# Patient Record
Sex: MALE | Race: WHITE | Employment: OTHER | URBAN - METROPOLITAN AREA
[De-identification: names, ages, dates, MRNs, and addresses within clinical notes are randomized per-mention and may not be internally consistent; named-entity substitution may affect disease eponyms.]

---

## 2018-07-31 ENCOUNTER — APPOINTMENT (OUTPATIENT)
Dept: CT IMAGING | Age: 53
End: 2018-07-31
Payer: COMMERCIAL

## 2018-07-31 ENCOUNTER — APPOINTMENT (OUTPATIENT)
Dept: ULTRASOUND IMAGING | Age: 53
End: 2018-07-31
Payer: COMMERCIAL

## 2018-07-31 ENCOUNTER — HOSPITAL ENCOUNTER (EMERGENCY)
Age: 53
Discharge: HOME | End: 2018-07-31
Attending: EMERGENCY MEDICINE
Payer: COMMERCIAL

## 2018-07-31 VITALS
BODY MASS INDEX: 29.4 KG/M2 | HEIGHT: 71 IN | DIASTOLIC BLOOD PRESSURE: 67 MMHG | SYSTOLIC BLOOD PRESSURE: 108 MMHG | WEIGHT: 210 LBS | TEMPERATURE: 97.5 F | RESPIRATION RATE: 16 BRPM | OXYGEN SATURATION: 98 % | HEART RATE: 95 BPM

## 2018-07-31 DIAGNOSIS — N50.819 TESTICLE PAIN: ICD-10-CM

## 2018-07-31 DIAGNOSIS — F32.0 MILD SINGLE CURRENT EPISODE OF MAJOR DEPRESSIVE DISORDER (HCC): Primary | ICD-10-CM

## 2018-07-31 LAB
ACETAMINOPHEN LEVEL: <5 MCG/ML (ref 10–30)
ALBUMIN SERPL-MCNC: 4.3 G/DL (ref 3.5–5.2)
ALP BLD-CCNC: 73 U/L (ref 40–129)
ALT SERPL-CCNC: 13 U/L (ref 0–40)
AMPHETAMINE SCREEN, URINE: POSITIVE
ANION GAP SERPL CALCULATED.3IONS-SCNC: 12 MMOL/L (ref 7–16)
AST SERPL-CCNC: 12 U/L (ref 0–39)
BACTERIA: ABNORMAL /HPF
BARBITURATE SCREEN URINE: NOT DETECTED
BASOPHILS ABSOLUTE: 0.1 E9/L (ref 0–0.2)
BASOPHILS RELATIVE PERCENT: 0.9 % (ref 0–2)
BENZODIAZEPINE SCREEN, URINE: NOT DETECTED
BILIRUB SERPL-MCNC: 0.3 MG/DL (ref 0–1.2)
BILIRUBIN URINE: ABNORMAL
BLOOD, URINE: NEGATIVE
BUN BLDV-MCNC: 20 MG/DL (ref 6–20)
CALCIUM SERPL-MCNC: 9.4 MG/DL (ref 8.6–10.2)
CANNABINOID SCREEN URINE: NOT DETECTED
CASTS: ABNORMAL /LPF
CHLORIDE BLD-SCNC: 97 MMOL/L (ref 98–107)
CLARITY: CLEAR
CO2: 25 MMOL/L (ref 22–29)
COCAINE METABOLITE SCREEN URINE: NOT DETECTED
COLOR: ABNORMAL
CREAT SERPL-MCNC: 0.9 MG/DL (ref 0.7–1.2)
EKG ATRIAL RATE: 100 BPM
EKG P AXIS: 48 DEGREES
EKG P-R INTERVAL: 182 MS
EKG Q-T INTERVAL: 334 MS
EKG QRS DURATION: 82 MS
EKG QTC CALCULATION (BAZETT): 430 MS
EKG R AXIS: -22 DEGREES
EKG T AXIS: 73 DEGREES
EKG VENTRICULAR RATE: 100 BPM
EOSINOPHILS ABSOLUTE: 0.35 E9/L (ref 0.05–0.5)
EOSINOPHILS RELATIVE PERCENT: 3.2 % (ref 0–6)
ETHANOL: <10 MG/DL (ref 0–0.08)
GFR AFRICAN AMERICAN: >60
GFR NON-AFRICAN AMERICAN: >60 ML/MIN/1.73
GLUCOSE BLD-MCNC: 247 MG/DL (ref 74–109)
GLUCOSE URINE: 250 MG/DL
HCT VFR BLD CALC: 41.4 % (ref 37–54)
HEMOGLOBIN: 14 G/DL (ref 12.5–16.5)
IMMATURE GRANULOCYTES #: 0.03 E9/L
IMMATURE GRANULOCYTES %: 0.3 % (ref 0–5)
KETONES, URINE: NEGATIVE MG/DL
LACTIC ACID: 1.1 MMOL/L (ref 0.5–2.2)
LEUKOCYTE ESTERASE, URINE: NEGATIVE
LIPASE: 104 U/L (ref 13–60)
LYMPHOCYTES ABSOLUTE: 4.1 E9/L (ref 1.5–4)
LYMPHOCYTES RELATIVE PERCENT: 37.7 % (ref 20–42)
MCH RBC QN AUTO: 29.5 PG (ref 26–35)
MCHC RBC AUTO-ENTMCNC: 33.8 % (ref 32–34.5)
MCV RBC AUTO: 87.3 FL (ref 80–99.9)
METHADONE SCREEN, URINE: NOT DETECTED
MONOCYTES ABSOLUTE: 0.93 E9/L (ref 0.1–0.95)
MONOCYTES RELATIVE PERCENT: 8.5 % (ref 2–12)
MUCUS: PRESENT
NEUTROPHILS ABSOLUTE: 5.37 E9/L (ref 1.8–7.3)
NEUTROPHILS RELATIVE PERCENT: 49.4 % (ref 43–80)
NITRITE, URINE: NEGATIVE
OPIATE SCREEN URINE: NOT DETECTED
PDW BLD-RTO: 12.6 FL (ref 11.5–15)
PH UA: 5.5 (ref 5–9)
PHENCYCLIDINE SCREEN URINE: NOT DETECTED
PLATELET # BLD: 398 E9/L (ref 130–450)
PMV BLD AUTO: 8.7 FL (ref 7–12)
POTASSIUM SERPL-SCNC: 4.3 MMOL/L (ref 3.5–5)
PROPOXYPHENE SCREEN: NOT DETECTED
PROTEIN UA: >=300 MG/DL
RBC # BLD: 4.74 E12/L (ref 3.8–5.8)
RBC UA: ABNORMAL /HPF (ref 0–2)
SALICYLATE, SERUM: <0.3 MG/DL (ref 0–30)
SODIUM BLD-SCNC: 134 MMOL/L (ref 132–146)
SPECIFIC GRAVITY UA: >=1.03 (ref 1–1.03)
TOTAL PROTEIN: 7.1 G/DL (ref 6.4–8.3)
TRICYCLIC ANTIDEPRESSANTS SCREEN SERUM: NEGATIVE NG/ML
TSH SERPL DL<=0.05 MIU/L-ACNC: 2.62 UIU/ML (ref 0.27–4.2)
UROBILINOGEN, URINE: 1 E.U./DL
WBC # BLD: 10.9 E9/L (ref 4.5–11.5)
WBC UA: ABNORMAL /HPF (ref 0–5)

## 2018-07-31 PROCEDURE — 93005 ELECTROCARDIOGRAM TRACING: CPT | Performed by: NURSE PRACTITIONER

## 2018-07-31 PROCEDURE — 99283 EMERGENCY DEPT VISIT LOW MDM: CPT

## 2018-07-31 PROCEDURE — 6360000002 HC RX W HCPCS: Performed by: EMERGENCY MEDICINE

## 2018-07-31 PROCEDURE — 2580000003 HC RX 258

## 2018-07-31 PROCEDURE — 74176 CT ABD & PELVIS W/O CONTRAST: CPT

## 2018-07-31 PROCEDURE — 80307 DRUG TEST PRSMV CHEM ANLYZR: CPT

## 2018-07-31 PROCEDURE — 83690 ASSAY OF LIPASE: CPT

## 2018-07-31 PROCEDURE — G0480 DRUG TEST DEF 1-7 CLASSES: HCPCS

## 2018-07-31 PROCEDURE — 81001 URINALYSIS AUTO W/SCOPE: CPT

## 2018-07-31 PROCEDURE — 84443 ASSAY THYROID STIM HORMONE: CPT

## 2018-07-31 PROCEDURE — 36415 COLL VENOUS BLD VENIPUNCTURE: CPT

## 2018-07-31 PROCEDURE — 80053 COMPREHEN METABOLIC PANEL: CPT

## 2018-07-31 PROCEDURE — 76870 US EXAM SCROTUM: CPT

## 2018-07-31 PROCEDURE — 96372 THER/PROPH/DIAG INJ SC/IM: CPT

## 2018-07-31 PROCEDURE — 83605 ASSAY OF LACTIC ACID: CPT

## 2018-07-31 PROCEDURE — 6370000000 HC RX 637 (ALT 250 FOR IP): Performed by: EMERGENCY MEDICINE

## 2018-07-31 PROCEDURE — 85025 COMPLETE CBC W/AUTO DIFF WBC: CPT

## 2018-07-31 RX ORDER — DOCUSATE SODIUM 100 MG/1
100 CAPSULE, LIQUID FILLED ORAL 2 TIMES DAILY
Qty: 20 CAPSULE | Refills: 0 | Status: ON HOLD | OUTPATIENT
Start: 2018-07-31 | End: 2020-07-31 | Stop reason: HOSPADM

## 2018-07-31 RX ORDER — IBUPROFEN 800 MG/1
800 TABLET ORAL ONCE
Status: COMPLETED | OUTPATIENT
Start: 2018-07-31 | End: 2018-07-31

## 2018-07-31 RX ORDER — CEFTRIAXONE 1 G/1
1 INJECTION, POWDER, FOR SOLUTION INTRAMUSCULAR; INTRAVENOUS ONCE
Status: COMPLETED | OUTPATIENT
Start: 2018-07-31 | End: 2018-07-31

## 2018-07-31 RX ORDER — CEPHALEXIN 500 MG/1
500 CAPSULE ORAL 3 TIMES DAILY
Qty: 21 CAPSULE | Refills: 0 | Status: SHIPPED | OUTPATIENT
Start: 2018-07-31 | End: 2018-08-07

## 2018-07-31 RX ORDER — NAPROXEN 500 MG/1
500 TABLET ORAL 2 TIMES DAILY
Qty: 14 TABLET | Refills: 0 | Status: SHIPPED | OUTPATIENT
Start: 2018-07-31 | End: 2018-12-11

## 2018-07-31 RX ORDER — DOXYCYCLINE HYCLATE 100 MG
100 TABLET ORAL 2 TIMES DAILY
Qty: 29 TABLET | Refills: 0 | Status: SHIPPED | OUTPATIENT
Start: 2018-07-31 | End: 2018-08-10

## 2018-07-31 RX ADMIN — IBUPROFEN 800 MG: 800 TABLET ORAL at 21:54

## 2018-07-31 RX ADMIN — WATER 10 ML: 1 INJECTION INTRAMUSCULAR; INTRAVENOUS; SUBCUTANEOUS at 21:57

## 2018-07-31 RX ADMIN — CEFTRIAXONE SODIUM 1 G: 1 INJECTION, POWDER, FOR SOLUTION INTRAMUSCULAR; INTRAVENOUS at 21:54

## 2018-07-31 ASSESSMENT — PAIN DESCRIPTION - PAIN TYPE: TYPE: ACUTE PAIN

## 2018-07-31 ASSESSMENT — PAIN SCALES - GENERAL
PAINLEVEL_OUTOF10: 9
PAINLEVEL_OUTOF10: 7

## 2018-07-31 ASSESSMENT — PAIN DESCRIPTION - DESCRIPTORS: DESCRIPTORS: DISCOMFORT

## 2018-07-31 ASSESSMENT — PAIN DESCRIPTION - LOCATION: LOCATION: GROIN

## 2018-07-31 NOTE — ED NOTES
FIRST PROVIDER CONTACT ASSESSMENT NOTE      Department of Emergency Medicine   7/31/18  7:24 PM    Chief Complaint: Groin Pain (complains of groin pain and lower abd pain )      History of Present Illness:    Taiwo Ross is a 46 y.o. male who presents to the ED by private car for Over 1 week history of groin and scrotal pain. Patient reports he was seen by his doctor for that and he said he has enlarged lymph nodes reports being on antibiotic. Patient also reports feeling very anxious but denies suicidal or homicidal ideations. Focused Screening Exam:  Constitutional:  Alert, appears stated age and is in no distress. *ALLERGIES*     Adhesive tape; Cymbalta [duloxetine hcl];  Lyrica [pregabalin]; and Tramadol     ED Triage Vitals [07/31/18 1923]   BP Temp Temp Source Pulse Resp SpO2 Height Weight   -- 97.5 °F (36.4 °C) Temporal 108 -- -- -- --        Initial Plan of Care:  Initiate Treatment-Testing, Proceed toTreatment Area When Bed Available for ED Attending/MLP to Continue Care    -----------------END OF FIRST PROVIDER CONTACT ASSESSMENT NOTE--------------  Electronically signed by FRANCIE Crane CNP   DD: 7/31/18         FRANCIE Crane CNP  07/31/18 1926

## 2018-08-01 NOTE — ED PROVIDER NOTES
Department of Emergency Medicine   ED  Provider Note  Admit Date/RoomTime: 7/31/2018  9:02 PM  ED Room: 10/10          History of Present Illness:  7/31/18, Time: 9:37 PM         Amber Cramer is a 46 y.o. male presenting to the ED for groin pain and psychiatric evaluation, beginning a couple days ago. The complaint has been persistent, moderate in severity, and worsened by nothing. The pt reports having lower abdominal pain and testicular pain. He states he has an infection to his groin area and is currently on Levaquin. He complains of pain and new onset of hematuria today. Pt also reports feeling depressed and very stressed recently. He denies any suicidal ideations or homicidal ideations. He denies any alcohol use or drug use. Pt denies any syncope, weakness, numbness, headache, fever, chills, cough, chest pain, sob, nausea, emesis, diarrhea, or any further complaints at this time. Review of Systems:   Pertinent positives and negatives are stated within HPI, all other systems reviewed and are negative.      --------------------------------------------- PAST HISTORY ---------------------------------------------  Past Medical History:  has a past medical history of Chronic pain; Depression; Osteoarthritis; and Type II or unspecified type diabetes mellitus without mention of complication, not stated as uncontrolled. Past Surgical History:  has a past surgical history that includes Varicose vein surgery and Arm Surgery. Social History:  reports that he has been smoking. He has a 15.00 pack-year smoking history. He has never used smokeless tobacco. He reports that he drinks alcohol. He reports that he uses drugs, including Marijuana. Family History: family history includes COPD in his father; Diabetes in his paternal grandfather. The patients home medications have been reviewed. Allergies: Adhesive tape; Cymbalta [duloxetine hcl]; Lidocaine; Lyrica [pregabalin];  Strattera [atomoxetine]; and Tramadol      ---------------------------------------------------PHYSICAL EXAM--------------------------------------    Constitutional/General: Alert and oriented x3, depressed appearing, non toxic in NAD  Head: Normocephalic and atraumatic  Eyes: PERRL, EOMI, conjunctiva normal  Mouth: Oropharynx clear, handling secretions, no asymmetry of the posterior oropharynx or uvular edema  Neck: Supple, full ROM, no meningeal signs  Respiratory: Lungs clear to auscultation bilaterally, no wheezes, rales, or rhonchi. Not in respiratory distress  Cardiovascular:  Regular rate. Regular rhythm. No murmurs, gallops, or rubs. 2+ distal pulses  GI:  Abdomen Soft, Diffuse lower abdominal tenderness, Non distended. +BS. No rebound, guarding, or rigidity. No pulsatile masses. Musculoskeletal: Moves all extremities x 4. Warm and well perfused  Integument: skin warm and dry. No rashes. Neurologic: GCS 15, no focal deficits, symmetric strength in the upper and lower extremities bilaterally  Psychiatric: Depressed. -SI. -HI. Genitourinary: Normal external exam. Nonspecific scrotal tenderness and no erythema. No scrotal edema. No penile discharge.      -------------------------------------------------- RESULTS -------------------------------------------------  I have personally reviewed all laboratory and imaging results for this patient. Results are listed below.      LABS:  Results for orders placed or performed during the hospital encounter of 07/31/18   CBC Auto Differential   Result Value Ref Range    WBC 10.9 4.5 - 11.5 E9/L    RBC 4.74 3.80 - 5.80 E12/L    Hemoglobin 14.0 12.5 - 16.5 g/dL    Hematocrit 41.4 37.0 - 54.0 %    MCV 87.3 80.0 - 99.9 fL    MCH 29.5 26.0 - 35.0 pg    MCHC 33.8 32.0 - 34.5 %    RDW 12.6 11.5 - 15.0 fL    Platelets 886 576 - 401 E9/L    MPV 8.7 7.0 - 12.0 fL    Neutrophils % 49.4 43.0 - 80.0 %    Immature Granulocytes % 0.3 0.0 - 5.0 %    Lymphocytes % 37.7 20.0 - 42.0 %    Monocytes % 8.5 2.0 - Wt 210 lb (95.3 kg)   SpO2 98%   BMI 29.29 kg/m²   Oxygen Saturation Interpretation: Normal    The patients available past medical records and past encounters were reviewed. ------------------------------ ED COURSE/MEDICAL DECISION MAKING----------------------  Medications   ibuprofen (ADVIL;MOTRIN) tablet 800 mg (800 mg Oral Given 7/31/18 2154)   cefTRIAXone (ROCEPHIN) injection 1 g (1 g Intramuscular Given 7/31/18 2154)   sterile water injection (10 mLs  Given 7/31/18 2157)       Medical Decision Making:    Pt presents for groin pain and psychiatric evaluation. Medication given. US scrotum/testicles, CT A/P, labs, and EKG obtained and reviewed. Results discussed with pt. Stable for discharge. Prescribed Naproxen, Colace, Keflex, and Doxycycline Hyclate. Advised follow-up with urology. This patient's ED course included: a personal history and physicial examination and re-evaluation prior to disposition    This patient has remained hemodynamically stable during their ED course. Counseling: The emergency provider has spoken with the patient and discussed todays results, in addition to providing specific details for the plan of care and counseling regarding the diagnosis and prognosis. Questions are answered at this time and they are agreeable with the plan.       --------------------------------- IMPRESSION AND DISPOSITION ---------------------------------    IMPRESSION  1. Mild single current episode of major depressive disorder ([facility] [address]    2. Testicle pain        DISPOSITION  Disposition: Discharge to home  Patient condition is stable    7/31/18, 9:37 PM.    This note is prepared by Joceline Osorio, acting as Scribe for Tyrell Gong MD.    Tyrell Gong MD:  The scribe's documentation has been prepared under my direction and personally reviewed by me in its entirety.   I confirm that the note above accurately reflects all work, treatment, procedures, and medical decision making performed by me.              Clemente Bergman MD  08/01/18 0195

## 2018-08-05 LAB
AMPHETAMINES, URINE: <200 NG/ML
METHAMPHETAMINE, URINE: <200 NG/ML
METHYLENEDIOXYAMPHETAMINE, UR: <200 NG/ML
METHYLENEDIOXYETHYLAMPHETAMINE, UR: <200 NG/ML
METHYLENEDIOXYMETHAMPHETAMINE, UR: <200 NG/ML

## 2018-09-13 ENCOUNTER — PREP FOR PROCEDURE (OUTPATIENT)
Dept: SURGERY | Age: 53
End: 2018-09-13

## 2018-09-13 RX ORDER — ONDANSETRON 4 MG/1
TABLET, ORALLY DISINTEGRATING ORAL
Refills: 0 | COMMUNITY
Start: 2018-08-29 | End: 2018-09-13

## 2018-09-13 RX ORDER — SODIUM CHLORIDE 9 MG/ML
INJECTION, SOLUTION INTRAVENOUS CONTINUOUS
Status: CANCELLED | OUTPATIENT
Start: 2018-09-13 | End: 2019-09-13

## 2018-09-13 RX ORDER — SENNOSIDES 15 MG
TABLET ORAL
Refills: 0 | COMMUNITY
Start: 2018-08-29 | End: 2019-05-07

## 2018-09-13 RX ORDER — TOPIRAMATE 25 MG/1
TABLET ORAL
Refills: 0 | COMMUNITY
Start: 2018-06-28 | End: 2018-09-13

## 2018-09-13 RX ORDER — FAMOTIDINE 20 MG/1
TABLET, FILM COATED ORAL
Refills: 0 | COMMUNITY
Start: 2018-08-29 | End: 2018-09-13

## 2018-09-20 ENCOUNTER — HOSPITAL ENCOUNTER (OUTPATIENT)
Age: 53
Setting detail: OUTPATIENT SURGERY
Discharge: HOME | End: 2018-09-20
Attending: SURGERY | Admitting: SURGERY
Payer: COMMERCIAL

## 2018-09-20 ENCOUNTER — ANESTHESIA (OUTPATIENT)
Dept: ENDOSCOPY | Age: 53
End: 2018-09-20
Payer: COMMERCIAL

## 2018-09-20 ENCOUNTER — ANESTHESIA EVENT (OUTPATIENT)
Dept: ENDOSCOPY | Age: 53
End: 2018-09-20
Payer: COMMERCIAL

## 2018-09-20 VITALS
HEIGHT: 71 IN | TEMPERATURE: 98.1 F | OXYGEN SATURATION: 98 % | WEIGHT: 212 LBS | SYSTOLIC BLOOD PRESSURE: 120 MMHG | HEART RATE: 78 BPM | DIASTOLIC BLOOD PRESSURE: 78 MMHG | BODY MASS INDEX: 29.68 KG/M2 | RESPIRATION RATE: 17 BRPM

## 2018-09-20 VITALS
SYSTOLIC BLOOD PRESSURE: 100 MMHG | OXYGEN SATURATION: 96 % | DIASTOLIC BLOOD PRESSURE: 63 MMHG | RESPIRATION RATE: 28 BRPM

## 2018-09-20 DIAGNOSIS — Z79.4 INSULIN-REQUIRING OR DEPENDENT TYPE II DIABETES MELLITUS (HCC): Primary | ICD-10-CM

## 2018-09-20 DIAGNOSIS — E11.9 INSULIN-REQUIRING OR DEPENDENT TYPE II DIABETES MELLITUS (HCC): Primary | ICD-10-CM

## 2018-09-20 LAB — METER GLUCOSE: 189 MG/DL (ref 70–110)

## 2018-09-20 PROCEDURE — 6360000002 HC RX W HCPCS: Performed by: NURSE ANESTHETIST, CERTIFIED REGISTERED

## 2018-09-20 PROCEDURE — 2720000010 HC SURG SUPPLY STERILE: Performed by: SURGERY

## 2018-09-20 PROCEDURE — 2580000003 HC RX 258: Performed by: NURSE ANESTHETIST, CERTIFIED REGISTERED

## 2018-09-20 PROCEDURE — 3700000000 HC ANESTHESIA ATTENDED CARE: Performed by: SURGERY

## 2018-09-20 PROCEDURE — C1773 RET DEV, INSERTABLE: HCPCS | Performed by: SURGERY

## 2018-09-20 PROCEDURE — 3609010600 HC COLONOSCOPY POLYPECTOMY SNARE/COLD BIOPSY: Performed by: SURGERY

## 2018-09-20 PROCEDURE — 88305 TISSUE EXAM BY PATHOLOGIST: CPT

## 2018-09-20 PROCEDURE — 2580000003 HC RX 258: Performed by: SURGERY

## 2018-09-20 PROCEDURE — 7100000011 HC PHASE II RECOVERY - ADDTL 15 MIN: Performed by: SURGERY

## 2018-09-20 PROCEDURE — 82962 GLUCOSE BLOOD TEST: CPT

## 2018-09-20 PROCEDURE — 3700000001 HC ADD 15 MINUTES (ANESTHESIA): Performed by: SURGERY

## 2018-09-20 PROCEDURE — 3609010300 HC COLONOSCOPY W/BIOPSY SINGLE/MULTIPLE: Performed by: SURGERY

## 2018-09-20 PROCEDURE — 7100000010 HC PHASE II RECOVERY - FIRST 15 MIN: Performed by: SURGERY

## 2018-09-20 RX ORDER — SODIUM CHLORIDE 9 MG/ML
INJECTION, SOLUTION INTRAVENOUS CONTINUOUS
Status: DISCONTINUED | OUTPATIENT
Start: 2018-09-20 | End: 2018-09-20 | Stop reason: HOSPADM

## 2018-09-20 RX ORDER — SODIUM CHLORIDE 9 MG/ML
INJECTION, SOLUTION INTRAVENOUS CONTINUOUS PRN
Status: DISCONTINUED | OUTPATIENT
Start: 2018-09-20 | End: 2018-09-20 | Stop reason: SDUPTHER

## 2018-09-20 RX ORDER — PROPOFOL 10 MG/ML
INJECTION, EMULSION INTRAVENOUS PRN
Status: DISCONTINUED | OUTPATIENT
Start: 2018-09-20 | End: 2018-09-20 | Stop reason: SDUPTHER

## 2018-09-20 RX ORDER — 0.9 % SODIUM CHLORIDE 0.9 %
10 VIAL (ML) INJECTION EVERY 12 HOURS SCHEDULED
Status: DISCONTINUED | OUTPATIENT
Start: 2018-09-20 | End: 2018-09-20 | Stop reason: HOSPADM

## 2018-09-20 RX ORDER — 0.9 % SODIUM CHLORIDE 0.9 %
10 VIAL (ML) INJECTION PRN
Status: DISCONTINUED | OUTPATIENT
Start: 2018-09-20 | End: 2018-09-20 | Stop reason: HOSPADM

## 2018-09-20 RX ADMIN — SODIUM CHLORIDE: 9 INJECTION, SOLUTION INTRAVENOUS at 08:53

## 2018-09-20 RX ADMIN — PROPOFOL 50 MG: 10 INJECTION, EMULSION INTRAVENOUS at 09:25

## 2018-09-20 RX ADMIN — PROPOFOL 40 MG: 10 INJECTION, EMULSION INTRAVENOUS at 09:20

## 2018-09-20 RX ADMIN — PROPOFOL 50 MG: 10 INJECTION, EMULSION INTRAVENOUS at 09:15

## 2018-09-20 RX ADMIN — PROPOFOL 150 MG: 10 INJECTION, EMULSION INTRAVENOUS at 09:00

## 2018-09-20 RX ADMIN — PROPOFOL 40 MG: 10 INJECTION, EMULSION INTRAVENOUS at 09:22

## 2018-09-20 RX ADMIN — SODIUM CHLORIDE: 9 INJECTION, SOLUTION INTRAVENOUS at 09:01

## 2018-09-20 RX ADMIN — PROPOFOL 50 MG: 10 INJECTION, EMULSION INTRAVENOUS at 09:10

## 2018-09-20 RX ADMIN — PROPOFOL 60 MG: 10 INJECTION, EMULSION INTRAVENOUS at 09:05

## 2018-09-20 ASSESSMENT — PAIN - FUNCTIONAL ASSESSMENT: PAIN_FUNCTIONAL_ASSESSMENT: 0-10

## 2018-09-20 ASSESSMENT — PAIN SCALES - GENERAL
PAINLEVEL_OUTOF10: 0

## 2018-09-20 ASSESSMENT — LIFESTYLE VARIABLES: SMOKING_STATUS: 1

## 2018-09-20 NOTE — ANESTHESIA PRE PROCEDURE
Department of Anesthesiology  Preprocedure Note       Name:  Madeleine Sandhoff   Age:  46 y.o.  :  1965                                          MRN:  [id number]         Date:  2018      Surgeon: Raquel Sanchez):  Maddie Franks MD    Procedure: Procedure(s):  COLONOSCOPY DIAGNOSTIC    Medications prior to admission:   Prior to Admission medications    Medication Sig Start Date End Date Taking? Authorizing Provider   docusate sodium (COLACE) 100 MG capsule Take 1 capsule by mouth 2 times daily 18  Yes Yadira Thomas MD   insulin glargine (LANTUS SOLOSTAR) 100 UNIT/ML injection pen Inject 30 Units into the skin 2 times daily. Indications: Insulin-Resistant Diabetes  Patient taking differently: Inject 20 Units into the skin 2 times daily  14  Yes Annette Velez MD   Multiple Vitamin (MULTI VITAMIN MENS PO) Take by mouth daily    Yes Historical Provider, MD   RA LAXATIVE 25 MG TABS take 2 tablets by mouth four times a day for 3 days 18   Historical Provider, MD   naproxen (NAPROSYN) 500 MG tablet Take 1 tablet by mouth 2 times daily for 7 days 18  Yadira Thomas MD   ketorolac (TORADOL) 10 MG tablet Take 1 tablet by mouth every 6 hours as needed for Pain for up to 5 days. 1/27/15 2/1/15  Annette Velez MD   Insulin Syringes, Disposable, U-100 0.3 ML MISC 1 each by Does not apply route daily. Indications: Insulin-Resistant Diabetes 14   Annette Velez MD   pravastatin (PRAVACHOL) 40 MG tablet Take 1 tablet by mouth daily. Indications: High Amount of Fats in the Blood  Patient not taking: Reported on 14   Annette Velez MD   Insulin Pen Needle 31G X 5 MM MISC 1 each by Does not apply route daily.  Indications: Insulin-Resistant Diabetes, Type 2 Diabetes 14   Annette Velez MD       Current medications:    Current Facility-Administered Medications   Medication Dose Route Frequency Provider Last Rate Last Dose    0.9 % sodium chloride infusion   Intravenous Continuous Valeria Dockery MD        sodium chloride (PF) 0.9 % injection 10 mL  10 mL Intravenous 2 times per day Valeria Dockery MD        sodium chloride (PF) 0.9 % injection 10 mL  10 mL Intravenous PRN Valeria Dockery MD           Allergies:     Allergies   Allergen Reactions    Adhesive Tape Hives    Cymbalta [Duloxetine Hcl]     Lyrica [Pregabalin]     Strattera [Atomoxetine]     Tramadol Other (See Comments)     With psych medicine    Lidocaine Rash and Swelling       Problem List:    Patient Active Problem List   Diagnosis Code    Acute on chronic neck pain M54.2, G89.29    Chronic low back pain M54.5, G89.29    Diabetic neuropathy (HCC) E11.40    Anxiety and depression F41.9, F32.9    Hyperlipidemia E78.5    Insulin-requiring or dependent type II diabetes mellitus ([facility] [address] E11.9, Z79.4    Mild single current episode of major depressive disorder ([facility] [address] F32.0    Testicle pain N50.819       Past Medical History:        Diagnosis Date    Chronic pain     Depression     Osteoarthritis     Sleep apnea     Type II or unspecified type diabetes mellitus without mention of complication, not stated as uncontrolled        Past Surgical History:        Procedure Laterality Date    ARM SURGERY      left arm    VARICOSE VEIN SURGERY         Social History:    Social History   Substance Use Topics    Smoking status: Current Every Day Smoker     Packs/day: 0.50     Years: 30.00    Smokeless tobacco: Never Used      Comment: Patient counseled to quit smoking     Alcohol use No      Comment: none since 2017                                Ready to quit: Not Answered  Counseling given: Not Answered      Vital Signs (Current):   Vitals:    09/13/18 1159   Weight: 212 lb (96.2 kg)   Height: 5' 11\" (1.803 m)                                              BP Readings from Last 3 Encounters:   07/31/18 108/67   01/30/15 140/90 01/27/15 128/78       NPO Status: Time of last liquid consumption: 2330                        Time of last solid consumption: 2330                        Date of last liquid consumption: 09/19/18                        Date of last solid food consumption: 09/19/18    BMI:   Wt Readings from Last 3 Encounters:   09/13/18 212 lb (96.2 kg)   07/31/18 210 lb (95.3 kg)   01/30/15 249 lb (112.9 kg)     Body mass index is 29.57 kg/m². CBC:   Lab Results   Component Value Date    WBC 10.9 07/31/2018    RBC 4.74 07/31/2018    HGB 14.0 07/31/2018    HCT 41.4 07/31/2018    MCV 87.3 07/31/2018    RDW 12.6 07/31/2018     07/31/2018       CMP:   Lab Results   Component Value Date     07/31/2018    K 4.3 07/31/2018    CL 97 07/31/2018    CO2 25 07/31/2018    BUN 20 07/31/2018    CREATININE 0.9 07/31/2018    GFRAA >60 07/31/2018    LABGLOM >60 07/31/2018    GLUCOSE 247 07/31/2018    PROT 7.1 07/31/2018    CALCIUM 9.4 07/31/2018    BILITOT 0.3 07/31/2018    ALKPHOS 73 07/31/2018    AST 12 07/31/2018    ALT 13 07/31/2018       POC Tests: No results for input(s): POCGLU, POCNA, POCK, POCCL, POCBUN, POCHEMO, POCHCT in the last 72 hours.     Coags:   Lab Results   Component Value Date    PROTIME 10.5 01/20/2015    INR 1.0 01/20/2015    APTT 31.9 01/20/2015       HCG (If Applicable): No results found for: PREGTESTUR, PREGSERUM, HCG, HCGQUANT     ABGs: No results found for: PHART, PO2ART, IZR0DEI, RDQ7WIQ, BEART, M4WETDDG     Type & Screen (If Applicable):  No results found for: LABABO, LABRH      EKG 7/31/2018  Normal sinus rhythm  Possible Left atrial enlargement  Septal infarct , age undetermined  Abnormal ECG  No previous ECGs available    Anesthesia Evaluation  Patient summary reviewed and Nursing notes reviewed no history of anesthetic complications:   Airway: Mallampati: II  TM distance: >3 FB   Neck ROM: full  Mouth opening: > = 3 FB Dental: normal exam     Comment: Pt denies loose, missing, or chipped teeth

## 2018-09-20 NOTE — PROGRESS NOTES
Fluids given to patient.  Lavonne Gonsalez
Patient passing gas.  Lavonne Gonsalez
Tolerating fluids well.  Lavonne Gonsalez
Tolerating fluids well.  Lavonne Gonsalez
juice or take a glucose tablet. The morning of your procedure, you may call the pre-op area if you have concerns about your blood sugar [phone]. [] Use your inhalers the morning of surgery. Bring your emergency inhaler with you day of surgery. [] Follow physician instructions regarding any blood thinners you may be taking. WHAT TO EXPECT:  [x] The day of surgery you will be greeted and  checked in by the [employer] .  A nurse will greet you in accordance to the time you are needed in the pre-op area to prepare you for surgery. Please do not be discouraged if you are not greeted in the order you arrive as there are many variables that are involved in patient preparation. Your patience is greatly appreciated as you wait for your nurse. Please bring in items such as: books, magazines, newspapers, electronics, or any other items  to occupy your time in the waiting area. [x]  Delays may occur with surgery and staff will make a sincere effort to keep you informed of delays. If any delays occur with your procedure, we apologize ahead of time for your inconvenience as we recognize the value of your time.

## 2018-09-21 NOTE — OP NOTE
Pre oop dx: rectal bleeding    Post op: colon polyps    Procedure, colonoscopy and polypectomy. Findings:  2 polyps in sigmoid colon removed with snare. Dictated. Tolerated well. To RR stable. Follow up in office in 1 week.     Wendy Friedman MD FACS
and the entire scope was pulled out and removed. Once the polyp had  been dislodged from the snare, the colonoscope was reinserted all the way  up to the polypectomy site and was carefully removed inspecting the distal  colon. No other abnormalities were found. Scope was removed after  approximately 12 minutes and the procedure was considered terminated. RECOMMENDATIONS: In view of findings, we will await the pathology report,  and based on that we will make further recommendation for followup.         Sydney Bah    D: 09/20/2018 9:37:37       T: 09/20/2018 12:21:03     [email or web address]  Job#: [id number]     Doc#: [id number]    CC:

## 2018-11-02 ENCOUNTER — APPOINTMENT (OUTPATIENT)
Dept: CT IMAGING | Age: 53
End: 2018-11-02
Payer: COMMERCIAL

## 2018-11-02 ENCOUNTER — APPOINTMENT (OUTPATIENT)
Dept: GENERAL RADIOLOGY | Age: 53
End: 2018-11-02
Payer: COMMERCIAL

## 2018-11-02 ENCOUNTER — HOSPITAL ENCOUNTER (EMERGENCY)
Age: 53
Discharge: HOME | End: 2018-11-03
Attending: EMERGENCY MEDICINE
Payer: COMMERCIAL

## 2018-11-02 DIAGNOSIS — K76.9 LIVER DISORDER: ICD-10-CM

## 2018-11-02 DIAGNOSIS — E86.0 DEHYDRATION: Primary | ICD-10-CM

## 2018-11-02 DIAGNOSIS — R10.9 ABDOMINAL PAIN, UNSPECIFIED ABDOMINAL LOCATION: ICD-10-CM

## 2018-11-02 LAB
ALBUMIN SERPL-MCNC: 4.3 G/DL (ref 3.5–5.2)
ALP BLD-CCNC: 85 U/L (ref 40–129)
ALT SERPL-CCNC: 13 U/L (ref 0–40)
ANION GAP SERPL CALCULATED.3IONS-SCNC: 13 MMOL/L (ref 7–16)
AST SERPL-CCNC: 13 U/L (ref 0–39)
BACTERIA: NORMAL /HPF
BASOPHILS ABSOLUTE: 0.14 E9/L (ref 0–0.2)
BASOPHILS RELATIVE PERCENT: 1.3 % (ref 0–2)
BILIRUB SERPL-MCNC: 0.3 MG/DL (ref 0–1.2)
BILIRUBIN URINE: NEGATIVE
BLOOD, URINE: NEGATIVE
BUN BLDV-MCNC: 18 MG/DL (ref 6–20)
CALCIUM SERPL-MCNC: 9.9 MG/DL (ref 8.6–10.2)
CASTS: NORMAL /LPF
CHLORIDE BLD-SCNC: 100 MMOL/L (ref 98–107)
CLARITY: ABNORMAL
CO2: 23 MMOL/L (ref 22–29)
COLOR: YELLOW
CREAT SERPL-MCNC: 1.1 MG/DL (ref 0.7–1.2)
EOSINOPHILS ABSOLUTE: 0.37 E9/L (ref 0.05–0.5)
EOSINOPHILS RELATIVE PERCENT: 3.6 % (ref 0–6)
EPITHELIAL CELLS, UA: NORMAL /HPF
GFR AFRICAN AMERICAN: >60
GFR NON-AFRICAN AMERICAN: >60 ML/MIN/1.73
GLUCOSE BLD-MCNC: 216 MG/DL (ref 74–109)
GLUCOSE URINE: NEGATIVE MG/DL
HCT VFR BLD CALC: 43.7 % (ref 37–54)
HEMOGLOBIN: 14.8 G/DL (ref 12.5–16.5)
IMMATURE GRANULOCYTES #: 0.03 E9/L
IMMATURE GRANULOCYTES %: 0.3 % (ref 0–5)
INFLUENZA A BY PCR: NOT DETECTED
INFLUENZA B BY PCR: NOT DETECTED
KETONES, URINE: NEGATIVE MG/DL
LACTIC ACID: 1.5 MMOL/L (ref 0.5–2.2)
LEUKOCYTE ESTERASE, URINE: NEGATIVE
LIPASE: 45 U/L (ref 13–60)
LYMPHOCYTES ABSOLUTE: 3.78 E9/L (ref 1.5–4)
LYMPHOCYTES RELATIVE PERCENT: 36.3 % (ref 20–42)
MAGNESIUM: 2 MG/DL (ref 1.6–2.6)
MCH RBC QN AUTO: 29.3 PG (ref 26–35)
MCHC RBC AUTO-ENTMCNC: 33.9 % (ref 32–34.5)
MCV RBC AUTO: 86.5 FL (ref 80–99.9)
MONOCYTES ABSOLUTE: 0.97 E9/L (ref 0.1–0.95)
MONOCYTES RELATIVE PERCENT: 9.3 % (ref 2–12)
MUCUS: PRESENT
NEUTROPHILS ABSOLUTE: 5.11 E9/L (ref 1.8–7.3)
NEUTROPHILS RELATIVE PERCENT: 49.2 % (ref 43–80)
NITRITE, URINE: NEGATIVE
PDW BLD-RTO: 12.8 FL (ref 11.5–15)
PH UA: 6 (ref 5–9)
PLATELET # BLD: 437 E9/L (ref 130–450)
PMV BLD AUTO: 8.8 FL (ref 7–12)
POTASSIUM SERPL-SCNC: 4.4 MMOL/L (ref 3.5–5)
PROTEIN UA: 100 MG/DL
RBC # BLD: 5.05 E12/L (ref 3.8–5.8)
RBC UA: NORMAL /HPF (ref 0–2)
SODIUM BLD-SCNC: 136 MMOL/L (ref 132–146)
SPECIFIC GRAVITY UA: 1.02 (ref 1–1.03)
TOTAL PROTEIN: 7.6 G/DL (ref 6.4–8.3)
TROPONIN: <0.01 NG/ML (ref 0–0.03)
UROBILINOGEN, URINE: 1 E.U./DL
WBC # BLD: 10.4 E9/L (ref 4.5–11.5)
WBC UA: NORMAL /HPF (ref 0–5)

## 2018-11-02 PROCEDURE — 84484 ASSAY OF TROPONIN QUANT: CPT

## 2018-11-02 PROCEDURE — 83690 ASSAY OF LIPASE: CPT

## 2018-11-02 PROCEDURE — 96374 THER/PROPH/DIAG INJ IV PUSH: CPT

## 2018-11-02 PROCEDURE — 85025 COMPLETE CBC W/AUTO DIFF WBC: CPT

## 2018-11-02 PROCEDURE — 6360000002 HC RX W HCPCS: Performed by: EMERGENCY MEDICINE

## 2018-11-02 PROCEDURE — 81001 URINALYSIS AUTO W/SCOPE: CPT

## 2018-11-02 PROCEDURE — 87040 BLOOD CULTURE FOR BACTERIA: CPT

## 2018-11-02 PROCEDURE — 6360000004 HC RX CONTRAST MEDICATION: Performed by: RADIOLOGY

## 2018-11-02 PROCEDURE — 83735 ASSAY OF MAGNESIUM: CPT

## 2018-11-02 PROCEDURE — 80053 COMPREHEN METABOLIC PANEL: CPT

## 2018-11-02 PROCEDURE — 36415 COLL VENOUS BLD VENIPUNCTURE: CPT

## 2018-11-02 PROCEDURE — 83605 ASSAY OF LACTIC ACID: CPT

## 2018-11-02 PROCEDURE — 2580000003 HC RX 258: Performed by: EMERGENCY MEDICINE

## 2018-11-02 PROCEDURE — 99284 EMERGENCY DEPT VISIT MOD MDM: CPT

## 2018-11-02 PROCEDURE — 74177 CT ABD & PELVIS W/CONTRAST: CPT

## 2018-11-02 PROCEDURE — 87502 INFLUENZA DNA AMP PROBE: CPT

## 2018-11-02 PROCEDURE — 71045 X-RAY EXAM CHEST 1 VIEW: CPT

## 2018-11-02 PROCEDURE — 96375 TX/PRO/DX INJ NEW DRUG ADDON: CPT

## 2018-11-02 RX ORDER — 0.9 % SODIUM CHLORIDE 0.9 %
1000 INTRAVENOUS SOLUTION INTRAVENOUS ONCE
Status: COMPLETED | OUTPATIENT
Start: 2018-11-02 | End: 2018-11-02

## 2018-11-02 RX ORDER — 0.9 % SODIUM CHLORIDE 0.9 %
1000 INTRAVENOUS SOLUTION INTRAVENOUS ONCE
Status: COMPLETED | OUTPATIENT
Start: 2018-11-02 | End: 2018-11-03

## 2018-11-02 RX ORDER — ONDANSETRON 2 MG/ML
4 INJECTION INTRAMUSCULAR; INTRAVENOUS ONCE
Status: COMPLETED | OUTPATIENT
Start: 2018-11-02 | End: 2018-11-02

## 2018-11-02 RX ORDER — SODIUM CHLORIDE 0.9 % (FLUSH) 0.9 %
10 SYRINGE (ML) INJECTION PRN
Status: DISCONTINUED | OUTPATIENT
Start: 2018-11-02 | End: 2018-11-03 | Stop reason: HOSPADM

## 2018-11-02 RX ORDER — FENTANYL CITRATE 50 UG/ML
50 INJECTION, SOLUTION INTRAMUSCULAR; INTRAVENOUS ONCE
Status: COMPLETED | OUTPATIENT
Start: 2018-11-02 | End: 2018-11-02

## 2018-11-02 RX ADMIN — SODIUM CHLORIDE 1000 ML: 9 INJECTION, SOLUTION INTRAVENOUS at 23:46

## 2018-11-02 RX ADMIN — SODIUM CHLORIDE 1000 ML: 9 INJECTION, SOLUTION INTRAVENOUS at 19:57

## 2018-11-02 RX ADMIN — SODIUM CHLORIDE 1000 ML: 9 INJECTION, SOLUTION INTRAVENOUS at 21:46

## 2018-11-02 RX ADMIN — FENTANYL CITRATE 50 MCG: 50 INJECTION, SOLUTION INTRAMUSCULAR; INTRAVENOUS at 19:57

## 2018-11-02 RX ADMIN — SODIUM CHLORIDE 1000 ML: 9 INJECTION, SOLUTION INTRAVENOUS at 23:04

## 2018-11-02 RX ADMIN — ONDANSETRON 4 MG: 2 INJECTION INTRAMUSCULAR; INTRAVENOUS at 19:57

## 2018-11-02 RX ADMIN — IOPAMIDOL 110 ML: 755 INJECTION, SOLUTION INTRAVENOUS at 20:50

## 2018-11-02 ASSESSMENT — PAIN SCALES - GENERAL: PAINLEVEL_OUTOF10: 9

## 2018-11-03 VITALS
TEMPERATURE: 98 F | RESPIRATION RATE: 18 BRPM | OXYGEN SATURATION: 95 % | WEIGHT: 220 LBS | SYSTOLIC BLOOD PRESSURE: 160 MMHG | BODY MASS INDEX: 30.8 KG/M2 | DIASTOLIC BLOOD PRESSURE: 98 MMHG | HEIGHT: 71 IN | HEART RATE: 76 BPM

## 2018-11-03 NOTE — ED NOTES
Pt alert oriented skin warm dry resp easy states dizziness better c/o abd pain that's been there for a while and body wide pain abd soft diffusely tender to palp bowel sounds present     Christine Costa RN  11/02/18 8187

## 2018-11-07 LAB — BLOOD CULTURE, ROUTINE: NORMAL

## 2018-11-09 LAB
EKG ATRIAL RATE: 99 BPM
EKG P AXIS: 47 DEGREES
EKG P-R INTERVAL: 180 MS
EKG Q-T INTERVAL: 360 MS
EKG QRS DURATION: 82 MS
EKG QTC CALCULATION (BAZETT): 462 MS
EKG R AXIS: -19 DEGREES
EKG T AXIS: 72 DEGREES
EKG VENTRICULAR RATE: 99 BPM

## 2018-12-04 ENCOUNTER — HOSPITAL ENCOUNTER (OUTPATIENT)
Dept: NEUROLOGY | Age: 53
Discharge: HOME | End: 2018-12-04
Payer: COMMERCIAL

## 2018-12-04 VITALS — BODY MASS INDEX: 29.82 KG/M2 | HEIGHT: 71 IN | WEIGHT: 213 LBS

## 2018-12-04 PROCEDURE — 95886 MUSC TEST DONE W/N TEST COMP: CPT

## 2018-12-04 PROCEDURE — 95911 NRV CNDJ TEST 9-10 STUDIES: CPT

## 2018-12-04 NOTE — PROCEDURES
[address] [facility]  [address] [facility], [address] [place] [address]  Phone: [phone]  Fax: [phone]      Referring Provider: Desirae Rick, Shena COLON Physician: Unknown Provider Result (Inactive)  Patient Name: Jack Francis  Patient YOB: 1965  Gender: male  BMI: There is no height or weight on file to calculate BMI. There were no vitals taken for this visit. 12/4/2018    Description of clinical problem:   No chief complaint on file. Pain Yes   ; Numbness/tingling  Yes; Weakness  No       Brief physical exam:   Sensory deficit Yes; Weakness No; Atrophy  No; Reflex abnormality Yes    Study Limitations:      Summary of Findings:   Nerve conduction studies:   · The following nerve conduction studies were abnormal:   · None  · All other nerve conduction studies listed in the table above were normal in latency, amplitude and conduction velocity. [address]  Electrodiagnostic Laboratory  [facility]        Full Name: Wayne Ortega Gender: Male  MRN: [id number] YOB: 1965  Location[de-identified] [facility]      Visit Date: 12/4/2018 09:21  Age: 46 Years 6 Months Old  Examining Physician: Dr. Tianna Bunn  Referring Physician: Dr. Desirae Rick  Technician: Jodi Leger  Height: 5 feet 11 inch  Weight: 213 lbs  Notes: Polyneuropathy/ Paresthesia BLE (G62.9)      Motor NCS      Nerve / Sites Lat. Amplitude Amp. 1-2 Distance Lat Diff Velocity Temp.    ms mV % cm ms m/s °C   R Peroneal - EDB      Ankle 4.79 5.0 100 8   31.2      Pop fossa 13.44 4.9 97.4 38 8.65 44 31.2   L Peroneal - EDB      Ankle 5.83 2.9 100 8   31.7      Pop fossa 13.75 2.8 97.9  7.92  31.7   R Tibial - AH      Ankle 5.73 2.7 100 8   31      Pop fossa 14.01 2.5 94.4 38 8.28 46 31   L Tibial - AH      Ankle 7.34 2.8 100 8   31.6      Pop fossa 18.07 1.9 66.7  10.73  31.8               Sensory NCS      Nerve / Sites Onset Lat Peak Lat PP Amp Distance

## 2018-12-10 ENCOUNTER — HOSPITAL ENCOUNTER (EMERGENCY)
Age: 53
Discharge: HOME | End: 2018-12-11
Attending: EMERGENCY MEDICINE
Payer: COMMERCIAL

## 2018-12-10 ENCOUNTER — APPOINTMENT (OUTPATIENT)
Dept: GENERAL RADIOLOGY | Age: 53
End: 2018-12-10
Payer: COMMERCIAL

## 2018-12-10 ENCOUNTER — APPOINTMENT (OUTPATIENT)
Dept: CT IMAGING | Age: 53
End: 2018-12-10
Payer: COMMERCIAL

## 2018-12-10 VITALS
OXYGEN SATURATION: 96 % | WEIGHT: 210 LBS | TEMPERATURE: 97.5 F | HEART RATE: 96 BPM | SYSTOLIC BLOOD PRESSURE: 140 MMHG | BODY MASS INDEX: 29.29 KG/M2 | DIASTOLIC BLOOD PRESSURE: 89 MMHG | RESPIRATION RATE: 17 BRPM

## 2018-12-10 DIAGNOSIS — K59.00 CONSTIPATION, UNSPECIFIED CONSTIPATION TYPE: ICD-10-CM

## 2018-12-10 DIAGNOSIS — R07.81 RIB PAIN ON LEFT SIDE: Primary | ICD-10-CM

## 2018-12-10 LAB
ALBUMIN SERPL-MCNC: 4.4 G/DL (ref 3.5–5.2)
ALP BLD-CCNC: 98 U/L (ref 40–129)
ALT SERPL-CCNC: 20 U/L (ref 0–40)
ANION GAP SERPL CALCULATED.3IONS-SCNC: 12 MMOL/L (ref 7–16)
AST SERPL-CCNC: 17 U/L (ref 0–39)
BACTERIA: ABNORMAL /HPF
BASOPHILS ABSOLUTE: 0.12 E9/L (ref 0–0.2)
BASOPHILS RELATIVE PERCENT: 1.3 % (ref 0–2)
BILIRUB SERPL-MCNC: 0.3 MG/DL (ref 0–1.2)
BILIRUBIN URINE: NEGATIVE
BLOOD, URINE: NEGATIVE
BUN BLDV-MCNC: 10 MG/DL (ref 6–20)
CALCIUM SERPL-MCNC: 9.5 MG/DL (ref 8.6–10.2)
CHLORIDE BLD-SCNC: 98 MMOL/L (ref 98–107)
CLARITY: CLEAR
CO2: 26 MMOL/L (ref 22–29)
COLOR: YELLOW
CREAT SERPL-MCNC: 0.8 MG/DL (ref 0.7–1.2)
EKG ATRIAL RATE: 82 BPM
EKG P AXIS: 65 DEGREES
EKG P-R INTERVAL: 176 MS
EKG Q-T INTERVAL: 382 MS
EKG QRS DURATION: 86 MS
EKG QTC CALCULATION (BAZETT): 446 MS
EKG R AXIS: 5 DEGREES
EKG T AXIS: 32 DEGREES
EKG VENTRICULAR RATE: 82 BPM
EOSINOPHILS ABSOLUTE: 0.25 E9/L (ref 0.05–0.5)
EOSINOPHILS RELATIVE PERCENT: 2.8 % (ref 0–6)
GFR AFRICAN AMERICAN: >60
GFR NON-AFRICAN AMERICAN: >60 ML/MIN/1.73
GLUCOSE BLD-MCNC: 248 MG/DL (ref 74–99)
GLUCOSE URINE: 500 MG/DL
HCT VFR BLD CALC: 41.2 % (ref 37–54)
HEMOGLOBIN: 13.6 G/DL (ref 12.5–16.5)
IMMATURE GRANULOCYTES #: 0.03 E9/L
IMMATURE GRANULOCYTES %: 0.3 % (ref 0–5)
KETONES, URINE: NEGATIVE MG/DL
LEUKOCYTE ESTERASE, URINE: NEGATIVE
LYMPHOCYTES ABSOLUTE: 3.98 E9/L (ref 1.5–4)
LYMPHOCYTES RELATIVE PERCENT: 44 % (ref 20–42)
MCH RBC QN AUTO: 29.2 PG (ref 26–35)
MCHC RBC AUTO-ENTMCNC: 33 % (ref 32–34.5)
MCV RBC AUTO: 88.4 FL (ref 80–99.9)
MONOCYTES ABSOLUTE: 0.68 E9/L (ref 0.1–0.95)
MONOCYTES RELATIVE PERCENT: 7.5 % (ref 2–12)
NEUTROPHILS ABSOLUTE: 3.99 E9/L (ref 1.8–7.3)
NEUTROPHILS RELATIVE PERCENT: 44.1 % (ref 43–80)
NITRITE, URINE: NEGATIVE
PDW BLD-RTO: 12.5 FL (ref 11.5–15)
PH UA: 7 (ref 5–9)
PLATELET # BLD: 486 E9/L (ref 130–450)
PMV BLD AUTO: 8.7 FL (ref 7–12)
POTASSIUM SERPL-SCNC: 4.2 MMOL/L (ref 3.5–5)
PROTEIN UA: 30 MG/DL
RBC # BLD: 4.66 E12/L (ref 3.8–5.8)
RBC UA: ABNORMAL /HPF (ref 0–2)
SODIUM BLD-SCNC: 136 MMOL/L (ref 132–146)
SPECIFIC GRAVITY UA: 1.02 (ref 1–1.03)
TOTAL PROTEIN: 7.4 G/DL (ref 6.4–8.3)
TROPONIN: <0.01 NG/ML (ref 0–0.03)
UROBILINOGEN, URINE: 0.2 E.U./DL
WBC # BLD: 9.1 E9/L (ref 4.5–11.5)
WBC UA: ABNORMAL /HPF (ref 0–5)

## 2018-12-10 PROCEDURE — 81001 URINALYSIS AUTO W/SCOPE: CPT

## 2018-12-10 PROCEDURE — 74176 CT ABD & PELVIS W/O CONTRAST: CPT

## 2018-12-10 PROCEDURE — 36415 COLL VENOUS BLD VENIPUNCTURE: CPT

## 2018-12-10 PROCEDURE — 96374 THER/PROPH/DIAG INJ IV PUSH: CPT

## 2018-12-10 PROCEDURE — 99284 EMERGENCY DEPT VISIT MOD MDM: CPT

## 2018-12-10 PROCEDURE — 84484 ASSAY OF TROPONIN QUANT: CPT

## 2018-12-10 PROCEDURE — 71101 X-RAY EXAM UNILAT RIBS/CHEST: CPT

## 2018-12-10 PROCEDURE — 85025 COMPLETE CBC W/AUTO DIFF WBC: CPT

## 2018-12-10 PROCEDURE — 6360000002 HC RX W HCPCS: Performed by: STUDENT IN AN ORGANIZED HEALTH CARE EDUCATION/TRAINING PROGRAM

## 2018-12-10 PROCEDURE — 80053 COMPREHEN METABOLIC PANEL: CPT

## 2018-12-10 RX ORDER — KETOROLAC TROMETHAMINE 30 MG/ML
15 INJECTION, SOLUTION INTRAMUSCULAR; INTRAVENOUS ONCE
Status: COMPLETED | OUTPATIENT
Start: 2018-12-10 | End: 2018-12-10

## 2018-12-10 RX ADMIN — KETOROLAC TROMETHAMINE 15 MG: 30 INJECTION, SOLUTION INTRAMUSCULAR; INTRAVENOUS at 22:56

## 2018-12-10 ASSESSMENT — PAIN DESCRIPTION - DESCRIPTORS: DESCRIPTORS: ACHING;SORE

## 2018-12-10 ASSESSMENT — PAIN DESCRIPTION - ORIENTATION: ORIENTATION: LEFT

## 2018-12-10 ASSESSMENT — ENCOUNTER SYMPTOMS
VOMITING: 0
COLOR CHANGE: 0
WHEEZING: 0
COUGH: 0
DIARRHEA: 0
CONSTIPATION: 0
NAUSEA: 0
ABDOMINAL PAIN: 1
SHORTNESS OF BREATH: 0

## 2018-12-10 ASSESSMENT — PAIN SCALES - GENERAL
PAINLEVEL_OUTOF10: 10
PAINLEVEL_OUTOF10: 9

## 2018-12-10 ASSESSMENT — PAIN DESCRIPTION - LOCATION: LOCATION: FLANK

## 2018-12-10 NOTE — ED NOTES
FIRST PROVIDER CONTACT ASSESSMENT NOTE      Department of Emergency Medicine   12/10/18  5:59 PM    Chief Complaint: Flank Pain (left flank pain that started after he fell this am onto his back)      History of Present Illness:    Ron Garcia is a 46 y.o. male who presents to the ED by private car for left rib and flank pain after near syncope and fell  Focused Screening Exam:  Constitutional:  Alert, appears stated age and is in no distress. *ALLERGIES*     Adhesive tape; Cymbalta [duloxetine hcl]; Lyrica [pregabalin]; Strattera [atomoxetine];  Tramadol; and Lidocaine     ED Triage Vitals [12/10/18 1702]   BP Temp Temp Source Pulse Resp SpO2 Height Weight   (!) 140/89 97.5 °F (36.4 °C) Temporal 96 17 96 % -- 210 lb (95.3 kg)        Initial Plan of Care:  Initiate Treatment-Testing, Proceed toTreatment Area When Bed Available for ED Attending/MLP to Continue Care    -----------------END OF FIRST PROVIDER CONTACT ASSESSMENT NOTE--------------  Electronically signed by FRANCIE Lowe CNP   DD: 12/10/18       FRANCIE Hernandez CNP  12/10/18 8607

## 2018-12-11 RX ORDER — POLYETHYLENE GLYCOL 3350 17 G/17G
17 POWDER, FOR SOLUTION ORAL DAILY
Qty: 510 G | Refills: 0 | Status: SHIPPED | OUTPATIENT
Start: 2018-12-11 | End: 2019-01-10

## 2018-12-11 RX ORDER — NAPROXEN 500 MG/1
500 TABLET ORAL 2 TIMES DAILY
Qty: 14 TABLET | Refills: 0 | Status: SHIPPED | OUTPATIENT
Start: 2018-12-11 | End: 2019-01-26 | Stop reason: SDUPTHER

## 2018-12-11 ASSESSMENT — PAIN DESCRIPTION - ORIENTATION: ORIENTATION: LEFT

## 2018-12-11 ASSESSMENT — PAIN DESCRIPTION - DESCRIPTORS: DESCRIPTORS: ACHING;SORE

## 2018-12-11 ASSESSMENT — PAIN DESCRIPTION - LOCATION: LOCATION: RIB CAGE

## 2018-12-11 ASSESSMENT — PAIN DESCRIPTION - PAIN TYPE: TYPE: ACUTE PAIN

## 2018-12-11 ASSESSMENT — PAIN SCALES - GENERAL: PAINLEVEL_OUTOF10: 8

## 2018-12-11 NOTE — ED PROVIDER NOTES
used smokeless tobacco. He reports that he does not drink alcohol or use drugs. Family History: family history includes COPD in his father; Diabetes in his paternal grandfather. The patients home medications have been reviewed. Allergies: Adhesive tape; Cymbalta [duloxetine hcl]; Lyrica [pregabalin]; Strattera [atomoxetine];  Tramadol; and Lidocaine    -------------------------------------------------- RESULTS -------------------------------------------------  Labs:  Results for orders placed or performed during the hospital encounter of 12/10/18   CBC Auto Differential   Result Value Ref Range    WBC 9.1 4.5 - 11.5 E9/L    RBC 4.66 3.80 - 5.80 E12/L    Hemoglobin 13.6 12.5 - 16.5 g/dL    Hematocrit 41.2 37.0 - 54.0 %    MCV 88.4 80.0 - 99.9 fL    MCH 29.2 26.0 - 35.0 pg    MCHC 33.0 32.0 - 34.5 %    RDW 12.5 11.5 - 15.0 fL    Platelets 105 (H) 736 - 450 E9/L    MPV 8.7 7.0 - 12.0 fL    Neutrophils % 44.1 43.0 - 80.0 %    Immature Granulocytes % 0.3 0.0 - 5.0 %    Lymphocytes % 44.0 (H) 20.0 - 42.0 %    Monocytes % 7.5 2.0 - 12.0 %    Eosinophils % 2.8 0.0 - 6.0 %    Basophils % 1.3 0.0 - 2.0 %    Neutrophils # 3.99 1.80 - 7.30 E9/L    Immature Granulocytes # 0.03 E9/L    Lymphocytes # 3.98 1.50 - 4.00 E9/L    Monocytes # 0.68 0.10 - 0.95 E9/L    Eosinophils # 0.25 0.05 - 0.50 E9/L    Basophils # 0.12 0.00 - 0.20 E9/L   Comprehensive Metabolic Panel   Result Value Ref Range    Sodium 136 132 - 146 mmol/L    Potassium 4.2 3.5 - 5.0 mmol/L    Chloride 98 98 - 107 mmol/L    CO2 26 22 - 29 mmol/L    Anion Gap 12 7 - 16 mmol/L    Glucose 248 (H) 74 - 99 mg/dL    BUN 10 6 - 20 mg/dL    CREATININE 0.8 0.7 - 1.2 mg/dL    GFR Non-African American >60 >=60 mL/min/1.73    GFR African American >60     Calcium 9.5 8.6 - 10.2 mg/dL    Total Protein 7.4 6.4 - 8.3 g/dL    Alb 4.4 3.5 - 5.2 g/dL    Total Bilirubin 0.3 0.0 - 1.2 mg/dL    Alkaline Phosphatase 98 40 - 129 U/L    ALT 20 0 - 40 U/L    AST 17 0 - 39 U/L

## 2019-01-17 ENCOUNTER — HOSPITAL ENCOUNTER (EMERGENCY)
Age: 54
Discharge: HOME | End: 2019-01-17
Payer: COMMERCIAL

## 2019-01-17 VITALS
RESPIRATION RATE: 18 BRPM | TEMPERATURE: 98.7 F | OXYGEN SATURATION: 96 % | SYSTOLIC BLOOD PRESSURE: 121 MMHG | DIASTOLIC BLOOD PRESSURE: 83 MMHG | HEART RATE: 108 BPM

## 2019-01-17 DIAGNOSIS — M54.2 CHRONIC NECK PAIN: ICD-10-CM

## 2019-01-17 DIAGNOSIS — G89.29 CHRONIC LOW BACK PAIN: ICD-10-CM

## 2019-01-17 DIAGNOSIS — M25.511 RIGHT SHOULDER PAIN: ICD-10-CM

## 2019-01-17 DIAGNOSIS — G89.29 CHRONIC NECK PAIN: ICD-10-CM

## 2019-01-17 DIAGNOSIS — M54.50 CHRONIC LOW BACK PAIN: ICD-10-CM

## 2019-01-17 DIAGNOSIS — M54.12 CERVICAL RADICULAR PAIN: Primary | ICD-10-CM

## 2019-01-17 PROCEDURE — 96372 THER/PROPH/DIAG INJ SC/IM: CPT

## 2019-01-17 PROCEDURE — 6360000002 HC RX W HCPCS: Performed by: PHYSICIAN ASSISTANT

## 2019-01-17 PROCEDURE — 99282 EMERGENCY DEPT VISIT SF MDM: CPT

## 2019-01-17 RX ORDER — KETOROLAC TROMETHAMINE 10 MG/1
10 TABLET, FILM COATED ORAL EVERY 6 HOURS PRN
Qty: 12 TABLET | Refills: 0 | Status: SHIPPED | OUTPATIENT
Start: 2019-01-17 | End: 2019-03-18

## 2019-01-17 RX ORDER — KETOROLAC TROMETHAMINE 30 MG/ML
30 INJECTION, SOLUTION INTRAMUSCULAR; INTRAVENOUS ONCE
Status: COMPLETED | OUTPATIENT
Start: 2019-01-17 | End: 2019-01-17

## 2019-01-17 RX ORDER — DEXAMETHASONE SODIUM PHOSPHATE 10 MG/ML
10 INJECTION INTRAMUSCULAR; INTRAVENOUS ONCE
Status: COMPLETED | OUTPATIENT
Start: 2019-01-17 | End: 2019-01-17

## 2019-01-17 RX ORDER — ONDANSETRON 4 MG/1
4 TABLET, ORALLY DISINTEGRATING ORAL EVERY 8 HOURS PRN
Qty: 10 TABLET | Refills: 0 | Status: SHIPPED | OUTPATIENT
Start: 2019-01-17 | End: 2019-03-18

## 2019-01-17 RX ORDER — CYCLOBENZAPRINE HCL 10 MG
10 TABLET ORAL NIGHTLY PRN
Qty: 30 TABLET | Refills: 0 | Status: SHIPPED | OUTPATIENT
Start: 2019-01-17 | End: 2019-01-26 | Stop reason: SDUPTHER

## 2019-01-17 RX ORDER — ONDANSETRON 4 MG/1
4 TABLET, ORALLY DISINTEGRATING ORAL ONCE
Status: COMPLETED | OUTPATIENT
Start: 2019-01-17 | End: 2019-01-17

## 2019-01-17 RX ORDER — PREDNISONE 10 MG/1
40 TABLET ORAL DAILY
Qty: 20 TABLET | Refills: 0 | Status: SHIPPED | OUTPATIENT
Start: 2019-01-17 | End: 2019-01-22

## 2019-01-17 RX ADMIN — ONDANSETRON 4 MG: 4 TABLET, ORALLY DISINTEGRATING ORAL at 19:57

## 2019-01-17 RX ADMIN — KETOROLAC TROMETHAMINE 30 MG: 30 INJECTION, SOLUTION INTRAMUSCULAR; INTRAVENOUS at 19:57

## 2019-01-17 RX ADMIN — DEXAMETHASONE SODIUM PHOSPHATE 10 MG: 10 INJECTION INTRAMUSCULAR; INTRAVENOUS at 19:58

## 2019-01-17 ASSESSMENT — PAIN DESCRIPTION - ORIENTATION: ORIENTATION: RIGHT

## 2019-01-17 ASSESSMENT — PAIN SCALES - GENERAL
PAINLEVEL_OUTOF10: 10
PAINLEVEL_OUTOF10: 10

## 2019-01-17 ASSESSMENT — PAIN DESCRIPTION - FREQUENCY: FREQUENCY: CONTINUOUS

## 2019-01-17 ASSESSMENT — PAIN DESCRIPTION - DESCRIPTORS: DESCRIPTORS: STABBING

## 2019-01-17 ASSESSMENT — PAIN DESCRIPTION - LOCATION: LOCATION: NECK

## 2019-01-17 ASSESSMENT — PAIN DESCRIPTION - PAIN TYPE: TYPE: CHRONIC PAIN

## 2019-01-26 ENCOUNTER — HOSPITAL ENCOUNTER (EMERGENCY)
Age: 54
Discharge: HOME | End: 2019-01-26
Payer: COMMERCIAL

## 2019-01-26 ENCOUNTER — APPOINTMENT (OUTPATIENT)
Dept: CT IMAGING | Age: 54
End: 2019-01-26
Payer: COMMERCIAL

## 2019-01-26 ENCOUNTER — APPOINTMENT (OUTPATIENT)
Dept: GENERAL RADIOLOGY | Age: 54
End: 2019-01-26
Payer: COMMERCIAL

## 2019-01-26 VITALS
BODY MASS INDEX: 30.8 KG/M2 | OXYGEN SATURATION: 97 % | TEMPERATURE: 97.7 F | DIASTOLIC BLOOD PRESSURE: 87 MMHG | HEART RATE: 97 BPM | HEIGHT: 71 IN | WEIGHT: 220 LBS | SYSTOLIC BLOOD PRESSURE: 124 MMHG | RESPIRATION RATE: 18 BRPM

## 2019-01-26 DIAGNOSIS — M54.2 NECK PAIN: Primary | ICD-10-CM

## 2019-01-26 DIAGNOSIS — M25.511 ACUTE PAIN OF RIGHT SHOULDER: ICD-10-CM

## 2019-01-26 DIAGNOSIS — M54.12 CERVICAL RADICULOPATHY: ICD-10-CM

## 2019-01-26 PROCEDURE — 6370000000 HC RX 637 (ALT 250 FOR IP): Performed by: PHYSICIAN ASSISTANT

## 2019-01-26 PROCEDURE — 72125 CT NECK SPINE W/O DYE: CPT

## 2019-01-26 PROCEDURE — 73030 X-RAY EXAM OF SHOULDER: CPT

## 2019-01-26 PROCEDURE — 99283 EMERGENCY DEPT VISIT LOW MDM: CPT

## 2019-01-26 PROCEDURE — 96372 THER/PROPH/DIAG INJ SC/IM: CPT

## 2019-01-26 PROCEDURE — 6360000002 HC RX W HCPCS: Performed by: PHYSICIAN ASSISTANT

## 2019-01-26 RX ORDER — ORPHENADRINE CITRATE 30 MG/ML
60 INJECTION INTRAMUSCULAR; INTRAVENOUS ONCE
Status: COMPLETED | OUTPATIENT
Start: 2019-01-26 | End: 2019-01-26

## 2019-01-26 RX ORDER — HYDROCODONE BITARTRATE AND ACETAMINOPHEN 5; 325 MG/1; MG/1
1 TABLET ORAL ONCE
Status: COMPLETED | OUTPATIENT
Start: 2019-01-26 | End: 2019-01-26

## 2019-01-26 RX ORDER — CYCLOBENZAPRINE HCL 10 MG
10 TABLET ORAL 3 TIMES DAILY PRN
Qty: 30 TABLET | Refills: 0 | Status: SHIPPED | OUTPATIENT
Start: 2019-01-26 | End: 2019-02-05

## 2019-01-26 RX ORDER — PREDNISONE 20 MG/1
TABLET ORAL
Qty: 18 TABLET | Refills: 0 | Status: SHIPPED | OUTPATIENT
Start: 2019-01-26 | End: 2019-02-05

## 2019-01-26 RX ORDER — DEXAMETHASONE SODIUM PHOSPHATE 10 MG/ML
10 INJECTION INTRAMUSCULAR; INTRAVENOUS ONCE
Status: COMPLETED | OUTPATIENT
Start: 2019-01-26 | End: 2019-01-26

## 2019-01-26 RX ORDER — NAPROXEN 500 MG/1
500 TABLET ORAL 2 TIMES DAILY
Qty: 14 TABLET | Refills: 0 | Status: SHIPPED | OUTPATIENT
Start: 2019-01-26 | End: 2020-07-21

## 2019-01-26 RX ORDER — KETOROLAC TROMETHAMINE 30 MG/ML
30 INJECTION, SOLUTION INTRAMUSCULAR; INTRAVENOUS ONCE
Status: COMPLETED | OUTPATIENT
Start: 2019-01-26 | End: 2019-01-26

## 2019-01-26 RX ADMIN — ORPHENADRINE CITRATE 60 MG: 30 INJECTION INTRAMUSCULAR; INTRAVENOUS at 16:14

## 2019-01-26 RX ADMIN — KETOROLAC TROMETHAMINE 30 MG: 30 INJECTION, SOLUTION INTRAMUSCULAR; INTRAVENOUS at 16:14

## 2019-01-26 RX ADMIN — HYDROCODONE BITARTRATE AND ACETAMINOPHEN 1 TABLET: 5; 325 TABLET ORAL at 16:15

## 2019-01-26 RX ADMIN — DEXAMETHASONE SODIUM PHOSPHATE 10 MG: 10 INJECTION INTRAMUSCULAR; INTRAVENOUS at 16:14

## 2019-01-26 ASSESSMENT — PAIN SCALES - GENERAL
PAINLEVEL_OUTOF10: 9
PAINLEVEL_OUTOF10: 5
PAINLEVEL_OUTOF10: 9
PAINLEVEL_OUTOF10: 9

## 2019-01-26 ASSESSMENT — PAIN DESCRIPTION - LOCATION: LOCATION: ARM;SHOULDER;NECK

## 2019-01-26 ASSESSMENT — PAIN DESCRIPTION - DESCRIPTORS: DESCRIPTORS: SORE;CRAMPING

## 2019-01-26 ASSESSMENT — PAIN DESCRIPTION - PAIN TYPE: TYPE: ACUTE PAIN

## 2019-01-26 ASSESSMENT — PAIN - FUNCTIONAL ASSESSMENT: PAIN_FUNCTIONAL_ASSESSMENT: PREVENTS OR INTERFERES WITH MANY ACTIVE NOT PASSIVE ACTIVITIES

## 2019-01-26 ASSESSMENT — PAIN DESCRIPTION - PROGRESSION: CLINICAL_PROGRESSION: GRADUALLY WORSENING

## 2019-01-26 ASSESSMENT — PAIN DESCRIPTION - FREQUENCY: FREQUENCY: CONTINUOUS

## 2019-01-26 ASSESSMENT — PAIN DESCRIPTION - ONSET: ONSET: PROGRESSIVE

## 2019-01-26 ASSESSMENT — PAIN DESCRIPTION - ORIENTATION: ORIENTATION: RIGHT

## 2019-02-25 ENCOUNTER — HOSPITAL ENCOUNTER (OUTPATIENT)
Dept: CT IMAGING | Age: 54
Discharge: HOME | End: 2019-02-27
Payer: COMMERCIAL

## 2019-02-25 ENCOUNTER — HOSPITAL ENCOUNTER (OUTPATIENT)
Age: 54
Discharge: HOME | End: 2019-02-25
Payer: COMMERCIAL

## 2019-02-25 DIAGNOSIS — R14.0 ABDOMINAL DISTENSION: ICD-10-CM

## 2019-02-25 LAB
BUN BLDV-MCNC: 20 MG/DL (ref 6–20)
CREAT SERPL-MCNC: 1 MG/DL (ref 0.7–1.2)
GFR AFRICAN AMERICAN: >60
GFR NON-AFRICAN AMERICAN: >60 ML/MIN/1.73

## 2019-02-25 PROCEDURE — 74177 CT ABD & PELVIS W/CONTRAST: CPT

## 2019-02-25 PROCEDURE — 2580000003 HC RX 258: Performed by: RADIOLOGY

## 2019-02-25 PROCEDURE — 82565 ASSAY OF CREATININE: CPT

## 2019-02-25 PROCEDURE — 36415 COLL VENOUS BLD VENIPUNCTURE: CPT

## 2019-02-25 PROCEDURE — 6360000004 HC RX CONTRAST MEDICATION: Performed by: RADIOLOGY

## 2019-02-25 PROCEDURE — 84520 ASSAY OF UREA NITROGEN: CPT

## 2019-02-25 RX ORDER — SODIUM CHLORIDE 0.9 % (FLUSH) 0.9 %
10 SYRINGE (ML) INJECTION ONCE
Status: COMPLETED | OUTPATIENT
Start: 2019-02-25 | End: 2019-02-25

## 2019-02-25 RX ADMIN — IOHEXOL 50 ML: 240 INJECTION, SOLUTION INTRATHECAL; INTRAVASCULAR; INTRAVENOUS; ORAL at 17:13

## 2019-02-25 RX ADMIN — Medication 10 ML: at 17:13

## 2019-02-25 RX ADMIN — IOPAMIDOL 110 ML: 755 INJECTION, SOLUTION INTRAVENOUS at 17:13

## 2019-03-11 ENCOUNTER — HOSPITAL ENCOUNTER (OUTPATIENT)
Age: 54
Discharge: HOME | End: 2019-03-13

## 2019-03-11 PROCEDURE — 88342 IMHCHEM/IMCYTCHM 1ST ANTB: CPT

## 2019-03-11 PROCEDURE — 88305 TISSUE EXAM BY PATHOLOGIST: CPT

## 2019-03-18 ENCOUNTER — OFFICE VISIT (OUTPATIENT)
Dept: NEUROLOGY | Age: 54
End: 2019-03-18
Payer: COMMERCIAL

## 2019-03-18 VITALS
HEART RATE: 98 BPM | WEIGHT: 211 LBS | DIASTOLIC BLOOD PRESSURE: 85 MMHG | HEIGHT: 71 IN | OXYGEN SATURATION: 98 % | TEMPERATURE: 97.2 F | SYSTOLIC BLOOD PRESSURE: 114 MMHG | BODY MASS INDEX: 29.54 KG/M2 | RESPIRATION RATE: 12 BRPM

## 2019-03-18 DIAGNOSIS — E11.42 DIABETIC POLYNEUROPATHY ASSOCIATED WITH TYPE 2 DIABETES MELLITUS (HCC): Primary | ICD-10-CM

## 2019-03-18 PROBLEM — F32.0 MILD SINGLE CURRENT EPISODE OF MAJOR DEPRESSIVE DISORDER (HCC): Status: RESOLVED | Noted: 2018-07-31 | Resolved: 2019-03-18

## 2019-03-18 PROCEDURE — 3017F COLORECTAL CA SCREEN DOC REV: CPT | Performed by: NURSE PRACTITIONER

## 2019-03-18 PROCEDURE — 99204 OFFICE O/P NEW MOD 45 MIN: CPT | Performed by: NURSE PRACTITIONER

## 2019-03-18 PROCEDURE — 4004F PT TOBACCO SCREEN RCVD TLK: CPT | Performed by: NURSE PRACTITIONER

## 2019-03-18 PROCEDURE — G8484 FLU IMMUNIZE NO ADMIN: HCPCS | Performed by: NURSE PRACTITIONER

## 2019-03-18 PROCEDURE — 2022F DILAT RTA XM EVC RTNOPTHY: CPT | Performed by: NURSE PRACTITIONER

## 2019-03-18 PROCEDURE — G8419 CALC BMI OUT NRM PARAM NOF/U: HCPCS | Performed by: NURSE PRACTITIONER

## 2019-03-18 PROCEDURE — 3046F HEMOGLOBIN A1C LEVEL >9.0%: CPT | Performed by: NURSE PRACTITIONER

## 2019-03-18 PROCEDURE — G8427 DOCREV CUR MEDS BY ELIG CLIN: HCPCS | Performed by: NURSE PRACTITIONER

## 2019-03-18 RX ORDER — DOXEPIN HYDROCHLORIDE 10 MG/1
10 CAPSULE ORAL NIGHTLY
Qty: 30 CAPSULE | Refills: 2 | Status: SHIPPED | OUTPATIENT
Start: 2019-03-18 | End: 2019-06-24 | Stop reason: SDUPTHER

## 2019-03-25 ENCOUNTER — TELEPHONE (OUTPATIENT)
Dept: NEUROLOGY | Age: 54
End: 2019-03-25

## 2019-04-05 ENCOUNTER — HOSPITAL ENCOUNTER (OUTPATIENT)
Dept: NEUROLOGY | Age: 54
Discharge: HOME | End: 2019-04-05
Payer: COMMERCIAL

## 2019-04-05 VITALS — WEIGHT: 215 LBS | BODY MASS INDEX: 30.1 KG/M2 | HEIGHT: 71 IN

## 2019-04-05 DIAGNOSIS — E11.42 DIABETIC POLYNEUROPATHY ASSOCIATED WITH TYPE 2 DIABETES MELLITUS (HCC): ICD-10-CM

## 2019-04-05 PROCEDURE — 95911 NRV CNDJ TEST 9-10 STUDIES: CPT

## 2019-04-05 PROCEDURE — 95911 NRV CNDJ TEST 9-10 STUDIES: CPT | Performed by: PSYCHIATRY & NEUROLOGY

## 2019-04-05 PROCEDURE — 95886 MUSC TEST DONE W/N TEST COMP: CPT

## 2019-04-05 PROCEDURE — 95886 MUSC TEST DONE W/N TEST COMP: CPT | Performed by: PSYCHIATRY & NEUROLOGY

## 2019-04-05 NOTE — PROCEDURES
[place]  [facility].  Electrodiagnostic Laboratory  [place]        Full Name: Chey Antoine Gender: Male  MRN: [id number] YOB: 1965  Location[de-identified] [facility]      Visit Date: 4/5/2019 11:05  Age: 53 Years 0 Months Old  Examining Physician: Dr. Alexandra Chandler   Referring Physician: Quinton Dean, APRN-CNP  Technician: Rama Haley   Height: 5 feet 11 inch  Weight: 215 lbs  Notes: Diabetic polyneuropathy; LS radiculopathy (E11.42)      Motor NCS      Nerve / Sites Lat. Amplitude Amp. 1-2 Distance Lat Diff Velocity Temp.    ms mV % cm ms m/s °C   R Peroneal - EDB      Ankle 4.43 4.7 100 8   31.2      Fib head 12.50 3.3 70.1 37 8.07 46 31.2      Pop fossa 14.74 3.1 66 10 2.24 45 31.2   L Peroneal - EDB      Ankle 3.70 4.2 100 8   31      Fib head 12.71 2.1 48.9 36 9.01 40 31      Pop fossa 15.10 2.1 49.8 10 2.40 42 31   R Tibial - AH      Ankle 4.11 6.3 100 8   31.8      Pop fossa 15.21 4.0 62.6 45 11.09 41 31   L Tibial - AH      Ankle 6.20 4.8 100 8   31      Pop fossa 17.24 3.6 75.7 45 11.04 41 31       Sensory NCS      Nerve / Sites Onset Lat Peak Lat PP Amp Distance Velocity Temp.    ms ms µV cm m/s °C   R Superficial peroneal - Ankle      Lat leg 3.07 3.49 20.1 10 33 31.2   L Superficial peroneal - Ankle      Lat leg 4.06 4.74 2.1 10 25 31.6   R Sural - Ankle (Calf)      Calf NR NR NR 14 NR 31   L Sural - Ankle (Calf)      Calf NR NR NR 14 NR 31.6       F  Wave      Nerve F Lat M Lat F-M Lat    ms ms ms   R Peroneal - EDB 38.3 3.9 34.5   R Tibial - AH 46.0 4.4 41.6   L Tibial - AH 46.6 6.7 39.9   L Peroneal - EDB 38.5 5.6 32.9       H Reflex      Nerve Lat Hmax    ms   R Tibial - Soleus 37.4   L Tibial - Soleus 37. 6       EMG         EMG Summary Table     Spontaneous MUAP Recruitment   Muscle IA Fib PSW Fasc H.F. Amp Dur. PPP Pattern   R. Lumbar paraspinals (mid) Incr 1+ 1+ None None N N N N   R. Lumbar paraspinals (low) Incr Few Few None None N N N N   R.  Sacral paraspinals Inc/DNT None None None None N N N N   R. Gluteus medius N None None None None N N N N   R. Biceps femoris (short head) N None None None None N N N N   R. Vastus lateralis N None None None None N N N N   R. Gastrocnemius (Medial head) N None None None None N N N N   R. Tibialis anterior N None None None None N N 1+ Sl Decr   R. Flexor digitorum longus N None None None None N N 1+ Sl Decr   R. Extensor hallucis longus N None None None None N N 1+ Decr   R. Dorsal interossei (pedis) N None None None None N N N Distant MUPs   R. Extensor digitorum brevis N None None None None N N 2+ Decr         Nerve conduction studies in both legs disclosed the following abnormalities---absent sural sensory nerve potentials. The sensory nerve conduction velocities in both superficial peroneal nerves were decreased. The distal motor latency of the left tibial nerve was also slightly prolonged. These findings were compared to the referential values in this laboratory, available upon request.    Monopolar needle examination of the right leg disclosed chronic axonal loss with reinnervation in the distal muscles of that limb. In addition, needle testing of the paraspinals produced acute denervation changes. Electrodiagnostic examination of both legs disclosed evidence diagnostic of the following---    1. A diffuse, symmetrical, sensorimotor peripheral neuropathy of the axonal degenerative type---of moderate severity. 2.  Right lumbosacral motor radiculopathies or intracanalicular lesions. These were proven with the abnormal needle testing of the paraspinals but were not further delineated due to the normal examination and the proximal right leg musculature. There were no other peripheral neuropathies. There were no other motor radiculopathies or intracanalicular lesions. Sensory radiculopathies can not be evaluated by electrodiagnostic means. Clinically, the patient presented with numbness in both feet and calves.   He denied

## 2019-04-26 ENCOUNTER — TELEPHONE (OUTPATIENT)
Dept: NEUROLOGY | Age: 54
End: 2019-04-26

## 2019-04-26 NOTE — TELEPHONE ENCOUNTER
LM for pt to return call regarding overdue labs.    Electronically signed by Shon Marin on 4/26/19 at 3:13 PM

## 2019-04-29 ENCOUNTER — TELEPHONE (OUTPATIENT)
Dept: NEUROLOGY | Age: 54
End: 2019-04-29

## 2019-04-29 DIAGNOSIS — M54.17 L-S RADICULOPATHY: Primary | ICD-10-CM

## 2019-04-29 NOTE — TELEPHONE ENCOUNTER
He has failed multiple other medications due to similar side effects. He should try taking the doxepin even earlier, perhaps around 1900. He must also make sure he is using his CPAP faithfully---I question if he needs an adjustment due to his continued daytime fatigue? ? He should also limit his daytime naps.     He EMG proved R LS radiculopathy and MRI of the lumbar spine was recommended, so I will place an order for this testing

## 2019-04-29 NOTE — TELEPHONE ENCOUNTER
MA contacted pt regarding overdue labs from 3/2019. Pt stated he will have them done at [facility] in the next 2-4 weeks. Pt also mentioned he had his EMG on 4/5/19 and was advised by Dr. Everlean Cushing to increase Doxepin to 2-10mg tabs qpm. Pt states the med has only given him 10% improvement of sx and has caused daytime drowsiness. Pt takes the med at 9pm and goes to bed at 11pm but will be unable to fall asleep. He admits to taking naps during the day due to daytime fatigue and poor night sleep. Forwarding to FRANCIE Garner, for advisement.   Electronically signed by Tre Persaud on 4/29/19 at 1:41 PM

## 2019-05-03 ENCOUNTER — TELEPHONE (OUTPATIENT)
Dept: NEUROLOGY | Age: 54
End: 2019-05-03

## 2019-05-03 NOTE — TELEPHONE ENCOUNTER
Pt called to inform FRANCIE Villarreal, that he quit taking Doxepin for nerve pain last night by recommendation of his psychiatrist at [facility]. Pt will instead be started on Effexor and \"something similar to Abilify. \" Forwarding to Ling Eaton for informational purposes.   Electronically signed by Obed Moreau on 5/3/19 at 3:20 PM

## 2019-05-07 ENCOUNTER — OFFICE VISIT (OUTPATIENT)
Dept: ENDOCRINOLOGY | Age: 54
End: 2019-05-07
Payer: COMMERCIAL

## 2019-05-07 VITALS
HEIGHT: 71 IN | OXYGEN SATURATION: 98 % | SYSTOLIC BLOOD PRESSURE: 120 MMHG | DIASTOLIC BLOOD PRESSURE: 52 MMHG | HEART RATE: 80 BPM | BODY MASS INDEX: 29.29 KG/M2 | WEIGHT: 209.2 LBS

## 2019-05-07 DIAGNOSIS — E11.8 TYPE 2 DIABETES MELLITUS WITH COMPLICATION, WITH LONG-TERM CURRENT USE OF INSULIN (HCC): ICD-10-CM

## 2019-05-07 DIAGNOSIS — Z91.119 DIETARY NONCOMPLIANCE: ICD-10-CM

## 2019-05-07 DIAGNOSIS — Z79.4 TYPE 2 DIABETES MELLITUS WITH COMPLICATION, WITH LONG-TERM CURRENT USE OF INSULIN (HCC): ICD-10-CM

## 2019-05-07 DIAGNOSIS — Z86.39 H/O THYROID DISEASE: Primary | ICD-10-CM

## 2019-05-07 PROCEDURE — 2022F DILAT RTA XM EVC RTNOPTHY: CPT | Performed by: INTERNAL MEDICINE

## 2019-05-07 PROCEDURE — G8427 DOCREV CUR MEDS BY ELIG CLIN: HCPCS | Performed by: INTERNAL MEDICINE

## 2019-05-07 PROCEDURE — 4004F PT TOBACCO SCREEN RCVD TLK: CPT | Performed by: INTERNAL MEDICINE

## 2019-05-07 PROCEDURE — G8419 CALC BMI OUT NRM PARAM NOF/U: HCPCS | Performed by: INTERNAL MEDICINE

## 2019-05-07 PROCEDURE — 3017F COLORECTAL CA SCREEN DOC REV: CPT | Performed by: INTERNAL MEDICINE

## 2019-05-07 PROCEDURE — 3046F HEMOGLOBIN A1C LEVEL >9.0%: CPT | Performed by: INTERNAL MEDICINE

## 2019-05-07 PROCEDURE — 99204 OFFICE O/P NEW MOD 45 MIN: CPT | Performed by: INTERNAL MEDICINE

## 2019-05-07 RX ORDER — LAMOTRIGINE 25 MG/1
25 TABLET ORAL DAILY
COMMUNITY
End: 2021-11-08 | Stop reason: ALTCHOICE

## 2019-05-07 RX ORDER — VENLAFAXINE 37.5 MG/1
37.5 TABLET ORAL DAILY
COMMUNITY
End: 2022-08-05

## 2019-05-07 RX ORDER — GLUCOSAMINE HCL/CHONDROITIN SU 500-400 MG
CAPSULE ORAL
Qty: 250 STRIP | Refills: 5 | Status: ON HOLD | OUTPATIENT
Start: 2019-05-07 | End: 2020-07-31 | Stop reason: HOSPADM

## 2019-05-07 RX ORDER — LANCETS 28 GAUGE
EACH MISCELLANEOUS
Qty: 250 EACH | Refills: 5 | Status: ON HOLD | OUTPATIENT
Start: 2019-05-07 | End: 2020-07-31 | Stop reason: HOSPADM

## 2019-05-07 RX ORDER — OMEPRAZOLE 40 MG/1
40 CAPSULE, DELAYED RELEASE ORAL DAILY
Status: ON HOLD | COMMUNITY
End: 2020-07-31 | Stop reason: HOSPADM

## 2019-05-07 NOTE — LETTER
[address] [place] [address] Department of Endocrinology Diabetes and Metabolism       Provider: Severiano Grizzle MD  [address] [facility], [address] [facility],[address] [address]   Phone: [phone]  Fax: [phone]    Primary Care Physician: Kenny Adams DO   Referring Provider: No ref. provider found    Patient: Shahla Berry  YOB: 1965  Date of Visit: 5/7/2019      Dear Dr. Kenny Adams DO   I had the pleasure of seeing your patient Shahla Berry today at endocrine clinic for consultation visit and I enclosed a copy of the office visit completed today. Thank you very much for asking us to participate in the care of this very pleasant patient. Please don't hesitate to call if there are any further questions or concerns. Sincerely   Severiano Grizzle MD  Endocrinologist, [facility]   [address] [facility] [address]   Phone: [phone]  Fax: [phone]      ENDOCRINOLOGY CLINIC NOTE    Date of Service: 5/7/2019    Medical Records Reviewed:   I personally reviewed and summarized previous records     Care Team:  Primary Care Physician: Kenny Adams DO  Referring physician: No ref. provider found  Provider: Severiano Grizzle MD     Reason for the visit:  Poorly controlled DM     History of Present Illness: The history is provided by the patient. No  was used. Accuracy of the patient data is excellent. Shahla Berry is a very pleasant 53 y.o. male seen in Endocrine clinic today for diabetes management     Shahla Berry was diagnosed with diabetes at age 38   The patient is currently on Lantus 30 units BID  The patient has been checking blood sugar 1-2 times a day in the morning.  BS readings consistently running high   Most recent A1c results summarized below  Lab Results   Component Value Date    LABA1C 14.5 12/23/2014    LABA1C 13.4 05/29/2014    LABA1C 13 02/17/2014     Patient denied any h/o hypoglycemic episodes The patient hasn't been mindful of what has been eating and wasn't following diabetes diet as encouraged   I reviewed current medications and the patient has no issues with diabetes medications  The patient is due for an eye exam. Last eye exam was 5 years ago , no h/o diabetic retinopathy  The patient seeing podiatrist every   And also performs  own feet care  Microvascular complications:  No Retinopathy, no Nephropathy + Neuropathy   Macrovascular complications: no CAD, PVD, or Stroke  The patient refuses Flushot and pneumonia vaccine     PAST MEDICAL HISTORY   Past Medical History:   Diagnosis Date    Chronic pain     Depression     HLD (hyperlipidemia)     Neuropathy     LIDYA on CPAP     Osteoarthritis     Type II or unspecified type diabetes mellitus without mention of complication, not stated as uncontrolled      PAST SURGICAL HISTORY   Past Surgical History:   Procedure Laterality Date    ARM SURGERY      left arm    WI COLONOSCOPY W/BIOPSY SINGLE/MULTIPLE N/A 9/20/2018    COLONOSCOPY WITH BIOPSY performed by Kat Cary MD at [address] [place] [address]  FLX W/RMVL OF TUMOR POLYP LESION SNARE TQ  9/20/2018    COLONOSCOPY POLYPECTOMY SNARE/COLD BIOPSY performed by Kat Cary MD at [address] [place] [address]   Social History     Socioeconomic History    Marital status: Single     Spouse name: Not on file    Number of children: Not on file    Years of education: Not on file    Highest education level: Not on file   Occupational History    Not on file   Social Needs    Financial resource strain: Not on file    Food insecurity:     Worry: Not on file     Inability: Not on file    Transportation needs:     Medical: Not on file     Non-medical: Not on file   Tobacco Use    Smoking status: Current Every Day Smoker     Packs/day: 1.00     Years: 30.00     Pack years: 30.00    Smokeless tobacco: Never Used   Substance and Sexual Activity  Alcohol use: No     Comment: none since 2017, drank heavily in early 2000s    Drug use: No     Types: Marijuana    Sexual activity: Not Currently     Partners: Female     Comment: Single; Last STD check was 2012   Lifestyle    Physical activity:     Days per week: Not on file     Minutes per session: Not on file    Stress: Not on file   Relationships    Social connections:     Talks on phone: Not on file     Gets together: Not on file     Attends Orthodox service: Not on file     Active member of club or organization: Not on file     Attends meetings of clubs or organizations: Not on file     Relationship status: Not on file    Intimate partner violence:     Fear of current or ex partner: Not on file     Emotionally abused: Not on file     Physically abused: Not on file     Forced sexual activity: Not on file   Other Topics Concern    Not on file   Social History Narrative    Not on file     FAMILY HISTORY   Family History   Problem Relation Age of Onset    COPD Father     Stroke Father     Diabetes Paternal Grandfather     Heart Disease Brother      ALLERGIES AND DRUG REACTIONS   Allergies   Allergen Reactions    Adhesive Tape Hives    Tramadol Other (See Comments)     With psych medicine    Cymbalta [Duloxetine Hcl] Rash    Lidocaine Rash and Swelling    Lyrica [Pregabalin] Rash    Strattera [Atomoxetine] Rash     And nausea       CURRENT MEDICATIONS   Current Outpatient Medications   Medication Sig Dispense Refill    venlafaxine (EFFEXOR) 37.5 MG tablet Take 37.5 mg by mouth daily      pitavastatin (LIVALO) 4 MG TABS tablet Take by mouth nightly      lamoTRIgine (LAMICTAL) 25 MG tablet Take 25 mg by mouth daily      omeprazole (PRILOSEC) 40 MG delayed release capsule Take 40 mg by mouth daily      insulin aspart (NOVOLOG) 100 UNIT/ML injection pen Take 12 units before meals  + sliding scale.  MAX 70 units daily 15 pen 5    insulin glargine (LANTUS SOLOSTAR) 100 UNIT/ML injection pen Indications: Insulin-Resistant Diabetes Take 45 units daily at bedtime 5 pen 5    Insulin Pen Needle 31G X 5 MM MISC Indications: Insulin-Resistant Diabetes, Type 2 Diabetes Four times a day for insulin Pen 250 each 5    blood glucose monitor strips Freestyle Lite Strips. Checks 4 times/day before meals and at bedtime and as needed for symptoms of irregular blood glucose. 250 strip 5    FREESTYLE LANCETS MISC Test 4 times a day before meals and at bedtime and as needed for symptoms of irregular blood glucose. 250 each 5    docusate sodium (COLACE) 100 MG capsule Take 1 capsule by mouth 2 times daily 20 capsule 0    Multiple Vitamin (MULTI VITAMIN MENS PO) Take by mouth daily       doxepin (SINEQUAN) 10 MG capsule Take 1 capsule by mouth nightly 30 capsule 2    naproxen (NAPROSYN) 500 MG tablet Take 1 tablet by mouth 2 times daily for 7 days 14 tablet 0     No current facility-administered medications for this visit. Review of Systems  Constitutional: No fever, no chills, no diaphoresis, no generalized weakness. HEENT: No blurred vision, No sore throat, no ear pain, no hair loss  Neck: denied any neck swelling, difficulty swallowing,   Cardio-pulmonary: No CP, SOB or palpitation, No orthopnea or PND. No cough or wheezing. GI: No N/V/D, no constipation, No abdominal pain, no melena or hematochezia   : Denied any dysuria, hematuria, flank pain, discharge, or incontinence. Skin: denied any rash, ulcer, Hirsute, or hyperpigmentation. MSK: denied any joint deformity, joint pain/swelling, muscle pain, or back pain.   Neuro: no numbness, no tingling, no weakness, _    OBJECTIVE    BP (!) 120/52   Pulse 80   Ht 5' 11\" (1.803 m)   Wt 209 lb 3.2 oz (94.9 kg)   SpO2 98%   BMI 29.18 kg/m²    BP Readings from Last 4 Encounters:   05/07/19 (!) 120/52   03/18/19 114/85   01/26/19 124/87   01/17/19 121/83     Wt Readings from Last 6 Encounters:   05/07/19 209 lb 3.2 oz (94.9 kg) 04/05/19 215 lb (97.5 kg)   03/18/19 211 lb (95.7 kg)   01/26/19 220 lb (99.8 kg)   12/10/18 210 lb (95.3 kg)   12/04/18 213 lb (96.6 kg)       Physical examination:  General: awake alert, oriented x3, no abnormal position or movements. HEENT: normocephalic non-traumatic, no exophthalmos   Neck: supple, no LN enlargement, no thyromegaly, no thyroid tenderness, no JVD. Pulm: Clear equal air entry no added sounds, no wheezing or rhonchi    CVS: S1 + S2, no murmur, no heave. Dorsalis pedis pulse palpable   Abd: soft lax, no tenderness, no organomegaly, audible bowel sounds.    Skin: warm, no lesions, no rash. + callus, no Ulcers, No acanthosis nigricans  Musculoskeletal: No back tenderness, no kyphosis/scoliosis    Neuro: CN intact, Monofilament sensation decreased bilateral , muscle power normal  Psych: normal mood, and affect      Review of Laboratory Data:  I personally reviewed the following lab:  Lab Results   Component Value Date/Time    WBC 9.1 12/10/2018 05:20 PM    RBC 4.66 12/10/2018 05:20 PM    HGB 13.6 12/10/2018 05:20 PM    HCT 41.2 12/10/2018 05:20 PM    MCV 88.4 12/10/2018 05:20 PM    MCH 29.2 12/10/2018 05:20 PM    MCHC 33.0 12/10/2018 05:20 PM    RDW 12.5 12/10/2018 05:20 PM     (H) 12/10/2018 05:20 PM    MPV 8.7 12/10/2018 05:20 PM      Lab Results   Component Value Date/Time     12/10/2018 05:20 PM    K 4.2 12/10/2018 05:20 PM    CO2 26 12/10/2018 05:20 PM    BUN 20 02/25/2019 02:56 PM    CREATININE 1.0 02/25/2019 02:56 PM    CALCIUM 9.5 12/10/2018 05:20 PM    LABGLOM >60 02/25/2019 02:56 PM    GFRAA >60 02/25/2019 02:56 PM      Lab Results   Component Value Date/Time    TSH 2.620 07/31/2018 09:01 PM    T4FREE 1.14 02/06/2014 10:00 AM     Lab Results   Component Value Date    LABA1C 14.5 12/23/2014    GLUCOSE 248 12/10/2018     Lab Results   Component Value Date    LABA1C 14.5 12/23/2014    LABA1C 13.4 05/29/2014    LABA1C 13 02/17/2014     Lab Results   Component Value Date Thank you for allowing us to participate in the care of this patient. Please do not hesitate to contact us with any additional questions. Diagnosis Orders   1. H/O thyroid disease  TSH without Reflex    T4, Free   2. Type 2 diabetes mellitus with complication, with long-term current use of insulin ([facility])  insulin aspart (NOVOLOG) 100 UNIT/ML injection pen    insulin glargine (LANTUS SOLOSTAR) 100 UNIT/ML injection pen    Insulin Pen Needle 31G X 5 MM MISC    blood glucose monitor strips    FREESTYLE LANCETS [facility]    Ambulatory referral to Diabetic Education    Basic Metabolic Panel    Hemoglobin A1C    Microalbumin / Creatinine Urine Ratio   3.  Dietary noncompliance           Lashell Guerrero MD  Endocrinologist, [facility])   [address] [place] [address]   Phone: [phone]  Fax: [phone]  --------------------------------------------  Electronically signed

## 2019-05-07 NOTE — PROGRESS NOTES
ENDOCRINOLOGY CLINIC NOTE    Date of Service: 5/7/2019    Medical Records Reviewed:   I personally reviewed and summarized previous records     Care Team:  Primary Care Physician: Martha Burkett DO  Referring physician: No ref. provider found  Provider: Wale Rodriguez MD     Reason for the visit:  Poorly controlled DM     History of Present Illness: The history is provided by the patient. No  was used. Accuracy of the patient data is excellent. Rom Baldwin is a very pleasant 53 y.o. male seen in Endocrine clinic today for diabetes management     Rom Baldwin was diagnosed with diabetes at age 38   The patient is currently on Lantus 30 units BID  The patient has been checking blood sugar 1-2 times a day in the morning.  BS readings consistently running high   Most recent A1c results summarized below  Lab Results   Component Value Date    LABA1C 14.5 12/23/2014    LABA1C 13.4 05/29/2014    LABA1C 13 02/17/2014     Patient denied any h/o hypoglycemic episodes  The patient hasn't been mindful of what has been eating and wasn't following diabetes diet as encouraged   I reviewed current medications and the patient has no issues with diabetes medications  The patient is due for an eye exam. Last eye exam was 5 years ago , no h/o diabetic retinopathy  The patient seeing podiatrist every   And also performs  own feet care  Microvascular complications:  No Retinopathy, no Nephropathy + Neuropathy   Macrovascular complications: no CAD, PVD, or Stroke  The patient refuses Flushot and pneumonia vaccine     PAST MEDICAL HISTORY   Past Medical History:   Diagnosis Date    Chronic pain     Depression     HLD (hyperlipidemia)     Neuropathy     LIDYA on CPAP     Osteoarthritis     Type II or unspecified type diabetes mellitus without mention of complication, not stated as uncontrolled      PAST SURGICAL HISTORY   Past Surgical History:   Procedure Laterality Date    ARM SURGERY      left arm    ND COLONOSCOPY W/BIOPSY SINGLE/MULTIPLE N/A 9/20/2018    COLONOSCOPY WITH BIOPSY performed by Jessica Avendaño MD at [address]  FLX W/RMVL OF TUMOR POLYP LESION SNARE TQ  9/20/2018    COLONOSCOPY POLYPECTOMY SNARE/COLD BIOPSY performed by Jessica Avendaño MD at [address] [facility]   Social History     Socioeconomic History    Marital status: Single     Spouse name: Not on file    Number of children: Not on file    Years of education: Not on file    Highest education level: Not on file   Occupational History    Not on file   Social Needs    Financial resource strain: Not on file    Food insecurity:     Worry: Not on file     Inability: Not on file    Transportation needs:     Medical: Not on file     Non-medical: Not on file   Tobacco Use    Smoking status: Current Every Day Smoker     Packs/day: 1.00     Years: 30.00     Pack years: 30.00    Smokeless tobacco: Never Used   Substance and Sexual Activity    Alcohol use: No     Comment: none since 2017, drank heavily in early 2000s    Drug use: No     Types: Marijuana    Sexual activity: Not Currently     Partners: Female     Comment: Single;  Last STD check was 2012   Lifestyle    Physical activity:     Days per week: Not on file     Minutes per session: Not on file    Stress: Not on file   Relationships    Social connections:     Talks on phone: Not on file     Gets together: Not on file     Attends Congregational service: Not on file     Active member of club or organization: Not on file     Attends meetings of clubs or organizations: Not on file     Relationship status: Not on file    Intimate partner violence:     Fear of current or ex partner: Not on file     Emotionally abused: Not on file     Physically abused: Not on file     Forced sexual activity: Not on file   Other Topics Concern    Not on file   Social History Narrative    Not on file     FAMILY HISTORY   Family History   Problem Relation Age of Onset    COPD Father     Stroke Father     Diabetes Paternal Grandfather     Heart Disease Brother      ALLERGIES AND DRUG REACTIONS   Allergies   Allergen Reactions    Adhesive Tape Hives    Tramadol Other (See Comments)     With psych medicine    Cymbalta [Duloxetine Hcl] Rash    Lidocaine Rash and Swelling    Lyrica [Pregabalin] Rash    Strattera [Atomoxetine] Rash     And nausea       CURRENT MEDICATIONS   Current Outpatient Medications   Medication Sig Dispense Refill    venlafaxine (EFFEXOR) 37.5 MG tablet Take 37.5 mg by mouth daily      pitavastatin (LIVALO) 4 MG TABS tablet Take by mouth nightly      lamoTRIgine (LAMICTAL) 25 MG tablet Take 25 mg by mouth daily      omeprazole (PRILOSEC) 40 MG delayed release capsule Take 40 mg by mouth daily      insulin aspart (NOVOLOG) 100 UNIT/ML injection pen Take 12 units before meals  + sliding scale. MAX 70 units daily 15 pen 5    insulin glargine (LANTUS SOLOSTAR) 100 UNIT/ML injection pen Indications: Insulin-Resistant Diabetes Take 45 units daily at bedtime 5 pen 5    Insulin Pen Needle 31G X 5 MM MISC Indications: Insulin-Resistant Diabetes, Type 2 Diabetes Four times a day for insulin Pen 250 each 5    blood glucose monitor strips Freestyle Lite Strips. Checks 4 times/day before meals and at bedtime and as needed for symptoms of irregular blood glucose. 250 strip 5    FREESTYLE LANCETS MISC Test 4 times a day before meals and at bedtime and as needed for symptoms of irregular blood glucose. 250 each 5    docusate sodium (COLACE) 100 MG capsule Take 1 capsule by mouth 2 times daily 20 capsule 0    Multiple Vitamin (MULTI VITAMIN MENS PO) Take by mouth daily       doxepin (SINEQUAN) 10 MG capsule Take 1 capsule by mouth nightly 30 capsule 2    naproxen (NAPROSYN) 500 MG tablet Take 1 tablet by mouth 2 times daily for 7 days 14 tablet 0     No current facility-administered medications for this visit. Review of Systems  Constitutional: No fever, no chills, no diaphoresis, no generalized weakness. HEENT: No blurred vision, No sore throat, no ear pain, no hair loss  Neck: denied any neck swelling, difficulty swallowing,   Cardio-pulmonary: No CP, SOB or palpitation, No orthopnea or PND. No cough or wheezing. GI: No N/V/D, no constipation, No abdominal pain, no melena or hematochezia   : Denied any dysuria, hematuria, flank pain, discharge, or incontinence. Skin: denied any rash, ulcer, Hirsute, or hyperpigmentation. MSK: denied any joint deformity, joint pain/swelling, muscle pain, or back pain. Neuro: no numbness, no tingling, no weakness, _    OBJECTIVE    BP (!) 120/52   Pulse 80   Ht 5' 11\" (1.803 m)   Wt 209 lb 3.2 oz (94.9 kg)   SpO2 98%   BMI 29.18 kg/m²   BP Readings from Last 4 Encounters:   05/07/19 (!) 120/52   03/18/19 114/85   01/26/19 124/87   01/17/19 121/83     Wt Readings from Last 6 Encounters:   05/07/19 209 lb 3.2 oz (94.9 kg)   04/05/19 215 lb (97.5 kg)   03/18/19 211 lb (95.7 kg)   01/26/19 220 lb (99.8 kg)   12/10/18 210 lb (95.3 kg)   12/04/18 213 lb (96.6 kg)       Physical examination:  General: awake alert, oriented x3, no abnormal position or movements. HEENT: normocephalic non-traumatic, no exophthalmos   Neck: supple, no LN enlargement, no thyromegaly, no thyroid tenderness, no JVD. Pulm: Clear equal air entry no added sounds, no wheezing or rhonchi    CVS: S1 + S2, no murmur, no heave. Dorsalis pedis pulse palpable   Abd: soft lax, no tenderness, no organomegaly, audible bowel sounds.    Skin: warm, no lesions, no rash. + callus, no Ulcers, No acanthosis nigricans  Musculoskeletal: No back tenderness, no kyphosis/scoliosis    Neuro: CN intact, Monofilament sensation decreased bilateral , muscle power normal  Psych: normal mood, and affect      Review of Laboratory Data:  I personally reviewed the following lab:  Lab Results   Component Value Date/Time    WBC 9.1 12/10/2018 05:20 PM    RBC 4.66 12/10/2018 05:20 PM    HGB 13.6 12/10/2018 05:20 PM    HCT 41.2 12/10/2018 05:20 PM    MCV 88.4 12/10/2018 05:20 PM    MCH 29.2 12/10/2018 05:20 PM    MCHC 33.0 12/10/2018 05:20 PM    RDW 12.5 12/10/2018 05:20 PM     (H) 12/10/2018 05:20 PM    MPV 8.7 12/10/2018 05:20 PM      Lab Results   Component Value Date/Time     12/10/2018 05:20 PM    K 4.2 12/10/2018 05:20 PM    CO2 26 12/10/2018 05:20 PM    BUN 20 02/25/2019 02:56 PM    CREATININE 1.0 02/25/2019 02:56 PM    CALCIUM 9.5 12/10/2018 05:20 PM    LABGLOM >60 02/25/2019 02:56 PM    GFRAA >60 02/25/2019 02:56 PM      Lab Results   Component Value Date/Time    TSH 2.620 07/31/2018 09:01 PM    T4FREE 1.14 02/06/2014 10:00 AM     Lab Results   Component Value Date    LABA1C 14.5 12/23/2014    GLUCOSE 248 12/10/2018     Lab Results   Component Value Date    LABA1C 14.5 12/23/2014    LABA1C 13.4 05/29/2014    LABA1C 13 02/17/2014     Lab Results   Component Value Date    CHOL 336 05/29/2014    CHOL 309 02/06/2014    TRIG 399 05/29/2014    TRIG 357 02/06/2014    HDL 45 05/29/2014    HDL 44 02/06/2014     No results found for: [address] [facility] [address] Records/Labs/Images review:   I personally reviewed and summarized previous records   All labs and imaging studies were independently reviewed     ASSESSMENT & RECOMMENDATIONS   Vy Michele, a 53 y.o.-old male seen in for the following issues     Insulin Dependent Diabetes Mellitus     · Patient's diabetes is poorly controlled. A1c 11.8%   · Will change DM regimen to Lantus 45 units daily at bedtime, Novolog 12 units before meals + ss 2:50>150   · The patient was advised to check blood sugars 4 times a day before meals and at bedtime and send BS readings to our office in a week.   · Discussed with patient A1c and blood sugar goals   · Optimal blood sugars: 100-140 pre-prandial, < 180 peak post-prandial  · The patient counseled about the complications of uncontrolled diabetes · Patient was counselled about the importance of self-blood glucose monitoring and eating consistent carb diet to avoid blood sugar fluctuations   · Patient will need routine diabetes maintenance and prevention  · The patient was referred to diabetic educator for further teaching   · Discussed lifestyle changes including diet and exercise with patient; recommended 150 minutes of moderate intensity exercise per week. · Diabetes labs before next visit     Dietary noncompliance   Discussed with patient the importance of eating consistent carbohydrate meals, avoiding high glycemic index food. Also, discussed with patient the risk and negative consequences of dietary noncompliance on blood glucose control, blood pressure and weight    Hyperlipidemia   · Continue statin     Return in about 4 months (around 9/7/2019) for IDDM . The above issues were reviewed with the patient who understood and agreed with the plan. Thank you for allowing us to participate in the care of this patient. Please do not hesitate to contact us with any additional questions. Diagnosis Orders   1. H/O thyroid disease  TSH without Reflex    T4, Free   2. Type 2 diabetes mellitus with complication, with long-term current use of insulin ([facility])  insulin aspart (NOVOLOG) 100 UNIT/ML injection pen    insulin glargine (LANTUS SOLOSTAR) 100 UNIT/ML injection pen    Insulin Pen Needle 31G X 5 MM MISC    blood glucose monitor strips    FREESTYLE LANCETS [facility]    Ambulatory referral to Diabetic Education    Basic Metabolic Panel    Hemoglobin A1C    Microalbumin / Creatinine Urine Ratio   3.  Dietary noncompliance           Bear Epstein MD  Endocrinologist, [facility])   [address] [place] [id number]   Phone: [phone]  Fax: [phone]  --------------------------------------------  Electronically signed

## 2019-05-09 ENCOUNTER — TELEPHONE (OUTPATIENT)
Dept: NEUROLOGY | Age: 54
End: 2019-05-09

## 2019-05-09 NOTE — TELEPHONE ENCOUNTER
Kelsi denied MRI lumbar bc pt has not completed PT, chiropractic tx or a medically directed home exercise program for 6 weeks in the last 6 months. Forwarding to FRANCIE Vasquez, for advisement.   Electronically signed by Lilian Leung on 5/9/19 at 2:01 PM

## 2019-05-09 NOTE — LETTER
[facility]  [facility] [address]  Phone: [phone]  Fax: [phone]    FRANCIE Juarez CNP        May 17, 2019    [address]      Dear Westley Cartagena: We have been unable to reach you by phone. Please contact us as soon as possible to avoid any delays in your care.     Sincerely,        [facility]) Neurology

## 2019-05-14 NOTE — TELEPHONE ENCOUNTER
WESTLEY VIRGEN for pt to call back regarding PT and what facility he would like, if agreeable.   Electronically signed by Delbert Card on 5/14/19 at 8:47 AM

## 2019-05-17 NOTE — TELEPHONE ENCOUNTER
MA LM x 3. Letter will be mailed to home address.   Electronically signed by Fortino Maharaj on 5/17/19 at 10:39 AM

## 2019-05-21 ENCOUNTER — HOSPITAL ENCOUNTER (OUTPATIENT)
Dept: DIABETES SERVICES | Age: 54
Setting detail: THERAPIES SERIES
Discharge: HOME | End: 2019-05-21
Payer: COMMERCIAL

## 2019-05-21 PROCEDURE — G0109 DIAB MANAGE TRN IND/GROUP: HCPCS

## 2019-05-21 PROCEDURE — G0109 DIAB MANAGE TRN IND/GROUP: HCPCS | Performed by: DIETITIAN, REGISTERED

## 2019-05-21 NOTE — LETTER
[facility])- Diabetes Education    2019       Re:     Radha Hays         :  1965  Dear Dr. Nyasia Aguirre:                    Thank you for referring your patient, Radha Hays, for diabetes education sessions.     Your patient attended class on 19, that addressed the following topics:    Nursing/Medical [x]     Nutrition []  · Describing the diabetes disease process/ treatment options  · Incorporating physical activity into lifestyle  · Using medication safely & for maximum therapeutic effectiveness  · Monitoring blood glucose & other parameters:  Interpreting and using results for self-management & decision making  · Preventing, detecting, & treating acute complications  · Preventing, detecting & treating chronic complications  · Developing personal strategies to address psychosocial issues and concerns  · Developing personal strategies to promote health & behavior change (risk reduction) · Incorporating nutrition management into lifestyle    · Nutrition for diabetes prevention & diabetes  · Relationship among nutrition, exercise, medication & blood glucose levels  · Food Groups/carbohydrate & non- carbohydrate foods  · Whole grain/high fiber foods & needs  · Fluid needs   · Label reading  · Carbohydrate consistent meal planning/ techniques  · Weighing/measuring portions  · Heart healthy guidelines: fat, sodium & cholesterol  · Alcohol  · Free foods/nutritive and non-nutritive sweeteners  · Dining out tips and recipe modification guidelines  · Sick day guidelines       Upon completion of these sessions the diabetes team made the following evaluation of your patients progress:  [x] Attended class alone  [] Attended classes accompanied by                family/friend/significant other  [x] Very attentive to teaching  [x] Actively participated in class                      discussions   [x] Answered questions appropriately            when asked

## 2019-05-22 SDOH — ECONOMIC STABILITY: FOOD INSECURITY: ADDITIONAL INFORMATION: YES

## 2019-05-22 ASSESSMENT — PATIENT HEALTH QUESTIONNAIRE - PHQ9
SUM OF ALL RESPONSES TO PHQ QUESTIONS 1-9: 23
SUM OF ALL RESPONSES TO PHQ9 QUESTIONS 1 & 2: 6
2. FEELING DOWN, DEPRESSED OR HOPELESS: 3
1. LITTLE INTEREST OR PLEASURE IN DOING THINGS: 3
SUM OF ALL RESPONSES TO PHQ QUESTIONS 1-9: 23

## 2019-05-22 NOTE — PROGRESS NOTES
Diabetes Self-Management Education Record    Participant Name: Joaquin Pimentel  Referring Provider: Ilan Lao DO  Assessment/Evaluation Ratings:  1=Needs Instruction   4=Demonstrates Understanding/Competency  2=Needs Review   NC=Not Covered    3=Comprehends Key Points  N/A=Not Applicable  Topics/Learning Objectives Pre-session Assess Date:  5/21/19 KMS Instr. Date Reinforce Date Post- session Eval Comments   Diabetes disease process & Treatment process: Define diabetes & prediabetes; Identify own type of diabetes; role of the pancreas; signs/symptoms; diagnostic criteria; prevention & treatment options; contributing factors. 1 5/21/19 KMS  4 Type 2, A1C 14.5%   Incorporating nutritional management into lifestyle: Describe effect of type, amount & timing of food on blood glucose; Describe basic meal planning techniques & current nutrition guidelines;Correctly read food labels & demonstrate CHO counting & portion control with personalized meal plan. Identify dining out strategies, & dietary sick day guidelines. 1       Incorporating physical activity into lifestyle:   Verbalize effect of exercise on blood glucose levels; benefits of regular exercise; safety considerations; contraindications; maintenance of activity. 1 5/21/19 KMS  4 Not physically active d/t pain--many exercise options discussed. Using medications safely:  Identify effects of diabetes medicines on blood glucose levels; List diabetes medication taken, action & side effects; appropriate injection sites; proper storage; supplies needed; proper technique; safe needle disposal guidelines. 1 5/21/19 KMS  3/4 Lantus 45 U at bedtime    Novolog 12 units plus sliding scale with meals   Monitoring blood glucose, interpreting and using results:  Identify recommended & personal blood glucose targets; importance of testing; testing supplies; HgbA1C target levels; Factors affecting blood glucose;  Importance of logging blood glucose levels for pattern recognition; ketone testing; safe lancet disposal. 1 5/21/19 KMS  3 Monitoring daily, but ordered to monitor 4-5 times per day. Encouraged more frequent monitoring per doctor's order. Prevention, detection & treatment of acute complications:  Identify symptoms of hyper & hypoglycemia, and prevention & treatment strategies. Describe sick day guidelines & indications for ketone testing & physician notification. Identify short term consequences of poor control. 1 5/21/19 KMS  3    Prevention, detection & treatment of chronic complications:  Define the natural course of diabetes & describe the relationship of blood glucose levels to long term complications of diabetes. Identify preventative measures & standards of care. 1 5/21/19 KMS  3    Developing strategies to address psychosocial issues:  Describe feelings about living with diabetes; Describe how stress, depression & anxiety affect blood glucose; Identify coping strategies; Identify support needed & support network available. 1 5/21/19 KMS  3 PHQ-9 Depression Screen Score: 23  Patient very positive and in good spirits today--states that he just began seeing a therapist and started on some new medication, both of which are helping. Developing strategies to promote health/change behavior: Identify 7 self-care behaviors; Personal health risk factors; Benefits, challenges & strategies for behavioral change; Individualized goal selection.  1    Goal:     Identified Barriers to learning/adherence to self management plan:    Physical, Emotional, Cognitive and Financial     Instruction Method:  Lecture/Discussion, Power Point Presentation  and Handouts    Education Materials/Equipment Provided: Self-management manual       Encounter Type Date Attended Start Time End Time Comments No Show Dates   Assessment 5/21/19 [facility] 1800 1830       Session 1 5/21/19 [facility] 1830 2100      Session 2        Session 3         Session 4         Gestational Diabetes Individual MNT        Meter Instrx        Insulin Instrx            Additional Comments:    DSMES Support Plan:  Follow-up plan/Date: August 2019   Contact Post Class Regarding:   Fasting Blood Sugar   HgbA1C   Weight   Hypertension/Follow-up with Physician   Self-Foot Exam Frequency   Monitoring Frequency   Exercise Routine   Goal Attainment  Post Education Referrals:      []WIC   []PAP   []Wound Care   []Social Service   []JUDY Matias 29 Specialist   [][facility]   []Sleep Lab   []Other

## 2019-05-29 ENCOUNTER — TELEPHONE (OUTPATIENT)
Dept: NEUROLOGY | Age: 54
End: 2019-05-29

## 2019-05-29 NOTE — LETTER
[facility]  [facility] [place] [address]  Phone: [phone]  Fax: [phone]    FRANCIE Sebastian - CNP        May 29, 2019    [address] [place] [address]      Dear Mercy Holm: We have been unable to reach you regarding your testing ordered by Lincoln Crawford that has not been completed. Please contact us as soon as possible to avoid any potential delays in your care.     Sincerely,      [facility]) Neurology Staff

## 2019-05-29 NOTE — TELEPHONE ENCOUNTER
MA attempted to reach pt (third attempt) regarding overdue labs from 3/2019. Labs will be cancelled and letter mailed to pt's home address. Forwarding to FRANCIE Aviles, for informational purposes.   Electronically signed by Ava Johnson on 5/29/19 at 9:07 AM

## 2019-06-24 RX ORDER — DOXEPIN HYDROCHLORIDE 10 MG/1
10 CAPSULE ORAL NIGHTLY
Qty: 30 CAPSULE | Refills: 2 | Status: SHIPPED | OUTPATIENT
Start: 2019-06-24 | End: 2019-09-27 | Stop reason: SDUPTHER

## 2019-09-27 RX ORDER — DOXEPIN HYDROCHLORIDE 10 MG/1
10 CAPSULE ORAL NIGHTLY
Qty: 30 CAPSULE | Refills: 2 | Status: SHIPPED
Start: 2019-09-27 | End: 2020-09-30 | Stop reason: ALTCHOICE

## 2019-11-24 ENCOUNTER — HOSPITAL ENCOUNTER (EMERGENCY)
Age: 54
Discharge: HOME | End: 2019-11-24
Attending: EMERGENCY MEDICINE
Payer: COMMERCIAL

## 2019-11-24 ENCOUNTER — APPOINTMENT (OUTPATIENT)
Dept: GENERAL RADIOLOGY | Age: 54
End: 2019-11-24
Payer: COMMERCIAL

## 2019-11-24 VITALS
RESPIRATION RATE: 16 BRPM | SYSTOLIC BLOOD PRESSURE: 115 MMHG | WEIGHT: 209 LBS | TEMPERATURE: 98.6 F | DIASTOLIC BLOOD PRESSURE: 84 MMHG | OXYGEN SATURATION: 98 % | BODY MASS INDEX: 29.15 KG/M2 | HEART RATE: 70 BPM

## 2019-11-24 DIAGNOSIS — Z48.89 ENCOUNTER FOR POST SURGICAL WOUND CHECK: Primary | ICD-10-CM

## 2019-11-24 LAB
ALBUMIN SERPL-MCNC: 4.1 G/DL (ref 3.5–5.2)
ALP BLD-CCNC: 88 U/L (ref 40–129)
ALT SERPL-CCNC: 17 U/L (ref 0–40)
ANION GAP SERPL CALCULATED.3IONS-SCNC: 13 MMOL/L (ref 7–16)
AST SERPL-CCNC: 16 U/L (ref 0–39)
BILIRUB SERPL-MCNC: 0.3 MG/DL (ref 0–1.2)
BUN BLDV-MCNC: 19 MG/DL (ref 6–20)
CALCIUM SERPL-MCNC: 9.8 MG/DL (ref 8.6–10.2)
CHLORIDE BLD-SCNC: 96 MMOL/L (ref 98–107)
CO2: 24 MMOL/L (ref 22–29)
CREAT SERPL-MCNC: 0.8 MG/DL (ref 0.7–1.2)
GFR AFRICAN AMERICAN: >60
GFR NON-AFRICAN AMERICAN: >60 ML/MIN/1.73
GLUCOSE BLD-MCNC: 322 MG/DL (ref 74–99)
HCT VFR BLD CALC: 42.3 % (ref 37–54)
HEMOGLOBIN: 13.8 G/DL (ref 12.5–16.5)
LACTIC ACID, SEPSIS: 0.9 MMOL/L (ref 0.5–1.9)
MCH RBC QN AUTO: 30.1 PG (ref 26–35)
MCHC RBC AUTO-ENTMCNC: 32.6 % (ref 32–34.5)
MCV RBC AUTO: 92.2 FL (ref 80–99.9)
PDW BLD-RTO: 12.9 FL (ref 11.5–15)
PLATELET # BLD: 387 E9/L (ref 130–450)
PMV BLD AUTO: 8.9 FL (ref 7–12)
POTASSIUM SERPL-SCNC: 4.7 MMOL/L (ref 3.5–5)
RBC # BLD: 4.59 E12/L (ref 3.8–5.8)
SODIUM BLD-SCNC: 133 MMOL/L (ref 132–146)
TOTAL PROTEIN: 7.6 G/DL (ref 6.4–8.3)
WBC # BLD: 7.5 E9/L (ref 4.5–11.5)

## 2019-11-24 PROCEDURE — 80053 COMPREHEN METABOLIC PANEL: CPT

## 2019-11-24 PROCEDURE — 73630 X-RAY EXAM OF FOOT: CPT

## 2019-11-24 PROCEDURE — 99283 EMERGENCY DEPT VISIT LOW MDM: CPT

## 2019-11-24 PROCEDURE — 85027 COMPLETE CBC AUTOMATED: CPT

## 2019-11-24 PROCEDURE — 83605 ASSAY OF LACTIC ACID: CPT

## 2019-11-24 PROCEDURE — 6370000000 HC RX 637 (ALT 250 FOR IP): Performed by: EMERGENCY MEDICINE

## 2019-11-24 PROCEDURE — 36415 COLL VENOUS BLD VENIPUNCTURE: CPT

## 2019-11-24 RX ORDER — DOXYCYCLINE HYCLATE 100 MG/1
100 CAPSULE ORAL ONCE
Status: COMPLETED | OUTPATIENT
Start: 2019-11-24 | End: 2019-11-24

## 2019-11-24 RX ORDER — DOXYCYCLINE HYCLATE 100 MG
100 TABLET ORAL 2 TIMES DAILY
Qty: 20 TABLET | Refills: 0 | Status: SHIPPED | OUTPATIENT
Start: 2019-11-24 | End: 2019-12-04

## 2019-11-24 RX ADMIN — DOXYCYCLINE HYCLATE 100 MG: 100 CAPSULE ORAL at 20:55

## 2019-11-24 ASSESSMENT — PAIN DESCRIPTION - DESCRIPTORS: DESCRIPTORS: ACHING;THROBBING

## 2019-11-24 ASSESSMENT — ENCOUNTER SYMPTOMS
SHORTNESS OF BREATH: 0
EYE DISCHARGE: 0
COUGH: 0
SINUS PRESSURE: 0
CONSTIPATION: 0
EYE REDNESS: 0
ABDOMINAL PAIN: 0
RHINORRHEA: 0
EYE PAIN: 0
WHEEZING: 0
DIARRHEA: 0
NAUSEA: 0
BLOOD IN STOOL: 0
VOMITING: 0
BACK PAIN: 0
SORE THROAT: 0

## 2019-11-24 ASSESSMENT — PAIN DESCRIPTION - PAIN TYPE: TYPE: ACUTE PAIN

## 2019-11-24 ASSESSMENT — PAIN DESCRIPTION - ORIENTATION: ORIENTATION: LEFT

## 2019-11-24 ASSESSMENT — PAIN SCALES - GENERAL: PAINLEVEL_OUTOF10: 7

## 2019-11-24 ASSESSMENT — PAIN DESCRIPTION - ONSET: ONSET: ON-GOING

## 2019-11-24 ASSESSMENT — PAIN DESCRIPTION - LOCATION: LOCATION: TOE (COMMENT WHICH ONE)

## 2019-11-24 ASSESSMENT — PAIN DESCRIPTION - PROGRESSION: CLINICAL_PROGRESSION: GRADUALLY IMPROVING

## 2020-02-27 ENCOUNTER — HOSPITAL ENCOUNTER (EMERGENCY)
Age: 55
Discharge: HOME | End: 2020-02-27
Payer: COMMERCIAL

## 2020-02-27 VITALS
SYSTOLIC BLOOD PRESSURE: 126 MMHG | TEMPERATURE: 98.2 F | RESPIRATION RATE: 18 BRPM | OXYGEN SATURATION: 98 % | BODY MASS INDEX: 30.52 KG/M2 | HEIGHT: 71 IN | WEIGHT: 218 LBS | HEART RATE: 78 BPM | DIASTOLIC BLOOD PRESSURE: 79 MMHG

## 2020-02-27 PROCEDURE — 99282 EMERGENCY DEPT VISIT SF MDM: CPT

## 2020-02-27 RX ORDER — CEPHALEXIN 500 MG/1
500 CAPSULE ORAL 3 TIMES DAILY
Qty: 30 CAPSULE | Refills: 0 | Status: SHIPPED | OUTPATIENT
Start: 2020-02-27 | End: 2020-03-08

## 2020-02-27 RX ORDER — TRIAMCINOLONE ACETONIDE 1 MG/G
CREAM TOPICAL
Qty: 45 G | Refills: 0 | Status: ON HOLD | OUTPATIENT
Start: 2020-02-27 | End: 2020-07-31 | Stop reason: HOSPADM

## 2020-02-27 RX ORDER — HYDROXYZINE PAMOATE 25 MG/1
25 CAPSULE ORAL 3 TIMES DAILY PRN
Qty: 30 CAPSULE | Refills: 0 | Status: SHIPPED | OUTPATIENT
Start: 2020-02-27 | End: 2020-03-12

## 2020-02-27 ASSESSMENT — PAIN SCALES - GENERAL: PAINLEVEL_OUTOF10: 8

## 2020-02-28 NOTE — ED PROVIDER NOTES
pack-year smoking history. He has never used smokeless tobacco. He reports that he does not drink alcohol or use drugs. Family History: family history includes COPD in his father; Diabetes in his paternal grandfather; Heart Disease in his brother; Stroke in his father. Allergies: Adhesive tape; Tramadol; Cymbalta [duloxetine hcl]; Lidocaine; Lyrica [pregabalin]; and Strattera [atomoxetine]    Physical Exam           ED Triage Vitals   BP Temp Temp src Pulse Resp SpO2 Height Weight   02/27/20 1459 02/27/20 1459 -- 02/27/20 1446 02/27/20 1446 02/27/20 1446 02/27/20 1459 02/27/20 1459   126/79 98.2 °F (36.8 °C)  78 18 98 % 5' 11\" (1.803 m) 218 lb (98.9 kg)     Oxygen Saturation Interpretation: Normal.    Constitutional:  Alert, development consistent with age. HEENT:  NC/NT. Airway patent. Eyes:  PERRL, EOMI, no discharge. Ears:  TMs without perforation, injection, or bulging. External canals clear without exudate. Mouth:  Mucous membranes moist without lesions, tongue and gums normal.  Throat:  Pharynx without injection, exudate, or tonsillar hypertrophy. Airway patient. Neck:  Supple. No lymphadenopathy. Respiratory:  Clear to auscultation and breath sounds equal.  CV:  Regular rate and rhythm. Peripheral pulses 2+ palpable  GI:  Abdomen Soft, nontender, +BS. Integument:  Skin turgor: Normal.              Patient has a 0.5 cm scab noted to dorsal aspect of proximal hand. No drainage noted. Rounding the scab is erythema measuring 2 cm. On this area there is diffuse macular/papular rash. Neurological:  Orientation age-appropriate unless noted elseware. Motor functions intact. Lab / Imaging Results   (All laboratory and radiology results have been personally reviewed by myself)  Labs:  No results found for this visit on 02/27/20. Imaging: All Radiology results interpreted by Radiologist unless otherwise noted.   No orders to display       ED Course / Medical Decision Making   Medications - No

## 2020-07-19 ENCOUNTER — HOSPITAL ENCOUNTER (EMERGENCY)
Age: 55
Discharge: HOME | End: 2020-07-19
Attending: EMERGENCY MEDICINE
Payer: COMMERCIAL

## 2020-07-19 ENCOUNTER — APPOINTMENT (OUTPATIENT)
Dept: CT IMAGING | Age: 55
End: 2020-07-19
Payer: COMMERCIAL

## 2020-07-19 VITALS
HEART RATE: 82 BPM | OXYGEN SATURATION: 96 % | TEMPERATURE: 97.5 F | WEIGHT: 240 LBS | HEIGHT: 71 IN | SYSTOLIC BLOOD PRESSURE: 131 MMHG | RESPIRATION RATE: 18 BRPM | BODY MASS INDEX: 33.6 KG/M2 | DIASTOLIC BLOOD PRESSURE: 75 MMHG

## 2020-07-19 LAB
ALBUMIN SERPL-MCNC: 4.4 G/DL (ref 3.5–5.2)
ALP BLD-CCNC: 74 U/L (ref 40–129)
ALT SERPL-CCNC: 22 U/L (ref 0–40)
ANION GAP SERPL CALCULATED.3IONS-SCNC: 17 MMOL/L (ref 7–16)
AST SERPL-CCNC: 24 U/L (ref 0–39)
BASOPHILS ABSOLUTE: 0.08 E9/L (ref 0–0.2)
BASOPHILS RELATIVE PERCENT: 0.7 % (ref 0–2)
BILIRUB SERPL-MCNC: 0.5 MG/DL (ref 0–1.2)
BUN BLDV-MCNC: 17 MG/DL (ref 6–20)
BURR CELLS: ABNORMAL
CALCIUM SERPL-MCNC: 9.2 MG/DL (ref 8.6–10.2)
CHLORIDE BLD-SCNC: 98 MMOL/L (ref 98–107)
CO2: 21 MMOL/L (ref 22–29)
CREAT SERPL-MCNC: 0.8 MG/DL (ref 0.7–1.2)
EOSINOPHILS ABSOLUTE: 0 E9/L (ref 0.05–0.5)
EOSINOPHILS RELATIVE PERCENT: 0 % (ref 0–6)
GFR AFRICAN AMERICAN: >60
GFR NON-AFRICAN AMERICAN: >60 ML/MIN/1.73
GLUCOSE BLD-MCNC: 306 MG/DL (ref 74–99)
HCT VFR BLD CALC: 40.2 % (ref 37–54)
HEMOGLOBIN: 13.9 G/DL (ref 12.5–16.5)
IMMATURE GRANULOCYTES #: 0.08 E9/L
IMMATURE GRANULOCYTES %: 0.7 % (ref 0–5)
LIPASE: 24 U/L (ref 13–60)
LYMPHOCYTES ABSOLUTE: 0.44 E9/L (ref 1.5–4)
LYMPHOCYTES RELATIVE PERCENT: 3.6 % (ref 20–42)
MCH RBC QN AUTO: 30.8 PG (ref 26–35)
MCHC RBC AUTO-ENTMCNC: 34.6 % (ref 32–34.5)
MCV RBC AUTO: 88.9 FL (ref 80–99.9)
MONOCYTES ABSOLUTE: 0.59 E9/L (ref 0.1–0.95)
MONOCYTES RELATIVE PERCENT: 4.9 % (ref 2–12)
NEUTROPHILS ABSOLUTE: 10.9 E9/L (ref 1.8–7.3)
NEUTROPHILS RELATIVE PERCENT: 90.1 % (ref 43–80)
PDW BLD-RTO: 12.9 FL (ref 11.5–15)
PLATELET # BLD: 321 E9/L (ref 130–450)
PMV BLD AUTO: 8.9 FL (ref 7–12)
POIKILOCYTES: ABNORMAL
POLYCHROMASIA: ABNORMAL
POTASSIUM REFLEX MAGNESIUM: 4.1 MMOL/L (ref 3.5–5)
RBC # BLD: 4.52 E12/L (ref 3.8–5.8)
SCHISTOCYTES: ABNORMAL
SODIUM BLD-SCNC: 136 MMOL/L (ref 132–146)
TOTAL PROTEIN: 7.1 G/DL (ref 6.4–8.3)
TROPONIN: <0.01 NG/ML (ref 0–0.03)
WBC # BLD: 12.1 E9/L (ref 4.5–11.5)

## 2020-07-19 PROCEDURE — 96374 THER/PROPH/DIAG INJ IV PUSH: CPT

## 2020-07-19 PROCEDURE — 6370000000 HC RX 637 (ALT 250 FOR IP): Performed by: EMERGENCY MEDICINE

## 2020-07-19 PROCEDURE — 70450 CT HEAD/BRAIN W/O DYE: CPT

## 2020-07-19 PROCEDURE — 83690 ASSAY OF LIPASE: CPT

## 2020-07-19 PROCEDURE — 74177 CT ABD & PELVIS W/CONTRAST: CPT

## 2020-07-19 PROCEDURE — 72125 CT NECK SPINE W/O DYE: CPT

## 2020-07-19 PROCEDURE — 6360000002 HC RX W HCPCS: Performed by: EMERGENCY MEDICINE

## 2020-07-19 PROCEDURE — 93005 ELECTROCARDIOGRAM TRACING: CPT | Performed by: EMERGENCY MEDICINE

## 2020-07-19 PROCEDURE — 96375 TX/PRO/DX INJ NEW DRUG ADDON: CPT

## 2020-07-19 PROCEDURE — 99284 EMERGENCY DEPT VISIT MOD MDM: CPT

## 2020-07-19 PROCEDURE — 84484 ASSAY OF TROPONIN QUANT: CPT

## 2020-07-19 PROCEDURE — 6360000004 HC RX CONTRAST MEDICATION: Performed by: RADIOLOGY

## 2020-07-19 PROCEDURE — 85025 COMPLETE CBC W/AUTO DIFF WBC: CPT

## 2020-07-19 PROCEDURE — 2580000003 HC RX 258: Performed by: EMERGENCY MEDICINE

## 2020-07-19 PROCEDURE — 80053 COMPREHEN METABOLIC PANEL: CPT

## 2020-07-19 RX ORDER — MORPHINE SULFATE 4 MG/ML
8 INJECTION, SOLUTION INTRAMUSCULAR; INTRAVENOUS ONCE
Status: COMPLETED | OUTPATIENT
Start: 2020-07-19 | End: 2020-07-19

## 2020-07-19 RX ORDER — SODIUM CHLORIDE 0.9 % (FLUSH) 0.9 %
10 SYRINGE (ML) INJECTION PRN
Status: DISCONTINUED | OUTPATIENT
Start: 2020-07-19 | End: 2020-07-19 | Stop reason: HOSPADM

## 2020-07-19 RX ORDER — 0.9 % SODIUM CHLORIDE 0.9 %
1000 INTRAVENOUS SOLUTION INTRAVENOUS ONCE
Status: COMPLETED | OUTPATIENT
Start: 2020-07-19 | End: 2020-07-19

## 2020-07-19 RX ORDER — ONDANSETRON 2 MG/ML
4 INJECTION INTRAMUSCULAR; INTRAVENOUS ONCE
Status: COMPLETED | OUTPATIENT
Start: 2020-07-19 | End: 2020-07-19

## 2020-07-19 RX ORDER — OXYCODONE HYDROCHLORIDE AND ACETAMINOPHEN 5; 325 MG/1; MG/1
2 TABLET ORAL ONCE
Status: COMPLETED | OUTPATIENT
Start: 2020-07-19 | End: 2020-07-19

## 2020-07-19 RX ADMIN — ONDANSETRON 4 MG: 2 INJECTION INTRAMUSCULAR; INTRAVENOUS at 16:12

## 2020-07-19 RX ADMIN — MORPHINE SULFATE 8 MG: 4 INJECTION, SOLUTION INTRAMUSCULAR; INTRAVENOUS at 16:12

## 2020-07-19 RX ADMIN — SODIUM CHLORIDE 1000 ML: 9 INJECTION, SOLUTION INTRAVENOUS at 20:42

## 2020-07-19 RX ADMIN — IOPAMIDOL 110 ML: 755 INJECTION, SOLUTION INTRAVENOUS at 19:19

## 2020-07-19 RX ADMIN — OXYCODONE AND ACETAMINOPHEN 2 TABLET: 5; 325 TABLET ORAL at 20:48

## 2020-07-19 ASSESSMENT — PAIN SCALES - GENERAL
PAINLEVEL_OUTOF10: 6
PAINLEVEL_OUTOF10: 10
PAINLEVEL_OUTOF10: 9
PAINLEVEL_OUTOF10: 9

## 2020-07-19 ASSESSMENT — PAIN DESCRIPTION - PAIN TYPE: TYPE: ACUTE PAIN

## 2020-07-19 ASSESSMENT — PAIN DESCRIPTION - LOCATION: LOCATION: NECK
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
## 2020-07-19 NOTE — ED NOTES
Bed: 16  Expected date:   Expected time:   Means of arrival:   Comments:  jovan High RN  07/19/20 [id number]

## 2020-07-20 ENCOUNTER — APPOINTMENT (OUTPATIENT)
Dept: GENERAL RADIOLOGY | Age: 55
DRG: 710 | End: 2020-07-20
Payer: COMMERCIAL

## 2020-07-20 ENCOUNTER — HOSPITAL ENCOUNTER (EMERGENCY)
Age: 55
Discharge: HOME | DRG: 710 | End: 2020-07-21
Attending: EMERGENCY MEDICINE
Payer: COMMERCIAL

## 2020-07-20 LAB
ANION GAP SERPL CALCULATED.3IONS-SCNC: 13 MMOL/L (ref 7–16)
BUN BLDV-MCNC: 19 MG/DL (ref 6–20)
CALCIUM SERPL-MCNC: 9.7 MG/DL (ref 8.6–10.2)
CHLORIDE BLD-SCNC: 94 MMOL/L (ref 98–107)
CO2: 26 MMOL/L (ref 22–29)
CREAT SERPL-MCNC: 0.8 MG/DL (ref 0.7–1.2)
EKG ATRIAL RATE: 103 BPM
EKG P AXIS: 51 DEGREES
EKG P-R INTERVAL: 184 MS
EKG Q-T INTERVAL: 350 MS
EKG QRS DURATION: 94 MS
EKG QTC CALCULATION (BAZETT): 458 MS
EKG R AXIS: -16 DEGREES
EKG T AXIS: 117 DEGREES
EKG VENTRICULAR RATE: 103 BPM
GFR AFRICAN AMERICAN: >60
GFR NON-AFRICAN AMERICAN: >60 ML/MIN/1.73
GLUCOSE BLD-MCNC: 181 MG/DL (ref 74–99)
POTASSIUM SERPL-SCNC: 3.4 MMOL/L (ref 3.5–5)
SODIUM BLD-SCNC: 133 MMOL/L (ref 132–146)
TROPONIN: <0.01 NG/ML (ref 0–0.03)

## 2020-07-20 PROCEDURE — 80048 BASIC METABOLIC PNL TOTAL CA: CPT

## 2020-07-20 PROCEDURE — 96374 THER/PROPH/DIAG INJ IV PUSH: CPT

## 2020-07-20 PROCEDURE — 93010 ELECTROCARDIOGRAM REPORT: CPT | Performed by: INTERNAL MEDICINE

## 2020-07-20 PROCEDURE — 2580000003 HC RX 258: Performed by: PHYSICIAN ASSISTANT

## 2020-07-20 PROCEDURE — 73562 X-RAY EXAM OF KNEE 3: CPT

## 2020-07-20 PROCEDURE — 84484 ASSAY OF TROPONIN QUANT: CPT

## 2020-07-20 PROCEDURE — 6360000002 HC RX W HCPCS: Performed by: PHYSICIAN ASSISTANT

## 2020-07-20 PROCEDURE — 20610 DRAIN/INJ JOINT/BURSA W/O US: CPT

## 2020-07-20 PROCEDURE — 71046 X-RAY EXAM CHEST 2 VIEWS: CPT

## 2020-07-20 PROCEDURE — 93005 ELECTROCARDIOGRAM TRACING: CPT | Performed by: PHYSICIAN ASSISTANT

## 2020-07-20 PROCEDURE — 99285 EMERGENCY DEPT VISIT HI MDM: CPT

## 2020-07-20 PROCEDURE — 96375 TX/PRO/DX INJ NEW DRUG ADDON: CPT

## 2020-07-20 PROCEDURE — 85025 COMPLETE CBC W/AUTO DIFF WBC: CPT

## 2020-07-20 RX ORDER — KETOROLAC TROMETHAMINE 30 MG/ML
30 INJECTION, SOLUTION INTRAMUSCULAR; INTRAVENOUS ONCE
Status: DISCONTINUED | OUTPATIENT
Start: 2020-07-20 | End: 2020-07-20

## 2020-07-20 RX ORDER — KETOROLAC TROMETHAMINE 30 MG/ML
30 INJECTION, SOLUTION INTRAMUSCULAR; INTRAVENOUS ONCE
Status: COMPLETED | OUTPATIENT
Start: 2020-07-20 | End: 2020-07-20

## 2020-07-20 RX ORDER — DIPHENHYDRAMINE HYDROCHLORIDE 50 MG/ML
25 INJECTION INTRAMUSCULAR; INTRAVENOUS ONCE
Status: COMPLETED | OUTPATIENT
Start: 2020-07-20 | End: 2020-07-20

## 2020-07-20 RX ORDER — METOCLOPRAMIDE HYDROCHLORIDE 5 MG/ML
10 INJECTION INTRAMUSCULAR; INTRAVENOUS ONCE
Status: COMPLETED | OUTPATIENT
Start: 2020-07-20 | End: 2020-07-20

## 2020-07-20 RX ORDER — 0.9 % SODIUM CHLORIDE 0.9 %
1000 INTRAVENOUS SOLUTION INTRAVENOUS ONCE
Status: COMPLETED | OUTPATIENT
Start: 2020-07-20 | End: 2020-07-21

## 2020-07-20 RX ADMIN — METOCLOPRAMIDE HYDROCHLORIDE 10 MG: 5 INJECTION INTRAMUSCULAR; INTRAVENOUS at 22:49

## 2020-07-20 RX ADMIN — SODIUM CHLORIDE 1000 ML: 9 INJECTION, SOLUTION INTRAVENOUS at 22:48

## 2020-07-20 RX ADMIN — KETOROLAC TROMETHAMINE 30 MG: 30 INJECTION, SOLUTION INTRAMUSCULAR at 22:49

## 2020-07-20 RX ADMIN — DIPHENHYDRAMINE HYDROCHLORIDE 25 MG: 50 INJECTION, SOLUTION INTRAMUSCULAR; INTRAVENOUS at 22:50

## 2020-07-20 ASSESSMENT — PAIN DESCRIPTION - PAIN TYPE: TYPE: ACUTE PAIN

## 2020-07-20 ASSESSMENT — PAIN SCALES - GENERAL
PAINLEVEL_OUTOF10: 10
PAINLEVEL_OUTOF10: 8

## 2020-07-21 ENCOUNTER — HOSPITAL ENCOUNTER (INPATIENT)
Age: 55
LOS: 8 days | Discharge: NURSING, REHAB OR LONG-TERM CARE | DRG: 710 | End: 2020-07-31
Attending: EMERGENCY MEDICINE | Admitting: INTERNAL MEDICINE
Payer: COMMERCIAL

## 2020-07-21 ENCOUNTER — APPOINTMENT (OUTPATIENT)
Dept: CT IMAGING | Age: 55
DRG: 710 | End: 2020-07-21
Payer: COMMERCIAL

## 2020-07-21 VITALS
BODY MASS INDEX: 30.8 KG/M2 | OXYGEN SATURATION: 98 % | RESPIRATION RATE: 14 BRPM | HEART RATE: 93 BPM | WEIGHT: 220 LBS | HEIGHT: 71 IN | TEMPERATURE: 98 F | SYSTOLIC BLOOD PRESSURE: 167 MMHG | DIASTOLIC BLOOD PRESSURE: 88 MMHG

## 2020-07-21 LAB
APPEARANCE FLUID: NORMAL
BACTERIA: NORMAL /HPF
BASOPHILS ABSOLUTE: 0 E9/L (ref 0–0.2)
BASOPHILS RELATIVE PERCENT: 0.3 % (ref 0–2)
BILIRUBIN URINE: NEGATIVE
BLOOD, URINE: ABNORMAL
BURR CELLS: ABNORMAL
CELL COUNT FLUID TYPE: NORMAL
CLARITY: CLEAR
COLOR FLUID: YELLOW
COLOR: YELLOW
CRYSTALS, FLUID: NORMAL
EKG ATRIAL RATE: 85 BPM
EKG P AXIS: 43 DEGREES
EKG P-R INTERVAL: 160 MS
EKG Q-T INTERVAL: 366 MS
EKG QRS DURATION: 90 MS
EKG QTC CALCULATION (BAZETT): 435 MS
EKG R AXIS: -22 DEGREES
EKG T AXIS: 91 DEGREES
EKG VENTRICULAR RATE: 85 BPM
EOSINOPHILS ABSOLUTE: 0 E9/L (ref 0.05–0.5)
EOSINOPHILS RELATIVE PERCENT: 0.1 % (ref 0–6)
GLUCOSE URINE: 100 MG/DL
GRAM STAIN ORDERABLE: NORMAL
HCT VFR BLD CALC: 39.4 % (ref 37–54)
HEMOGLOBIN: 13.5 G/DL (ref 12.5–16.5)
KETONES, URINE: NEGATIVE MG/DL
LEUKOCYTE ESTERASE, URINE: NEGATIVE
LYMPHOCYTES ABSOLUTE: 2.62 E9/L (ref 1.5–4)
LYMPHOCYTES RELATIVE PERCENT: 15.7 % (ref 20–42)
MCH RBC QN AUTO: 30.8 PG (ref 26–35)
MCHC RBC AUTO-ENTMCNC: 34.3 % (ref 32–34.5)
MCV RBC AUTO: 90 FL (ref 80–99.9)
MONOCYTE, FLUID: 8 %
MONOCYTES ABSOLUTE: 2.13 E9/L (ref 0.1–0.95)
MONOCYTES RELATIVE PERCENT: 13 % (ref 2–12)
NEUTROPHIL, FLUID: 92 %
NEUTROPHILS ABSOLUTE: 11.64 E9/L (ref 1.8–7.3)
NEUTROPHILS RELATIVE PERCENT: 71.3 % (ref 43–80)
NITRITE, URINE: NEGATIVE
NUCLEATED CELLS FLUID: NORMAL /UL
PDW BLD-RTO: 13 FL (ref 11.5–15)
PH UA: 6 (ref 5–9)
PLATELET # BLD: 348 E9/L (ref 130–450)
PMV BLD AUTO: 9.4 FL (ref 7–12)
POIKILOCYTES: ABNORMAL
PROTEIN UA: 100 MG/DL
RBC # BLD: 4.38 E12/L (ref 3.8–5.8)
RBC FLUID: 2000 /UL
RBC UA: NORMAL /HPF (ref 0–2)
SOURCE BODY FLUID: NORMAL
SPECIFIC GRAVITY UA: 1.01 (ref 1–1.03)
UROBILINOGEN, URINE: 0.2 E.U./DL
WBC # BLD: 16.4 E9/L (ref 4.5–11.5)
WBC UA: NORMAL /HPF (ref 0–5)

## 2020-07-21 PROCEDURE — 87186 SC STD MICRODIL/AGAR DIL: CPT

## 2020-07-21 PROCEDURE — 81001 URINALYSIS AUTO W/SCOPE: CPT

## 2020-07-21 PROCEDURE — 87205 SMEAR GRAM STAIN: CPT

## 2020-07-21 PROCEDURE — 96376 TX/PRO/DX INJ SAME DRUG ADON: CPT

## 2020-07-21 PROCEDURE — 87070 CULTURE OTHR SPECIMN AEROBIC: CPT

## 2020-07-21 PROCEDURE — 93010 ELECTROCARDIOGRAM REPORT: CPT | Performed by: INTERNAL MEDICINE

## 2020-07-21 PROCEDURE — 6360000002 HC RX W HCPCS: Performed by: PHYSICIAN ASSISTANT

## 2020-07-21 PROCEDURE — 89060 EXAM SYNOVIAL FLUID CRYSTALS: CPT

## 2020-07-21 PROCEDURE — 89051 BODY FLUID CELL COUNT: CPT

## 2020-07-21 PROCEDURE — 70450 CT HEAD/BRAIN W/O DYE: CPT

## 2020-07-21 PROCEDURE — 96375 TX/PRO/DX INJ NEW DRUG ADDON: CPT

## 2020-07-21 PROCEDURE — 0S9D4ZZ DRAINAGE OF LEFT KNEE JOINT, PERCUTANEOUS ENDOSCOPIC APPROACH: ICD-10-PCS | Performed by: EMERGENCY MEDICINE

## 2020-07-21 PROCEDURE — 0S9C3ZX DRAINAGE OF RIGHT KNEE JOINT, PERCUTANEOUS APPROACH, DIAGNOSTIC: ICD-10-PCS | Performed by: EMERGENCY MEDICINE

## 2020-07-21 PROCEDURE — 99284 EMERGENCY DEPT VISIT MOD MDM: CPT

## 2020-07-21 PROCEDURE — 87147 CULTURE TYPE IMMUNOLOGIC: CPT

## 2020-07-21 RX ORDER — SODIUM CHLORIDE 0.9 % (FLUSH) 0.9 %
10 SYRINGE (ML) INJECTION PRN
Status: DISCONTINUED | OUTPATIENT
Start: 2020-07-21 | End: 2020-07-21 | Stop reason: HOSPADM

## 2020-07-21 RX ORDER — FENTANYL CITRATE 50 UG/ML
50 INJECTION, SOLUTION INTRAMUSCULAR; INTRAVENOUS ONCE
Status: COMPLETED | OUTPATIENT
Start: 2020-07-22 | End: 2020-07-22

## 2020-07-21 RX ORDER — NAPROXEN 500 MG/1
500 TABLET ORAL 2 TIMES DAILY
Qty: 14 TABLET | Refills: 0 | Status: ON HOLD | OUTPATIENT
Start: 2020-07-21 | End: 2020-07-31 | Stop reason: HOSPADM

## 2020-07-21 RX ORDER — SODIUM CHLORIDE 0.9 % (FLUSH) 0.9 %
10 SYRINGE (ML) INJECTION EVERY 12 HOURS SCHEDULED
Status: DISCONTINUED | OUTPATIENT
Start: 2020-07-21 | End: 2020-07-21 | Stop reason: HOSPADM

## 2020-07-21 RX ORDER — DIPHENHYDRAMINE HYDROCHLORIDE 50 MG/ML
25 INJECTION INTRAMUSCULAR; INTRAVENOUS ONCE
Status: COMPLETED | OUTPATIENT
Start: 2020-07-21 | End: 2020-07-21

## 2020-07-21 RX ORDER — ONDANSETRON 4 MG/1
4 TABLET, ORALLY DISINTEGRATING ORAL EVERY 8 HOURS PRN
Qty: 10 TABLET | Refills: 0 | Status: ON HOLD | OUTPATIENT
Start: 2020-07-21 | End: 2020-07-31 | Stop reason: HOSPADM

## 2020-07-21 RX ORDER — PROCHLORPERAZINE EDISYLATE 5 MG/ML
10 INJECTION INTRAMUSCULAR; INTRAVENOUS ONCE
Status: COMPLETED | OUTPATIENT
Start: 2020-07-21 | End: 2020-07-21

## 2020-07-21 RX ADMIN — DIPHENHYDRAMINE HYDROCHLORIDE 25 MG: 50 INJECTION, SOLUTION INTRAMUSCULAR; INTRAVENOUS at 00:31

## 2020-07-21 RX ADMIN — PROCHLORPERAZINE EDISYLATE 10 MG: 5 INJECTION INTRAMUSCULAR; INTRAVENOUS at 00:30

## 2020-07-21 ASSESSMENT — PAIN DESCRIPTION - PAIN TYPE: TYPE: ACUTE PAIN

## 2020-07-21 ASSESSMENT — PAIN DESCRIPTION - DESCRIPTORS: DESCRIPTORS: STABBING;SHOOTING;SHARP

## 2020-07-21 ASSESSMENT — PAIN DESCRIPTION - FREQUENCY: FREQUENCY: CONTINUOUS

## 2020-07-21 ASSESSMENT — PAIN DESCRIPTION - LOCATION: LOCATION: KNEE

## 2020-07-21 ASSESSMENT — PAIN DESCRIPTION - ORIENTATION: ORIENTATION: LEFT;RIGHT
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
## 2020-07-21 NOTE — ED NOTES
Bed: 01  Expected date:   Expected time:   Means of arrival:   Comments:  Charley Herrera RN  07/20/20 [id number]

## 2020-07-22 PROBLEM — M25.562 ACUTE BILATERAL KNEE PAIN: Status: ACTIVE | Noted: 2020-07-22

## 2020-07-22 PROBLEM — M54.41 ACUTE MIDLINE LOW BACK PAIN WITH BILATERAL SCIATICA: Status: ACTIVE | Noted: 2020-07-22

## 2020-07-22 PROBLEM — M54.42 ACUTE MIDLINE LOW BACK PAIN WITH BILATERAL SCIATICA: Status: ACTIVE | Noted: 2020-07-22

## 2020-07-22 PROBLEM — M00.261 STREPTOCOCCAL ARTHRITIS OF RIGHT KNEE (HCC): Status: ACTIVE | Noted: 2020-07-22

## 2020-07-22 PROBLEM — M25.561 ACUTE BILATERAL KNEE PAIN: Status: ACTIVE | Noted: 2020-07-22

## 2020-07-22 PROBLEM — A40.1 SEPSIS DUE TO GROUP B STREPTOCOCCUS (HCC): Status: ACTIVE | Noted: 2020-07-22

## 2020-07-22 LAB
ADENOVIRUS BY PCR: NOT DETECTED
ANION GAP SERPL CALCULATED.3IONS-SCNC: 14 MMOL/L (ref 7–16)
ANION GAP SERPL CALCULATED.3IONS-SCNC: 14 MMOL/L (ref 7–16)
APPEARANCE FLUID: NORMAL
APTT: 29.6 SEC (ref 24.5–35.1)
BASOPHILS ABSOLUTE: 0 E9/L (ref 0–0.2)
BASOPHILS ABSOLUTE: 0.03 E9/L (ref 0–0.2)
BASOPHILS RELATIVE PERCENT: 0.2 % (ref 0–2)
BASOPHILS RELATIVE PERCENT: 0.3 % (ref 0–2)
BORDETELLA PARAPERTUSSIS BY PCR: NOT DETECTED
BORDETELLA PERTUSSIS BY PCR: NOT DETECTED
BUN BLDV-MCNC: 16 MG/DL (ref 6–20)
BUN BLDV-MCNC: 20 MG/DL (ref 6–20)
BURR CELLS: ABNORMAL
BURR CELLS: ABNORMAL
C-REACTIVE PROTEIN: 41.4 MG/DL (ref 0–0.4)
CALCIUM SERPL-MCNC: 9.2 MG/DL (ref 8.6–10.2)
CALCIUM SERPL-MCNC: 9.3 MG/DL (ref 8.6–10.2)
CELL COUNT FLUID TYPE: NORMAL
CHLAMYDOPHILIA PNEUMONIAE BY PCR: NOT DETECTED
CHLORIDE BLD-SCNC: 93 MMOL/L (ref 98–107)
CHLORIDE BLD-SCNC: 94 MMOL/L (ref 98–107)
CO2: 24 MMOL/L (ref 22–29)
CO2: 25 MMOL/L (ref 22–29)
COLOR FLUID: YELLOW
CORONAVIRUS 229E BY PCR: NOT DETECTED
CORONAVIRUS HKU1 BY PCR: NOT DETECTED
CORONAVIRUS NL63 BY PCR: NOT DETECTED
CORONAVIRUS OC43 BY PCR: NOT DETECTED
CREAT SERPL-MCNC: 0.7 MG/DL (ref 0.7–1.2)
CREAT SERPL-MCNC: 0.8 MG/DL (ref 0.7–1.2)
EOSINOPHILS ABSOLUTE: 0.01 E9/L (ref 0.05–0.5)
EOSINOPHILS ABSOLUTE: 0.16 E9/L (ref 0.05–0.5)
EOSINOPHILS RELATIVE PERCENT: 0.1 % (ref 0–6)
EOSINOPHILS RELATIVE PERCENT: 0.9 % (ref 0–6)
FLUID TYPE: NORMAL
GFR AFRICAN AMERICAN: >60
GFR AFRICAN AMERICAN: >60
GFR NON-AFRICAN AMERICAN: >60 ML/MIN/1.73
GFR NON-AFRICAN AMERICAN: >60 ML/MIN/1.73
GLUCOSE BLD-MCNC: 215 MG/DL (ref 74–99)
GLUCOSE BLD-MCNC: 272 MG/DL (ref 74–99)
GLUCOSE, FLUID: 15 MG/DL
HBA1C MFR BLD: 10.1 % (ref 4–5.6)
HBA1C MFR BLD: 10.2 % (ref 4–5.6)
HCT VFR BLD CALC: 37.6 % (ref 37–54)
HCT VFR BLD CALC: 38.2 % (ref 37–54)
HEMOGLOBIN: 12.9 G/DL (ref 12.5–16.5)
HEMOGLOBIN: 13 G/DL (ref 12.5–16.5)
HUMAN METAPNEUMOVIRUS BY PCR: NOT DETECTED
HUMAN RHINOVIRUS/ENTEROVIRUS BY PCR: DETECTED
IMMATURE GRANULOCYTES #: 0.1 E9/L
IMMATURE GRANULOCYTES %: 0.7 % (ref 0–5)
INFLUENZA A BY PCR: NOT DETECTED
INFLUENZA B BY PCR: NOT DETECTED
INR BLD: 1.2
LYMPHOCYTES ABSOLUTE: 1.07 E9/L (ref 1.5–4)
LYMPHOCYTES ABSOLUTE: 1.25 E9/L (ref 1.5–4)
LYMPHOCYTES RELATIVE PERCENT: 7 % (ref 20–42)
LYMPHOCYTES RELATIVE PERCENT: 7.4 % (ref 20–42)
MAGNESIUM: 2 MG/DL (ref 1.6–2.6)
MCH RBC QN AUTO: 30.2 PG (ref 26–35)
MCH RBC QN AUTO: 30.4 PG (ref 26–35)
MCHC RBC AUTO-ENTMCNC: 34 % (ref 32–34.5)
MCHC RBC AUTO-ENTMCNC: 34.3 % (ref 32–34.5)
MCV RBC AUTO: 88.1 FL (ref 80–99.9)
MCV RBC AUTO: 89.3 FL (ref 80–99.9)
METER GLUCOSE: 156 MG/DL (ref 74–99)
METER GLUCOSE: 168 MG/DL (ref 74–99)
METER GLUCOSE: 190 MG/DL (ref 74–99)
MONOCYTE, FLUID: 8 %
MONOCYTES ABSOLUTE: 1.42 E9/L (ref 0.1–0.95)
MONOCYTES ABSOLUTE: 1.88 E9/L (ref 0.1–0.95)
MONOCYTES RELATIVE PERCENT: 13 % (ref 2–12)
MONOCYTES RELATIVE PERCENT: 7.8 % (ref 2–12)
MYCOPLASMA PNEUMONIAE BY PCR: NOT DETECTED
NEUTROPHIL, FLUID: 92 %
NEUTROPHILS ABSOLUTE: 11.41 E9/L (ref 1.8–7.3)
NEUTROPHILS ABSOLUTE: 14.95 E9/L (ref 1.8–7.3)
NEUTROPHILS RELATIVE PERCENT: 78.6 % (ref 43–80)
NEUTROPHILS RELATIVE PERCENT: 84.3 % (ref 43–80)
NUCLEATED CELLS FLUID: 8738 /UL
OVALOCYTES: ABNORMAL
PARAINFLUENZA VIRUS 1 BY PCR: NOT DETECTED
PARAINFLUENZA VIRUS 2 BY PCR: NOT DETECTED
PARAINFLUENZA VIRUS 3 BY PCR: NOT DETECTED
PARAINFLUENZA VIRUS 4 BY PCR: NOT DETECTED
PDW BLD-RTO: 12.9 FL (ref 11.5–15)
PDW BLD-RTO: 13 FL (ref 11.5–15)
PLATELET # BLD: 329 E9/L (ref 130–450)
PLATELET # BLD: 347 E9/L (ref 130–450)
PMV BLD AUTO: 9.1 FL (ref 7–12)
PMV BLD AUTO: 9.2 FL (ref 7–12)
POIKILOCYTES: ABNORMAL
POIKILOCYTES: ABNORMAL
POLYCHROMASIA: ABNORMAL
POLYCHROMASIA: ABNORMAL
POTASSIUM SERPL-SCNC: 3.1 MMOL/L (ref 3.5–5)
POTASSIUM SERPL-SCNC: 3.3 MMOL/L (ref 3.5–5)
PROCALCITONIN: 0.62 NG/ML (ref 0–0.08)
PROTHROMBIN TIME: 13.4 SEC (ref 9.3–12.4)
RBC # BLD: 4.27 E12/L (ref 3.8–5.8)
RBC # BLD: 4.28 E12/L (ref 3.8–5.8)
RBC FLUID: 4000 /UL
RESPIRATORY SYNCYTIAL VIRUS BY PCR: NOT DETECTED
SEDIMENTATION RATE, ERYTHROCYTE: 90 MM/HR (ref 0–15)
SODIUM BLD-SCNC: 132 MMOL/L (ref 132–146)
SODIUM BLD-SCNC: 132 MMOL/L (ref 132–146)
VITAMIN B-12: 569 PG/ML (ref 211–946)
WBC # BLD: 14.5 E9/L (ref 4.5–11.5)
WBC # BLD: 17.8 E9/L (ref 4.5–11.5)

## 2020-07-22 PROCEDURE — 87081 CULTURE SCREEN ONLY: CPT

## 2020-07-22 PROCEDURE — 87040 BLOOD CULTURE FOR BACTERIA: CPT

## 2020-07-22 PROCEDURE — 6360000002 HC RX W HCPCS: Performed by: INTERNAL MEDICINE

## 2020-07-22 PROCEDURE — 6370000000 HC RX 637 (ALT 250 FOR IP): Performed by: STUDENT IN AN ORGANIZED HEALTH CARE EDUCATION/TRAINING PROGRAM

## 2020-07-22 PROCEDURE — 0100U HC RESPIRPTHGN MULT REV TRANS & AMP PRB TECH 21 TRGT: CPT

## 2020-07-22 PROCEDURE — 2580000003 HC RX 258: Performed by: INTERNAL MEDICINE

## 2020-07-22 PROCEDURE — 87147 CULTURE TYPE IMMUNOLOGIC: CPT

## 2020-07-22 PROCEDURE — 82607 VITAMIN B-12: CPT

## 2020-07-22 PROCEDURE — 87186 SC STD MICRODIL/AGAR DIL: CPT

## 2020-07-22 PROCEDURE — 36415 COLL VENOUS BLD VENIPUNCTURE: CPT

## 2020-07-22 PROCEDURE — 89051 BODY FLUID CELL COUNT: CPT

## 2020-07-22 PROCEDURE — 96375 TX/PRO/DX INJ NEW DRUG ADDON: CPT

## 2020-07-22 PROCEDURE — 85651 RBC SED RATE NONAUTOMATED: CPT

## 2020-07-22 PROCEDURE — 2580000003 HC RX 258: Performed by: STUDENT IN AN ORGANIZED HEALTH CARE EDUCATION/TRAINING PROGRAM

## 2020-07-22 PROCEDURE — 6360000002 HC RX W HCPCS: Performed by: EMERGENCY MEDICINE

## 2020-07-22 PROCEDURE — 83735 ASSAY OF MAGNESIUM: CPT

## 2020-07-22 PROCEDURE — 99254 IP/OBS CNSLTJ NEW/EST MOD 60: CPT | Performed by: ORTHOPAEDIC SURGERY

## 2020-07-22 PROCEDURE — 80048 BASIC METABOLIC PNL TOTAL CA: CPT

## 2020-07-22 PROCEDURE — 82947 ASSAY GLUCOSE BLOOD QUANT: CPT

## 2020-07-22 PROCEDURE — 85610 PROTHROMBIN TIME: CPT

## 2020-07-22 PROCEDURE — 87205 SMEAR GRAM STAIN: CPT

## 2020-07-22 PROCEDURE — 96376 TX/PRO/DX INJ SAME DRUG ADON: CPT

## 2020-07-22 PROCEDURE — 6370000000 HC RX 637 (ALT 250 FOR IP): Performed by: INTERNAL MEDICINE

## 2020-07-22 PROCEDURE — 87077 CULTURE AEROBIC IDENTIFY: CPT

## 2020-07-22 PROCEDURE — 84145 PROCALCITONIN (PCT): CPT

## 2020-07-22 PROCEDURE — G0378 HOSPITAL OBSERVATION PER HR: HCPCS

## 2020-07-22 PROCEDURE — 83036 HEMOGLOBIN GLYCOSYLATED A1C: CPT

## 2020-07-22 PROCEDURE — 86140 C-REACTIVE PROTEIN: CPT

## 2020-07-22 PROCEDURE — 99220 PR INITIAL OBSERVATION CARE/DAY 70 MINUTES: CPT | Performed by: INTERNAL MEDICINE

## 2020-07-22 PROCEDURE — 85730 THROMBOPLASTIN TIME PARTIAL: CPT

## 2020-07-22 PROCEDURE — 6370000000 HC RX 637 (ALT 250 FOR IP): Performed by: EMERGENCY MEDICINE

## 2020-07-22 PROCEDURE — 96374 THER/PROPH/DIAG INJ IV PUSH: CPT

## 2020-07-22 PROCEDURE — 96372 THER/PROPH/DIAG INJ SC/IM: CPT

## 2020-07-22 PROCEDURE — 6360000002 HC RX W HCPCS: Performed by: STUDENT IN AN ORGANIZED HEALTH CARE EDUCATION/TRAINING PROGRAM

## 2020-07-22 PROCEDURE — 87070 CULTURE OTHR SPECIMN AEROBIC: CPT

## 2020-07-22 PROCEDURE — 85025 COMPLETE CBC W/AUTO DIFF WBC: CPT

## 2020-07-22 PROCEDURE — 89060 EXAM SYNOVIAL FLUID CRYSTALS: CPT

## 2020-07-22 PROCEDURE — 82962 GLUCOSE BLOOD TEST: CPT

## 2020-07-22 RX ORDER — VENLAFAXINE HYDROCHLORIDE 75 MG/1
75 CAPSULE, EXTENDED RELEASE ORAL DAILY
Status: DISCONTINUED | OUTPATIENT
Start: 2020-07-22 | End: 2020-07-31 | Stop reason: HOSPADM

## 2020-07-22 RX ORDER — ONDANSETRON 2 MG/ML
4 INJECTION INTRAMUSCULAR; INTRAVENOUS EVERY 6 HOURS PRN
Status: DISCONTINUED | OUTPATIENT
Start: 2020-07-22 | End: 2020-07-27

## 2020-07-22 RX ORDER — KETOROLAC TROMETHAMINE 30 MG/ML
30 INJECTION, SOLUTION INTRAMUSCULAR; INTRAVENOUS ONCE
Status: COMPLETED | OUTPATIENT
Start: 2020-07-22 | End: 2020-07-22

## 2020-07-22 RX ORDER — HYDROCODONE BITARTRATE AND ACETAMINOPHEN 5; 325 MG/1; MG/1
1 TABLET ORAL EVERY 6 HOURS PRN
Status: DISCONTINUED | OUTPATIENT
Start: 2020-07-22 | End: 2020-07-23

## 2020-07-22 RX ORDER — GABAPENTIN 100 MG/1
200 CAPSULE ORAL ONCE
Status: COMPLETED | OUTPATIENT
Start: 2020-07-22 | End: 2020-07-22

## 2020-07-22 RX ORDER — MORPHINE SULFATE 4 MG/ML
8 INJECTION, SOLUTION INTRAMUSCULAR; INTRAVENOUS ONCE
Status: DISCONTINUED | OUTPATIENT
Start: 2020-07-22 | End: 2020-07-22

## 2020-07-22 RX ORDER — POTASSIUM CHLORIDE 20 MEQ/1
40 TABLET, EXTENDED RELEASE ORAL ONCE
Status: COMPLETED | OUTPATIENT
Start: 2020-07-22 | End: 2020-07-22

## 2020-07-22 RX ORDER — MORPHINE SULFATE 4 MG/ML
4 INJECTION, SOLUTION INTRAMUSCULAR; INTRAVENOUS EVERY 6 HOURS PRN
Status: DISCONTINUED | OUTPATIENT
Start: 2020-07-22 | End: 2020-07-22

## 2020-07-22 RX ORDER — POLYETHYLENE GLYCOL 3350 17 G/17G
17 POWDER, FOR SOLUTION ORAL DAILY PRN
Status: DISCONTINUED | OUTPATIENT
Start: 2020-07-22 | End: 2020-07-25

## 2020-07-22 RX ORDER — SENNA AND DOCUSATE SODIUM 50; 8.6 MG/1; MG/1
2 TABLET, FILM COATED ORAL DAILY
Status: DISCONTINUED | OUTPATIENT
Start: 2020-07-22 | End: 2020-07-25

## 2020-07-22 RX ORDER — NICOTINE POLACRILEX 4 MG
15 LOZENGE BUCCAL PRN
Status: DISCONTINUED | OUTPATIENT
Start: 2020-07-22 | End: 2020-07-31 | Stop reason: HOSPADM

## 2020-07-22 RX ORDER — GABAPENTIN 300 MG/1
300 CAPSULE ORAL NIGHTLY
Status: DISCONTINUED | OUTPATIENT
Start: 2020-07-23 | End: 2020-07-25

## 2020-07-22 RX ORDER — POTASSIUM CHLORIDE 20 MEQ/1
40 TABLET, EXTENDED RELEASE ORAL ONCE
Status: CANCELLED | OUTPATIENT
Start: 2020-07-22

## 2020-07-22 RX ORDER — INSULIN GLARGINE 100 [IU]/ML
30 INJECTION, SOLUTION SUBCUTANEOUS NIGHTLY
Status: DISCONTINUED | OUTPATIENT
Start: 2020-07-22 | End: 2020-07-24

## 2020-07-22 RX ORDER — PANTOPRAZOLE SODIUM 40 MG/1
40 TABLET, DELAYED RELEASE ORAL
Status: DISCONTINUED | OUTPATIENT
Start: 2020-07-22 | End: 2020-07-31 | Stop reason: HOSPADM

## 2020-07-22 RX ORDER — ATORVASTATIN CALCIUM 20 MG/1
20 TABLET, FILM COATED ORAL DAILY
Status: DISCONTINUED | OUTPATIENT
Start: 2020-07-22 | End: 2020-07-31 | Stop reason: HOSPADM

## 2020-07-22 RX ORDER — PROMETHAZINE HYDROCHLORIDE 25 MG/1
12.5 TABLET ORAL EVERY 6 HOURS PRN
Status: DISCONTINUED | OUTPATIENT
Start: 2020-07-22 | End: 2020-07-27

## 2020-07-22 RX ORDER — MORPHINE SULFATE 4 MG/ML
10 INJECTION, SOLUTION INTRAMUSCULAR; INTRAVENOUS ONCE
Status: DISCONTINUED | OUTPATIENT
Start: 2020-07-22 | End: 2020-07-24

## 2020-07-22 RX ORDER — GABAPENTIN 100 MG/1
100 CAPSULE ORAL NIGHTLY
Status: DISCONTINUED | OUTPATIENT
Start: 2020-07-22 | End: 2020-07-22

## 2020-07-22 RX ORDER — INSULIN GLARGINE 100 [IU]/ML
25 INJECTION, SOLUTION SUBCUTANEOUS ONCE
Status: COMPLETED | OUTPATIENT
Start: 2020-07-22 | End: 2020-07-22

## 2020-07-22 RX ORDER — LAMOTRIGINE 25 MG/1
25 TABLET ORAL DAILY
Status: DISCONTINUED | OUTPATIENT
Start: 2020-07-22 | End: 2020-07-31 | Stop reason: HOSPADM

## 2020-07-22 RX ORDER — SODIUM CHLORIDE 0.9 % (FLUSH) 0.9 %
10 SYRINGE (ML) INJECTION EVERY 12 HOURS SCHEDULED
Status: DISCONTINUED | OUTPATIENT
Start: 2020-07-22 | End: 2020-07-23 | Stop reason: ALTCHOICE

## 2020-07-22 RX ORDER — INSULIN GLARGINE 100 [IU]/ML
25 INJECTION, SOLUTION SUBCUTANEOUS NIGHTLY
Status: DISCONTINUED | OUTPATIENT
Start: 2020-07-22 | End: 2020-07-22

## 2020-07-22 RX ORDER — POTASSIUM CHLORIDE 20 MEQ/1
20 TABLET, EXTENDED RELEASE ORAL ONCE
Status: COMPLETED | OUTPATIENT
Start: 2020-07-22 | End: 2020-07-22

## 2020-07-22 RX ORDER — DEXTROSE MONOHYDRATE 25 G/50ML
12.5 INJECTION, SOLUTION INTRAVENOUS PRN
Status: DISCONTINUED | OUTPATIENT
Start: 2020-07-22 | End: 2020-07-31 | Stop reason: HOSPADM

## 2020-07-22 RX ORDER — ACETAMINOPHEN 325 MG/1
650 TABLET ORAL EVERY 6 HOURS PRN
Status: DISCONTINUED | OUTPATIENT
Start: 2020-07-22 | End: 2020-07-27

## 2020-07-22 RX ORDER — LORAZEPAM 2 MG/ML
1 INJECTION INTRAMUSCULAR SEE ADMIN INSTRUCTIONS
Status: DISCONTINUED | OUTPATIENT
Start: 2020-07-22 | End: 2020-07-24

## 2020-07-22 RX ORDER — MORPHINE SULFATE 4 MG/ML
4 INJECTION, SOLUTION INTRAMUSCULAR; INTRAVENOUS EVERY 4 HOURS PRN
Status: DISCONTINUED | OUTPATIENT
Start: 2020-07-22 | End: 2020-07-30

## 2020-07-22 RX ORDER — GABAPENTIN 100 MG/1
100 CAPSULE ORAL ONCE
Status: COMPLETED | OUTPATIENT
Start: 2020-07-22 | End: 2020-07-22

## 2020-07-22 RX ORDER — SODIUM CHLORIDE 0.9 % (FLUSH) 0.9 %
10 SYRINGE (ML) INJECTION PRN
Status: DISCONTINUED | OUTPATIENT
Start: 2020-07-22 | End: 2020-07-23 | Stop reason: ALTCHOICE

## 2020-07-22 RX ORDER — DEXTROSE MONOHYDRATE 50 MG/ML
100 INJECTION, SOLUTION INTRAVENOUS PRN
Status: DISCONTINUED | OUTPATIENT
Start: 2020-07-22 | End: 2020-07-31 | Stop reason: HOSPADM

## 2020-07-22 RX ORDER — ACETAMINOPHEN 650 MG/1
650 SUPPOSITORY RECTAL EVERY 6 HOURS PRN
Status: DISCONTINUED | OUTPATIENT
Start: 2020-07-22 | End: 2020-07-27

## 2020-07-22 RX ADMIN — GABAPENTIN 100 MG: 100 CAPSULE ORAL at 21:04

## 2020-07-22 RX ADMIN — SODIUM CHLORIDE, PRESERVATIVE FREE 10 ML: 5 INJECTION INTRAVENOUS at 21:05

## 2020-07-22 RX ADMIN — HYDROCODONE BITARTRATE AND ACETAMINOPHEN 1 TABLET: 5; 325 TABLET ORAL at 19:27

## 2020-07-22 RX ADMIN — INSULIN LISPRO 3 UNITS: 100 INJECTION, SOLUTION INTRAVENOUS; SUBCUTANEOUS at 21:05

## 2020-07-22 RX ADMIN — LAMOTRIGINE 25 MG: 25 TABLET ORAL at 10:40

## 2020-07-22 RX ADMIN — MORPHINE SULFATE 4 MG: 4 INJECTION, SOLUTION INTRAMUSCULAR; INTRAVENOUS at 16:36

## 2020-07-22 RX ADMIN — ATORVASTATIN CALCIUM 20 MG: 20 TABLET, FILM COATED ORAL at 10:40

## 2020-07-22 RX ADMIN — MORPHINE SULFATE 4 MG: 4 INJECTION, SOLUTION INTRAMUSCULAR; INTRAVENOUS at 12:28

## 2020-07-22 RX ADMIN — MORPHINE SULFATE 4 MG: 4 INJECTION, SOLUTION INTRAMUSCULAR; INTRAVENOUS at 21:55

## 2020-07-22 RX ADMIN — HYDROCODONE BITARTRATE AND ACETAMINOPHEN 1 TABLET: 5; 325 TABLET ORAL at 10:40

## 2020-07-22 RX ADMIN — PANTOPRAZOLE SODIUM 40 MG: 40 TABLET, DELAYED RELEASE ORAL at 10:40

## 2020-07-22 RX ADMIN — INSULIN GLARGINE 30 UNITS: 100 INJECTION, SOLUTION SUBCUTANEOUS at 21:05

## 2020-07-22 RX ADMIN — KETOROLAC TROMETHAMINE 30 MG: 30 INJECTION, SOLUTION INTRAMUSCULAR; INTRAVENOUS at 04:18

## 2020-07-22 RX ADMIN — DOCUSATE SODIUM 50 MG AND SENNOSIDES 8.6 MG 2 TABLET: 8.6; 5 TABLET, FILM COATED ORAL at 10:45

## 2020-07-22 RX ADMIN — POTASSIUM CHLORIDE 40 MEQ: 1500 TABLET, EXTENDED RELEASE ORAL at 16:24

## 2020-07-22 RX ADMIN — FENTANYL CITRATE 50 MCG: 50 INJECTION, SOLUTION INTRAMUSCULAR; INTRAVENOUS at 00:15

## 2020-07-22 RX ADMIN — GABAPENTIN 200 MG: 100 CAPSULE ORAL at 23:48

## 2020-07-22 RX ADMIN — INSULIN LISPRO 3 UNITS: 100 INJECTION, SOLUTION INTRAVENOUS; SUBCUTANEOUS at 16:37

## 2020-07-22 RX ADMIN — ENOXAPARIN SODIUM 40 MG: 40 INJECTION SUBCUTANEOUS at 10:42

## 2020-07-22 RX ADMIN — INSULIN LISPRO 4 UNITS: 100 INJECTION, SOLUTION INTRAVENOUS; SUBCUTANEOUS at 10:46

## 2020-07-22 RX ADMIN — VENLAFAXINE HYDROCHLORIDE 75 MG: 75 CAPSULE, EXTENDED RELEASE ORAL at 10:40

## 2020-07-22 RX ADMIN — SODIUM CHLORIDE, PRESERVATIVE FREE 10 ML: 5 INJECTION INTRAVENOUS at 10:45

## 2020-07-22 RX ADMIN — INSULIN GLARGINE 25 UNITS: 100 INJECTION, SOLUTION SUBCUTANEOUS at 07:15

## 2020-07-22 RX ADMIN — POTASSIUM CHLORIDE 40 MEQ: 1500 TABLET, EXTENDED RELEASE ORAL at 22:00

## 2020-07-22 RX ADMIN — GABAPENTIN 100 MG: 100 CAPSULE ORAL at 04:18

## 2020-07-22 RX ADMIN — POTASSIUM CHLORIDE 20 MEQ: 1500 TABLET, EXTENDED RELEASE ORAL at 07:14

## 2020-07-22 RX ADMIN — CEFTRIAXONE 2 G: 2 INJECTION, POWDER, FOR SOLUTION INTRAMUSCULAR; INTRAVENOUS at 17:02

## 2020-07-22 ASSESSMENT — PAIN SCALES - GENERAL
PAINLEVEL_OUTOF10: 9
PAINLEVEL_OUTOF10: 10
PAINLEVEL_OUTOF10: 9
PAINLEVEL_OUTOF10: 10
PAINLEVEL_OUTOF10: 0
PAINLEVEL_OUTOF10: 10
PAINLEVEL_OUTOF10: 10
PAINLEVEL_OUTOF10: 9

## 2020-07-22 ASSESSMENT — PAIN DESCRIPTION - LOCATION: LOCATION: KNEE

## 2020-07-22 ASSESSMENT — PAIN DESCRIPTION - PAIN TYPE: TYPE: ACUTE PAIN

## 2020-07-22 ASSESSMENT — PAIN DESCRIPTION - DESCRIPTORS: DESCRIPTORS: STABBING;DISCOMFORT;NAGGING

## 2020-07-22 ASSESSMENT — PAIN DESCRIPTION - ORIENTATION: ORIENTATION: LEFT;RIGHT
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
## 2020-07-22 NOTE — PROGRESS NOTES
[facility]  [facility] Residency Program  Progress Note - House Team 2    Patient:  Ancelmo Hagan 63 y.o. male MRN: [id number]     Date of Service: 7/22/2020     CC: Bilateral knee pain, low back pain, bilateral hip pain, N/T  Overnight events: Unremitting pain  Hospital Day: 2    Subjective     Patient still with 9-10/10 pain in the low back, bilateral hips, and bilateral knees. Reports that the knee pain is the worst. Norco, toradol, gabapentin did not relieve the pain at all last night. 2 mg of morphine this morning also did not relieve the pain. Has tingling throughout the lower extremities as well. Also complaining of a HA that developed this morning. Had nausea and vomiting when this problem started but no vomiting since admission. Denies CP, SOB, abd pain, or urinary/bowel incontinence. Objective     Physical Exam:  Vitals: BP (!) 141/71   Pulse 87   Temp 97.5 °F (36.4 °C) (Temporal)   Resp 20   Ht 5' 11\" (1.803 m)   Wt 218 lb 4.1 oz (99 kg)   SpO2 97%   BMI 30.44 kg/m²     I & O - 24hr: No intake or output data in the 24 hours ending 07/22/20 1525    · General Appearance: alert, appears stated age, cooperative and appears to be in pain, becoming tearful at times. · HEENT:  Head: Normocephalic, no lesions, without obvious abnormality. · Neck: no adenopathy, supple, symmetrical, trachea midline and thyroid not enlarged, symmetric, no tenderness/mass/nodules  · Lung: clear to auscultation bilaterally  · Heart: regular rate and rhythm, S1, S2 normal, no murmur, click, rub or gallop  · Abdomen: Mildly tender to palpation diffusely. · Extremities:  Bilaterally the entire lower extremities are exquisitely tender to palpation. The right knee is mildly swollen. Bandage over the arthrocentesis site. No erythema or warmth noted. Sensation is equal and intact. Decreased range of motion secondary to pain. Unable to assess strength.  Able to wiggle his toes on both sides.  · Neurologic: Mental status: Alert, oriented, thought content appropriate    Pertinent Labs & Imaging Studies     CBC:   Lab Results   Component Value Date    WBC 17.8 07/22/2020    RBC 4.27 07/22/2020    HGB 12.9 07/22/2020    HCT 37.6 07/22/2020    MCV 88.1 07/22/2020    MCH 30.2 07/22/2020    MCHC 34.3 07/22/2020    RDW 13.0 07/22/2020     07/22/2020    MPV 9.2 07/22/2020     CMP:    Lab Results   Component Value Date     07/22/2020    K 3.1 07/22/2020    K 4.1 07/19/2020    CL 94 07/22/2020    CO2 24 07/22/2020    BUN 16 07/22/2020    CREATININE 0.7 07/22/2020    GFRAA >60 07/22/2020    LABGLOM >60 07/22/2020    GLUCOSE 215 07/22/2020    PROT 7.1 07/19/2020    LABALBU 4.4 07/19/2020    CALCIUM 9.2 07/22/2020    BILITOT 0.5 07/19/2020    ALKPHOS 74 07/19/2020    AST 24 07/19/2020    ALT 22 07/19/2020 7/22/2020 09:22   CRP 41.4 (H)      7/22/2020 09:22   Sed Rate 90 (H)      7/21/2020 01:22   Body Fluid Culture (Knee) Strep agalactiae (Beta Strep Group B) (A)      7/22/2020 09:22   Hemoglobin A1C 10.1 (H)     Resident's Assessment and Plan     Francis Bonilla is a 63 y.o. male with a PMHx of type 2 DM, peripheral neuropathy, OA, lumbar stenosis, and HLD who presented with bilateral lower extremity, hip, and low back pain. He has chronic pain in these areas but it acutely worsened after a fall on 7/18. CT head, CT C-spine, CT A/P were all negative for acute abnormalities. X-rays showed effusion in the right knee and bilateral knee OA. Right knee arthrocentesis on 7/21 with 13,700 WBC, 92% neutrophils, and no crystals. Synovial fluid grew GBS. 1. Septic arthritis, right knee  Right knee arthrocentesis on 7/21 with 13,700 WBC, 92% neutrophils, and no crystals. Synovial fluid grew GBS. Afebrile, WBC 12.1->16.4->14.5->17.8. Elevated CRP (41.4) and ESR (90). Possible seeding from bacteremia, blood cultures pending.   · Started on Ceftriaxone 2 g IV Q24H by ID   Consulted ortho and ID, appreciate recs   F/U blood cx's    2. Bilateral lower extremity pain  May be 2/2 progressive lumbar stenosis vs spinal abscess/ vertebral osteomyelitis vs radiculopathy. Associated with tingling. Decreased ROM 2/2 pain, difficult to assess strength. No bowel or bladder incontinence. Is on Norco 7.5-325 at home for chronic pain. No relief with Norco 5-325, Morphine 4 mg, Toradol, or Gabapentin.  Lumbar MRI with contrast   10 mg Morphine x1 due to increased tolerance    3. Hx of lumbar spinal stenosis  1/20/2015 CT L-spine: Spinal canal stenosis of severe degree at the L4-L5 level and   of a moderate to severe degree at L3-L4 and L5-S1 levels. · Lumbar MRI with contrast    4. Type 2 DM  HbgA1c 10.1. Was taking 30u Lantus every other day at home. Has peripheral neuropathy.  Lantus 30u QHS   MDSS   Monitor glucose    5. Opioid-induced constipation  · Senokot daily     6. HLD  Atorvastatin 20 mg daily    7. Bipolar disorder  Lamictal 25 mg daily, Effexor 75 mg daily    8. GERD  Protonix 40 mg daily     9.  Bilateral knee OA      PT/OT evaluation: On hold until MRI is done  DVT prophylaxis/GI prophylaxis: Lovenox/Protonix  Disposition: Continue current care    Rohini Cifuentes MD,  PGY-1  Attending Physician: Dr. Timoteo Boston

## 2020-07-22 NOTE — ED TRIAGE NOTES
Patient states that he treats at the pain management clinic for neck, back and knee pain.  That is why he has norco

## 2020-07-22 NOTE — PROGRESS NOTES
Physical Therapy    PT orders received and chart reviewed to attempt PT eval. Pt ending MRI of lumbar spine s/p fall. PT will follow and attempt again at later time/date as able. Thank you.     Dez Wood, PT, DPT  [id number]
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
## 2020-07-23 ENCOUNTER — ANESTHESIA EVENT (OUTPATIENT)
Dept: OPERATING ROOM | Age: 55
DRG: 710 | End: 2020-07-23
Payer: COMMERCIAL

## 2020-07-23 ENCOUNTER — APPOINTMENT (OUTPATIENT)
Dept: CT IMAGING | Age: 55
DRG: 710 | End: 2020-07-23
Payer: COMMERCIAL

## 2020-07-23 ENCOUNTER — ANESTHESIA (OUTPATIENT)
Dept: OPERATING ROOM | Age: 55
DRG: 710 | End: 2020-07-23
Payer: COMMERCIAL

## 2020-07-23 VITALS — TEMPERATURE: 99.3 F | SYSTOLIC BLOOD PRESSURE: 178 MMHG | DIASTOLIC BLOOD PRESSURE: 91 MMHG | OXYGEN SATURATION: 97 %

## 2020-07-23 PROBLEM — M00.9 SEPTIC ARTHRITIS OF KNEE, BILATERAL (HCC): Status: ACTIVE | Noted: 2020-07-22

## 2020-07-23 PROBLEM — M00.9 SEPTIC ARTHRITIS (HCC): Status: ACTIVE | Noted: 2020-07-23

## 2020-07-23 LAB
AMPHETAMINE SCREEN, URINE: NOT DETECTED
ANION GAP SERPL CALCULATED.3IONS-SCNC: 16 MMOL/L (ref 7–16)
ANISOCYTOSIS: ABNORMAL
APPEARANCE FLUID: NORMAL
BARBITURATE SCREEN URINE: NOT DETECTED
BASOPHILS ABSOLUTE: 0 E9/L (ref 0–0.2)
BASOPHILS RELATIVE PERCENT: 0.2 % (ref 0–2)
BENZODIAZEPINE SCREEN, URINE: POSITIVE
BUN BLDV-MCNC: 17 MG/DL (ref 6–20)
CALCIUM SERPL-MCNC: 9.2 MG/DL (ref 8.6–10.2)
CANNABINOID SCREEN URINE: POSITIVE
CELL COUNT FLUID TYPE: NORMAL
CHLORIDE BLD-SCNC: 100 MMOL/L (ref 98–107)
CO2: 21 MMOL/L (ref 22–29)
COCAINE METABOLITE SCREEN URINE: NOT DETECTED
COLOR FLUID: YELLOW
CREAT SERPL-MCNC: 0.7 MG/DL (ref 0.7–1.2)
CRYSTALS, FLUID: NORMAL
CRYSTALS, FLUID: NORMAL
EOSINOPHILS ABSOLUTE: 0 E9/L (ref 0.05–0.5)
EOSINOPHILS RELATIVE PERCENT: 0 % (ref 0–6)
FENTANYL SCREEN, URINE: POSITIVE
GFR AFRICAN AMERICAN: >60
GFR NON-AFRICAN AMERICAN: >60 ML/MIN/1.73
GLUCOSE BLD-MCNC: 160 MG/DL (ref 74–99)
GRAM STAIN ORDERABLE: NORMAL
HCT VFR BLD CALC: 37.8 % (ref 37–54)
HEMOGLOBIN: 12.7 G/DL (ref 12.5–16.5)
LYMPHOCYTES ABSOLUTE: 1.42 E9/L (ref 1.5–4)
LYMPHOCYTES RELATIVE PERCENT: 7.1 % (ref 20–42)
Lab: ABNORMAL
MAGNESIUM: 2.1 MG/DL (ref 1.6–2.6)
MCH RBC QN AUTO: 30.1 PG (ref 26–35)
MCHC RBC AUTO-ENTMCNC: 33.6 % (ref 32–34.5)
MCV RBC AUTO: 89.6 FL (ref 80–99.9)
METER GLUCOSE: 123 MG/DL (ref 74–99)
METER GLUCOSE: 146 MG/DL (ref 74–99)
METER GLUCOSE: 302 MG/DL (ref 74–99)
METER GLUCOSE: 335 MG/DL (ref 74–99)
METHADONE SCREEN, URINE: NOT DETECTED
MONOCYTE, FLUID: 12 %
MONOCYTES ABSOLUTE: 1.83 E9/L (ref 0.1–0.95)
MONOCYTES RELATIVE PERCENT: 8.8 % (ref 2–12)
NEUTROPHIL, FLUID: 88 %
NEUTROPHILS ABSOLUTE: 17.05 E9/L (ref 1.8–7.3)
NEUTROPHILS RELATIVE PERCENT: 84.1 % (ref 43–80)
NUCLEATED CELLS FLUID: NORMAL /UL
OPIATE SCREEN URINE: POSITIVE
OXYCODONE URINE: NOT DETECTED
PDW BLD-RTO: 12.9 FL (ref 11.5–15)
PHENCYCLIDINE SCREEN URINE: NOT DETECTED
PLATELET # BLD: 394 E9/L (ref 130–450)
PMV BLD AUTO: 9.5 FL (ref 7–12)
POIKILOCYTES: ABNORMAL
POLYCHROMASIA: ABNORMAL
POTASSIUM SERPL-SCNC: 4.1 MMOL/L (ref 3.5–5)
RBC # BLD: 4.22 E12/L (ref 3.8–5.8)
RBC FLUID: 8000 /UL
REASON FOR REJECTION: NORMAL
REJECTED TEST: NORMAL
SODIUM BLD-SCNC: 137 MMOL/L (ref 132–146)
SOURCE BODY FLUID: NORMAL
SOURCE BODY FLUID: NORMAL
SPHEROCYTES: ABNORMAL
WBC # BLD: 20.3 E9/L (ref 4.5–11.5)

## 2020-07-23 PROCEDURE — 2580000003 HC RX 258: Performed by: NURSE ANESTHETIST, CERTIFIED REGISTERED

## 2020-07-23 PROCEDURE — 80307 DRUG TEST PRSMV CHEM ANLYZR: CPT

## 2020-07-23 PROCEDURE — 6370000000 HC RX 637 (ALT 250 FOR IP): Performed by: STUDENT IN AN ORGANIZED HEALTH CARE EDUCATION/TRAINING PROGRAM

## 2020-07-23 PROCEDURE — 3E1U48Z IRRIGATION OF JOINTS USING IRRIGATING SUBSTANCE, PERCUTANEOUS ENDOSCOPIC APPROACH: ICD-10-PCS | Performed by: ORTHOPAEDIC SURGERY

## 2020-07-23 PROCEDURE — 87015 SPECIMEN INFECT AGNT CONCNTJ: CPT

## 2020-07-23 PROCEDURE — 1200000000 HC SEMI PRIVATE

## 2020-07-23 PROCEDURE — 87206 SMEAR FLUORESCENT/ACID STAI: CPT

## 2020-07-23 PROCEDURE — 2580000003 HC RX 258: Performed by: RADIOLOGY

## 2020-07-23 PROCEDURE — 87205 SMEAR GRAM STAIN: CPT

## 2020-07-23 PROCEDURE — 87116 MYCOBACTERIA CULTURE: CPT

## 2020-07-23 PROCEDURE — 6360000004 HC RX CONTRAST MEDICATION: Performed by: RADIOLOGY

## 2020-07-23 PROCEDURE — 82962 GLUCOSE BLOOD TEST: CPT

## 2020-07-23 PROCEDURE — 80048 BASIC METABOLIC PNL TOTAL CA: CPT

## 2020-07-23 PROCEDURE — 85025 COMPLETE CBC W/AUTO DIFF WBC: CPT

## 2020-07-23 PROCEDURE — 2500000003 HC RX 250 WO HCPCS: Performed by: NURSE ANESTHETIST, CERTIFIED REGISTERED

## 2020-07-23 PROCEDURE — 2580000003 HC RX 258: Performed by: STUDENT IN AN ORGANIZED HEALTH CARE EDUCATION/TRAINING PROGRAM

## 2020-07-23 PROCEDURE — 87491 CHLMYD TRACH DNA AMP PROBE: CPT

## 2020-07-23 PROCEDURE — 3600000014 HC SURGERY LEVEL 4 ADDTL 15MIN: Performed by: ORTHOPAEDIC SURGERY

## 2020-07-23 PROCEDURE — 3700000000 HC ANESTHESIA ATTENDED CARE: Performed by: ORTHOPAEDIC SURGERY

## 2020-07-23 PROCEDURE — 87102 FUNGUS ISOLATION CULTURE: CPT

## 2020-07-23 PROCEDURE — 87147 CULTURE TYPE IMMUNOLOGIC: CPT

## 2020-07-23 PROCEDURE — 7100000001 HC PACU RECOVERY - ADDTL 15 MIN: Performed by: ORTHOPAEDIC SURGERY

## 2020-07-23 PROCEDURE — 6360000002 HC RX W HCPCS: Performed by: INTERNAL MEDICINE

## 2020-07-23 PROCEDURE — 6360000002 HC RX W HCPCS: Performed by: NURSE ANESTHETIST, CERTIFIED REGISTERED

## 2020-07-23 PROCEDURE — 2580000003 HC RX 258: Performed by: INTERNAL MEDICINE

## 2020-07-23 PROCEDURE — 6370000000 HC RX 637 (ALT 250 FOR IP): Performed by: INTERNAL MEDICINE

## 2020-07-23 PROCEDURE — 3700000001 HC ADD 15 MINUTES (ANESTHESIA): Performed by: ORTHOPAEDIC SURGERY

## 2020-07-23 PROCEDURE — 0HDKXZZ EXTRACTION OF RIGHT LOWER LEG SKIN, EXTERNAL APPROACH: ICD-10-PCS | Performed by: STUDENT IN AN ORGANIZED HEALTH CARE EDUCATION/TRAINING PROGRAM

## 2020-07-23 PROCEDURE — 7100000000 HC PACU RECOVERY - FIRST 15 MIN: Performed by: ORTHOPAEDIC SURGERY

## 2020-07-23 PROCEDURE — 87075 CULTR BACTERIA EXCEPT BLOOD: CPT

## 2020-07-23 PROCEDURE — 29871 ARTHRS KNEE SURG FOR INFCTJ: CPT | Performed by: ORTHOPAEDIC SURGERY

## 2020-07-23 PROCEDURE — 87070 CULTURE OTHR SPECIMN AEROBIC: CPT

## 2020-07-23 PROCEDURE — 99232 SBSQ HOSP IP/OBS MODERATE 35: CPT | Performed by: INTERNAL MEDICINE

## 2020-07-23 PROCEDURE — 6360000002 HC RX W HCPCS: Performed by: STUDENT IN AN ORGANIZED HEALTH CARE EDUCATION/TRAINING PROGRAM

## 2020-07-23 PROCEDURE — 87591 N.GONORRHOEAE DNA AMP PROB: CPT

## 2020-07-23 PROCEDURE — 36415 COLL VENOUS BLD VENIPUNCTURE: CPT

## 2020-07-23 PROCEDURE — 6360000002 HC RX W HCPCS

## 2020-07-23 PROCEDURE — 6360000002 HC RX W HCPCS: Performed by: ANESTHESIOLOGY

## 2020-07-23 PROCEDURE — 2709999900 HC NON-CHARGEABLE SUPPLY: Performed by: ORTHOPAEDIC SURGERY

## 2020-07-23 PROCEDURE — 6360000002 HC RX W HCPCS: Performed by: ORTHOPAEDIC SURGERY

## 2020-07-23 PROCEDURE — 83735 ASSAY OF MAGNESIUM: CPT

## 2020-07-23 PROCEDURE — 3600000004 HC SURGERY LEVEL 4 BASE: Performed by: ORTHOPAEDIC SURGERY

## 2020-07-23 PROCEDURE — 96376 TX/PRO/DX INJ SAME DRUG ADON: CPT

## 2020-07-23 PROCEDURE — 72133 CT LUMBAR SPINE W/O & W/DYE: CPT

## 2020-07-23 RX ORDER — PROPOFOL 10 MG/ML
INJECTION, EMULSION INTRAVENOUS PRN
Status: DISCONTINUED | OUTPATIENT
Start: 2020-07-23 | End: 2020-07-23 | Stop reason: SDUPTHER

## 2020-07-23 RX ORDER — MIDAZOLAM HYDROCHLORIDE 1 MG/ML
INJECTION INTRAMUSCULAR; INTRAVENOUS PRN
Status: DISCONTINUED | OUTPATIENT
Start: 2020-07-23 | End: 2020-07-23 | Stop reason: SDUPTHER

## 2020-07-23 RX ORDER — SODIUM CHLORIDE 0.9 % (FLUSH) 0.9 %
10 SYRINGE (ML) INJECTION EVERY 12 HOURS SCHEDULED
Status: DISCONTINUED | OUTPATIENT
Start: 2020-07-23 | End: 2020-07-27 | Stop reason: SDUPTHER

## 2020-07-23 RX ORDER — MORPHINE SULFATE 2 MG/ML
INJECTION, SOLUTION INTRAMUSCULAR; INTRAVENOUS
Status: COMPLETED
Start: 2020-07-23 | End: 2020-07-23

## 2020-07-23 RX ORDER — MORPHINE SULFATE 2 MG/ML
2 INJECTION, SOLUTION INTRAMUSCULAR; INTRAVENOUS EVERY 5 MIN PRN
Status: DISCONTINUED | OUTPATIENT
Start: 2020-07-23 | End: 2020-07-23 | Stop reason: HOSPADM

## 2020-07-23 RX ORDER — SODIUM CHLORIDE 0.9 % (FLUSH) 0.9 %
10 SYRINGE (ML) INJECTION PRN
Status: DISCONTINUED | OUTPATIENT
Start: 2020-07-23 | End: 2020-07-27 | Stop reason: SDUPTHER

## 2020-07-23 RX ORDER — CEFAZOLIN SODIUM 1 G/3ML
INJECTION, POWDER, FOR SOLUTION INTRAMUSCULAR; INTRAVENOUS PRN
Status: DISCONTINUED | OUTPATIENT
Start: 2020-07-23 | End: 2020-07-23 | Stop reason: SDUPTHER

## 2020-07-23 RX ORDER — HYDROCODONE BITARTRATE AND ACETAMINOPHEN 5; 325 MG/1; MG/1
2 TABLET ORAL EVERY 6 HOURS PRN
Status: DISCONTINUED | OUTPATIENT
Start: 2020-07-23 | End: 2020-07-27

## 2020-07-23 RX ORDER — ROCURONIUM BROMIDE 10 MG/ML
INJECTION, SOLUTION INTRAVENOUS PRN
Status: DISCONTINUED | OUTPATIENT
Start: 2020-07-23 | End: 2020-07-23 | Stop reason: SDUPTHER

## 2020-07-23 RX ORDER — FENTANYL CITRATE 50 UG/ML
INJECTION, SOLUTION INTRAMUSCULAR; INTRAVENOUS PRN
Status: DISCONTINUED | OUTPATIENT
Start: 2020-07-23 | End: 2020-07-23 | Stop reason: SDUPTHER

## 2020-07-23 RX ORDER — DEXAMETHASONE SODIUM PHOSPHATE 10 MG/ML
INJECTION INTRAMUSCULAR; INTRAVENOUS PRN
Status: DISCONTINUED | OUTPATIENT
Start: 2020-07-23 | End: 2020-07-23 | Stop reason: SDUPTHER

## 2020-07-23 RX ORDER — HYDROCODONE BITARTRATE AND ACETAMINOPHEN 5; 325 MG/1; MG/1
1 TABLET ORAL EVERY 6 HOURS PRN
Qty: 28 TABLET | Refills: 0 | Status: SHIPPED | OUTPATIENT
Start: 2020-07-23 | End: 2020-07-30

## 2020-07-23 RX ORDER — HYDROCODONE BITARTRATE AND ACETAMINOPHEN 5; 325 MG/1; MG/1
2 TABLET ORAL PRN
Status: DISCONTINUED | OUTPATIENT
Start: 2020-07-23 | End: 2020-07-23 | Stop reason: HOSPADM

## 2020-07-23 RX ORDER — EPINEPHRINE 1 MG/ML
INJECTION, SOLUTION, CONCENTRATE INTRAVENOUS PRN
Status: DISCONTINUED | OUTPATIENT
Start: 2020-07-23 | End: 2020-07-23 | Stop reason: ALTCHOICE

## 2020-07-23 RX ORDER — MORPHINE SULFATE 2 MG/ML
1 INJECTION, SOLUTION INTRAMUSCULAR; INTRAVENOUS EVERY 5 MIN PRN
Status: DISCONTINUED | OUTPATIENT
Start: 2020-07-23 | End: 2020-07-23 | Stop reason: HOSPADM

## 2020-07-23 RX ORDER — MEPERIDINE HYDROCHLORIDE 25 MG/ML
12.5 INJECTION INTRAMUSCULAR; INTRAVENOUS; SUBCUTANEOUS EVERY 5 MIN PRN
Status: DISCONTINUED | OUTPATIENT
Start: 2020-07-23 | End: 2020-07-23 | Stop reason: HOSPADM

## 2020-07-23 RX ORDER — ONDANSETRON 2 MG/ML
INJECTION INTRAMUSCULAR; INTRAVENOUS PRN
Status: DISCONTINUED | OUTPATIENT
Start: 2020-07-23 | End: 2020-07-23 | Stop reason: SDUPTHER

## 2020-07-23 RX ORDER — PROMETHAZINE HYDROCHLORIDE 25 MG/ML
6.25 INJECTION, SOLUTION INTRAMUSCULAR; INTRAVENOUS EVERY 10 MIN PRN
Status: DISCONTINUED | OUTPATIENT
Start: 2020-07-23 | End: 2020-07-23 | Stop reason: HOSPADM

## 2020-07-23 RX ORDER — SODIUM CHLORIDE 9 MG/ML
INJECTION, SOLUTION INTRAVENOUS CONTINUOUS PRN
Status: DISCONTINUED | OUTPATIENT
Start: 2020-07-23 | End: 2020-07-23 | Stop reason: SDUPTHER

## 2020-07-23 RX ORDER — HYDROCODONE BITARTRATE AND ACETAMINOPHEN 5; 325 MG/1; MG/1
1 TABLET ORAL PRN
Status: DISCONTINUED | OUTPATIENT
Start: 2020-07-23 | End: 2020-07-23 | Stop reason: HOSPADM

## 2020-07-23 RX ADMIN — FENTANYL CITRATE 50 MCG: 50 INJECTION, SOLUTION INTRAMUSCULAR; INTRAVENOUS at 07:21

## 2020-07-23 RX ADMIN — CEFTRIAXONE 2 G: 2 INJECTION, POWDER, FOR SOLUTION INTRAMUSCULAR; INTRAVENOUS at 18:18

## 2020-07-23 RX ADMIN — ATORVASTATIN CALCIUM 20 MG: 20 TABLET, FILM COATED ORAL at 10:03

## 2020-07-23 RX ADMIN — CEFAZOLIN 2000 MG: 1 INJECTION, POWDER, FOR SOLUTION INTRAMUSCULAR; INTRAVENOUS at 07:51

## 2020-07-23 RX ADMIN — SODIUM CHLORIDE: 9 INJECTION, SOLUTION INTRAVENOUS at 07:50

## 2020-07-23 RX ADMIN — HYDROCODONE BITARTRATE AND ACETAMINOPHEN 1 TABLET: 5; 325 TABLET ORAL at 10:03

## 2020-07-23 RX ADMIN — FENTANYL CITRATE 100 MCG: 50 INJECTION, SOLUTION INTRAMUSCULAR; INTRAVENOUS at 06:51

## 2020-07-23 RX ADMIN — ONDANSETRON HYDROCHLORIDE 4 MG: 2 INJECTION, SOLUTION INTRAMUSCULAR; INTRAVENOUS at 06:52

## 2020-07-23 RX ADMIN — MORPHINE SULFATE 4 MG: 4 INJECTION, SOLUTION INTRAMUSCULAR; INTRAVENOUS at 04:23

## 2020-07-23 RX ADMIN — ENOXAPARIN SODIUM 40 MG: 40 INJECTION SUBCUTANEOUS at 10:04

## 2020-07-23 RX ADMIN — FENTANYL CITRATE 50 MCG: 50 INJECTION, SOLUTION INTRAMUSCULAR; INTRAVENOUS at 08:16

## 2020-07-23 RX ADMIN — PROPOFOL 200 MG: 10 INJECTION, EMULSION INTRAVENOUS at 06:51

## 2020-07-23 RX ADMIN — FENTANYL CITRATE 50 MCG: 50 INJECTION, SOLUTION INTRAMUSCULAR; INTRAVENOUS at 07:37

## 2020-07-23 RX ADMIN — MORPHINE SULFATE 4 MG: 4 INJECTION, SOLUTION INTRAMUSCULAR; INTRAVENOUS at 18:36

## 2020-07-23 RX ADMIN — DEXAMETHASONE SODIUM PHOSPHATE 10 MG: 10 INJECTION INTRAMUSCULAR; INTRAVENOUS at 06:51

## 2020-07-23 RX ADMIN — SODIUM CHLORIDE, PRESERVATIVE FREE 10 ML: 5 INJECTION INTRAVENOUS at 20:40

## 2020-07-23 RX ADMIN — LAMOTRIGINE 25 MG: 25 TABLET ORAL at 10:03

## 2020-07-23 RX ADMIN — GABAPENTIN 300 MG: 300 CAPSULE ORAL at 20:40

## 2020-07-23 RX ADMIN — VENLAFAXINE HYDROCHLORIDE 75 MG: 75 CAPSULE, EXTENDED RELEASE ORAL at 10:04

## 2020-07-23 RX ADMIN — DOCUSATE SODIUM 50 MG AND SENNOSIDES 8.6 MG 2 TABLET: 8.6; 5 TABLET, FILM COATED ORAL at 10:03

## 2020-07-23 RX ADMIN — SODIUM CHLORIDE: 9 INJECTION, SOLUTION INTRAVENOUS at 06:40

## 2020-07-23 RX ADMIN — INSULIN GLARGINE 30 UNITS: 100 INJECTION, SOLUTION SUBCUTANEOUS at 20:46

## 2020-07-23 RX ADMIN — INSULIN LISPRO 12 UNITS: 100 INJECTION, SOLUTION INTRAVENOUS; SUBCUTANEOUS at 18:24

## 2020-07-23 RX ADMIN — Medication 10 ML: at 13:45

## 2020-07-23 RX ADMIN — HYDROCODONE BITARTRATE AND ACETAMINOPHEN 2 TABLET: 5; 325 TABLET ORAL at 19:42

## 2020-07-23 RX ADMIN — PANTOPRAZOLE SODIUM 40 MG: 40 TABLET, DELAYED RELEASE ORAL at 06:21

## 2020-07-23 RX ADMIN — MIDAZOLAM 2 MG: 1 INJECTION INTRAMUSCULAR; INTRAVENOUS at 06:44

## 2020-07-23 RX ADMIN — ROCURONIUM BROMIDE 40 MG: 10 INJECTION, SOLUTION INTRAVENOUS at 06:51

## 2020-07-23 RX ADMIN — MORPHINE SULFATE 2 MG: 2 INJECTION, SOLUTION INTRAMUSCULAR; INTRAVENOUS at 08:51

## 2020-07-23 RX ADMIN — MORPHINE SULFATE 2 MG: 2 INJECTION, SOLUTION INTRAMUSCULAR; INTRAVENOUS at 08:56

## 2020-07-23 RX ADMIN — INSULIN LISPRO 3 UNITS: 100 INJECTION, SOLUTION INTRAVENOUS; SUBCUTANEOUS at 14:52

## 2020-07-23 RX ADMIN — ASPIRIN 325 MG: 325 TABLET, COATED ORAL at 20:40

## 2020-07-23 RX ADMIN — SODIUM CHLORIDE, PRESERVATIVE FREE 10 ML: 5 INJECTION INTRAVENOUS at 10:04

## 2020-07-23 RX ADMIN — IOPAMIDOL 90 ML: 755 INJECTION, SOLUTION INTRAVENOUS at 13:45

## 2020-07-23 RX ADMIN — ASPIRIN 325 MG: 325 TABLET, COATED ORAL at 11:22

## 2020-07-23 RX ADMIN — INSULIN LISPRO 6 UNITS: 100 INJECTION, SOLUTION INTRAVENOUS; SUBCUTANEOUS at 20:42

## 2020-07-23 ASSESSMENT — PULMONARY FUNCTION TESTS
PIF_VALUE: 22
PIF_VALUE: 2
PIF_VALUE: 1
PIF_VALUE: 1
PIF_VALUE: 15
PIF_VALUE: 0
PIF_VALUE: 21
PIF_VALUE: 22
PIF_VALUE: 21
PIF_VALUE: 17
PIF_VALUE: 23
PIF_VALUE: 16
PIF_VALUE: 23
PIF_VALUE: 19
PIF_VALUE: 19
PIF_VALUE: 15
PIF_VALUE: 14
PIF_VALUE: 22
PIF_VALUE: 20
PIF_VALUE: 20
PIF_VALUE: 17
PIF_VALUE: 18
PIF_VALUE: 22
PIF_VALUE: 25
PIF_VALUE: 1
PIF_VALUE: 20
PIF_VALUE: 22
PIF_VALUE: 16
PIF_VALUE: 2
PIF_VALUE: 21
PIF_VALUE: 26
PIF_VALUE: 22
PIF_VALUE: 23
PIF_VALUE: 24
PIF_VALUE: 18
PIF_VALUE: 21
PIF_VALUE: 16
PIF_VALUE: 20
PIF_VALUE: 16
PIF_VALUE: 21
PIF_VALUE: 16
PIF_VALUE: 19
PIF_VALUE: 17
PIF_VALUE: 16
PIF_VALUE: 21
PIF_VALUE: 16
PIF_VALUE: 20
PIF_VALUE: 21
PIF_VALUE: 3
PIF_VALUE: 18
PIF_VALUE: 21
PIF_VALUE: 21
PIF_VALUE: 16
PIF_VALUE: 23
PIF_VALUE: 16
PIF_VALUE: 22
PIF_VALUE: 18
PIF_VALUE: 16
PIF_VALUE: 16
PIF_VALUE: 22
PIF_VALUE: 23
PIF_VALUE: 16
PIF_VALUE: 21
PIF_VALUE: 1
PIF_VALUE: 21
PIF_VALUE: 22
PIF_VALUE: 20
PIF_VALUE: 17
PIF_VALUE: 18
PIF_VALUE: 22
PIF_VALUE: 21
PIF_VALUE: 15
PIF_VALUE: 7
PIF_VALUE: 21
PIF_VALUE: 24
PIF_VALUE: 0
PIF_VALUE: 16
PIF_VALUE: 0
PIF_VALUE: 20
PIF_VALUE: 23
PIF_VALUE: 22
PIF_VALUE: 0
PIF_VALUE: 16
PIF_VALUE: 0
PIF_VALUE: 21
PIF_VALUE: 8
PIF_VALUE: 16
PIF_VALUE: 16
PIF_VALUE: 20
PIF_VALUE: 16
PIF_VALUE: 23
PIF_VALUE: 21
PIF_VALUE: 26
PIF_VALUE: 0
PIF_VALUE: 26
PIF_VALUE: 15

## 2020-07-23 ASSESSMENT — PAIN DESCRIPTION - LOCATION
LOCATION: BACK;KNEE
LOCATION: BACK
LOCATION: BACK
LOCATION: BACK;KNEE

## 2020-07-23 ASSESSMENT — PAIN SCALES - GENERAL
PAINLEVEL_OUTOF10: 8
PAINLEVEL_OUTOF10: 8
PAINLEVEL_OUTOF10: 10
PAINLEVEL_OUTOF10: 3
PAINLEVEL_OUTOF10: 9
PAINLEVEL_OUTOF10: 5
PAINLEVEL_OUTOF10: 0
PAINLEVEL_OUTOF10: 10
PAINLEVEL_OUTOF10: 8
PAINLEVEL_OUTOF10: 7
PAINLEVEL_OUTOF10: 0
PAINLEVEL_OUTOF10: 0
PAINLEVEL_OUTOF10: 9

## 2020-07-23 ASSESSMENT — PAIN DESCRIPTION - PAIN TYPE
TYPE: CHRONIC PAIN
TYPE: CHRONIC PAIN
TYPE: ACUTE PAIN;CHRONIC PAIN
TYPE: ACUTE PAIN;CHRONIC PAIN

## 2020-07-23 ASSESSMENT — PAIN DESCRIPTION - ORIENTATION
ORIENTATION: MID
ORIENTATION: RIGHT;LEFT
ORIENTATION: RIGHT;LEFT
ORIENTATION: MID

## 2020-07-23 ASSESSMENT — PAIN DESCRIPTION - ONSET
ONSET: ON-GOING
ONSET: ON-GOING

## 2020-07-23 ASSESSMENT — PAIN DESCRIPTION - PROGRESSION
CLINICAL_PROGRESSION: NOT CHANGED

## 2020-07-23 ASSESSMENT — PAIN DESCRIPTION - FREQUENCY
FREQUENCY: CONTINUOUS

## 2020-07-23 ASSESSMENT — PAIN DESCRIPTION - DESCRIPTORS
DESCRIPTORS: ACHING;CONSTANT;DISCOMFORT
DESCRIPTORS: ACHING;CONSTANT;DISCOMFORT
DESCRIPTORS: ACHING;CONSTANT;PENETRATING
DESCRIPTORS: ACHING;CONSTANT;PENETRATING

## 2020-07-23 ASSESSMENT — PAIN - FUNCTIONAL ASSESSMENT
PAIN_FUNCTIONAL_ASSESSMENT: PREVENTS OR INTERFERES SOME ACTIVE ACTIVITIES AND ADLS
PAIN_FUNCTIONAL_ASSESSMENT: PREVENTS OR INTERFERES SOME ACTIVE ACTIVITIES AND ADLS

## 2020-07-23 ASSESSMENT — LIFESTYLE VARIABLES: SMOKING_STATUS: 1

## 2020-07-23 NOTE — BRIEF OP NOTE
Brief Postoperative Note      Patient: Ghada Pardo  YOB: 1965  MRN: [id number]    Date of Procedure: 7/23/2020    Pre-Op Diagnosis: BILATERAL: SEPTIC ARTHRITIS    Post-Op Diagnosis: Same       Procedure(s):  BILATERAL KNEE ARTHROSCOPY, IRRIGATION AND DEBRIDEMENT    Surgeon(s):  Sarah Weathers MD    Assistant:  Resident: Erik Goodson DO    Anesthesia: General    Estimated Blood Loss (mL): Minimal    Complications: None    Specimens:   ID Type Source Tests Collected by Time Destination   1 : LEFT KNEE SYNOVIAL FLUID Joint/Joint Fluid Joint, Knee CULTURE, ANAEROBIC, CULTURE, FUNGUS, GRAM STAIN, CULTURE, SURGICAL, CULTURE WITH SMEAR, ACID FAST BACILLIUS Sarah Weathers MD 7/23/2020 1587    2 : RIGHT KNEE SYNOVIAL FLUID Joint/Joint Fluid Joint, Knee CULTURE, ANAEROBIC, CULTURE, FUNGUS, GRAM STAIN, CULTURE, SURGICAL, CULTURE WITH SMEAR, ACID FAST [address] [place], MD 7/23/2020 9956        Implants:  * No implants in log *      Drains: * No LDAs found *    Findings: see op note, Pustule fluid bilateral lower knee joint    Electronically signed by Jennifer Wills DO on 7/23/2020 at 8:19 AM
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
## 2020-07-23 NOTE — PLAN OF CARE
Problem: Pain:  Goal: Pain level will decrease  Description: Pain level will decrease  7/23/2020 1202 by Heather Gardner RN  Outcome: Met This Shift     Problem: Pain:  Goal: Control of acute pain  Description: Control of acute pain  7/23/2020 1202 by Heather Gardner RN  Outcome: Met This Shift     Problem: Falls - Risk of:  Goal: Will remain free from falls  Description: Will remain free from falls  7/23/2020 1202 by Heather Gardner RN  Outcome: Met This Shift     Problem: Falls - Risk of:  Goal: Absence of physical injury  Description: Absence of physical injury  7/23/2020 1202 by Heather Gardner RN  Outcome: Met This Shift

## 2020-07-23 NOTE — CARE COORDINATION
Patient had a BILATERAL KNEE ARTHROSCOPY, IRRIGATION AND DEBRIDEMENT today for Bilateral Knee SEPTIC ARTHRITIS. He is full weight bearing as tolerated. ID is also following. He is currently on IV ceftriaxone 2 g every 24 hours pending antibiotic sensitivities from synovial fluid culture on 07/20. Will need plan for antibiotics at discharge. Also awaiting PT/OT evals to be completed to assist with transition of care determination.   Marcos Webb RN CM

## 2020-07-23 NOTE — ANESTHESIA PRE PROCEDURE
Department of Anesthesiology  Preprocedure Note       Name:  Ghada Pardo   Age:  63 y.o.  :  1965                                          MRN:  [id number]         Date:  2020      Surgeon: Kiara Rankin):  Sarah Weathers MD    Procedure: Procedure(s):  Bilateral knee arthroscopy    Medications prior to admission:   Prior to Admission medications    Medication Sig Start Date End Date Taking? Authorizing Provider   ondansetron (ZOFRAN ODT) 4 MG disintegrating tablet Take 1 tablet by mouth every 8 hours as needed for Nausea or Vomiting 20   CHELSIE Gonzalez   naproxen (NAPROSYN) 500 MG tablet Take 1 tablet by mouth 2 times daily for 7 days 20  CHELSIE Gonzalez   triamcinolone (KENALOG) 0.1 % cream Apply topically 2 times daily. 20   FRANCIE Ward CNP   doxepin (SINEQUAN) 10 MG capsule take 1 capsule by mouth nightly 19   FRANCIE Hebert CNP   venlafaxine (EFFEXOR) 37.5 MG tablet Take 37.5 mg by mouth daily    Historical Provider, MD   pitavastatin (LIVALO) 4 MG TABS tablet Take by mouth nightly    Historical Provider, MD   lamoTRIgine (LAMICTAL) 25 MG tablet Take 25 mg by mouth daily    Historical Provider, MD   omeprazole (PRILOSEC) 40 MG delayed release capsule Take 40 mg by mouth daily    Historical Provider, MD   insulin aspart (NOVOLOG) 100 UNIT/ML injection pen Take 12 units before meals  + sliding scale. MAX 70 units daily 19   Jesus Aldana MD   insulin glargine (LANTUS SOLOSTAR) 100 UNIT/ML injection pen Indications: Insulin-Resistant Diabetes Take 45 units daily at bedtime 19   Jesus Aldana MD   Insulin Pen Needle 31G X 5 MM MISC Indications: Insulin-Resistant Diabetes, Type 2 Diabetes Four times a day for insulin Pen 19   Jesus Aldana MD   blood glucose monitor strips Freestyle Lite Strips. Checks 4 times/day before meals and at bedtime and as needed for symptoms of irregular blood glucose.  19   Rick Liana Farnsworth MD   FREESTYLE LANCETS MISC Test 4 times a day before meals and at bedtime and as needed for symptoms of irregular blood glucose.  5/7/19   Rick Farnsworth MD   docusate sodium (COLACE) 100 MG capsule Take 1 capsule by mouth 2 times daily 7/31/18   Alexy Ann MD   Multiple Vitamin (MULTI VITAMIN MENS PO) Take by mouth daily     Historical Provider, MD       Current medications:    Current Facility-Administered Medications   Medication Dose Route Frequency Provider Last Rate Last Dose    lamoTRIgine (LAMICTAL) tablet 25 mg  25 mg Oral Daily Dawson Gary MD   25 mg at 07/22/20 1040    pantoprazole (PROTONIX) tablet 40 mg  40 mg Oral QAM AC Dawson Gary MD   40 mg at 07/22/20 1040    atorvastatin (LIPITOR) tablet 20 mg  20 mg Oral Daily Dawson Gary MD   20 mg at 07/22/20 1040    venlafaxine (EFFEXOR XR) extended release capsule 75 mg  75 mg Oral Daily Dawson Gary MD   75 mg at 07/22/20 1040    sodium chloride flush 0.9 % injection 10 mL  10 mL Intravenous 2 times per day Dawson Gary MD   10 mL at 07/22/20 2105    sodium chloride flush 0.9 % injection 10 mL  10 mL Intravenous PRN Dawson Gary MD        acetaminophen (TYLENOL) tablet 650 mg  650 mg Oral Q6H PRN Dawson Gary MD        Or   [facility] acetaminophen (TYLENOL) suppository 650 mg  650 mg Rectal Q6H PRN Dawson Gary MD        polyethylene glycol (GLYCOLAX) packet 17 g  17 g Oral Daily PRN Dawson Gary MD        promethazine (PHENERGAN) tablet 12.5 mg  12.5 mg Oral Q6H PRCASS Gary MD        Or    ondansetron [facility]) injection 4 mg  4 mg Intravenous Q6H PRN Dawson Gary MD        enoxaparin (LOVENOX) injection 40 mg  40 mg Subcutaneous Daily Dawson Gary MD   40 mg at 07/22/20 1042    HYDROcodone-acetaminophen (NORCO) 5-325 MG per tablet 1 tablet  1 tablet Oral Q6H PRN Dawson Gary MD   1 tablet at 07/22/20 5487    glucose (GLUTOSE) 40 % oral gel 15 g  15 g Oral PRN Magnus Ayala MD        dextrose 50 % IV solution  12.5 g Intravenous PRN Magnus Ayala MD        glucagon (rDNA) injection 1 mg  1 mg Intramuscular PRN Magnus Ayala MD        dextrose 5 % solution  100 mL/hr Intravenous PRN Magnus Ayala MD        insulin lispro (HUMALOG) injection vial 0-18 Units  0-18 Units Subcutaneous TID WC Amberly Woodruff MD   3 Units at 07/22/20 1637    insulin lispro (HUMALOG) injection vial 0-9 Units  0-9 Units Subcutaneous Nightly Amberly Woodruff MD   3 Units at 07/22/20 2105    insulin glargine (LANTUS) injection vial 30 Units  30 Units Subcutaneous Nightly Amberly Woodruff MD   30 Units at 07/22/20 2105    sennosides-docusate sodium (SENOKOT-S) 8.6-50 MG tablet 2 tablet  2 tablet Oral Daily Amberly Woodruff MD   2 tablet at 07/22/20 1045    morphine sulfate (PF) injection 4 mg  4 mg Intravenous Q4H PRN Amberly Woodruff MD   4 mg at 07/23/20 0423    morphine (PF) injection 10 mg  10 mg Intravenous Once Amberly Woodruff MD        LORazepam (ATIVAN) injection 1 mg  1 mg Intravenous See Admin Instructions Amberly Woodruff MD        cefTRIAXone (ROCEPHIN) 2 g in sterile water 20 mL IV syringe  2 g Intravenous Q24H Oliverio Singh MD   2 g at 07/22/20 1702    gabapentin (NEURONTIN) capsule 300 mg  300 mg Oral Nightly Amberly Woodruff MD           Allergies:     Allergies   Allergen Reactions    Adhesive Tape Hives    Tramadol Other (See Comments)     With psych medicine    Cymbalta [Duloxetine Hcl] Rash    Lidocaine Rash and Swelling    Lyrica [Pregabalin] Rash    Strattera [Atomoxetine] Rash     And nausea       Problem List:    Patient Active Problem List   Diagnosis Code    Chronic low back pain M54.5, G89.29    Diabetic neuropathy (HCC) E11.40    Anxiety and depression F41.9, F32.9    Hyperlipidemia E78.5    Insulin-requiring or dependent type II diabetes mellitus (HCC) E11.9, Z79.4    Testicle pain N50.819    Acute bilateral knee pain M25.561, M25.562    Streptococcal arthritis of right knee (HCC) M00.261    Acute midline low back pain with bilateral sciatica M54.42, M54.41    Sepsis due to group B Streptococcus (HCC) A40.1    Hypokalemia E87.6       Past Medical History:        Diagnosis Date    Chronic pain     Depression     HLD (hyperlipidemia)     Neuropathy     LIDYA on CPAP     Osteoarthritis     Type II or unspecified type diabetes mellitus without mention of complication, not stated as uncontrolled        Past Surgical History:        Procedure Laterality Date    ARM SURGERY      left arm    [place] COLONOSCOPY W/BIOPSY SINGLE/MULTIPLE N/A 9/20/2018    COLONOSCOPY WITH BIOPSY performed by Sophia Stuart MD at [address] CAI W/RMVL OF TUMOR POLYP LESION [address] [place]  9/20/2018    COLONOSCOPY POLYPECTOMY SNARE/COLD BIOPSY performed by Sophia Stuart MD at [address] [place] [address] History:    Social History     Tobacco Use    Smoking status: Current Every Day Smoker     Packs/day: 1.00     Years: 30.00     Pack years: 30.00    Smokeless tobacco: Never Used   Substance Use Topics    Alcohol use: No     Comment: none since 2017, drank heavily in early 2000s                                Ready to quit: Not Answered  Counseling given: Not Answered      Vital Signs (Current):   Vitals:    07/21/20 2330 07/22/20 0709 07/22/20 0730 07/22/20 2000   BP: 106/60 (!) 142/82 (!) 141/71 (!) 155/74   Pulse: 94 75 87 93   Resp: 16 14 20 18   Temp: 37 °C (98.6 °F) 36.2 °C (97.2 °F) 36.4 °C (97.5 °F) 36.5 °C (97.7 °F)   TempSrc: Temporal  Temporal Temporal   SpO2: 98% 99% 97% 93%   Weight: 220 lb (99.8 kg)  218 lb 4.1 oz (99 kg)    Height: 5' 11\" (1.803 m)                                                 BP Readings from Last 3 Encounters:   07/22/20 (!) 155/74   07/21/20 (!) 167/88   07/19/20 131/75       NPO Status: Time of last liquid consumption: 2330                        Time of last solid consumption: 1800                        Date of last liquid consumption: 07/22/20                        Date of last solid food consumption: 07/22/20    BMI:   Wt Readings from Last 3 Encounters:   07/22/20 218 lb 4.1 oz (99 kg)   07/20/20 220 lb (99.8 kg)   07/19/20 240 lb (108.9 kg)     Body mass index is 30.44 kg/m². CBC:   Lab Results   Component Value Date    WBC 17.8 07/22/2020    RBC 4.27 07/22/2020    HGB 12.9 07/22/2020    HCT 37.6 07/22/2020    MCV 88.1 07/22/2020    RDW 13.0 07/22/2020     07/22/2020       CMP:   Lab Results   Component Value Date     07/22/2020    K 3.1 07/22/2020    K 4.1 07/19/2020    CL 94 07/22/2020    CO2 24 07/22/2020    BUN 16 07/22/2020    CREATININE 0.7 07/22/2020    GFRAA >60 07/22/2020    LABGLOM >60 07/22/2020    GLUCOSE 215 07/22/2020    PROT 7.1 07/19/2020    CALCIUM 9.2 07/22/2020    BILITOT 0.5 07/19/2020    ALKPHOS 74 07/19/2020    AST 24 07/19/2020    ALT 22 07/19/2020       POC Tests: No results for input(s): POCGLU, POCNA, POCK, POCCL, POCBUN, POCHEMO, POCHCT in the last 72 hours.     Coags:   Lab Results   Component Value Date    PROTIME 13.4 07/22/2020    INR 1.2 07/22/2020    APTT 29.6 07/22/2020       HCG (If Applicable): No results found for: PREGTESTUR, PREGSERUM, HCG, HCGQUANT     ABGs: No results found for: PHART, PO2ART, GLU3ZEE, SNO1IIG, BEART, G0SQEFGM     Type & Screen (If Applicable):  No results found for: LABABO, LABRH      EKG 7/31/2018  Normal sinus rhythm  Possible Left atrial enlargement  Septal infarct , age undetermined  Abnormal ECG  No previous ECGs available    Anesthesia Evaluation  Patient summary reviewed and Nursing notes reviewed no history of anesthetic complications:   Airway: Mallampati: II  TM distance: >3 FB   Neck ROM: full  Mouth opening: > = 3 FB Dental: normal exam     Comment: Pt denies loose, missing, or chipped teeth    Pulmonary:   (+) sleep apnea: on CPAP,  decreased breath sounds,  current smoker          Patient did not smoke on day of surgery. Cardiovascular:  Exercise tolerance: poor (<4 METS),   (+) hyperlipidemia        Rhythm: regular  Rate: normal                    Neuro/Psych:   (+) psychiatric history: stable with treatmentdepression/anxiety             GI/Hepatic/Renal: Neg GI/Hepatic/Renal ROS            Endo/Other:    (+) DiabetesType II DM, well controlled, using insulin, : arthritis: OA., .                 Abdominal:           Vascular: negative vascular ROS. Anesthesia Plan      MAC     ASA 3       Induction: intravenous. Anesthetic plan and risks discussed with patient. Use of blood products discussed with patient whom consented to blood products. Plan discussed with CRNA and attending. FRANCIE Morales - CRNA   7/23/2020    Agree with above assessment. Physical exam unchanged. Spoke to patient about anesthetic plan. Patient understands and wishes to proceed.  (this addendum was done preop but unable to be filed electronically at that time)      Pt seen, examined, chart reviewed, plan discussed.   Kyrie Bautista  7/23/2020  6:27 AM

## 2020-07-23 NOTE — PROGRESS NOTES
[facility] Residency Program  Progress Note - House Team 2    Patient:  Kimberly Liu 63 y.o. male MRN: [id number]     Date of Service: 7/23/2020     CC: Bilateral knee pain, low back pain, bilateral hip pain, N/T  Overnight events: None  Hospital Day: 3    Subjective     Patient was seen post-op from his knee arthroscopy and washout. He still has severe pain in the low back and bilateral lower extremities that is worst in the knees. Pain medications have not been providing relief. Objective     Physical Exam:  Vitals: BP (!) 122/99   Pulse 93   Temp 97.4 °F (36.3 °C) (Temporal)   Resp 16   Ht 5' 11\" (1.803 m)   Wt 218 lb 4.1 oz (99 kg)   SpO2 94%   BMI 30.44 kg/m²     I & O - 24hr:     Intake/Output Summary (Last 24 hours) at 7/23/2020 1329  Last data filed at 7/23/2020 0930  Gross per 24 hour   Intake 1250 ml   Output 800 ml   Net 450 ml     · General Appearance: alert, appears stated age, cooperative and appears to be in pain, becoming tearful at times. · HEENT:  Head: Normocephalic, no lesions, without obvious abnormality. · Neck: no adenopathy, supple, symmetrical, trachea midline and thyroid not enlarged, symmetric, no tenderness/mass/nodules  · Lung: clear to auscultation bilaterally  · Heart: regular rate and rhythm, S1, S2 normal, no murmur, click, rub or gallop  · Abdomen: Mildly tender to palpation diffusely. · Extremities:  Bilaterally the entire lower extremities are exquisitely tender to palpation. ACE wraps over both thighs, knees, and legs. Sensation is equal and intact. Decreased range of motion secondary to pain. Unable to assess strength. Able to wiggle his toes on both sides.   · Neurologic: Mental status: Alert, oriented, thought content appropriate    Pertinent Labs & Imaging Studies     CBC:   Lab Results   Component Value Date    WBC 20.3 07/23/2020    RBC 4.22 07/23/2020    HGB 12.7 07/23/2020    HCT 37.8 07/23/2020    MCV 89.6 07/23/2020    MCH 30.1 07/23/2020    MCHC 33.6 07/23/2020    RDW 12.9 07/23/2020     07/23/2020    MPV 9.5 07/23/2020     CMP:    Lab Results   Component Value Date     07/23/2020    K 4.1 07/23/2020    K 4.1 07/19/2020     07/23/2020    CO2 21 07/23/2020    BUN 17 07/23/2020    CREATININE 0.7 07/23/2020    GFRAA >60 07/23/2020    LABGLOM >60 07/23/2020    GLUCOSE 160 07/23/2020    PROT 7.1 07/19/2020    LABALBU 4.4 07/19/2020    CALCIUM 9.2 07/23/2020    BILITOT 0.5 07/19/2020    ALKPHOS 74 07/19/2020    AST 24 07/19/2020    ALT 22 07/19/2020 7/22/2020 09:22   CRP 41.4 (H)      7/22/2020 09:22   Sed Rate 90 (H)      7/21/2020 01:22   Body Fluid Culture (Knee) Strep agalactiae (Beta Strep Group B) (A)      7/22/2020 09:22   Hemoglobin A1C 10.1 (H)      7/22/2020 17:00   Human Rhinovirus/Enterovirus by PCR DETECTED (A)       Order Date:  7/23/2020 11:15 AM    EXAM: CT LUMBAR SPINE W WO CONTRAST    INDICATION: Severe back pain. Recent septic arthritis. COMPARISON: 20 January 2015    FINDINGS:    No acute fracture or dislocation is seen. There is moderate spinal    stenosis at L3-4 L4-5 relate disc bulging and facet arthropathy. There    are no endplate erosions to suggest infection.         Postcontrast there is no evidence of any abnormal enhancement to    suggest infection or otherwise. Impression    Moderate spinal canal stenosis related to disc bulging and facet    arthropathy at L3-4 and L4-5. No evidence of spinal infection. Resident's Assessment and Plan     Francis Bonilla is a 63 y.o. male with a PMHx of type 2 DM, peripheral neuropathy, OA, lumbar stenosis, and HLD who presented with bilateral lower extremity, hip, and low back pain. He has chronic pain in these areas but it acutely worsened after a fall on 7/18. CT head, CT C-spine, CT A/P were all negative for acute abnormalities. X-rays showed effusion in the right knee and bilateral knee OA.  Right knee arthrocentesis on 7/21 with 13,700 WBC, 92% neutrophils, and no crystals. Synovial fluid grew GBS. Arthrocentesis of both knees done on 7/23 with stable findings in right knee and septic range WBC in left knee. Bilateral arthroscopy with washout was done with findings of purulent fluid in both knees. CT L-spine with no evidence of infection. 1. Septic arthritis, right knee  Right knee arthrocentesis on 7/21 with 13,700 WBC, 92% neutrophils, and no crystals. Synovial fluid grew GBS. Afebrile, WBC 12.1->16.4->14.5->17.8->20.3. Elevated CRP (41.4) and ESR (90). Blood cx's no growth 24 hours. S/P bilateral knee arthroscopy, drainage, and debridement. · Continue Ceftriaxone 2 g IV Q24H, anticipate 4 weeks of treatment per ID  · Insert midline   Ortho and ID following, appreciate recs   Follow blood cx's, fluid cx's, and tissue cx's    2. Bilateral lower extremity pain  May be 2/2 progressive lumbar stenosis vs spinal abscess/ vertebral osteomyelitis vs radiculopathy. Associated with tingling. Decreased ROM 2/2 pain, difficult to assess strength. No bowel or bladder incontinence. Is on Norco 7.5-325 at home for chronic pain. No relief with Norco 5-325, Morphine 4 mg, Toradol, or Gabapentin. CT L-spine with moderate spinal canal stenosis at L3-4 and L4-5. No evidence of spinal infection. · Increased Norco 5-325 to 2 doses Q6H PRN    3. Hx of lumbar spinal stenosis  1/20/2015 CT L-spine: Spinal canal stenosis of severe degree at the L4-L5 level and   of a moderate to severe degree at L3-L4 and L5-S1 levels. CT L-spine with moderate spinal canal stenosis at L3-4 and L4-5.    4. Type 2 DM  HbgA1c 10.1. Was taking 30u Lantus every other day at home. Has peripheral neuropathy.  Lantus 30u QHS   MDSS   Monitor glucose    5. Rhinovirus infection/colonization    6. Opioid-induced constipation  · Senokot daily     7. HLD  Atorvastatin 20 mg daily    8. Bipolar disorder  Lamictal 25 mg daily, Effexor 75 mg daily    9.  GERD  Protonix 40 mg daily     10.  Bilateral knee OA      PT/OT evaluation: On hold until MRI is done  DVT prophylaxis/GI prophylaxis: Lovenox/Protonix  Disposition: Continue current care    Rishi Miller MD,  PGY-1  Attending Physician: Dr. Anne Marie Youssef

## 2020-07-23 NOTE — PLAN OF CARE
Problem: Pain:  Goal: Pain level will decrease  Description: Pain level will decrease  Outcome: Met This Shift  Goal: Control of acute pain  Description: Control of acute pain  Outcome: Met This Shift  Goal: Control of chronic pain  Description: Control of chronic pain  Outcome: Met This Shift     Problem: Skin Integrity:  Goal: Will show no infection signs and symptoms  Description: Will show no infection signs and symptoms  Outcome: Met This Shift  Goal: Absence of new skin breakdown  Description: Absence of new skin breakdown  Outcome: Met This Shift     Problem: Falls - Risk of:  Goal: Will remain free from falls  Description: Will remain free from falls  Outcome: Met This Shift  Goal: Absence of physical injury  Description: Absence of physical injury  Outcome: Met This Shift

## 2020-07-23 NOTE — OP NOTE
Operative Note      Patient: Joao Rea  YOB: 1965  MRN: [id number]    Date of Procedure: 7/23/2020    Pre-Op Diagnosis: Bilateral Knee SEPTIC ARTHRITIS    Post-Op Diagnosis: Same       Procedure(s):  KNEE ARTHROSCOPY    Surgeon(s):  Yuli Baer MD    Assistant:   Resident: America Vizcaino DO    Anesthesia: General    Estimated Blood Loss (mL): less than 50     Complications: None    Specimens:   ID Type Source Tests Collected by Time Destination   1 : LEFT KNEE SYNOVIAL FLUID Joint/Joint Fluid Joint, Knee CULTURE, ANAEROBIC, CULTURE, FUNGUS, GRAM STAIN, CULTURE, SURGICAL, CULTURE WITH SMEAR, ACID FAST [address], [place] 7/23/2020 6235    2 : RIGHT KNEE SYNOVIAL FLUID Joint/Joint Fluid Joint, Knee CULTURE, ANAEROBIC, CULTURE, FUNGUS, GRAM STAIN, CULTURE, SURGICAL, CULTURE WITH SMEAR, ACID FAST [address], [place] 7/23/2020 9816        Implants:  * No implants in log *      Drains: * No LDAs found *    Findings: Rocael pus in both knees    Detailed Description of Procedure:   Patient was brought to the operating room in a supine position on a hospital bed. Patient was transferred to the operating room table by multiple individuals in a safe fashion with anesthesia in control of the patient's C-spine and airway. Once on the operating table, all points of pressure were identified and well-padded. Patient had tourniquets applied to bilateral upper thighs. The scope orders were placed in the size of the bed. Bilateral lower extremities were sterilely prepped and draped in a sterile field fashion. A timeout was performed indicating the appropriate identification of the patient procedures to be performed and the side to be performed upon. This was agreed upon by individuals in the room. Other times performed the tourniquet was elevated on the left knee. Medial and lateral portal were marked out.   The lateral portal was established and the knee scope was inserted. This is a regular fragment of possible sent the lab. Diagnostic  scope was undertaken. A medial portal was established with a spinal needle with an 11 blade. We then irrigated copious amounts of sterile normal saline through the knee. Did trim some fat pad and synovial tissue. After the fluid is gone to the knee  The scope rolls were closed with the scope portal sutures. Attention was turned to the right knee which again the lateral portal was established obtaining denisa pus which was in the lab. The diagnostic scope establish a medial portal with a spinal needle. We again washed the right knee out again with a copious amount of sterile normal saline. The wound closed with scope portal sutures. Pictures were saved. Patient tolerated well was taken the PACU in stable condition. Postoperative plan  Bear as tolerated bilateral lower extremities  Follow cultures  Infectious disease should be on board  Will intervene surgically if necessary and follow examination.

## 2020-07-23 NOTE — ANESTHESIA POSTPROCEDURE EVALUATION
Department of Anesthesiology  Postprocedure Note    Patient: Abelardo Ballard  MRN: [id number]  YOB: 1965  Date of evaluation: 7/24/2020  Time:  8:15 AM     Procedure Summary     Date:  07/23/20 Room / Location:  [facility] OR 08 / [facility]    Anesthesia Start:  9846 Anesthesia Stop:      Procedure:  KNEE ARTHROSCOPY (Bilateral ) Diagnosis:  (SEPTIC ARTHRITIS)    Surgeon:  Fernando Pritchard MD Responsible Provider:  Jessica Thornton MD    Anesthesia Type:  MAC ASA Status:  3          Anesthesia Type: MAC    Marie Phase I: Marie Score: 9    Marie Phase II:      Last vitals: Reviewed and per EMR flowsheets.        Anesthesia Post Evaluation    Patient location during evaluation: PACU  Patient participation: complete - patient participated  Level of consciousness: awake  Pain score: 3  Airway patency: patent  Nausea & Vomiting: no nausea and no vomiting  Complications: no  Cardiovascular status: blood pressure returned to baseline  Respiratory status: acceptable  Hydration status: euvolemic

## 2020-07-24 LAB
ANION GAP SERPL CALCULATED.3IONS-SCNC: 14 MMOL/L (ref 7–16)
BASOPHILS ABSOLUTE: 0.04 E9/L (ref 0–0.2)
BASOPHILS RELATIVE PERCENT: 0.2 % (ref 0–2)
BUN BLDV-MCNC: 25 MG/DL (ref 6–20)
BURR CELLS: ABNORMAL
CALCIUM SERPL-MCNC: 9 MG/DL (ref 8.6–10.2)
CHLORIDE BLD-SCNC: 97 MMOL/L (ref 98–107)
CO2: 22 MMOL/L (ref 22–29)
CREAT SERPL-MCNC: 0.7 MG/DL (ref 0.7–1.2)
EOSINOPHILS ABSOLUTE: 0 E9/L (ref 0.05–0.5)
EOSINOPHILS RELATIVE PERCENT: 0 % (ref 0–6)
GFR AFRICAN AMERICAN: >60
GFR NON-AFRICAN AMERICAN: >60 ML/MIN/1.73
GLUCOSE BLD-MCNC: 239 MG/DL (ref 74–99)
GRAM STAIN ORDERABLE: NORMAL
GRAM STAIN ORDERABLE: NORMAL
HCT VFR BLD CALC: 33.5 % (ref 37–54)
HEMOGLOBIN: 11.4 G/DL (ref 12.5–16.5)
IMMATURE GRANULOCYTES #: 0.44 E9/L
IMMATURE GRANULOCYTES %: 1.8 % (ref 0–5)
LYMPHOCYTES ABSOLUTE: 1.49 E9/L (ref 1.5–4)
LYMPHOCYTES RELATIVE PERCENT: 6.3 % (ref 20–42)
MAGNESIUM: 2.3 MG/DL (ref 1.6–2.6)
MCH RBC QN AUTO: 30.3 PG (ref 26–35)
MCHC RBC AUTO-ENTMCNC: 34 % (ref 32–34.5)
MCV RBC AUTO: 89.1 FL (ref 80–99.9)
METER GLUCOSE: 205 MG/DL (ref 74–99)
METER GLUCOSE: 219 MG/DL (ref 74–99)
METER GLUCOSE: 250 MG/DL (ref 74–99)
METER GLUCOSE: 271 MG/DL (ref 74–99)
METER GLUCOSE: 277 MG/DL (ref 74–99)
MONOCYTES ABSOLUTE: 1.93 E9/L (ref 0.1–0.95)
MONOCYTES RELATIVE PERCENT: 8.1 % (ref 2–12)
MRSA CULTURE ONLY: NORMAL
NEUTROPHILS ABSOLUTE: 19.94 E9/L (ref 1.8–7.3)
NEUTROPHILS RELATIVE PERCENT: 83.6 % (ref 43–80)
PDW BLD-RTO: 13 FL (ref 11.5–15)
PLATELET # BLD: 446 E9/L (ref 130–450)
PMV BLD AUTO: 9.6 FL (ref 7–12)
POIKILOCYTES: ABNORMAL
POTASSIUM SERPL-SCNC: 3.9 MMOL/L (ref 3.5–5)
RBC # BLD: 3.76 E12/L (ref 3.8–5.8)
SODIUM BLD-SCNC: 133 MMOL/L (ref 132–146)
WBC # BLD: 23.8 E9/L (ref 4.5–11.5)

## 2020-07-24 PROCEDURE — C1751 CATH, INF, PER/CENT/MIDLINE: HCPCS

## 2020-07-24 PROCEDURE — 2580000003 HC RX 258: Performed by: INTERNAL MEDICINE

## 2020-07-24 PROCEDURE — 82962 GLUCOSE BLOOD TEST: CPT

## 2020-07-24 PROCEDURE — 97162 PT EVAL MOD COMPLEX 30 MIN: CPT | Performed by: PHYSICAL THERAPIST

## 2020-07-24 PROCEDURE — 6370000000 HC RX 637 (ALT 250 FOR IP): Performed by: INTERNAL MEDICINE

## 2020-07-24 PROCEDURE — 85025 COMPLETE CBC W/AUTO DIFF WBC: CPT

## 2020-07-24 PROCEDURE — 36415 COLL VENOUS BLD VENIPUNCTURE: CPT

## 2020-07-24 PROCEDURE — 6370000000 HC RX 637 (ALT 250 FOR IP): Performed by: STUDENT IN AN ORGANIZED HEALTH CARE EDUCATION/TRAINING PROGRAM

## 2020-07-24 PROCEDURE — 36410 VNPNXR 3YR/> PHY/QHP DX/THER: CPT

## 2020-07-24 PROCEDURE — 6360000002 HC RX W HCPCS: Performed by: STUDENT IN AN ORGANIZED HEALTH CARE EDUCATION/TRAINING PROGRAM

## 2020-07-24 PROCEDURE — 99232 SBSQ HOSP IP/OBS MODERATE 35: CPT | Performed by: INTERNAL MEDICINE

## 2020-07-24 PROCEDURE — 6360000002 HC RX W HCPCS: Performed by: INTERNAL MEDICINE

## 2020-07-24 PROCEDURE — 2580000003 HC RX 258: Performed by: STUDENT IN AN ORGANIZED HEALTH CARE EDUCATION/TRAINING PROGRAM

## 2020-07-24 PROCEDURE — 76937 US GUIDE VASCULAR ACCESS: CPT

## 2020-07-24 PROCEDURE — 97530 THERAPEUTIC ACTIVITIES: CPT | Performed by: PHYSICAL THERAPIST

## 2020-07-24 PROCEDURE — 97535 SELF CARE MNGMENT TRAINING: CPT

## 2020-07-24 PROCEDURE — 83735 ASSAY OF MAGNESIUM: CPT

## 2020-07-24 PROCEDURE — 97165 OT EVAL LOW COMPLEX 30 MIN: CPT

## 2020-07-24 PROCEDURE — 1200000000 HC SEMI PRIVATE

## 2020-07-24 PROCEDURE — 80048 BASIC METABOLIC PNL TOTAL CA: CPT

## 2020-07-24 RX ORDER — INSULIN GLARGINE 100 [IU]/ML
35 INJECTION, SOLUTION SUBCUTANEOUS NIGHTLY
Status: DISCONTINUED | OUTPATIENT
Start: 2020-07-24 | End: 2020-07-31 | Stop reason: HOSPADM

## 2020-07-24 RX ORDER — SODIUM CHLORIDE 0.9 % (FLUSH) 0.9 %
10 SYRINGE (ML) INJECTION PRN
Status: DISCONTINUED | OUTPATIENT
Start: 2020-07-24 | End: 2020-07-27 | Stop reason: SDUPTHER

## 2020-07-24 RX ORDER — HEPARIN SODIUM (PORCINE) LOCK FLUSH IV SOLN 100 UNIT/ML 100 UNIT/ML
1 SOLUTION INTRAVENOUS EVERY 12 HOURS SCHEDULED
Status: DISCONTINUED | OUTPATIENT
Start: 2020-07-24 | End: 2020-07-31 | Stop reason: HOSPADM

## 2020-07-24 RX ORDER — HEPARIN SODIUM (PORCINE) LOCK FLUSH IV SOLN 100 UNIT/ML 100 UNIT/ML
1 SOLUTION INTRAVENOUS PRN
Status: DISCONTINUED | OUTPATIENT
Start: 2020-07-24 | End: 2020-07-31 | Stop reason: HOSPADM

## 2020-07-24 RX ADMIN — SODIUM CHLORIDE, PRESERVATIVE FREE 10 ML: 5 INJECTION INTRAVENOUS at 08:28

## 2020-07-24 RX ADMIN — HYDROCODONE BITARTRATE AND ACETAMINOPHEN 2 TABLET: 5; 325 TABLET ORAL at 18:20

## 2020-07-24 RX ADMIN — INSULIN GLARGINE 35 UNITS: 100 INJECTION, SOLUTION SUBCUTANEOUS at 21:54

## 2020-07-24 RX ADMIN — INSULIN LISPRO 9 UNITS: 100 INJECTION, SOLUTION INTRAVENOUS; SUBCUTANEOUS at 18:01

## 2020-07-24 RX ADMIN — ASPIRIN 325 MG: 325 TABLET, COATED ORAL at 08:27

## 2020-07-24 RX ADMIN — PANTOPRAZOLE SODIUM 40 MG: 40 TABLET, DELAYED RELEASE ORAL at 05:28

## 2020-07-24 RX ADMIN — HYDROCODONE BITARTRATE AND ACETAMINOPHEN 2 TABLET: 5; 325 TABLET ORAL at 02:43

## 2020-07-24 RX ADMIN — MORPHINE SULFATE 4 MG: 4 INJECTION, SOLUTION INTRAMUSCULAR; INTRAVENOUS at 08:47

## 2020-07-24 RX ADMIN — SODIUM CHLORIDE, PRESERVATIVE FREE 10 ML: 5 INJECTION INTRAVENOUS at 08:47

## 2020-07-24 RX ADMIN — ENOXAPARIN SODIUM 40 MG: 40 INJECTION SUBCUTANEOUS at 08:28

## 2020-07-24 RX ADMIN — INSULIN LISPRO 6 UNITS: 100 INJECTION, SOLUTION INTRAVENOUS; SUBCUTANEOUS at 08:51

## 2020-07-24 RX ADMIN — SODIUM CHLORIDE, PRESERVATIVE FREE 100 UNITS: 5 INJECTION INTRAVENOUS at 21:49

## 2020-07-24 RX ADMIN — SODIUM CHLORIDE, PRESERVATIVE FREE 10 ML: 5 INJECTION INTRAVENOUS at 17:58

## 2020-07-24 RX ADMIN — LAMOTRIGINE 25 MG: 25 TABLET ORAL at 08:27

## 2020-07-24 RX ADMIN — GABAPENTIN 300 MG: 300 CAPSULE ORAL at 21:48

## 2020-07-24 RX ADMIN — MORPHINE SULFATE 4 MG: 4 INJECTION, SOLUTION INTRAMUSCULAR; INTRAVENOUS at 20:19

## 2020-07-24 RX ADMIN — ASPIRIN 325 MG: 325 TABLET, COATED ORAL at 21:48

## 2020-07-24 RX ADMIN — HYDROCODONE BITARTRATE AND ACETAMINOPHEN 2 TABLET: 5; 325 TABLET ORAL at 12:17

## 2020-07-24 RX ADMIN — ATORVASTATIN CALCIUM 20 MG: 20 TABLET, FILM COATED ORAL at 08:27

## 2020-07-24 RX ADMIN — MORPHINE SULFATE 4 MG: 4 INJECTION, SOLUTION INTRAMUSCULAR; INTRAVENOUS at 14:59

## 2020-07-24 RX ADMIN — INSULIN LISPRO 9 UNITS: 100 INJECTION, SOLUTION INTRAVENOUS; SUBCUTANEOUS at 12:17

## 2020-07-24 RX ADMIN — DOCUSATE SODIUM 50 MG AND SENNOSIDES 8.6 MG 2 TABLET: 8.6; 5 TABLET, FILM COATED ORAL at 08:27

## 2020-07-24 RX ADMIN — SODIUM CHLORIDE, PRESERVATIVE FREE 10 ML: 5 INJECTION INTRAVENOUS at 14:59

## 2020-07-24 RX ADMIN — CEFTRIAXONE 2 G: 2 INJECTION, POWDER, FOR SOLUTION INTRAMUSCULAR; INTRAVENOUS at 17:55

## 2020-07-24 RX ADMIN — SODIUM CHLORIDE, PRESERVATIVE FREE 10 ML: 5 INJECTION INTRAVENOUS at 21:49

## 2020-07-24 RX ADMIN — INSULIN LISPRO 3 UNITS: 100 INJECTION, SOLUTION INTRAVENOUS; SUBCUTANEOUS at 21:54

## 2020-07-24 RX ADMIN — VENLAFAXINE HYDROCHLORIDE 75 MG: 75 CAPSULE, EXTENDED RELEASE ORAL at 08:27

## 2020-07-24 ASSESSMENT — PAIN DESCRIPTION - LOCATION: LOCATION: LEG;HIP

## 2020-07-24 ASSESSMENT — PAIN DESCRIPTION - PAIN TYPE: TYPE: ACUTE PAIN

## 2020-07-24 ASSESSMENT — PAIN DESCRIPTION - PROGRESSION
CLINICAL_PROGRESSION: NOT CHANGED
CLINICAL_PROGRESSION: NOT CHANGED

## 2020-07-24 ASSESSMENT — PAIN SCALES - GENERAL
PAINLEVEL_OUTOF10: 10
PAINLEVEL_OUTOF10: 9
PAINLEVEL_OUTOF10: 7
PAINLEVEL_OUTOF10: 9
PAINLEVEL_OUTOF10: 10
PAINLEVEL_OUTOF10: 10
PAINLEVEL_OUTOF10: 9
PAINLEVEL_OUTOF10: 5
PAINLEVEL_OUTOF10: 9
PAINLEVEL_OUTOF10: 5
PAINLEVEL_OUTOF10: 9
PAINLEVEL_OUTOF10: 10

## 2020-07-24 ASSESSMENT — PAIN DESCRIPTION - ORIENTATION: ORIENTATION: RIGHT;LEFT

## 2020-07-24 NOTE — PROGRESS NOTES
[facility]  Internal Medicine Residency Program  Progress Note - House Team 2    Patient:  Phuc Valdez 63 y.o. male MRN: [id number]     Date of Service: 7/24/2020     CC: Bilateral knee pain, low back pain, bilateral hip pain, N/T  Overnight events: None  Hospital Day: 4    Subjective     Patient states that he feels better and has more range of motion in his lower extremities. Current pain regimen is making his pain bearable but he still had great difficulty working with OT and PT. Objective     Physical Exam:  Vitals: BP (!) 142/75   Pulse 85   Temp 97.8 °F (36.6 °C) (Temporal)   Resp 18   Ht 5' 11\" (1.803 m)   Wt 218 lb 4.1 oz (99 kg)   SpO2 95%   BMI 30.44 kg/m²     I & O - 24hr:     Intake/Output Summary (Last 24 hours) at 7/24/2020 1651  Last data filed at 7/24/2020 0403  Gross per 24 hour   Intake --   Output 1700 ml   Net -1700 ml     · General Appearance: alert, appears stated age, cooperative and laying comfortably in bed. · HEENT:  Head: Normocephalic, no lesions, without obvious abnormality. · Neck: no adenopathy, supple, symmetrical, trachea midline and thyroid not enlarged, symmetric, no tenderness/mass/nodules  · Lung: clear to auscultation bilaterally  · Heart: regular rate and rhythm, S1, S2 normal, no murmur, click, rub or gallop  · Abdomen: Mildly tender to palpation diffusely. · Extremities:  Bilaterally the entire lower extremities are moderately tender to palpation. ACE wraps over both thighs, knees, and legs. Sensation is equal and intact. Decreased range of motion secondary to pain although increased from yesterday. Able to dorsiflex and plantar flex both feet 3/5.   · Neurologic: Mental status: Alert, oriented, thought content appropriate    Pertinent Labs & Imaging Studies     CBC:   Lab Results   Component Value Date    WBC 23.8 07/24/2020    RBC 3.76 07/24/2020    HGB 11.4 07/24/2020    HCT 33.5 07/24/2020    MCV 89.1 07/24/2020    MCH 30.3 07/24/2020    MCHC 34.0 07/24/2020    RDW 13.0 07/24/2020     07/24/2020    MPV 9.6 07/24/2020     CMP:    Lab Results   Component Value Date     07/24/2020    K 3.9 07/24/2020    K 4.1 07/19/2020    CL 97 07/24/2020    CO2 22 07/24/2020    BUN 25 07/24/2020    CREATININE 0.7 07/24/2020    GFRAA >60 07/24/2020    LABGLOM >60 07/24/2020    GLUCOSE 239 07/24/2020    PROT 7.1 07/19/2020    LABALBU 4.4 07/19/2020    CALCIUM 9.0 07/24/2020    BILITOT 0.5 07/19/2020    ALKPHOS 74 07/19/2020    AST 24 07/19/2020    ALT 22 07/19/2020 7/22/2020 09:22   CRP 41.4 (H)      7/22/2020 09:22   Sed Rate 90 (H)      7/21/2020 01:22   Body Fluid Culture (Knee) Strep agalactiae (Beta Strep Group B) (A)      7/22/2020 09:22   Hemoglobin A1C 10.1 (H)      7/22/2020 17:00   Human Rhinovirus/Enterovirus by PCR DETECTED (A)     Order Date:  7/23/2020 11:15 AM    EXAM: CT LUMBAR SPINE W WO CONTRAST    INDICATION: Severe back pain. Recent septic arthritis. COMPARISON: 20 January 2015    FINDINGS:    No acute fracture or dislocation is seen. There is moderate spinal    stenosis at L3-4 L4-5 relate disc bulging and facet arthropathy. There    are no endplate erosions to suggest infection.         Postcontrast there is no evidence of any abnormal enhancement to    suggest infection or otherwise. Impression    Moderate spinal canal stenosis related to disc bulging and facet    arthropathy at L3-4 and L4-5. No evidence of spinal infection. Resident's Assessment and Plan     Bong Jacobson is a 63 y.o. male with a PMHx of type 2 DM, peripheral neuropathy, OA, lumbar stenosis, and HLD who presented with bilateral lower extremity, hip, and low back pain. He has chronic pain in these areas but it acutely worsened after a fall on 7/18. CT head, CT C-spine, CT A/P were all negative for acute abnormalities. X-rays showed effusion in the right knee and bilateral knee OA.  Right knee arthrocentesis on 7/21 with 13,700 WBC, 92% neutrophils, and no crystals. Synovial fluid grew GBS. Arthrocentesis of both knees done on 7/23 with stable findings in right knee and septic range WBC in left knee. Bilateral arthroscopy with washout was done with findings of purulent fluid in both knees. CT L-spine with no evidence of infection. 1. Septic arthritis, right knee  Right knee arthrocentesis on 7/21 with 13,700 WBC, 92% neutrophils, and no crystals. Synovial fluid grew GBS. Afebrile, WBC 12.1->16.4->14.5->17.8->20.3. Elevated CRP (41.4) and ESR (90). Blood cx's no growth 24 hours. S/P bilateral knee arthroscopy, drainage, and debridement. · Continue Ceftriaxone 2 g IV Q24H, anticipate 4 weeks of treatment via midine   Ortho and ID following, appreciate recs   Follow blood cx's, fluid cx's, and tissue cx's    2. Bilateral lower extremity pain  May be 2/2 progressive lumbar stenosis vs spinal abscess/ vertebral osteomyelitis vs radiculopathy. Associated with tingling. Decreased ROM 2/2 pain, difficult to assess strength. No bowel or bladder incontinence. Is on Norco 7.5-325 at home for chronic pain. No relief with Norco 5-325, Morphine 4 mg, Toradol, or Gabapentin. CT L-spine with moderate spinal canal stenosis at L3-4 and L4-5. No evidence of spinal infection. · Continue Norco 5-325 2 doses Q6H PRN    3. Hx of lumbar spinal stenosis  1/20/2015 CT L-spine: Spinal canal stenosis of severe degree at the L4-L5 level and   of a moderate to severe degree at L3-L4 and L5-S1 levels. CT L-spine with moderate spinal canal stenosis at L3-4 and L4-5.    4. Type 2 DM  HbgA1c 10.1. Was taking 30u Lantus every other day at home. Has peripheral neuropathy.  Increased Lantus to 35u QHS   HDSS   Monitor glucose    5. Rhinovirus infection/colonization    6. Opioid-induced constipation  · Senokot daily     7. HLD  Atorvastatin 20 mg daily    8. Bipolar disorder  Lamictal 25 mg daily, Effexor 75 mg daily    9. GERD  Protonix 40 mg daily     10.  Bilateral knee OA    PT/OT evaluation: PT 8/24 / OT 14/24  DVT prophylaxis/GI prophylaxis: Lovenox/Protonix  Disposition: Continue current care    Sohail Hernandez MD,  PGY-1  Attending Physician: Dr. Nathanael Tai

## 2020-07-24 NOTE — CARE COORDINATION
Per Lashon Richard from [facility], they can accept patient and started precert today. Hens completed and place in envelope in soft chart. RN given Covid-19 send out test to be sent today.   Arlin Ochoa RN CM

## 2020-07-24 NOTE — PROGRESS NOTES
Department of Orthopedic Surgery  Resident Progress Note    Patient seen and examined, sleeping soundly upon entering room. Continued pain in bilateral knees, can move more than previous but still very painful. No new complaints. Denies chest pain, shortness of breath, dizziness/lightheadedness. VITALS:  BP (!) 157/79   Pulse 87   Temp 97.9 °F (36.6 °C) (Temporal)   Resp 18   Ht 5' 11\" (1.803 m)   Wt 218 lb 4.1 oz (99 kg)   SpO2 93%   BMI 30.44 kg/m²     General: alert and oriented to person, place and time, well-developed and well-nourished, in no acute distress    MUSCULOSKELETAL:   bilateral lower extremity:  · Dressing C/D/I  · Compartments soft and compressible  · +PF/DF/EHL  · +2/4 DP & PT pulses, Brisk Cap refill, Toes warm and perfused  · Distal sensation grossly intact to Peroneals, Sural, Saphenous, and tibial nrs  · Pain with active and passive ROM to knees. CBC:   Lab Results   Component Value Date    WBC 20.3 07/23/2020    HGB 12.7 07/23/2020    HCT 37.8 07/23/2020     07/23/2020     PT/INR:    Lab Results   Component Value Date    PROTIME 13.4 07/22/2020    INR 1.2 07/22/2020       Intraop Cultures: pending, growth in aspirate positive for strep    ASSESSMENT  · S/P Bilateral septic knee washout - 7/23/20    PLAN      · Continue physical therapy and protocol: WBAT - BLE  · Antibiotics per ID/medicine  · Deep venous thrombosis prophylaxis - ASA if okay with primary team, early mobilization  · Continue to monitor exams.   · PT/OT  · Pain Control: IV and PO  · Monitor H&H  · Discuss with attending

## 2020-07-24 NOTE — PROGRESS NOTES
POWER MIDLINE CATH  Placement 7/24/2020    Product number: [id number]   Lot Number: [id number]      Ultrasound: YES   R Basilic:                Upper Arm Circumference: 30 CM    Size: 4.5 FR SL    Exposed Length: 2 CM    Internal Length: 13 CM   Cut: 0 CM   Vein Measurement: 0.55 CM    Renuka Hayden  7/24/2020  5:12 PM

## 2020-07-24 NOTE — PROGRESS NOTES
Occupational Therapy  OCCUPATIONAL THERAPY INITIAL EVALUATION      Date:2020  Patient Name: Manuel Henley  MRN: [id number]  : 1965  Room: [address] [place], [place] [address]      Evaluating OT: Key Castellano OTR/L #[id number]    AM-PAC Daily Activity Raw Score:     Recommended Adaptive Equipment: AE for LB dressing/bathing; TBD     Diagnosis: Acute bilateral knee pain [M25.561, M25.562]  Septic arthritis ([facility] [address] [M00.9]  Referring Provider: Shy Petty DO; Alessia Wilson DO   Patient presented to ED for     Surgery:  KNEE ARTHROSCOPY (Bilateral )   Pertinent Medical History: depression, HLD, neuropathy, OA, type II DM      Precautions:  Falls, WBAT BLE, contact/droplet isolation, R 5th toe wound     Home Living: Pt lives 2 daughters and son in a 2 floor house w/ basement, 1st floor set-up     Bathroom setup: tub/shower    Equipment owned: standard walker, SPC     Prior Level of Function: assistance prn with ADLs(especially with LB dressing/bathing) , Assistance prn with IADLs; ambulated SPC prn; pt reports last time he was ambulating was Saturday night  Driving: Yes  Occupation: works at [employer]    Pain Level: Pt c/o 9/10 B knees, RN aware and received pain meds prior to session  Cognition: A&O: 4/4; Follows 1 step directions   Memory:  good    Sequencing:  good    Problem solving: good    Judgement/safety: good      Functional Assessment:   Initial Eval Status  Date: 20 Treatment Status  Date: STGs/LTGs  Treatment frequency: 1-4x/wk   Feeding Independent      Grooming Set-up  Mod. I   UB Dressing Minimal Assist   Independent    LB Dressing Dependent to don socks  Modified [place] w/ AE prn   Bathing Maximal Assist (simulated)  Minimal Assist w/ AE as needed   Toileting Dependent  Moderate Assist    Bed Mobility  Supine to sit: attempted but pt in severe pain with movement of BLEs  Sit to supine: NT   Supine to sit: Minimal Assist   Sit to supine: Minimal Assist Functional Transfers NT  TBD    Functional Mobility NT  TBD   Balance Sitting: NT    Standing: NT         Activity Tolerance poor  Fair+   Visual/  Perceptual Glasses: \"used too\"        Safety          Hand dominance: R     Strength ROM Additional Info:    RUE  4/5  Grossly WFL good  and wfl FMC/dexterity noted during ADL tasks       LUE 4/5  WFL good  and wfl FMC/dexterity noted during ADL tasks         Hearing: WFL   Sensation:  Pt c/o numbness/tingling in feet/legs d/t nueropathy  Tone: WFL   Edema: unremarkable            Treatment: Upon arrival, patient lying in bed with RN present and agreeable to OT session at this time. RN approved. Therapist facilitated bed mobility(educated pt on body mechanics--pt in severe pain with movement of BLEs--unable to perform full supine>sit transfer). Therapist facilitated self-care retraining: UB/LB self-care tasks(donned socks with increased time noted d/t pain), simulated toileting task and grooming task(set-up pt to increase independence for tasks) while educating pt on modified techniques, posture, safety and energy conservation techniques. Educated pt on performing routine skin checks d/t neuropathy. Skilled monitoring of HR, O2 sats and pts response to treatment. At end of session, patient lying in bed (skilled positioning to prevent skin breakdown/promote function) with call light and phone within reach, all lines and tubes intact. Comments:  Overall pt demonstrated decreased independence and safety during completion of ADL/functional transfers/mobility tasks. Pt demonstrating fair understanding of education/techniques, requiring additional training / education. Pt would benefit from continued skilled OT to increase functional independence and quality of life.       Eval Complexity: Low    (Evaluation time includes thorough review of current medical information, gathering information on past medical history/social history and prior level of function, completion of standardized testing/informal observation of tasks, assessment of data, and development of POC/Goals.)      Assessment of current deficits   Functional mobility [x]  ADLs [x] Strength [x]  Cognition []  Functional transfers  [x] IADLs [x] Safety Awareness [x]  Endurance [x]  Fine Motor Coordination [] Balance [x] Vision/perception [] Sensation []   Gross Motor Coordination [] ROM [] Delirium []                  Motor Control []    Plan of Care (5-7 days):   ADL retraining [x]   Equipment needs [x]   Neuromuscular re-education [x] Energy Conservation Techniques [x]  Functional Transfer training [x] Patient and/or Family Education [x]  Functional Mobility training [x]  Environmental Modifications [x]  Cognitive re-training []   Compensatory techniques for ADLs [x]  Splinting Needs []   Positioning to improve overall function [x]   Therapeutic Activity [x]  Therapeutic Exercise  [x]  Visual/Perceptual: []    Delirium prevention/treatment  []   Other:  []    Rehab Potential: Good for established goals     Patient / Family Goal:  Not stated     Patient and/or family were instructed diagnosis, prognosis/goals and plan of care. Demonstrated fair understanding.         Low Evaluation +   Treatment Time In:8:55            Treatment Time Out: 9:10              Treatment Charges: Mins Units   Ther Ex  86361     Manual Therapy 39571     Thera Activities 35929 5    ADL/Home Mgt 58035 10 1   Neuro Re-ed 23710     Group Therapy      Orthotic manage/training  73931     Non-Billable Time     Total Timed Treatment 15 117 [facility], OTR/L #[id number]

## 2020-07-24 NOTE — PROGRESS NOTES
[facility]  Internal Medicine Residency / [address]    Attending Physician Statement  I have discussed the case, including pertinent history and exam findings with the resident and the team.  I have seen and examined the patient and the key elements of the encounter have been performed by me. I agree with the assessment, plan and orders as documented by the resident. He reports 10% improvement from admission. Denies any back pain. Reports he attempted to stand but too much pain still.     1. Bilateral knee septic arthritis from strep agalactiae  2. Acute on chronic lumbar pain from lumbar stenosis  3. Rhinovirus infection  4. Uncontrolled DM with hyperglycemia    Clinically he is having some improvement in pain and increased movement of his knees. plan is for a midline for continued 4 weeks abx therapy. We will increase his bowel regimen as he is getting significant amount of opiates without recent bowel movement. He will need discharge to a facility for continued PT/OT and IV abx. Remainder of medical problems as per resident note.       Anette Kaur DO  Internal Medicine Residency Faculty
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
## 2020-07-24 NOTE — PROGRESS NOTES
Physical Therapy    Physical Therapy Initial Assessment     Name: Sonya Hill  : 1965  MRN: [id number]    Referring Provider:  Giovanni Hassan DO    Date of Service: 2020    Evaluating PT:  Hugo Johnson, PT, DPT [id number]      Room #:  6891/1143-E  Diagnosis:  Acute bilateral knee pain  PMHx/PSHx:  Type 2 DM, chronic pain, OA, neuropathy, HLD  Procedure/Surgery:  R Knee aspiration , B knee arthroscopy   Precautions:  WBAT B LE, 5th digit wound R foot, Falls, contact and droplet iso  Equipment Needs:  TBD    SUBJECTIVE:    Pt lives with 3 children (16, 15, 4 y/o) in a 2 story house with 2 stairs to enter and 1 rail. Bed is on first floor and bath is on first floor. Pt does not access second floor of home. Pt ambulated with SPC prior to admission. OBJECTIVE:   Initial Evaluation  Date: 20 Treatment Short Term/ Long Term   Goals   AM-PAC 6 Clicks      Was pt agreeable to Eval/treatment? yes     Does pt have pain? /10 B knee pain at rest, increased with mobility- RN notified     Bed Mobility  Rolling: Max A  Supine to sit: Max A  Sit to supine: Max A  Scooting: Max A  Rolling: Supervision  Supine to sit: Supervision  Sit to supine: Supervision  Scooting: Supervision   Transfers Sit to stand: NT  Stand to sit: NT  Stand pivot: NT  Sit to stand: Mod A  Stand to sit: Mod A  Stand pivot: Mod A   Ambulation    NT  10 feet with FWW with Mod A   Stair negotiation: ascended and descended  NT  TBD   ROM BUE:  WNL  BLE:  Limited B knee flexion to approx 80 degrees, extension to approximately 20 degrees     Strength BUE:  WNL  BLE:  Unable to assess due to pain     Balance Sitting EOB:  SBA  Dynamic Standing:  NT  Sitting EOB:  Independent  Dynamic Standing: Mod A     Pt is A & O x 4  Sensation:  Chronic numbness to B feet and hypersensitivity to all touch of R foot. Edema:   Moderate edema of R foot    Patient education  Pt educated on role of therapy, safety with bed mobility, breathing technique, importance of continued ROM of B LEs, TE's    Patient response to education:   Pt verbalized understanding Pt demonstrated skill Pt requires further education in this area   yes With assist yes     ASSESSMENT:    Comments:  Pt resting semi-supine upon arrival, agreeable to PT eval. Pt completed bed mobility with very slow pace of movement and HOB fully elevated. He is dependent for R LE progression towards R LE and assist to clear L LE from bed surface but is able to complete hip adduction towards EOB. Pt seated EOB x15 minutes with progressive flexion of heels posteriorly to increase B knee flexion gradually. Pt unable to tolerate increased weight to B feet to attempt standing. Pt completed lateral scooting towards HOB with assist for clearance of hips. Pt exclaiming with pain of all B LE movements and with rolling. Pt semi-supine upon completion of session with all needs in reach and skilled positioning implemented to decreased B LE external rotation and flexion. Pt educated to complete B LE ankle pumps, quad sets and importance of positioning. Pt will benefit from continued skilled PT services to improve independence with functional mobility, pain control, and initiation of standing and ambulation as appropriate. Treatment:  Patient practiced and was instructed in the following treatment:     Bed mobility: verbal/tactile cues for sequencing, manual assist for progression of B LEs over EOB with max cues for placement of B UEs to assist with progression of hips and trunk   Therapeutic activities: sitting EOB x15 minutes, assistance for gradual progression of B knee flexion, skilled positioning upon return to bed, pt education for importance of progressive mobility    Pt's/ family goals   1. To return home with family     Patient and or family understand(s) diagnosis, prognosis, and plan of care. yes    PLAN:    PT care will be provided in accordance with the objectives noted above.  Exercises and functional mobility practice will be used as well as appropriate assistive devices or modalities to obtain goals. Patient and family education will also be administered as needed. Frequency of treatments: 2-5x/week x 1-2 weeks. Time in  1330  Time out  1405    Total Treatment Time  25 minutes     Evaluation Time includes thorough review of current medical information, gathering information on past medical history/social history and prior level of function, completion of standardized testing/informal observation of tasks, assessment of data and education on plan of care and goals.     CPT codes:  [] Low Complexity PT evaluation 17101  [x] Moderate Complexity PT evaluation 62246  [] High Complexity PT evaluation 11304  [] PT Re-evaluation 29427  [] Gait training 31032 0 minutes  [] Manual therapy 05443 0 minutes  [x] Therapeutic activities 29274 25 minutes  [] Therapeutic exercises 86683 0 minutes  [] Neuromuscular reeducation 10418 0 minutes     Sunitha Hernandez, PT, DPT  [id number]

## 2020-07-24 NOTE — PLAN OF CARE
Problem: Pain:  Goal: Pain level will decrease  Description: Pain level will decrease  7/23/2020 2339 by Marisol Dave RN  Outcome: Met This Shift  7/23/2020 1202 by Leslee Hinojosa RN  Outcome: Met This Shift  Goal: Control of acute pain  Description: Control of acute pain  7/23/2020 2339 by Marisol Dave RN  Outcome: Met This Shift  7/23/2020 1202 by Leslee Hinojosa RN  Outcome: Met This Shift  Goal: Control of chronic pain  Description: Control of chronic pain  Outcome: Met This Shift     Problem: Skin Integrity:  Goal: Will show no infection signs and symptoms  Description: Will show no infection signs and symptoms  Outcome: Met This Shift  Goal: Absence of new skin breakdown  Description: Absence of new skin breakdown  Outcome: Met This Shift     Problem: Falls - Risk of:  Goal: Will remain free from falls  Description: Will remain free from falls  7/23/2020 2339 by Marisol Dave RN  Outcome: Met This Shift  7/23/2020 1202 by Leslee Hinojosa RN  Outcome: Met This Shift  Goal: Absence of physical injury  Description: Absence of physical injury  7/23/2020 2339 by Marisol Dave RN  Outcome: Met This Shift  7/23/2020 1202 by Leslee Hinojosa RN  Outcome: Met This Shift

## 2020-07-25 LAB
ANAEROBIC CULTURE: NORMAL
ANAEROBIC CULTURE: NORMAL
ANION GAP SERPL CALCULATED.3IONS-SCNC: 14 MMOL/L (ref 7–16)
ANISOCYTOSIS: ABNORMAL
BASOPHILS ABSOLUTE: 0 E9/L (ref 0–0.2)
BASOPHILS RELATIVE PERCENT: 0.2 % (ref 0–2)
BUN BLDV-MCNC: 23 MG/DL (ref 6–20)
BURR CELLS: ABNORMAL
CALCIUM SERPL-MCNC: 8.9 MG/DL (ref 8.6–10.2)
CHLORIDE BLD-SCNC: 96 MMOL/L (ref 98–107)
CO2: 25 MMOL/L (ref 22–29)
CREAT SERPL-MCNC: 0.7 MG/DL (ref 0.7–1.2)
EOSINOPHILS ABSOLUTE: 0 E9/L (ref 0.05–0.5)
EOSINOPHILS RELATIVE PERCENT: 0.5 % (ref 0–6)
GFR AFRICAN AMERICAN: >60
GFR NON-AFRICAN AMERICAN: >60 ML/MIN/1.73
GLUCOSE BLD-MCNC: 83 MG/DL (ref 74–99)
HCT VFR BLD CALC: 32.8 % (ref 37–54)
HEMOGLOBIN: 11.1 G/DL (ref 12.5–16.5)
HYPOCHROMIA: ABNORMAL
LYMPHOCYTES ABSOLUTE: 2.34 E9/L (ref 1.5–4)
LYMPHOCYTES RELATIVE PERCENT: 12.2 % (ref 20–42)
MAGNESIUM: 2 MG/DL (ref 1.6–2.6)
MCH RBC QN AUTO: 30.3 PG (ref 26–35)
MCHC RBC AUTO-ENTMCNC: 33.8 % (ref 32–34.5)
MCV RBC AUTO: 89.6 FL (ref 80–99.9)
METER GLUCOSE: 102 MG/DL (ref 74–99)
METER GLUCOSE: 144 MG/DL (ref 74–99)
METER GLUCOSE: 144 MG/DL (ref 74–99)
METER GLUCOSE: 151 MG/DL (ref 74–99)
MONOCYTES ABSOLUTE: 0.98 E9/L (ref 0.1–0.95)
MONOCYTES RELATIVE PERCENT: 5.2 % (ref 2–12)
NEUTROPHILS ABSOLUTE: 16.19 E9/L (ref 1.8–7.3)
NEUTROPHILS RELATIVE PERCENT: 82.6 % (ref 43–80)
OVALOCYTES: ABNORMAL
PDW BLD-RTO: 12.9 FL (ref 11.5–15)
PLATELET # BLD: 486 E9/L (ref 130–450)
PMV BLD AUTO: 9.3 FL (ref 7–12)
POIKILOCYTES: ABNORMAL
POLYCHROMASIA: ABNORMAL
POTASSIUM SERPL-SCNC: 3.6 MMOL/L (ref 3.5–5)
RBC # BLD: 3.66 E12/L (ref 3.8–5.8)
SODIUM BLD-SCNC: 135 MMOL/L (ref 132–146)
WBC # BLD: 19.5 E9/L (ref 4.5–11.5)

## 2020-07-25 PROCEDURE — 82962 GLUCOSE BLOOD TEST: CPT

## 2020-07-25 PROCEDURE — 6370000000 HC RX 637 (ALT 250 FOR IP): Performed by: STUDENT IN AN ORGANIZED HEALTH CARE EDUCATION/TRAINING PROGRAM

## 2020-07-25 PROCEDURE — 36415 COLL VENOUS BLD VENIPUNCTURE: CPT

## 2020-07-25 PROCEDURE — 6360000002 HC RX W HCPCS: Performed by: INTERNAL MEDICINE

## 2020-07-25 PROCEDURE — 83735 ASSAY OF MAGNESIUM: CPT

## 2020-07-25 PROCEDURE — 85025 COMPLETE CBC W/AUTO DIFF WBC: CPT

## 2020-07-25 PROCEDURE — 2580000003 HC RX 258: Performed by: STUDENT IN AN ORGANIZED HEALTH CARE EDUCATION/TRAINING PROGRAM

## 2020-07-25 PROCEDURE — 6360000002 HC RX W HCPCS: Performed by: STUDENT IN AN ORGANIZED HEALTH CARE EDUCATION/TRAINING PROGRAM

## 2020-07-25 PROCEDURE — 80048 BASIC METABOLIC PNL TOTAL CA: CPT

## 2020-07-25 PROCEDURE — U0003 INFECTIOUS AGENT DETECTION BY NUCLEIC ACID (DNA OR RNA); SEVERE ACUTE RESPIRATORY SYNDROME CORONAVIRUS 2 (SARS-COV-2) (CORONAVIRUS DISEASE [COVID-19]), AMPLIFIED PROBE TECHNIQUE, MAKING USE OF HIGH THROUGHPUT TECHNOLOGIES AS DESCRIBED BY CMS-2020-01-R: HCPCS

## 2020-07-25 PROCEDURE — 6370000000 HC RX 637 (ALT 250 FOR IP): Performed by: INTERNAL MEDICINE

## 2020-07-25 PROCEDURE — 2580000003 HC RX 258: Performed by: INTERNAL MEDICINE

## 2020-07-25 PROCEDURE — 99232 SBSQ HOSP IP/OBS MODERATE 35: CPT | Performed by: INTERNAL MEDICINE

## 2020-07-25 PROCEDURE — 1200000000 HC SEMI PRIVATE

## 2020-07-25 RX ORDER — SENNA AND DOCUSATE SODIUM 50; 8.6 MG/1; MG/1
2 TABLET, FILM COATED ORAL 2 TIMES DAILY
Status: DISCONTINUED | OUTPATIENT
Start: 2020-07-25 | End: 2020-07-31 | Stop reason: HOSPADM

## 2020-07-25 RX ORDER — GABAPENTIN 300 MG/1
300 CAPSULE ORAL 3 TIMES DAILY
Status: DISCONTINUED | OUTPATIENT
Start: 2020-07-25 | End: 2020-07-31

## 2020-07-25 RX ORDER — POLYETHYLENE GLYCOL 3350 17 G/17G
17 POWDER, FOR SOLUTION ORAL DAILY
Status: DISCONTINUED | OUTPATIENT
Start: 2020-07-25 | End: 2020-07-31 | Stop reason: HOSPADM

## 2020-07-25 RX ADMIN — HYDROCODONE BITARTRATE AND ACETAMINOPHEN 2 TABLET: 5; 325 TABLET ORAL at 21:56

## 2020-07-25 RX ADMIN — HYDROCODONE BITARTRATE AND ACETAMINOPHEN 2 TABLET: 5; 325 TABLET ORAL at 14:54

## 2020-07-25 RX ADMIN — SODIUM CHLORIDE, PRESERVATIVE FREE 10 ML: 5 INJECTION INTRAVENOUS at 11:24

## 2020-07-25 RX ADMIN — SODIUM CHLORIDE, PRESERVATIVE FREE 10 ML: 5 INJECTION INTRAVENOUS at 08:55

## 2020-07-25 RX ADMIN — SODIUM CHLORIDE, PRESERVATIVE FREE 10 ML: 5 INJECTION INTRAVENOUS at 20:04

## 2020-07-25 RX ADMIN — ASPIRIN 325 MG: 325 TABLET, COATED ORAL at 20:03

## 2020-07-25 RX ADMIN — LAMOTRIGINE 25 MG: 25 TABLET ORAL at 08:51

## 2020-07-25 RX ADMIN — MORPHINE SULFATE 4 MG: 4 INJECTION, SOLUTION INTRAMUSCULAR; INTRAVENOUS at 11:25

## 2020-07-25 RX ADMIN — SODIUM CHLORIDE, PRESERVATIVE FREE 100 UNITS: 5 INJECTION INTRAVENOUS at 20:04

## 2020-07-25 RX ADMIN — INSULIN LISPRO 3 UNITS: 100 INJECTION, SOLUTION INTRAVENOUS; SUBCUTANEOUS at 11:24

## 2020-07-25 RX ADMIN — PANTOPRAZOLE SODIUM 40 MG: 40 TABLET, DELAYED RELEASE ORAL at 05:53

## 2020-07-25 RX ADMIN — INSULIN GLARGINE 35 UNITS: 100 INJECTION, SOLUTION SUBCUTANEOUS at 20:04

## 2020-07-25 RX ADMIN — Medication 100 UNITS: at 11:23

## 2020-07-25 RX ADMIN — GABAPENTIN 300 MG: 300 CAPSULE ORAL at 14:54

## 2020-07-25 RX ADMIN — DOCUSATE SODIUM 50 MG AND SENNOSIDES 8.6 MG 2 TABLET: 8.6; 5 TABLET, FILM COATED ORAL at 08:51

## 2020-07-25 RX ADMIN — INSULIN LISPRO 2 UNITS: 100 INJECTION, SOLUTION INTRAVENOUS; SUBCUTANEOUS at 20:04

## 2020-07-25 RX ADMIN — INSULIN LISPRO 3 UNITS: 100 INJECTION, SOLUTION INTRAVENOUS; SUBCUTANEOUS at 16:48

## 2020-07-25 RX ADMIN — SODIUM CHLORIDE, PRESERVATIVE FREE 100 UNITS: 5 INJECTION INTRAVENOUS at 08:54

## 2020-07-25 RX ADMIN — VENLAFAXINE HYDROCHLORIDE 75 MG: 75 CAPSULE, EXTENDED RELEASE ORAL at 08:51

## 2020-07-25 RX ADMIN — Medication 100 UNITS: at 16:50

## 2020-07-25 RX ADMIN — ENOXAPARIN SODIUM 40 MG: 40 INJECTION SUBCUTANEOUS at 08:52

## 2020-07-25 RX ADMIN — MORPHINE SULFATE 4 MG: 4 INJECTION, SOLUTION INTRAMUSCULAR; INTRAVENOUS at 20:52

## 2020-07-25 RX ADMIN — HYDROCODONE BITARTRATE AND ACETAMINOPHEN 2 TABLET: 5; 325 TABLET ORAL at 01:38

## 2020-07-25 RX ADMIN — ATORVASTATIN CALCIUM 20 MG: 20 TABLET, FILM COATED ORAL at 08:51

## 2020-07-25 RX ADMIN — GABAPENTIN 300 MG: 300 CAPSULE ORAL at 20:03

## 2020-07-25 RX ADMIN — CEFTRIAXONE 2 G: 2 INJECTION, POWDER, FOR SOLUTION INTRAMUSCULAR; INTRAVENOUS at 16:49

## 2020-07-25 RX ADMIN — SODIUM CHLORIDE, PRESERVATIVE FREE 10 ML: 5 INJECTION INTRAVENOUS at 16:50

## 2020-07-25 RX ADMIN — ASPIRIN 325 MG: 325 TABLET, COATED ORAL at 08:52

## 2020-07-25 RX ADMIN — DOCUSATE SODIUM 50 MG AND SENNOSIDES 8.6 MG 2 TABLET: 8.6; 5 TABLET, FILM COATED ORAL at 20:03

## 2020-07-25 RX ADMIN — MORPHINE SULFATE 4 MG: 4 INJECTION, SOLUTION INTRAMUSCULAR; INTRAVENOUS at 16:49

## 2020-07-25 RX ADMIN — HYDROCODONE BITARTRATE AND ACETAMINOPHEN 2 TABLET: 5; 325 TABLET ORAL at 08:52

## 2020-07-25 RX ADMIN — MORPHINE SULFATE 4 MG: 4 INJECTION, SOLUTION INTRAMUSCULAR; INTRAVENOUS at 05:53

## 2020-07-25 RX ADMIN — POLYETHYLENE GLYCOL 3350 17 G: 17 POWDER, FOR SOLUTION ORAL at 11:41

## 2020-07-25 RX ADMIN — MORPHINE SULFATE 4 MG: 4 INJECTION, SOLUTION INTRAMUSCULAR; INTRAVENOUS at 00:19

## 2020-07-25 ASSESSMENT — PAIN SCALES - GENERAL
PAINLEVEL_OUTOF10: 5
PAINLEVEL_OUTOF10: 10
PAINLEVEL_OUTOF10: 6
PAINLEVEL_OUTOF10: 9
PAINLEVEL_OUTOF10: 10
PAINLEVEL_OUTOF10: 5
PAINLEVEL_OUTOF10: 7
PAINLEVEL_OUTOF10: 10
PAINLEVEL_OUTOF10: 10

## 2020-07-25 ASSESSMENT — PAIN DESCRIPTION - PAIN TYPE
TYPE: ACUTE PAIN

## 2020-07-25 ASSESSMENT — PAIN DESCRIPTION - ONSET
ONSET: ON-GOING

## 2020-07-25 ASSESSMENT — PAIN DESCRIPTION - LOCATION
LOCATION: KNEE
LOCATION: LEG
LOCATION: KNEE

## 2020-07-25 ASSESSMENT — PAIN DESCRIPTION - ORIENTATION
ORIENTATION: RIGHT;LEFT

## 2020-07-25 ASSESSMENT — PAIN DESCRIPTION - DESCRIPTORS
DESCRIPTORS: CONSTANT;SHARP;THROBBING
DESCRIPTORS: ACHING;CONSTANT;DISCOMFORT
DESCRIPTORS: CONSTANT;SHARP;THROBBING
DESCRIPTORS: CONSTANT;SHARP;THROBBING
DESCRIPTORS: CONSTANT;SHARP;DISCOMFORT

## 2020-07-25 ASSESSMENT — PAIN DESCRIPTION - FREQUENCY
FREQUENCY: CONTINUOUS

## 2020-07-25 ASSESSMENT — PAIN DESCRIPTION - PROGRESSION
CLINICAL_PROGRESSION: NOT CHANGED

## 2020-07-25 NOTE — PROGRESS NOTES
Department of Orthopedic Surgery  Resident Progress Note    Patient seen and examined, sleeping soundly upon entering room. Continued pain in bilateral knees, can move more than previous but still very painful. No new complaints. Denies chest pain, shortness of breath, dizziness/lightheadedness. Overall he feels he is still improved than compared to before surgery. VITALS:  BP (!) 162/89   Pulse 84   Temp 98.3 °F (36.8 °C) (Oral)   Resp 18   Ht 5' 11\" (1.803 m)   Wt 218 lb 4.1 oz (99 kg)   SpO2 95%   BMI 30.44 kg/m²     General: alert and oriented to person, place and time, well-developed and well-nourished, in no acute distress    MUSCULOSKELETAL:   bilateral lower extremity:  · Dressing C/D/I  · Compartments soft and compressible  · +PF/DF/EHL  · +2/4 DP & PT pulses, Brisk Cap refill, Toes warm and perfused  · Distal sensation grossly intact to Peroneals, Sural, Saphenous, and tibial nrs  · Pain with active and passive ROM to knees. CBC:   Lab Results   Component Value Date    WBC 23.8 07/24/2020    HGB 11.4 07/24/2020    HCT 33.5 07/24/2020     07/24/2020     PT/INR:    Lab Results   Component Value Date    PROTIME 13.4 07/22/2020    INR 1.2 07/22/2020       Intraop Cultures: Strep Agalactiae    ASSESSMENT  · S/P Bilateral septic knee washout for septic arthritis - 7/23/20    PLAN      · Continue physical therapy and protocol: WBAT - BLE  · Antibiotics per ID/medicine  · Deep venous thrombosis prophylaxis - ASA if okay with primary team, early mobilization  · Continue to monitor exams. · PT/OT  · Pain Control: IV and PO  · Monitor H&H  · If patient does not continue to show improvement or he declines repeat I&D may be warranted. Currently he is showing slight improvement.    · Discuss with attending
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
## 2020-07-25 NOTE — PLAN OF CARE
Problem: Pain:  Goal: Pain level will decrease  Description: Pain level will decrease  7/25/2020 1206 by Heather Gardner RN  Outcome: Met This Shift     Problem: Pain:  Goal: Control of acute pain  Description: Control of acute pain  7/25/2020 1206 by Heather Gardner RN  Outcome: Met This Shift     Problem: Falls - Risk of:  Goal: Will remain free from falls  Description: Will remain free from falls  7/25/2020 1206 by Heather Gardner RN  Outcome: Met This Shift     Problem: Falls - Risk of:  Goal: Absence of physical injury  Description: Absence of physical injury  7/25/2020 1206 by Heather Gardner RN  Outcome: Met This Shift

## 2020-07-26 ENCOUNTER — APPOINTMENT (OUTPATIENT)
Dept: GENERAL RADIOLOGY | Age: 55
DRG: 710 | End: 2020-07-26
Payer: COMMERCIAL

## 2020-07-26 LAB
ANION GAP SERPL CALCULATED.3IONS-SCNC: 13 MMOL/L (ref 7–16)
BASOPHILS ABSOLUTE: 0.08 E9/L (ref 0–0.2)
BASOPHILS RELATIVE PERCENT: 0.5 % (ref 0–2)
BODY FLUID CULTURE, STERILE: ABNORMAL
BODY FLUID CULTURE, STERILE: ABNORMAL
BUN BLDV-MCNC: 19 MG/DL (ref 6–20)
BURR CELLS: ABNORMAL
CALCIUM SERPL-MCNC: 9 MG/DL (ref 8.6–10.2)
CHLORIDE BLD-SCNC: 93 MMOL/L (ref 98–107)
CO2: 28 MMOL/L (ref 22–29)
CREAT SERPL-MCNC: 0.7 MG/DL (ref 0.7–1.2)
EOSINOPHILS ABSOLUTE: 0.16 E9/L (ref 0.05–0.5)
EOSINOPHILS RELATIVE PERCENT: 1 % (ref 0–6)
GFR AFRICAN AMERICAN: >60
GFR NON-AFRICAN AMERICAN: >60 ML/MIN/1.73
GLUCOSE BLD-MCNC: 108 MG/DL (ref 74–99)
GRAM STAIN RESULT: ABNORMAL
HCT VFR BLD CALC: 33.9 % (ref 37–54)
HEMOGLOBIN: 11.4 G/DL (ref 12.5–16.5)
IMMATURE GRANULOCYTES #: 0.43 E9/L
IMMATURE GRANULOCYTES %: 2.6 % (ref 0–5)
LYMPHOCYTES ABSOLUTE: 3.16 E9/L (ref 1.5–4)
LYMPHOCYTES RELATIVE PERCENT: 18.9 % (ref 20–42)
MAGNESIUM: 1.7 MG/DL (ref 1.6–2.6)
MCH RBC QN AUTO: 30 PG (ref 26–35)
MCHC RBC AUTO-ENTMCNC: 33.6 % (ref 32–34.5)
MCV RBC AUTO: 89.2 FL (ref 80–99.9)
METER GLUCOSE: 120 MG/DL (ref 74–99)
METER GLUCOSE: 146 MG/DL (ref 74–99)
METER GLUCOSE: 163 MG/DL (ref 74–99)
METER GLUCOSE: 235 MG/DL (ref 74–99)
MONOCYTES ABSOLUTE: 1.98 E9/L (ref 0.1–0.95)
MONOCYTES RELATIVE PERCENT: 11.8 % (ref 2–12)
NEUTROPHILS ABSOLUTE: 10.94 E9/L (ref 1.8–7.3)
NEUTROPHILS RELATIVE PERCENT: 65.2 % (ref 43–80)
ORGANISM: ABNORMAL
PDW BLD-RTO: 12.9 FL (ref 11.5–15)
PLATELET # BLD: 507 E9/L (ref 130–450)
PMV BLD AUTO: 9.1 FL (ref 7–12)
POIKILOCYTES: ABNORMAL
POLYCHROMASIA: ABNORMAL
POTASSIUM SERPL-SCNC: 3.9 MMOL/L (ref 3.5–5)
RBC # BLD: 3.8 E12/L (ref 3.8–5.8)
SODIUM BLD-SCNC: 134 MMOL/L (ref 132–146)
WBC # BLD: 16.8 E9/L (ref 4.5–11.5)

## 2020-07-26 PROCEDURE — 6360000002 HC RX W HCPCS: Performed by: INTERNAL MEDICINE

## 2020-07-26 PROCEDURE — 2580000003 HC RX 258: Performed by: INTERNAL MEDICINE

## 2020-07-26 PROCEDURE — 6370000000 HC RX 637 (ALT 250 FOR IP): Performed by: INTERNAL MEDICINE

## 2020-07-26 PROCEDURE — 85025 COMPLETE CBC W/AUTO DIFF WBC: CPT

## 2020-07-26 PROCEDURE — 6370000000 HC RX 637 (ALT 250 FOR IP): Performed by: STUDENT IN AN ORGANIZED HEALTH CARE EDUCATION/TRAINING PROGRAM

## 2020-07-26 PROCEDURE — 82784 ASSAY IGA/IGD/IGG/IGM EACH: CPT

## 2020-07-26 PROCEDURE — 2580000003 HC RX 258: Performed by: STUDENT IN AN ORGANIZED HEALTH CARE EDUCATION/TRAINING PROGRAM

## 2020-07-26 PROCEDURE — 73521 X-RAY EXAM HIPS BI 2 VIEWS: CPT

## 2020-07-26 PROCEDURE — 83735 ASSAY OF MAGNESIUM: CPT

## 2020-07-26 PROCEDURE — 86360 T CELL ABSOLUTE COUNT/RATIO: CPT

## 2020-07-26 PROCEDURE — 1200000000 HC SEMI PRIVATE

## 2020-07-26 PROCEDURE — 80048 BASIC METABOLIC PNL TOTAL CA: CPT

## 2020-07-26 PROCEDURE — 86359 T CELLS TOTAL COUNT: CPT

## 2020-07-26 PROCEDURE — 36415 COLL VENOUS BLD VENIPUNCTURE: CPT

## 2020-07-26 PROCEDURE — 99232 SBSQ HOSP IP/OBS MODERATE 35: CPT | Performed by: INTERNAL MEDICINE

## 2020-07-26 PROCEDURE — 82962 GLUCOSE BLOOD TEST: CPT

## 2020-07-26 PROCEDURE — 73630 X-RAY EXAM OF FOOT: CPT

## 2020-07-26 RX ORDER — MAGNESIUM SULFATE IN WATER 40 MG/ML
2 INJECTION, SOLUTION INTRAVENOUS ONCE
Status: COMPLETED | OUTPATIENT
Start: 2020-07-26 | End: 2020-07-26

## 2020-07-26 RX ADMIN — INSULIN LISPRO 3 UNITS: 100 INJECTION, SOLUTION INTRAVENOUS; SUBCUTANEOUS at 12:00

## 2020-07-26 RX ADMIN — ATORVASTATIN CALCIUM 20 MG: 20 TABLET, FILM COATED ORAL at 09:17

## 2020-07-26 RX ADMIN — CEFTRIAXONE 2 G: 2 INJECTION, POWDER, FOR SOLUTION INTRAMUSCULAR; INTRAVENOUS at 16:46

## 2020-07-26 RX ADMIN — SODIUM CHLORIDE, PRESERVATIVE FREE 10 ML: 5 INJECTION INTRAVENOUS at 09:18

## 2020-07-26 RX ADMIN — PANTOPRAZOLE SODIUM 40 MG: 40 TABLET, DELAYED RELEASE ORAL at 05:23

## 2020-07-26 RX ADMIN — SODIUM CHLORIDE, PRESERVATIVE FREE 10 ML: 5 INJECTION INTRAVENOUS at 16:46

## 2020-07-26 RX ADMIN — MORPHINE SULFATE 4 MG: 4 INJECTION, SOLUTION INTRAMUSCULAR; INTRAVENOUS at 21:28

## 2020-07-26 RX ADMIN — POLYETHYLENE GLYCOL 3350 17 G: 17 POWDER, FOR SOLUTION ORAL at 09:17

## 2020-07-26 RX ADMIN — LAMOTRIGINE 25 MG: 25 TABLET ORAL at 09:17

## 2020-07-26 RX ADMIN — GABAPENTIN 300 MG: 300 CAPSULE ORAL at 20:08

## 2020-07-26 RX ADMIN — HYDROCODONE BITARTRATE AND ACETAMINOPHEN 2 TABLET: 5; 325 TABLET ORAL at 13:59

## 2020-07-26 RX ADMIN — SODIUM CHLORIDE, PRESERVATIVE FREE 100 UNITS: 5 INJECTION INTRAVENOUS at 09:16

## 2020-07-26 RX ADMIN — MORPHINE SULFATE 4 MG: 4 INJECTION, SOLUTION INTRAMUSCULAR; INTRAVENOUS at 10:15

## 2020-07-26 RX ADMIN — DOCUSATE SODIUM 50 MG AND SENNOSIDES 8.6 MG 2 TABLET: 8.6; 5 TABLET, FILM COATED ORAL at 20:08

## 2020-07-26 RX ADMIN — GABAPENTIN 300 MG: 300 CAPSULE ORAL at 13:59

## 2020-07-26 RX ADMIN — INSULIN LISPRO 3 UNITS: 100 INJECTION, SOLUTION INTRAVENOUS; SUBCUTANEOUS at 20:09

## 2020-07-26 RX ADMIN — VENLAFAXINE HYDROCHLORIDE 75 MG: 75 CAPSULE, EXTENDED RELEASE ORAL at 09:17

## 2020-07-26 RX ADMIN — INSULIN GLARGINE 35 UNITS: 100 INJECTION, SOLUTION SUBCUTANEOUS at 20:09

## 2020-07-26 RX ADMIN — MORPHINE SULFATE 4 MG: 4 INJECTION, SOLUTION INTRAMUSCULAR; INTRAVENOUS at 06:01

## 2020-07-26 RX ADMIN — INSULIN LISPRO 3 UNITS: 100 INJECTION, SOLUTION INTRAVENOUS; SUBCUTANEOUS at 16:29

## 2020-07-26 RX ADMIN — HYDROMORPHONE HYDROCHLORIDE 1 MG: 1 INJECTION, SOLUTION INTRAMUSCULAR; INTRAVENOUS; SUBCUTANEOUS at 02:15

## 2020-07-26 RX ADMIN — HYDROCODONE BITARTRATE AND ACETAMINOPHEN 2 TABLET: 5; 325 TABLET ORAL at 03:57

## 2020-07-26 RX ADMIN — DOCUSATE SODIUM 50 MG AND SENNOSIDES 8.6 MG 2 TABLET: 8.6; 5 TABLET, FILM COATED ORAL at 09:16

## 2020-07-26 RX ADMIN — SODIUM CHLORIDE, PRESERVATIVE FREE 100 UNITS: 5 INJECTION INTRAVENOUS at 20:09

## 2020-07-26 RX ADMIN — SODIUM CHLORIDE, PRESERVATIVE FREE 10 ML: 5 INJECTION INTRAVENOUS at 20:09

## 2020-07-26 RX ADMIN — GABAPENTIN 300 MG: 300 CAPSULE ORAL at 09:17

## 2020-07-26 RX ADMIN — ACETAMINOPHEN 650 MG: 325 TABLET, FILM COATED ORAL at 11:59

## 2020-07-26 RX ADMIN — MAGNESIUM SULFATE IN WATER 2 G: 40 INJECTION, SOLUTION INTRAVENOUS at 10:16

## 2020-07-26 RX ADMIN — HYDROCODONE BITARTRATE AND ACETAMINOPHEN 2 TABLET: 5; 325 TABLET ORAL at 20:08

## 2020-07-26 RX ADMIN — MORPHINE SULFATE 4 MG: 4 INJECTION, SOLUTION INTRAMUSCULAR; INTRAVENOUS at 00:52

## 2020-07-26 ASSESSMENT — PAIN DESCRIPTION - LOCATION
LOCATION: LEG
LOCATION: LEG

## 2020-07-26 ASSESSMENT — PAIN DESCRIPTION - FREQUENCY
FREQUENCY: CONTINUOUS
FREQUENCY: CONTINUOUS

## 2020-07-26 ASSESSMENT — PAIN DESCRIPTION - ORIENTATION
ORIENTATION: RIGHT;LEFT
ORIENTATION: RIGHT;LEFT

## 2020-07-26 ASSESSMENT — PAIN DESCRIPTION - PROGRESSION
CLINICAL_PROGRESSION: NOT CHANGED

## 2020-07-26 ASSESSMENT — PAIN DESCRIPTION - PAIN TYPE
TYPE: ACUTE PAIN
TYPE: ACUTE PAIN

## 2020-07-26 ASSESSMENT — PAIN SCALES - GENERAL
PAINLEVEL_OUTOF10: 10
PAINLEVEL_OUTOF10: 10
PAINLEVEL_OUTOF10: 9
PAINLEVEL_OUTOF10: 8
PAINLEVEL_OUTOF10: 10

## 2020-07-26 ASSESSMENT — PAIN DESCRIPTION - DESCRIPTORS
DESCRIPTORS: ACHING;CONSTANT;DISCOMFORT
DESCRIPTORS: CONSTANT;THROBBING;SHARP

## 2020-07-26 ASSESSMENT — PAIN DESCRIPTION - ONSET
ONSET: ON-GOING
ONSET: ON-GOING
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
## 2020-07-26 NOTE — PROGRESS NOTES
Department of Orthopedic Surgery  Resident Progress Note    Patient seen and examined, emotional this AM due to pain. Continued pain in bilateral knees, feels roughly the same as before surgery. No new fevers or chills. Denies chest pain, shortness of breath, dizziness/lightheadedness. VITALS:  /71   Pulse 90   Temp 98.7 °F (37.1 °C)   Resp 16   Ht 5' 11\" (1.803 m)   Wt 218 lb 4.1 oz (99 kg)   SpO2 93%   BMI 30.44 kg/m²     General: alert and oriented to person, place and time, well-developed and well-nourished, in no acute distress    MUSCULOSKELETAL:   bilateral lower extremity:  · Dressing C/D/I  · Compartments soft and compressible  · +PF/DF/EHL  · +2/4 DP & PT pulses, Brisk Cap refill, Toes warm and perfused  · Distal sensation grossly intact to Peroneals, Sural, Saphenous, and tibial nrs  · Pain with active and passive ROM to knees. · Some pain to PROM to left hip as well, this is hard to fully discern due to knee pain. When keeping knee stable patient states hip pain on motion is minimal.    CBC:   Lab Results   Component Value Date    WBC 16.8 07/26/2020    HGB 11.4 07/26/2020    HCT 33.9 07/26/2020     07/26/2020     PT/INR:    Lab Results   Component Value Date    PROTIME 13.4 07/22/2020    INR 1.2 07/22/2020       Intraop Cultures: Strep Agalactiae    ASSESSMENT  · S/P Bilateral septic knee washout for septic arthritis - 7/23/20    PLAN      · Continue physical therapy and protocol: WBAT - BLE  · Antibiotics per ID/medicine  · Deep venous thrombosis prophylaxis - ASA if okay with primary team, early mobilization  · Continue to monitor exams. · PT/OT  · Pain Control: IV and PO  · Monitor H&H  · Patient is clinically not showing improvement.   Will plan on probable repeated I and D tomorrow with Dr. Escobar Hernandez  · NPO midnight tonight, hold anticoags  · Discuss with attending    Orthopaedic Attending    I have seen and evaluated the patient with the resident and agree with the above assessments on today's visit. I have performed the key components of the history and physical examination and concur completely with the findings and plans as documented above. Patient with previous bilateral knee arthroscopic I&D for septic arthritis. Intraoperative cultures did demonstrate strep agalactiae. Patient was doing well however the past few days has had significant increase in his pain. Clinically unable to actively ROM or passive ROM knees without severe pain. Patient does feel that he has some pain in the upper inner left thigh however able to passively ROM the left hip and logroll without any significant discomfort. Discussed with patient plans for repeat surgical I&D of the bilateral knees with formal open arthrotomy due to his recurrence. Patient is in agreement with plans. I have explained the risks and complications of the recommended surgery with the patient at length, as well as discussed potential treatment alternatives including nonoperative management. These risks include but are not limited to death or complication from anesthesia, continued pain, nerve tendon or vascular injury, continued infection, stiffness, hemarthrosis, arthrofibrosis, deep vein thrombosis or pulmonary embolism, and need for further surgery, etc.  Patient understood this, asked appropriate questions, which were all answered, and he has elected to proceed with the procedure.     Electronically signed by   Oliver Jarvis DO  7/27/2020

## 2020-07-26 NOTE — PROGRESS NOTES
[facility] Medicine Residency Program  Progress Note - House Team 2    Patient:  Mihaela Mandel 63 y.o. male MRN: [id number]     Date of Service: 7/26/2020     CC: Bilateral knee pain, low back pain, bilateral hip pain, N/T  Overnight events: Severe pain, given Dilaudid 1 mg  Hospital Day: 6    Subjective     Patient had worsening of his bilateral knee pain last night and was given Dilaudid 1 mg IV but states that it did not provide much relief. Reports that he now also has severe pain deep in the left hip that radiates down the thigh. The burning/tingling in the right 5th toe is worse as well. He has been feeling nauseous due to pain but meds have been helping. He has not had a BM still. Denies CP, SOB, abd pain, and vomiting. During rounds later in the morning, he appeared more comfortable and was able to tolerate more range of motion in the left hip. Objective     Physical Exam:  Vitals: BP (!) 140/84   Pulse 94   Temp 98.2 °F (36.8 °C) (Temporal)   Resp 16   Ht 5' 11\" (1.803 m)   Wt 218 lb 4.1 oz (99 kg)   SpO2 93%   BMI 30.44 kg/m²     I & O - 24hr:     Intake/Output Summary (Last 24 hours) at 7/26/2020 1241  Last data filed at 7/26/2020 0450  Gross per 24 hour   Intake 740 ml   Output 3825 ml   Net -3085 ml     · General Appearance: alert, appears stated age, cooperative. Appears uncomfortable, becoming tearful at times. · HEENT:  Head: Normocephalic, no lesions, without obvious abnormality. · Neck: no adenopathy, supple, symmetrical, trachea midline and thyroid not enlarged, symmetric, no tenderness/mass/nodules  · Lung: clear to auscultation bilaterally  · Heart: regular rate and rhythm, S1, S2 normal, no murmur, click, rub or gallop  · Abdomen: Mildly tender to palpation diffusely. · Extremities:  Bilaterally the knees are exquisitely tender to palpation. The left hip region is tender to palpation diffusely, mostly over the anterior aspect.  ACE wraps over both thighs, knees, and legs. Sensation is equal and intact. Decreased range of motion secondary to pain. Able to dorsiflex and plantar flex both feet 3/5. The right fifth toe appears darker in color with a superficial wound but there is good capillary refill and it is not tender to palpation. · Neurologic: Mental status: Alert, oriented, thought content appropriate    Pertinent Labs & Imaging Studies     CBC:   Lab Results   Component Value Date    WBC 16.8 07/26/2020    RBC 3.80 07/26/2020    HGB 11.4 07/26/2020    HCT 33.9 07/26/2020    MCV 89.2 07/26/2020    MCH 30.0 07/26/2020    MCHC 33.6 07/26/2020    RDW 12.9 07/26/2020     07/26/2020    MPV 9.1 07/26/2020     CMP:    Lab Results   Component Value Date     07/26/2020    K 3.9 07/26/2020    K 4.1 07/19/2020    CL 93 07/26/2020    CO2 28 07/26/2020    BUN 19 07/26/2020    CREATININE 0.7 07/26/2020    GFRAA >60 07/26/2020    LABGLOM >60 07/26/2020    GLUCOSE 108 07/26/2020    PROT 7.1 07/19/2020    LABALBU 4.4 07/19/2020    CALCIUM 9.0 07/26/2020    BILITOT 0.5 07/19/2020    ALKPHOS 74 07/19/2020    AST 24 07/19/2020    ALT 22 07/19/2020 7/22/2020 09:22   CRP 41.4 (H)      7/22/2020 09:22   Sed Rate 90 (H)      7/21/2020 01:22   Body Fluid Culture (Knee) Strep agalactiae (Beta Strep Group B) (A)      7/22/2020 09:22   Hemoglobin A1C 10.1 (H)      7/22/2020 17:00   Human Rhinovirus/Enterovirus by PCR DETECTED (A)     Order Date:  7/23/2020 11:15 AM    EXAM: CT LUMBAR SPINE W WO CONTRAST    INDICATION: Severe back pain. Recent septic arthritis. COMPARISON: 20 January 2015    FINDINGS:    No acute fracture or dislocation is seen. There is moderate spinal    stenosis at L3-4 L4-5 relate disc bulging and facet arthropathy. There    are no endplate erosions to suggest infection.         Postcontrast there is no evidence of any abnormal enhancement to    suggest infection or otherwise.     Impression    Moderate spinal canal stenosis related to disc bulging and facet    arthropathy at L3-4 and L4-5. No evidence of spinal infection. Resident's Assessment and Plan     Renuka Epps is a 63 y.o. male with a PMHx of type 2 DM, peripheral neuropathy, OA, lumbar stenosis, and HLD who presented with bilateral lower extremity, hip, and low back pain. He has chronic pain in these areas but it acutely worsened after a fall on 7/18. CT head, CT C-spine, CT A/P were all negative for acute abnormalities. X-rays showed effusion in the right knee and bilateral knee OA. Right knee arthrocentesis on 7/21 with 13,700 WBC, 92% neutrophils, and no crystals. Synovial fluid grew GBS. Arthrocentesis of both knees done on 7/23 with stable findings in right knee and septic range WBC in left knee. Bilateral arthroscopy with washout was done with findings of purulent fluid in both knees. CT L-spine with no evidence of infection. 1. Bilateral knee septic arthritis  Right knee arthrocentesis on 7/21 with 13,700 WBC, 92% neutrophils, and no crystals. Synovial fluid grew GBS. Afebrile, WBC 12.1->16.4->14.5->17.8->20.3->19.5. Elevated CRP (41.4) and ESR (90). Blood cx's no growth 24 hours. S/P bilateral knee arthroscopy, drainage, and debridement on 7/23. Thick purulent fluid was drained from both knees. Fluid cultures from both knees grew GBS. · Continue Ceftriaxone 2 g IV Q24H, anticipate 4 weeks of treatment via midine  · Repeat bilateral knee I&D tomorrow if patient does not improve or worsens   Ortho and ID following, appreciate recs   Follow blood cx's and fluid cx's   Continue Norco 5-325 x2 Q6H PRN and Morphine 4 mg Q4H PRN    2. Left hip pain  May be 2/2 septic arthritis vs referred pain from knee. Hip XR revealed mild OA in both hip joints with no acute fracture or dislocation. Per ortho, physical exam is not consistent with septic arthritis. · Possible intervention in the OR tomorrow when knee I&D is done    3.  Bilateral lower extremity and lumbar pain  2/2

## 2020-07-26 NOTE — PLAN OF CARE
Problem: Pain:  Goal: Pain level will decrease  Description: Pain level will decrease  7/26/2020 1044 by Phil Horowitz RN  Outcome: Met This Shift     Problem: Pain:  Goal: Control of acute pain  Description: Control of acute pain  Outcome: Met This Shift     Problem: Falls - Risk of:  Goal: Will remain free from falls  Description: Will remain free from falls  7/26/2020 1044 by Phil Horwoitz RN  Outcome: Met This Shift     Problem: Falls - Risk of:  Goal: Absence of physical injury  Description: Absence of physical injury  Outcome: Met This Shift

## 2020-07-26 NOTE — CONSULTS
Department of Podiatry   Consult Note        Reason for Consult:  Right 5th toe wound    CHIEF COMPLAINT: painful right 5th toe     HISTORY OF PRESENT ILLNESS:                The patient is a 63 y.o. male with significant past medical history of diabetes mellitus and presents to the hospital with bilateral knee septic arthritis from strep agalactiae. Patient relates he's been the hospital for about a week for his knees and relates he's in a lot of pain. He states he noticed his right pinky toe starting to turn purple and become painful a couple of days ago. Patient states the appearance of the toe is worsening. He relates that it is painful to the touch. Patient currently denies any N/V/D/F/C/SOB/CP and has no other pedal complaints at this time. Past Medical History:        Diagnosis Date    Chronic pain     Depression     HLD (hyperlipidemia)     Neuropathy     LIDYA on CPAP     Osteoarthritis     Type II or unspecified type diabetes mellitus without mention of complication, not stated as uncontrolled    ·     Past Surgical History:        Procedure Laterality Date    ARM SURGERY      left arm    KNEE ARTHROSCOPY Bilateral 7/23/2020    KNEE ARTHROSCOPY performed by Yuli Baer MD at [facility] [address] COLONOSCOPY W/BIOPSY SINGLE/MULTIPLE N/A 9/20/2018    COLONOSCOPY WITH BIOPSY performed by Magan Samayoa MD at [address]  FLX W/RMVL OF TUMOR POLYP LESION SNARE TQ  9/20/2018    COLONOSCOPY POLYPECTOMY SNARE/COLD BIOPSY performed by Magan Samayoa MD at [facility]     ·     Medications Prior to Admission:    · Medications Prior to Admission: ondansetron (ZOFRAN ODT) 4 MG disintegrating tablet, Take 1 tablet by mouth every 8 hours as needed for Nausea or Vomiting  · naproxen (NAPROSYN) 500 MG tablet, Take 1 tablet by mouth 2 times daily for 7 days  · triamcinolone (KENALOG) 0.1 % cream, Apply topically 2 times daily.   · doxepin (SINEQUAN) 10 MG capsule, take 1 capsule by mouth nightly  · venlafaxine (EFFEXOR) 37.5 MG tablet, Take 37.5 mg by mouth daily  · pitavastatin (LIVALO) 4 MG TABS tablet, Take by mouth nightly  · lamoTRIgine (LAMICTAL) 25 MG tablet, Take 25 mg by mouth daily  · omeprazole (PRILOSEC) 40 MG delayed release capsule, Take 40 mg by mouth daily  · insulin aspart (NOVOLOG) 100 UNIT/ML injection pen, Take 12 units before meals  + sliding scale. MAX 70 units daily  · insulin glargine (LANTUS SOLOSTAR) 100 UNIT/ML injection pen, Indications: Insulin-Resistant Diabetes Take 45 units daily at bedtime  · Insulin Pen Needle 31G X 5 MM MISC, Indications: Insulin-Resistant Diabetes, Type 2 Diabetes Four times a day for insulin Pen  · blood glucose monitor strips, Freestyle Lite Strips. Checks 4 times/day before meals and at bedtime and as needed for symptoms of irregular blood glucose. · FREESTYLE LANCETS MISC, Test 4 times a day before meals and at bedtime and as needed for symptoms of irregular blood glucose. · docusate sodium (COLACE) 100 MG capsule, Take 1 capsule by mouth 2 times daily  · Multiple Vitamin (MULTI VITAMIN MENS PO), Take by mouth daily     Allergies:  Adhesive tape; Tramadol; Cymbalta [duloxetine hcl]; Lidocaine; Lyrica [pregabalin]; and Strattera [atomoxetine]    Social History:   · TOBACCO:   reports that he has been smoking. He has a 30.00 pack-year smoking history. He has never used smokeless tobacco.  · ETOH:   reports no history of alcohol use. DRUGS:   Social History     Substance and Sexual Activity   Drug Use No    Types: Marijuana   ·     Family History:       Problem Relation Age of Onset    COPD Father    David Filter Stroke Father     Diabetes Paternal Grandfather     Heart Disease Brother    ·       REVIEW OF SYSTEMS:    All pertinent positives and negatives as stated in the HPI       LOWER EXTREMITY EXAMINATION     VASCULAR:  DP and PT pulses are palpable on left foot.  PT pulse palpable, but DP pulse not palpable on right foot. CFT < 5 seconds B/L, except sluggish to right 5th digit. Mild increase in warmth noted to right foot in comparison to left foot. Edema noted b/l. NEUROLOGIC:  Protective sensation is diminished but grossly intact    DERM:  Right 5th digit appears dusky in nature. Unstageable eschar lesion noted to dorsal aspect of right 5th digit. Hyperkeratotic lesion with underlying ulcer noted to lateral aspect of right 5th digit. Does not probe to bone. No drainage noted. No malodor noted. Wound measures approximately 0.4x0.5cm. MUSCULOSKELETAL: Pain on palpation of right 5th digit. Muscle strength testing deferred due to pain. CONSULTS:  IP CONSULT TO ORTHOPEDIC SURGERY  IP CONSULT TO INFECTIOUS DISEASES  IP CONSULT TO INFECTIOUS DISEASES  IP CONSULT TO SOCIAL WORK  IP CONSULT TO CASE MANAGEMENT  IP CONSULT TO PODIATRY  IP CONSULT TO RESPIRATORY CARE    MEDICATION:  Scheduled Meds:   gabapentin  300 mg Oral TID    sennosides-docusate sodium  2 tablet Oral BID    polyethylene glycol  17 g Oral Daily    insulin glargine  35 Units Subcutaneous Nightly    heparin flush  1 mL Intravenous 2 times per day    sodium chloride flush  10 mL Intravenous 2 times per day    aspirin  325 mg Oral BID    lamoTRIgine  25 mg Oral Daily    pantoprazole  40 mg Oral QAM AC    atorvastatin  20 mg Oral Daily    venlafaxine  75 mg Oral Daily    enoxaparin  40 mg Subcutaneous Daily    insulin lispro  0-18 Units Subcutaneous TID WC    insulin lispro  0-9 Units Subcutaneous Nightly    cefTRIAXone (ROCEPHIN) IV  2 g Intravenous Q24H     Continuous Infusions:   dextrose       PRN Meds:.sodium chloride flush, heparin flush, sodium chloride flush, HYDROcodone 5 mg - acetaminophen, sodium chloride flush, acetaminophen **OR** acetaminophen, promethazine **OR** ondansetron, glucose, dextrose, glucagon (rDNA), dextrose, morphine    RADIOLOGY:  XR HIP BILATERAL W AP PELVIS (2 VIEWS)   Final Result       .  NO

## 2020-07-27 ENCOUNTER — ANESTHESIA (OUTPATIENT)
Dept: OPERATING ROOM | Age: 55
DRG: 710 | End: 2020-07-27
Payer: COMMERCIAL

## 2020-07-27 ENCOUNTER — ANESTHESIA EVENT (OUTPATIENT)
Dept: OPERATING ROOM | Age: 55
DRG: 710 | End: 2020-07-27
Payer: COMMERCIAL

## 2020-07-27 VITALS — TEMPERATURE: 99 F | DIASTOLIC BLOOD PRESSURE: 80 MMHG | SYSTOLIC BLOOD PRESSURE: 111 MMHG | OXYGEN SATURATION: 97 %

## 2020-07-27 LAB
ANION GAP SERPL CALCULATED.3IONS-SCNC: 13 MMOL/L (ref 7–16)
BASOPHILS ABSOLUTE: 0.13 E9/L (ref 0–0.2)
BASOPHILS RELATIVE PERCENT: 0.9 % (ref 0–2)
BLOOD CULTURE, ROUTINE: NORMAL
BUN BLDV-MCNC: 19 MG/DL (ref 6–20)
BURR CELLS: ABNORMAL
CALCIUM SERPL-MCNC: 9.3 MG/DL (ref 8.6–10.2)
CHLORIDE BLD-SCNC: 97 MMOL/L (ref 98–107)
CO2: 28 MMOL/L (ref 22–29)
CREAT SERPL-MCNC: 0.8 MG/DL (ref 0.7–1.2)
CULTURE, BLOOD 2: NORMAL
EOSINOPHILS ABSOLUTE: 0.13 E9/L (ref 0.05–0.5)
EOSINOPHILS RELATIVE PERCENT: 0.9 % (ref 0–6)
GFR AFRICAN AMERICAN: >60
GFR NON-AFRICAN AMERICAN: >60 ML/MIN/1.73
GLUCOSE BLD-MCNC: 109 MG/DL (ref 74–99)
HCT VFR BLD CALC: 34.1 % (ref 37–54)
HEMOGLOBIN: 11.4 G/DL (ref 12.5–16.5)
LYMPHOCYTES ABSOLUTE: 2.09 E9/L (ref 1.5–4)
LYMPHOCYTES RELATIVE PERCENT: 14 % (ref 20–42)
MAGNESIUM: 2.1 MG/DL (ref 1.6–2.6)
MCH RBC QN AUTO: 30 PG (ref 26–35)
MCHC RBC AUTO-ENTMCNC: 33.4 % (ref 32–34.5)
MCV RBC AUTO: 89.7 FL (ref 80–99.9)
METAMYELOCYTES RELATIVE PERCENT: 0.9 % (ref 0–1)
METER GLUCOSE: 113 MG/DL (ref 74–99)
METER GLUCOSE: 123 MG/DL (ref 74–99)
METER GLUCOSE: 131 MG/DL (ref 74–99)
METER GLUCOSE: 150 MG/DL (ref 74–99)
MONOCYTES ABSOLUTE: 1.19 E9/L (ref 0.1–0.95)
MONOCYTES RELATIVE PERCENT: 7.9 % (ref 2–12)
MYELOCYTE PERCENT: 0.9 % (ref 0–0)
NEUTROPHILS ABSOLUTE: 11.32 E9/L (ref 1.8–7.3)
NEUTROPHILS RELATIVE PERCENT: 74.6 % (ref 43–80)
PDW BLD-RTO: 12.9 FL (ref 11.5–15)
PLATELET # BLD: 492 E9/L (ref 130–450)
PMV BLD AUTO: 8.8 FL (ref 7–12)
POIKILOCYTES: ABNORMAL
POLYCHROMASIA: ABNORMAL
POTASSIUM SERPL-SCNC: 4.4 MMOL/L (ref 3.5–5)
RBC # BLD: 3.8 E12/L (ref 3.8–5.8)
SARS-COV-2: NOT DETECTED
SODIUM BLD-SCNC: 138 MMOL/L (ref 132–146)
SOURCE: NORMAL
WBC # BLD: 14.9 E9/L (ref 4.5–11.5)

## 2020-07-27 PROCEDURE — 87015 SPECIMEN INFECT AGNT CONCNTJ: CPT

## 2020-07-27 PROCEDURE — 2580000003 HC RX 258: Performed by: STUDENT IN AN ORGANIZED HEALTH CARE EDUCATION/TRAINING PROGRAM

## 2020-07-27 PROCEDURE — 0SCD0ZZ EXTIRPATION OF MATTER FROM LEFT KNEE JOINT, OPEN APPROACH: ICD-10-PCS | Performed by: ORTHOPAEDIC SURGERY

## 2020-07-27 PROCEDURE — 3700000001 HC ADD 15 MINUTES (ANESTHESIA): Performed by: ORTHOPAEDIC SURGERY

## 2020-07-27 PROCEDURE — 6360000002 HC RX W HCPCS

## 2020-07-27 PROCEDURE — 3600000002 HC SURGERY LEVEL 2 BASE: Performed by: ORTHOPAEDIC SURGERY

## 2020-07-27 PROCEDURE — 2580000003 HC RX 258

## 2020-07-27 PROCEDURE — 3700000000 HC ANESTHESIA ATTENDED CARE: Performed by: ORTHOPAEDIC SURGERY

## 2020-07-27 PROCEDURE — 0SCC0ZZ EXTIRPATION OF MATTER FROM RIGHT KNEE JOINT, OPEN APPROACH: ICD-10-PCS | Performed by: ORTHOPAEDIC SURGERY

## 2020-07-27 PROCEDURE — 6360000002 HC RX W HCPCS: Performed by: INTERNAL MEDICINE

## 2020-07-27 PROCEDURE — 6370000000 HC RX 637 (ALT 250 FOR IP): Performed by: INTERNAL MEDICINE

## 2020-07-27 PROCEDURE — 83735 ASSAY OF MAGNESIUM: CPT

## 2020-07-27 PROCEDURE — 99232 SBSQ HOSP IP/OBS MODERATE 35: CPT | Performed by: INTERNAL MEDICINE

## 2020-07-27 PROCEDURE — 1200000000 HC SEMI PRIVATE

## 2020-07-27 PROCEDURE — 7100000001 HC PACU RECOVERY - ADDTL 15 MIN: Performed by: ORTHOPAEDIC SURGERY

## 2020-07-27 PROCEDURE — 2709999900 HC NON-CHARGEABLE SUPPLY: Performed by: ORTHOPAEDIC SURGERY

## 2020-07-27 PROCEDURE — 87205 SMEAR GRAM STAIN: CPT

## 2020-07-27 PROCEDURE — 6370000000 HC RX 637 (ALT 250 FOR IP): Performed by: STUDENT IN AN ORGANIZED HEALTH CARE EDUCATION/TRAINING PROGRAM

## 2020-07-27 PROCEDURE — 3E1U48Z IRRIGATION OF JOINTS USING IRRIGATING SUBSTANCE, PERCUTANEOUS ENDOSCOPIC APPROACH: ICD-10-PCS | Performed by: STUDENT IN AN ORGANIZED HEALTH CARE EDUCATION/TRAINING PROGRAM

## 2020-07-27 PROCEDURE — 2500000003 HC RX 250 WO HCPCS

## 2020-07-27 PROCEDURE — 27310 EXPLORATION OF KNEE JOINT: CPT | Performed by: ORTHOPAEDIC SURGERY

## 2020-07-27 PROCEDURE — 87070 CULTURE OTHR SPECIMN AEROBIC: CPT

## 2020-07-27 PROCEDURE — 36415 COLL VENOUS BLD VENIPUNCTURE: CPT

## 2020-07-27 PROCEDURE — 6360000002 HC RX W HCPCS: Performed by: STUDENT IN AN ORGANIZED HEALTH CARE EDUCATION/TRAINING PROGRAM

## 2020-07-27 PROCEDURE — 6360000002 HC RX W HCPCS: Performed by: ANESTHESIOLOGY

## 2020-07-27 PROCEDURE — 6370000000 HC RX 637 (ALT 250 FOR IP)

## 2020-07-27 PROCEDURE — 82962 GLUCOSE BLOOD TEST: CPT

## 2020-07-27 PROCEDURE — 80048 BASIC METABOLIC PNL TOTAL CA: CPT

## 2020-07-27 PROCEDURE — 87116 MYCOBACTERIA CULTURE: CPT

## 2020-07-27 PROCEDURE — 3600000012 HC SURGERY LEVEL 2 ADDTL 15MIN: Performed by: ORTHOPAEDIC SURGERY

## 2020-07-27 PROCEDURE — 7100000000 HC PACU RECOVERY - FIRST 15 MIN: Performed by: ORTHOPAEDIC SURGERY

## 2020-07-27 PROCEDURE — 87075 CULTR BACTERIA EXCEPT BLOOD: CPT

## 2020-07-27 PROCEDURE — 87206 SMEAR FLUORESCENT/ACID STAI: CPT

## 2020-07-27 PROCEDURE — 87102 FUNGUS ISOLATION CULTURE: CPT

## 2020-07-27 PROCEDURE — 2580000003 HC RX 258: Performed by: INTERNAL MEDICINE

## 2020-07-27 PROCEDURE — 85025 COMPLETE CBC W/AUTO DIFF WBC: CPT

## 2020-07-27 RX ORDER — GLYCOPYRROLATE 1 MG/5 ML
SYRINGE (ML) INTRAVENOUS PRN
Status: DISCONTINUED | OUTPATIENT
Start: 2020-07-27 | End: 2020-07-27 | Stop reason: SDUPTHER

## 2020-07-27 RX ORDER — HYDROMORPHONE HCL 110MG/55ML
PATIENT CONTROLLED ANALGESIA SYRINGE INTRAVENOUS PRN
Status: DISCONTINUED | OUTPATIENT
Start: 2020-07-27 | End: 2020-07-27 | Stop reason: SDUPTHER

## 2020-07-27 RX ORDER — MORPHINE SULFATE 2 MG/ML
1 INJECTION, SOLUTION INTRAMUSCULAR; INTRAVENOUS EVERY 5 MIN PRN
Status: DISCONTINUED | OUTPATIENT
Start: 2020-07-27 | End: 2020-07-27 | Stop reason: HOSPADM

## 2020-07-27 RX ORDER — MEPERIDINE HYDROCHLORIDE 25 MG/ML
12.5 INJECTION INTRAMUSCULAR; INTRAVENOUS; SUBCUTANEOUS EVERY 5 MIN PRN
Status: DISCONTINUED | OUTPATIENT
Start: 2020-07-27 | End: 2020-07-27 | Stop reason: HOSPADM

## 2020-07-27 RX ORDER — SODIUM CHLORIDE 0.9 % (FLUSH) 0.9 %
10 SYRINGE (ML) INJECTION EVERY 12 HOURS SCHEDULED
Status: DISCONTINUED | OUTPATIENT
Start: 2020-07-27 | End: 2020-07-31 | Stop reason: HOSPADM

## 2020-07-27 RX ORDER — ONDANSETRON 2 MG/ML
4 INJECTION INTRAMUSCULAR; INTRAVENOUS EVERY 6 HOURS PRN
Status: DISCONTINUED | OUTPATIENT
Start: 2020-07-27 | End: 2020-07-31 | Stop reason: HOSPADM

## 2020-07-27 RX ORDER — ROCURONIUM BROMIDE 10 MG/ML
INJECTION, SOLUTION INTRAVENOUS PRN
Status: DISCONTINUED | OUTPATIENT
Start: 2020-07-27 | End: 2020-07-27 | Stop reason: SDUPTHER

## 2020-07-27 RX ORDER — HYDROCODONE BITARTRATE AND ACETAMINOPHEN 5; 325 MG/1; MG/1
1 TABLET ORAL PRN
Status: DISCONTINUED | OUTPATIENT
Start: 2020-07-27 | End: 2020-07-27 | Stop reason: HOSPADM

## 2020-07-27 RX ORDER — DEXAMETHASONE SODIUM PHOSPHATE 10 MG/ML
INJECTION, SOLUTION INTRAMUSCULAR; INTRAVENOUS PRN
Status: DISCONTINUED | OUTPATIENT
Start: 2020-07-27 | End: 2020-07-27 | Stop reason: SDUPTHER

## 2020-07-27 RX ORDER — MORPHINE SULFATE 4 MG/ML
4 INJECTION, SOLUTION INTRAMUSCULAR; INTRAVENOUS
Status: DISCONTINUED | OUTPATIENT
Start: 2020-07-27 | End: 2020-07-30

## 2020-07-27 RX ORDER — OXYCODONE HYDROCHLORIDE 5 MG/1
5 TABLET ORAL EVERY 4 HOURS PRN
Status: DISCONTINUED | OUTPATIENT
Start: 2020-07-27 | End: 2020-07-30

## 2020-07-27 RX ORDER — PROMETHAZINE HYDROCHLORIDE 25 MG/ML
6.25 INJECTION, SOLUTION INTRAMUSCULAR; INTRAVENOUS EVERY 10 MIN PRN
Status: DISCONTINUED | OUTPATIENT
Start: 2020-07-27 | End: 2020-07-27 | Stop reason: HOSPADM

## 2020-07-27 RX ORDER — SODIUM CHLORIDE 0.9 % (FLUSH) 0.9 %
10 SYRINGE (ML) INJECTION PRN
Status: DISCONTINUED | OUTPATIENT
Start: 2020-07-27 | End: 2020-07-31 | Stop reason: HOSPADM

## 2020-07-27 RX ORDER — CEFAZOLIN SODIUM 1 G/3ML
INJECTION, POWDER, FOR SOLUTION INTRAMUSCULAR; INTRAVENOUS PRN
Status: DISCONTINUED | OUTPATIENT
Start: 2020-07-27 | End: 2020-07-27 | Stop reason: SDUPTHER

## 2020-07-27 RX ORDER — OXYCODONE HYDROCHLORIDE 10 MG/1
10 TABLET ORAL EVERY 4 HOURS PRN
Status: DISCONTINUED | OUTPATIENT
Start: 2020-07-27 | End: 2020-07-30

## 2020-07-27 RX ORDER — MORPHINE SULFATE 2 MG/ML
2 INJECTION, SOLUTION INTRAMUSCULAR; INTRAVENOUS
Status: DISCONTINUED | OUTPATIENT
Start: 2020-07-27 | End: 2020-07-30

## 2020-07-27 RX ORDER — ONDANSETRON 2 MG/ML
INJECTION INTRAMUSCULAR; INTRAVENOUS PRN
Status: DISCONTINUED | OUTPATIENT
Start: 2020-07-27 | End: 2020-07-27 | Stop reason: SDUPTHER

## 2020-07-27 RX ORDER — MIDAZOLAM HYDROCHLORIDE 1 MG/ML
INJECTION INTRAMUSCULAR; INTRAVENOUS PRN
Status: DISCONTINUED | OUTPATIENT
Start: 2020-07-27 | End: 2020-07-27 | Stop reason: SDUPTHER

## 2020-07-27 RX ORDER — ACETAMINOPHEN 325 MG/1
650 TABLET ORAL EVERY 6 HOURS
Status: DISCONTINUED | OUTPATIENT
Start: 2020-07-27 | End: 2020-07-31 | Stop reason: HOSPADM

## 2020-07-27 RX ORDER — SODIUM CHLORIDE 9 MG/ML
INJECTION, SOLUTION INTRAVENOUS CONTINUOUS PRN
Status: DISCONTINUED | OUTPATIENT
Start: 2020-07-27 | End: 2020-07-27 | Stop reason: SDUPTHER

## 2020-07-27 RX ORDER — MORPHINE SULFATE 2 MG/ML
2 INJECTION, SOLUTION INTRAMUSCULAR; INTRAVENOUS EVERY 5 MIN PRN
Status: DISCONTINUED | OUTPATIENT
Start: 2020-07-27 | End: 2020-07-27 | Stop reason: HOSPADM

## 2020-07-27 RX ORDER — LIDOCAINE HYDROCHLORIDE 20 MG/ML
INJECTION, SOLUTION INTRAVENOUS PRN
Status: DISCONTINUED | OUTPATIENT
Start: 2020-07-27 | End: 2020-07-27 | Stop reason: SDUPTHER

## 2020-07-27 RX ORDER — HYDROCODONE BITARTRATE AND ACETAMINOPHEN 5; 325 MG/1; MG/1
2 TABLET ORAL PRN
Status: DISCONTINUED | OUTPATIENT
Start: 2020-07-27 | End: 2020-07-27 | Stop reason: HOSPADM

## 2020-07-27 RX ORDER — ACETAMINOPHEN 325 MG/1
TABLET ORAL
Status: COMPLETED
Start: 2020-07-27 | End: 2020-07-27

## 2020-07-27 RX ORDER — NEOSTIGMINE METHYLSULFATE 1 MG/ML
INJECTION, SOLUTION INTRAVENOUS PRN
Status: DISCONTINUED | OUTPATIENT
Start: 2020-07-27 | End: 2020-07-27 | Stop reason: SDUPTHER

## 2020-07-27 RX ORDER — FENTANYL CITRATE 50 UG/ML
INJECTION, SOLUTION INTRAMUSCULAR; INTRAVENOUS PRN
Status: DISCONTINUED | OUTPATIENT
Start: 2020-07-27 | End: 2020-07-27 | Stop reason: SDUPTHER

## 2020-07-27 RX ORDER — PROMETHAZINE HYDROCHLORIDE 25 MG/1
12.5 TABLET ORAL EVERY 6 HOURS PRN
Status: DISCONTINUED | OUTPATIENT
Start: 2020-07-27 | End: 2020-07-31 | Stop reason: HOSPADM

## 2020-07-27 RX ADMIN — MIDAZOLAM 2 MG: 1 INJECTION INTRAMUSCULAR; INTRAVENOUS at 18:03

## 2020-07-27 RX ADMIN — HYDROCODONE BITARTRATE AND ACETAMINOPHEN 2 TABLET: 5; 325 TABLET ORAL at 02:42

## 2020-07-27 RX ADMIN — Medication 4 MG: at 22:01

## 2020-07-27 RX ADMIN — VENLAFAXINE HYDROCHLORIDE 75 MG: 75 CAPSULE, EXTENDED RELEASE ORAL at 09:05

## 2020-07-27 RX ADMIN — MORPHINE SULFATE 4 MG: 4 INJECTION, SOLUTION INTRAMUSCULAR; INTRAVENOUS at 01:29

## 2020-07-27 RX ADMIN — SODIUM CHLORIDE: 9 INJECTION, SOLUTION INTRAVENOUS at 18:03

## 2020-07-27 RX ADMIN — LAMOTRIGINE 25 MG: 25 TABLET ORAL at 09:05

## 2020-07-27 RX ADMIN — SODIUM CHLORIDE, PRESERVATIVE FREE 10 ML: 5 INJECTION INTRAVENOUS at 12:59

## 2020-07-27 RX ADMIN — HYDROCODONE BITARTRATE AND ACETAMINOPHEN 2 TABLET: 5; 325 TABLET ORAL at 09:04

## 2020-07-27 RX ADMIN — FENTANYL CITRATE 100 MCG: 50 INJECTION, SOLUTION INTRAMUSCULAR; INTRAVENOUS at 18:05

## 2020-07-27 RX ADMIN — ONDANSETRON HYDROCHLORIDE 4 MG: 2 INJECTION, SOLUTION INTRAMUSCULAR; INTRAVENOUS at 18:05

## 2020-07-27 RX ADMIN — MORPHINE SULFATE 4 MG: 4 INJECTION, SOLUTION INTRAMUSCULAR; INTRAVENOUS at 05:44

## 2020-07-27 RX ADMIN — MORPHINE SULFATE 4 MG: 4 INJECTION, SOLUTION INTRAMUSCULAR; INTRAVENOUS at 17:30

## 2020-07-27 RX ADMIN — FENTANYL CITRATE 50 MCG: 50 INJECTION, SOLUTION INTRAMUSCULAR; INTRAVENOUS at 18:47

## 2020-07-27 RX ADMIN — CEFAZOLIN 2000 MG: 1 INJECTION, POWDER, FOR SOLUTION INTRAMUSCULAR; INTRAVENOUS at 18:47

## 2020-07-27 RX ADMIN — ACETAMINOPHEN 650 MG: 325 TABLET, FILM COATED ORAL at 22:00

## 2020-07-27 RX ADMIN — DOCUSATE SODIUM 50 MG AND SENNOSIDES 8.6 MG 2 TABLET: 8.6; 5 TABLET, FILM COATED ORAL at 21:44

## 2020-07-27 RX ADMIN — SODIUM CHLORIDE: 9 INJECTION, SOLUTION INTRAVENOUS at 18:49

## 2020-07-27 RX ADMIN — HYDROMORPHONE HYDROCHLORIDE 2 MG: 2 INJECTION, SOLUTION INTRAMUSCULAR; INTRAVENOUS; SUBCUTANEOUS at 19:13

## 2020-07-27 RX ADMIN — MORPHINE SULFATE 2 MG: 2 INJECTION, SOLUTION INTRAMUSCULAR; INTRAVENOUS at 19:57

## 2020-07-27 RX ADMIN — Medication 3 MG: at 18:48

## 2020-07-27 RX ADMIN — SODIUM CHLORIDE, PRESERVATIVE FREE 10 ML: 5 INJECTION INTRAVENOUS at 21:44

## 2020-07-27 RX ADMIN — DOCUSATE SODIUM 50 MG AND SENNOSIDES 8.6 MG 2 TABLET: 8.6; 5 TABLET, FILM COATED ORAL at 09:05

## 2020-07-27 RX ADMIN — GABAPENTIN 300 MG: 300 CAPSULE ORAL at 09:05

## 2020-07-27 RX ADMIN — ATORVASTATIN CALCIUM 20 MG: 20 TABLET, FILM COATED ORAL at 09:05

## 2020-07-27 RX ADMIN — SODIUM CHLORIDE, PRESERVATIVE FREE 100 UNITS: 5 INJECTION INTRAVENOUS at 12:59

## 2020-07-27 RX ADMIN — FENTANYL CITRATE 50 MCG: 50 INJECTION, SOLUTION INTRAMUSCULAR; INTRAVENOUS at 18:31

## 2020-07-27 RX ADMIN — CEFTRIAXONE 2 G: 2 INJECTION, POWDER, FOR SOLUTION INTRAMUSCULAR; INTRAVENOUS at 17:40

## 2020-07-27 RX ADMIN — FENTANYL CITRATE 50 MCG: 50 INJECTION, SOLUTION INTRAMUSCULAR; INTRAVENOUS at 18:40

## 2020-07-27 RX ADMIN — SODIUM CHLORIDE, PRESERVATIVE FREE 10 ML: 5 INJECTION INTRAVENOUS at 09:07

## 2020-07-27 RX ADMIN — MORPHINE SULFATE 4 MG: 4 INJECTION, SOLUTION INTRAMUSCULAR; INTRAVENOUS at 12:59

## 2020-07-27 RX ADMIN — DEXAMETHASONE SODIUM PHOSPHATE 10 MG: 10 INJECTION, SOLUTION INTRAMUSCULAR; INTRAVENOUS at 18:05

## 2020-07-27 RX ADMIN — LIDOCAINE HYDROCHLORIDE 80 MG: 20 INJECTION, SOLUTION INTRAVENOUS at 18:05

## 2020-07-27 RX ADMIN — FENTANYL CITRATE 100 MCG: 50 INJECTION, SOLUTION INTRAMUSCULAR; INTRAVENOUS at 18:10

## 2020-07-27 RX ADMIN — Medication 0.4 MG: at 18:48

## 2020-07-27 RX ADMIN — FENTANYL CITRATE 100 MCG: 50 INJECTION, SOLUTION INTRAMUSCULAR; INTRAVENOUS at 18:51

## 2020-07-27 RX ADMIN — FENTANYL CITRATE 50 MCG: 50 INJECTION, SOLUTION INTRAMUSCULAR; INTRAVENOUS at 18:17

## 2020-07-27 RX ADMIN — ROCURONIUM BROMIDE 30 MG: 10 INJECTION, SOLUTION INTRAVENOUS at 18:05

## 2020-07-27 RX ADMIN — GABAPENTIN 300 MG: 300 CAPSULE ORAL at 21:44

## 2020-07-27 RX ADMIN — INSULIN GLARGINE 35 UNITS: 100 INJECTION, SOLUTION SUBCUTANEOUS at 21:45

## 2020-07-27 ASSESSMENT — LIFESTYLE VARIABLES: SMOKING_STATUS: 1

## 2020-07-27 ASSESSMENT — PULMONARY FUNCTION TESTS
PIF_VALUE: 2
PIF_VALUE: 4
PIF_VALUE: 4
PIF_VALUE: 28
PIF_VALUE: 17
PIF_VALUE: 17
PIF_VALUE: 3
PIF_VALUE: 3
PIF_VALUE: 2
PIF_VALUE: 3
PIF_VALUE: 3
PIF_VALUE: 17
PIF_VALUE: 3
PIF_VALUE: 22
PIF_VALUE: 2
PIF_VALUE: 19
PIF_VALUE: 22
PIF_VALUE: 3
PIF_VALUE: 2
PIF_VALUE: 5
PIF_VALUE: 4
PIF_VALUE: 0
PIF_VALUE: 3
PIF_VALUE: 3
PIF_VALUE: 2
PIF_VALUE: 28
PIF_VALUE: 16
PIF_VALUE: 0
PIF_VALUE: 17
PIF_VALUE: 4
PIF_VALUE: 18
PIF_VALUE: 3
PIF_VALUE: 18
PIF_VALUE: 9
PIF_VALUE: 4
PIF_VALUE: 11
PIF_VALUE: 4
PIF_VALUE: 3
PIF_VALUE: 4
PIF_VALUE: 3
PIF_VALUE: 19
PIF_VALUE: 3
PIF_VALUE: 5
PIF_VALUE: 3
PIF_VALUE: 17
PIF_VALUE: 20
PIF_VALUE: 4
PIF_VALUE: 4
PIF_VALUE: 3
PIF_VALUE: 3
PIF_VALUE: 7
PIF_VALUE: 21
PIF_VALUE: 40
PIF_VALUE: 2
PIF_VALUE: 3
PIF_VALUE: 4
PIF_VALUE: 19
PIF_VALUE: 3
PIF_VALUE: 1
PIF_VALUE: 3
PIF_VALUE: 3
PIF_VALUE: 4
PIF_VALUE: 4
PIF_VALUE: 3
PIF_VALUE: 1

## 2020-07-27 ASSESSMENT — PAIN DESCRIPTION - LOCATION
LOCATION: LEG

## 2020-07-27 ASSESSMENT — PAIN DESCRIPTION - DESCRIPTORS
DESCRIPTORS: ACHING;CONSTANT
DESCRIPTORS: ACHING;CONSTANT
DESCRIPTORS: ACHING;DISCOMFORT
DESCRIPTORS: ACHING;CONSTANT
DESCRIPTORS: ACHING;DISCOMFORT
DESCRIPTORS: ACHING;DISCOMFORT
DESCRIPTORS: ACHING;CONSTANT
DESCRIPTORS: ACHING;DISCOMFORT
DESCRIPTORS: ACHING;CONSTANT
DESCRIPTORS: ACHING;CONSTANT

## 2020-07-27 ASSESSMENT — PAIN SCALES - GENERAL
PAINLEVEL_OUTOF10: 10
PAINLEVEL_OUTOF10: 6
PAINLEVEL_OUTOF10: 10
PAINLEVEL_OUTOF10: 5
PAINLEVEL_OUTOF10: 9
PAINLEVEL_OUTOF10: 7
PAINLEVEL_OUTOF10: 8
PAINLEVEL_OUTOF10: 10
PAINLEVEL_OUTOF10: 8
PAINLEVEL_OUTOF10: 10

## 2020-07-27 ASSESSMENT — PAIN DESCRIPTION - PAIN TYPE
TYPE: ACUTE PAIN;SURGICAL PAIN
TYPE: SURGICAL PAIN
TYPE: SURGICAL PAIN
TYPE: ACUTE PAIN;SURGICAL PAIN
TYPE: SURGICAL PAIN
TYPE: SURGICAL PAIN
TYPE: ACUTE PAIN;SURGICAL PAIN
TYPE: SURGICAL PAIN
TYPE: ACUTE PAIN;SURGICAL PAIN

## 2020-07-27 ASSESSMENT — PAIN DESCRIPTION - ORIENTATION
ORIENTATION: RIGHT;LEFT

## 2020-07-27 ASSESSMENT — PAIN DESCRIPTION - FREQUENCY
FREQUENCY: CONTINUOUS

## 2020-07-27 NOTE — ANESTHESIA PRE PROCEDURE
Department of Anesthesiology  Preprocedure Note       Name:  Abelardo Ballard   Age:  63 y.o.  :  1965                                          MRN:  [id number]         Date:  2020      Surgeon: Bari Mccullough):  Refugio Medellin DO    Procedure: Procedure(s):REPEAT BILATERAL KNEE WASHOUT WITH POSSIBLE HIP WASHOUT - Bilateral    Medications prior to admission:   Prior to Admission medications    Medication Sig Start Date End Date Taking? Authorizing Provider   cefTRIAXone (ROCEPHIN) infusion Infuse 2,000 mg intravenously every 12 hours for 26 days Compound per protocol 20  Obed Edmondson MD   HYDROcodone-acetaminophen [facility]) 5-325 MG per tablet Take 1 tablet by mouth every 6 hours as needed for Pain for up to 7 days. . Take lowest dose possible to manage pain 20  Marcelo Lim DO   ondansetron (ZOFRAN ODT) 4 MG disintegrating tablet Take 1 tablet by mouth every 8 hours as needed for Nausea or Vomiting 20   CHELSIE Mast   naproxen (NAPROSYN) 500 MG tablet Take 1 tablet by mouth 2 times daily for 7 days 20  CHELSIE Mast   triamcinolone (KENALOG) 0.1 % cream Apply topically 2 times daily. 20   FRANCIE Ward CNP   doxepin (SINEQUAN) 10 MG capsule take 1 capsule by mouth nightly 19   FRANCIE Ram - CNP   venlafaxine (EFFEXOR) 37.5 MG tablet Take 37.5 mg by mouth daily    Historical Provider, MD   pitavastatin (LIVALO) 4 MG TABS tablet Take by mouth nightly    Historical Provider, MD   lamoTRIgine (LAMICTAL) 25 MG tablet Take 25 mg by mouth daily    Historical Provider, MD   omeprazole (PRILOSEC) 40 MG delayed release capsule Take 40 mg by mouth daily    Historical Provider, MD   insulin aspart (NOVOLOG) 100 UNIT/ML injection pen Take 12 units before meals  + sliding scale.  MAX 70 units daily 19   Gini Shanks MD   insulin glargine (LANTUS SOLOSTAR) 100 UNIT/ML injection pen Indications: oral gel 15 g  15 g Oral PRN Gail Paige MD        dextrose 50 % IV solution  12.5 g Intravenous PRN Gail Paige MD        glucagon (rDNA) injection 1 mg  1 mg Intramuscular PRN Gail Paige MD        dextrose 5 % solution  100 mL/hr Intravenous PRN Gail Paige MD        insulin lispro (HUMALOG) injection vial 0-18 Units  0-18 Units Subcutaneous TID WC Shania Jeffrey MD   Stopped at 07/27/20 0723    insulin lispro (HUMALOG) injection vial 0-9 Units  0-9 Units Subcutaneous Nightly Shania Jeffrey MD   3 Units at 07/26/20 2009    morphine sulfate (PF) injection 4 mg  4 mg Intravenous Q4H PRN Shania Jeffrey MD   4 mg at 07/27/20 1259    cefTRIAXone (ROCEPHIN) 2 g in sterile water 20 mL IV syringe  2 g Intravenous Q24H Lilian Motley MD   2 g at 07/26/20 1646       Allergies:     Allergies   Allergen Reactions    Adhesive Tape Hives    Tramadol Other (See Comments)     With psych medicine    Cymbalta [Duloxetine Hcl] Rash    Lidocaine Rash and Swelling    Lyrica [Pregabalin] Rash    Strattera [Atomoxetine] Rash     And nausea       Problem List:    Patient Active Problem List   Diagnosis Code    Chronic low back pain M54.5, G89.29    Diabetic neuropathy ([facility]) E11.40    Anxiety and depression F41.9, F32.9    Hyperlipidemia E78.5    Insulin-requiring or dependent type II diabetes mellitus ([facility] [address] E11.9, Z79.4    Testicle pain N50.819    Pain in both knees M25.561, M25.562    Septic arthritis of knee, bilateral (HCC) M00.9    Acute midline low back pain with bilateral sciatica M54.42, M54.41    Sepsis due to group B Streptococcus ([facility]) A40.1    Hypokalemia E87.6    Septic arthritis ([facility]) M00.9       Past Medical History:        Diagnosis Date    Chronic pain     Depression     HLD (hyperlipidemia)     Neuropathy     LIDYA on CPAP     Osteoarthritis     Type II or unspecified type diabetes mellitus without mention of complication, not stated as uncontrolled Past Surgical History:        Procedure Laterality Date    ARM SURGERY      left arm    KNEE ARTHROSCOPY Bilateral 7/23/2020    KNEE ARTHROSCOPY performed by Sharda Patrick MD at [address]/BIOPSY SINGLE/MULTIPLE N/A 9/20/2018    COLONOSCOPY WITH BIOPSY performed by Gina Parra MD at [facility] [place] [facility] [place] [address] [place] W/RMVL OF TUMOR POLYP LESION SNARE TQ  9/20/2018    COLONOSCOPY POLYPECTOMY SNARE/COLD BIOPSY performed by Gina Parra MD at [facility]         Social History:    Social History     Tobacco Use    Smoking status: Current Every Day Smoker     Packs/day: 1.00     Years: 30.00     Pack years: 30.00    Smokeless tobacco: Never Used   Substance Use Topics    Alcohol use: No     Comment: none since 2017, drank heavily in early 2000s                                Ready to quit: Not Answered  Counseling given: Not Answered      Vital Signs (Current): There were no vitals filed for this visit.                                            BP Readings from Last 3 Encounters:   07/27/20 (!) 140/82   07/23/20 (!) 178/91   07/21/20 (!) 167/88       NPO Status:  >8 hrs                                                                               BMI:   Wt Readings from Last 3 Encounters:   07/22/20 218 lb 4.1 oz (99 kg)   07/20/20 220 lb (99.8 kg)   07/19/20 240 lb (108.9 kg)     There is no height or weight on file to calculate BMI.    CBC:   Lab Results   Component Value Date    WBC 14.9 07/27/2020    RBC 3.80 07/27/2020    HGB 11.4 07/27/2020    HCT 34.1 07/27/2020    MCV 89.7 07/27/2020    RDW 12.9 07/27/2020     07/27/2020       CMP:   Lab Results   Component Value Date     07/27/2020    K 4.4 07/27/2020    K 4.1 07/19/2020    CL 97 07/27/2020    CO2 28 07/27/2020    BUN 19 07/27/2020    CREATININE 0.8 07/27/2020    GFRAA >60 07/27/2020    LABGLOM >60 07/27/2020    GLUCOSE 109 07/27/2020    PROT 7.1 07/19/2020 CALCIUM 9.3 07/27/2020    BILITOT 0.5 07/19/2020    ALKPHOS 74 07/19/2020    AST 24 07/19/2020    ALT 22 07/19/2020       POC Tests: No results for input(s): POCGLU, POCNA, POCK, POCCL, POCBUN, POCHEMO, POCHCT in the last 72 hours. Coags:   Lab Results   Component Value Date    PROTIME 13.4 07/22/2020    INR 1.2 07/22/2020    APTT 29.6 07/22/2020       HCG (If Applicable): No results found for: PREGTESTUR, PREGSERUM, HCG, HCGQUANT     ABGs: No results found for: PHART, PO2ART, GTA4EEF, GNF0HXO, BEART, Y5AEXOKH     Type & Screen (If Applicable):  No results found for: LABABO, LABRH    EKG 7/20/20  Normal sinus rhythm  Possible Left atrial enlargement  Nonspecific ST and T wave abnormality  Abnormal ECG  When compared with ECG of 19-JUL-2020 16:20,  No significant change was found  Confirmed by Raulito Law ([id number]) on 7/21/2020 1:17:51 PM    Anesthesia Evaluation  Patient summary reviewed and Nursing notes reviewed no history of anesthetic complications:   Airway: Mallampati: II  TM distance: >3 FB   Neck ROM: full  Mouth opening: > = 3 FB Dental: normal exam     Comment: Pt denies loose, missing, or chipped teeth    Pulmonary:   (+) sleep apnea: on CPAP,  decreased breath sounds,  current smoker          Patient did not smoke on day of surgery. Cardiovascular:  Exercise tolerance: poor (<4 METS),   (+) hyperlipidemia        Rhythm: regular  Rate: normal                    Neuro/Psych:   (+) psychiatric history: stable with treatmentdepression/anxiety             GI/Hepatic/Renal: Neg GI/Hepatic/Renal ROS            Endo/Other:    (+) DiabetesType II DM, well controlled, using insulin, : arthritis: OA., .                 Abdominal:           Vascular: negative vascular ROS. Anesthesia Plan      general     ASA 3     (Discussed anesthetic plan with pt and answered any questions)  Induction: intravenous.     MIPS: Postoperative opioids intended, Prophylactic antiemetics administered and Postoperative trial extubation. Anesthetic plan and risks discussed with patient. Use of blood products discussed with patient whom consented to blood products. Plan discussed with CRNA and attending. Lyndsey Metcalf RN, SHIKHA   7/27/2020    Pt seen, examined, chart reviewed, plan discussed.   Naina Bautista  7/27/2020  5:56 PM

## 2020-07-27 NOTE — OP NOTE
structures were well-padded and protected. Anesthesia was induced by the anesthesia team.  Jaquan Minus were placed high on the thighs of the operative extremities. Patient was sedated under the care of the anesthesia team.  Patient was prepped and draped in standard sterile fashion. Before beginning formal surgical timeout was called and all parties were in agreement before proceeding. We elected to start with the left knee first.  The limb was elevated and exsanguinated via gravity assist.  The tourniquet was inflated to 300 mmHg. A standard midline incision was performed using a 15 blade through the skin. Dissection was carried down to the extensor mechanism where a medial flap was carefully elevated to identify the edge of the patellar tendon and medial border of the patella. Deep knife was used to perform a formal medial parapatellar arthrotomy to gain access to the joint. Care was taken to protect the medial border of the patellar tendon as well as providing access into the joint. Incisional debridement was now performed taking care to explore the joint. Dark discolored gelatinous hematoma was now encountered within the joint. The joint was now copiously irrgiated and after further inspection, there was no other obvious infected material.     After thorough irrigation and debridement the arthrotomy was closed with interrupted Ethibond suture 1-0. The wound was again irrigated. 2-0 Monocryl was used subcutaneously. The the skin was closed with 3-0 nylon. Standard sterile dressing was applied. Tourniquet was deflated. Attention was then turned to the right knee. The limb was elevated using gravity assist for exsanguination and the tourniquet was inflated to 300 mmHg. In a similar fashion 10 blade was used to incise the skin in a standard midline incision. Care was taken to elevate the medial flap in the same fashion as the left.   A formal medial parapatellar arthrotomy was made in order to perform an irrigation and debridement. Upon gaining access into the joint again a copious amount of gelatinous dark material was evacuated from the joint. Joint was explored and debrided. It was then copiously irrigated. After thorough irrigation and debridement the arthrotomy was closed with 1-0 Ethibond in interrupted fashion. 2-0 Monocryl was used cutaneously and 3-0 nylon on the skin. A standard sterile dressing was applied. Tourniquet was deflated. Patient was reversed from anesthesia. He was transferred from the operative bed to a cart and taken to PACU for recovery. Disposition:      Once stable in recovery the patient be transferred back to the floor. There he will be weightbearing as tolerated his bilateral lower extremities. He received a dose of antibiotics postoperatively as well as continuing his current antibiotic regime per infectious disease recommendations. Anticoagulation will be per his primary care/medical admitting team.  Intraoperative cultures were taken of each knee and sent for analysis. We will follow these postoperatively. it should be noted that after gaining access to the joints and collecting cultures intraoperative antibiotics were given 2 g Ancef. Should be noted that Dr. Trenton Wagner was present for the entirety of the procedure. We will continue to monitor his progress postoperatively.         electronically signed by Venus Aguilar DO on 7/27/2020 at 7:23 PM     Electronically Signed By  Levi Reich D.O.  7/27/20

## 2020-07-27 NOTE — PROGRESS NOTES
[facility]  Internal Medicine Residency Program  Progress Note - House Team 2    Patient:  Rosalinda Essex 63 y.o. male MRN: [id number]     Date of Service: 7/27/2020     CC: Bilateral knee pain and hip pain, chronic low back pain. Overnight events: Patient does not sleep well. Subjective     He states the knee pain stay the same, 8-9/10 bilaterally. He also has left hip pain but less severe. Patient has not had bowel movement. He denies fever, chills, chest pain, SOB, headache or palpitation. Objective     Physical Exam:  · Vitals: /70   Pulse 72   Temp 97.9 °F (36.6 °C) (Temporal)   Resp 20   Ht 5' 11\" (1.803 m)   Wt 218 lb 4.1 oz (99 kg)   SpO2 98%   BMI 30.44 kg/m²     · I & O - 24hr: 200/2250 (-2050)  · General Appearance: alert, appears stated age and cooperative  · HEENT:  Head: Normocephalic, no lesions, without obvious abnormality. · Neck: no adenopathy, no carotid bruit, no JVD, supple, symmetrical, trachea midline and thyroid not enlarged, symmetric, no tenderness/mass/nodules  · Lung: clear to auscultation bilaterally  · Heart: regular rate and rhythm, S1, S2 normal, no murmur, click, rub or gallop  · Abdomen: soft, non-tender; bowel sounds normal; no masses,  no organomegaly  · Extremities: Bilateral knee tender on palpation. Sensation intact bilaterally. Pulse 2+ bilaterally. · Musculokeletal: Decrease ROM bilateral lower extremities.   · Neurologic: Mental status: Alert, oriented, thought content appropriate  Subject  Pertinent Labs & Imaging Studies   malcolm  CBC with Differential:    Lab Results   Component Value Date    WBC 14.9 07/27/2020    RBC 3.80 07/27/2020    HGB 11.4 07/27/2020    HCT 34.1 07/27/2020     07/27/2020    MCV 89.7 07/27/2020    MCH 30.0 07/27/2020    MCHC 33.4 07/27/2020    RDW 12.9 07/27/2020    SEGSPCT 55 02/06/2014    METASPCT 0.9 07/27/2020    LYMPHOPCT 14.0 07/27/2020    MONOPCT 7.9 07/27/2020    MYELOPCT 0.9 07/27/2020 BASOPCT 0.9 07/27/2020    MONOSABS 1.19 07/27/2020    LYMPHSABS 2.09 07/27/2020    EOSABS 0.13 07/27/2020    BASOSABS 0.13 07/27/2020     WBC:    Lab Results   Component Value Date    WBC 14.9 07/27/2020     CMP:    Lab Results   Component Value Date     07/27/2020    K 4.4 07/27/2020    K 4.1 07/19/2020    CL 97 07/27/2020    CO2 28 07/27/2020    BUN 19 07/27/2020    CREATININE 0.8 07/27/2020    GFRAA >60 07/27/2020    LABGLOM >60 07/27/2020    GLUCOSE 109 07/27/2020    PROT 7.1 07/19/2020    LABALBU 4.4 07/19/2020    CALCIUM 9.3 07/27/2020    BILITOT 0.5 07/19/2020    ALKPHOS 74 07/19/2020    AST 24 07/19/2020    ALT 22 07/19/2020     BUN/Creatinine:    Lab Results   Component Value Date    BUN 19 07/27/2020    CREATININE 0.8 07/27/2020     U/A:    Lab Results   Component Value Date    COLORU Yellow 07/21/2020    PROTEINU 100 07/21/2020    PHUR 6.0 07/21/2020    LABCAST FEW 11/02/2018    WBCUA NONE 07/21/2020    RBCUA 1-3 07/21/2020    MUCUS Present 11/02/2018    BACTERIA NONE SEEN 07/21/2020    CLARITYU Clear 07/21/2020    SPECGRAV 1.010 07/21/2020    LEUKOCYTESUR Negative 07/21/2020    UROBILINOGEN 0.2 07/21/2020    BILIRUBINUR Negative 07/21/2020    BLOODU SMALL 07/21/2020    GLUCOSEU 100 07/21/2020     HgBA1c:    Lab Results   Component Value Date    LABA1C 10.1 07/22/2020       Resident's Assessment and Plan     Wero Haynes is a 63 y.o. male with PMHx of chronic low back pain, DM2, HTN, peripheral neuropathy, osteo arthritis presented with bilateral knee pain. Patient has been Dx with bilateral knee septic arthritis, rhinovirus infection, 5th digit small abscess from diabetic neuropathy. Patient has right knee arthrocentesis with WBC 13,700, synovial fluid grew Group B streptococcus agalactiae. XR right foot done on 07/26 shows soft tissue edema, small pocket of air in R 5th digit suggesting abscess. Musculoskeletal:   1.  GBS bilateral knee arthritis s/p bilateral incision and drainage   - Patient has severe bilateral knee pain, 8-9/10, modest relieved with Norco - 5 325 x2 q6h, morphine 4mg q4h. - Has schedule to OR today for repeat bilateral knee I&D   · Continue Rocephin 2g q24h. · Continue Norco 5-325 q6h and Morphine 4mg q4h as needed. · Follow CBC, WBC count, vitals. · Follow Blood culture. 2. 5 digit small abscess  - XR of right foot shows air poket suggesting abscess. - Currently wrapping with betadine bandage  - Consulted with podiatrist, will take care after I&D today  3. Bilateral hip pain likely septic arthritis from the knee vs OA  - Patient has bilateral septic knee arthritis from GBS. - XR of the hip shows mild osteoarthropathy of both hip joints. - Plan: Arthrocentesis of the hip, blood culture, fluid culture. - Treat as knee septic arthritis. 4. Chronic bilateral lower back pain  - likely 2/2 chronic lumbar stenosis  - No bladder or bowel incontinence. - CT scan of lumbar spine shows no evidence of spinal infection.  - Continue with Gabapentin 300 TID, pain medication    Gastroenterology:   1. Opioid- induced constipation  - Patient has not has bowel movement today  - Senokot BID and Glycolax daily. 2. GERD.  - Currently on pantoprazole 40mg daily. Endocrine:   Type 2 DM on Lantus 35 Units QHS. - Today Blood glucose: 131 mg/dl  - Continue regimen, monitor blood glucose daily. DVT ppx: Hold today for OR  GI ppx: Marietta Mina MD, PGY-1  Attending physician: Dr. Rey Camp.
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
## 2020-07-27 NOTE — PROGRESS NOTES
[facility]  Internal Medicine Residency Program  Progress Note - House Team 2    Patient:  Lucero Shannon 63 y.o. male MRN: [id number]     Date of Service: 7/27/2020     CC: Bilateral knee pain, bilateral hip pain, lumbar back pain  Overnight events: NAOE    Subjective     Patient had no acute events overnight. States he still has severe pain in bilateral knees that he rates \"10+/10\". Patient states that the pain comes in \"spasms\" but the pain does not raidiate down his legs. Patient also endorses bilateral hip pain, primarily in the left hip which he rates an 8/10. States that the medication regimen is not helping his pain and that he is unable to sleep. Patient did not require Dilaudid overnight. Patient has yet to have bowel movement, but endorses passing gas. Denies chest pain, palpitations, shortness of breath. Objective     Physical Exam:  Vitals: /70   Pulse 72   Temp 97.9 °F (36.6 °C) (Temporal)   Resp 20   Ht 5' 11\" (1.803 m)   Wt 218 lb 4.1 oz (99 kg)   SpO2 98%   BMI 30.44 kg/m²     I & O - 24hr:     Intake/Output Summary (Last 24 hours) at 7/27/2020 1243  Last data filed at 7/27/2020 0533  Gross per 24 hour   Intake 200 ml   Output 2250 ml   Net -2050 ml     · General Appearance: alert, appears stated age, cooperative. Appears uncomfortable, but no acute distress  · HEENT:  Head: Normocephalic, no lesions, without obvious abnormality. · Neck: no adenopathy, supple, symmetrical, trachea midline and thyroid not enlarged, symmetric, no tenderness/mass/nodules  · Lung: clear to auscultation bilaterally. No accessory muscle use. · Heart: regular rate and rhythm, S1, S2 normal, no murmur, click, rub or gallop  · Abdomen: Mildly tender to palpation diffusely. Bowel sounds present in all 4 quadrants. No abdominal distention noted. · Extremities:  ACE wraps over both thighs, knees, and legs. Bilaterally the knees are tender to palpation.  The left hip region is tender to palpation over the anterior aspect, but not laterally. Denies any pain to palpation over right hip. Sensation is equal and intact. Dorsalis pedis and posterior tibial pulsee are intact and 2+ Decreased range of motion secondary to pain. AROM and PROM in feet and ankles intact but elicit pain in his knees. Compartments soft. Superficial wound on right fifth digit, currently dressed. Dressing C/D/I    Pertinent Labs & Imaging Studies     CBC:   Lab Results   Component Value Date    WBC 14.9 07/27/2020    RBC 3.80 07/27/2020    HGB 11.4 07/27/2020    HCT 34.1 07/27/2020    MCV 89.7 07/27/2020    MCH 30.0 07/27/2020    MCHC 33.4 07/27/2020    RDW 12.9 07/27/2020     07/27/2020    MPV 8.8 07/27/2020     CMP:    Lab Results   Component Value Date     07/27/2020    K 4.4 07/27/2020    K 4.1 07/19/2020    CL 97 07/27/2020    CO2 28 07/27/2020    BUN 19 07/27/2020    CREATININE 0.8 07/27/2020    GFRAA >60 07/27/2020    LABGLOM >60 07/27/2020    GLUCOSE 109 07/27/2020    PROT 7.1 07/19/2020    LABALBU 4.4 07/19/2020    CALCIUM 9.3 07/27/2020    BILITOT 0.5 07/19/2020    ALKPHOS 74 07/19/2020    AST 24 07/19/2020    ALT 22 07/19/2020 7/22/2020 09:22   CRP 41.4 (H)      7/22/2020 09:22   Sed Rate 90 (H)      7/21/2020 01:22   Body Fluid Culture (Knee) Strep agalactiae (Beta Strep Group B) (A)      7/22/2020 09:22   Hemoglobin A1C 10.1 (H)      7/22/2020 17:00   Human Rhinovirus/Enterovirus by PCR DETECTED (A)     Order Date:  7/23/2020 11:15 AM    EXAM: CT LUMBAR SPINE W WO CONTRAST    INDICATION: Severe back pain. Recent septic arthritis. COMPARISON: 20 January 2015    FINDINGS:    No acute fracture or dislocation is seen. There is moderate spinal    stenosis at L3-4 L4-5 relate disc bulging and facet arthropathy. There    are no endplate erosions to suggest infection.         Postcontrast there is no evidence of any abnormal enhancement to    suggest infection or otherwise.     Impression    Moderate spinal canal stenosis related to disc bulging and facet    arthropathy at L3-4 and L4-5. No evidence of spinal infection. Student's Assessment and Plan     Brandon Jordan is a 63 y.o. male with a past medical history of type 2 DM managed with insulin, peripheral neuropathy, OA of bilateral knees, lumbar stenosis, and HLD. He presented on 7/21 with bilateral knee and hip pain as well as low back pain. This pain is chronic in nature for him but worsened acutely after a recent fall. X-rays showed effusion in the right knee and bilateral knee OA. Bilateral arthroscopy with washout was done with findings of purulent fluid in both knees. Cultures grew GBS. 1. Bilateral knee septic arthritis  Right knee arthrocentesis on 7/21 with 13,700 WBC, 92% neutrophils, and no crystals. Currently afebrile. WBC at 14.9, down from 23.8 on 7/24. Blood cx's no growth to date. S/P bilateral knee arthroscopy, drainage, and debridement on 7/23. Thick, purulent fluid was drained from both knees. Fluid cultures from both knees grew GBS. - Ortho and ID following  - Repeat bilateral knee I&D today  - Continue Ceftriaxone 2 g IV Q24H, anticipate 4 weeks of treatment  - Awaiting culture sensitivities  - Continue to monitor blood cultures, negative to date  - Continue Norco 5-325 x2 Q6H PRN and Morphine 4 mg Q4H PRN    2. Left hip pain  May be 2/2 septic arthritis vs referred pain from knee. Hip XR revealed mild OA in both hip joints. No acute process was noted. Concern for septic arthritis is low. - Potential intervention in OR today  - Evaluate after resolution of knee pain to elucidate etiology  - Continue Norco 5-325 x2 Q6H PRN and Morphine 4 mg Q4H PRN    3. Bilateral lower extremity and lumbar pain  -2/2 diabetic neuropathy vs chronic lumbar stenosis. No radicular pain or parasthesia. Decreased ROM 2/2 pain. Bowel and bladder function is intact. No saddle anesthesia noted.   - Continue Gabapentin to 300 mg TID  - Continue Norco

## 2020-07-27 NOTE — PROGRESS NOTES
[facility]  Internal Medicine Residency / [address]    Attending Physician Statement  I have discussed the case, including pertinent history and exam findings with the resident and the team.  I have seen and examined the patient and the key elements of the encounter have been performed by me. I agree with the assessment, plan and orders as documented by the resident. Ongoing pain, plans for OR arthroscopic washout of the knees bilateral, possible hip today. He is n.p.o.    1. Bilateral knee septic arthritis from strep agalactiae  2. Acute on chronic lumbar pain from lumbar stenosis  3. Rhinovirus infection  4. Uncontrolled DM with hyperglycemia  5. Left hip pain, ? Septic  6. Diabetic ulceration right fifth digit    We will continue antibiotics at present. Continue bowel regimen and VTE prophylaxis on hold for planned OR washout of his knees and possible aspiration of hip. Appreciate podiatry involvement for his toe wound. Remainder of medical problems as per resident note.       Anette Kaur,   Internal Medicine Residency Faculty

## 2020-07-27 NOTE — PROGRESS NOTES
Department of Podiatry  Resident Progress Note    Subjective  Patient seen bedside for right diabetic foot ulcer. In distress from bilateral knee pain. Patient states he is still having pain in both his knees and will be going for another procedure with the orthopedic team today. Patient denies any N/V/D/F/C/SOB/CP and has no other complaints at this time. Objective  Vitals:    07/26/20 1815 07/26/20 2000 07/26/20 2353 07/27/20 0400   BP: 138/82 133/70 135/79 135/64   Pulse: 94 88 90 92   Resp: 16 18 18 18   Temp: 98.2 °F (36.8 °C) 98 °F (36.7 °C) 97.8 °F (36.6 °C) 97.9 °F (36.6 °C)   TempSrc: Temporal Temporal Temporal Temporal   SpO2:  96% 97% 98%   Weight:       Height:         LOWER EXTREMITY EXAMINATION   VASCULAR:  DP and PT pulses are palpable on left foot. PT pulse palpable, but DP pulse not palpable on right foot. CFT < 5 seconds B/L, except sluggish to right 5th digit. Mild increase in warmth noted to right foot in comparison to left foot. Edema noted b/l.      NEUROLOGIC:  Protective sensation is diminished but grossly intact.     DERM:  Right 5th digit appears dusky in nature. Unstageable eschar lesion noted to dorsal aspect of right 5th digit. Hyperkeratotic lesion with underlying ulcer noted to lateral aspect of right 5th digit. Does not probe to bone. No drainage noted. No malodor noted. Wound measures approximately 0.4x0.5cm.      MUSCULOSKELETAL: Pain on palpation of right 5th digit.   Muscle strength testing deferred due to pain.      LABS:        Recent Labs     07/25/20  0710 07/26/20  0431   WBC 19.5* 16.8*   HGB 11.1* 11.4*   HCT 32.8* 33.9*   * 507*         Recent Labs     07/26/20  0431      K 3.9   CL 93*   CO2 28   BUN 19   CREATININE 0.7     No results for input(s): PROT, INR, APTT in the last 72 hours.     ASSESSMENTS:   Principal Problem:    Septic arthritis of knee, bilateral   -Diabetic ulceration of right 5th digit secondary to diabetic neuropathy  -PVD     PLAN:  - ABX per ID, ceftriaxone.  - Cultures show bilateral knee septic arthritis from strep agalactiae. Patient also has rhinovirus infection.  - Ortho: to OR today for bilateral knee washout / I&D. - Vascular studies: pending covid test will perform. - X-ray of right foot: small pocket of air suggestive of abscess. - Dressing: betadine DSD. Changes QOD. Changed today.   - Sx plan: Possible incision and drainage of right 5th digit pending completion of vascular work-up and resolution of b/l septic knee per ortho team.  - Will follow     DW: Dr. Page Ok

## 2020-07-27 NOTE — ANESTHESIA POSTPROCEDURE EVALUATION
Department of Anesthesiology  Postprocedure Note    Patient: Sawyer Sanchez  MRN: [id number]  YOB: 1965  Date of evaluation: 7/27/2020  Time:  8:30 PM     Procedure Summary     Date:  07/27/20 Room / Location:  [facility] OR 08 / [facility]    Anesthesia Start:   Anesthesia Stop:      Procedure:  REPEAT BILATERAL KNEE WASHOUT WITH POSSIBLE HIP WASHOUT (Bilateral ) Diagnosis:  (INFECTED BILATERAL KNEES)    Surgeon:  Franck Rod DO Responsible Provider:      Anesthesia Type:  general ASA Status:  3          Anesthesia Type: general    Marie Phase I: Marie Score: 9    Marie Phase II:      Last vitals: Reviewed and per EMR flowsheets.        Anesthesia Post Evaluation    Patient location during evaluation: PACU  Patient participation: complete - patient participated  Level of consciousness: awake  Pain score: 3  Airway patency: patent  Nausea & Vomiting: no nausea and no vomiting  Complications: no  Cardiovascular status: blood pressure returned to baseline  Respiratory status: acceptable  Hydration status: euvolemic

## 2020-07-28 ENCOUNTER — APPOINTMENT (OUTPATIENT)
Dept: INTERVENTIONAL RADIOLOGY/VASCULAR | Age: 55
DRG: 710 | End: 2020-07-28
Payer: COMMERCIAL

## 2020-07-28 LAB
BASOPHILS ABSOLUTE: 0.04 E9/L (ref 0–0.2)
BASOPHILS RELATIVE PERCENT: 0.3 % (ref 0–2)
BODY FLUID CULTURE, STERILE: ABNORMAL
C. TRACHOMATIS DNA ,URINE: ABNORMAL
EOSINOPHILS ABSOLUTE: 0 E9/L (ref 0.05–0.5)
EOSINOPHILS RELATIVE PERCENT: 0 % (ref 0–6)
GRAM STAIN ORDERABLE: NORMAL
GRAM STAIN ORDERABLE: NORMAL
GRAM STAIN RESULT: ABNORMAL
HCT VFR BLD CALC: 34 % (ref 37–54)
HEMOGLOBIN: 11.3 G/DL (ref 12.5–16.5)
IMMATURE GRANULOCYTES #: 0.27 E9/L
IMMATURE GRANULOCYTES %: 1.7 % (ref 0–5)
LYMPHOCYTES ABSOLUTE: 1.19 E9/L (ref 1.5–4)
LYMPHOCYTES RELATIVE PERCENT: 7.5 % (ref 20–42)
MCH RBC QN AUTO: 30.1 PG (ref 26–35)
MCHC RBC AUTO-ENTMCNC: 33.2 % (ref 32–34.5)
MCV RBC AUTO: 90.7 FL (ref 80–99.9)
METER GLUCOSE: 207 MG/DL (ref 74–99)
METER GLUCOSE: 220 MG/DL (ref 74–99)
METER GLUCOSE: 229 MG/DL (ref 74–99)
METER GLUCOSE: 230 MG/DL (ref 74–99)
MONOCYTES ABSOLUTE: 1.26 E9/L (ref 0.1–0.95)
MONOCYTES RELATIVE PERCENT: 8 % (ref 2–12)
N. GONORRHOEAE DNA, URINE: ABNORMAL
NEUTROPHILS ABSOLUTE: 13.04 E9/L (ref 1.8–7.3)
NEUTROPHILS RELATIVE PERCENT: 82.5 % (ref 43–80)
ORGANISM: ABNORMAL
PDW BLD-RTO: 12.7 FL (ref 11.5–15)
PLATELET # BLD: 550 E9/L (ref 130–450)
PMV BLD AUTO: 8.9 FL (ref 7–12)
RBC # BLD: 3.75 E12/L (ref 3.8–5.8)
SOURCE: ABNORMAL
WBC # BLD: 15.8 E9/L (ref 4.5–11.5)

## 2020-07-28 PROCEDURE — 6360000002 HC RX W HCPCS: Performed by: STUDENT IN AN ORGANIZED HEALTH CARE EDUCATION/TRAINING PROGRAM

## 2020-07-28 PROCEDURE — 2580000003 HC RX 258: Performed by: STUDENT IN AN ORGANIZED HEALTH CARE EDUCATION/TRAINING PROGRAM

## 2020-07-28 PROCEDURE — 1200000000 HC SEMI PRIVATE

## 2020-07-28 PROCEDURE — 97530 THERAPEUTIC ACTIVITIES: CPT

## 2020-07-28 PROCEDURE — 6370000000 HC RX 637 (ALT 250 FOR IP): Performed by: STUDENT IN AN ORGANIZED HEALTH CARE EDUCATION/TRAINING PROGRAM

## 2020-07-28 PROCEDURE — 99231 SBSQ HOSP IP/OBS SF/LOW 25: CPT | Performed by: INTERNAL MEDICINE

## 2020-07-28 PROCEDURE — 97110 THERAPEUTIC EXERCISES: CPT

## 2020-07-28 PROCEDURE — 93923 UPR/LXTR ART STDY 3+ LVLS: CPT

## 2020-07-28 PROCEDURE — 82962 GLUCOSE BLOOD TEST: CPT

## 2020-07-28 PROCEDURE — 97164 PT RE-EVAL EST PLAN CARE: CPT

## 2020-07-28 PROCEDURE — 85025 COMPLETE CBC W/AUTO DIFF WBC: CPT

## 2020-07-28 PROCEDURE — 36415 COLL VENOUS BLD VENIPUNCTURE: CPT

## 2020-07-28 RX ADMIN — OXYCODONE HYDROCHLORIDE 10 MG: 10 TABLET ORAL at 21:24

## 2020-07-28 RX ADMIN — GABAPENTIN 300 MG: 300 CAPSULE ORAL at 09:26

## 2020-07-28 RX ADMIN — MORPHINE SULFATE 4 MG: 4 INJECTION, SOLUTION INTRAMUSCULAR; INTRAVENOUS at 23:10

## 2020-07-28 RX ADMIN — Medication 10 ML: at 09:28

## 2020-07-28 RX ADMIN — OXYCODONE HYDROCHLORIDE 10 MG: 10 TABLET ORAL at 06:47

## 2020-07-28 RX ADMIN — GABAPENTIN 300 MG: 300 CAPSULE ORAL at 21:24

## 2020-07-28 RX ADMIN — LAMOTRIGINE 25 MG: 25 TABLET ORAL at 09:26

## 2020-07-28 RX ADMIN — SODIUM CHLORIDE, PRESERVATIVE FREE 100 UNITS: 5 INJECTION INTRAVENOUS at 09:29

## 2020-07-28 RX ADMIN — SODIUM CHLORIDE, PRESERVATIVE FREE 10 ML: 5 INJECTION INTRAVENOUS at 05:02

## 2020-07-28 RX ADMIN — OXYCODONE HYDROCHLORIDE 10 MG: 10 TABLET ORAL at 10:48

## 2020-07-28 RX ADMIN — INSULIN LISPRO 6 UNITS: 100 INJECTION, SOLUTION INTRAVENOUS; SUBCUTANEOUS at 13:56

## 2020-07-28 RX ADMIN — MORPHINE SULFATE 4 MG: 4 INJECTION, SOLUTION INTRAMUSCULAR; INTRAVENOUS at 09:38

## 2020-07-28 RX ADMIN — ENOXAPARIN SODIUM 40 MG: 40 INJECTION SUBCUTANEOUS at 09:27

## 2020-07-28 RX ADMIN — MORPHINE SULFATE 2 MG: 2 INJECTION, SOLUTION INTRAMUSCULAR; INTRAVENOUS at 05:02

## 2020-07-28 RX ADMIN — VENLAFAXINE HYDROCHLORIDE 75 MG: 75 CAPSULE, EXTENDED RELEASE ORAL at 09:26

## 2020-07-28 RX ADMIN — MORPHINE SULFATE 4 MG: 4 INJECTION, SOLUTION INTRAMUSCULAR; INTRAVENOUS at 13:49

## 2020-07-28 RX ADMIN — CEFTRIAXONE 2 G: 2 INJECTION, POWDER, FOR SOLUTION INTRAMUSCULAR; INTRAVENOUS at 16:53

## 2020-07-28 RX ADMIN — Medication 2 G: at 13:48

## 2020-07-28 RX ADMIN — INSULIN LISPRO 6 UNITS: 100 INJECTION, SOLUTION INTRAVENOUS; SUBCUTANEOUS at 09:26

## 2020-07-28 RX ADMIN — OXYCODONE HYDROCHLORIDE 10 MG: 10 TABLET ORAL at 02:07

## 2020-07-28 RX ADMIN — ACETAMINOPHEN 650 MG: 325 TABLET, FILM COATED ORAL at 16:53

## 2020-07-28 RX ADMIN — DOCUSATE SODIUM 50 MG AND SENNOSIDES 8.6 MG 2 TABLET: 8.6; 5 TABLET, FILM COATED ORAL at 09:26

## 2020-07-28 RX ADMIN — INSULIN GLARGINE 35 UNITS: 100 INJECTION, SOLUTION SUBCUTANEOUS at 21:24

## 2020-07-28 RX ADMIN — ATORVASTATIN CALCIUM 20 MG: 20 TABLET, FILM COATED ORAL at 09:26

## 2020-07-28 RX ADMIN — PANTOPRAZOLE SODIUM 40 MG: 40 TABLET, DELAYED RELEASE ORAL at 06:52

## 2020-07-28 RX ADMIN — Medication 2 G: at 03:06

## 2020-07-28 RX ADMIN — SODIUM CHLORIDE, PRESERVATIVE FREE 10 ML: 5 INJECTION INTRAVENOUS at 03:07

## 2020-07-28 RX ADMIN — DOCUSATE SODIUM 50 MG AND SENNOSIDES 8.6 MG 2 TABLET: 8.6; 5 TABLET, FILM COATED ORAL at 21:24

## 2020-07-28 RX ADMIN — GABAPENTIN 300 MG: 300 CAPSULE ORAL at 13:48

## 2020-07-28 RX ADMIN — SODIUM CHLORIDE, PRESERVATIVE FREE 10 ML: 5 INJECTION INTRAVENOUS at 23:11

## 2020-07-28 RX ADMIN — INSULIN LISPRO 3 UNITS: 100 INJECTION, SOLUTION INTRAVENOUS; SUBCUTANEOUS at 21:25

## 2020-07-28 RX ADMIN — INSULIN LISPRO 6 UNITS: 100 INJECTION, SOLUTION INTRAVENOUS; SUBCUTANEOUS at 16:56

## 2020-07-28 RX ADMIN — SODIUM CHLORIDE, PRESERVATIVE FREE 10 ML: 5 INJECTION INTRAVENOUS at 13:49

## 2020-07-28 RX ADMIN — ACETAMINOPHEN 650 MG: 325 TABLET, FILM COATED ORAL at 05:02

## 2020-07-28 RX ADMIN — POLYETHYLENE GLYCOL 3350 17 G: 17 POWDER, FOR SOLUTION ORAL at 09:26

## 2020-07-28 RX ADMIN — ACETAMINOPHEN 650 MG: 325 TABLET, FILM COATED ORAL at 21:24

## 2020-07-28 RX ADMIN — OXYCODONE HYDROCHLORIDE 10 MG: 10 TABLET ORAL at 16:53

## 2020-07-28 RX ADMIN — SODIUM CHLORIDE, PRESERVATIVE FREE 100 UNITS: 5 INJECTION INTRAVENOUS at 21:24

## 2020-07-28 ASSESSMENT — PAIN DESCRIPTION - ONSET: ONSET: UNABLE TO TELL

## 2020-07-28 ASSESSMENT — PAIN SCALES - GENERAL
PAINLEVEL_OUTOF10: 9
PAINLEVEL_OUTOF10: 9
PAINLEVEL_OUTOF10: 4
PAINLEVEL_OUTOF10: 10
PAINLEVEL_OUTOF10: 9
PAINLEVEL_OUTOF10: 10
PAINLEVEL_OUTOF10: 8

## 2020-07-28 ASSESSMENT — PAIN DESCRIPTION - DESCRIPTORS
DESCRIPTORS: ACHING;SHARP
DESCRIPTORS: ACHING

## 2020-07-28 ASSESSMENT — PAIN DESCRIPTION - PAIN TYPE
TYPE: SURGICAL PAIN
TYPE: SURGICAL PAIN

## 2020-07-28 ASSESSMENT — PAIN DESCRIPTION - PROGRESSION
CLINICAL_PROGRESSION: GRADUALLY IMPROVING
CLINICAL_PROGRESSION: NOT CHANGED

## 2020-07-28 ASSESSMENT — PAIN DESCRIPTION - FREQUENCY
FREQUENCY: INTERMITTENT
FREQUENCY: INTERMITTENT

## 2020-07-28 ASSESSMENT — PAIN DESCRIPTION - ORIENTATION
ORIENTATION: PROXIMAL
ORIENTATION: PROXIMAL

## 2020-07-28 ASSESSMENT — PAIN DESCRIPTION - LOCATION
LOCATION: KNEE
LOCATION: KNEE

## 2020-07-28 NOTE — PROGRESS NOTES
Caitie Chard, DO   10 mL at 07/28/20 8357    sodium chloride flush 0.9 % injection 10 mL  10 mL Intravenous PRN Caitie Aparnad, DO   10 mL at 07/28/20 1349    morphine (PF) injection 2 mg  2 mg Intravenous Q2H PRN Caitie Chard, DO   2 mg at 07/28/20 0502    Or    morphine sulfate (PF) injection 4 mg  4 mg Intravenous Q2H PRN Caitie Aparnad, DO   4 mg at 07/27/20 2201    acetaminophen (TYLENOL) tablet 650 mg  650 mg Oral Q6H Caitie Chard, DO   650 mg at 07/28/20 0502    oxyCODONE (ROXICODONE) immediate release tablet 5 mg  5 mg Oral Q4H PRN Caitie Aparnad, DO        Or    oxyCODONE HCl (OXY-IR) immediate release tablet 10 mg  10 mg Oral Q4H PRN Caitie Aparnad, DO   10 mg at 07/28/20 1048    promethazine (PHENERGAN) tablet 12.5 mg  12.5 mg Oral Q6H PRN Caitie Braund, DO        Or    ondansetron [facility]) injection 4 mg  4 mg Intravenous Q6H PRN Caitie Chard, DO        gabapentin (NEURONTIN) capsule 300 mg  300 mg Oral TID Caitie Aparnad, DO   300 mg at 07/28/20 1348    sennosides-docusate sodium (SENOKOT-S) 8.6-50 MG tablet 2 tablet  2 tablet Oral BID Caitie Brower, DO   2 tablet at 07/28/20 1031    polyethylene glycol (GLYCOLAX) packet 17 g  17 g Oral Daily Caitiehenny Braund, DO   17 g at 07/28/20 0926    insulin glargine (LANTUS) injection vial 35 Units  35 Units Subcutaneous Nightly Caitiehenny Brower, DO   35 Units at 07/27/20 2145    heparin flush 100 UNIT/ML injection 100 Units  1 mL Intravenous 2 times per day Caitie Brower, DO   100 Units at 07/28/20 0929    heparin flush 100 UNIT/ML injection 100 Units  1 mL Intracatheter PRN Caitie Brower, DO   100 Units at 07/25/20 1650    [Held by provider] aspirin EC tablet 325 mg  325 mg Oral BID Verlucio Navarro, DO   Stopped at 07/26/20 4369    lamoTRIgine (LAMICTAL) tablet 25 mg  25 mg Oral Daily Caitie Brower DO   25 mg at 07/28/20 0926    pantoprazole (PROTONIX) tablet 40 mg  40 mg Oral Q[place]ox North Caitie Brower DO   40 mg at 07/28/20 5817    atorvastatin (LIPITOR) tablet 20 mg  20 mg Oral Daily Windy Beath, DO   20 mg at 07/28/20 0926    venlafaxine (EFFEXOR XR) extended release capsule 75 mg  75 mg Oral Daily Windy Beath, DO   75 mg at 07/28/20 0926    glucose (GLUTOSE) 40 % oral gel 15 g  15 g Oral PRN Windy Beath, DO        dextrose 50 % IV solution  12.5 g Intravenous PRN Windy Beath, DO        glucagon (rDNA) injection 1 mg  1 mg Intramuscular PRN Windy Beath, DO        dextrose 5 % solution  100 mL/hr Intravenous PRN Windy Beath, DO        insulin lispro (HUMALOG) injection vial 0-18 Units  0-18 Units Subcutaneous TID WC Windy Beath, DO   6 Units at 07/28/20 1356    insulin lispro (HUMALOG) injection vial 0-9 Units  0-9 Units Subcutaneous Nightly Windy Beath, DO   3 Units at 07/26/20 2009    morphine sulfate (PF) injection 4 mg  4 mg Intravenous Q4H PRN Windy Beath, DO   4 mg at 07/28/20 1349    cefTRIAXone (ROCEPHIN) 2 g in sterile water 20 mL IV syringe  2 g Intravenous Q24H Windy Beath, DO   2 g at 07/27/20 1740     REVIEW OF SYSTEMS:    CONSTITUTIONAL: denies fever  HEENT: denies blurring of vision or double vision, denies hearing problem  RESPIRATORY: Denies SOB     CARDIOVASCULAR:  Denies palpitation  GASTROINTESTINAL:  Denies abdomen pain, diarrhea or constipation. GENITOURINARY:  Denies burning urination or frequency of urination  INTEGUMENT: denies wound , rash  HEMATOLOGIC/LYMPHATIC:  Denies lymph node swelling, gum bleeding or easy bruising.   MUSCULOSKELETAL:  Bilateral knee pain   NEUROLOGICAL:  Weakness     Objective:    BP (!) 140/70   Pulse 84   Temp 96.9 °F (36.1 °C) (Temporal)   Resp 18   Ht 5' 11\" (1.803 m)   Wt 218 lb 4.1 oz (99 kg)   SpO2 97%   BMI 30.44 kg/m²     Constitutional: Alert, not in distress  HEENT : no pallor, no icterus, no LN   Respiratory: Clear bilaterally  Cardiovascular: Regular rate and rhythm, no murmurs  Gastrointestinal: Bowel sounds present, soft, nontender  Skin: Warm and dry, no active dermatoses  Musculoskeletal: Bilateral lower extremities with dressing and Ace wrap in place, tender while moving     Laboratory:  Lab Results   Component Value Date    WBC 15.8 (H) 07/28/2020    WBC 14.9 (H) 07/27/2020    WBC 16.8 (H) 07/26/2020    HGB 11.3 (L) 07/28/2020    HCT 34.0 (L) 07/28/2020    MCV 90.7 07/28/2020     (H) 07/28/2020     Lab Results   Component Value Date    NEUTROABS 13.04 (H) 07/28/2020    NEUTROABS 11.32 (H) 07/27/2020    NEUTROABS 10.94 (H) 07/26/2020     Lab Results   Component Value Date    CRP 41.4 (H) 07/22/2020     No results found for: CRPHS  Lab Results   Component Value Date    SEDRATE 90 (H) 07/22/2020     Lab Results   Component Value Date    ALT 22 07/19/2020    AST 24 07/19/2020    ALKPHOS 74 07/19/2020    BILITOT 0.5 07/19/2020     Lab Results   Component Value Date     07/27/2020    K 4.4 07/27/2020    K 4.1 07/19/2020    CL 97 07/27/2020    CO2 28 07/27/2020    BUN 19 07/27/2020    CREATININE 0.8 07/27/2020    CREATININE 0.7 07/26/2020    CREATININE 0.7 07/25/2020    GFRAA >60 07/27/2020    LABGLOM >60 07/27/2020    GLUCOSE 109 07/27/2020    PROT 7.1 07/19/2020    LABALBU 4.4 07/19/2020    CALCIUM 9.3 07/27/2020    BILITOT 0.5 07/19/2020    ALKPHOS 74 07/19/2020    AST 24 07/19/2020    ALT 22 07/19/2020       Radiology: CT lumbar spine without contrast (07/23): Moderate spinal canal stenosis related to disc bulging and facet   arthropathy at L3-4 and L4-5. No evidence of spinal infection. Microbiology:     Blood cx  No results found for: BLOODCULT1  Culture, Blood 2   Date Value Ref Range Status   07/22/2020 5 Days no growth  Final   12/26/2014 5 Days- no growth  Final   12/23/2014 (A)  Final    Coagulase negative Staph by PNA fish  Growth in aerobic bottle only     12/23/2014   Final    No antibiotic susceptibility studies performed. Plates will be held 10  days. Contact the laboratory, [phone], if further workup is  desired.          BODY FLUID CULTURE  Body Fluid Culture, Sterile   Date Value Ref Range Status   07/27/2020 Growth not present, incubation continues  Preliminary     Organism   Date Value Ref Range Status   07/23/2020 Strep agalactiae (Beta Strep Group B) (A)  Preliminary         MRSA Culture Only   Date Value Ref Range Status   07/22/2020 Methicillin resistant Staph aureus not isolated  Final       Assessment:  Group B Strep septic native bilateral knee arthritis, s/p wash-out on 07/23 and 7/27   Rhinovirus infection  Leukocytosis  15.8      Plan:  Ceftriaxone 2 g every 24 hours        Electronically signed by Pamela Chan MD on 7/28/2020 at 4:44 PM

## 2020-07-28 NOTE — PROGRESS NOTES
[facility]  Internal Medicine Residency / [address]    Attending Physician Statement  I have discussed the case, including pertinent history and exam findings with the resident and the team.  I have seen and examined the patient and the key elements of the encounter have been performed by me. I agree with the assessment, plan and orders as documented by the resident. 1. Bilateral knee septic arthritis from strep agalactiae s/p open washout and debridement on 727 and endoscopic incision and drainage on 723  2. Acute on chronic lumbar pain from lumbar stenosis  3. Rhinovirus infection  4. Uncontrolled DM with hyperglycemia  5. Diabetic ulceration right fifth digit    We will continue pain control, range of motion seems to be a little better after yesterday's procedure. Continue antibiotics and plans for bedside debridement of his fifth toe on the right per podiatry. Remainder of medical problems as per resident note.       Fani Ovalle, DO  Internal Medicine Residency Faculty

## 2020-07-28 NOTE — PROGRESS NOTES
Department of Orthopedic Surgery  Resident Progress Note    Patient seen and examined. Still has pain in bilateral knees which is improved since before surgery. More ROM this AM. No new complaints. Denies chest pain, shortness of breath, dizziness/lightheadedness. VITALS:  BP (!) 150/84   Pulse 98   Temp 97.4 °F (36.3 °C) (Temporal)   Resp 18   Ht 5' 11\" (1.803 m)   Wt 218 lb 4.1 oz (99 kg)   SpO2 97%   BMI 30.44 kg/m²     General: alert and oriented to person, place and time, well-developed and well-nourished, in no acute distress    MUSCULOSKELETAL:   bilateral lower extremity:  · Dressing C/D/I, mild saturation to left knee ace wrap  · Compartments soft and compressible  · +PF/DF/EHL  · +2/4 DP & PT pulses, Brisk Cap refill, Toes warm and perfused  · Distal sensation grossly intact to Peroneals, Sural, Saphenous, and tibial nrs    CBC:   Lab Results   Component Value Date    WBC 15.8 07/28/2020    HGB 11.3 07/28/2020    HCT 34.0 07/28/2020     07/28/2020     PT/INR:    Lab Results   Component Value Date    PROTIME 13.4 07/22/2020    INR 1.2 07/22/2020       Intraop Cultures: Strep agalactiae, new cultures pending    ASSESSMENT  · S/P bilateral knee open washout and debridement - 7/27/20  · S/P Endoscopic I and D - 7/23/20    PLAN      · Continue physical therapy and protocol: WBAT - BLE  · Antibiotics per primary/ ID  · Deep venous thrombosis prophylaxis - Lovenox, early mobilization  · Will need aggressive PT/OT  · PT/OT  · Pain Control: Per primary  · Monitor H&H labs and cultures  · Discuss with attending    Orthopaedic Attending    I have seen and evaluated the patient with the resident and agree with the above assessments on today's visit. I have performed the key components of the history and physical examination and concur completely with the findings and plans as documented above.     abx per ID  Will continue to follow along    Electronically signed by   Oscar Chow DO  7/28/2020

## 2020-07-28 NOTE — PLAN OF CARE
Problem: Pain:  Goal: Pain level will decrease  Description: Pain level will decrease  Outcome: Met This Shift     Problem: Pain:  Goal: Control of acute pain  Description: Control of acute pain  Outcome: Met This Shift     Problem: Pain:  Goal: Control of chronic pain  Description: Control of chronic pain  Outcome: Met This Shift     Problem: Skin Integrity:  Goal: Will show no infection signs and symptoms  Description: Will show no infection signs and symptoms  Outcome: Met This Shift     Problem: Falls - Risk of:  Goal: Will remain free from falls  Description: Will remain free from falls  Outcome: Met This Shift     Problem: Falls - Risk of:  Goal: Absence of physical injury  Description: Absence of physical injury  Outcome: Met This Shift     Problem: Mobility - Impaired:  Goal: Mobility will improve  Description: Mobility will improve  Outcome: Met This Shift

## 2020-07-28 NOTE — PROGRESS NOTES
54 02/06/2014    METASPCT 0.9 07/27/2020    LYMPHOPCT 7.5 07/28/2020    MONOPCT 8.0 07/28/2020    MYELOPCT 0.9 07/27/2020    BASOPCT 0.3 07/28/2020    MONOSABS 1.26 07/28/2020    LYMPHSABS 1.19 07/28/2020    EOSABS 0.00 07/28/2020    BASOSABS 0.04 07/28/2020     WBC:    Lab Results   Component Value Date    WBC 15.8 07/28/2020     CMP:    Lab Results   Component Value Date     07/27/2020    K 4.4 07/27/2020    K 4.1 07/19/2020    CL 97 07/27/2020    CO2 28 07/27/2020    BUN 19 07/27/2020    CREATININE 0.8 07/27/2020    GFRAA >60 07/27/2020    LABGLOM >60 07/27/2020    GLUCOSE 109 07/27/2020    PROT 7.1 07/19/2020    LABALBU 4.4 07/19/2020    CALCIUM 9.3 07/27/2020    BILITOT 0.5 07/19/2020    ALKPHOS 74 07/19/2020    AST 24 07/19/2020    ALT 22 07/19/2020     BUN/Creatinine:    Lab Results   Component Value Date    BUN 19 07/27/2020    CREATININE 0.8 07/27/2020     U/A:    Lab Results   Component Value Date    COLORU Yellow 07/21/2020    PROTEINU 100 07/21/2020    PHUR 6.0 07/21/2020    LABCAST FEW 11/02/2018    WBCUA NONE 07/21/2020    RBCUA 1-3 07/21/2020    MUCUS Present 11/02/2018    BACTERIA NONE SEEN 07/21/2020    CLARITYU Clear 07/21/2020    SPECGRAV 1.010 07/21/2020    LEUKOCYTESUR Negative 07/21/2020    UROBILINOGEN 0.2 07/21/2020    BILIRUBINUR Negative 07/21/2020    BLOODU SMALL 07/21/2020    GLUCOSEU 100 07/21/2020     HgBA1c:    Lab Results   Component Value Date    LABA1C 10.1 07/22/2020       Resident's Assessment and Plan     Kimberly Liu is a 63 y.o. male with PMHx of chronic low back pain, DM2, HTN, peripheral neuropathy, osteo arthritis presented with bilateral knee pain. Patient has been Dx with bilateral knee septic arthritis, rhinovirus infection, 5th digit small abscess from diabetic neuropathy. Patient has right knee arthrocentesis with WBC 13,700, synovial fluid grew Group B streptococcus agalactiae.  XR right foot done on 07/26 shows soft tissue edema, small pocket of air in R 5th digit suggesting abscess. 1. GBS bilateral knee arthritis s/p bilateral knee open washout and debridement 7/27.    - Patient has severe bilateral knee pain, 8-9/10, returned from OR for I&D yesterday. - Patient is stable today. · Continue Rocephin 2g q24h  · Continue Norco 5-325 q6h and Morphine 4mg q4h as needed. · Follow CBC, WBC count, vitals, pain management. · Follow Blood culture. · Ortho seen patient today, will follow up with ortho. 2. 5 digit small abscess   - XR of right foot shows air poket suggesting abscess. - Currently wrapping with betadine bandage  - Consulted with podiatrist, plan: bedside I&D. 3. Bilateral hip pain likely septic arthritis from the knee vs OA  - Patient has bilateral septic knee arthritis from GBS. - XR of the hip shows mild osteoarthropathy of both hip joints. - Plan: Arthrocentesis of the hip, blood culture, fluid culture. - Treat as knee septic arthritis. 4. Chronic bilateral lower back pain  - likely 2/2 chronic lumbar stenosis  - No bladder or bowel incontinence. - CT scan of lumbar spine shows no evidence of spinal infection.  - Continue with Gabapentin 300 TID, pain medication    5. Opioid- induced constipation  - Patient has not has bowel movement today  - Senokot BID and Glycolax daily. 6. GERD.  - Currently on pantoprazole 40mg daily. 7. Type 2 DM on Lantus 35 Units QHS. - Today Blood glucose: 131 mg/dl  - Continue regimen, monitor blood glucose daily. DVT ppx: Lovenox   GI ppx: Protonix         Jayden Mean MD, PGY-1  Attending physician: Dr. Jovanna Vazquez.

## 2020-07-28 NOTE — PROGRESS NOTES
Physical Therapy  Re-Assessment     Name: Juany Corrales  : 1965  MRN: [id number]    Referring Provider: Lilian Dickey DO    Date of Service: 2020    Evaluating PT:  Feliciafrancia Carlson, PT, DPT [id number]  Re-Evaluating PT: Lashell Stoverus PT, DPT [id number]    Room #:  [facility]/2141-T  Diagnosis:  Acute bilateral knee pain  PMHx/PSHx:  Type 2 DM, chronic pain, OA, neuropathy, HLD  Procedure/Surgery:       Right knee arthrotomy with exploration and irrigation and debridement    Left knee arthrotomy with exploration and irrigation and debridement    B knee arthroscopy     R Knee aspiration     Precautions:  Falls, WBAT BLE, 5th digit wound R foot, contact and droplet iso (Rhino +)  Equipment Needs:  TBD    SUBJECTIVE:  Pt lives with 3 children (16, 15, 4 y/o) in a 2 story house with 2 stairs to enter and 1 rail. Bed is on first floor and bath is on first floor. Pt does not access second floor of home. Pt ambulated with SPC prior to admission. OBJECTIVE:   Re-Evaluation  Date: 20 Treatment Short Term/ Long Term   Goals   AM-PAC 6 Clicks      Was pt agreeable to Eval/treatment? yes     Does pt have pain? 10/10 B knee pain at rest, increased with mobility- RN aware     Bed Mobility  Rolling: Max A  Supine to sit: Max A  Sit to supine: Max A  Scooting: Max A  Rolling: Mod Independent   Supine to sit: Mod Independent   Sit to supine: Mod Independent   Scooting: Mod Independent    Transfers Sit to stand: Max A to attempt (unable to clear B hips)  Stand to sit: NT  Stand pivot: NT  Sit to stand: Mod A  Stand to sit: Mod A  Stand pivot: Mod A   Ambulation    NT  >25 feet using WW with Mod A   Stair negotiation: ascended and descended  NT  TBD   ROM BUE:  WNL  BLE:  Limited B knee limited throughout due to pain.   R knee held in approximately 30* flexion and L knee in approx 60* flexion     Strength BUE:  WNL  BLE:  2+/5 hip, 2-/5 knee and 3-/5 ankle     Balance Sitting EOB:  SBA  Dynamic Standing: mobility: verbal/tactile cues for sequencing, manual assist for progression of B LEs over EOB with max cues for placement of B UEs to assist with progression of hips and trunk   Therapeutic activities: sitting EOB x15 minutes, assistance for gradual progression of B knee flexion, skilled positioning upon return to bed, pt education for importance of progressive mobility    Pt's/ family goals   1. To return home with family     Patient and or family understand(s) diagnosis, prognosis, and plan of care. yes    PLAN:    PT care will be provided in accordance with the objectives noted above. Exercises and functional mobility practice will be used as well as appropriate assistive devices or modalities to obtain goals. Patient and family education will also be administered as needed. Frequency of treatments: 2-5x/week x 1-2 weeks. Time in  1000  Time out  1045    Total Treatment Time 40  minutes     Evaluation Time includes thorough review of current medical information, gathering information on past medical history/social history and prior level of function, completion of standardized testing/informal observation of tasks, assessment of data and education on plan of care and goals.     CPT codes:  [] Low Complexity PT evaluation 34483  [] Moderate Complexity PT evaluation 20836  [] High Complexity PT evaluation 12217  [x] PT Re-evaluation 47857  [] Gait training 55306 0 minutes  [] Manual therapy 90404 0 minutes  [x] Therapeutic activities 57864 25 minutes  [x] Therapeutic exercises 10389 15 minutes  [] Neuromuscular reeducation 32552 0 minutes     Candy Noble, PT, DPT  License [id number]

## 2020-07-28 NOTE — PROGRESS NOTES
Department of Podiatry  Resident Progress Note    Subjective  Patient seen bedside for right diabetic foot ulcer. In distress from bilateral knee pain. States he is in pain and has difficulty moving his legs. Patient states he would like to go forward at this time with his toe. Patient denies any N/V/D/F/C/SOB/CP and has no other complaints at this time. Objective  Vitals:    07/27/20 2015 07/27/20 2030 07/27/20 2130 07/28/20 0915   BP: (!) 160/93  (!) 150/84 (!) 140/70   Pulse: 90 94 98 84   Resp: 22 18 18   Temp: 97.9 °F (36.6 °C)  97.4 °F (36.3 °C) 96.9 °F (36.1 °C)   TempSrc:   Temporal Temporal   SpO2: 97%   97%   Weight:       Height:         LOWER EXTREMITY EXAMINATION   VASCULAR:  DP and PT pulses are palpable on left foot. PT pulse palpable, but DP pulse not palpable on right foot. CFT < 5 seconds B/L, except sluggish to right 5th digit. Mild increase in warmth noted to right foot in comparison to left foot. Edema noted b/l.      NEUROLOGIC:  Protective sensation is diminished but grossly intact.     DERM:  Right 5th digit appears dusky in nature. Unstageable eschar lesion noted to dorsal aspect of right 5th digit. Hyperkeratotic lesion with underlying ulcer noted to lateral aspect of right 5th digit. Does not probe to bone. No drainage noted. No malodor noted. Wound measures approximately 0.4x0.5cm.      MUSCULOSKELETAL: Pain on palpation of right 5th digit. Muscle strength testing deferred due to pain.      LABS:        Recent Labs     07/25/20  0710 07/26/20  0431   WBC 19.5* 16.8*   HGB 11.1* 11.4*   HCT 32.8* 33.9*   * 507*         Recent Labs     07/26/20  0431      K 3.9   CL 93*   CO2 28   BUN 19   CREATININE 0.7     No results for input(s): PROT, INR, APTT in the last 72 hours.     ASSESSMENTS:   Principal Problem:    Septic arthritis of knee, bilateral   -Diabetic ulceration of right 5th digit secondary to diabetic neuropathy  -PVD     PLAN:  - ABX per ID, ceftriaxone. Cultures show bilateral knee septic arthritis from strep agalactiae. Patient also has rhinovirus infection.  - Ortho: OR yesterday for repeat bilateral open knee washout / I&D.  - NIVS read pending. Exam is limited. Waveforms appear blunted to all digits. PRABHAKAR likely falsely elevated secondary to calcified vessels. Bilat AT amplitude appears decreased. - X-ray of right foot: small pocket of air suggestive of abscess. - Dressing: betadine DSD. Changes QOD.  - Sx plan: NIVS demonstrate decreased flow to digits lending to possible complications of healing a distal procedure. However, at this time I would recommend a bedside I&D of right 5th digit. Initial plan for Wednesday with Dr. Ila Reyes.  Will discuss further.  - Will follow     DW: Dr. Ila Reyes

## 2020-07-29 LAB
ABSOLUTE CD 3: 972 CELLS/UL (ref 570–2400)
ABSOLUTE CD 4 HELPER: 729 CELLS/UL (ref 430–1800)
ABSOLUTE CD 8 (SUPP): 254 CELLS/UL (ref 210–1200)
ANION GAP SERPL CALCULATED.3IONS-SCNC: 12 MMOL/L (ref 7–16)
BASOPHILS ABSOLUTE: 0 E9/L (ref 0–0.2)
BASOPHILS RELATIVE PERCENT: 0.2 % (ref 0–2)
BODY FLUID CULTURE, STERILE: ABNORMAL
BODY FLUID CULTURE, STERILE: ABNORMAL
BUN BLDV-MCNC: 21 MG/DL (ref 6–20)
BURR CELLS: ABNORMAL
CALCIUM SERPL-MCNC: 8.5 MG/DL (ref 8.6–10.2)
CD4/CD8 RATIO: 2.87 RATIO (ref 0.8–3.9)
CHLORIDE BLD-SCNC: 97 MMOL/L (ref 98–107)
CO2: 26 MMOL/L (ref 22–29)
CREAT SERPL-MCNC: 0.7 MG/DL (ref 0.7–1.2)
EOSINOPHILS ABSOLUTE: 0.23 E9/L (ref 0.05–0.5)
EOSINOPHILS RELATIVE PERCENT: 1.7 % (ref 0–6)
GFR AFRICAN AMERICAN: >60
GFR NON-AFRICAN AMERICAN: >60 ML/MIN/1.73
GLUCOSE BLD-MCNC: 143 MG/DL (ref 74–99)
GRAM STAIN RESULT: ABNORMAL
HCT VFR BLD CALC: 29.8 % (ref 37–54)
HEMOGLOBIN: 10 G/DL (ref 12.5–16.5)
IGA: 194 MG/DL (ref 70–400)
IGG: 529 MG/DL (ref 700–1600)
IGM: 132 MG/DL (ref 40–230)
LYMPH SUBSET INFORMATION: NORMAL
LYMPHOCYTES ABSOLUTE: 3.78 E9/L (ref 1.5–4)
LYMPHOCYTES RELATIVE PERCENT: 27.8 % (ref 20–42)
MCH RBC QN AUTO: 30.4 PG (ref 26–35)
MCHC RBC AUTO-ENTMCNC: 33.6 % (ref 32–34.5)
MCV RBC AUTO: 90.6 FL (ref 80–99.9)
METER GLUCOSE: 131 MG/DL (ref 74–99)
METER GLUCOSE: 148 MG/DL (ref 74–99)
METER GLUCOSE: 183 MG/DL (ref 74–99)
METER GLUCOSE: 221 MG/DL (ref 74–99)
MONOCYTES ABSOLUTE: 1.35 E9/L (ref 0.1–0.95)
MONOCYTES RELATIVE PERCENT: 9.6 % (ref 2–12)
NEUTROPHILS ABSOLUTE: 8.24 E9/L (ref 1.8–7.3)
NEUTROPHILS RELATIVE PERCENT: 60.9 % (ref 43–80)
ORGANISM: ABNORMAL
PDW BLD-RTO: 12.7 FL (ref 11.5–15)
PLATELET # BLD: 527 E9/L (ref 130–450)
PMV BLD AUTO: 9 FL (ref 7–12)
POIKILOCYTES: ABNORMAL
POLYCHROMASIA: ABNORMAL
POTASSIUM SERPL-SCNC: 4.2 MMOL/L (ref 3.5–5)
RBC # BLD: 3.29 E12/L (ref 3.8–5.8)
SODIUM BLD-SCNC: 135 MMOL/L (ref 132–146)
WBC # BLD: 13.5 E9/L (ref 4.5–11.5)

## 2020-07-29 PROCEDURE — 82962 GLUCOSE BLOOD TEST: CPT

## 2020-07-29 PROCEDURE — 6370000000 HC RX 637 (ALT 250 FOR IP): Performed by: STUDENT IN AN ORGANIZED HEALTH CARE EDUCATION/TRAINING PROGRAM

## 2020-07-29 PROCEDURE — 99231 SBSQ HOSP IP/OBS SF/LOW 25: CPT | Performed by: INTERNAL MEDICINE

## 2020-07-29 PROCEDURE — 6360000002 HC RX W HCPCS: Performed by: STUDENT IN AN ORGANIZED HEALTH CARE EDUCATION/TRAINING PROGRAM

## 2020-07-29 PROCEDURE — 97530 THERAPEUTIC ACTIVITIES: CPT | Performed by: PHYSICAL THERAPIST

## 2020-07-29 PROCEDURE — 2580000003 HC RX 258: Performed by: STUDENT IN AN ORGANIZED HEALTH CARE EDUCATION/TRAINING PROGRAM

## 2020-07-29 PROCEDURE — 80048 BASIC METABOLIC PNL TOTAL CA: CPT

## 2020-07-29 PROCEDURE — 85025 COMPLETE CBC W/AUTO DIFF WBC: CPT

## 2020-07-29 PROCEDURE — 97535 SELF CARE MNGMENT TRAINING: CPT

## 2020-07-29 PROCEDURE — 1200000000 HC SEMI PRIVATE

## 2020-07-29 PROCEDURE — 97530 THERAPEUTIC ACTIVITIES: CPT

## 2020-07-29 PROCEDURE — 36415 COLL VENOUS BLD VENIPUNCTURE: CPT

## 2020-07-29 RX ADMIN — OXYCODONE HYDROCHLORIDE 10 MG: 10 TABLET ORAL at 11:55

## 2020-07-29 RX ADMIN — INSULIN LISPRO 3 UNITS: 100 INJECTION, SOLUTION INTRAVENOUS; SUBCUTANEOUS at 08:43

## 2020-07-29 RX ADMIN — Medication 4 MG: at 10:48

## 2020-07-29 RX ADMIN — SODIUM CHLORIDE, PRESERVATIVE FREE 100 UNITS: 5 INJECTION INTRAVENOUS at 08:42

## 2020-07-29 RX ADMIN — ENOXAPARIN SODIUM 40 MG: 40 INJECTION SUBCUTANEOUS at 08:41

## 2020-07-29 RX ADMIN — ACETAMINOPHEN 650 MG: 325 TABLET, FILM COATED ORAL at 16:41

## 2020-07-29 RX ADMIN — Medication 4 MG: at 01:08

## 2020-07-29 RX ADMIN — INSULIN LISPRO 6 UNITS: 100 INJECTION, SOLUTION INTRAVENOUS; SUBCUTANEOUS at 18:13

## 2020-07-29 RX ADMIN — OXYCODONE HYDROCHLORIDE 10 MG: 10 TABLET ORAL at 21:11

## 2020-07-29 RX ADMIN — GABAPENTIN 300 MG: 300 CAPSULE ORAL at 08:42

## 2020-07-29 RX ADMIN — INSULIN GLARGINE 35 UNITS: 100 INJECTION, SOLUTION SUBCUTANEOUS at 20:35

## 2020-07-29 RX ADMIN — CEFTRIAXONE 2 G: 2 INJECTION, POWDER, FOR SOLUTION INTRAMUSCULAR; INTRAVENOUS at 17:21

## 2020-07-29 RX ADMIN — VENLAFAXINE HYDROCHLORIDE 75 MG: 75 CAPSULE, EXTENDED RELEASE ORAL at 08:42

## 2020-07-29 RX ADMIN — GABAPENTIN 300 MG: 300 CAPSULE ORAL at 13:45

## 2020-07-29 RX ADMIN — POLYETHYLENE GLYCOL 3350 17 G: 17 POWDER, FOR SOLUTION ORAL at 08:42

## 2020-07-29 RX ADMIN — Medication 4 MG: at 04:36

## 2020-07-29 RX ADMIN — LAMOTRIGINE 25 MG: 25 TABLET ORAL at 08:41

## 2020-07-29 RX ADMIN — PANTOPRAZOLE SODIUM 40 MG: 40 TABLET, DELAYED RELEASE ORAL at 07:33

## 2020-07-29 RX ADMIN — Medication 10 ML: at 23:08

## 2020-07-29 RX ADMIN — Medication 4 MG: at 15:07

## 2020-07-29 RX ADMIN — DOCUSATE SODIUM 50 MG AND SENNOSIDES 8.6 MG 2 TABLET: 8.6; 5 TABLET, FILM COATED ORAL at 08:42

## 2020-07-29 RX ADMIN — SODIUM CHLORIDE, PRESERVATIVE FREE 10 ML: 5 INJECTION INTRAVENOUS at 18:13

## 2020-07-29 RX ADMIN — Medication 4 MG: at 13:05

## 2020-07-29 RX ADMIN — SODIUM CHLORIDE, PRESERVATIVE FREE 100 UNITS: 5 INJECTION INTRAVENOUS at 19:57

## 2020-07-29 RX ADMIN — ATORVASTATIN CALCIUM 20 MG: 20 TABLET, FILM COATED ORAL at 08:42

## 2020-07-29 RX ADMIN — Medication 4 MG: at 19:55

## 2020-07-29 RX ADMIN — ACETAMINOPHEN 650 MG: 325 TABLET, FILM COATED ORAL at 10:54

## 2020-07-29 RX ADMIN — OXYCODONE HYDROCHLORIDE 10 MG: 10 TABLET ORAL at 01:41

## 2020-07-29 RX ADMIN — Medication 10 ML: at 08:43

## 2020-07-29 RX ADMIN — GABAPENTIN 300 MG: 300 CAPSULE ORAL at 19:56

## 2020-07-29 RX ADMIN — Medication 4 MG: at 08:35

## 2020-07-29 RX ADMIN — SODIUM CHLORIDE, PRESERVATIVE FREE 10 ML: 5 INJECTION INTRAVENOUS at 17:21

## 2020-07-29 RX ADMIN — ACETAMINOPHEN 650 MG: 325 TABLET, FILM COATED ORAL at 04:36

## 2020-07-29 RX ADMIN — ACETAMINOPHEN 650 MG: 325 TABLET, FILM COATED ORAL at 21:11

## 2020-07-29 RX ADMIN — OXYCODONE HYDROCHLORIDE 10 MG: 10 TABLET ORAL at 16:04

## 2020-07-29 RX ADMIN — OXYCODONE HYDROCHLORIDE 10 MG: 10 TABLET ORAL at 06:55

## 2020-07-29 RX ADMIN — Medication 4 MG: at 17:21

## 2020-07-29 RX ADMIN — Medication 4 MG: at 22:00

## 2020-07-29 ASSESSMENT — PAIN SCALES - GENERAL
PAINLEVEL_OUTOF10: 9
PAINLEVEL_OUTOF10: 10
PAINLEVEL_OUTOF10: 9
PAINLEVEL_OUTOF10: 8
PAINLEVEL_OUTOF10: 10
PAINLEVEL_OUTOF10: 8
PAINLEVEL_OUTOF10: 9
PAINLEVEL_OUTOF10: 10
PAINLEVEL_OUTOF10: 9
PAINLEVEL_OUTOF10: 8
PAINLEVEL_OUTOF10: 10
PAINLEVEL_OUTOF10: 9
PAINLEVEL_OUTOF10: 9
PAINLEVEL_OUTOF10: 10
PAINLEVEL_OUTOF10: 10

## 2020-07-29 ASSESSMENT — PAIN DESCRIPTION - FREQUENCY
FREQUENCY: CONTINUOUS
FREQUENCY: INTERMITTENT

## 2020-07-29 ASSESSMENT — PAIN DESCRIPTION - ORIENTATION
ORIENTATION: RIGHT;LEFT
ORIENTATION: PROXIMAL
ORIENTATION: RIGHT;LEFT

## 2020-07-29 ASSESSMENT — PAIN DESCRIPTION - DESCRIPTORS
DESCRIPTORS: ACHING;SHARP
DESCRIPTORS: ACHING;DISCOMFORT;DULL
DESCRIPTORS: ACHING;DISCOMFORT

## 2020-07-29 ASSESSMENT — PAIN DESCRIPTION - PAIN TYPE
TYPE: ACUTE PAIN
TYPE: ACUTE PAIN
TYPE: SURGICAL PAIN

## 2020-07-29 ASSESSMENT — PAIN DESCRIPTION - LOCATION
LOCATION: KNEE
LOCATION: BACK;LEG
LOCATION: BACK;LEG

## 2020-07-29 ASSESSMENT — PAIN DESCRIPTION - ONSET
ONSET: ON-GOING
ONSET: UNABLE TO TELL

## 2020-07-29 ASSESSMENT — PAIN - FUNCTIONAL ASSESSMENT: PAIN_FUNCTIONAL_ASSESSMENT: PREVENTS OR INTERFERES SOME ACTIVE ACTIVITIES AND ADLS

## 2020-07-29 ASSESSMENT — PAIN DESCRIPTION - PROGRESSION
CLINICAL_PROGRESSION: NOT CHANGED
CLINICAL_PROGRESSION: NOT CHANGED

## 2020-07-29 NOTE — PROGRESS NOTES
Department of Orthopedic Surgery  Resident Progress Note    Patient seen and examined. Still has pain in bilateral knees which is improved since before surgery. Increased ROM this am compared to yesterday. Describes pain in his inguinal crease. No new complaints. Denies chest pain, shortness of breath, dizziness/lightheadedness. VITALS:  BP (!) 153/81   Pulse 84   Temp 97.3 °F (36.3 °C) (Temporal)   Resp 16   Ht 5' 11\" (1.803 m)   Wt 218 lb 4.1 oz (99 kg)   SpO2 96%   BMI 30.44 kg/m²     General: alert and oriented to person, place and time, well-developed and well-nourished, in no acute distress    MUSCULOSKELETAL:   bilateral lower extremity:  · Dressing C/D/I, mild saturation to left knee ace wrap  · Compartments soft and compressible  · +PF/DF/EHL  · +2/4 DP & PT pulses, Brisk Cap refill, Toes warm and perfused  · Distal sensation grossly intact to Peroneals, Sural, Saphenous, and tibial nrs  · No pain with logroll of the hip. There is TTP about the inguinal crease. There is no pain when isolating the knee on the ipsilateral side and ranging the hip. CBC:   Lab Results   Component Value Date    WBC 13.5 07/29/2020    HGB 10.0 07/29/2020    HCT 29.8 07/29/2020     07/29/2020     PT/INR:    Lab Results   Component Value Date    PROTIME 13.4 07/22/2020    INR 1.2 07/22/2020       Intraop Cultures: Strep agalactiae, new cultures pending    ASSESSMENT  · S/P bilateral knee open washout and debridement - 7/27/20  · S/P Endoscopic I and D - 7/23/20    PLAN      · Continue physical therapy and protocol: WBAT - BLE  · Antibiotics per primary/ ID  · Deep venous thrombosis prophylaxis - Lovenox, early mobilization  · Will need aggressive PT/OT  · PT/OT  · Pain Control: Per primary  · Monitor H&H labs and cultures  · Will continue to monitor hip pain, exam does not illicit concern for septic arthritis of the hip.   · Discuss with attending    Orthopaedic Attending    I have seen and evaluated the

## 2020-07-29 NOTE — CARE COORDINATION
Patient is POD#2 REPEAT BILATERAL KNEE WASHOUT. Per Gayathri Polo from [facility], precert is still pending which was started Friday. I called and left a second voicemail message for Rasihd Murray from [address] [place] [address] to check the status of the precert. per podiatry note today, NIVS demonstrate decreased flow to digits lending to possible complications of healing a distal procedure. However, at this time I would recommend a bedside I&D of right 5th digit. Hens in envelope in soft chart. Last negative Covid-19 test was Saturday 7/25.    Ender Bill RN CM

## 2020-07-29 NOTE — PROGRESS NOTES
Mobility  Supine to sit: attempted but pt in severe pain with movement of BLEs  Sit to supine: NT  Mod A x 2- supine to sit  Max A x 2- sit to supine  Educated pt on technique to increase independence.    Supine to sit: Minimal Assist   Sit to supine: Minimal Assist    Functional Transfers NT Pt completed half stand with Max A x 2  TBD    Functional Mobility NT N/T  TBD   Balance Sitting: NT     Standing: NT     Sitting: CGA     Standing: NT      Activity Tolerance poor Poor  Fair+   Visual/  Perceptual Glasses: \"used too\"          Safety    fair         Comments: Upon arrival pt supine in bed. Pt educated on techniques to increase independence and safety during ADL's, bed mobility, and functional transfers. At end of session pt left seated upright in bed, call light within reach. · Pt has made limited progress towards set goals.      · Continue with current plan of care    Treatment Time In: 2:10            Treatment Time Out: 2: 40           Treatment Charges: Mins Units   Ther Ex  14644     Manual Therapy 23412     Thera Activities 69179 20 1   ADL/Home Mgt 61484 10 1   Neuro Re-ed 95426     Group Therapy      Orthotic manage/training  34590     Non-Billable Time     Total Timed Treatment 30 2       Shiela Pleitez
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
## 2020-07-29 NOTE — PROGRESS NOTES
Physical Therapy  Facility/Department: [facility] SURG ONC  Daily Treatment Note  NAME: Sawyer Sanchez  : 1965  MRN: [id number]    Date of Service: 2020    Referring Provider: Eileen Antonio DO     Evaluating PT:  Deyanira Cabrales, PT, DPT [id number]  Re-Evaluating PT: Omar Brooks PT, DPT [id number]     Room #:  5276/6294-J  Diagnosis:  Acute bilateral knee pain  PMHx/PSHx:  Type 2 DM, chronic pain, OA, neuropathy, HLD  Procedure/Surgery:                    Right knee arthrotomy with exploration and irrigation and debridement                          Left knee arthrotomy with exploration and irrigation and debridement               B knee arthroscopy                R Knee aspiration      Precautions:  Falls, WBAT BLE, 5th digit wound R foot, contact and droplet iso (Rhino +)  Equipment Needs:  TBD     SUBJECTIVE:  Pt lives with 3 children (16, 15, 6 y/o) in a 2 story house with 2 stairs to enter and 1 rail. Bed is on first floor and bath is on first floor. Pt does not access second floor of home. Pt ambulated with [facility] prior to admission.      OBJECTIVE:    Re-Evaluation  Date: 20 Treatment  20 Short Term/ Long Term   Goals   AM-PAC 6 Clicks 3/41  3/06     Was pt agreeable to Eval/treatment? yes  yes     Does pt have pain? 10/10 B knee pain at rest, increased with mobility- RN aware 10/10 B knee pain, increased with mobility      Bed Mobility  Rolling: Max A  Supine to sit: Max A  Sit to supine: Max A  Scooting: Max A Rolling: Min A  Supine to sit: Mod Ax2  Sit to supine: Max A x2  Scooting: Min A to HOB  Rolling: Mod Independent   Supine to sit: Mod Independent   Sit to supine: Mod Independent   Scooting: Mod Independent    Transfers Sit to stand: Max A to attempt (unable to clear B hips)  Stand to sit: NT  Stand pivot: NT Sit to stand: Max Ax2, 50% stance  Stand to sit: Max A x2  Stand pivot: NT  Sit to stand: Mod A  Stand to sit: Mod A  Stand pivot:  Mod A   Ambulation    NT  NT for progressive increase in B knee flexion, education for importance of skilled positioning   Transfer training: attempted sit<>stand, limited by B LE pain with WB. PLAN:    Patient is making continued progress towards established goals. Will continue with current POC.         PLAN:    Time in  1410  Time out  1440    Total Treatment Time  30    CPT codes:  [] Gait training 91300 0 minutes  [] Manual therapy 51306 0 minutes  [x] Therapeutic activities 02555 30 minutes  [] Therapeutic exercises 68098 0 minutes  [] Neuromuscular reeducation 88304 0 minutes      Wellington Alfaro, PT, DPT  [id number]

## 2020-07-29 NOTE — PROGRESS NOTES
Department of Podiatry  Resident Progress Note    Subjective  Patient seen bedside. Improving knee pain. Minimal toe pain. Patient denies any N/V/D/F/C/SOB/CP and has no other complaints at this time. Objective  Vitals:    07/28/20 0915 07/28/20 2030 07/29/20 0030 07/29/20 0835   BP: (!) 140/70 124/82 (!) 153/81 128/80   Pulse: 84 92 84 87   Resp: 18 18 16 18   Temp: 96.9 °F (36.1 °C) 98.2 °F (36.8 °C) 97.3 °F (36.3 °C) 97.7 °F (36.5 °C)   TempSrc: Temporal Temporal Temporal Temporal   SpO2: 97% 95% 96% 95%   Weight:       Height:         LOWER EXTREMITY EXAMINATION   VASCULAR:  DP and PT pulses are palpable on left foot. PT pulse palpable, but DP pulse not palpable on right foot. CFT < 5 seconds B/L, except sluggish to right 5th digit. Mild increase in warmth noted to right foot in comparison to left foot. Edema noted b/l.      NEUROLOGIC:  Protective sensation is diminished but grossly intact.     DERM:  Right 5th digit still dusky in nature, but stable. Very small, intact fluid collection to dorsal right 5th toe. Hyperkeratotic lesion with underlying ulcer noted to lateral aspect of right 5th digit. Ulcer closed with scab. No drainage noted. No malodor noted.      MUSCULOSKELETAL: Pain on palpation of right 5th digit. Motor intact.     LABS:        Recent Labs     07/25/20  0710 07/26/20  0431   WBC 19.5* 16.8*   HGB 11.1* 11.4*   HCT 32.8* 33.9*   * 507*         Recent Labs     07/26/20  0431      K 3.9   CL 93*   CO2 28   BUN 19   CREATININE 0.7     No results for input(s): PROT, INR, APTT in the last 72 hours.     ASSESSMENTS:   -Right 5th toe abscess  -Septic arthritis of knee, bilateral   -Diabetic ulceration of right 5th digit secondary to diabetic neuropathy  -PVD     PLAN:  - ABX per ID  - Vascular studies: diminished blood flow to toe and PAD. -A1c >10%. Better compliance necessary.    - I explained the procedure to the patient who gave verbal consent.   Patient not numbed due to

## 2020-07-29 NOTE — PROGRESS NOTES
Physician Progress Note      Patsy Swanson  CSN #:                  [id number]  :                       1965  ADMIT DATE:       2020 11:25 PM  [address] [place] DATE:  RESPONDING  PROVIDER #:        Padmini Chin DO          QUERY TEXT:    Dear Attending Provider,    Patient admitted with  septic arthritis. Documentation reflects Sepsis from   suspected vertebral OM / discitis in  PN and noted on Problem list dated   20:Sepsis due to group B Streptococcus. If possible, please document in   the progress notes and discharge summary if Sepsis was: The medical record reflects the following:  Risk Factors: Septic Arthritis  Clinical Indicators:: wbc-14.5, -wbc 23.8, -16.8 -13.5, Blood   cultures negative at 5 days (), Max temp noted-99.5, Resp:18-20 and   Pulse:84-98  Treatment: Ceftriaxone iv, ID consult, and lab monitoring    Thank you,  Cuca Lugo RN, BSN, CCDS  [phone]  Options provided:  -- Sepsis confirmed after study  -- Sepsis ruled out after study  -- Sepsis treated and resolved  -- Other - I will add my own diagnosis  -- Disagree - Clinically unable to determine / Unknown  -- Refer to Clinical Documentation Reviewer    PROVIDER RESPONSE TEXT:    Sepsis confirmed after study. Query created by:  Mickey Hardy on 2020 2:35 PM      Electronically signed by:  Padmini Chin DO 2020 5:05 PM

## 2020-07-29 NOTE — CARE COORDINATION
Per Larry Dials from [place], precert has been obtained and will be good through tomorrow. Charge nurse notified and is checking for discharge. [facility] for [address] [place] [address] & [facility] phone# [phone]. Ambulance form and Hens in envelope in soft chart.   Gina Duncan RN CM

## 2020-07-29 NOTE — PROGRESS NOTES
Value Date    WBC 13.5 07/29/2020    RBC 3.29 07/29/2020    HGB 10.0 07/29/2020    HCT 29.8 07/29/2020    MCV 90.6 07/29/2020    MCH 30.4 07/29/2020    MCHC 33.6 07/29/2020    RDW 12.7 07/29/2020     07/29/2020    MPV 9.0 07/29/2020     CMP:    Lab Results   Component Value Date     07/29/2020    K 4.2 07/29/2020    K 4.1 07/19/2020    CL 97 07/29/2020    CO2 26 07/29/2020    BUN 21 07/29/2020    CREATININE 0.7 07/29/2020    GFRAA >60 07/29/2020    LABGLOM >60 07/29/2020    GLUCOSE 143 07/29/2020    PROT 7.1 07/19/2020    LABALBU 4.4 07/19/2020    CALCIUM 8.5 07/29/2020    BILITOT 0.5 07/19/2020    ALKPHOS 74 07/19/2020    AST 24 07/19/2020    ALT 22 07/19/2020     . Student's Assessment and Plan     Bri Esquivel is a 63 y.o. male with a past medical history of type 2 DM managed with insulin, peripheral neuropathy, OA of bilateral knees, lumbar stenosis, and HLD. He presented on 7/21 with bilateral knee and hip pain as well as low back pain. This pain is chronic in nature for him but worsened acutely after a recent fall. X-rays showed effusion in the right knee and bilateral knee OA. Bilateral arthroscopy with washout was done with findings of purulent fluid in both knees. Cultures grew GBS. Open irrigation and washout completed on 2/27    1. Bilateral knee septic arthritis  Right knee arthrocentesis on 7/21 with 13,700 WBC, 92% neutrophils, and no crystals. Currently afebrile. WBC at 13.5 and trending downward. Blood cxs no growth at five days (7/28). S/P bilateral knee arthroscopy, drainage, and debridement on 7/23. Thick, purulent fluid was drained from both knees. Fluid cultures from both knees grew GBS. Patient underwent open irrigation and drainage of hematoma on 7/27.  Gram stain at that time showed no organisms.  - Ortho and ID following  - Underwent open irrigation and washout 7/27  - Continue Ceftriaxone 2 g IV Q24H for GBS (sensitivities confirmed), anticipate 4 weeks of treatment  - Blood cultures negative at 5 days (7/28)  - Labs show low levels of IgG  - Oxycodone and Morphine PRN for pain  - Up to chair today as tolerated    2. Left hip pain  May be 2/2 septic arthritis vs referred pain from knee. Hip XR revealed mild OA in both hip joints. No acute process was noted. Concern for septic arthritis is low. - Ortho continuing to monitor  - Not believed to be infectious etiology  - Evaluate after resolution of knee pain to elucidate etiology  - Oxycodone and Morphine PRN for pain    3. Bilateral lower extremity and lumbar pain  -2/2 diabetic neuropathy vs chronic lumbar stenosis. No radicular pain or parasthesia. Decreased ROM 2/2 pain. Bowel and bladder function is intact. - Continue Gabapentin to 300 mg TID  - Oxycodone and Morphine PRN for pain      4. Type 2 DM - poorly contolled  HbgA1c 10.1. Blood glucose 148 this AM and low 220s-230s overnight. On 35u Lantus. Was taking 30u Lantus at home. Patient has concerns of hypoglycemia while working.  - Continue Lantus 35u QHS  - Continue to monitor glucose    5. Right fifth toe pain and wound likely 2/2 diabetic neuropathy  Likely 2/2 diabetic neuropathy. Small wound present on left 5th digit. X-ray of the foot showed small pocket of air over right 5th digit indicating possible abscess. - Podiatry evaluated, plan for bedside I&D today  - NIVS showed blunted waveforms to all digits    6. Opioid-induced constipation. Patient denies having a bowel movement since admission  - Senokot BID  - Glycolax daily    7. Rhinovirus infection/colonization  - COVID 19 negative    8. HLD  - Atorvastatin 20 mg daily    9. Bilateral knee OA    10. GERD  - Protonix 40 mg daily     11. Hx of Bipolar disorder - stable  - Lamictal 25 mg daily, Effexor 75 mg daily    12. Hx of lumbar spinal stenosis  1/20/2015 CT L-spine: Spinal canal stenosis of severe degree at the L4-L5 level and   of a moderate to severe degree at L3-L4 and L5-S1 levels.    CT L-spine with moderate spinal canal stenosis at L3-4 and L4-5.     DVT prophylaxis/GI prophylaxis:Lovenox/Protonix  Disposition: Continue current management    Trevor Samayoa,  Medical Student  Attending Physician: Dr. Maria Elena Rodriguez

## 2020-07-29 NOTE — PLAN OF CARE
Problem: Pain:  Goal: Pain level will decrease  Description: Pain level will decrease  7/28/2020 2233 by Berna Louise RN  Outcome: Met This Shift     Problem: Pain:  Goal: Control of acute pain  Description: Control of acute pain  7/28/2020 2233 by Berna Louise RN  Outcome: Met This Shift     Problem: Pain:  Goal: Control of chronic pain  Description: Control of chronic pain  7/28/2020 2233 by Berna Louise RN  Outcome: Met This Shift     Problem: Skin Integrity:  Goal: Will show no infection signs and symptoms  Description: Will show no infection signs and symptoms  7/28/2020 2233 by Berna Louise RN  Outcome: Met This Shift     Problem: Skin Integrity:  Goal: Absence of new skin breakdown  Description: Absence of new skin breakdown  7/28/2020 2233 by Berna Louise RN  Outcome: Met This Shift     Problem: Falls - Risk of:  Goal: Will remain free from falls  Description: Will remain free from falls  7/28/2020 2233 by Berna Louise RN  Outcome: Met This Shift     Problem: Falls - Risk of:  Goal: Absence of physical injury  Description: Absence of physical injury  7/28/2020 2233 by Berna Louise RN  Outcome: Met This Shift     Problem: Mobility - Impaired:  Goal: Mobility will improve  Description: Mobility will improve  7/28/2020 2233 by Berna Louise RN  Outcome: Met This Shift

## 2020-07-29 NOTE — PROGRESS NOTES
[facility]  Internal Medicine Residency Program  Progress Note - House Team 2    Patient:  Francis Bonilla 63 y.o. male MRN: [id number]     Date of Service: 7/29/2020     CC: Bilateral knee pain and hip pain, chronic low back pain. Overnight events: No acute events overnight. Patient has saturation over left knee bandage.   - Blood glucose: 148mg/dl  - WBC: 13.5, Hb 10.0.   - VL lower extremity segmental pressure w ppg bilateral: Normal ankle arm index with triphasic normal ankle Doppler tracings, good arterial flow to both feet. Diminished pulse volume recordings noted over the toes bilaterally. Subjective     Have seen patient at bedside this morning. Patient has bilateral knee pain less severe, improved from yesterday. Has minimal 5th digit pain, no tingling or numb feeling. Also has moderate bilateral hip pain. Patient denies fever, chills, chest pain, SOB, dizziness, palpitation. No abdominal pain. Patient still has not had bowel movement. Objective     Physical Exam:  · Vitals: /80   Pulse 87   Temp 97.7 °F (36.5 °C) (Temporal)   Resp 18   Ht 5' 11\" (1.803 m)   Wt 218 lb 4.1 oz (99 kg)   SpO2 95%   BMI 30.44 kg/m²     · I & O - 24hr: 200/2250 (-2050)  · General Appearance: alert, appears stated age and mild distress  · HEENT:  Head: Normocephalic, no lesions, without obvious abnormality. · Neck: no adenopathy, no carotid bruit, no JVD, supple, symmetrical, trachea midline and thyroid not enlarged, symmetric, no tenderness/mass/nodules  · Lung: clear to auscultation bilaterally  · Heart: regular rate and rhythm, S1, S2 normal, no murmur, click, rub or gallop  · Abdomen: soft, non-tender; bowel sounds normal; no masses,  no organomegaly  · Extremities: Ace wrapped bilaterally from thigh-knee-leg. There is saturated over left knee wraps. Active and passive ROM increase from yesterday.  Strength 2/5 bilateral thigh and leg, dorsiflex and plantar flex both feet 4/5. Sensation is intact bilaterally. Pulse 2+ bilaterally.    · Neurologic: Mental status: Alert, oriented, thought content appropriate  Subject  Pertinent Labs & Imaging Studies   malcolm  CBC with Differential:    Lab Results   Component Value Date    WBC 13.5 07/29/2020    RBC 3.29 07/29/2020    HGB 10.0 07/29/2020    HCT 29.8 07/29/2020     07/29/2020    MCV 90.6 07/29/2020    MCH 30.4 07/29/2020    MCHC 33.6 07/29/2020    RDW 12.7 07/29/2020    SEGSPCT 55 02/06/2014    METASPCT 0.9 07/27/2020    LYMPHOPCT 27.8 07/29/2020    MONOPCT 9.6 07/29/2020    MYELOPCT 0.9 07/27/2020    BASOPCT 0.2 07/29/2020    MONOSABS 1.35 07/29/2020    LYMPHSABS 3.78 07/29/2020    EOSABS 0.23 07/29/2020    BASOSABS 0.00 07/29/2020     WBC:    Lab Results   Component Value Date    WBC 13.5 07/29/2020     CMP:    Lab Results   Component Value Date     07/29/2020    K 4.2 07/29/2020    K 4.1 07/19/2020    CL 97 07/29/2020    CO2 26 07/29/2020    BUN 21 07/29/2020    CREATININE 0.7 07/29/2020    GFRAA >60 07/29/2020    LABGLOM >60 07/29/2020    GLUCOSE 143 07/29/2020    PROT 7.1 07/19/2020    LABALBU 4.4 07/19/2020    CALCIUM 8.5 07/29/2020    BILITOT 0.5 07/19/2020    ALKPHOS 74 07/19/2020    AST 24 07/19/2020    ALT 22 07/19/2020     BUN/Creatinine:    Lab Results   Component Value Date    BUN 21 07/29/2020    CREATININE 0.7 07/29/2020     U/A:    Lab Results   Component Value Date    COLORU Yellow 07/21/2020    PROTEINU 100 07/21/2020    PHUR 6.0 07/21/2020    LABCAST FEW 11/02/2018    WBCUA NONE 07/21/2020    RBCUA 1-3 07/21/2020    MUCUS Present 11/02/2018    BACTERIA NONE SEEN 07/21/2020    CLARITYU Clear 07/21/2020    SPECGRAV 1.010 07/21/2020    LEUKOCYTESUR Negative 07/21/2020    UROBILINOGEN 0.2 07/21/2020    BILIRUBINUR Negative 07/21/2020    BLOODU SMALL 07/21/2020    GLUCOSEU 100 07/21/2020     HgBA1c:    Lab Results   Component Value Date    LABA1C 10.1 07/22/2020       Resident's Assessment and Plan     Wallace COLEMAN Lucius Mercado is a 63 y.o. male with PMHx of chronic low back pain, DM2, HTN, peripheral neuropathy, osteo arthritis presented with bilateral knee pain. Patient has been Dx with bilateral knee septic arthritis, rhinovirus infection, 5th digit small abscess from diabetic neuropathy. Patient has right knee arthrocentesis with WBC 13,700, synovial fluid grew Group B streptococcus agalactiae. XR right foot done on 07/26 shows soft tissue edema, small pocket of air in R 5th digit suggesting abscess. Patient has bilateral bilateral knee open washout and debridement 7/27. 1. GBS bilateral knee arthritis s/p bilateral knee open washout and debridement 7/27.    - Patient still have bilateral knee pain, pain is improved since yesterday. - Left knee bandage saturated. - Plan today:  · Continue Rocephin 2g q24h  · Continue pain medication as needed  · Early mobilization : help patient sit up to chair if patient tolerated  · Follow CBC, WBC count, vitals, pain management. · Follow Blood culture. · Continue follow up with orthopedic for early mobilization. 2. 5 digit small abscess   - XR of right foot shows air poket suggesting abscess. - Currently wrapping with betadine bandage  - Patient has bedside I&D today. Minimal purulent drainage was expressed. Plan: Iodine wrapped and change band-aid daily.   - Follow up with podiatry. 3. Bilateral hip pain likely septic arthritis from the knee vs OA  - Patient has bilateral septic knee arthritis from GBS. - XR of the hip shows mild osteoarthropathy of both hip joints. 4. Chronic bilateral lower back pain  - likely 2/2 chronic lumbar stenosis  - No bladder or bowel incontinence. - CT scan of lumbar spine shows no evidence of spinal infection.  - Continue with Gabapentin 300 TID, pain medication  5. Opioid- induced constipation  - Patient has not has bowel movement today  - Senokot BID and Glycolax daily. 6. GERD.  - Currently on pantoprazole 40mg daily.      7. Type 2 DM on Lantus 35 Units QHS, Humalog 18 Units TID  - Today Blood glucose: 143 mg/dl  - Continue regimen, monitor blood glucose daily. DVT ppx: Lovenox   GI ppx: Protonix         Meng Myers MD, PGY-1  Attending physician: Dr. George Tran.

## 2020-07-30 LAB
BASOPHILS ABSOLUTE: 0.06 E9/L (ref 0–0.2)
BASOPHILS RELATIVE PERCENT: 0.5 % (ref 0–2)
EOSINOPHILS ABSOLUTE: 0.2 E9/L (ref 0.05–0.5)
EOSINOPHILS RELATIVE PERCENT: 1.7 % (ref 0–6)
HCT VFR BLD CALC: 27.6 % (ref 37–54)
HEMOGLOBIN: 9.1 G/DL (ref 12.5–16.5)
IMMATURE GRANULOCYTES #: 0.14 E9/L
IMMATURE GRANULOCYTES %: 1.2 % (ref 0–5)
LYMPHOCYTES ABSOLUTE: 2.62 E9/L (ref 1.5–4)
LYMPHOCYTES RELATIVE PERCENT: 22.6 % (ref 20–42)
MCH RBC QN AUTO: 30.1 PG (ref 26–35)
MCHC RBC AUTO-ENTMCNC: 33 % (ref 32–34.5)
MCV RBC AUTO: 91.4 FL (ref 80–99.9)
METER GLUCOSE: 173 MG/DL (ref 74–99)
METER GLUCOSE: 176 MG/DL (ref 74–99)
METER GLUCOSE: 189 MG/DL (ref 74–99)
METER GLUCOSE: 220 MG/DL (ref 74–99)
MONOCYTES ABSOLUTE: 1.68 E9/L (ref 0.1–0.95)
MONOCYTES RELATIVE PERCENT: 14.5 % (ref 2–12)
NEUTROPHILS ABSOLUTE: 6.9 E9/L (ref 1.8–7.3)
NEUTROPHILS RELATIVE PERCENT: 59.5 % (ref 43–80)
OVALOCYTES: ABNORMAL
PDW BLD-RTO: 12.7 FL (ref 11.5–15)
PLATELET # BLD: 525 E9/L (ref 130–450)
PMV BLD AUTO: 8.9 FL (ref 7–12)
POIKILOCYTES: ABNORMAL
POLYCHROMASIA: ABNORMAL
RBC # BLD: 3.02 E12/L (ref 3.8–5.8)
WBC # BLD: 11.6 E9/L (ref 4.5–11.5)

## 2020-07-30 PROCEDURE — 6370000000 HC RX 637 (ALT 250 FOR IP): Performed by: STUDENT IN AN ORGANIZED HEALTH CARE EDUCATION/TRAINING PROGRAM

## 2020-07-30 PROCEDURE — 36415 COLL VENOUS BLD VENIPUNCTURE: CPT

## 2020-07-30 PROCEDURE — 97535 SELF CARE MNGMENT TRAINING: CPT

## 2020-07-30 PROCEDURE — 6360000002 HC RX W HCPCS: Performed by: STUDENT IN AN ORGANIZED HEALTH CARE EDUCATION/TRAINING PROGRAM

## 2020-07-30 PROCEDURE — 2580000003 HC RX 258: Performed by: STUDENT IN AN ORGANIZED HEALTH CARE EDUCATION/TRAINING PROGRAM

## 2020-07-30 PROCEDURE — 6370000000 HC RX 637 (ALT 250 FOR IP): Performed by: INTERNAL MEDICINE

## 2020-07-30 PROCEDURE — 1200000000 HC SEMI PRIVATE

## 2020-07-30 PROCEDURE — 85025 COMPLETE CBC W/AUTO DIFF WBC: CPT

## 2020-07-30 PROCEDURE — 82962 GLUCOSE BLOOD TEST: CPT

## 2020-07-30 PROCEDURE — 97110 THERAPEUTIC EXERCISES: CPT

## 2020-07-30 PROCEDURE — 99232 SBSQ HOSP IP/OBS MODERATE 35: CPT | Performed by: INTERNAL MEDICINE

## 2020-07-30 RX ORDER — OXYCODONE HCL 10 MG/1
10 TABLET, FILM COATED, EXTENDED RELEASE ORAL EVERY 12 HOURS SCHEDULED
Status: DISCONTINUED | OUTPATIENT
Start: 2020-07-30 | End: 2020-07-31 | Stop reason: HOSPADM

## 2020-07-30 RX ORDER — OXYCODONE HYDROCHLORIDE 5 MG/1
5 TABLET ORAL
Status: DISCONTINUED | OUTPATIENT
Start: 2020-07-30 | End: 2020-07-31 | Stop reason: HOSPADM

## 2020-07-30 RX ORDER — METHOCARBAMOL 500 MG/1
1000 TABLET, FILM COATED ORAL 4 TIMES DAILY
Status: DISCONTINUED | OUTPATIENT
Start: 2020-07-30 | End: 2020-07-31

## 2020-07-30 RX ADMIN — POLYETHYLENE GLYCOL 3350 17 G: 17 POWDER, FOR SOLUTION ORAL at 08:57

## 2020-07-30 RX ADMIN — OXYCODONE 5 MG: 5 TABLET ORAL at 15:06

## 2020-07-30 RX ADMIN — OXYCODONE 5 MG: 5 TABLET ORAL at 21:16

## 2020-07-30 RX ADMIN — ACETAMINOPHEN 650 MG: 325 TABLET, FILM COATED ORAL at 22:23

## 2020-07-30 RX ADMIN — GABAPENTIN 300 MG: 300 CAPSULE ORAL at 08:58

## 2020-07-30 RX ADMIN — Medication 4 MG: at 02:20

## 2020-07-30 RX ADMIN — Medication 4 MG: at 08:59

## 2020-07-30 RX ADMIN — Medication 10 ML: at 21:25

## 2020-07-30 RX ADMIN — GABAPENTIN 300 MG: 300 CAPSULE ORAL at 21:08

## 2020-07-30 RX ADMIN — DOCUSATE SODIUM 50 MG AND SENNOSIDES 8.6 MG 2 TABLET: 8.6; 5 TABLET, FILM COATED ORAL at 21:08

## 2020-07-30 RX ADMIN — VENLAFAXINE HYDROCHLORIDE 75 MG: 75 CAPSULE, EXTENDED RELEASE ORAL at 08:57

## 2020-07-30 RX ADMIN — Medication 4 MG: at 06:39

## 2020-07-30 RX ADMIN — OXYCODONE HYDROCHLORIDE 10 MG: 10 TABLET ORAL at 10:14

## 2020-07-30 RX ADMIN — DOCUSATE SODIUM 50 MG AND SENNOSIDES 8.6 MG 2 TABLET: 8.6; 5 TABLET, FILM COATED ORAL at 08:57

## 2020-07-30 RX ADMIN — CEFTRIAXONE 2 G: 2 INJECTION, POWDER, FOR SOLUTION INTRAMUSCULAR; INTRAVENOUS at 17:28

## 2020-07-30 RX ADMIN — METHOCARBAMOL TABLETS 1000 MG: 500 TABLET, COATED ORAL at 12:36

## 2020-07-30 RX ADMIN — Medication 4 MG: at 04:27

## 2020-07-30 RX ADMIN — Medication 4 MG: at 00:05

## 2020-07-30 RX ADMIN — GABAPENTIN 300 MG: 300 CAPSULE ORAL at 15:06

## 2020-07-30 RX ADMIN — INSULIN LISPRO 3 UNITS: 100 INJECTION, SOLUTION INTRAVENOUS; SUBCUTANEOUS at 08:59

## 2020-07-30 RX ADMIN — ATORVASTATIN CALCIUM 20 MG: 20 TABLET, FILM COATED ORAL at 08:57

## 2020-07-30 RX ADMIN — SODIUM CHLORIDE, PRESERVATIVE FREE 100 UNITS: 5 INJECTION INTRAVENOUS at 08:58

## 2020-07-30 RX ADMIN — ENOXAPARIN SODIUM 40 MG: 40 INJECTION SUBCUTANEOUS at 08:56

## 2020-07-30 RX ADMIN — INSULIN GLARGINE 35 UNITS: 100 INJECTION, SOLUTION SUBCUTANEOUS at 21:12

## 2020-07-30 RX ADMIN — OXYCODONE 5 MG: 5 TABLET ORAL at 18:14

## 2020-07-30 RX ADMIN — ACETAMINOPHEN 650 MG: 325 TABLET, FILM COATED ORAL at 10:14

## 2020-07-30 RX ADMIN — MORPHINE SULFATE 4 MG: 4 INJECTION, SOLUTION INTRAMUSCULAR; INTRAVENOUS at 11:15

## 2020-07-30 RX ADMIN — ACETAMINOPHEN 650 MG: 325 TABLET, FILM COATED ORAL at 04:14

## 2020-07-30 RX ADMIN — INSULIN LISPRO 3 UNITS: 100 INJECTION, SOLUTION INTRAVENOUS; SUBCUTANEOUS at 12:36

## 2020-07-30 RX ADMIN — INSULIN LISPRO 3 UNITS: 100 INJECTION, SOLUTION INTRAVENOUS; SUBCUTANEOUS at 17:31

## 2020-07-30 RX ADMIN — Medication 10 ML: at 08:58

## 2020-07-30 RX ADMIN — ACETAMINOPHEN 650 MG: 325 TABLET, FILM COATED ORAL at 17:29

## 2020-07-30 RX ADMIN — METHOCARBAMOL TABLETS 1000 MG: 500 TABLET, COATED ORAL at 17:28

## 2020-07-30 RX ADMIN — PANTOPRAZOLE SODIUM 40 MG: 40 TABLET, DELAYED RELEASE ORAL at 08:58

## 2020-07-30 RX ADMIN — LAMOTRIGINE 25 MG: 25 TABLET ORAL at 08:57

## 2020-07-30 RX ADMIN — SODIUM CHLORIDE, PRESERVATIVE FREE 100 UNITS: 5 INJECTION INTRAVENOUS at 21:25

## 2020-07-30 RX ADMIN — METHOCARBAMOL TABLETS 1000 MG: 500 TABLET, COATED ORAL at 21:08

## 2020-07-30 RX ADMIN — INSULIN LISPRO 3 UNITS: 100 INJECTION, SOLUTION INTRAVENOUS; SUBCUTANEOUS at 21:12

## 2020-07-30 ASSESSMENT — PAIN SCALES - GENERAL
PAINLEVEL_OUTOF10: 9
PAINLEVEL_OUTOF10: 10
PAINLEVEL_OUTOF10: 10
PAINLEVEL_OUTOF10: 9
PAINLEVEL_OUTOF10: 10
PAINLEVEL_OUTOF10: 9
PAINLEVEL_OUTOF10: 10
PAINLEVEL_OUTOF10: 3
PAINLEVEL_OUTOF10: 10
PAINLEVEL_OUTOF10: 10
PAINLEVEL_OUTOF10: 8
PAINLEVEL_OUTOF10: 10
PAINLEVEL_OUTOF10: 8
PAINLEVEL_OUTOF10: 3
PAINLEVEL_OUTOF10: 9
PAINLEVEL_OUTOF10: 10
PAINLEVEL_OUTOF10: 9

## 2020-07-30 NOTE — PROGRESS NOTES
[facility] [address]  Internal Medicine Residency Program  Progress Note - House Team 2    Patient:  Joe Sandhu 63 y.o. male MRN: [id number]     Date of Service: 7/30/2020     CC: Bilateral knee pain, bilateral hip pain, lumbar back pain  Overnight events: NAOE    Subjective     Patient seen and evaluated this am. He states his pain has is the same from yesterday and requests improvement in pain management. Denies having a bowel movement since admission. Denies any chest pain, palpitations, shortness of breath, fevers, chills, or sweats. Denies headache. Patient states physical therapy yesterday was very painful. Objective     Physical Exam:  Vitals: BP (!) 148/78   Pulse 94   Temp 98.8 °F (37.1 °C) (Temporal)   Resp 18   Ht 5' 11\" (1.803 m)   Wt 218 lb 4.1 oz (99 kg)   SpO2 94%   BMI 30.44 kg/m²     I & O - 24hr:     Intake/Output Summary (Last 24 hours) at 7/30/2020 1408  Last data filed at 7/30/2020 1240  Gross per 24 hour   Intake 20 ml   Output 3225 ml   Net -3205 ml     · General Appearance: alert, appears stated age, cooperative. Appears uncomfortable. No acute distress  · HEENT: Normocephalic, no lesions, without obvious abnormality. · Neck: no adenopathy, supple, symmetrical, trachea midline and thyroid not enlarged, symmetric, no tenderness/mass/nodules  · Lung: clear to auscultation bilaterally. · Heart: regular rate and rhythm, S1, S2 normal, no murmur, click, rub or gallop  · Abdomen: No tenderness to palpation. Bowel sounds present. No distention. · Extremities:  ACE wraps over right thigh, knee, and leg. Right Bandage C/D/I. New left bandage changed  And C/D/I. Bilaterally the knees are very tender to palpation. Sensation is equal and intact although decreased distally. Decreased range of motion secondary to pain. AROM and PROM in feet and ankles are limited due to pain but improved. Compartments soft.  Wound on right fifth digit, currently covered with Band-aid. Pertinent Labs & Imaging Studies     CBC:   Lab Results   Component Value Date    WBC 11.6 07/30/2020    RBC 3.02 07/30/2020    HGB 9.1 07/30/2020    HCT 27.6 07/30/2020    MCV 91.4 07/30/2020    MCH 30.1 07/30/2020    MCHC 33.0 07/30/2020    RDW 12.7 07/30/2020     07/30/2020    MPV 8.9 07/30/2020     CMP:    Lab Results   Component Value Date     07/29/2020    K 4.2 07/29/2020    K 4.1 07/19/2020    CL 97 07/29/2020    CO2 26 07/29/2020    BUN 21 07/29/2020    CREATININE 0.7 07/29/2020    GFRAA >60 07/29/2020    LABGLOM >60 07/29/2020    GLUCOSE 143 07/29/2020    PROT 7.1 07/19/2020    LABALBU 4.4 07/19/2020    CALCIUM 8.5 07/29/2020    BILITOT 0.5 07/19/2020    ALKPHOS 74 07/19/2020    AST 24 07/19/2020    ALT 22 07/19/2020     . Student's Assessment and Plan     Parul Lan is a 63 y.o. male with a past medical history of type 2 DM managed with insulin, peripheral neuropathy, OA of bilateral knees, lumbar stenosis, and HLD. He presented on 7/21 with bilateral knee and hip pain as well as low back pain. This pain is chronic in nature for him but worsened acutely after a recent fall. X-rays showed effusion in the right knee and bilateral knee OA. Bilateral arthroscopy with washout was done with findings of purulent fluid in both knees. Cultures grew GBS. Open irrigation and washout completed on 2/27. Patient not ready for discharge at this time. 1. Bilateral knee septic arthritis  Currently afebrile. WBC at 11.9 and trending downward. Blood cxs no growth at five days (7/28). S/P bilateral knee arthroscopy, drainage, and debridement on 7/23. Thick, purulent fluid was drained from both knees. Fluid cultures from both knees grew GBS. Patient underwent open irrigation and drainage of hematoma on 7/27.  Gram stain at that time showed no organisms.  - Ortho and ID following  - Continue Ceftriaxone 2 g IV Q24H for GBS (sensitivities confirmed), anticipate 4 weeks of treatment  - Oxycodone 10mg ER BID and Oxycodone 5mg Q3 PRN for pain  - Add Robaxin 1000 mg for pain   - Up to chair today as tolerated, encouraged activity and PT    2. Left hip pain  May be 2/2 septic arthritis vs referred pain from knee. Hip XR revealed mild OA in both hip joints. No acute process was noted. Concern for septic arthritis is low. - Ortho continuing to monitor  - Not believed to be infectious etiology  - Evaluate after resolution of knee pain to elucidate etiology  - Oxycodone 10mg ER BID and Oxycodone 5mg Q3 PRN for pain    3. Bilateral lower extremity and lumbar pain  -2/2 diabetic neuropathy vs chronic lumbar stenosis. No radicular pain or parasthesia. Decreased ROM 2/2 pain. Bowel and bladder function is intact. - Continue Gabapentin to 300 mg TID  - Add Robaxin 1000mg for pain   - Oxycodone 10mg ER BID and Oxycodone 5mg Q3 PRN for pain    4. Type 2 DM - poorly contolled  HbgA1c 10.1. Blood glucose 148 this AM and low 220s-230s overnight. On 35u Lantus. Was taking 30u Lantus at home. Patient has concerns of hypoglycemia while working.  - Continue Lantus 35u QHS  - Continue to monitor glucose    5. Right fifth toe pain and wound likely 2/2 diabetic neuropathy  Likely 2/2 diabetic neuropathy. Small wound present on left 5th digit. X-ray of the foot showed small pocket of air over right 5th digit indicating possible abscess. Now s/p I&D 2/  - Bedside I&D performed  - Podiatry continuing to follow    6. Opioid-induced constipation. Patient denies having a bowel movement since admission  - Senokot BID  - Glycolax daily  - Magnesium hydroxide added    7. Rhinovirus infection/colonization  - COVID 19 negative  - Contact and droplet precautions can be removed    8. HLD  - Atorvastatin 20 mg daily    9. Bilateral knee OA    10. GERD  - Protonix 40 mg daily     11. Hx of Bipolar disorder - stable  - Lamictal 25 mg daily, Effexor 75 mg daily    12.  Hx of lumbar spinal stenosis  1/20/2015 CT L-spine: Spinal canal stenosis of severe degree at the L4-L5 level and   of a moderate to severe degree at L3-L4 and L5-S1 levels. CT L-spine with moderate spinal canal stenosis at L3-4 and L4-5. DVT prophylaxis/GI prophylaxis:Lovenox/Protonix  Disposition: Continue current management, patient not ready for discharge based on clinical status.     Jaxson Mcnulty,  Medical Student  Attending Physician: Dr. Glory Martin

## 2020-07-30 NOTE — PROGRESS NOTES
Occupational Therapy  OT BEDSIDE TREATMENT NOTE      Date:2020  Patient Name: Ivette Chan  MRN: [id number]  : 1965  Room: [address] [place] [address]-A     Evaluating OT: Aj Mobley OTR/L #[id number]     AM-PAC Daily Activity Raw Score:      Recommended Adaptive Equipment: AE for LB dressing/bathing; TBD      Diagnosis: Acute bilateral knee pain [M25.561, M25.562]  Septic arthritis ([address] [M00.9]  Referring Provider: Danuta Townsend DO; Donnie Thompson DO   Patient presented to ED for      Surgery: [facility] ARTHROSCOPY (Bilateral )   Pertinent Medical History: depression, HLD, neuropathy, OA, type II DM      Precautions:  Falls, WBAT BLE, contact/droplet isolation, R 5th toe wound     Home Living: Pt lives 2 daughters and son in a 2 floor house w/ basement, 1st floor set-up     Bathroom setup: tub/shower    Equipment owned: standard [place], [facility]      Prior Level of Function: assistance prn with ADLs(especially with LB dressing/bathing) , Assistance prn with IADLs; ambulated SPC prn; pt reports last time he was ambulating was Saturday night  Driving: Yes  Occupation: works at [employer] [place] [employer]     Pain Level: pt having extreme pain upon arrival which only heightened with movement from PTA present & attempted bed mobility, pt tearful with pain, unable to open eyes & make eye contact at times due to severe pain, nsg arrived & reported she is unable to medicate pt until 25 more minutes.       Cognition: A&O: 4/4; Follows 1 step directions              Memory:  good               Sequencing:  good               Problem solving: fair              Judgement/safety: fair                 Functional Assessment:    Initial Eval Status  Date: 20 Treatment Status  Date: 20 STGs/LTGs  Treatment frequency: 1-4x/wk   Feeding Independent  Independent      Grooming Set-up Set-up  Bed level Mod. I   UB Dressing Minimal Assist  NT  Due to severe pain unable to complete    Min A  Prior level Independent    LB Dressing Dependent to don socks NT  Dep  Prior level  Modified [place] w/ AE prn   Bathing Maximal Assist (simulated) Max A  simulated  Minimal Assist w/ AE as needed   Toileting Dependent Dependent  Moderate Assist    Bed Mobility  Supine to sit: attempted but pt in severe pain with movement of BLEs  Sit to supine: NT  NT  Due to pain unable to complete    Mod A x 2- supine to sit  Max A x 2- sit to supine  Prior level    Supine to sit: Minimal Assist   Sit to supine: Minimal Assist    Functional Transfers NT Pt completed half stand with Max A x 2  TBD    Functional Mobility NT N/T  TBD   Balance Sitting: NT     Standing: NT     Sitting: unable to complete      Standing: unable to complete     Activity Tolerance poor Poor   Severe pain this date B knees Fair+   Visual/  Perceptual Glasses: \"used too\"          Safety    fair         Comments: Upon arrival pt supine in bed. Pt educated on deep breathing techniques due to severe pain this date. Treatment was very limited to pain, pt crying & moaning in pain. Unable to make eye contact & even mumbling at times. Nsg present & aware, unable to medicate for another 25 minutes. At end of session pt was positioned comfortable as possible in bed, call light within reach. · Pt has made limited progress towards set goals. · Continue with current plan of care    Treatment Time In: 10:35           Treatment Time Out: 10:45           Treatment Charges: Mins Units   Ther Ex  36393     Manual Therapy 19872     Thera Activities 50500     ADL/Home Mgt 36993 10 1   Neuro Re-ed 43429     Group Therapy      Orthotic manage/training  09762     Non-Billable Time     Total Timed Treatment 10 1       Sruthi JFrannie  [address] [place] [address], [address] [place] [address]

## 2020-07-30 NOTE — PROGRESS NOTES
Physical Therapy  Facility/Department: [facility] MED SURG ONC  Daily Treatment Note  NAME: Osman King  : 1965  MRN: [id number]    Date of Service: 2020    Referring Provider: Marine Napoles DO     Evaluating PT:  Tracy Hood, PT, DPT [id number]  Re-Evaluating PT: Janes Brenda PT, DPT [id number]     Room #:  19/4211-E  Diagnosis:  Acute bilateral knee pain  PMHx/PSHx:  Type 2 DM, chronic pain, OA, neuropathy, HLD  Procedure/Surgery:                    Right knee arthrotomy with exploration and irrigation and debridement                          Left knee arthrotomy with exploration and irrigation and debridement               B knee arthroscopy                R Knee aspiration      Precautions:  Falls, WBAT BLE, 5th digit wound R foot, contact and droplet iso (Rhino +)  Equipment Needs:  TBD     SUBJECTIVE:  Pt lives with 3 children (16, 15, 6 y/o) in a 2 story house with 2 stairs to enter and 1 rail. Bed is on first floor and bath is on first floor. Pt does not access second floor of home. Pt ambulated with [facility] prior to admission.      OBJECTIVE:    Re-Evaluation  Date: 20 Treatment  20 Short Term/ Long Term   Goals   AM-PAC 6 Clicks 0/18  3/60     Was pt agreeable to Eval/treatment? yes  yes     Does pt have pain? 10/10 B knee pain at rest, increased with mobility- RN aware 10/10 B knee pain at rest and nursing notified.      Bed Mobility  Rolling: Max A  Supine to sit: Max A  Sit to supine: Max A  Scooting: Max A NT Rolling: Mod Independent   Supine to sit: Mod Independent   Sit to supine: Mod Independent   Scooting: Mod Independent    Transfers Sit to stand: Max A to attempt (unable to clear B hips)  Stand to sit: NT  Stand pivot: NT NT  Sit to stand: Mod A  Stand to sit: Mod A  Stand pivot: Mod A   Ambulation    NT  NT >25 feet using WW with Mod A   Stair negotiation: ascended and descended  NT   TBD   ROM BUE:  WNL  BLE:  Limited B knee limited throughout due to pain.   R knee held in approximately 30* flexion and L knee in approx 60* flexion       Strength BUE:  WNL  BLE:  2+/5 hip, 2-/5 knee and 3-/5 ankle       Balance Sitting EOB:  SBA  Dynamic Standing:  NT    Sitting EOB:  Independent  Dynamic Standing: Mod A      Therapeutic exercises : Ankle pumps ( x10)   Quad sets ( x5 )   Heel slides ( x5)     Patient education  Pt educated on importance of ROM to B LE to improve strength and function. Patient response to education:   Pt verbalized understanding Pt demonstrated skill Pt requires further education in this area   x xpartially with verbal cues and assistance x     ASSESSMENT:    Comments: Pt cleared pt for physical therapy. Pt in contact isolation. Pt in bed upon arrival and agreed to participate in therapy. Pt completed exercises in supine as noted above. Very limited tolerance to ROM exercises to B LE due to increased \"burning \" pain. Pt yelling and crying during ROM to B LE. Pt refused functional mobility due to increased pain of B knees at rest.Increased time required due to pt requiring frequent rest due to pain . Instruction in diaphragmatic breathing  Pt remained in bed with call light in reach. Treatment:  Patient practiced and was instructed in the following treatment:     Therapeutic exercises - completed as noted above.         Time in  1035  Time out  1055    Total Treatment Time  20    CPT codes:  [] Gait training 73289 0 minutes  [] Manual therapy 57424 0 minutes  [] Therapeutic activities 16696 0 minutes  [x] Therapeutic exercises 47600 20 minutes  [] Neuromuscular reeducation 06783 0 minutes      Dariana Going [id number]

## 2020-07-30 NOTE — PROGRESS NOTES
[facility]  Internal Medicine Residency Program  Progress Note - House Team 2    Patient:  Kishor Alvares 63 y.o. male MRN: [id number]     Date of Service: 7/30/2020     CC: Bilateral knee pain, low back pain, bilateral hip pain, N/T  Overnight events: None  Hospital Day: 10    Subjective     Patient was laying comfortably in bed eating breakfast this morning. He still complains of severe 10/10 bilateral knee pain but states that is improved from before surgery and he has better range of motion. Reports that his right 5th toe is significantly better after drainage and his left hip pain is much better as well. Has still not had a BM yet. Objective     Physical Exam:  Vitals: BP (!) 148/78   Pulse 94   Temp 98.8 °F (37.1 °C) (Temporal)   Resp 18   Ht 5' 11\" (1.803 m)   Wt 218 lb 4.1 oz (99 kg)   SpO2 94%   BMI 30.44 kg/m²     I & O - 24hr:     Intake/Output Summary (Last 24 hours) at 7/30/2020 1418  Last data filed at 7/30/2020 1240  Gross per 24 hour   Intake 20 ml   Output 3225 ml   Net -3205 ml     · General Appearance: alert, appears stated age, cooperative. Appears comfortable. · HEENT:  Head: Normocephalic, no lesions, without obvious abnormality. · Neck: no adenopathy, supple, symmetrical, trachea midline and thyroid not enlarged, symmetric, no tenderness/mass/nodules  · Lung: clear to auscultation bilaterally  · Heart: regular rate and rhythm, S1, S2 normal, no murmur, click, rub or gallop  · Abdomen: Mildly tender to palpation diffusely. · Extremities:  Bilaterally the knees are exquisitely tender to palpation. ACE wrap over the right thigh, knee, and leg. Wrap over the left knee that is clean and dry. Sensation is equal and intact. Decreased range of motion secondary to pain but increased compared to previous days. Able to dorsiflex and plantar flex both feet 3/5. Bandage over the right fifth toe with good capillary refill and no tenderness to palpation.   · Neurologic: Mental status: Alert, oriented, thought content appropriate    Pertinent Labs & Imaging Studies     CBC:   Lab Results   Component Value Date    WBC 11.6 07/30/2020    RBC 3.02 07/30/2020    HGB 9.1 07/30/2020    HCT 27.6 07/30/2020    MCV 91.4 07/30/2020    MCH 30.1 07/30/2020    MCHC 33.0 07/30/2020    RDW 12.7 07/30/2020     07/30/2020    MPV 8.9 07/30/2020     CMP:    Lab Results   Component Value Date     07/29/2020    K 4.2 07/29/2020    K 4.1 07/19/2020    CL 97 07/29/2020    CO2 26 07/29/2020    BUN 21 07/29/2020    CREATININE 0.7 07/29/2020    GFRAA >60 07/29/2020    LABGLOM >60 07/29/2020    GLUCOSE 143 07/29/2020    PROT 7.1 07/19/2020    LABALBU 4.4 07/19/2020    CALCIUM 8.5 07/29/2020    BILITOT 0.5 07/19/2020    ALKPHOS 74 07/19/2020    AST 24 07/19/2020    ALT 22 07/19/2020 7/21/2020 01:22   Body Fluid Culture (Knee) Strep agalactiae (Beta Strep Group B) (A)      7/22/2020 09:22   Hemoglobin A1C 10.1 (H)      7/22/2020 17:00   Human Rhinovirus/Enterovirus by PCR DETECTED (A)     Order Date:  7/23/2020 11:15 AM    EXAM: CT LUMBAR SPINE W WO CONTRAST    INDICATION: Severe back pain. Recent septic arthritis. COMPARISON: 20 January 2015    FINDINGS:    No acute fracture or dislocation is seen. There is moderate spinal    stenosis at L3-4 L4-5 relate disc bulging and facet arthropathy. There    are no endplate erosions to suggest infection.         Postcontrast there is no evidence of any abnormal enhancement to    suggest infection or otherwise. Impression    Moderate spinal canal stenosis related to disc bulging and facet    arthropathy at L3-4 and L4-5. No evidence of spinal infection. Order Date: 7/26/2020 10:15 AM    Exam: XR HIP BILATERAL W AP PELVIS (2 VIEWS)    Impression    NO EVIDENCE OF FRACTURE OR DISLOCATION.     Mild osteoarthropathy of both hip joints      Order Date: 7/26/2020 2:00 PM    Exam: XR FOOT RIGHT (MIN 3 VIEWS)    Impression    NO ACUTE FRACTURE OR DISLOCATION RIGHT FOOT    Soft tissue edema of all the digits    Right fifth digit near the distal phalax on the lateral aspect there    is a small pocket of air suggesting of a small abscess. Lower Extremity Arterial Segmental Pressures  Normal ankle arm index with triphasic normal ankle Doppler tracings    and good arterial flow to both feet based upon the pulse volume    recordings over the metatarsals         Diminished pulse volume recordings noted over the toes bilaterally      Resident's Assessment and Plan     Abelardo Ballard is a 63 y.o. male with a PMHx of type 2 DM, peripheral neuropathy, OA, lumbar stenosis, and HLD who presented with bilateral lower extremity, hip, and low back pain. He has chronic pain in these areas but it acutely worsened after a fall on 7/18. CT head, CT C-spine, CT A/P were all negative for acute abnormalities. X-rays showed effusion in the right knee and bilateral knee OA. Right knee arthrocentesis on 7/21 with 13,700 WBC, 92% neutrophils, and no crystals. Synovial fluid grew GBS. Arthrocentesis of both knees done on 7/23 with stable findings in right knee and septic range WBC in left knee. Bilateral arthroscopy with washout was done with findings of purulent fluid in both knees. CT L-spine with no evidence of infection. Open I&D bilateral knees on 7/27.    1. Bilateral knee septic arthritis  Right knee arthrocentesis on 7/21 with 13,700 WBC, 92% neutrophils, and no crystals. Synovial fluid grew GBS. Blood cx's no growth. S/P bilateral knee arthroscopy, drainage, and debridement on 7/23. Thick purulent fluid was drained from both knees. Fluid cultures from both knees grew GBS. Open I&D bilateral knees on 7/27 with dark gelatinous material from both knees. 7/27 cultures with no growth. WBC 11.6 today.   · Continue Ceftriaxone 2 g IV Q24H until 8/24 via midline   Transition to PO opioids: Oxycontin 10 mg BID and Oxycodone 5 mg Q3H PRN   Start Robaxin 1,000 mg QID    2. Chronic bilateral lower extremity and lumbar pain  2/2 diabetic neuropathy vs chronic lumbar stenosis. Associated with tingling. Decreased ROM 2/2 pain, difficult to assess strength. No bowel or bladder incontinence. Is on Norco 7.5-325 at home for chronic pain. CT L-spine with moderate spinal canal stenosis at L3-4 and L4-5. No evidence of spinal infection. · Continue Gabapentin to 300 mg TID  · Continue pain medications as above    3. Left hip pain  Improving, likely 2/2 referred pain from knee. Hip XR revealed mild OA in both hip joints with no acute fracture or dislocation. Per ortho, physical exam is not consistent with septic arthritis. 4. Normocytic anemia  Baseline hgb 13-14. Today Hgb 9.1. Likely post-op anemia. 5. Hx of lumbar spinal stenosis  1/20/2015 CT L-spine: Spinal canal stenosis of severe degree at the L4-L5 level and   of a moderate to severe degree at L3-L4 and L5-S1 levels. CT L-spine with moderate spinal canal stenosis at L3-4 and L4-5.    6. Type 2 DM  HbgA1c 10.1. Was taking 30u Lantus every other day at home. Has peripheral neuropathy. BG well-controlled today 100 to 160's.  Continue Lantus 35u QHS   HDSS    7. Right fifth toe abscess and pain  Likely 2/2 diabetic neuropathy. Has burning and tingling in the right 5th toe that worsened over the past week. Small wound present as well. XR suggestive of small abscess in right 5th toe. Podiatry drained it on 7/29. Much improved per patient. 8. Rhinovirus infection/colonization    9. Opioid-induced constipation  · Give Milk of Magnesia. Continue Senokot BID and scheduled PEG daily    10. HLD  Atorvastatin 20 mg daily    11. Bipolar disorder  Lamictal 25 mg daily, Effexor 75 mg daily    12. GERD  Protonix 40 mg daily     13. Bilateral knee OA    PT/OT evaluation: PT 8/24 / OT 14/24  DVT prophylaxis/GI prophylaxis: Lovenox/Protonix  Disposition: Transition to PO pain meds and improve pain control.  Likely discharge to [facility] tomorrow.     Vanessa Moreno MD,  PGY-1  Attending Physician: Dr. Alejo Obrien
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
## 2020-07-30 NOTE — CARE COORDINATION
Precert has been obtained for [facility] and is good through today. Charge nurse is aware and is checking for discharge. Transportation set up via [employer] for [address] [facility] phone# [phone]. Ambulance form and Hens in envelope in soft chart. Hasmukh Christianson RN, CM      Per internal med note today, Tentative discharge next 24 - 48 hours pending response to new pain regimen.  Zander Haber from [facility] notified and will resubmit precert tomorrow am.  Hasmukh Christianson RN CM

## 2020-07-30 NOTE — PLAN OF CARE
Problem: Pain:  Goal: Pain level will decrease  Description: Pain level will decrease  Outcome: Met This Shift  Goal: Control of acute pain  Description: Control of acute pain  Outcome: Met This Shift  Goal: Control of chronic pain  Description: Control of chronic pain  Outcome: Met This Shift     Problem: Skin Integrity:  Goal: Will show no infection signs and symptoms  Description: Will show no infection signs and symptoms  Outcome: Met This Shift  Goal: Absence of new skin breakdown  Description: Absence of new skin breakdown  Outcome: Met This Shift     Problem: Falls - Risk of:  Goal: Will remain free from falls  Description: Will remain free from falls  Outcome: Met This Shift  Goal: Absence of physical injury  Description: Absence of physical injury  Outcome: Met This Shift     Problem: Mobility - Impaired:  Goal: Mobility will improve  Description: Mobility will improve  Outcome: Met This Shift     Problem: Increased nutrient needs (NI-5.1)  Goal: Food and/or Nutrient Delivery  Description: Individualized approach for food/nutrient provision.   7/30/2020 1132 by Barbara Gardner, MS, RD, LD  Outcome: Met This Shift

## 2020-07-31 VITALS
TEMPERATURE: 97.8 F | HEART RATE: 94 BPM | DIASTOLIC BLOOD PRESSURE: 78 MMHG | HEIGHT: 71 IN | RESPIRATION RATE: 16 BRPM | OXYGEN SATURATION: 92 % | BODY MASS INDEX: 30.56 KG/M2 | SYSTOLIC BLOOD PRESSURE: 125 MMHG | WEIGHT: 218.26 LBS

## 2020-07-31 LAB
METER GLUCOSE: 147 MG/DL (ref 74–99)
METER GLUCOSE: 187 MG/DL (ref 74–99)

## 2020-07-31 PROCEDURE — 2580000003 HC RX 258: Performed by: STUDENT IN AN ORGANIZED HEALTH CARE EDUCATION/TRAINING PROGRAM

## 2020-07-31 PROCEDURE — 99232 SBSQ HOSP IP/OBS MODERATE 35: CPT | Performed by: INTERNAL MEDICINE

## 2020-07-31 PROCEDURE — 6360000002 HC RX W HCPCS: Performed by: STUDENT IN AN ORGANIZED HEALTH CARE EDUCATION/TRAINING PROGRAM

## 2020-07-31 PROCEDURE — 6370000000 HC RX 637 (ALT 250 FOR IP): Performed by: STUDENT IN AN ORGANIZED HEALTH CARE EDUCATION/TRAINING PROGRAM

## 2020-07-31 PROCEDURE — 6370000000 HC RX 637 (ALT 250 FOR IP): Performed by: INTERNAL MEDICINE

## 2020-07-31 PROCEDURE — 82962 GLUCOSE BLOOD TEST: CPT

## 2020-07-31 RX ORDER — ATORVASTATIN CALCIUM 20 MG/1
20 TABLET, FILM COATED ORAL DAILY
Qty: 7 TABLET | Refills: 0 | Status: SHIPPED | OUTPATIENT
Start: 2020-08-01 | End: 2021-02-19 | Stop reason: ALTCHOICE

## 2020-07-31 RX ORDER — OXYCODONE HYDROCHLORIDE 5 MG/1
5 TABLET ORAL
Qty: 56 TABLET | Refills: 0 | Status: ON HOLD | OUTPATIENT
Start: 2020-07-31 | End: 2020-08-14 | Stop reason: HOSPADM

## 2020-07-31 RX ORDER — METHOCARBAMOL 500 MG/1
1000 TABLET, FILM COATED ORAL 4 TIMES DAILY PRN
Status: DISCONTINUED | OUTPATIENT
Start: 2020-07-31 | End: 2020-07-31 | Stop reason: HOSPADM

## 2020-07-31 RX ORDER — PANTOPRAZOLE SODIUM 40 MG/1
40 TABLET, DELAYED RELEASE ORAL
Qty: 7 TABLET | Refills: 0 | Status: SHIPPED | OUTPATIENT
Start: 2020-08-01 | End: 2021-11-08 | Stop reason: ALTCHOICE

## 2020-07-31 RX ORDER — OXYCODONE HCL 10 MG/1
10 TABLET, FILM COATED, EXTENDED RELEASE ORAL EVERY 12 HOURS SCHEDULED
Qty: 14 EACH | Refills: 0 | Status: ON HOLD | OUTPATIENT
Start: 2020-07-31 | End: 2020-08-14 | Stop reason: HOSPADM

## 2020-07-31 RX ORDER — INSULIN GLARGINE 100 [IU]/ML
35 INJECTION, SOLUTION SUBCUTANEOUS NIGHTLY
Qty: 1 VIAL | Refills: 3 | Status: ON HOLD
Start: 2020-07-31 | End: 2020-08-14 | Stop reason: HOSPADM

## 2020-07-31 RX ORDER — METHOCARBAMOL 750 MG/1
1500 TABLET, FILM COATED ORAL 4 TIMES DAILY
Status: DISCONTINUED | OUTPATIENT
Start: 2020-07-31 | End: 2020-07-31

## 2020-07-31 RX ORDER — SENNA AND DOCUSATE SODIUM 50; 8.6 MG/1; MG/1
2 TABLET, FILM COATED ORAL 2 TIMES DAILY
Qty: 28 TABLET | Refills: 0 | Status: SHIPPED
Start: 2020-07-31 | End: 2020-07-31 | Stop reason: HOSPADM

## 2020-07-31 RX ORDER — METHOCARBAMOL 500 MG/1
1000 TABLET, FILM COATED ORAL 4 TIMES DAILY PRN
Qty: 56 TABLET | Refills: 0 | Status: SHIPPED | OUTPATIENT
Start: 2020-07-31 | End: 2021-02-19 | Stop reason: ALTCHOICE

## 2020-07-31 RX ORDER — POLYETHYLENE GLYCOL 3350 17 G/17G
17 POWDER, FOR SOLUTION ORAL DAILY
Qty: 7 EACH | Refills: 0 | Status: SHIPPED
Start: 2020-08-01 | End: 2020-07-31 | Stop reason: HOSPADM

## 2020-07-31 RX ADMIN — GABAPENTIN 300 MG: 300 CAPSULE ORAL at 09:14

## 2020-07-31 RX ADMIN — PANTOPRAZOLE SODIUM 40 MG: 40 TABLET, DELAYED RELEASE ORAL at 05:40

## 2020-07-31 RX ADMIN — INSULIN LISPRO 3 UNITS: 100 INJECTION, SOLUTION INTRAVENOUS; SUBCUTANEOUS at 11:46

## 2020-07-31 RX ADMIN — ACETAMINOPHEN 650 MG: 325 TABLET, FILM COATED ORAL at 15:29

## 2020-07-31 RX ADMIN — OXYCODONE 5 MG: 5 TABLET ORAL at 03:05

## 2020-07-31 RX ADMIN — ACETAMINOPHEN 650 MG: 325 TABLET, FILM COATED ORAL at 11:42

## 2020-07-31 RX ADMIN — VENLAFAXINE HYDROCHLORIDE 75 MG: 75 CAPSULE, EXTENDED RELEASE ORAL at 09:14

## 2020-07-31 RX ADMIN — LAMOTRIGINE 25 MG: 25 TABLET ORAL at 09:14

## 2020-07-31 RX ADMIN — MAGNESIUM HYDROXIDE 30 ML: 2400 SUSPENSION ORAL at 09:14

## 2020-07-31 RX ADMIN — OXYCODONE 5 MG: 5 TABLET ORAL at 15:27

## 2020-07-31 RX ADMIN — Medication 10 ML: at 09:15

## 2020-07-31 RX ADMIN — METHOCARBAMOL TABLETS 1000 MG: 500 TABLET, COATED ORAL at 09:14

## 2020-07-31 RX ADMIN — INSULIN LISPRO 3 UNITS: 100 INJECTION, SOLUTION INTRAVENOUS; SUBCUTANEOUS at 09:24

## 2020-07-31 RX ADMIN — ACETAMINOPHEN 650 MG: 325 TABLET, FILM COATED ORAL at 04:57

## 2020-07-31 RX ADMIN — POLYETHYLENE GLYCOL 3350 17 G: 17 POWDER, FOR SOLUTION ORAL at 09:14

## 2020-07-31 RX ADMIN — SODIUM CHLORIDE, PRESERVATIVE FREE 100 UNITS: 5 INJECTION INTRAVENOUS at 09:15

## 2020-07-31 RX ADMIN — OXYCODONE HYDROCHLORIDE 10 MG: 10 TABLET, FILM COATED, EXTENDED RELEASE ORAL at 09:13

## 2020-07-31 RX ADMIN — DOCUSATE SODIUM 50 MG AND SENNOSIDES 8.6 MG 2 TABLET: 8.6; 5 TABLET, FILM COATED ORAL at 09:13

## 2020-07-31 RX ADMIN — OXYCODONE 5 MG: 5 TABLET ORAL at 11:48

## 2020-07-31 RX ADMIN — OXYCODONE HYDROCHLORIDE 10 MG: 10 TABLET, FILM COATED, EXTENDED RELEASE ORAL at 00:04

## 2020-07-31 RX ADMIN — CEFTRIAXONE 2 G: 2 INJECTION, POWDER, FOR SOLUTION INTRAMUSCULAR; INTRAVENOUS at 16:36

## 2020-07-31 RX ADMIN — ATORVASTATIN CALCIUM 20 MG: 20 TABLET, FILM COATED ORAL at 09:14

## 2020-07-31 RX ADMIN — ENOXAPARIN SODIUM 40 MG: 40 INJECTION SUBCUTANEOUS at 09:14

## 2020-07-31 RX ADMIN — OXYCODONE 5 MG: 5 TABLET ORAL at 06:02

## 2020-07-31 ASSESSMENT — PAIN SCALES - GENERAL
PAINLEVEL_OUTOF10: 10
PAINLEVEL_OUTOF10: 9
PAINLEVEL_OUTOF10: 10
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
## 2020-07-31 NOTE — PROGRESS NOTES
[facility]  Internal Medicine Residency Program  Progress Note - House Team 2    Patient:  Jovanna Trent 63 y.o. male MRN: [id number]     Date of Service: 7/31/2020     CC: Bilateral knee pain, bilateral hip pain, lumbar back pain    Overnight events: NAOE    Subjective     Patient seen and evaluated this am. He states he did not sleep well due to pain and that the alterations in his analgesics did not improved his pain level significantly. Denies having a bowel movement since admission although endorses passing significant flatus. Denies any chest pain, palpitations, shortness of breath, fevers, chills, or sweats. Denies headache. Patient states physical therapy yesterday was very painful and he is concerned about his ability to be mobile. Patient stated he felt drowsy this am from pain medication. Patient asked if he was to be discharged today. Told it was largely dependent on his activity level throughout the day and precert for placement. Precert received. Objective     Physical Exam:  Vitals: /70   Pulse 95   Temp 98.6 °F (37 °C) (Temporal)   Resp 16   Ht 5' 11\" (1.803 m)   Wt 218 lb 4.1 oz (99 kg)   SpO2 92%   BMI 30.44 kg/m²     I & O - 24hr:     Intake/Output Summary (Last 24 hours) at 7/31/2020 1239  Last data filed at 7/31/2020 0750  Gross per 24 hour   Intake 240 ml   Output 3000 ml   Net -2760 ml     · General Appearance: alert, appears stated age, cooperative. Appears uncomfortable. No acute distress. · HEENT: Normocephalic, no lesions, without obvious abnormality. · Neck: no adenopathy, supple, symmetrical, trachea midline and thyroid not enlarged, symmetric, no tenderness/mass/nodules  · Lung: clear to auscultation bilaterally. · Heart: regular rate and rhythm, S1, S2 normal, no murmur, click, rub or gallop  · Abdomen: No tenderness to palpation. Bowel sounds hyperactive. · Extremities:  ACE wraps over both thigh, knee, and leg.  Bandages C/D/I. Bilaterally the knees are very tender to palpation. Sensation is equal and intact although decreased distally. Decreased range of motion secondary to pain in hips and knees BL. AROM and PROM in feet and ankles are limited due to pain but improved from yesterday. Compartments soft. No pain with passive dorsiflexion of feet bilaterally. Endorses \"pressure\" with calf compression. No erythema, edema or warmth BLE. Wound on right fifth digit. covered with Band-aid. Pertinent Labs & Imaging Studies     CBC:   Lab Results   Component Value Date    WBC 11.6 07/30/2020    RBC 3.02 07/30/2020    HGB 9.1 07/30/2020    HCT 27.6 07/30/2020    MCV 91.4 07/30/2020    MCH 30.1 07/30/2020    MCHC 33.0 07/30/2020    RDW 12.7 07/30/2020     07/30/2020    MPV 8.9 07/30/2020     CMP:    Lab Results   Component Value Date     07/29/2020    K 4.2 07/29/2020    K 4.1 07/19/2020    CL 97 07/29/2020    CO2 26 07/29/2020    BUN 21 07/29/2020    CREATININE 0.7 07/29/2020    GFRAA >60 07/29/2020    LABGLOM >60 07/29/2020    GLUCOSE 143 07/29/2020    PROT 7.1 07/19/2020    LABALBU 4.4 07/19/2020    CALCIUM 8.5 07/29/2020    BILITOT 0.5 07/19/2020    ALKPHOS 74 07/19/2020    AST 24 07/19/2020    ALT 22 07/19/2020     . Student's Assessment and Plan     Neo Brown is a 63 y.o. male with a past medical history of type 2 DM managed with insulin, peripheral neuropathy, OA of bilateral knees, lumbar stenosis, and HLD. He presented on 7/21 with bilateral knee and hip pain as well as low back pain. This pain is chronic in nature for him but worsened acutely after a recent fall. X-rays showed effusion in the right knee and bilateral knee OA. Bilateral arthroscopy with washout was done with findings of purulent fluid in both knees. Cultures grew GBS. Open irrigation and washout completed on 2/27. Patient continues to have limited mobility of BLE.  Re precert obtained today and patient is being prepared for discharge. 1. Bilateral knee septic arthritis  Currently afebrile. WBC at 11.9 and trending downward. Blood cxs no growth at five days (7/28). S/P bilateral knee arthroscopy, drainage, and debridement on 7/23. Thick, purulent fluid was drained from both knees. Fluid cultures from both knees grew GBS. Patient underwent open irrigation and drainage of hematoma on 7/27. Gram stain at that time showed no organisms.  - Ortho and ID following  - Continue Ceftriaxone 2 g IV Q24H for GBS (sensitivities confirmed), anticipate 4 weeks of treatment  - Oxycodone 10mg ER BID and Oxycodone 5mg Q3 PRN for pain  - Robaxin 1000 mg QID PRN for pain   - Encouraged activity and PT    2. Left hip pain  May be 2/2 septic arthritis vs referred pain from knee. Hip XR revealed mild OA in both hip joints. No acute process was noted. Concern for septic arthritis is low. - Ortho continuing to monitor  - Not believed to be infectious etiology  - Evaluate after resolution of knee pain to elucidate etiology  - Oxycodone 10mg ER BID and Oxycodone 5mg Q3 PRN for pain    3. Bilateral lower extremity and lumbar pain  -2/2 diabetic neuropathy vs chronic lumbar stenosis. No radicular pain or parasthesia. Decreased ROM 2/2 pain. Bowel and bladder function is intact. - Discontinue Gabapentin  - Change Robaxin to 1000mg QID PRN for pain   - Oxycodone 10mg ER BID and Oxycodone 5mg Q3 PRN for pain    4. Type 2 DM - poorly contolled  HbgA1c 10.1. Blood glucose 147 this AM and 220 max overnight. Improved from previous On 35u Lantus. Was taking 30u Lantus at home. Patient has concerns of hypoglycemia while working.  - Continue Lantus 35u QHS  - Continue to monitor glucose    5. Right fifth toe pain and wound likely 2/2 diabetic neuropathy  Likely 2/2 diabetic neuropathy. Small wound present on left 5th digit. X-ray of the foot showed small pocket of air over right 5th digit indicating possible abscess.  Now s/p I&D 7/29  - Bedside I&D performed  - Podiatry continuing to follow    6. Opioid-induced constipation. Patient denies having a bowel movement since admission  - Senokot BID  - Glycolax daily  - Magnesium hydroxide    7. Rhinovirus infection/colonization  - COVID 19 negative    8. HLD  - Atorvastatin 20 mg daily    9. Bilateral knee OA    10. GERD  - Protonix 40 mg daily     11. Hx of Bipolar disorder - stable  - Lamictal 25 mg daily, Effexor 75 mg daily    12. Hx of lumbar spinal stenosis  1/20/2015 CT L-spine: Spinal canal stenosis of severe degree at the L4-L5 level and   of a moderate to severe degree at L3-L4 and L5-S1 levels. CT L-spine with moderate spinal canal stenosis at L3-4 and L4-5.     DVT prophylaxis/GI prophylaxis:Lovenox/Protonix  Disposition: plan for discharge today to [address] [place],  Medical Student  Attending Physician: Dr. Glory Martin

## 2020-07-31 NOTE — PROGRESS NOTES
[facility]  Internal Medicine Residency / [address]    Attending Physician Statement  I have discussed the case, including pertinent history and exam findings with the resident and the team.  I have seen and examined the patient and the key elements of the encounter have been performed by me. I agree with the assessment, plan and orders as documented by the resident. Ongoing pain, little bit more sedated today. 1. Bilateral knee septic arthritis from strep agalactiae s/p open washout and debridement on 7/27 and endoscopic incision and drainage on 7/23  2. Acute on chronic lumbar pain from lumbar stenosis  3. Rhinovirus infection  4. Uncontrolled DM with hyperglycemia  5. Diabetic ulceration right fifth digit s/p bedside debridement  6. Normocytic anemia: iatrogenic with many blood draws/ chronic disease with uncontrolled DM / infection    Discontinue Neurontin, make Robaxin as needed. His range of motion is a little better passively. Medically he is overall improving but will have a long road ahead of him for ongoing rehabilitation. Medically he is stable to discharge to rehab with drug regimen as present noting changes to pain medicine. He needs to continue on DVT prophylaxis when he leaves given his debilitated state. Remainder of medical problems as per resident note.       Otilio Escoto DO  Internal Medicine Residency Faculty

## 2020-07-31 NOTE — PLAN OF CARE
Problem: Pain:  Goal: Pain level will decrease  Description: Pain level will decrease  Outcome: Met This Shift     Problem: Pain:  Goal: Control of acute pain  Description: Control of acute pain  Outcome: Met This Shift     Problem: Skin Integrity:  Goal: Will show no infection signs and symptoms  Description: Will show no infection signs and symptoms  Outcome: Met This Shift

## 2020-07-31 NOTE — PROGRESS NOTES
Department of Podiatry  Resident Progress Note    Subjective  Patient seen bedside for right fifth digit wound. No digital pain. Still endorses bilateral leg pain. States he is going to rehab facility tomorrow. Patient denies any new constitutional symptoms and has no other LE complaints at this time. Objective  Vitals:    07/30/20 0845 07/30/20 1730 07/30/20 2015 07/31/20 0830   BP: (!) 148/78 130/68 138/75 120/70   Pulse: 94 91 98 95   Resp: 18 18 18 16   Temp: 98.8 °F (37.1 °C) 98.3 °F (36.8 °C) 97.8 °F (36.6 °C) 98.6 °F (37 °C)   TempSrc: Temporal Temporal Temporal Temporal   SpO2: 94% 94% 98% 92%   Weight:       Height:         LABS:   Recent Labs     07/29/20  0445 07/30/20  0655   WBC 13.5* 11.6*   HGB 10.0* 9.1*   HCT 29.8* 27.6*   * 525*     Recent Labs     07/29/20  0445      K 4.2   CL 97*   CO2 26   BUN 21*   CREATININE 0.7       LOWER EXTREMITY EXAMINATION   VASCULAR:  DP and PT pulses are palpable on left foot. PT pulse palpable, but DP pulse not palpable on right foot. CFT < 5 seconds B/L, sluggish to right 5th digit. Moderate edema in b/l LE. Increased temperature gradient from right leg to 5th digit.     NEUROLOGIC:  Protective sensation is diminished in distal lower extremity bilaterally.     DERM:      Right 5th digit still dusky in nature, but stable. Gangrenous changes apparent on plantar apsect of digit. Hyperkeratotic lesion with underlying ulcer noted to lateral aspect of right 5th digit. Eschar overlying. No drainage noted. No malodor noted.      MUSCULOSKELETAL: Pain on palpation of right 5th digit. Motor intact.       ASSESSMENTS:   1. Right 5th toe abscess - drained (7/29)  2. Diabetic ulceration of right 5th digit secondary to diabetic neuropathy  3. Hyperkeratotic lesion R 5th digit  4. PAD  5. Septic arthritis of knee, bilateral    PLAN:  -ABX per ID, ceftriaxone. -A1c >10%. Better compliance necessary. -I&D site looks good today.   Band-aid/BD to be changed daily until healed.  -Okay for d/c from podiatry standpoint. Endorses SNF placement.  -Follow-up in office with Dr. Lyle Arce 1 week post d/c.  -Signing off case     DW: [employer].  Lyle Arce, [place], [address] [place] [address], [address] [place] [address]  Office: [phone]  Cell: [phone]

## 2020-07-31 NOTE — ADT AUTH CERT
Utilization Reviews         Septic Arthritis - Care Day 9 (7/31/2020) by Madonna Calabrese RN         Review Status  Review Entered    Completed  7/31/2020 15:30        Criteria Review       Care Day: 9 Care Date: 7/31/2020 Level of Care: Inpatient Floor    Guideline Day 3    Level Of Care    (X) Floor    Clinical Status    ( ) * Joint exam clinically improved    (X) * Need for inpatient surgical management absent    (X) * Pain absent or managed    7/31/2020 3:30 PM EDT by Noemi Castillo      oxycontin 10mg po twice daily, roxicodone 5mg po prn x 4    Routes    (X) * Oral hydration, medications    (X) Diet as tolerated    Interventions    (X) WBC, ESR, C-reactive protein    7/31/2020 3:30 PM EDT by Noemi Castillo      see notes for lab values    Medications    (X) Parenteral antibiotics    7/31/2020 3:30 PM EDT by Noemi Castillo      rocephin 2gm IV Q24hrs    * Milestone    Additional Notes    07/31/2020 care day 9       Meds: roxicodone 5mg po prn x4, effexor 75mg po daily, oxycontin 10mg po twice daily, robaxin 1000mg po four times daily, MOM 30ml PO daily, lamictal 25mg po daily, insulin lispro ss subq four times daily, lantus 35 units subq nightly, neurontin 300mg po three times daily, lovenox 40mg subq daily, rocephin 2gm IV Q24hrs, tylenol 650mg po Q6hrs scheduled, lipitor 20mg po daily       ortho note   Patient seen and examined.  Still has pain in bilateral knees which is slowly improving.  Increased ROM this am compared to yesterday. Puma Redding is having continued pain, IV narcotics have been DCed, robaxin and Toradol have been ordered for multimodal pain control.         Has not walked with PT         VITALS:  /75   Pulse 98   Temp 97.8 °F (36.6 °C) (Temporal)   Resp 18   Ht 5' 11\" (1.803 m)   Wt 218 lb 4.1 oz (99 kg)   SpO2 98%   BMI 30.44 kg/m²       Intraop Cultures: Strep agalactiae, New cultures currently have no growth.         ASSESSMENT    · S/P bilateral knee open washout and debridement - 7/27/20    · S/P Endoscopic I and D - 7/23/20         PLAN        · Continue physical therapy and protocol: WBAT - BLE    · Antibiotics per primary/ ID    · Deep venous thrombosis prophylaxis - Lovenox, early mobilization    · Will need aggressive PT/OT    · PT/OT    · Pain Control: Per primary    · Monitor H&H labs and cultures    · Will continue to monitor hip pain, exam does not illicit concern for septic arthritis of the hip. · toradol x 2 doses to help with progression with therapy. · At this point, Suma Becker has not showed any continued signs of infection of his knees or hip other than pain.  Patients case is difficult due to his continued pain 2/2 to narcotic sensitization as well as hematomas in the knee joints.  Aggressive therapy will need to continue during his rehab stay.  Patient aware that pain will be associated with therapy, but needs to keep working to make sure his knees do not become stiff, along with lung and skin integrity.  Okay to DC to rehab if medically stable. · Discuss with attending       ID NOTE        Assessment:    Streptococcus agalactiae septic native bilateral knee arthritis, s/p wash-out on 07/23, bilateral knee arthrotomy with exploration and irrigation and debridement on 07/27    Rhinovirus infection/colonization    Leukocytosis, resolving         Plan:    Continue ceftriaxone 2 g every 24 hours for 4 weeks of IV antibiotic therapy from 07/27-08/24. Maintain midline care and monitor labs and schedule follow-up appointment per IV antibiotic protocol for OPAT as indicated on discharge instructions. Follow up with me at [address] [place] [address] on 08/20. IM NOTE        1. Bilateral knee septic arthritis    Currently afebrile. WBC at 11.9 and trending downward. Blood cxs no growth at five days (7/28). S/P bilateral knee arthroscopy, drainage, and debridement on 7/23. Thick, purulent fluid was drained from both knees. Fluid cultures from both knees grew GBS.      8. HLD    - Atorvastatin 20 mg daily         9. Bilateral knee OA         10. GERD    - Protonix 40 mg daily         11. Hx of Bipolar disorder - stable    - Lamictal 25 mg daily, Effexor 75 mg daily         12. Hx of lumbar spinal stenosis    1/20/2015 CT L-spine: Spinal canal stenosis of severe degree at the L4-L5 level and    of a moderate to severe degree at L3-L4 and L5-S1 levels. CT L-spine with moderate spinal canal stenosis at L3-4 and L4-5.         DVT prophylaxis/GI prophylaxis:Lovenox/Protonix    Disposition: plan for discharge today to [place] [address] is [facility] at discharge, awaiting re precert. Transportation set up via [employer] for [address] [facility] & [facility] phone# [phone]. Ambulance form and Hens in envelope in soft chart. last negative Covid-19 was on Saturday 7/25. Herminio Hector RN, CM              Per Cheryl Tsai from [facility], re precert has been obtained. Transportation set up via [employer] phone# [phone] for 4:30 pm today. Charge nurse, RN, liaison and patient all notified. Ambulance form and Hens in envelope in soft chart. last negative Covid-19 was on Saturday 7/25.  Charge  Nurse is checking for discharge order. Herminio Hector RN, CM        Septic Arthritis - Care Day 8 (7/30/2020) by Erlin Gleason RN         Review Status  Review Entered    Completed  7/31/2020 15:28        Criteria Review       Care Day: 8 Care Date: 7/30/2020 Level of Care: Inpatient Floor    Guideline Day 3    Level Of Care    (X) Floor    Clinical Status    ( ) * Joint exam clinically improved    ( ) * Need for inpatient surgical management absent    ( ) * Pain absent or managed    7/31/2020 3:28 PM EDT by Espinoza Dave      morphine 4mg IV PRN x 6, oxy IR 5mg po prn x3, oxy IR 10mg po prn x1    Routes    (X) * Oral hydration, medications    (X) Diet as tolerated    Interventions    (X) WBC, ESR, C-reactive protein    Medications    (X) Parenteral antibiotics    7/31/2020 3:28 PM EDT by Alayna Collado      rocephin 2gm IV Q24hrs,    * Milestone    Additional Notes    07/30/2020       Labs    WBC: 11.6 (H)    RBC: 3.02 (L)    Hemoglobin Quant: 9.1 (L)    Hematocrit: 27.6 (L)    Platelet Count: 326 (H)    Monocytes %: 14.5 (H)    Monocytes Absolute: 1.68 (H)          Meds: roxicodone 5mg po prn x3, oxy IR 10mg po prn x1, morphine 4mg IV PRN x6, effexor 75mg po daily, robaxin 1000mg po four times daily, MOM 30ml PO daily, lamictal 25mg po daily, insulin lispro ss subq four times daily, lantus 35 units subq nightly, neurontin 300mg po three times daily, lovenox 40mg subq daily, rocephin 2gm IV Q24hrs, tylenol 650mg po Q6hrs scheduled, lipitor 20mg po daily       ortho note        Patient seen and examined. Still has pain in bilateral knees which is slowly improving.  Increased ROM this am compared to yesterday.  Describes pain in his inguinal crease. No new complaints.  Denies chest pain, shortness of breath, dizziness/lightheadedness.         Has not walked with PT         VITALS:  /66   Pulse 92   Temp 98.4 °F (36.9 °C) (Temporal)   Resp 16   Ht 5' 11\" (1.803 m)   Wt 218 lb 4.1 oz (99 kg)   SpO2 95%   BMI 30.44 kg/m²            ASSESSMENT    · S/P bilateral knee open washout and debridement - 7/27/20    · S/P Endoscopic I and D - 7/23/20         PLAN        · Continue physical therapy and protocol: WBAT - BLE    · Antibiotics per primary/ ID    · Deep venous thrombosis prophylaxis - Lovenox, early mobilization    · Will need aggressive PT/OT    · PT/OT    · Pain Control: Per primary- okay to wean narcotics from out standpoint. · Monitor H&H labs and cultures    · Will continue to monitor hip pain, exam does not illicit concern for septic arthritis of the hip. · Discuss with attending       CM NOTE   Precert has been obtained for [facility] and is good through today. Charge nurse is aware and is checking for discharge. Transportation set up via [employer] for [address] [facility] & [facility] phone# [phone]. Ambulance form and Hens in envelope in soft chart. Adrianne Quintana RN CM              Per internal med note today, Tentative discharge next 24 - 48 hours pending response to new pain regimen. Nirmala Fernandes from [facility] notified and will resubmit precert tomorrow am. Adrianne Quintana RN CM       IM NOTE   1. Bilateral knee septic arthritis    Currently afebrile. WBC at 11.9 and trending downward. Blood cxs no growth at five days (7/28). S/P bilateral knee arthroscopy, drainage, and debridement on 7/23. Thick, purulent fluid was drained from both knees. Fluid cultures from both knees grew GBS. Patient underwent open irrigation and drainage of hematoma on 7/27. Gram stain at that time showed no organisms.    - Ortho and ID following    - Continue Ceftriaxone 2 g IV Q24H for GBS (sensitivities confirmed), anticipate 4 weeks of treatment    - Oxycodone 10mg ER BID and Oxycodone 5mg Q3 PRN for pain    - Add Robaxin 1000 mg for pain    - Up to chair today as tolerated, encouraged activity and PT         2. Left hip pain    May be 2/2 septic arthritis vs referred pain from knee. Hip XR revealed mild OA in both hip joints. No acute process was noted. Concern for septic arthritis is low. - Ortho continuing to monitor    - Not believed to be infectious etiology    - Evaluate after resolution of knee pain to elucidate etiology    - Oxycodone 10mg ER BID and Oxycodone 5mg Q3 PRN for pain         3. Bilateral lower extremity and lumbar pain    -2/2 diabetic neuropathy vs chronic lumbar stenosis. No radicular pain or parasthesia. Decreased ROM 2/2 pain. Bowel and bladder function is intact. - Continue Gabapentin to 300 mg TID    - Add Robaxin 1000mg for pain    - Oxycodone 10mg ER BID and Oxycodone 5mg Q3 PRN for pain         4. Type 2 DM - poorly contolled    HbgA1c 10.1. Blood glucose 148 this AM and low 220s-230s overnight. On 35u Lantus. Was taking 30u Lantus at home. Patient has concerns of hypoglycemia while working.    - Continue Lantus 35u QHS    - Continue to monitor glucose         5. Right fifth toe pain and wound likely 2/2 diabetic neuropathy    Likely 2/2 diabetic neuropathy. Small wound present on left 5th digit. X-ray of the foot showed small pocket of air over right 5th digit indicating possible abscess. Now s/p I&D 2/    - Bedside I&D performed    - Podiatry continuing to follow         6. Opioid-induced constipation. Patient denies having a bowel movement since admission    - Senokot BID    - Glycolax daily    - Magnesium hydroxide added         7. Rhinovirus infection/colonization    - COVID 19 negative    - Contact and droplet precautions can be removed         8. HLD    - Atorvastatin 20 mg daily         9. Bilateral knee OA         10. GERD    - Protonix 40 mg daily         11. Hx of Bipolar disorder - stable    - Lamictal 25 mg daily, Effexor 75 mg daily         12. Hx of lumbar spinal stenosis    1/20/2015 CT L-spine: Spinal canal stenosis of severe degree at the L4-L5 level and    of a moderate to severe degree at L3-L4 and L5-S1 levels.     CT L-spine with moderate spinal canal stenosis at L3-4 and L4-5.         DVT prophylaxis/GI prophylaxis:Lovenox/Protonix    Disposition: Continue current management, patient not ready for discharge based on clinical status.        Septic Arthritis - Care Day 7 (7/29/2020) by Mike Solis RN         Review Status  Review Entered    Completed  7/31/2020 15:27        Criteria Review       Care Day: 7 Care Date: 7/29/2020 Level of Care: Inpatient Floor    Guideline Day 3    Level Of Care    (X) Floor    Clinical Status    ( ) * Joint exam clinically improved    ( ) * Need for inpatient surgical management absent    ( ) * Pain absent or managed    7/31/2020 3:27 PM EDT by Dairl Angelucci      morphine 4mg IV PRN x 9    Routes    (X) * Oral hydration, medications    (X) Diet as tolerated    Interventions    (X) WBC, ESR, C-reactive protein    Medications    (X) Parenteral antibiotics    7/31/2020 3:27 PM EDT by Yessica Mcpherson      rocephin 2gm IV Q24hrs,    * Milestone    Additional Notes    07/29/2020 care day 7       Labs    Chloride: 97 (L)    BUN: 21 (H)    Glucose: 143 (H)    Calcium: 8.5 (L)    WBC: 13.5 (H)    RBC: 3.29 (L)    Hemoglobin Quant: 10.0 (L)    Hematocrit: 29.8 (L)          Meds: oxy IR 10mg po prn x5, morphine 4mg IV PRN x9, effexor 75mg po daily, MOM 30ml PO daily, lamictal 25mg po daily, insulin lispro ss subq four times daily, lantus 35 units subq nightly, neurontin 300mg po three times daily, lovenox 40mg subq daily, rocephin 2gm IV Q24hrs, tylenol 650mg po Q6hrs scheduled, lipitor 20mg po daily          ortho note         Patient seen and examined. Still has pain in bilateral knees which is improved since before surgery.  Increased ROM this am compared to yesterday.  Describes pain in his inguinal crease. No new complaints.  Denies chest pain, shortness of breath, dizziness/lightheadedness.         VITALS:  BP (!) 153/81   Pulse 84   Temp 97.3 °F (36.3 °C) (Temporal)   Resp 16   Ht 5' 11\" (1.803 m)   Wt 218 lb 4.1 oz (99 kg)   SpO2 96%   BMI 30.44 kg/m²       ASSESSMENT    · S/P bilateral knee open washout and debridement - 7/27/20    · S/P Endoscopic I and D - 7/23/20         PLAN        · Continue physical therapy and protocol: WBAT - BLE    · Antibiotics per primary/ ID    · Deep venous thrombosis prophylaxis - Lovenox, early mobilization    · Will need aggressive PT/OT    · PT/OT    · Pain Control: Per primary    · Monitor H&H labs and cultures    · Will continue to monitor hip pain, exam does not illicit concern for septic arthritis of the hip.     · Discuss with attending            ID NOTE   Assessment:    Streptococcus agalactiae septic native bilateral knee arthritis, s/p wash-out on 07/23, bilateral knee arthrotomy with exploration and irrigation and debridement on 07/27    Rhinovirus infection/colonization    Leukocytosis         Plan:    Continue ceftriaxone 2 g every 24 hours for 4 weeks of IV antibiotic therapy from 07/27-08/24. Maintain midline care and monitor labs and schedule follow-up appointment per IV antibiotic protocol for OPAT as indicated on discharge instructions. [facility]       IM NOTE    1. Bilateral knee septic arthritis    Right knee arthrocentesis on 7/21 with 13,700 WBC, 92% neutrophils, and no crystals. Currently afebrile. WBC at 13.5 and trending downward. Blood cxs no growth at five days (7/28). S/P bilateral knee arthroscopy, drainage, and debridement on 7/23. Thick, purulent fluid was drained from both knees. Fluid cultures from both knees grew GBS. Patient underwent open irrigation and drainage of hematoma on 7/27. Gram stain at that time showed no organisms.    - Ortho and ID following    - Underwent open irrigation and washout 7/27    - Continue Ceftriaxone 2 g IV Q24H for GBS (sensitivities confirmed), anticipate 4 weeks of treatment    - Blood cultures negative at 5 days (7/28)    - Labs show low levels of IgG    - Oxycodone and Morphine PRN for pain    - Up to chair today as tolerated         2. Left hip pain    May be 2/2 septic arthritis vs referred pain from knee. Hip XR revealed mild OA in both hip joints. No acute process was noted. Concern for septic arthritis is low. - Ortho continuing to monitor    - Not believed to be infectious etiology    - Evaluate after resolution of knee pain to elucidate etiology    - Oxycodone and Morphine PRN for pain         3. Bilateral lower extremity and lumbar pain    -2/2 diabetic neuropathy vs chronic lumbar stenosis. No radicular pain or parasthesia. Decreased ROM 2/2 pain. Bowel and bladder function is intact. - Continue Gabapentin to 300 mg TID    - Oxycodone and Morphine PRN for pain              4.  Type 2 DM - poorly contolled pain throughout and a dressing of iodine and band-aid was applied.  0cc EBL.  Band-aid to be changed daily until healed.             Septic Arthritis - Care Day 6 (7/28/2020) by Carole Dos Santos RN         Review Status  Review Entered    Completed  7/31/2020 15:26        Criteria Review       Care Day: 6 Care Date: 7/28/2020 Level of Care: Inpatient Floor    Guideline Day 3    Level Of Care    (X) Floor    Clinical Status    ( ) * Joint exam clinically improved    ( ) * Need for inpatient surgical management absent    ( ) * Pain absent or managed    7/31/2020 3:26 PM EDT by Randy Contreras      morphine 4mg IV PRN x3, morphine 2mg IV PRN x1, oxy IR 10mg PO PRN x 5    Routes    ( ) * Oral hydration, medications    (X) Diet as tolerated    Interventions    (X) WBC, ESR, C-reactive protein    7/31/2020 3:25 PM EDT by Randy Contreras      see notes for lab values    Medications    (X) Parenteral antibiotics    7/31/2020 3:25 PM EDT by Randy Contreras      ancef 2gm IV Q8hrs, rocephin 2gm IV Q24hrs,    * Milestone    Additional Notes    07/28/2020 care day 6       Labs    WBC: 15.8 (H)    RBC: 3.75 (L)    Hemoglobin Quant: 11.3 (L)    Hematocrit: 34.0 (L)    Platelet Count: 093 (H)    Neutrophils %: 82.5 (H)    Lymphocyte %: 7.5 (L)    Neutrophils Absolute: 13.04 (H)    Lymphocytes Absolute: 1.19 (L)    Monocytes Absolute: 1.26 (H)    Eosinophils Absolute: 0.00 (L)          Meds: oxy IR 10mg po prn x5, morphine 4mg IV PRN x3, morphine 2mg IV PRN x1, effexor 75mg po daily, MOM 30ml PO daily, lamictal 25mg po daily, insulin lispro ss subq four times daily, lantus 35 units subq nightly, neurontin 300mg po three times daily, lovenox 40mg subq daily, ancef 2gm IV Q8hrs, rocephin 2gm IV Q24hrs, tylenol 650mg po Q6hrs scheduled, lipitor 20mg po daily       ortho note        Patient seen and examined. Still has pain in bilateral knees which is improved since before surgery.  More ROM this AM. No new complaints.  Denies chest pain, shortness of breath, dizziness/lightheadedness.         VITALS:  BP (!) 150/84   Pulse 98   Temp 97.4 °F (36.3 °C) (Temporal)   Resp 18   Ht 5' 11\" (1.803 m)   Wt 218 lb 4.1 oz (99 kg)   SpO2 97%   BMI 30.44 kg/m²       ASSESSMENT    · S/P bilateral knee open washout and debridement - 7/27/20    · S/P Endoscopic I and D - 7/23/20         PLAN        · Continue physical therapy and protocol: WBAT - BLE    · Antibiotics per primary/ ID    · Deep venous thrombosis prophylaxis - Lovenox, early mobilization    · Will need aggressive PT/OT    · PT/OT    · Pain Control: Per primary    · Monitor H&H labs and cultures    · Discuss with attending         podiatry note   Continue ABX per ID, likely there is an abscess to the 5th toe.  Possible I&D. -Await PVR/PRABHAKAR.  Concern for vascular status of 5th toe.    -Ulcer stable.  Continue daily dressing changes to the foot w/ betadine, DSD.              IM NOTE   1. GBS bilateral knee arthritis s/p bilateral knee open washout and debridement 7/27.      - Patient has severe bilateral knee pain, 8-9/10, returned from OR for I&D yesterday. - Patient is stable today. · Continue Rocephin 2g q24h    · Continue Norco 5-325 q6h and Morphine 4mg q4h as needed. · Follow CBC, WBC count, vitals, pain management. · Follow Blood culture. · Ortho seen patient today, will follow up with ortho. 2. 5 digit small abscess    - XR of right foot shows air poket suggesting abscess. - Currently wrapping with betadine bandage    - Consulted with podiatrist, plan: bedside I&D. 3. Bilateral hip pain likely septic arthritis from the knee vs OA    - Patient has bilateral septic knee arthritis from GBS. - XR of the hip shows mild osteoarthropathy of both hip joints. - Plan: Arthrocentesis of the hip, blood culture, fluid culture. - Treat as knee septic arthritis.     4. Chronic bilateral lower back pain    - likely 2/2 chronic lumbar stenosis    - No

## 2020-07-31 NOTE — DISCHARGE INSTR - COC
Continuity of Care Form    Patient Name: Wero Haynes   :  1965  MRN:  [id number]    Admit date:  2020  Discharge date:  2020      Code Status Order: Full Code   Advance Directives:   Advance Care Flowsheet Documentation     Date/Time Healthcare Directive Type of Healthcare Directive Copy in [address] Agent's Name Healthcare Agent's Phone Number    20 4010  No, patient does not have an advance directive for healthcare treatment -- -- -- -- --    20 0602  No, patient does not have an advance directive for healthcare treatment -- -- -- -- --    20 1107  No, patient does not have an advance directive for healthcare treatment -- -- -- -- --          Admitting Physician:  Jose Alfredo Greer DO  PCP: No primary care provider on file. Discharging Nurse: Claudine Doshi Unit/Room#: 8463/0674-W  Discharging Unit Phone Number: [phone]    Emergency Contact:   Extended Emergency Contact Information  Primary Emergency Contact:  Chuckymaryellen Phillips [address] Phone: [phone]  Mobile Phone: [phone]  Relation: Brother/Sister  Secondary Emergency Contact: Maria De Jesus Tracy [address] Phone: [phone]  Mobile Phone: [phone]  Relation: Brother/Sister    Past Surgical History:  Past Surgical History:   Procedure Laterality Date    ARM SURGERY      left arm    INCISION AND DRAINAGE Bilateral 2020    REPEAT BILATERAL KNEE WASHOUT performed by Shawna Kincaid DO at [facility] ARTHROSCOPY Bilateral 2020    KNEE ARTHROSCOPY performed by Nicol Joel MD at [facility] [place] COLONOSCOPY W/BIOPSY SINGLE/MULTIPLE N/A 2018    COLONOSCOPY WITH BIOPSY performed by Sophia Stuart MD at [address] CAI W/RMVL OF TUMOR POLYP LESION SNARE TQ  2018    COLONOSCOPY POLYPECTOMY SNARE/COLD BIOPSY performed by Sophia Stuart MD at [address]  VARICOSE VEIN SURGERY         Immunization History: There is no immunization history on file for this patient. Active Problems:  Patient Active Problem List   Diagnosis Code    Chronic low back pain M54.5, G89.29    Diabetic neuropathy ([facility]) E11.40    Anxiety and depression F41.9, F32.9    Hyperlipidemia E78.5    Insulin-requiring or dependent type II diabetes mellitus ([facility] [address] E11.9, Z79.4    Testicle pain N50.819    Pain in both knees M25.561, M25.562    Septic arthritis of knee, bilateral (HCC) M00.9    Acute midline low back pain with bilateral sciatica M54.42, M54.41    Sepsis due to group B Streptococcus ([facility]) A40.1    Hypokalemia E87.6    Septic arthritis ([facility]) M00.9    Acute pain R52       Isolation/Infection:   Isolation          Contact and Droplet        Patient Infection Status     Infection Onset Added Last Indicated Last Indicated By Review Planned Expiration Resolved Resolved By    Rhinovirus 07/22/20 07/22/20 07/22/20 Respiratory Panel, Molecular 07/24/20       Resolved    COVID-19 Rule Out 07/25/20 07/25/20 07/25/20 Covid-19 Ambulatory (Ordered)   07/27/20 Rule-Out Test Resulted          Nurse Assessment:  Last Vital Signs: /70   Pulse 95   Temp 98.6 °F (37 °C) (Temporal)   Resp 16   Ht 5' 11\" (1.803 m)   Wt 218 lb 4.1 oz (99 kg)   SpO2 92%   BMI 30.44 kg/m²     Last documented pain score (0-10 scale): Pain Level: 10  Last Weight:   Wt Readings from Last 1 Encounters:   07/22/20 218 lb 4.1 oz (99 kg)     Mental Status:  alert    IV Access:  R midline   Nursing Mobility/ADLs:  Walking   Assisted  Transfer  Assisted  Bathing  Assisted  Dressing  Assisted  Toileting  Assisted  Feeding  Independent  Med Admin  Independent  Med Delivery   whole    Wound Care Documentation and Therapy:        Elimination:  Continence:   · Bowel:  Yes  · Bladder: Yes  Urinary Catheter: None   Colostomy/Ileostomy/Ileal Conduit: No       Date of Last BM: 7/29/2020    Intake/Output Summary (Last 24 hours) at 7/31/2020 1559  Last data filed at 7/31/2020 0955  Gross per 24 hour   Intake 240 ml   Output 1850 ml   Net -1610 ml     I/O last 3 completed shifts: In: 240 [P.O.:240]  Out: 2300 [Urine:2300]    Safety Concerns: At Risk for Falls    Impairments/Disabilities:      None    Nutrition Therapy:  Current Nutrition Therapy:   - Oral Diet:  General    Routes of Feeding: Oral  Liquids: No Restrictions  Daily Fluid Restriction: no  Last Modified Barium Swallow with Video (Video Swallowing Test): not done    Treatments at the Time of Hospital Discharge:   Respiratory Treatments: ***  Oxygen Therapy:  is not on home oxygen therapy.   Ventilator:    - No ventilator support    Rehab Therapies: Physical Therapy and Occupational Therapy  Weight Bearing Status/Restrictions: No weight bearing restirctions  Other Medical Equipment (for information only, NOT a DME order):  walker  Other Treatments: ***    Patient's personal belongings (please select all that are sent with patient):  {[facility] DME Belongings:735680358}    RN SIGNATURE:  Electronically signed by Phuc Guerrero RN on 7/31/20 at 4:02 PM EDT    CASE MANAGEMENT/SOCIAL WORK SECTION    Inpatient Status Date: ***    Readmission Risk Assessment Score:  Readmission Risk              Risk of Unplanned Readmission:        24           Discharging to Facility/ Agency   · Name:   · Address:  · Phone:  · Fax:    Dialysis Facility (if applicable)   · Name:  · Address:  · Dialysis Schedule:  · Phone:  · Fax:    / signature: {Esignature:[id number]}    PHYSICIAN SECTION    Prognosis: {Prognosis:6377168045}    Condition at Discharge: [address] [place] [address] Patient Condition:[id number]}    Rehab Potential (if transferring to Rehab): {Prognosis:9162324727}    Recommended Labs or Other Treatments After Discharge: ***    Physician Certification: I certify the above information and transfer of Naeem Angry  is necessary for the continuing treatment of the diagnosis listed and that he requires {Admit to Appropriate Level of Care:49835} for {GREATER/LESS:853866426} 30 days.      Update Admission H&P: {CHP DME Changes in QCW:403389663}    PHYSICIAN SIGNATURE:  {Esignature:[id number]}

## 2020-07-31 NOTE — CARE COORDINATION
Plan is [facility] at discharge, awaiting re precert. Transportation set up via [employer] for [address] [facility] phone# [phone]. Ambulance form and Hens in envelope in soft chart. last negative Covid-19 was on Saturday 7/25. Isela Julien RN, CM      Per Perla Santiago from [facility], re precert has been obtained. Transportation set up via [employer] phone# [phone] for 4:30 pm today. Charge nurse, RN, liaison and patient all notified. Ambulance form and Hens in envelope in soft chart. last negative Covid-19 was on Saturday 7/25. Charge  Nurse is checking for discharge order.   Isela Julien RN, CM

## 2020-07-31 NOTE — DISCHARGE SUMMARY
[address]  Discharge Summary    PCP: No primary care provider on file. Admit Date:7/21/2020  Discharge Date: 7/31/2020    Attending Physician: Dr. Christiano Gamino DO    Admission Diagnosis:   1. Bilateral lower extremity pain  2. Lumbar stenosis  3. Type 2 IDDM  4. Leukocytosis  5. Hx of HLD  6. Hx of Bipolar Disorder  7. Hx of GERD  8. Hx of bilateral knee OA    Discharge Diagnosis:  1. Bilateral knee septic arthritis from strep agalactiae  2. Acute on chronic lumbar pain from lumbar stenosis  3. Rhinovirus infection  4. Uncontrolled Type 2 DM  5. Diabetic ulceration right fifth toe  6. Normocytic anemia  7. Hx of HLD  8. Hx of Bipolar Disorder  9. Hx of GERD  10. Hx of bilateral knee OA    Hospital Course: Juany Corrales is a 64 y/o male with a PMHx of type 2 DM, peripheral neuropathy, OA, lumbar stenosis, and HLD who presented with bilateral lower extremity, hip, and low back pain. He has chronic pain in these areas but it acutely worsened after a mechanical fall on 7/18. In the ED CT head, CT C-spine, CT A/P were all negative for acute abnormalities. Knee X-rays showed effusion in the right knee and bilateral knee OA. Right knee arthrocentesis was done and synovial fluid grew strep agalactiae. He was started on IV Ceftriaxone. Repeat arthrocentesis of both knees was done and revealed a septic left knee as well. Bilateral knee arthroscopy with washout was done with findings of purulent fluid in both knees. Fluid from both knees grew strep agalactiae. Blood cultures had no growth. CT L-spine showed no evidence of infection. Due to minimal clinical improvement open irrigation and debridement of bilateral knees was done and found hematomas in both knees. He also had a right fifth toe wound and was found to have an abscess, which was drained.  Throughout admission he had poorly controlled acute on chronic lower extremity pain and required multiple changes to his pain medication regimen. On discharge his pain was fairly controlled with PO Oxycontin and PO Roxicodone as well as Robaxin. His glucose was well-controlled with basal and HDSS insulin. He had a midline placed for IV antibiotics until 8/24. Significant findings (history and exam, laboratory, radiological, pathology, other tests):   · General Appearance: alert, appears stated age, cooperative and no distress  · HEENT:  Head: Normal, normocephalic, atraumatic. · Neck: no adenopathy, no carotid bruit, no JVD, supple, symmetrical, trachea midline and thyroid not enlarged, symmetric, no tenderness/mass/nodules  · Lung: clear to auscultation bilaterally  · Heart: regular rate and rhythm, S1, S2 normal, no murmur, click, rub or gallop  · Abdomen: soft, non-tender; bowel sounds normal; no masses,  no organomegaly  · Extremities: Bilaterally the knees and lower legs are moderately tender to palpation. ACE wrap over the right thigh, knee, and leg, clean and dry. ACE wrap over the left knee that is clean and dry. Sensation is equal and intact. Decreased range of motion in the knees and hips secondary to pain but increased compared to previous days. Dorsiflexion 5/5. Plantarflexion 4/5. Bandage over the right fifth toe with good capillary refill and no tenderness to palpation. · Neurologic: Alert, oriented, thought content appropriate    Pending test results: None    Consults:  1. Orthopedic surgery  2. Infectious disease  3. Podiatry    Procedures:  1. BILATERAL KNEE ARTHROSCOPY, IRRIGATION AND DEBRIDEMENT  2. Right knee arthrotomy with exploration and irrigation and debridement  3. Left knee arthrotomy with exploration and irrigation and debridement  4.  Right fifth toe abscess drainage    Condition at discharge: Stable    Disposition: SNF    Discharge Medications:  Current Discharge Medication List      START taking these medications    Details   oxyCODONE (OXYCONTIN) 10 MG extended release tablet Take 1 tablet by mouth every 12 hours for 7 days. Qty: 14 each, Refills: 0    Comments: Reduce doses taken as pain becomes manageable  Associated Diagnoses: Acute pain of both knees      oxyCODONE (ROXICODONE) 5 MG immediate release tablet Take 1 tablet by mouth every 3 hours as needed for Pain for up to 7 days. Qty: 56 tablet, Refills: 0    Comments: Reduce doses taken as pain becomes manageable  Associated Diagnoses: Acute pain of both knees      atorvastatin (LIPITOR) 20 MG tablet Take 1 tablet by mouth daily  Qty: 7 tablet, Refills: 0      sennosides-docusate sodium (SENOKOT-S) 8.6-50 MG tablet Take 2 tablets by mouth 2 times daily  Qty: 28 tablet, Refills: 0      polyethylene glycol (GLYCOLAX) 17 g packet Take 17 g by mouth daily  Qty: 7 each, Refills: 0      methocarbamol (ROBAXIN) 500 MG tablet Take 2 tablets by mouth 4 times daily as needed (As needed for muscle spasms)  Qty: 56 tablet, Refills: 0      pantoprazole (PROTONIX) 40 MG tablet Take 1 tablet by mouth every morning (before breakfast)  Qty: 7 tablet, Refills: 0      !! insulin lispro (HUMALOG) 100 UNIT/ML injection vial Inject 0-18 Units into the skin 3 times daily (with meals)  Qty: 1 vial, Refills: 3      !! insulin lispro (HUMALOG) 100 UNIT/ML injection vial Inject 0-9 Units into the skin nightly  Qty: 1 vial, Refills: 3      cefTRIAXone (ROCEPHIN) infusion Infuse 2,000 mg intravenously every 12 hours for 26 days Compound per protocol  Qty: 104 g, Refills: 0       !! - Potential duplicate medications found. Please discuss with provider.       CONTINUE these medications which have CHANGED    Details   insulin glargine (LANTUS) 100 UNIT/ML injection vial Inject 35 Units into the skin nightly  Qty: 1 vial, Refills: 3         CONTINUE these medications which have NOT CHANGED    Details   doxepin (SINEQUAN) 10 MG capsule take 1 capsule by mouth nightly  Qty: 30 capsule, Refills: 2      venlafaxine (EFFEXOR) 37.5 MG tablet Take 37.5 mg by mouth daily      lamoTRIgine (LAMICTAL) 25 MG tablet Take 25 mg by mouth daily      Multiple Vitamin (MULTI VITAMIN MENS PO) Take by mouth daily          STOP taking these medications       HYDROcodone-acetaminophen (NORCO) 5-325 MG per tablet Comments:   Reason for Stopping:         ondansetron (ZOFRAN ODT) 4 MG disintegrating tablet Comments:   Reason for Stopping:         naproxen (NAPROSYN) 500 MG tablet Comments:   Reason for Stopping:         triamcinolone (KENALOG) 0.1 % cream Comments:   Reason for Stopping:         pitavastatin (LIVALO) 4 MG TABS tablet Comments:   Reason for Stopping:         omeprazole (PRILOSEC) 40 MG delayed release capsule Comments:   Reason for Stopping:         insulin aspart (NOVOLOG) 100 UNIT/ML injection pen Comments:   Reason for Stopping:         Insulin Pen Needle 31G X 5 MM MISC Comments:   Reason for Stopping:         blood glucose monitor strips Comments:   Reason for Stopping:         FREESTYLE LANCETS MISC Comments:   Reason for Stopping:         docusate sodium (COLACE) 100 MG capsule Comments:   Reason for Stopping:             Activity: activity as tolerated  Diet: regular diet    Follow-up appointments: Follow-up With  Details  Why  Contact Info   David Wood MD  In 2 weeks    YOHANNES Hobson [address] [place] [address], MD  Schedule an appointment as soon as possible for a visit on 8/20/2020    [facility] [address]     Patient Instructions:   Orthopedics Discharge Instructions   Weight bearing Status - Weight bearing as tolerated - Operative Extermities  Pain medication Per Prescription  Ice to operative/injured site for 15-30 minutes of each hour for next 3-5 days    Elevate operative/injured limb on 2 pillows at home  Continue DVT Prophylaxis as Prescribed  Wound care - Dressings can be removed after 7 days, can leave open to air.   If any drainage, can dress with gauze, abds and ace wrap  Follow Up in Office in 2 weeks with Dr. Maxime Carlson    Call the office at [phone] for directions or with any questions. Watch for these significant complications. Call physician if they or any other problems occur:  - Fever over 101°, redness, swelling or warmth at the operative site  - Unrelieved nausea    - Foul smelling or cloudy drainage at the operative site   - Unrelieved pain    - Blood soaked dressing. (Some oozing may be normal)     - Numb, pale, blue, cold or tingling extremity    [facility] Internal Medicine Resident Service     Activity as tolerated  Diet: common adult  Please be compliant with your medications and take them as prescribed. Other Follow-Ups:    Future Appointments   Date Time Provider August Barlow   8/7/2020  8:45 AM SCHEDULE, Alireza Bravo MD, Infectious Disease. Schedule an appointment for around 8/20. Other than any new prescriptions given to you today, the list of home going meds on this After Visit Summary are based on information provided to us from you. This information, including the list, dose, and frequency of medications is only as accurate as the information you provided. If you have any questions or concerns about your home medications, please contact your Primary Care Physician for further clarification.     Maris Loredo MD, PGY-1  1:20 PM 7/31/2020

## 2020-07-31 NOTE — PROGRESS NOTES
Department of Orthopedic Surgery  Resident Progress Note    Patient seen and examined. Still has pain in bilateral knees which is slowly improving. Increased ROM this am compared to yesterday. He is having continued pain, IV narcotics have been DCed, robaxin and Toradol have been ordered for multimodal pain control. Has not walked with PT    VITALS:  /75   Pulse 98   Temp 97.8 °F (36.6 °C) (Temporal)   Resp 18   Ht 5' 11\" (1.803 m)   Wt 218 lb 4.1 oz (99 kg)   SpO2 98%   BMI 30.44 kg/m²     General: alert and oriented to person, place and time, well-developed and well-nourished, in no acute distress    MUSCULOSKELETAL:   bilateral lower extremity:  · Dressing C/D/I, left sided dressing intact. · Compartments soft and compressible  · +PF/DF/EHL  · +2/4 DP & PT pulses, Brisk Cap refill, Toes warm and perfused  · Distal sensation grossly intact to Peroneals, Sural, Saphenous, and tibial nrs  · No pain with logroll of the hip. There is TTP about the inguinal crease. There is no pain when isolating the knee on the ipsilateral side and ranging the hip. CBC:   Lab Results   Component Value Date    WBC 11.6 07/30/2020    HGB 9.1 07/30/2020    HCT 27.6 07/30/2020     07/30/2020     PT/INR:    Lab Results   Component Value Date    PROTIME 13.4 07/22/2020    INR 1.2 07/22/2020       Intraop Cultures: Strep agalactiae, New cultures currently have no growth. ASSESSMENT  · S/P bilateral knee open washout and debridement - 7/27/20  · S/P Endoscopic I and D - 7/23/20    PLAN      · Continue physical therapy and protocol: WBAT - BLE  · Antibiotics per primary/ ID  · Deep venous thrombosis prophylaxis - Lovenox, early mobilization  · Will need aggressive PT/OT  · PT/OT  · Pain Control: Per primary  · Monitor H&H labs and cultures  · Will continue to monitor hip pain, exam does not illicit concern for septic arthritis of the hip. · toradol x 2 doses to help with progression with therapy.   · At this point, Jossue Lozano has not showed any continued signs of infection of his knees or hip other than pain. Patients case is difficult due to his continued pain 2/2 to narcotic sensitization as well as hematomas in the knee joints. Aggressive therapy will need to continue during his rehab stay. Patient aware that pain will be associated with therapy, but needs to keep working to make sure his knees do not become stiff, along with lung and skin integrity. Okay to DC to rehab if medically stable.   · Discuss with attending

## 2020-08-01 LAB
ANAEROBIC CULTURE: NORMAL
ANAEROBIC CULTURE: NORMAL

## 2020-08-02 LAB
BODY FLUID CULTURE, STERILE: NORMAL
BODY FLUID CULTURE, STERILE: NORMAL
GRAM STAIN RESULT: NORMAL
GRAM STAIN RESULT: NORMAL

## 2020-08-07 ENCOUNTER — APPOINTMENT (OUTPATIENT)
Dept: GENERAL RADIOLOGY | Age: 55
DRG: 710 | End: 2020-08-07
Payer: COMMERCIAL

## 2020-08-07 ENCOUNTER — OFFICE VISIT (OUTPATIENT)
Dept: ORTHOPEDIC SURGERY | Age: 55
DRG: 710 | End: 2020-08-07
Payer: COMMERCIAL

## 2020-08-07 ENCOUNTER — HOSPITAL ENCOUNTER (INPATIENT)
Age: 55
LOS: 7 days | Discharge: OTHER INSTITUTION | DRG: 710 | End: 2020-08-14
Attending: EMERGENCY MEDICINE | Admitting: INTERNAL MEDICINE
Payer: COMMERCIAL

## 2020-08-07 ENCOUNTER — TELEPHONE (OUTPATIENT)
Dept: ORTHOPEDIC SURGERY | Age: 55
End: 2020-08-07

## 2020-08-07 VITALS
SYSTOLIC BLOOD PRESSURE: 79 MMHG | DIASTOLIC BLOOD PRESSURE: 56 MMHG | HEART RATE: 91 BPM | TEMPERATURE: 97.2 F | BODY MASS INDEX: 29.4 KG/M2 | HEIGHT: 71 IN | WEIGHT: 210 LBS

## 2020-08-07 PROBLEM — A41.9 SEPSIS (HCC): Status: ACTIVE | Noted: 2020-08-07

## 2020-08-07 LAB
ABO/RH: NORMAL
ALBUMIN SERPL-MCNC: 2.9 G/DL (ref 3.5–5.2)
ALP BLD-CCNC: 220 U/L (ref 40–129)
ALT SERPL-CCNC: 29 U/L (ref 0–40)
ANION GAP SERPL CALCULATED.3IONS-SCNC: 17 MMOL/L (ref 7–16)
ANTIBODY SCREEN: NORMAL
AST SERPL-CCNC: 24 U/L (ref 0–39)
BACTERIA: ABNORMAL /HPF
BASOPHILS ABSOLUTE: 0.09 E9/L (ref 0–0.2)
BASOPHILS ABSOLUTE: 0.1 E9/L (ref 0–0.2)
BASOPHILS RELATIVE PERCENT: 0.7 % (ref 0–2)
BASOPHILS RELATIVE PERCENT: 0.8 % (ref 0–2)
BILIRUB SERPL-MCNC: 0.4 MG/DL (ref 0–1.2)
BILIRUBIN URINE: NEGATIVE
BLOOD, URINE: NEGATIVE
BUN BLDV-MCNC: 17 MG/DL (ref 6–20)
C-REACTIVE PROTEIN: 26.1 MG/DL (ref 0–0.4)
CALCIUM SERPL-MCNC: 10.1 MG/DL (ref 8.6–10.2)
CHLORIDE BLD-SCNC: 91 MMOL/L (ref 98–107)
CHOLESTEROL, TOTAL: 100 MG/DL (ref 0–199)
CLARITY: CLEAR
CO2: 24 MMOL/L (ref 22–29)
COLOR: YELLOW
CREAT SERPL-MCNC: 1.4 MG/DL (ref 0.7–1.2)
EOSINOPHILS ABSOLUTE: 0.01 E9/L (ref 0.05–0.5)
EOSINOPHILS ABSOLUTE: 0.05 E9/L (ref 0.05–0.5)
EOSINOPHILS RELATIVE PERCENT: 0.1 % (ref 0–6)
EOSINOPHILS RELATIVE PERCENT: 0.4 % (ref 0–6)
EPITHELIAL CELLS, UA: ABNORMAL /HPF
GFR AFRICAN AMERICAN: >60
GFR NON-AFRICAN AMERICAN: 52 ML/MIN/1.73
GLUCOSE BLD-MCNC: 194 MG/DL (ref 74–99)
GLUCOSE URINE: NEGATIVE MG/DL
HCT VFR BLD CALC: 20.3 % (ref 37–54)
HCT VFR BLD CALC: 23.8 % (ref 37–54)
HDLC SERPL-MCNC: 42 MG/DL
HEMOGLOBIN: 6.8 G/DL (ref 12.5–16.5)
HEMOGLOBIN: 7.9 G/DL (ref 12.5–16.5)
HYALINE CASTS: ABNORMAL /LPF (ref 0–2)
IMMATURE GRANULOCYTES #: 0.07 E9/L
IMMATURE GRANULOCYTES #: 0.11 E9/L
IMMATURE GRANULOCYTES %: 0.6 % (ref 0–5)
IMMATURE GRANULOCYTES %: 0.7 % (ref 0–5)
KETONES, URINE: NEGATIVE MG/DL
LACTIC ACID, SEPSIS: 0.8 MMOL/L (ref 0.5–1.9)
LACTIC ACID, SEPSIS: 2.3 MMOL/L (ref 0.5–1.9)
LACTIC ACID: 0.6 MMOL/L (ref 0.5–2.2)
LDL CHOLESTEROL CALCULATED: 48 MG/DL (ref 0–99)
LEUKOCYTE ESTERASE, URINE: NEGATIVE
LYMPHOCYTES ABSOLUTE: 1.35 E9/L (ref 1.5–4)
LYMPHOCYTES ABSOLUTE: 2.51 E9/L (ref 1.5–4)
LYMPHOCYTES RELATIVE PERCENT: 21.5 % (ref 20–42)
LYMPHOCYTES RELATIVE PERCENT: 9.1 % (ref 20–42)
MCH RBC QN AUTO: 30.4 PG (ref 26–35)
MCH RBC QN AUTO: 30.5 PG (ref 26–35)
MCHC RBC AUTO-ENTMCNC: 33.2 % (ref 32–34.5)
MCHC RBC AUTO-ENTMCNC: 33.5 % (ref 32–34.5)
MCV RBC AUTO: 90.6 FL (ref 80–99.9)
MCV RBC AUTO: 91.9 FL (ref 80–99.9)
METER GLUCOSE: 155 MG/DL (ref 74–99)
MONOCYTES ABSOLUTE: 1.14 E9/L (ref 0.1–0.95)
MONOCYTES ABSOLUTE: 1.29 E9/L (ref 0.1–0.95)
MONOCYTES RELATIVE PERCENT: 11.1 % (ref 2–12)
MONOCYTES RELATIVE PERCENT: 7.7 % (ref 2–12)
NEUTROPHILS ABSOLUTE: 12.14 E9/L (ref 1.8–7.3)
NEUTROPHILS ABSOLUTE: 7.66 E9/L (ref 1.8–7.3)
NEUTROPHILS RELATIVE PERCENT: 65.6 % (ref 43–80)
NEUTROPHILS RELATIVE PERCENT: 81.7 % (ref 43–80)
NITRITE, URINE: NEGATIVE
PDW BLD-RTO: 12.3 FL (ref 11.5–15)
PDW BLD-RTO: 12.5 FL (ref 11.5–15)
PH UA: 7 (ref 5–9)
PLATELET # BLD: 1159 E9/L (ref 130–450)
PLATELET # BLD: 554 E9/L (ref 130–450)
PMV BLD AUTO: 8.7 FL (ref 7–12)
PMV BLD AUTO: 8.9 FL (ref 7–12)
POTASSIUM REFLEX MAGNESIUM: 4.5 MMOL/L (ref 3.5–5)
PROTEIN UA: 30 MG/DL
RBC # BLD: 2.24 E12/L (ref 3.8–5.8)
RBC # BLD: 2.59 E12/L (ref 3.8–5.8)
RBC UA: ABNORMAL /HPF (ref 0–2)
SEDIMENTATION RATE, ERYTHROCYTE: 138 MM/HR (ref 0–15)
SODIUM BLD-SCNC: 132 MMOL/L (ref 132–146)
SPECIFIC GRAVITY UA: 1.01 (ref 1–1.03)
TOTAL PROTEIN: 7.1 G/DL (ref 6.4–8.3)
TRIGL SERPL-MCNC: 51 MG/DL (ref 0–149)
TROPONIN: <0.01 NG/ML (ref 0–0.03)
UROBILINOGEN, URINE: 0.2 E.U./DL
VLDLC SERPL CALC-MCNC: 10 MG/DL
WBC # BLD: 11.7 E9/L (ref 4.5–11.5)
WBC # BLD: 14.9 E9/L (ref 4.5–11.5)
WBC UA: ABNORMAL /HPF (ref 0–5)

## 2020-08-07 PROCEDURE — 87205 SMEAR GRAM STAIN: CPT

## 2020-08-07 PROCEDURE — 36415 COLL VENOUS BLD VENIPUNCTURE: CPT

## 2020-08-07 PROCEDURE — 80061 LIPID PANEL: CPT

## 2020-08-07 PROCEDURE — 86901 BLOOD TYPING SEROLOGIC RH(D): CPT

## 2020-08-07 PROCEDURE — 87040 BLOOD CULTURE FOR BACTERIA: CPT

## 2020-08-07 PROCEDURE — 99254 IP/OBS CNSLTJ NEW/EST MOD 60: CPT | Performed by: ORTHOPAEDIC SURGERY

## 2020-08-07 PROCEDURE — 86923 COMPATIBILITY TEST ELECTRIC: CPT

## 2020-08-07 PROCEDURE — 96367 TX/PROPH/DG ADDL SEQ IV INF: CPT

## 2020-08-07 PROCEDURE — 6370000000 HC RX 637 (ALT 250 FOR IP): Performed by: INTERNAL MEDICINE

## 2020-08-07 PROCEDURE — 85025 COMPLETE CBC W/AUTO DIFF WBC: CPT

## 2020-08-07 PROCEDURE — 2580000003 HC RX 258: Performed by: INTERNAL MEDICINE

## 2020-08-07 PROCEDURE — 96375 TX/PRO/DX INJ NEW DRUG ADDON: CPT

## 2020-08-07 PROCEDURE — P9016 RBC LEUKOCYTES REDUCED: HCPCS

## 2020-08-07 PROCEDURE — 86900 BLOOD TYPING SEROLOGIC ABO: CPT

## 2020-08-07 PROCEDURE — 2060000000 HC ICU INTERMEDIATE R&B

## 2020-08-07 PROCEDURE — 84484 ASSAY OF TROPONIN QUANT: CPT

## 2020-08-07 PROCEDURE — 99024 POSTOP FOLLOW-UP VISIT: CPT | Performed by: PHYSICIAN ASSISTANT

## 2020-08-07 PROCEDURE — 89060 EXAM SYNOVIAL FLUID CRYSTALS: CPT

## 2020-08-07 PROCEDURE — 96365 THER/PROPH/DIAG IV INF INIT: CPT

## 2020-08-07 PROCEDURE — 71045 X-RAY EXAM CHEST 1 VIEW: CPT

## 2020-08-07 PROCEDURE — 89051 BODY FLUID CELL COUNT: CPT

## 2020-08-07 PROCEDURE — 81001 URINALYSIS AUTO W/SCOPE: CPT

## 2020-08-07 PROCEDURE — 99212 OFFICE O/P EST SF 10 MIN: CPT

## 2020-08-07 PROCEDURE — 6360000002 HC RX W HCPCS: Performed by: STUDENT IN AN ORGANIZED HEALTH CARE EDUCATION/TRAINING PROGRAM

## 2020-08-07 PROCEDURE — 80053 COMPREHEN METABOLIC PANEL: CPT

## 2020-08-07 PROCEDURE — 86850 RBC ANTIBODY SCREEN: CPT

## 2020-08-07 PROCEDURE — 6360000002 HC RX W HCPCS: Performed by: INTERNAL MEDICINE

## 2020-08-07 PROCEDURE — 93005 ELECTROCARDIOGRAM TRACING: CPT | Performed by: STUDENT IN AN ORGANIZED HEALTH CARE EDUCATION/TRAINING PROGRAM

## 2020-08-07 PROCEDURE — 86140 C-REACTIVE PROTEIN: CPT

## 2020-08-07 PROCEDURE — 87070 CULTURE OTHR SPECIMN AEROBIC: CPT

## 2020-08-07 PROCEDURE — 82962 GLUCOSE BLOOD TEST: CPT

## 2020-08-07 PROCEDURE — 99284 EMERGENCY DEPT VISIT MOD MDM: CPT

## 2020-08-07 PROCEDURE — 83605 ASSAY OF LACTIC ACID: CPT

## 2020-08-07 PROCEDURE — 85651 RBC SED RATE NONAUTOMATED: CPT

## 2020-08-07 PROCEDURE — 2580000003 HC RX 258: Performed by: STUDENT IN AN ORGANIZED HEALTH CARE EDUCATION/TRAINING PROGRAM

## 2020-08-07 RX ORDER — INSULIN GLARGINE 100 [IU]/ML
20 INJECTION, SOLUTION SUBCUTANEOUS NIGHTLY
Status: DISCONTINUED | OUTPATIENT
Start: 2020-08-07 | End: 2020-08-10

## 2020-08-07 RX ORDER — PROMETHAZINE HYDROCHLORIDE 25 MG/ML
25 INJECTION, SOLUTION INTRAMUSCULAR; INTRAVENOUS ONCE
Status: COMPLETED | OUTPATIENT
Start: 2020-08-08 | End: 2020-08-08

## 2020-08-07 RX ORDER — POLYETHYLENE GLYCOL 3350 17 G/17G
17 POWDER, FOR SOLUTION ORAL DAILY PRN
Status: DISCONTINUED | OUTPATIENT
Start: 2020-08-07 | End: 2020-08-14 | Stop reason: HOSPADM

## 2020-08-07 RX ORDER — METHOCARBAMOL 500 MG/1
1000 TABLET, FILM COATED ORAL 4 TIMES DAILY PRN
Status: DISCONTINUED | OUTPATIENT
Start: 2020-08-07 | End: 2020-08-14 | Stop reason: HOSPADM

## 2020-08-07 RX ORDER — HEPARIN SODIUM 10000 [USP'U]/ML
5000 INJECTION, SOLUTION INTRAVENOUS; SUBCUTANEOUS ONCE
Status: DISCONTINUED | OUTPATIENT
Start: 2020-08-07 | End: 2020-08-08

## 2020-08-07 RX ORDER — MORPHINE SULFATE 2 MG/ML
2 INJECTION, SOLUTION INTRAMUSCULAR; INTRAVENOUS ONCE
Status: COMPLETED | OUTPATIENT
Start: 2020-08-08 | End: 2020-08-08

## 2020-08-07 RX ORDER — ATORVASTATIN CALCIUM 20 MG/1
20 TABLET, FILM COATED ORAL DAILY
Status: DISCONTINUED | OUTPATIENT
Start: 2020-08-07 | End: 2020-08-14 | Stop reason: HOSPADM

## 2020-08-07 RX ORDER — LAMOTRIGINE 25 MG/1
25 TABLET ORAL DAILY
Status: DISCONTINUED | OUTPATIENT
Start: 2020-08-07 | End: 2020-08-14 | Stop reason: HOSPADM

## 2020-08-07 RX ORDER — ACETAMINOPHEN 325 MG/1
650 TABLET ORAL EVERY 6 HOURS PRN
Status: DISCONTINUED | OUTPATIENT
Start: 2020-08-07 | End: 2020-08-14 | Stop reason: HOSPADM

## 2020-08-07 RX ORDER — VENLAFAXINE 75 MG/1
37.5 TABLET ORAL DAILY
Status: DISCONTINUED | OUTPATIENT
Start: 2020-08-07 | End: 2020-08-14 | Stop reason: HOSPADM

## 2020-08-07 RX ORDER — 0.9 % SODIUM CHLORIDE 0.9 %
1000 INTRAVENOUS SOLUTION INTRAVENOUS ONCE
Status: COMPLETED | OUTPATIENT
Start: 2020-08-07 | End: 2020-08-07

## 2020-08-07 RX ORDER — SODIUM CHLORIDE, SODIUM LACTATE, POTASSIUM CHLORIDE, CALCIUM CHLORIDE 600; 310; 30; 20 MG/100ML; MG/100ML; MG/100ML; MG/100ML
INJECTION, SOLUTION INTRAVENOUS CONTINUOUS
Status: ACTIVE | OUTPATIENT
Start: 2020-08-07 | End: 2020-08-08

## 2020-08-07 RX ORDER — OXYCODONE HCL 10 MG/1
10 TABLET, FILM COATED, EXTENDED RELEASE ORAL EVERY 12 HOURS SCHEDULED
Status: DISCONTINUED | OUTPATIENT
Start: 2020-08-07 | End: 2020-08-14 | Stop reason: HOSPADM

## 2020-08-07 RX ORDER — ONDANSETRON 2 MG/ML
4 INJECTION INTRAMUSCULAR; INTRAVENOUS EVERY 6 HOURS PRN
Status: DISCONTINUED | OUTPATIENT
Start: 2020-08-07 | End: 2020-08-14 | Stop reason: HOSPADM

## 2020-08-07 RX ORDER — PROMETHAZINE HYDROCHLORIDE 25 MG/1
12.5 TABLET ORAL EVERY 6 HOURS PRN
Status: DISCONTINUED | OUTPATIENT
Start: 2020-08-07 | End: 2020-08-14 | Stop reason: HOSPADM

## 2020-08-07 RX ORDER — SODIUM CHLORIDE 0.9 % (FLUSH) 0.9 %
10 SYRINGE (ML) INJECTION EVERY 12 HOURS SCHEDULED
Status: DISCONTINUED | OUTPATIENT
Start: 2020-08-07 | End: 2020-08-14 | Stop reason: HOSPADM

## 2020-08-07 RX ORDER — SODIUM CHLORIDE 0.9 % (FLUSH) 0.9 %
10 SYRINGE (ML) INJECTION PRN
Status: DISCONTINUED | OUTPATIENT
Start: 2020-08-07 | End: 2020-08-14 | Stop reason: HOSPADM

## 2020-08-07 RX ORDER — MULTIVITAMIN WITH IRON
1 TABLET ORAL DAILY
Status: DISCONTINUED | OUTPATIENT
Start: 2020-08-07 | End: 2020-08-12 | Stop reason: SDUPTHER

## 2020-08-07 RX ORDER — OXYCODONE HYDROCHLORIDE 5 MG/1
5 TABLET ORAL EVERY 4 HOURS PRN
Status: DISCONTINUED | OUTPATIENT
Start: 2020-08-07 | End: 2020-08-14 | Stop reason: HOSPADM

## 2020-08-07 RX ORDER — ONDANSETRON 2 MG/ML
4 INJECTION INTRAMUSCULAR; INTRAVENOUS ONCE
Status: COMPLETED | OUTPATIENT
Start: 2020-08-07 | End: 2020-08-07

## 2020-08-07 RX ORDER — ACETAMINOPHEN 650 MG/1
650 SUPPOSITORY RECTAL EVERY 6 HOURS PRN
Status: DISCONTINUED | OUTPATIENT
Start: 2020-08-07 | End: 2020-08-14 | Stop reason: HOSPADM

## 2020-08-07 RX ADMIN — SODIUM CHLORIDE 1000 ML: 9 INJECTION, SOLUTION INTRAVENOUS at 13:20

## 2020-08-07 RX ADMIN — VANCOMYCIN HYDROCHLORIDE 2000 MG: 10 INJECTION, POWDER, LYOPHILIZED, FOR SOLUTION INTRAVENOUS at 15:14

## 2020-08-07 RX ADMIN — OXYCODONE 5 MG: 5 TABLET ORAL at 22:05

## 2020-08-07 RX ADMIN — ONDANSETRON 4 MG: 2 INJECTION INTRAMUSCULAR; INTRAVENOUS at 10:42

## 2020-08-07 RX ADMIN — SODIUM CHLORIDE 1000 ML: 9 INJECTION, SOLUTION INTRAVENOUS at 10:42

## 2020-08-07 RX ADMIN — INSULIN GLARGINE 20 UNITS: 100 INJECTION, SOLUTION SUBCUTANEOUS at 22:51

## 2020-08-07 RX ADMIN — OXYCODONE HYDROCHLORIDE 10 MG: 10 TABLET, FILM COATED, EXTENDED RELEASE ORAL at 20:45

## 2020-08-07 RX ADMIN — PIPERACILLIN SODIUM AND TAZOBACTAM SODIUM 4.5 G: 4; .5 INJECTION, POWDER, LYOPHILIZED, FOR SOLUTION INTRAVENOUS at 14:36

## 2020-08-07 RX ADMIN — SODIUM CHLORIDE, POTASSIUM CHLORIDE, SODIUM LACTATE AND CALCIUM CHLORIDE: 600; 310; 30; 20 INJECTION, SOLUTION INTRAVENOUS at 18:59

## 2020-08-07 RX ADMIN — PIPERACILLIN AND TAZOBACTAM 3.38 G: 3; .375 INJECTION, POWDER, FOR SOLUTION INTRAVENOUS at 18:58

## 2020-08-07 ASSESSMENT — ENCOUNTER SYMPTOMS
PHOTOPHOBIA: 0
NAUSEA: 0
BACK PAIN: 0
SHORTNESS OF BREATH: 0
SORE THROAT: 0
VOMITING: 0
ABDOMINAL PAIN: 0
COUGH: 0

## 2020-08-07 ASSESSMENT — PAIN SCALES - GENERAL
PAINLEVEL_OUTOF10: 10
PAINLEVEL_OUTOF10: 9
PAINLEVEL_OUTOF10: 8
PAINLEVEL_OUTOF10: 10

## 2020-08-07 ASSESSMENT — PAIN - FUNCTIONAL ASSESSMENT: PAIN_FUNCTIONAL_ASSESSMENT: PREVENTS OR INTERFERES SOME ACTIVE ACTIVITIES AND ADLS

## 2020-08-07 ASSESSMENT — PAIN DESCRIPTION - PAIN TYPE
TYPE: ACUTE PAIN

## 2020-08-07 ASSESSMENT — PAIN DESCRIPTION - ORIENTATION
ORIENTATION: LEFT;RIGHT
ORIENTATION: RIGHT;LEFT
ORIENTATION: LEFT;RIGHT

## 2020-08-07 ASSESSMENT — PAIN DESCRIPTION - LOCATION
LOCATION: KNEE
LOCATION: KNEE;LEG
LOCATION: ABDOMEN;LEG
LOCATION: KNEE

## 2020-08-07 ASSESSMENT — PAIN DESCRIPTION - DESCRIPTORS
DESCRIPTORS: SHARP;ACHING
DESCRIPTORS: SHARP;ACHING
DESCRIPTORS: ACHING;SHARP
DESCRIPTORS: ACHING;DISCOMFORT;NAGGING

## 2020-08-07 ASSESSMENT — PAIN DESCRIPTION - FREQUENCY: FREQUENCY: CONTINUOUS

## 2020-08-07 ASSESSMENT — PAIN DESCRIPTION - PROGRESSION: CLINICAL_PROGRESSION: NOT CHANGED

## 2020-08-07 ASSESSMENT — PAIN DESCRIPTION - ONSET: ONSET: ON-GOING

## 2020-08-07 NOTE — PROGRESS NOTES
Pharmacy Consultation Note  (Antibiotic Dosing and Monitoring)    Initial consult date: 2020  Consulting physician: Dr Linda Gilmore  Drug: Vancomycin  Indication: Sepsis 2/2 recurrent septic arthritis vs pneumonia vs CLABSI    Age/  Gender Height Weight IBW Dosing weight  Allergy Information   55 y.o./male 5' 11\" (180.3 cm) 210 lb (95.3 kg)     Ideal body weight: 75.3 kg (166 lb 0.1 oz)  Adjusted ideal body weight: 82.7 kg (182 lb 5.8 oz)  93.8  Adhesive tape; Tramadol; Cymbalta [duloxetine hcl]; Lidocaine; Lyrica [pregabalin]; and Strattera [atomoxetine]      Temp (24hrs), Av.9 °F (36.6 °C), Min:97 °F (36.1 °C), Max:98.8 °F (37.1 °C)          Date  WBC BUN SCr CrCl  (mL/min) Drug/Dose Time   Given Level(s)   (Time) Comments   8- 14.9 17 1.4  69   Vancomycin 2000 mg IV  1514                                          No intake or output data in the 24 hours ending 20 1906    Historical Cultures:  Organism   Date Value Ref Range Status   2020 Strep agalactiae (Beta Strep Group B) (A)  Final     No results for input(s): BC in the last 72 hours. Cultures:  available culture and sensitivity results were reviewed in [facility]    Assessment:  · 63 y.o. male has been initiated Vancomycin.   · Estimated CrCl = 69 mL/min  · Goal trough level = 15-20 mcg/mL    Plan:  · Will initiate vancomycin at a dose of 1000 mg Q12H starting 2020 at 0300  · Monitor renal function   · Clinical pharmacy to follow      GURU Raines YOAN [facility] 2020 7:06 PM

## 2020-08-07 NOTE — PROGRESS NOTES
Patient was very distressed c/o severe pain in both knees. Appeared very pale. Stated \"lights are bothering me\" and proceeded to  Put blanket over his head. Asked patient if I could look at his knee incisions. Patient agreed. Upon pulling pant leg up, patient vocalized \"please stop! My skin is very tender and that feels like fire\". VS obtained with patient consent. Austin Small PA-C notified of patient VS.    Decision made to transfer patient to ED. Patient transferred in his own wc with assist of Wilian Ragland. Hand off complete after report given.

## 2020-08-07 NOTE — ED PROVIDER NOTES
Laura Blas is a 63year old male with PMH of DM, HLD, septic arthritis who presented to ED with fatigue that has been present for at least one week. Patient presented to orthopedic's office today for follow up when he was found to be hypotensive. Patient was sent to ED for evaluation. Patient has been having worsening pain to LE bialterally. Patient is unable to ambulate at baseline from recent surgery 7/23 and 7/27. Patient denies any back pain, weakness, saddle paresthesia, anesthesia. Patient is being treated with IV rocephin via PICC line for septic arthritis that patient reported was bilateral. Patient denies chest pain or shortness of breath. Patient has been having intermittent chills. Patient denies nausea, vomiting. Patient currently comes from rehab facility. The history is provided by the patient and medical records. Fatigue   Severity:  Moderate  Onset quality:  Gradual  Duration:  1 week  Timing:  Constant  Progression:  Worsening  Chronicity:  New  Context: recent infection    Relieved by:  Nothing  Worsened by:  Nothing  Ineffective treatments:  None tried  Associated symptoms: dizziness    Associated symptoms: no abdominal pain, no chest pain, no cough, no dysuria, no numbness in extremities, no fever, no headaches, no nausea, no seizures, no sensory-motor deficit, no shortness of breath, no syncope and no vomiting    Risk factors: diabetes    Risk factors: no congestive heart failure         Review of Systems   Constitutional: Positive for fatigue. Negative for chills, diaphoresis and fever. HENT: Negative for sore throat. Eyes: Negative for photophobia and visual disturbance. Respiratory: Negative for cough and shortness of breath. Cardiovascular: Negative for chest pain and syncope. Gastrointestinal: Negative for abdominal pain, nausea and vomiting. Genitourinary: Negative for dysuria. Musculoskeletal: Negative for back pain, neck pain and neck stiffness.    Skin: Negative for pallor and rash. Neurological: Positive for dizziness. Negative for seizures and headaches. Psychiatric/Behavioral: Negative for confusion. Physical Exam  Vitals signs and nursing note reviewed. Constitutional:       Appearance: Normal appearance. HENT:      Head: Normocephalic and atraumatic. Mouth/Throat:      Pharynx: Oropharynx is clear. Eyes:      General: No scleral icterus. Conjunctiva/sclera: Conjunctivae normal.      Pupils: Pupils are equal, round, and reactive to light. Neck:      Musculoskeletal: Normal range of motion and neck supple. No neck rigidity or muscular tenderness. Cardiovascular:      Rate and Rhythm: Normal rate and regular rhythm. Heart sounds: No murmur. Comments: PICC line in place  Pulmonary:      Effort: Pulmonary effort is normal.      Breath sounds: Normal breath sounds. Abdominal:      General: Bowel sounds are normal. There is no distension. Palpations: Abdomen is soft. Tenderness: There is no abdominal tenderness. There is no guarding or rebound. Musculoskeletal:         General: Tenderness present. Right lower leg: No edema. Left lower leg: No edema. Comments: Well healing incisions to bilateral knees mild erythema to left knee  No drainage  No tenderness   Skin:     Coloration: Skin is pale. Findings: No erythema or rash. Neurological:      Mental Status: He is alert and oriented to person, place, and time. Cranial Nerves: No cranial nerve deficit. Comments: Patient ROM to LE limited due to pain to knees  Patient has normal strength to LE bilaterally   Psychiatric:         Mood and Affect: Mood normal.          Procedures     MDM  Number of Diagnoses or Management Options  Acute on chronic anemia:   ER[facility] ([facility]) ([facility]):   Pneumonia due to organism:   Septicemia [facility]):    Thrombocytosis [facility]):   Diagnosis management comments: Chloe King is a 69-year-old male who presented to emergency department with fatigue and hypotension. Patient's hypotension resolved with 2 L IV fluid in emergency department. Patient was found to have a leukocytosis with ERIC and concern for sepsis. Patient also found to have elevation of platelets concerning for possible sepsis with increased in ESR. Patient started on Zosyn and vancomycin in emergency department concern for sepsis. secondary to possible pneumonia noted on chest x-ray however, patient did not have findings concerning for pna. Possible patient's symptoms due to septic arthritis vs PICC line infection. Patient did not have murmur on exam but possible with previous bacteremia possible for recurrent bacteremia or endocarditis. Patient does not have any current back pain or midline tenderness less likely symptoms are due to epidural abscess or meningitis. Patient does not have any findings to suggest UTI and does not have barajas in place. Patient's incisions to knees appear well healing and less likely cause of sepsis. Discussed results and plan with patient he is agreeable to admission. ED Course as of Aug 07 1938   Fri Aug 07, 2020   1525 Dr. Alexus Henriquez has accepted patient for admission    [SS]      ED Course User Index  [SS] Eugenie Cid MD       --------------------------------------------- PAST HISTORY ---------------------------------------------  Past Medical History:  has a past medical history of Chronic pain, Depression, HLD (hyperlipidemia), Neuropathy, LIDYA on CPAP, Osteoarthritis, and Type II or unspecified type diabetes mellitus without mention of complication, not stated as uncontrolled. Past Surgical History:  has a past surgical history that includes Varicose vein surgery; Arm Surgery; pr colonoscopy w/biopsy single/multiple (N/A, 9/20/2018); pr colsc flx w/rmvl of tumor polyp lesion snare tq (9/20/2018);  Knee arthroscopy (Bilateral, 7/23/2020); and incision and drainage (Bilateral, 7/27/2020). Social History:  reports that he has been smoking. He has been smoking about 0.00 packs per day for the past 30.00 years. He has never used smokeless tobacco. He reports that he does not drink alcohol or use drugs. Family History: family history includes COPD in his father; Diabetes in his paternal grandfather; Heart Disease in his brother; Stroke in his father. The patients home medications have been reviewed. Allergies: Adhesive tape; Tramadol; Cymbalta [duloxetine hcl];  Lidocaine; Lyrica [pregabalin]; and Strattera [atomoxetine]    -------------------------------------------------- RESULTS -------------------------------------------------    LABS:  Results for orders placed or performed during the hospital encounter of 08/07/20   CBC auto differential   Result Value Ref Range    WBC 14.9 (H) 4.5 - 11.5 E9/L    RBC 2.59 (L) 3.80 - 5.80 E12/L    Hemoglobin 7.9 (L) 12.5 - 16.5 g/dL    Hematocrit 23.8 (L) 37.0 - 54.0 %    MCV 91.9 80.0 - 99.9 fL    MCH 30.5 26.0 - 35.0 pg    MCHC 33.2 32.0 - 34.5 %    RDW 12.3 11.5 - 15.0 fL    Platelets 6,026 (H) 407 - 450 E9/L    MPV 8.7 7.0 - 12.0 fL    Neutrophils % 81.7 (H) 43.0 - 80.0 %    Immature Granulocytes % 0.7 0.0 - 5.0 %    Lymphocytes % 9.1 (L) 20.0 - 42.0 %    Monocytes % 7.7 2.0 - 12.0 %    Eosinophils % 0.1 0.0 - 6.0 %    Basophils % 0.7 0.0 - 2.0 %    Neutrophils Absolute 12.14 (H) 1.80 - 7.30 E9/L    Immature Granulocytes # 0.11 E9/L    Lymphocytes Absolute 1.35 (L) 1.50 - 4.00 E9/L    Monocytes Absolute 1.14 (H) 0.10 - 0.95 E9/L    Eosinophils Absolute 0.01 (L) 0.05 - 0.50 E9/L    Basophils Absolute 0.10 0.00 - 0.20 E9/L   Comprehensive Metabolic Panel w/ Reflex to MG   Result Value Ref Range    Sodium 132 132 - 146 mmol/L    Potassium reflex Magnesium 4.5 3.5 - 5.0 mmol/L    Chloride 91 (L) 98 - 107 mmol/L    CO2 24 22 - 29 mmol/L    Anion Gap 17 (H) 7 - 16 mmol/L    Glucose 194 (H) 74 - 99 mg/dL    BUN 17 6 - 20 mg/dL    CREATININE 1.4 (H) 0.7 - 1.2 mg/dL    GFR Non-African American 52 >=60 mL/min/1.73    GFR African American >60     Calcium 10.1 8.6 - 10.2 mg/dL    Total Protein 7.1 6.4 - 8.3 g/dL    Alb 2.9 (L) 3.5 - 5.2 g/dL    Total Bilirubin 0.4 0.0 - 1.2 mg/dL    Alkaline Phosphatase 220 (H) 40 - 129 U/L    ALT 29 0 - 40 U/L    AST 24 0 - 39 U/L   Troponin   Result Value Ref Range    Troponin <0.01 0.00 - 0.03 ng/mL   Lactate, Sepsis   Result Value Ref Range    Lactic Acid, Sepsis 2.3 (H) 0.5 - 1.9 mmol/L   Lactate, Sepsis   Result Value Ref Range    Lactic Acid, Sepsis 0.8 0.5 - 1.9 mmol/L   Urinalysis with Microscopic   Result Value Ref Range    Color, UA Yellow Straw/Yellow    Clarity, UA Clear Clear    Glucose, Ur Negative Negative mg/dL    Bilirubin Urine Negative Negative    Ketones, Urine Negative Negative mg/dL    Specific Gravity, UA 1.015 1.005 - 1.030    Blood, Urine Negative Negative    pH, UA 7.0 5.0 - 9.0    Protein, UA 30 (A) Negative mg/dL    Urobilinogen, Urine 0.2 <2.0 E.U./dL    Nitrite, Urine Negative Negative    Leukocyte Esterase, Urine Negative Negative    Hyaline Casts, UA 0-2 0 - 2 /LPF    WBC, UA 0-1 0 - 5 /HPF    RBC, UA NONE 0 - 2 /HPF    Epithelial Cells, UA RARE /HPF    Bacteria, UA FEW (A) None Seen /HPF   SEDIMENTATION RATE   Result Value Ref Range    Sed Rate 138 (H) 0 - 15 mm/Hr   C-REACTIVE PROTEIN   Result Value Ref Range    CRP 26.1 (H) 0.0 - 0.4 mg/dL   TYPE AND SCREEN   Result Value Ref Range    ABO/Rh A POS     Antibody Screen NEG        RADIOLOGY:  XR CHEST PORTABLE   Final Result   Atelectasis/infiltrates left lung base concerning for pneumonia. XR CHEST PORTABLE    (Results Pending)           ------------------------- NURSING NOTES AND VITALS REVIEWED ---------------------------  Date / Time Roomed:  8/7/2020  9:22 AM  ED Bed Assignment:  [id number]    The nursing notes within the ED encounter and vital signs as below have been reviewed.      Patient Vitals for the past 24 hrs:   BP Temp Temp src Pulse Resp SpO2 Height Weight   08/07/20 1834 (!) 142/78 98.8 °F (37.1 °C) Temporal 89 18 95 % 5' 11\" (1.803 m) 206 lb 14.4 oz (93.8 kg)   08/07/20 1700 (!) 140/60 98.4 °F (36.9 °C) Oral 90 16 97 % -- --   08/07/20 1502 (!) 149/77 -- -- 89 18 97 % -- --   08/07/20 1436 (!) 140/78 98.1 °F (36.7 °C) Oral 87 20 95 % -- --   08/07/20 1203 (!) 141/90 -- -- 94 21 -- -- --   08/07/20 1030 139/78 -- -- 96 19 96 % -- --   08/07/20 1015 -- -- -- 94 13 96 % -- --   08/07/20 1000 109/76 -- -- 86 22 98 % -- --   08/07/20 0945 -- -- -- 97 21 97 % -- --   08/07/20 0931 99/67 97 °F (36.1 °C) -- 88 18 100 % -- 210 lb (95.3 kg)   08/07/20 0930 99/67 -- -- 88 28 100 % -- --       Oxygen Saturation Interpretation: Normal    ------------------------------------------ PROGRESS NOTES ------------------------------------------  Re-evaluation(s):  Time: 230pm  Patients symptoms show no change  Repeat physical examination is improved    Counseling:  I have spoken with the patient and discussed todays results, in addition to providing specific details for the plan of care and counseling regarding the diagnosis and prognosis. Their questions are answered at this time and they are agreeable with the plan of admission.    --------------------------------- ADDITIONAL PROVIDER NOTES ---------------------------------  Consultations:  Spoke with Dr. Lizzie Aguirre. Discussed case. They will admit the patient. This patient's ED course included: a personal history and physicial examination, re-evaluation prior to disposition, multiple bedside re-evaluations, IV medications, cardiac monitoring, continuous pulse oximetry and complex medical decision making and emergency management    This patient has remained hemodynamically stable during their ED course. Diagnosis:  1. Acute on chronic anemia    2. ERIC (acute kidney injury) ([facility]    3. Thrombocytosis ([address]    4. Septicemia ([facility]    5.  Pneumonia due to organism        Disposition:  Patient's

## 2020-08-07 NOTE — H&P
K[facility] Past[facility]  Internal Medicine Residency Program  History and Physical    Patient:  Mihaela Mandel 63 y.o. male MRN: [id number]     Date of Service: 8/7/2020    Hospital Day: 1      Chief complaint: had concerns including Dizziness (sent from ortho clinic for hypotension today, recent bilateral knee surgery, c/o dizziness, abd pain, BLE pain). History of Present Illness   The patient is a 63 y.o. male with a PMHx of insulin-dependent Type 2 DM, HLD, GERD, B/L osteoarthritis, and lumbar stenosis, who was admitted on 8/7/2020 for hypotension, fatigue, and suspected sepsis. He was recently admitted from 7/21-7/31 for septic arthritis of B/L knees, with cultures positive for Strep Agalactiae. Was placed on IV Ceftriaxone. Surgical washout of bilateral knees was performed on 7/27. He was discharged on IV Ceftriaxone via midline IV, with last dose scheduled for 8/24. He was scheduled for f/u with Ortho (8/7) and ID (pt instructed to call for appt. ). Today, pt presented to Ortho Clinic for f/u. While there, he was found to be hypotensive to 79/56, HR 91. He was sent to the ED for further evaluation and treatment. During the interview, the pt was very confused. Stated that he had begun having symptoms of nausea and vomiting beginning last Sunday (8/2). Shortly thereafter, he developed severe knee pain. He also endorsed lightheadedness and photophobia during his visit to the Ortho clinic today. He believes he had a syncopal episode but cannot be sure. Currently, he is c/o SoB, constipation, abdominal pain and pain in his lower extremities. He denies fever/chills, chest pain, cough, or n/v/d. In the ED, the pt was afebrile and hypotensive to 99/67 mmHg. Initial labs showing WBCs of 14.9, Cr of 1.4. Lactic acidosis to 2.3, anion gap of 17. ESR of 138 and CRP 26.1. Albumin of 2.9, alk phos of 220. Hgb of 7.9. UA 30 protein and few bacteria.  CXR showing increased pulmonary vascular markings, skin nightly 7/31/20   Chester Burnette MD   insulin lispro (HUMALOG) 100 UNIT/ML injection vial Inject 0-18 Units into the skin 3 times daily (with meals) 7/31/20   Chester Burnette MD   insulin lispro (HUMALOG) 100 UNIT/ML injection vial Inject 0-9 Units into the skin nightly 7/31/20   Chester Burnette MD   cefTRIAXone (ROCEPHIN) infusion Infuse 2,000 mg intravenously every 12 hours for 26 days Compound per protocol 7/25/20 8/20/20  Maikol Nunez MD   doxepin (SINEQUAN) 10 MG capsule take 1 capsule by mouth nightly  Patient not taking: Reported on 8/7/2020 9/27/19   FRANCIE Capellan - CNP   venlafaxine (EFFEXOR) 37.5 MG tablet Take 37.5 mg by mouth daily    Historical Provider, MD   lamoTRIgine (LAMICTAL) 25 MG tablet Take 25 mg by mouth daily    Historical Provider, MD   Multiple Vitamin (MULTI VITAMIN MENS PO) Take by mouth daily     Historical Provider, MD       Allergies:  Adhesive tape; Tramadol; Cymbalta [duloxetine hcl]; Lidocaine; Lyrica [pregabalin]; and Strattera [atomoxetine]    Social History:   TOBACCO:   reports that he has been smoking. He has been smoking about 0.00 packs per day for the past 30.00 years. He has never used smokeless tobacco.  ETOH:   reports no history of alcohol use.     Family History:       Problem Relation Age of Onset    COPD Father     Stroke Father     Diabetes Paternal Grandfather     Heart Disease Brother        Physical Exam   · Vitals: BP (!) 140/60   Pulse 90   Temp 98.4 °F (36.9 °C) (Oral)   Resp 16   Wt 210 lb (95.3 kg)   SpO2 97%   BMI 29.29 kg/m²     · General Appearance: well-developed and well nourished, in no acute distress and disoriented  · Skin: surgical incisions on bilateral knees, no erythema or discharge at sites,  Several small wounds on BLE in various stages of healing, skin otherwise clean, dry, and intact  · Head: normocephalic and atraumatic  · Eyes: conjunctivae normal, sclera anicteric and no discharge from bilateral Final  08/07/2020  9:55 AM  [facility]    pH, UA  7.0  5.0 - 9.0  Final  08/07/2020  9:55 AM  [facility]    Protein, UA  30Abnormal    Negative mg/dL  Final  08/07/2020  9:55 AM  [facility]    Urobilinogen, Urine  0.2  <2.0 E.U./dL  Final  08/07/2020  9:55 AM  [facility]    Nitrite, Urine  Negative  Negative  Final  08/07/2020  9:55 AM  [facility]    Leukocyte Esterase, Urine  Negative  Negative  Final  08/07/2020  9:55 AM  [facility]    Hyaline Casts, UA  0-2  0 - 2 /LPF  Final  08/07/2020  9:55 AM  [facility]    WBC, UA  0-1  0 - 5 /HPF  Final  08/07/2020  9:55 AM  [facility]    RBC, UA  NONE  0 - 2 /HPF  Final  08/07/2020  9:55 AM  [facility]    Epithelial Cells, UA  RARE  /HPF  Final  08/07/2020  9:55 AM  [facility]    Bacteria, UA  FEWAbnormal    None Seen /HPF  Final  08/07/2020  9:55 AM  [facility]      Lactate   Lactic Acid, Sepsis  0.8  0.5 - 1.9 mmol/L  Final  08/07/2020  9:55 AM  [facility]      Troponin  Troponin  <0.01  0.00 - 0.03 ng/mL  Final  08/07/2020  9:55 AM  [facility]      Imaging Studies:     Xr Chest Standard (2 Vw)    Result Date: 7/20/2020  Clinical indications: Chest pain. TECHNIQUE: Frontal and lateral radiographs (2 views) of the chest are obtained. COMPARISON: December 10, 2018 Findings: The heart is enlarged. There is calcification within thoracic aorta. Acromioclavicular arthropathy is present. Degenerative spondylosis and increased kyphosis of thoracic spine. Diffuse bone demineralization. The heart, lungs, mediastinum and regional skeleton are otherwise unremarkable. Costophrenic angles are sharp and clear. The lungs are normally aerated.  Regional osseous structures are negative for evidence of aggressive process or acute fracture. There is no evidence for mediastinal shift. There is no evidence for hilar lymphadenopathy. The lungs are negative for evidence of dominant mass, airspace consolidation, effusion, atelectasis or pneumothorax. There is no severe interstitial change. No evidence for acute cardiopulmonary process. Xr Hip Bilateral W Ap Pelvis (2 Views)    Result Date: 2020  Patient MRN: [id number] : 1965 Age:  63 years Gender: Male Order Date: 2020 10:15 AM Exam: XR HIP BILATERAL W AP PELVIS (2 VIEWS) Number of Images: 5 views Indication:   Pain Pain Comparison: None. Findings: Examination of the pelvis in AP view shows no radiographic evidence of fracture or bony abnormality. No osteolytic or blastic lesions are identified. The soft tissue outlines are normal. The bilateral femoral head is well conjugated within the acetabulum. There is no evidence of dislocation. There is mild osteoarthropathy of both hip joints. There are osteophytes seen in the superior aspect of the both femoral acetabular joint. .      . NO EVIDENCE OF FRACTURE OR DISLOCATION. Mild osteoarthropathy of both hip joints    Xr Knee Left (3 Views)    Result Date: 2020  4 views of the left knee are obtained. These images reveal no evidence for acute displaced cortical disruption or dislocation. There is no evidence of joint effusion. There is loss of joint space, osteophytosis and eburnation in the tricompartmental distribution, most notably in the medial compartment. Otherwise the regional soft tissue and osseous structures are unremarkable. No acute fracture is identified. Osteoarthritis. Xr Knee Right (3 Views)    Result Date: 2020  4 views of the right knee are obtained. These images reveal no evidence for acute displaced cortical disruption or dislocation. There is evidence of joint effusion.  There is loss of joint space, osteophytosis and eburnation in the tricompartmental BASAL CISTERNS: Normal in size and morphology for patient's age. Rakesh cisterna magna with the incidental posterior fossa arachnoid cyst. Not clinically significant. The Bones/joints: CALVARIUM /SKULL BASE /FACIAL BONES: Bony sella and visualized foramina appear normal. No destructive lesions. Negative for fractures. Sinuses: The visualized paranasal sinuses are well-developed and aerated. Mastoid air cells: Pneumatized. Orbits: No abnormality in visualized bilateral orbits. Vasculature: Unremarkable within the limits of this study. Soft tissues: Unremarkable. Negative for acute intracranial pathology. This report has been electronically signed by Jesusita Ceron MD.    Ct Head Wo Contrast    Result Date: 2020  Patient MRN:  [id number] : 1965 Age: 63 years Gender: Male Order Date:  2020 4:15 PM EXAM: CT HEAD WO CONTRAST COMPARISON: 2015 INDICATION:  fall fall TECHNIQUE: Axial unenhanced CT scanning was performed through the head without the use of intravenous contrast. Low-dose CT acquisition technique included one of the following options; 1. Automated exposure control, 2. Adjustment of mA and/or kV according to the patient's size or 3. Use of iterative reconstruction. FINDINGS: No evidence of acute intracranial hemorrhage or edema. Ventricles are nonenlarged. No abnormal extra-axial fluid collections. Mastoid air cells are clear. No evidence of acute intracranial hemorrhage or edema. Ct Cervical Spine Wo Contrast    Result Date: 2020  Patient MRN:  [id number] : 1965 Age: 63 years Gender: Male Order Date:  2020 4:15 PM EXAM: CT CERVICAL SPINE WO CONTRAST COMPARISON: 2019 INDICATION:  fall fall TECHNIQUE: Axial unenhanced CT scanning was performed through the cervical spine. Reformatted coronal and sagittal views also obtained. Low-dose CT acquisition technique included one of the following options; 1. Automated exposure control, 2.  Adjustment of mA and/or kV according to the patient's size or 3. Use of iterative reconstruction. FINDINGS: There is no evidence of fracture or dislocation. Vertebral body height is well-maintained. No obvious prevertebral soft tissue edema. Posterior disc/osteophyte complex is seen at multiple levels results in mild to moderate effacement of ventral thecal sac notable at C4/C5 and C5/C6. No evidence of fracture or dislocation of cervical spine. Ct Lumbar Spine W Wo Contrast    Result Date: 2020  Patient MRN:  [id number] : 1965 Age: 63 years Gender: Male Order Date:  2020 11:15 AM EXAM: CT LUMBAR SPINE W WO CONTRAST NUMBER OF IMAGES:  1945 INDICATION: Severe back pain. Recent septic arthritis. COMPARISON: 2015 TECHNIQUE: Axial CT of the lumbar spine was obtained. Sagittal and coronal MPR reconstructions were performed and uploaded to PACS for evaluation. Technique: Low-dose CT  acquisition technique included one of following options; 1 . Automated exposure control, 2. Adjustment of MA and or KV according to patient's size or 3. Use of iterative reconstruction. FINDINGS: No acute fracture or dislocation is seen. There is moderate spinal stenosis at L3-4 L4-5 relate disc bulging and facet arthropathy. There are no endplate erosions to suggest infection. Postcontrast there is no evidence of any abnormal enhancement to suggest infection or otherwise. Moderate spinal canal stenosis related to disc bulging and facet arthropathy at L3-4 and L4-5. No evidence of spinal infection. Ct Abdomen Pelvis W Iv Contrast Additional Contrast? None    Result Date: 2020  Patient MRN:  [id number] : 1965 Age: 63 years Gender: Male Order Date:  2020 4:15 PM EXAM: CT ABDOMEN PELVIS W IV CONTRAST INDICATION:  trauma tender post fall trauma tender post fall  COMPARISON: None Technique: Low-dose CT  acquisition technique included one of following options; 1 . Automated exposure control, 2.  Adjustment DM, A1c of 10.1 as of 7/22/2020. On 35U Lantus nightly and Lispro AC/HS while at home. BG of 194 in ED.    -Initiate Lantus 20U nightly with low dose SSI    -Will likely increase Lantus dosage when pt's diet is resumed    -POCT glucose monitoring q4 hrs while pt is NPO     5. Bipolar Disorder: Pt has a documented PMHx of Bipolar disorder.    -Continue home Doxepin, Venlafaxine, and Lamotrigine    -Will continue to monitor      6. HLD: Pt has a PMHx of HLD, total cholesterol of 336, Triglycerides of 339, and LDL of 211 as of 5/29/2020.    -Continue home Lipitor    -F/u repeat Lipid Panel     7. Pain management: Pt reports significant pain from surgical sites. Is currently hypertensive to 140/60.  However, was resting comfortably in bed in ED, no acute distress.   -strict bedrest    -Insert Mcpherson catheter to prevent urinary incontinence 2/2 pt pain with ambulating    -Tylenol 650mg PO q6 hrs PRN   -Oxycodone 10mg PO q12 hrs    -Oxycodone 5mg PO q4 hrs PRN    -continue Robaxin 1,000mg PRN for muscle spasms    -continue to monitor pt's pain     PT/OT evaluation: PT/OT consulted   DVT prophylaxis: Heparin     Savannah Alaniz DO, PGY-1   Attending physician: Dr. Sundar Angela

## 2020-08-07 NOTE — TELEPHONE ENCOUNTER
Call palced to [facility], spoke with [facility] RN and notified her pt was sent to ER after appt. She verbalized understanding, questions answered.

## 2020-08-07 NOTE — PROGRESS NOTES
Patient physically seen, pale and diaphoretic, reporting symptoms of dizziness and spotty vision. BP 79/56 HR 91. Patient agreeable to transport to ED for further evaluation. Suture removal OK to be done when patient more stable.   Electronically signed by Ivette Childers PA-C on 8/7/2020 at 10:25 AM

## 2020-08-08 ENCOUNTER — ANESTHESIA (OUTPATIENT)
Dept: OPERATING ROOM | Age: 55
DRG: 710 | End: 2020-08-08
Payer: COMMERCIAL

## 2020-08-08 ENCOUNTER — ANESTHESIA EVENT (OUTPATIENT)
Dept: OPERATING ROOM | Age: 55
DRG: 710 | End: 2020-08-08
Payer: COMMERCIAL

## 2020-08-08 ENCOUNTER — APPOINTMENT (OUTPATIENT)
Dept: GENERAL RADIOLOGY | Age: 55
DRG: 710 | End: 2020-08-08
Payer: COMMERCIAL

## 2020-08-08 LAB
ALBUMIN SERPL-MCNC: 2.4 G/DL (ref 3.5–5.2)
ALP BLD-CCNC: 155 U/L (ref 40–129)
ALT SERPL-CCNC: 18 U/L (ref 0–40)
ANION GAP SERPL CALCULATED.3IONS-SCNC: 12 MMOL/L (ref 7–16)
ANISOCYTOSIS: ABNORMAL
APPEARANCE FLUID: NORMAL
APPEARANCE FLUID: NORMAL
AST SERPL-CCNC: 15 U/L (ref 0–39)
BASOPHILS ABSOLUTE: 0.12 E9/L (ref 0–0.2)
BASOPHILS RELATIVE PERCENT: 1.3 % (ref 0–2)
BILIRUB SERPL-MCNC: 0.3 MG/DL (ref 0–1.2)
BLOOD BANK DISPENSE STATUS: NORMAL
BLOOD BANK PRODUCT CODE: NORMAL
BPU ID: NORMAL
BUN BLDV-MCNC: 14 MG/DL (ref 6–20)
BURR CELLS: ABNORMAL
CALCIUM SERPL-MCNC: 9 MG/DL (ref 8.6–10.2)
CELL COUNT FLUID TYPE: NORMAL
CELL COUNT FLUID TYPE: NORMAL
CHLORIDE BLD-SCNC: 97 MMOL/L (ref 98–107)
CHLORIDE URINE RANDOM: 37 MMOL/L
CO2: 25 MMOL/L (ref 22–29)
COLOR FLUID: NORMAL
COLOR FLUID: NORMAL
CREAT SERPL-MCNC: 1 MG/DL (ref 0.7–1.2)
CREATININE URINE: 50 MG/DL (ref 40–278)
DESCRIPTION BLOOD BANK: NORMAL
EKG ATRIAL RATE: 90 BPM
EKG P AXIS: 56 DEGREES
EKG P-R INTERVAL: 158 MS
EKG Q-T INTERVAL: 372 MS
EKG QRS DURATION: 84 MS
EKG QTC CALCULATION (BAZETT): 455 MS
EKG R AXIS: 1 DEGREES
EKG T AXIS: 55 DEGREES
EKG VENTRICULAR RATE: 90 BPM
EOSINOPHILS ABSOLUTE: 0.14 E9/L (ref 0.05–0.5)
EOSINOPHILS RELATIVE PERCENT: 1.5 % (ref 0–6)
GFR AFRICAN AMERICAN: >60
GFR NON-AFRICAN AMERICAN: >60 ML/MIN/1.73
GLUCOSE BLD-MCNC: 118 MG/DL (ref 74–99)
GRAM STAIN ORDERABLE: NORMAL
GRAM STAIN ORDERABLE: NORMAL
HCT VFR BLD CALC: 19.7 % (ref 37–54)
HCT VFR BLD CALC: 23.4 % (ref 37–54)
HEMOGLOBIN: 6.5 G/DL (ref 12.5–16.5)
HEMOGLOBIN: 7.8 G/DL (ref 12.5–16.5)
HYPOCHROMIA: ABNORMAL
IMMATURE GRANULOCYTES #: 0.05 E9/L
IMMATURE GRANULOCYTES %: 0.5 % (ref 0–5)
LACTIC ACID: 0.5 MMOL/L (ref 0.5–2.2)
LACTIC ACID: 0.6 MMOL/L (ref 0.5–2.2)
LYMPHOCYTES ABSOLUTE: 3.06 E9/L (ref 1.5–4)
LYMPHOCYTES RELATIVE PERCENT: 31.9 % (ref 20–42)
MCH RBC QN AUTO: 29.8 PG (ref 26–35)
MCHC RBC AUTO-ENTMCNC: 33 % (ref 32–34.5)
MCV RBC AUTO: 90.4 FL (ref 80–99.9)
METER GLUCOSE: 109 MG/DL (ref 74–99)
METER GLUCOSE: 116 MG/DL (ref 74–99)
METER GLUCOSE: 120 MG/DL (ref 74–99)
METER GLUCOSE: 121 MG/DL (ref 74–99)
METER GLUCOSE: 129 MG/DL (ref 74–99)
MONOCYTE, FLUID: 14 %
MONOCYTE, FLUID: 7 %
MONOCYTES ABSOLUTE: 1.11 E9/L (ref 0.1–0.95)
MONOCYTES RELATIVE PERCENT: 11.6 % (ref 2–12)
NEUTROPHIL, FLUID: 86 %
NEUTROPHIL, FLUID: 93 %
NEUTROPHILS ABSOLUTE: 5.12 E9/L (ref 1.8–7.3)
NEUTROPHILS RELATIVE PERCENT: 53.2 % (ref 43–80)
NUCLEATED CELLS FLUID: NORMAL /UL
NUCLEATED CELLS FLUID: NORMAL /UL
PDW BLD-RTO: 12.3 FL (ref 11.5–15)
PLATELET # BLD: 612 E9/L (ref 130–450)
PMV BLD AUTO: 8.9 FL (ref 7–12)
POIKILOCYTES: ABNORMAL
POLYCHROMASIA: ABNORMAL
POTASSIUM REFLEX MAGNESIUM: 3.6 MMOL/L (ref 3.5–5)
POTASSIUM, UR: 14.5 MMOL/L
RBC # BLD: 2.18 E12/L (ref 3.8–5.8)
RBC FLUID: NORMAL /UL
RBC FLUID: NORMAL /UL
SCHISTOCYTES: ABNORMAL
SODIUM BLD-SCNC: 134 MMOL/L (ref 132–146)
SODIUM URINE: 44 MMOL/L
TARGET CELLS: ABNORMAL
TOTAL PROTEIN: 5.9 G/DL (ref 6.4–8.3)
UREA NITROGEN, UR: 349 MG/DL (ref 800–1666)
WBC # BLD: 9.6 E9/L (ref 4.5–11.5)

## 2020-08-08 PROCEDURE — 80053 COMPREHEN METABOLIC PANEL: CPT

## 2020-08-08 PROCEDURE — 85018 HEMOGLOBIN: CPT

## 2020-08-08 PROCEDURE — 82570 ASSAY OF URINE CREATININE: CPT

## 2020-08-08 PROCEDURE — 2580000003 HC RX 258: Performed by: INTERNAL MEDICINE

## 2020-08-08 PROCEDURE — 6370000000 HC RX 637 (ALT 250 FOR IP): Performed by: INTERNAL MEDICINE

## 2020-08-08 PROCEDURE — P9016 RBC LEUKOCYTES REDUCED: HCPCS

## 2020-08-08 PROCEDURE — 6360000002 HC RX W HCPCS: Performed by: INTERNAL MEDICINE

## 2020-08-08 PROCEDURE — 84133 ASSAY OF URINE POTASSIUM: CPT

## 2020-08-08 PROCEDURE — 84540 ASSAY OF URINE/UREA-N: CPT

## 2020-08-08 PROCEDURE — 85025 COMPLETE CBC W/AUTO DIFF WBC: CPT

## 2020-08-08 PROCEDURE — 93010 ELECTROCARDIOGRAM REPORT: CPT | Performed by: INTERNAL MEDICINE

## 2020-08-08 PROCEDURE — 82962 GLUCOSE BLOOD TEST: CPT

## 2020-08-08 PROCEDURE — 2060000000 HC ICU INTERMEDIATE R&B

## 2020-08-08 PROCEDURE — 83605 ASSAY OF LACTIC ACID: CPT

## 2020-08-08 PROCEDURE — C9113 INJ PANTOPRAZOLE SODIUM, VIA: HCPCS | Performed by: INTERNAL MEDICINE

## 2020-08-08 PROCEDURE — 71045 X-RAY EXAM CHEST 1 VIEW: CPT

## 2020-08-08 PROCEDURE — 82436 ASSAY OF URINE CHLORIDE: CPT

## 2020-08-08 PROCEDURE — 36430 TRANSFUSION BLD/BLD COMPNT: CPT

## 2020-08-08 PROCEDURE — 99223 1ST HOSP IP/OBS HIGH 75: CPT | Performed by: INTERNAL MEDICINE

## 2020-08-08 PROCEDURE — 6360000002 HC RX W HCPCS: Performed by: STUDENT IN AN ORGANIZED HEALTH CARE EDUCATION/TRAINING PROGRAM

## 2020-08-08 PROCEDURE — 36415 COLL VENOUS BLD VENIPUNCTURE: CPT

## 2020-08-08 PROCEDURE — 85014 HEMATOCRIT: CPT

## 2020-08-08 PROCEDURE — 84300 ASSAY OF URINE SODIUM: CPT

## 2020-08-08 RX ORDER — 0.9 % SODIUM CHLORIDE 0.9 %
20 INTRAVENOUS SOLUTION INTRAVENOUS ONCE
Status: COMPLETED | OUTPATIENT
Start: 2020-08-08 | End: 2020-08-08

## 2020-08-08 RX ORDER — DEXTROSE MONOHYDRATE 50 MG/ML
100 INJECTION, SOLUTION INTRAVENOUS PRN
Status: DISCONTINUED | OUTPATIENT
Start: 2020-08-08 | End: 2020-08-14 | Stop reason: HOSPADM

## 2020-08-08 RX ORDER — PANTOPRAZOLE SODIUM 40 MG/10ML
40 INJECTION, POWDER, LYOPHILIZED, FOR SOLUTION INTRAVENOUS DAILY
Status: DISCONTINUED | OUTPATIENT
Start: 2020-08-08 | End: 2020-08-10 | Stop reason: CLARIF

## 2020-08-08 RX ORDER — DEXTROSE MONOHYDRATE 25 G/50ML
12.5 INJECTION, SOLUTION INTRAVENOUS PRN
Status: DISCONTINUED | OUTPATIENT
Start: 2020-08-08 | End: 2020-08-14 | Stop reason: HOSPADM

## 2020-08-08 RX ORDER — NICOTINE POLACRILEX 4 MG
15 LOZENGE BUCCAL PRN
Status: DISCONTINUED | OUTPATIENT
Start: 2020-08-08 | End: 2020-08-14 | Stop reason: HOSPADM

## 2020-08-08 RX ORDER — SODIUM CHLORIDE, SODIUM LACTATE, POTASSIUM CHLORIDE, CALCIUM CHLORIDE 600; 310; 30; 20 MG/100ML; MG/100ML; MG/100ML; MG/100ML
INJECTION, SOLUTION INTRAVENOUS CONTINUOUS
Status: ACTIVE | OUTPATIENT
Start: 2020-08-08 | End: 2020-08-09

## 2020-08-08 RX ORDER — SODIUM CHLORIDE 9 MG/ML
10 INJECTION INTRAVENOUS DAILY
Status: DISCONTINUED | OUTPATIENT
Start: 2020-08-08 | End: 2020-08-10 | Stop reason: CLARIF

## 2020-08-08 RX ADMIN — OXYCODONE HYDROCHLORIDE 10 MG: 10 TABLET, FILM COATED, EXTENDED RELEASE ORAL at 21:07

## 2020-08-08 RX ADMIN — PANTOPRAZOLE SODIUM 40 MG: 40 INJECTION, POWDER, FOR SOLUTION INTRAVENOUS at 09:23

## 2020-08-08 RX ADMIN — PROMETHAZINE HYDROCHLORIDE 25 MG: 25 INJECTION INTRAMUSCULAR; INTRAVENOUS at 00:15

## 2020-08-08 RX ADMIN — METHOCARBAMOL TABLETS 1000 MG: 500 TABLET, COATED ORAL at 18:06

## 2020-08-08 RX ADMIN — LAMOTRIGINE 25 MG: 25 TABLET ORAL at 09:23

## 2020-08-08 RX ADMIN — OXYCODONE 5 MG: 5 TABLET ORAL at 16:16

## 2020-08-08 RX ADMIN — VANCOMYCIN HYDROCHLORIDE 1000 MG: 1 INJECTION, POWDER, LYOPHILIZED, FOR SOLUTION INTRAVENOUS at 03:13

## 2020-08-08 RX ADMIN — METHOCARBAMOL TABLETS 1000 MG: 500 TABLET, COATED ORAL at 08:54

## 2020-08-08 RX ADMIN — PIPERACILLIN AND TAZOBACTAM 3.38 G: 3; .375 INJECTION, POWDER, FOR SOLUTION INTRAVENOUS at 04:12

## 2020-08-08 RX ADMIN — MORPHINE SULFATE 2 MG: 2 INJECTION, SOLUTION INTRAMUSCULAR; INTRAVENOUS at 00:15

## 2020-08-08 RX ADMIN — ATORVASTATIN CALCIUM 20 MG: 20 TABLET, FILM COATED ORAL at 09:23

## 2020-08-08 RX ADMIN — DAPTOMYCIN 450 MG: 500 INJECTION, POWDER, LYOPHILIZED, FOR SOLUTION INTRAVENOUS at 12:20

## 2020-08-08 RX ADMIN — SODIUM CHLORIDE 20 ML: 9 INJECTION, SOLUTION INTRAVENOUS at 15:16

## 2020-08-08 RX ADMIN — VANCOMYCIN HYDROCHLORIDE 1000 MG: 1 INJECTION, POWDER, LYOPHILIZED, FOR SOLUTION INTRAVENOUS at 16:15

## 2020-08-08 RX ADMIN — SODIUM CHLORIDE, POTASSIUM CHLORIDE, SODIUM LACTATE AND CALCIUM CHLORIDE: 600; 310; 30; 20 INJECTION, SOLUTION INTRAVENOUS at 12:24

## 2020-08-08 RX ADMIN — VENLAFAXINE HYDROCHLORIDE 37.5 MG: 75 TABLET ORAL at 09:23

## 2020-08-08 RX ADMIN — SODIUM CHLORIDE, PRESERVATIVE FREE 10 ML: 5 INJECTION INTRAVENOUS at 08:34

## 2020-08-08 RX ADMIN — OXYCODONE HYDROCHLORIDE 10 MG: 10 TABLET, FILM COATED, EXTENDED RELEASE ORAL at 09:23

## 2020-08-08 RX ADMIN — Medication 10 ML: at 08:35

## 2020-08-08 RX ADMIN — OXYCODONE 5 MG: 5 TABLET ORAL at 06:29

## 2020-08-08 RX ADMIN — MULTIVITAMIN TABLET 1 TABLET: TABLET at 08:57

## 2020-08-08 ASSESSMENT — PAIN SCALES - GENERAL
PAINLEVEL_OUTOF10: 10
PAINLEVEL_OUTOF10: 8
PAINLEVEL_OUTOF10: 9
PAINLEVEL_OUTOF10: 10
PAINLEVEL_OUTOF10: 7
PAINLEVEL_OUTOF10: 0

## 2020-08-08 ASSESSMENT — PAIN DESCRIPTION - PAIN TYPE: TYPE: ACUTE PAIN

## 2020-08-08 ASSESSMENT — PAIN DESCRIPTION - PROGRESSION: CLINICAL_PROGRESSION: NOT CHANGED

## 2020-08-08 ASSESSMENT — PAIN DESCRIPTION - DESCRIPTORS: DESCRIPTORS: ACHING;SHARP

## 2020-08-08 ASSESSMENT — PAIN DESCRIPTION - LOCATION: LOCATION: KNEE

## 2020-08-08 ASSESSMENT — PAIN DESCRIPTION - ORIENTATION: ORIENTATION: RIGHT;LEFT

## 2020-08-08 ASSESSMENT — PAIN DESCRIPTION - FREQUENCY: FREQUENCY: CONTINUOUS

## 2020-08-08 ASSESSMENT — PAIN - FUNCTIONAL ASSESSMENT: PAIN_FUNCTIONAL_ASSESSMENT: PREVENTS OR INTERFERES SOME ACTIVE ACTIVITIES AND ADLS

## 2020-08-08 ASSESSMENT — PAIN DESCRIPTION - ONSET: ONSET: ON-GOING

## 2020-08-08 NOTE — CONSULTS
Department of Orthopedic Surgery  Resident Consult Note      Reason for Consult: Bilateral knee effusions    HISTORY OF PRESENT ILLNESS:       Patient is a 63 y.o. male who is s/p bilateral knee washouts x2. He presents today with bilateral knee effusions. Denies numbness/tingling/paresthesias. Denies any other orthopedic complaints at this time.       Past Medical History:        Diagnosis Date    Chronic pain     Depression     HLD (hyperlipidemia)     Neuropathy     LIDYA on CPAP     Osteoarthritis     Type II or unspecified type diabetes mellitus without mention of complication, not stated as uncontrolled      Past Surgical History:        Procedure Laterality Date    ARM SURGERY      left arm    INCISION AND DRAINAGE Bilateral 7/27/2020    REPEAT BILATERAL KNEE WASHOUT performed by Joni Juarez DO at [facility] ARTHROSCOPY Bilateral 7/23/2020    KNEE ARTHROSCOPY performed by Stanton Muse MD at [address] W/BIOPSY SINGLE/MULTIPLE N/A 9/20/2018    COLONOSCOPY WITH BIOPSY performed by Waqar Fisher MD at [address]  [facility] W/RMVL OF TUMOR POLYP LESION SNARE TQ  9/20/2018    COLONOSCOPY POLYPECTOMY SNARE/COLD BIOPSY performed by Waqar Fisher MD at [facility]       Current Medications:   Current Facility-Administered Medications: sodium chloride flush 0.9 % injection 10 mL, 10 mL, Intravenous, 2 times per day  sodium chloride flush 0.9 % injection 10 mL, 10 mL, Intravenous, PRN  acetaminophen (TYLENOL) tablet 650 mg, 650 mg, Oral, Q6H PRN **OR** acetaminophen (TYLENOL) suppository 650 mg, 650 mg, Rectal, Q6H PRN  polyethylene glycol (GLYCOLAX) packet 17 g, 17 g, Oral, Daily PRN  promethazine (PHENERGAN) tablet 12.5 mg, 12.5 mg, Oral, Q6H PRN **OR** ondansetron (ZOFRAN) injection 4 mg, 4 mg, Intravenous, Q6H PRN  lactated ringers infusion, , Intravenous, Continuous  insulin glargine (LANTUS) injection vial 20 Units, 20 Units, Subcutaneous, Nightly  insulin lispro (HUMALOG) injection vial 0-6 Units, 0-6 Units, Subcutaneous, Q4H  heparin (porcine) 5000 Units in sodium chloride 0.9% 1000 mL irrigation, 5,000 Units, Irrigation, Once  atorvastatin (LIPITOR) tablet 20 mg, 20 mg, Oral, Daily  lamoTRIgine (LAMICTAL) tablet 25 mg, 25 mg, Oral, Daily  methocarbamol (ROBAXIN) tablet 1,000 mg, 1,000 mg, Oral, 4x Daily PRN  venlafaxine (EFFEXOR) tablet 37.5 mg, 37.5 mg, Oral, Daily  multivitamin 1 tablet, 1 tablet, Oral, Daily  oxyCODONE (OXYCONTIN) extended release tablet 10 mg, 10 mg, Oral, 2 times per day  oxyCODONE (ROXICODONE) immediate release tablet 5 mg, 5 mg, Oral, Q4H PRN  piperacillin-tazobactam (ZOSYN) 3.375 g in dextrose 5 % 100 mL IVPB extended infusion (mini-bag), 3.375 g, Intravenous, Q8H **AND** 0.9 % sodium chloride infusion admixture, , Intravenous, Q8H  [START ON 8/8/2020] vancomycin 1000 mg IVPB in 250 mL D5W addavial, 1,000 mg, Intravenous, Q12H  Allergies:  Adhesive tape; Tramadol; Cymbalta [duloxetine hcl]; Lidocaine; Lyrica [pregabalin]; and Strattera [atomoxetine]    Social History:   TOBACCO:   reports that he has been smoking. He has been smoking about 0.00 packs per day for the past 30.00 years. He has never used smokeless tobacco.  ETOH:   reports no history of alcohol use. DRUGS:   reports no history of drug use.   ACTIVITIES OF DAILY LIVING:    OCCUPATION:    Family History:       Problem Relation Age of Onset    COPD Father     Stroke Father     Diabetes Paternal Grandfather     Heart Disease Brother        REVIEW OF SYSTEMS:  CONSTITUTIONAL:  negative for  fevers, chills  EYES:  negative for blurred vision, visual disturbance  HEENT:  negative for  hearing loss, voice change  RESPIRATORY:  negative for  dyspnea, wheezing  CARDIOVASCULAR:  negative for  chest pain, palpitations  GASTROINTESTINAL:  negative for nausea, vomiting  GENITOURINARY:  negative for frequency, urinary incontinence  HEMATOLOGIC/LYMPHATIC:  negative for bleeding and petechiae  MUSCULOSKELETAL:  positive for  pain and joint swelling  NEUROLOGICAL:  negative for headaches, dizziness  BEHAVIOR/PSYCH:  negative for increased agitation and anxiety    PHYSICAL EXAM:    VITALS:  BP (!) 160/86   Pulse 85   Temp 98.6 °F (37 °C) (Temporal)   Resp 16   Ht 5' 11\" (1.803 m)   Wt 206 lb 14.4 oz (93.8 kg)   SpO2 97%   BMI 28.86 kg/m²   CONSTITUTIONAL:  awake, alert, cooperative, no apparent distress, and appears stated age  MUSCULOSKELETAL:  Bilateral lower Extremity:  bilateral lower extremity:  Dressing C/D/I  + TTP  Compartments soft and compressible  +PF/DF/EHL  +2/4 DP & PT pulses, Brisk Cap refill, Toes warm and perfused  Distal sensation grossly intact to Peroneals, Sural, Saphenous, and tibial nrs  · Joint effusion present  · Knee pain with passive range of motion    Secondary Exam:   · Bilateral UE: No obvious signs of trauma. -TTP to fingers, hand, wrist, forearm, elbow, humerus, shoulder or clavicle. -- Patient able to flex/extend fingers, wrist, elbow and shoulder with active and passive ROM without pain, +2/4 Radial pulse, cap refill <3sec, +AIN/PIN/Radial/Ulnar/Median N, distal sensation grossly intact to C4-T1 dermatomes, compartments soft and compressible. · Pelvis: -TTP, -Log roll, -Heel strike     DATA:    CBC:   Lab Results   Component Value Date    WBC 14.9 08/07/2020    RBC 2.59 08/07/2020    HGB 7.9 08/07/2020    HCT 23.8 08/07/2020    MCV 91.9 08/07/2020    MCH 30.5 08/07/2020    MCHC 33.2 08/07/2020    RDW 12.3 08/07/2020    PLT 1,159 08/07/2020    MPV 8.7 08/07/2020     PT/INR:    Lab Results   Component Value Date    PROTIME 13.4 07/22/2020    INR 1.2 07/22/2020       Radiology Review:  No new imaging obtained    IMPRESSION:  · Bilateral knee effusions    PLAN:  · In a sterile manner bilateral knees were aspirated.   Approximately 10 cc's were obtained from the right knee and approximately 5 cc's were obtained from the left knee. · Await cell count, crystals, culture, and Gram stain. · N.p.o. after midnight  · Pending lab results  · Continue medical management  · PT OT  · Plan for further orthopedic intervention based off of continued physical exam and laboratory results  · Plan to be discussed with attending    Orthopaedic Attending    I have seen and evaluated the patient with the resident and agree with the above assessments on today's visit. I have performed the key components of the history and physical examination and concur completely with the findings and plans as documented above. Pateint represented with increasing bilateral knee pain. Patient unable to actively ROM the knee, severe pain with passive ROM of the knee. Increased swelling about the knee as well as ecchymosis about the bilateral incisions. Bilateral knees were aspirated again demonstrating elevated cell counts consistent with septic arthritis. This was again discussed in detail with the patient. It was discussed recommendation for repeat arthrotomy with I&D due to the recurrent infection. Patient understood this was in agreement with plans. To OR for bilateral knee arthrotomy with irrigation and debridement pending room availability    I have explained the risks and complications of the recommended surgery with the patient at length, as well as discussed potential treatment alternatives including nonoperative management. These risks include but are not limited to death or complication from anesthesia, continued pain, nerve tendon or vascular injury, infection, stiffness, wound healing complications, arthrofibrosis, hemarthrosis, symptomatic hardware or hardware failure, deep vein thrombosis or pulmonary embolism, and need for further surgery, etc.  Patient understood this, asked appropriate questions, which were all answered, and he has elected to proceed with the procedure.      Electronically signed by   Jeff rapp Aly Ochoa DO  8/8/2020

## 2020-08-08 NOTE — CONSULTS
[facility] PROGRESS NOTE      Pt is known to ID     C/C : Group B Strep bilateral knee septic arthritis    The patient is a 63 y.o. male with history of DM, chronic low back pain with severe spinal stenosis, presented to the ER with  worsening bilateral knee pain on   07/20 -synovial fluid with WBCs of 13,772 predominantly neutrophilic at 66% and no crystals seen. He underwent multiple I & D - Cx grew Group B Strep . He was discharged to the nursing home on IV rocephin .   He came back to the ER with increased pain in the knees, not able stand or ambulate   He denied fever and chills     Past Medical History:   Diagnosis Date    Chronic pain     Depression     HLD (hyperlipidemia)     Neuropathy     LIDYA on CPAP     Osteoarthritis     Type II or unspecified type diabetes mellitus without mention of complication, not stated as uncontrolled      Past Surgical History:   Procedure Laterality Date    ARM SURGERY      left arm    INCISION AND DRAINAGE Bilateral 7/27/2020    REPEAT BILATERAL KNEE WASHOUT performed by Sari Lima DO at [facility] ARTHROSCOPY Bilateral 7/23/2020    KNEE ARTHROSCOPY performed by Sandra Poole MD at [address] W/BIOPSY SINGLE/MULTIPLE N/A 9/20/2018    COLONOSCOPY WITH BIOPSY performed by Turner Desir MD at [address] [facility] W/RMVL OF TUMOR POLYP LESION [address] TQ  9/20/2018    COLONOSCOPY POLYPECTOMY SNARE/COLD BIOPSY performed by Turner Desir MD at [facility]             Current Facility-Administered Medications   Medication Dose Route Frequency Provider Last Rate Last Dose    0.9 % sodium chloride bolus  20 mL Intravenous Once Iveth YINA Quispes, DO        pantoprazole (PROTONIX) injection 40 mg  40 mg Intravenous Daily Iveth L Wei, DO   40 mg at 08/08/20 0412    And    sodium chloride (PF) 0.9 % injection 10 mL  10 mL Intravenous Daily Iveth L Wei, DO   10 mL at 08/08/20 0873  sodium chloride flush 0.9 % injection 10 mL  10 mL Intravenous 2 times per day Landry Renner, DO   10 mL at 08/08/20 0835    sodium chloride flush 0.9 % injection 10 mL  10 mL Intravenous PRN Iveth L Wei, DO        acetaminophen (TYLENOL) tablet 650 mg  650 mg Oral Q6H PRN Iveth L Wei, DO        Or    acetaminophen (TYLENOL) suppository 650 mg  650 mg Rectal Q6H PRN Iveth L Wei, DO        polyethylene glycol (GLYCOLAX) packet 17 g  17 g Oral Daily PRN Iveth L Wei, DO        promethazine (PHENERGAN) tablet 12.5 mg  12.5 mg Oral Q6H PRN Iveth L Wei, DO        Or    ondansetron (ZOFRAN) injection 4 mg  4 mg Intravenous Q6H PRN Iveth L Wei, DO        insulin glargine (LANTUS) injection vial 20 Units  20 Units Subcutaneous Nightly Iveth L Wei, DO   20 Units at 08/07/20 2251    insulin lispro (HUMALOG) injection vial 0-6 Units  0-6 Units Subcutaneous Q4H Iveth L Wei, DO   Stopped at 08/08/20 0400    atorvastatin (LIPITOR) tablet 20 mg  20 mg Oral Daily Iveth L Wei, DO   20 mg at 08/08/20 6212    lamoTRIgine (LAMICTAL) tablet 25 mg  25 mg Oral Daily Iveth L Wei, DO   25 mg at 08/08/20 8204    methocarbamol (ROBAXIN) tablet 1,000 mg  1,000 mg Oral 4x Daily PRN Landry Renner, DO   1,000 mg at 08/08/20 0854    venlafaxine (EFFEXOR) tablet 37.5 mg  37.5 mg Oral Daily Iveth L Wei, DO   37.5 mg at 08/08/20 5071    multivitamin 1 tablet  1 tablet Oral Daily Iveth L Wei, DO   1 tablet at 08/08/20 0857    oxyCODONE (OXYCONTIN) extended release tablet 10 mg  10 mg Oral 2 times per day Landry Renner, DO   10 mg at 08/08/20 8957    oxyCODONE (ROXICODONE) immediate release tablet 5 mg  5 mg Oral Q4H PRN Landry Renner DO   5 mg at 08/08/20 0629    piperacillin-tazobactam (ZOSYN) 3.375 g in dextrose 5 % 100 mL IVPB extended infusion (mini-bag)  3.375 g Intravenous Q8H Iveth Wei DO   Stopped at 08/08/20 0851    And    0.9 % sodium chloride infusion admixture   Intravenous Q8H Iveth Wei DO   Stopped at 08/07/20 2311    vancomycin 1000 mg IVPB in 250 mL D5W addavial  1,000 mg Intravenous Q12H Kalani Tsai MD   Stopped at 08/08/20 3492     REVIEW OF SYSTEMS:    CONSTITUTIONAL: denies fever  HEENT: denies blurring of vision or double vision, denies hearing problem  RESPIRATORY: Denies SOB     CARDIOVASCULAR:  Denies palpitation  GASTROINTESTINAL:  Denies abdomen pain, diarrhea or constipation. GENITOURINARY:  Denies burning urination or frequency of urination  INTEGUMENT: denies wound , rash  HEMATOLOGIC/LYMPHATIC:  Denies lymph node swelling, gum bleeding or easy bruising.   MUSCULOSKELETAL:  Bilateral knee pain and swelling    NEUROLOGICAL:  Weakness     Objective:    /67   Pulse 87   Temp 98.7 °F (37.1 °C) (Temporal)   Resp 18   Ht 5' 11\" (1.803 m)   Wt 206 lb 14.4 oz (93.8 kg)   SpO2 97%   BMI 28.86 kg/m²     Constitutional: Alert, not in distress  HEENT : no pallor, no icterus, no LN   Respiratory: Clear bilaterally  Cardiovascular: Regular rate and rhythm, no murmurs  Gastrointestinal: Bowel sounds present, soft, nontender  Skin: Warm and dry, no active dermatoses  Ext - Bilateral edema +  Knee wound - healing,   Bilateral knee effusion, tender, limited flexion   Laboratory:  Lab Results   Component Value Date    WBC 9.6 08/08/2020    WBC 11.7 (H) 08/07/2020    WBC 14.9 (H) 08/07/2020    HGB 6.5 (L) 08/08/2020    HCT 19.7 (L) 08/08/2020    MCV 90.4 08/08/2020     (H) 08/08/2020     Lab Results   Component Value Date    NEUTROABS 5.12 08/08/2020    NEUTROABS 7.66 (H) 08/07/2020    NEUTROABS 12.14 (H) 08/07/2020     Lab Results   Component Value Date    CRP 26.1 (H) 08/07/2020    CRP 41.4 (H) 07/22/2020     No results found for: CRPHS  Lab Results   Component Value Date    SEDRATE 138 (H) 08/07/2020    SEDRATE 90 (H) 07/22/2020     Lab Results   Component Value Date    ALT 18 08/08/2020    AST 15 08/08/2020    ALKPHOS 155 (H) 08/08/2020    BILITOT 0.3 08/08/2020     Lab Results   Component Value Date     08/08/2020    K 3.6 08/08/2020    CL 97 08/08/2020    CO2 25 08/08/2020    BUN 14 08/08/2020    CREATININE 1.0 08/08/2020    CREATININE 1.4 08/07/2020    CREATININE 0.7 07/29/2020    GFRAA >60 08/08/2020    LABGLOM >60 08/08/2020    GLUCOSE 118 08/08/2020    PROT 5.9 08/08/2020    LABALBU 2.4 08/08/2020    CALCIUM 9.0 08/08/2020    BILITOT 0.3 08/08/2020    ALKPHOS 155 08/08/2020    AST 15 08/08/2020    ALT 18 08/08/2020       Radiology: CT lumbar spine without contrast (07/23): Moderate spinal canal stenosis related to disc bulging and facet   arthropathy at L3-4 and L4-5. No evidence of spinal infection. Microbiology:     Blood cx  No results found for: BLOODCULT1  Culture, Blood 2   Date Value Ref Range Status   07/22/2020 5 Days no growth  Final   12/26/2014 5 Days- no growth  Final   12/23/2014 (A)  Final    Coagulase negative Staph by PNA fish  Growth in aerobic bottle only     12/23/2014   Final    No antibiotic susceptibility studies performed. Plates will be held 10  days. Contact the laboratory, [phone], if further workup is  desired.          BODY FLUID CULTURE  Body Fluid Culture, Sterile   Date Value Ref Range Status   07/27/2020   Final    Neisseria gonorrhoeae not isolated  5 Days, no growth       Organism   Date Value Ref Range Status   07/23/2020 Strep agalactiae (Beta Strep Group B) (A)  Preliminary         MRSA Culture Only   Date Value Ref Range Status   07/22/2020 Methicillin resistant Staph aureus not isolated  Final       Assessment:  Group B Strep septic native bilateral knee arthritis, s/p wash-out on 07/23 and 7/27        Plan:    Daptomycin 6 mg / kg IV q 24 hrs ( sed rate 138 up , CRP 26 ( down)   Knee Wash out per ortho         Electronically signed by Truman Castellanos MD on 8/8/2020 at 10:50 AM

## 2020-08-08 NOTE — PROGRESS NOTES
[facility]  Internal Medicine Residency / [address]    Attending Physician Statement, covering attending, initial assessment  I have discussed the case, including pertinent history and exam findings with the resident and the team.  I have seen and examined the patient, reviewed meds and pertinent labs and the key elements of the encounter have been performed by me. I agree with the assessment, plan and orders as documented by the resident. 64 yo admitted with bilateral Gr B strep septic arthritis. Patient states that the pain in his knees is extremely severe. Patient had initial hypotension (early shock) and has mild ERIC and leukocytosis and thrombocytosis. Co-morbidities include DM on insulin, hyperlipidemia and bipolar dx. Noted Hb 6.5. Would check stool for occult blood and also concern for hemarthrosis. ID and ortho input appreciated. Remainder of medical problems as per resident note.       Donald Abreu  Internal Medicine Residency Faculty

## 2020-08-08 NOTE — PROGRESS NOTES
[facility]  Internal Medicine Residency Program  Progress Note - House Team 1    Patient:  Cindy Ba 63 y.o. male MRN: [id number]     Date of Service: 8/8/2020     CC: Fatigue, B/L knee pain, concern for septic arthritis  Overnight events: NAEO     Subjective     Pt seen and examined at bedside this AM. States that his pain is still \"almost unbearable\" Localized to bilateral legs. Pt denies chest pain/palpitations, SoB, n/v/d/c. Of note, pt endorsed a desire to be moved to another nursing facility on d/c.     Objective     Physical Exam:  · Vitals: /67   Pulse 87   Temp 98.7 °F (37.1 °C) (Temporal)   Resp 18   Ht 5' 11\" (1.803 m)   Wt 206 lb 14.4 oz (93.8 kg)   SpO2 97%   BMI 28.86 kg/m²     I & O - 24hr: I/O this shift:  In: 20 [I.V.:20]  · Out: -    · General Appearance: alert, appears stated age, cooperative and no distress  · HEENT:  Head: Normocephalic, no lesions, without obvious abnormality.   · Eye: negative findings: lids and lashes normal and conjunctivae and sclerae normal  · Lung: Anterior lung sounds mildly diminished throughout, no wheezing, ronchi, or rales, no increased work of breathing or respiratory distress   · Heart: Peripheral pulses diminished in the BLE, R>L, intact in BUE, heart regular rate and rhythm, S1, S2 normal, no murmur, click, rub or gallop,  · Abdomen: mild TTP of RUQ and suprapubic region, abdomen soft, bs +, no rigidity or guarding   · Extremities:  Surgical incisions on extensor surfaces of bilateral knees, ecchymotic, no purulent drainage, wound on R small toe with darkened discoloration of surrounding skin, no purulent drainage or erythema, extremities acyanotic, no clubbing noted  · Musculokeletal: ROM diminished in BLE, no gross skeletal deformity of the joints  · Neurologic: No focal neurological deficits, no speech slurring or tremor   · Psychiatric: Mood, thought content, and behavior appropriate  Subject  Pertinent Labs & Imaging Studies   malcolm  CBC with Differential:    Lab Results   Component Value Date    WBC 9.6 08/08/2020    RBC 2.18 08/08/2020    HGB 6.5 08/08/2020    HCT 19.7 08/08/2020     08/08/2020    MCV 90.4 08/08/2020    MCH 29.8 08/08/2020    MCHC 33.0 08/08/2020    RDW 12.3 08/08/2020    SEGSPCT 55 02/06/2014    METASPCT 0.9 07/27/2020    LYMPHOPCT 31.9 08/08/2020    MONOPCT 11.6 08/08/2020    MYELOPCT 0.9 07/27/2020    BASOPCT 1.3 08/08/2020    MONOSABS 1.11 08/08/2020    LYMPHSABS 3.06 08/08/2020    EOSABS 0.14 08/08/2020    BASOSABS 0.12 08/08/2020     CMP:    Lab Results   Component Value Date     08/08/2020    K 3.6 08/08/2020    CL 97 08/08/2020    CO2 25 08/08/2020    BUN 14 08/08/2020    CREATININE 1.0 08/08/2020    GFRAA >60 08/08/2020    LABGLOM >60 08/08/2020    GLUCOSE 118 08/08/2020    PROT 5.9 08/08/2020    LABALBU 2.4 08/08/2020    CALCIUM 9.0 08/08/2020    BILITOT 0.3 08/08/2020    ALKPHOS 155 08/08/2020    AST 15 08/08/2020    ALT 18 08/08/2020     Lactic Acid   Lactic Acid  0.5  0.5 - 2.2 mmol/L  Final  08/08/2020  2:00 AM  [facility]      Urine Electrolytes   Sodium, Ur  44  Not Established mmol/L  Final  08/08/2020  7:25 AM  [facility]    Potassium, Ur  14.5  Not Established mmol/L  Final  08/08/2020  7:25 AM  [facility]    Chloride  37  Not Established mmol/L  Final  08/08/2020  7:25 AM  [facility]      Urine Urea Nitrogen  Urea Nitrogen, Ur  349Low    800 - 1666 mg/dL  Final  08/08/2020  7:25 AM  [facility]      Urine Creatinine   Creatinine, Ur  50  40 - 278 mg/dL  Final  08/08/2020  7:25 AM   - [facility]      Resident's Assessment and Plan     Phuc Valdez is a 63 y.o. male with a PMHx of insulin dependent Type 2 DM, HLD, GERD, OA, lumbar stenosis, and bipolar disorder who was admitted from the ED on 8/7/2020 for hypotension, fatigue, and suspected sepsis 2/2 recent septic arthritis of B/L knees.      1. Sepsis: Likely 2/2 recurrent septic arthritis vs pneumonia vs CLABSI vs less likely UTI. Pt had recent admission for B/L septic arthritis (cx growing strep agalactiae) s/p washout on 7/27. Pt was discharged on Ceftriaxone IV via midline catheter. Found to be hypotensive in Ortho clinic PTA. Currently, pt is afebrile with BP of 149/95. As of 8/7, CRP 26.1. Currently, WBCs of 9.6 and Lactic acid of 0.5. CXR on 8/8 showing increased pulmonary vascular markings, read as unremarkable by Radiology. UA showing protein of 30 with few bacteria. Blood cx NGTD. Pt currently afebrile and hypertensive to 149/95. B/L knee synovial fluid aspiration performed on 8/7 by Orthopedic Surgery. Gram stain showing abundant PMNs with no organisms. -Daptomycin 6mg/kg IV q24 hrs per ID     -Repeat B/L knee washout today per Orthopedic Surgery     -Keep NPO - will restart diet when pt returns from surgery              -f/u repeat CBC and CMP               -continue to monitor pt vitals.               -Continue to monitor on telemetry     2. ERIC - Resolving: Likely pre-renal given pt's documented hypotension and possible early sepsis. FeUrea of 0.7%. Currently, Cr of 1.0, reduced from 1.4. Baseline 0.7-0.8.                -Continue IV LR at rate of 75mL/hr for 12 hrs                -daily weights               -strict I/O              -continue to monitor CBC and BMP      3. Thrombocytosis - Resolving: Likely acute phase reactant in setting of possible sepsis, pain, and recent surgery. Platelets 0,108 at time of ED admission, currently 612. Peripheral blood smear pending.               -Continue IV LR at 75mL/hr for 12 hrs               -f/u repeat CBC and CMP               -f/u peripheral blood smear               -remainder of plan per #1 above               -will continue to monitor      4. Type 2 DM: Pt has a history of insulin dependent Type 2 DM, A1c of 10.1 as of 7/22/2020. On 35U Lantus nightly and Lispro AC/HS while at home. BG currently 116              -Continue Lantus 20U nightly with low dose SSI q4 hrs               -May increase Lantus dosage when pt's diet is resumed               -POCT glucose monitoring q4 hrs while pt is NPO      5. Bipolar Disorder: Pt has a documented PMHx of Bipolar disorder.     -Continue home Venlafaxine, and Lamotrigine               -Will continue to monitor                 6. HLD - Resolved: Pt has a PMHx of HLD. Currently, total cholesterol of 100, Triglycerides of 51, HDL of 42, and LDL of 48 as of 8/7/2020.               -Continue home Lipitor    -Continue to monitor      7. Pain management: Pt reports significant pain from surgical sites. Is currently hypertensive to 140/60.  However, was resting comfortably in bed in ED, no acute distress.              -strict bedrest               -Tylenol 650mg PO q6 hrs PRN              -Oxycodone 5mg PO q4 hrs PRN     -Oxycodone 10mg PO q12 hrs               -continue Robaxin 1,000mg q4hrs PRN for muscle spasms               -continue to monitor    PT/OT evaluation: PT/OT consulted   DVT prophylaxis: Held d/t Hgb of 6.5 this AM and possibility for hematoma in knees     Nicole Medellin DO, PGY-1  Attending physician: Dr. Luis Shah

## 2020-08-08 NOTE — PROGRESS NOTES
[facility]  Internal Medicine Residency Program  Progress Note - House Team 2    Patient:  Francis Bonilla 63 y.o. male MRN: [id number]     Date of Service: 8/8/2020     CC: hypotensive at orthopedic appointment, BP 79/56   Overnight events: none    Subjective     Mr. Marco A Srivastava was asleep when I went to see him today. He woke up but seemed disoriented, but became oriented x3 after a few minutes. He denied chest pain, SOB, nausea, vomiting, lightheadedness, headahce. He endorsed abdominal pain in the suprapubic and LUQ on palpation. Objective     Physical Exam:  · Vitals: /67   Pulse 87   Temp 98.7 °F (37.1 °C) (Temporal)   Resp 18   Ht 5' 11\" (1.803 m)   Wt 206 lb 14.4 oz (93.8 kg)   SpO2 97%   BMI 28.86 kg/m²     · I & O - 24hr: I/O this shift:  · In: 20 [I.V.:20]  · Out: -    · General Appearance: alert, appears stated age and cooperative  · HEENT:  Head: Normal, normocephalic, atraumatic. · Neck: no adenopathy, no JVD, supple, symmetrical, trachea midline and thyroid not enlarged, symmetric, no tenderness/mass/nodules  · Lung: clear to auscultation bilaterally  · Heart: regular rate and rhythm, S1, S2 normal, no murmur, click, rub or gallop  · Abdomen: soft, non-tender; bowel sounds normal; no masses,  no organomegaly; suprapubic and LUQ abdominal pain on palpation   · Extremities:  extremities normal, atraumatic, no cyanosis or edema; BL knee stitches from joint aspiration and previous wash out, right 5th toe recently debrided and drained, significant bruising but no warmth or purulence    · Musculokeletal: BL knees: Joint swelling but no muscle tenderness. Pain with passive ROM of both knees. Some pain to palpation of the leg and ankle.      · Neurologic: Mental status: Alert, oriented, thought content appropriate  Subject  Pertinent Labs & Imaging Studies   malcolm  CBC with Differential:    Lab Results   Component Value Date    WBC 9.6 08/08/2020    RBC 2.18 08/08/2020    HGB down 14.9 > 11.7 > 9.6  · Sed rate 138  · CRP 26.1  · Lactic acid 2.3  · Vancomycin and daptomycin per ID  · Lactated ringers infusion 75 mL/hr continuous IV   · F/u with peripheral smear, synovial fluid, blood cultures   · F/u with ortho recommendations   · F/u with ID recommendations     Normocytic Normochromic Anemia 2/2 acute bleed vs decreased erythropoiesis vs hemarthrosis   Patient described a melena-like stool this morning. Denied hemoptysis or blood in the urine. Patient does not appear to be in acute distress. · Hemoglobin trending down 7.9 > 6.8 > 6.5  · Transfused 1 unit PRCs   · F/u FOBT    ERIC 2/2 hypotension d/t sepsis vs diabetic nephropathy  · BUN trending down 17 > 14  · Cr trending down 1.4 > 1.0  · Decreased urea nitrogen- 349  · LR 75 mL/hr  · Follow daily electrolytes, CBC, BMP    Thrombocytosis 2/2 septic arthritis vs infection/ inflammation   · Platelets trending down 1,159 > 554 > 612   · Continue antibiotic treatment per ID as above  · Continue LR 75 mL/hr infusion   · Follow daily CBC     Ho Type 2 DM  A1c 10.1 on 7/22, blood glucose 121 this morning. · Lantus 20 units nightly  · Humalog 0-6 units Q4H  · Continue to monitor blood glucose     Ho HLD  · Atorvastatin 20 mg daily  · Cholesterol panel WNL    Ho Bipolar Disorder- mood is stable, oriented x3  · Treatment with Effexor, Lamictal     Ho Osteoarthritis of the Lumbar Spine   Chronic norco for this issue made pain management difficult during last admission, though he has improved mobility in his knees.   · OxyContin 10 mg 2x/d  · Robaxin 1000 mg 4x/d PRN for muscle spasms  · Oxycodone 5 mg every 4 hours PRN  · Acetaminophen 650 mg Q6H PRN  · Morphine injection 2 mg- once    PT/OT evaluation:  DVT prophylaxis/ GI prophylaxis: heparin / protonix  Disposition: continue current level of care    Swapna Lanza, MS3  Attending physician: Dr. Jose R Sen

## 2020-08-08 NOTE — PROGRESS NOTES
Pharmacy Consultation Note  (Antibiotic Dosing and Monitoring)    Initial consult date: 2020  Consulting physician: Dr Luke Horton  Drug: Vancomycin  Indication: Sepsis 2/2 recurrent septic arthritis vs pneumonia vs CLABSI    Age/  Gender Height Weight IBW Dosing weight  Allergy Information   55 y.o./male 5' 11\" (180.3 cm) 210 lb (95.3 kg)     Ideal body weight: 75.3 kg (166 lb 0.1 oz)  Adjusted ideal body weight: 82.7 kg (182 lb 5.8 oz)  93.8  Adhesive tape; Tramadol; Cymbalta [duloxetine hcl]; Lidocaine; Lyrica [pregabalin]; and Strattera [atomoxetine]      Temp (24hrs), Av.3 °F (36.8 °C), Min:97 °F (36.1 °C), Max:98.8 °F (37.1 °C)          Date  WBC BUN SCr CrCl  (mL/min) Drug/Dose Time   Given Level(s)   (Time) Comments   8-7 14.9 17 1.4  69   Vancomycin 2000 mg IV x 1 dose  1514     8-8 9.6 14 1 96 Vancomycin 1000 mg IV q 12 hr  0313  (1500)     8-9      (0300) Vanco trough at (0230)                     Intake/Output Summary (Last 24 hours) at 2020 0930  Last data filed at 2020 [id number]  Gross per 24 hour   Intake 920 ml   Output --   Net 920 ml       Historical Cultures:  Organism   Date Value Ref Range Status   2020 Strep agalactiae (Beta Strep Group B) (A)  Final     No results for input(s): BC in the last 72 hours.     Cultures:  available culture and sensitivity results were reviewed in [facility]    Assessment:  · 63 y.o. male has been initiated Vancomycin and zosyn for sepsis 2/2 recurrent septic arthritis vs pneumonia vs CLABSI   · Estimated CrCl = 69 mL/min  · Goal trough level = 15-20 mcg/mL  · Scr 1    Plan:  · Cont vanco 1000 mg Q12H  · A vanco trough level will be obtained when steady-state is reached  · Clinical pharmacy to follow    Francia RothmanD, BCPS 2020 9:33 AM

## 2020-08-09 VITALS — DIASTOLIC BLOOD PRESSURE: 71 MMHG | TEMPERATURE: 99.9 F | OXYGEN SATURATION: 99 % | SYSTOLIC BLOOD PRESSURE: 112 MMHG

## 2020-08-09 LAB
ANION GAP SERPL CALCULATED.3IONS-SCNC: 12 MMOL/L (ref 7–16)
BASOPHILS ABSOLUTE: 0.13 E9/L (ref 0–0.2)
BASOPHILS RELATIVE PERCENT: 1.5 % (ref 0–2)
BUN BLDV-MCNC: 12 MG/DL (ref 6–20)
CALCIUM SERPL-MCNC: 9.2 MG/DL (ref 8.6–10.2)
CHLORIDE BLD-SCNC: 98 MMOL/L (ref 98–107)
CO2: 25 MMOL/L (ref 22–29)
CREAT SERPL-MCNC: 0.8 MG/DL (ref 0.7–1.2)
EOSINOPHILS ABSOLUTE: 0.16 E9/L (ref 0.05–0.5)
EOSINOPHILS RELATIVE PERCENT: 1.9 % (ref 0–6)
GFR AFRICAN AMERICAN: >60
GFR NON-AFRICAN AMERICAN: >60 ML/MIN/1.73
GLUCOSE BLD-MCNC: 134 MG/DL (ref 74–99)
HCT VFR BLD CALC: 22.8 % (ref 37–54)
HCT VFR BLD CALC: 25.5 % (ref 37–54)
HEMOGLOBIN: 7.6 G/DL (ref 12.5–16.5)
HEMOGLOBIN: 8.6 G/DL (ref 12.5–16.5)
IMMATURE GRANULOCYTES #: 0.03 E9/L
IMMATURE GRANULOCYTES %: 0.4 % (ref 0–5)
LYMPHOCYTES ABSOLUTE: 2.21 E9/L (ref 1.5–4)
LYMPHOCYTES RELATIVE PERCENT: 26.1 % (ref 20–42)
MCH RBC QN AUTO: 29.7 PG (ref 26–35)
MCHC RBC AUTO-ENTMCNC: 33.3 % (ref 32–34.5)
MCV RBC AUTO: 89.1 FL (ref 80–99.9)
METER GLUCOSE: 140 MG/DL (ref 74–99)
METER GLUCOSE: 164 MG/DL (ref 74–99)
METER GLUCOSE: 176 MG/DL (ref 74–99)
MONOCYTES ABSOLUTE: 0.92 E9/L (ref 0.1–0.95)
MONOCYTES RELATIVE PERCENT: 10.9 % (ref 2–12)
NEUTROPHILS ABSOLUTE: 5.02 E9/L (ref 1.8–7.3)
NEUTROPHILS RELATIVE PERCENT: 59.2 % (ref 43–80)
PDW BLD-RTO: 12.6 FL (ref 11.5–15)
PLATELET # BLD: 656 E9/L (ref 130–450)
PMV BLD AUTO: 8.7 FL (ref 7–12)
POTASSIUM SERPL-SCNC: 3.8 MMOL/L (ref 3.5–5)
RBC # BLD: 2.56 E12/L (ref 3.8–5.8)
SODIUM BLD-SCNC: 135 MMOL/L (ref 132–146)
VANCOMYCIN TROUGH: 26.9 MCG/ML (ref 5–16)
WBC # BLD: 8.5 E9/L (ref 4.5–11.5)

## 2020-08-09 PROCEDURE — 27310 EXPLORATION OF KNEE JOINT: CPT | Performed by: ORTHOPAEDIC SURGERY

## 2020-08-09 PROCEDURE — 36415 COLL VENOUS BLD VENIPUNCTURE: CPT

## 2020-08-09 PROCEDURE — 82962 GLUCOSE BLOOD TEST: CPT

## 2020-08-09 PROCEDURE — 2580000003 HC RX 258: Performed by: INTERNAL MEDICINE

## 2020-08-09 PROCEDURE — 6370000000 HC RX 637 (ALT 250 FOR IP): Performed by: STUDENT IN AN ORGANIZED HEALTH CARE EDUCATION/TRAINING PROGRAM

## 2020-08-09 PROCEDURE — 80048 BASIC METABOLIC PNL TOTAL CA: CPT

## 2020-08-09 PROCEDURE — 87075 CULTR BACTERIA EXCEPT BLOOD: CPT

## 2020-08-09 PROCEDURE — 87015 SPECIMEN INFECT AGNT CONCNTJ: CPT

## 2020-08-09 PROCEDURE — 87070 CULTURE OTHR SPECIMN AEROBIC: CPT

## 2020-08-09 PROCEDURE — 2500000003 HC RX 250 WO HCPCS

## 2020-08-09 PROCEDURE — 80202 ASSAY OF VANCOMYCIN: CPT

## 2020-08-09 PROCEDURE — 0S9D0ZZ DRAINAGE OF LEFT KNEE JOINT, OPEN APPROACH: ICD-10-PCS | Performed by: ORTHOPAEDIC SURGERY

## 2020-08-09 PROCEDURE — 87205 SMEAR GRAM STAIN: CPT

## 2020-08-09 PROCEDURE — 6370000000 HC RX 637 (ALT 250 FOR IP): Performed by: INTERNAL MEDICINE

## 2020-08-09 PROCEDURE — 6360000002 HC RX W HCPCS: Performed by: INTERNAL MEDICINE

## 2020-08-09 PROCEDURE — 2709999900 HC NON-CHARGEABLE SUPPLY: Performed by: ORTHOPAEDIC SURGERY

## 2020-08-09 PROCEDURE — 6360000002 HC RX W HCPCS

## 2020-08-09 PROCEDURE — C9113 INJ PANTOPRAZOLE SODIUM, VIA: HCPCS | Performed by: INTERNAL MEDICINE

## 2020-08-09 PROCEDURE — 6360000002 HC RX W HCPCS: Performed by: ANESTHESIOLOGY

## 2020-08-09 PROCEDURE — 85014 HEMATOCRIT: CPT

## 2020-08-09 PROCEDURE — 0S9C0ZZ DRAINAGE OF RIGHT KNEE JOINT, OPEN APPROACH: ICD-10-PCS | Performed by: ORTHOPAEDIC SURGERY

## 2020-08-09 PROCEDURE — 87102 FUNGUS ISOLATION CULTURE: CPT

## 2020-08-09 PROCEDURE — 3700000000 HC ANESTHESIA ATTENDED CARE: Performed by: ORTHOPAEDIC SURGERY

## 2020-08-09 PROCEDURE — 2060000000 HC ICU INTERMEDIATE R&B

## 2020-08-09 PROCEDURE — 7100000000 HC PACU RECOVERY - FIRST 15 MIN: Performed by: ORTHOPAEDIC SURGERY

## 2020-08-09 PROCEDURE — 3600000012 HC SURGERY LEVEL 2 ADDTL 15MIN: Performed by: ORTHOPAEDIC SURGERY

## 2020-08-09 PROCEDURE — 85025 COMPLETE CBC W/AUTO DIFF WBC: CPT

## 2020-08-09 PROCEDURE — 87116 MYCOBACTERIA CULTURE: CPT

## 2020-08-09 PROCEDURE — 2580000003 HC RX 258

## 2020-08-09 PROCEDURE — 3600000002 HC SURGERY LEVEL 2 BASE: Performed by: ORTHOPAEDIC SURGERY

## 2020-08-09 PROCEDURE — 86703 HIV-1/HIV-2 1 RESULT ANTBDY: CPT

## 2020-08-09 PROCEDURE — 99232 SBSQ HOSP IP/OBS MODERATE 35: CPT | Performed by: INTERNAL MEDICINE

## 2020-08-09 PROCEDURE — 7100000001 HC PACU RECOVERY - ADDTL 15 MIN: Performed by: ORTHOPAEDIC SURGERY

## 2020-08-09 PROCEDURE — 2580000003 HC RX 258: Performed by: STUDENT IN AN ORGANIZED HEALTH CARE EDUCATION/TRAINING PROGRAM

## 2020-08-09 PROCEDURE — 87206 SMEAR FLUORESCENT/ACID STAI: CPT

## 2020-08-09 PROCEDURE — 85018 HEMOGLOBIN: CPT

## 2020-08-09 PROCEDURE — 3700000001 HC ADD 15 MINUTES (ANESTHESIA): Performed by: ORTHOPAEDIC SURGERY

## 2020-08-09 PROCEDURE — 87176 TISSUE HOMOGENIZATION CULTR: CPT

## 2020-08-09 RX ORDER — MORPHINE SULFATE 2 MG/ML
2 INJECTION, SOLUTION INTRAMUSCULAR; INTRAVENOUS ONCE
Status: DISCONTINUED | OUTPATIENT
Start: 2020-08-09 | End: 2020-08-09

## 2020-08-09 RX ORDER — HYDROCODONE BITARTRATE AND ACETAMINOPHEN 5; 325 MG/1; MG/1
2 TABLET ORAL PRN
Status: DISCONTINUED | OUTPATIENT
Start: 2020-08-09 | End: 2020-08-09 | Stop reason: HOSPADM

## 2020-08-09 RX ORDER — PROPOFOL 10 MG/ML
INJECTION, EMULSION INTRAVENOUS PRN
Status: DISCONTINUED | OUTPATIENT
Start: 2020-08-09 | End: 2020-08-09 | Stop reason: SDUPTHER

## 2020-08-09 RX ORDER — FENTANYL CITRATE 50 UG/ML
INJECTION, SOLUTION INTRAMUSCULAR; INTRAVENOUS PRN
Status: DISCONTINUED | OUTPATIENT
Start: 2020-08-09 | End: 2020-08-09 | Stop reason: SDUPTHER

## 2020-08-09 RX ORDER — LIDOCAINE HYDROCHLORIDE 20 MG/ML
INJECTION, SOLUTION INTRAVENOUS PRN
Status: DISCONTINUED | OUTPATIENT
Start: 2020-08-09 | End: 2020-08-09 | Stop reason: SDUPTHER

## 2020-08-09 RX ORDER — CEFAZOLIN SODIUM 1 G/3ML
INJECTION, POWDER, FOR SOLUTION INTRAMUSCULAR; INTRAVENOUS PRN
Status: DISCONTINUED | OUTPATIENT
Start: 2020-08-09 | End: 2020-08-09 | Stop reason: SDUPTHER

## 2020-08-09 RX ORDER — MEPERIDINE HYDROCHLORIDE 25 MG/ML
12.5 INJECTION INTRAMUSCULAR; INTRAVENOUS; SUBCUTANEOUS EVERY 5 MIN PRN
Status: DISCONTINUED | OUTPATIENT
Start: 2020-08-09 | End: 2020-08-09 | Stop reason: HOSPADM

## 2020-08-09 RX ORDER — DEXAMETHASONE SODIUM PHOSPHATE 10 MG/ML
INJECTION INTRAMUSCULAR; INTRAVENOUS PRN
Status: DISCONTINUED | OUTPATIENT
Start: 2020-08-09 | End: 2020-08-09 | Stop reason: SDUPTHER

## 2020-08-09 RX ORDER — SODIUM CHLORIDE 0.9 % (FLUSH) 0.9 %
10 SYRINGE (ML) INJECTION EVERY 12 HOURS SCHEDULED
Status: DISCONTINUED | OUTPATIENT
Start: 2020-08-09 | End: 2020-08-14 | Stop reason: HOSPADM

## 2020-08-09 RX ORDER — MORPHINE SULFATE 2 MG/ML
2 INJECTION, SOLUTION INTRAMUSCULAR; INTRAVENOUS EVERY 4 HOURS PRN
Status: DISCONTINUED | OUTPATIENT
Start: 2020-08-09 | End: 2020-08-13

## 2020-08-09 RX ORDER — KETOROLAC TROMETHAMINE 30 MG/ML
30 INJECTION, SOLUTION INTRAMUSCULAR; INTRAVENOUS ONCE
Status: COMPLETED | OUTPATIENT
Start: 2020-08-09 | End: 2020-08-09

## 2020-08-09 RX ORDER — PHENYLEPHRINE HCL IN 0.9% NACL 1 MG/10 ML
SYRINGE (ML) INTRAVENOUS PRN
Status: DISCONTINUED | OUTPATIENT
Start: 2020-08-09 | End: 2020-08-09 | Stop reason: SDUPTHER

## 2020-08-09 RX ORDER — MORPHINE SULFATE 2 MG/ML
2 INJECTION, SOLUTION INTRAMUSCULAR; INTRAVENOUS EVERY 5 MIN PRN
Status: DISCONTINUED | OUTPATIENT
Start: 2020-08-09 | End: 2020-08-09 | Stop reason: HOSPADM

## 2020-08-09 RX ORDER — NEOSTIGMINE METHYLSULFATE 1 MG/ML
INJECTION, SOLUTION INTRAVENOUS PRN
Status: DISCONTINUED | OUTPATIENT
Start: 2020-08-09 | End: 2020-08-09 | Stop reason: SDUPTHER

## 2020-08-09 RX ORDER — GLYCOPYRROLATE 1 MG/5 ML
SYRINGE (ML) INTRAVENOUS PRN
Status: DISCONTINUED | OUTPATIENT
Start: 2020-08-09 | End: 2020-08-09 | Stop reason: SDUPTHER

## 2020-08-09 RX ORDER — MIDAZOLAM HYDROCHLORIDE 1 MG/ML
INJECTION INTRAMUSCULAR; INTRAVENOUS
Status: COMPLETED
Start: 2020-08-09 | End: 2020-08-09

## 2020-08-09 RX ORDER — SODIUM CHLORIDE 0.9 % (FLUSH) 0.9 %
10 SYRINGE (ML) INJECTION PRN
Status: DISCONTINUED | OUTPATIENT
Start: 2020-08-09 | End: 2020-08-14 | Stop reason: HOSPADM

## 2020-08-09 RX ORDER — SODIUM CHLORIDE 9 MG/ML
INJECTION, SOLUTION INTRAVENOUS CONTINUOUS PRN
Status: DISCONTINUED | OUTPATIENT
Start: 2020-08-09 | End: 2020-08-09 | Stop reason: SDUPTHER

## 2020-08-09 RX ORDER — ROCURONIUM BROMIDE 10 MG/ML
INJECTION, SOLUTION INTRAVENOUS PRN
Status: DISCONTINUED | OUTPATIENT
Start: 2020-08-09 | End: 2020-08-09 | Stop reason: SDUPTHER

## 2020-08-09 RX ORDER — MORPHINE SULFATE 4 MG/ML
4 INJECTION, SOLUTION INTRAMUSCULAR; INTRAVENOUS EVERY 4 HOURS PRN
Status: DISCONTINUED | OUTPATIENT
Start: 2020-08-09 | End: 2020-08-13

## 2020-08-09 RX ORDER — ONDANSETRON 2 MG/ML
INJECTION INTRAMUSCULAR; INTRAVENOUS PRN
Status: DISCONTINUED | OUTPATIENT
Start: 2020-08-09 | End: 2020-08-09 | Stop reason: SDUPTHER

## 2020-08-09 RX ORDER — KETOROLAC TROMETHAMINE 30 MG/ML
INJECTION, SOLUTION INTRAMUSCULAR; INTRAVENOUS
Status: COMPLETED
Start: 2020-08-09 | End: 2020-08-09

## 2020-08-09 RX ORDER — PROMETHAZINE HYDROCHLORIDE 25 MG/ML
6.25 INJECTION, SOLUTION INTRAMUSCULAR; INTRAVENOUS EVERY 10 MIN PRN
Status: DISCONTINUED | OUTPATIENT
Start: 2020-08-09 | End: 2020-08-09 | Stop reason: HOSPADM

## 2020-08-09 RX ORDER — MIDAZOLAM HYDROCHLORIDE 1 MG/ML
2 INJECTION INTRAMUSCULAR; INTRAVENOUS ONCE
Status: COMPLETED | OUTPATIENT
Start: 2020-08-09 | End: 2020-08-09

## 2020-08-09 RX ORDER — MORPHINE SULFATE 2 MG/ML
1 INJECTION, SOLUTION INTRAMUSCULAR; INTRAVENOUS EVERY 5 MIN PRN
Status: DISCONTINUED | OUTPATIENT
Start: 2020-08-09 | End: 2020-08-09 | Stop reason: HOSPADM

## 2020-08-09 RX ORDER — MIDAZOLAM HYDROCHLORIDE 1 MG/ML
INJECTION INTRAMUSCULAR; INTRAVENOUS PRN
Status: DISCONTINUED | OUTPATIENT
Start: 2020-08-09 | End: 2020-08-09 | Stop reason: SDUPTHER

## 2020-08-09 RX ORDER — HYDROCODONE BITARTRATE AND ACETAMINOPHEN 5; 325 MG/1; MG/1
1 TABLET ORAL PRN
Status: DISCONTINUED | OUTPATIENT
Start: 2020-08-09 | End: 2020-08-09 | Stop reason: HOSPADM

## 2020-08-09 RX ADMIN — PANTOPRAZOLE SODIUM 40 MG: 40 INJECTION, POWDER, FOR SOLUTION INTRAVENOUS at 08:28

## 2020-08-09 RX ADMIN — Medication 10 ML: at 22:03

## 2020-08-09 RX ADMIN — METHOCARBAMOL TABLETS 1000 MG: 500 TABLET, COATED ORAL at 11:16

## 2020-08-09 RX ADMIN — HYDROMORPHONE HYDROCHLORIDE 0.5 MG: 1 INJECTION, SOLUTION INTRAMUSCULAR; INTRAVENOUS; SUBCUTANEOUS at 19:02

## 2020-08-09 RX ADMIN — ATORVASTATIN CALCIUM 20 MG: 20 TABLET, FILM COATED ORAL at 08:29

## 2020-08-09 RX ADMIN — OXYCODONE HYDROCHLORIDE 10 MG: 10 TABLET, FILM COATED, EXTENDED RELEASE ORAL at 08:30

## 2020-08-09 RX ADMIN — Medication 0.6 MG: at 18:20

## 2020-08-09 RX ADMIN — MIDAZOLAM 2 MG: 1 INJECTION INTRAMUSCULAR; INTRAVENOUS at 16:50

## 2020-08-09 RX ADMIN — HYDROMORPHONE HYDROCHLORIDE 0.5 MG: 1 INJECTION, SOLUTION INTRAMUSCULAR; INTRAVENOUS; SUBCUTANEOUS at 18:57

## 2020-08-09 RX ADMIN — FENTANYL CITRATE 100 MCG: 50 INJECTION, SOLUTION INTRAMUSCULAR; INTRAVENOUS at 17:58

## 2020-08-09 RX ADMIN — KETOROLAC TROMETHAMINE 30 MG: 30 INJECTION, SOLUTION INTRAMUSCULAR; INTRAVENOUS at 19:20

## 2020-08-09 RX ADMIN — PROPOFOL 200 MG: 10 INJECTION, EMULSION INTRAVENOUS at 16:54

## 2020-08-09 RX ADMIN — LIDOCAINE HYDROCHLORIDE 60 MG: 20 INJECTION, SOLUTION INTRAVENOUS at 16:54

## 2020-08-09 RX ADMIN — Medication 100 MCG: at 17:20

## 2020-08-09 RX ADMIN — ROCURONIUM BROMIDE 35 MG: 10 INJECTION, SOLUTION INTRAVENOUS at 16:54

## 2020-08-09 RX ADMIN — KETOROLAC TROMETHAMINE 30 MG: 30 INJECTION, SOLUTION INTRAMUSCULAR at 19:20

## 2020-08-09 RX ADMIN — OXYCODONE HYDROCHLORIDE 10 MG: 10 TABLET, FILM COATED, EXTENDED RELEASE ORAL at 22:02

## 2020-08-09 RX ADMIN — OXYCODONE 5 MG: 5 TABLET ORAL at 05:46

## 2020-08-09 RX ADMIN — SODIUM CHLORIDE, PRESERVATIVE FREE 10 ML: 5 INJECTION INTRAVENOUS at 08:29

## 2020-08-09 RX ADMIN — FENTANYL CITRATE 100 MCG: 50 INJECTION, SOLUTION INTRAMUSCULAR; INTRAVENOUS at 16:54

## 2020-08-09 RX ADMIN — LAMOTRIGINE 25 MG: 25 TABLET ORAL at 08:29

## 2020-08-09 RX ADMIN — CEFAZOLIN 2000 MG: 1 INJECTION, POWDER, FOR SOLUTION INTRAMUSCULAR; INTRAVENOUS at 18:10

## 2020-08-09 RX ADMIN — ROCURONIUM BROMIDE 15 MG: 10 INJECTION, SOLUTION INTRAVENOUS at 17:01

## 2020-08-09 RX ADMIN — ONDANSETRON HYDROCHLORIDE 4 MG: 2 INJECTION, SOLUTION INTRAMUSCULAR; INTRAVENOUS at 18:20

## 2020-08-09 RX ADMIN — HYDROMORPHONE HYDROCHLORIDE 0.5 MG: 1 INJECTION, SOLUTION INTRAMUSCULAR; INTRAVENOUS; SUBCUTANEOUS at 19:07

## 2020-08-09 RX ADMIN — VANCOMYCIN HYDROCHLORIDE 1000 MG: 1 INJECTION, POWDER, LYOPHILIZED, FOR SOLUTION INTRAVENOUS at 03:30

## 2020-08-09 RX ADMIN — VENLAFAXINE HYDROCHLORIDE 37.5 MG: 75 TABLET ORAL at 08:29

## 2020-08-09 RX ADMIN — DEXAMETHASONE SODIUM PHOSPHATE 10 MG: 10 INJECTION INTRAMUSCULAR; INTRAVENOUS at 16:54

## 2020-08-09 RX ADMIN — ASPIRIN 325 MG: 325 TABLET, COATED ORAL at 22:01

## 2020-08-09 RX ADMIN — MIDAZOLAM HYDROCHLORIDE 2 MG: 2 INJECTION, SOLUTION INTRAMUSCULAR; INTRAVENOUS at 19:18

## 2020-08-09 RX ADMIN — DAPTOMYCIN 450 MG: 500 INJECTION, POWDER, LYOPHILIZED, FOR SOLUTION INTRAVENOUS at 11:41

## 2020-08-09 RX ADMIN — OXYCODONE 5 MG: 5 TABLET ORAL at 00:24

## 2020-08-09 RX ADMIN — MIDAZOLAM HYDROCHLORIDE 2 MG: 1 INJECTION INTRAMUSCULAR; INTRAVENOUS at 19:18

## 2020-08-09 RX ADMIN — HYDROMORPHONE HYDROCHLORIDE 0.5 MG: 1 INJECTION, SOLUTION INTRAMUSCULAR; INTRAVENOUS; SUBCUTANEOUS at 19:22

## 2020-08-09 RX ADMIN — MORPHINE SULFATE 2 MG: 2 INJECTION, SOLUTION INTRAMUSCULAR; INTRAVENOUS at 18:47

## 2020-08-09 RX ADMIN — Medication 3 MG: at 18:20

## 2020-08-09 RX ADMIN — SODIUM CHLORIDE: 9 INJECTION, SOLUTION INTRAVENOUS at 16:50

## 2020-08-09 RX ADMIN — Medication 10 ML: at 08:30

## 2020-08-09 ASSESSMENT — PULMONARY FUNCTION TESTS
PIF_VALUE: 13
PIF_VALUE: 22
PIF_VALUE: 12
PIF_VALUE: 0
PIF_VALUE: 0
PIF_VALUE: 22
PIF_VALUE: 21
PIF_VALUE: 3
PIF_VALUE: 13
PIF_VALUE: 22
PIF_VALUE: 21
PIF_VALUE: 18
PIF_VALUE: 0
PIF_VALUE: 23
PIF_VALUE: 0
PIF_VALUE: 14
PIF_VALUE: 12
PIF_VALUE: 17
PIF_VALUE: 13
PIF_VALUE: 0
PIF_VALUE: 20
PIF_VALUE: 21
PIF_VALUE: 14
PIF_VALUE: 12
PIF_VALUE: 0
PIF_VALUE: 17
PIF_VALUE: 13
PIF_VALUE: 20
PIF_VALUE: 21
PIF_VALUE: 13
PIF_VALUE: 19
PIF_VALUE: 21
PIF_VALUE: 6
PIF_VALUE: 12
PIF_VALUE: 13
PIF_VALUE: 21
PIF_VALUE: 21
PIF_VALUE: 1
PIF_VALUE: 0
PIF_VALUE: 21
PIF_VALUE: 16
PIF_VALUE: 21
PIF_VALUE: 16
PIF_VALUE: 17
PIF_VALUE: 13
PIF_VALUE: 0
PIF_VALUE: 19
PIF_VALUE: 19
PIF_VALUE: 16
PIF_VALUE: 4
PIF_VALUE: 3
PIF_VALUE: 13
PIF_VALUE: 13
PIF_VALUE: 20
PIF_VALUE: 22
PIF_VALUE: 13
PIF_VALUE: 0
PIF_VALUE: 21
PIF_VALUE: 21
PIF_VALUE: 18
PIF_VALUE: 22
PIF_VALUE: 21
PIF_VALUE: 12
PIF_VALUE: 12
PIF_VALUE: 21
PIF_VALUE: 17
PIF_VALUE: 1
PIF_VALUE: 22
PIF_VALUE: 13
PIF_VALUE: 22
PIF_VALUE: 1
PIF_VALUE: 1
PIF_VALUE: 19
PIF_VALUE: 22
PIF_VALUE: 1
PIF_VALUE: 13
PIF_VALUE: 12
PIF_VALUE: 18
PIF_VALUE: 18
PIF_VALUE: 12
PIF_VALUE: 20
PIF_VALUE: 17
PIF_VALUE: 1
PIF_VALUE: 13
PIF_VALUE: 13
PIF_VALUE: 1
PIF_VALUE: 21
PIF_VALUE: 22
PIF_VALUE: 21
PIF_VALUE: 27
PIF_VALUE: 21
PIF_VALUE: 13
PIF_VALUE: 19
PIF_VALUE: 19
PIF_VALUE: 20
PIF_VALUE: 17
PIF_VALUE: 17
PIF_VALUE: 18
PIF_VALUE: 13
PIF_VALUE: 9
PIF_VALUE: 30

## 2020-08-09 ASSESSMENT — PAIN DESCRIPTION - ORIENTATION
ORIENTATION: RIGHT;LEFT

## 2020-08-09 ASSESSMENT — PAIN DESCRIPTION - DESCRIPTORS
DESCRIPTORS: ACHING;SORE;CONSTANT
DESCRIPTORS: ACHING;SORE;CONSTANT
DESCRIPTORS: ACHING;CONSTANT;SORE
DESCRIPTORS: ACHING;CONSTANT;SORE

## 2020-08-09 ASSESSMENT — PAIN DESCRIPTION - LOCATION
LOCATION: KNEE

## 2020-08-09 ASSESSMENT — PAIN DESCRIPTION - PAIN TYPE
TYPE: SURGICAL PAIN

## 2020-08-09 ASSESSMENT — PAIN SCALES - GENERAL
PAINLEVEL_OUTOF10: 0
PAINLEVEL_OUTOF10: 7
PAINLEVEL_OUTOF10: 8
PAINLEVEL_OUTOF10: 10
PAINLEVEL_OUTOF10: 7
PAINLEVEL_OUTOF10: 8
PAINLEVEL_OUTOF10: 10

## 2020-08-09 ASSESSMENT — LIFESTYLE VARIABLES: SMOKING_STATUS: 1

## 2020-08-09 NOTE — ANESTHESIA PRE PROCEDURE
Department of Anesthesiology  Preprocedure Note       Name:  Francis Bonilla   Age:  63 y.o.  :  1965                                          MRN:  [id number]         Date:  2020      Surgeon: Calderon Landeros):  Joni Juarez DO    Procedure: Procedure(s): Incision and Drainage of Bilateral Knees    Medications prior to admission:   Prior to Admission medications    Medication Sig Start Date End Date Taking?  Authorizing Provider   atorvastatin (LIPITOR) 20 MG tablet Take 1 tablet by mouth daily 20   Baldomero Leone MD   methocarbamol (ROBAXIN) 500 MG tablet Take 2 tablets by mouth 4 times daily as needed (As needed for muscle spasms) 20   Baldomero Leone MD   pantoprazole (PROTONIX) 40 MG tablet Take 1 tablet by mouth every morning (before breakfast) 20   Baldomero Leone MD   insulin glargine (LANTUS) 100 UNIT/ML injection vial Inject 35 Units into the skin nightly 20   Baldomero Leone MD   insulin lispro (HUMALOG) 100 UNIT/ML injection vial Inject 0-18 Units into the skin 3 times daily (with meals) 20   Baldomero Leone MD   insulin lispro (HUMALOG) 100 UNIT/ML injection vial Inject 0-9 Units into the skin nightly 20   Baldomero Leone MD   cefTRIAXone (ROCEPHIN) infusion Infuse 2,000 mg intravenously every 12 hours for 26 days Compound per protocol 20  Joaquin Lyons MD   doxepin (SINEQUAN) 10 MG capsule take 1 capsule by mouth nightly  Patient not taking: Reported on 2020   FRANCIE Chou - CNP   venlafaxine (EFFEXOR) 37.5 MG tablet Take 37.5 mg by mouth daily    Historical Provider, MD   lamoTRIgine (LAMICTAL) 25 MG tablet Take 25 mg by mouth daily    Historical Provider, MD   Multiple Vitamin (MULTI VITAMIN MENS PO) Take by mouth daily     Historical Provider, MD       Current medications:    Current Facility-Administered Medications   Medication Dose Route Frequency Provider Last Rate Last Dose    pantoprazole (PROTONIX) injection 40 mg 40 mg Intravenous Daily Iveth L Wei, DO   40 mg at 08/09/20 6334    And    sodium chloride (PF) 0.9 % injection 10 mL  10 mL Intravenous Daily Iveth L Wei, DO   10 mL at 08/09/20 0829    DAPTOmycin (CUBICIN) 450 mg in sodium chloride 0.9 % 50 mL IVPB  6 mg/kg (Ideal) Intravenous Q24H Loan Read MD   Stopped at 08/09/20 1211    glucose (GLUTOSE) 40 % oral gel 15 g  15 g Oral PRN Atif Castellanosjon, DO        dextrose 50 % IV solution  12.5 g Intravenous PRN Magdalena Young, DO        glucagon (rDNA) injection 1 mg  1 mg Intramuscular PRN Lana Parrot Marnejon, DO        dextrose 5 % solution  100 mL/hr Intravenous PRN Lana Parrot Marnejon, DO        sodium chloride flush 0.9 % injection 10 mL  10 mL Intravenous 2 times per day Evelina Antoine, DO   10 mL at 08/09/20 0830    sodium chloride flush 0.9 % injection 10 mL  10 mL Intravenous PRN Iveth L Wei, DO        acetaminophen (TYLENOL) tablet 650 mg  650 mg Oral Q6H PRN Iveth L Wei, DO        Or    acetaminophen (TYLENOL) suppository 650 mg  650 mg Rectal Q6H PRN Iveth L Wei, DO        polyethylene glycol (GLYCOLAX) packet 17 g  17 g Oral Daily PRN Iveth L Wei, DO        promethazine (PHENERGAN) tablet 12.5 mg  12.5 mg Oral Q6H PRN Iveth L Wei, DO        Or    ondansetron (ZOFRAN) injection 4 mg  4 mg Intravenous Q6H PRN Iveth L Wei, DO        [Held by provider] insulin glargine (LANTUS) injection vial 20 Units  20 Units Subcutaneous Nightly Iveth L Wei, DO   20 Units at 08/07/20 2251    insulin lispro (HUMALOG) injection vial 0-6 Units  0-6 Units Subcutaneous Q4H Iveth L Wei, DO   Stopped at 08/08/20 0400    atorvastatin (LIPITOR) tablet 20 mg  20 mg Oral Daily Iveth L Wei, DO   20 mg at 08/09/20 0829    lamoTRIgine (LAMICTAL) tablet 25 mg  25 mg Oral Daily Iveth Wei DO   25 mg at 08/09/20 6565    methocarbamol (ROBAXIN) tablet 1,000 mg  1,000 mg Oral 4x Daily PRN Evelina Antoine DO SURGERY      left arm    INCISION AND DRAINAGE Bilateral 7/27/2020    REPEAT BILATERAL KNEE WASHOUT performed by Dylon Roblero DO at [facility] ARTHROSCOPY Bilateral 7/23/2020    KNEE ARTHROSCOPY performed by Ruy Guallpa MD at [address] W/BIOPSY SINGLE/MULTIPLE N/A 9/20/2018    COLONOSCOPY WITH BIOPSY performed by Rg Kenyon MD at [address] [place] [address] Dr CAI W/RMVL OF TUMOR POLYP LESION SNARE TQ  9/20/2018    COLONOSCOPY POLYPECTOMY SNARE/COLD BIOPSY performed by Rg Kenyon MD at [address] [place] [address] History:    Social History     Tobacco Use    Smoking status: Current Every Day Smoker     Packs/day: 0.00     Years: 30.00     Pack years: 0.00    Smokeless tobacco: Never Used    Tobacco comment: States stopped using cigarettes   Substance Use Topics    Alcohol use: No     Comment: none since 2017, drank heavily in early 2000s                                Ready to quit: Not Answered  Counseling given: Not Answered  Comment: States stopped using cigarettes      Vital Signs (Current):   Vitals:    08/08/20 1511 08/08/20 1756 08/08/20 2000 08/09/20 0800   BP: (!) 149/95 (!) 155/80 (!) 145/83 (!) 151/83   Pulse: 87 88 95 87   Resp: 18 18 20 20   Temp: 37.1 °C (98.7 °F) 36.6 °C (97.9 °F) 36.9 °C (98.4 °F) 36.8 °C (98.3 °F)   TempSrc: Temporal Temporal Temporal Oral   SpO2: 96% 95% 94% 94%   Weight:       Height:                                                  BP Readings from Last 3 Encounters:   08/09/20 (!) 151/83   08/07/20 (!) 79/56   07/31/20 125/78       NPO Status: Time of last liquid consumption: 2200>8 hrs                        Time of last solid consumption: 2200                        Date of last liquid consumption: 08/08/20                        Date of last solid food consumption: 08/08/20    BMI:   Wt Readings from Last 3 Encounters:   08/07/20 206 lb 14.4 oz (93.8 kg)   08/07/20 210 lb (95.3 kg)
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
## 2020-08-09 NOTE — PROGRESS NOTES
[facility]  Internal Medicine Residency / [address]    Attending Physician Statement  I have discussed the case, including pertinent history and exam findings with the resident and the team.  I have seen and examined the patient, reviewed meds and pertinent labs and the key elements of the encounter have been performed by me. I agree with the assessment, plan and orders as documented by the resident. Patient continues to c/o knee pain and also some left thigh pain. Remains afebrile, Hb 7.6, will continue to follow and transfuse as need for Hb < 7, await OR. Remainder of medical problems as per resident note.       Lizzie Cleveland  Internal Medicine Residency Faculty

## 2020-08-09 NOTE — PROGRESS NOTES
Due to OR availability, patient surgery will be moved until tomorrow, 8/9. Floor notified of changes.     NPO after midnight

## 2020-08-09 NOTE — PROGRESS NOTES
Lab Results   Component Value Date     08/08/2020    K 3.6 08/08/2020    CL 97 08/08/2020    CO2 25 08/08/2020    BUN 14 08/08/2020    CREATININE 1.0 08/08/2020    CREATININE 1.4 08/07/2020    CREATININE 0.7 07/29/2020    GFRAA >60 08/08/2020    LABGLOM >60 08/08/2020    GLUCOSE 118 08/08/2020    PROT 5.9 08/08/2020    LABALBU 2.4 08/08/2020    CALCIUM 9.0 08/08/2020    BILITOT 0.3 08/08/2020    ALKPHOS 155 08/08/2020    AST 15 08/08/2020    ALT 18 08/08/2020       Radiology: CT lumbar spine without contrast (07/23): Moderate spinal canal stenosis related to disc bulging and facet   arthropathy at L3-4 and L4-5. No evidence of spinal infection. Microbiology:     Blood cx  No results found for: BLOODCULT1  Culture, Blood 2   Date Value Ref Range Status   08/07/2020 24 Hours no growth  Preliminary   07/22/2020 5 Days no growth  Final   12/26/2014 5 Days- no growth  Final   12/23/2014 (A)  Final    Coagulase negative Staph by PNA fish  Growth in aerobic bottle only     12/23/2014   Final    No antibiotic susceptibility studies performed. Plates will be held 10  days. Contact the laboratory, [phone], if further workup is  desired.          BODY FLUID CULTURE  Body Fluid Culture, Sterile   Date Value Ref Range Status   07/27/2020   Final    Neisseria gonorrhoeae not isolated  5 Days, no growth       Organism   Date Value Ref Range Status   07/23/2020 Strep agalactiae (Beta Strep Group B) (A)  Preliminary         MRSA Culture Only   Date Value Ref Range Status   07/22/2020 Methicillin resistant Staph aureus not isolated  Final       Assessment:  Group B Strep septic native bilateral knee arthritis, s/p wash-out on 07/23 and 7/27        Plan:    Daptomycin 6 mg / kg IV q 24 hrs ( sed rate 138 up , CRP 26 ( down)   I & D today   HIV test ( was neg on 2014)         Electronically signed by Prabhakar Lai MD on 8/9/2020 at 8:18 AM

## 2020-08-09 NOTE — BRIEF OP NOTE
Brief Postoperative Note      Patient: Verna Cervantes  YOB: 1965  MRN: [id number]    Date of Procedure: 8/9/2020    Pre-Op Diagnosis: bilateral septic knees    Post-Op Diagnosis: Same       Procedure(s):  INCISION AND DRAINAGE bilateral knees    Surgeon(s):  Dez Castellanos DO    Assistant:  Resident: Rosa Navarro DO    Anesthesia: General    Estimated Blood Loss (mL): less than 50     Complications: None    Specimens:   ID Type Source Tests Collected by Time Destination   1 : Right Knee Joint Cultures Specimen Leg CULTURE, ANAEROBIC, CULTURE, FUNGUS, GRAM STAIN, CULTURE, SURGICAL, CULTURE WITH SMEAR, ACID FAST [address], [place] 8/9/2020 1742    2 : Left Knee Joint Cultures Specimen Leg CULTURE, ANAEROBIC, CULTURE, FUNGUS, GRAM STAIN, CULTURE, SURGICAL, CULTURE WITH SMEAR, ACID FAST [address] [place], [place] 8/9/2020 1800        Implants:  * No implants in log *      Drains:   Closed/Suction Drain Right; Anterior Knee Accordion 10 Polish (Active)       Closed/Suction Drain Left; Anterior Knee Accordion 10 Polish (Active)       Findings: See op note    Electronically signed by Rosa Navarro DO on 8/9/2020 at 6:34 PM

## 2020-08-09 NOTE — PROGRESS NOTES
Pharmacy Consultation Note  (Antibiotic Dosing and Monitoring)    Initial consult date: 8-7-2020  Consulting physician: Dr Demian Negron  Drug: Vancomycin  Indication: Sepsis 2/2 recurrent septic arthritis vs pneumonia vs CLABSI     Vancomycin has been discontinued   [address] Physician to re-consult pharmacy if future dosing is needed    Thank you for the consult,      Fracnia BermudezD, BCPS 8/9/2020 9:46 AM

## 2020-08-09 NOTE — ANESTHESIA POSTPROCEDURE EVALUATION
Department of Anesthesiology  Postprocedure Note    Patient: Jovanna Trent  MRN: [id number]  YOB: 1965  Date of evaluation: 8/9/2020  Time:  7:27 PM     Procedure Summary     Date:  08/09/20 Room / Location:  [facility] OR  / [facility]    Anesthesia Start:  8637 Anesthesia Stop:      Procedure:  INCISION AND DRAINAGE bilateral knees (Bilateral ) Diagnosis:  (bilateral septic knees)    Surgeon:  Gwen Contreras DO Responsible Provider:  Juan Luis Aquino MD    Anesthesia Type:  general ASA Status:  3          Anesthesia Type: general    Marie Phase I: Marie Score: 9    Marie Phase II:      Last vitals: Reviewed and per EMR flowsheets.        Anesthesia Post Evaluation    Patient location during evaluation: PACU  Patient participation: complete - patient participated  Level of consciousness: awake  Pain score: 3  Airway patency: patent  Nausea & Vomiting: no nausea and no vomiting  Complications: no  Cardiovascular status: blood pressure returned to baseline  Respiratory status: acceptable  Hydration status: euvolemic

## 2020-08-10 LAB
ANION GAP SERPL CALCULATED.3IONS-SCNC: 15 MMOL/L (ref 7–16)
BASOPHILS ABSOLUTE: 0.02 E9/L (ref 0–0.2)
BASOPHILS RELATIVE PERCENT: 0.3 % (ref 0–2)
BUN BLDV-MCNC: 18 MG/DL (ref 6–20)
CALCIUM SERPL-MCNC: 9.2 MG/DL (ref 8.6–10.2)
CHLORIDE BLD-SCNC: 97 MMOL/L (ref 98–107)
CO2: 24 MMOL/L (ref 22–29)
CREAT SERPL-MCNC: 0.8 MG/DL (ref 0.7–1.2)
EOSINOPHILS ABSOLUTE: 0 E9/L (ref 0.05–0.5)
EOSINOPHILS RELATIVE PERCENT: 0 % (ref 0–6)
GFR AFRICAN AMERICAN: >60
GFR NON-AFRICAN AMERICAN: >60 ML/MIN/1.73
GLUCOSE BLD-MCNC: 144 MG/DL (ref 74–99)
GRAM STAIN ORDERABLE: NORMAL
GRAM STAIN ORDERABLE: NORMAL
HCT VFR BLD CALC: 24.4 % (ref 37–54)
HEMOGLOBIN: 8 G/DL (ref 12.5–16.5)
HIV-1 AND HIV-2 ANTIBODIES: NORMAL
IMMATURE GRANULOCYTES #: 0.03 E9/L
IMMATURE GRANULOCYTES %: 0.4 % (ref 0–5)
LYMPHOCYTES ABSOLUTE: 1.33 E9/L (ref 1.5–4)
LYMPHOCYTES RELATIVE PERCENT: 18.1 % (ref 20–42)
MCH RBC QN AUTO: 29.1 PG (ref 26–35)
MCHC RBC AUTO-ENTMCNC: 32.8 % (ref 32–34.5)
MCV RBC AUTO: 88.7 FL (ref 80–99.9)
METER GLUCOSE: 162 MG/DL (ref 74–99)
METER GLUCOSE: 168 MG/DL (ref 74–99)
METER GLUCOSE: 173 MG/DL (ref 74–99)
METER GLUCOSE: 175 MG/DL (ref 74–99)
METER GLUCOSE: 186 MG/DL (ref 74–99)
METER GLUCOSE: 197 MG/DL (ref 74–99)
METER GLUCOSE: 197 MG/DL (ref 74–99)
MONOCYTES ABSOLUTE: 0.54 E9/L (ref 0.1–0.95)
MONOCYTES RELATIVE PERCENT: 7.3 % (ref 2–12)
NEUTROPHILS ABSOLUTE: 5.43 E9/L (ref 1.8–7.3)
NEUTROPHILS RELATIVE PERCENT: 73.9 % (ref 43–80)
PATHOLOGIST REVIEW: NORMAL
PDW BLD-RTO: 12.5 FL (ref 11.5–15)
PLATELET # BLD: 776 E9/L (ref 130–450)
PMV BLD AUTO: 8.7 FL (ref 7–12)
POTASSIUM SERPL-SCNC: 4.3 MMOL/L (ref 3.5–5)
RBC # BLD: 2.75 E12/L (ref 3.8–5.8)
SODIUM BLD-SCNC: 136 MMOL/L (ref 132–146)
WBC # BLD: 7.4 E9/L (ref 4.5–11.5)

## 2020-08-10 PROCEDURE — 6360000002 HC RX W HCPCS: Performed by: INTERNAL MEDICINE

## 2020-08-10 PROCEDURE — 80048 BASIC METABOLIC PNL TOTAL CA: CPT

## 2020-08-10 PROCEDURE — C9113 INJ PANTOPRAZOLE SODIUM, VIA: HCPCS | Performed by: INTERNAL MEDICINE

## 2020-08-10 PROCEDURE — U0003 INFECTIOUS AGENT DETECTION BY NUCLEIC ACID (DNA OR RNA); SEVERE ACUTE RESPIRATORY SYNDROME CORONAVIRUS 2 (SARS-COV-2) (CORONAVIRUS DISEASE [COVID-19]), AMPLIFIED PROBE TECHNIQUE, MAKING USE OF HIGH THROUGHPUT TECHNOLOGIES AS DESCRIBED BY CMS-2020-01-R: HCPCS

## 2020-08-10 PROCEDURE — 82962 GLUCOSE BLOOD TEST: CPT

## 2020-08-10 PROCEDURE — 99231 SBSQ HOSP IP/OBS SF/LOW 25: CPT | Performed by: INTERNAL MEDICINE

## 2020-08-10 PROCEDURE — 36415 COLL VENOUS BLD VENIPUNCTURE: CPT

## 2020-08-10 PROCEDURE — 6370000000 HC RX 637 (ALT 250 FOR IP): Performed by: STUDENT IN AN ORGANIZED HEALTH CARE EDUCATION/TRAINING PROGRAM

## 2020-08-10 PROCEDURE — 6370000000 HC RX 637 (ALT 250 FOR IP): Performed by: INTERNAL MEDICINE

## 2020-08-10 PROCEDURE — 2700000000 HC OXYGEN THERAPY PER DAY

## 2020-08-10 PROCEDURE — 6360000002 HC RX W HCPCS: Performed by: STUDENT IN AN ORGANIZED HEALTH CARE EDUCATION/TRAINING PROGRAM

## 2020-08-10 PROCEDURE — 2060000000 HC ICU INTERMEDIATE R&B

## 2020-08-10 PROCEDURE — 97530 THERAPEUTIC ACTIVITIES: CPT

## 2020-08-10 PROCEDURE — 2580000003 HC RX 258: Performed by: INTERNAL MEDICINE

## 2020-08-10 PROCEDURE — 97161 PT EVAL LOW COMPLEX 20 MIN: CPT

## 2020-08-10 PROCEDURE — 2580000003 HC RX 258: Performed by: STUDENT IN AN ORGANIZED HEALTH CARE EDUCATION/TRAINING PROGRAM

## 2020-08-10 PROCEDURE — 85025 COMPLETE CBC W/AUTO DIFF WBC: CPT

## 2020-08-10 RX ORDER — INSULIN GLARGINE 100 [IU]/ML
15 INJECTION, SOLUTION SUBCUTANEOUS NIGHTLY
Status: DISCONTINUED | OUTPATIENT
Start: 2020-08-10 | End: 2020-08-14 | Stop reason: HOSPADM

## 2020-08-10 RX ORDER — PANTOPRAZOLE SODIUM 40 MG/1
40 TABLET, DELAYED RELEASE ORAL
Status: DISCONTINUED | OUTPATIENT
Start: 2020-08-11 | End: 2020-08-11

## 2020-08-10 RX ADMIN — OXYCODONE HYDROCHLORIDE 10 MG: 10 TABLET, FILM COATED, EXTENDED RELEASE ORAL at 09:16

## 2020-08-10 RX ADMIN — OXYCODONE 5 MG: 5 TABLET ORAL at 22:18

## 2020-08-10 RX ADMIN — Medication 10 ML: at 20:31

## 2020-08-10 RX ADMIN — MULTIVITAMIN TABLET 1 TABLET: TABLET at 09:17

## 2020-08-10 RX ADMIN — Medication 10 ML: at 09:18

## 2020-08-10 RX ADMIN — OXYCODONE HYDROCHLORIDE 10 MG: 10 TABLET, FILM COATED, EXTENDED RELEASE ORAL at 20:31

## 2020-08-10 RX ADMIN — SODIUM CHLORIDE, PRESERVATIVE FREE 10 ML: 5 INJECTION INTRAVENOUS at 09:18

## 2020-08-10 RX ADMIN — VENLAFAXINE HYDROCHLORIDE 37.5 MG: 75 TABLET ORAL at 09:16

## 2020-08-10 RX ADMIN — ATORVASTATIN CALCIUM 20 MG: 20 TABLET, FILM COATED ORAL at 09:16

## 2020-08-10 RX ADMIN — ENOXAPARIN SODIUM 40 MG: 40 INJECTION SUBCUTANEOUS at 15:08

## 2020-08-10 RX ADMIN — SODIUM CHLORIDE, PRESERVATIVE FREE 10 ML: 5 INJECTION INTRAVENOUS at 01:44

## 2020-08-10 RX ADMIN — OXYCODONE 5 MG: 5 TABLET ORAL at 18:07

## 2020-08-10 RX ADMIN — OXYCODONE 5 MG: 5 TABLET ORAL at 12:11

## 2020-08-10 RX ADMIN — INSULIN LISPRO 1 UNITS: 100 INJECTION, SOLUTION INTRAVENOUS; SUBCUTANEOUS at 17:33

## 2020-08-10 RX ADMIN — MORPHINE SULFATE 2 MG: 2 INJECTION, SOLUTION INTRAMUSCULAR; INTRAVENOUS at 15:14

## 2020-08-10 RX ADMIN — DAPTOMYCIN 450 MG: 500 INJECTION, POWDER, LYOPHILIZED, FOR SOLUTION INTRAVENOUS at 13:30

## 2020-08-10 RX ADMIN — Medication 2 G: at 01:43

## 2020-08-10 RX ADMIN — ASPIRIN 325 MG: 325 TABLET, COATED ORAL at 09:16

## 2020-08-10 RX ADMIN — MORPHINE SULFATE 2 MG: 2 INJECTION, SOLUTION INTRAMUSCULAR; INTRAVENOUS at 23:57

## 2020-08-10 RX ADMIN — INSULIN GLARGINE 15 UNITS: 100 INJECTION, SOLUTION SUBCUTANEOUS at 20:34

## 2020-08-10 RX ADMIN — LAMOTRIGINE 25 MG: 25 TABLET ORAL at 09:16

## 2020-08-10 RX ADMIN — Medication 2 G: at 09:45

## 2020-08-10 RX ADMIN — ACETAMINOPHEN 650 MG: 325 TABLET ORAL at 15:14

## 2020-08-10 RX ADMIN — INSULIN LISPRO 1 UNITS: 100 INJECTION, SOLUTION INTRAVENOUS; SUBCUTANEOUS at 12:12

## 2020-08-10 RX ADMIN — PANTOPRAZOLE SODIUM 40 MG: 40 INJECTION, POWDER, FOR SOLUTION INTRAVENOUS at 09:18

## 2020-08-10 RX ADMIN — INSULIN LISPRO 1 UNITS: 100 INJECTION, SOLUTION INTRAVENOUS; SUBCUTANEOUS at 20:33

## 2020-08-10 RX ADMIN — INSULIN LISPRO 1 UNITS: 100 INJECTION, SOLUTION INTRAVENOUS; SUBCUTANEOUS at 09:45

## 2020-08-10 RX ADMIN — Medication 10 ML: at 22:05

## 2020-08-10 RX ADMIN — OXYCODONE 5 MG: 5 TABLET ORAL at 04:13

## 2020-08-10 RX ADMIN — METHOCARBAMOL TABLETS 1000 MG: 500 TABLET, COATED ORAL at 15:13

## 2020-08-10 RX ADMIN — ASPIRIN 325 MG: 325 TABLET, COATED ORAL at 20:31

## 2020-08-10 RX ADMIN — Medication 10 ML: at 10:55

## 2020-08-10 ASSESSMENT — PAIN DESCRIPTION - ONSET
ONSET: ON-GOING

## 2020-08-10 ASSESSMENT — PAIN DESCRIPTION - LOCATION
LOCATION: KNEE

## 2020-08-10 ASSESSMENT — PAIN SCALES - GENERAL
PAINLEVEL_OUTOF10: 9
PAINLEVEL_OUTOF10: 8
PAINLEVEL_OUTOF10: 9
PAINLEVEL_OUTOF10: 8
PAINLEVEL_OUTOF10: 10
PAINLEVEL_OUTOF10: 0
PAINLEVEL_OUTOF10: 9

## 2020-08-10 ASSESSMENT — PAIN DESCRIPTION - PAIN TYPE
TYPE: SURGICAL PAIN

## 2020-08-10 ASSESSMENT — PAIN DESCRIPTION - ORIENTATION
ORIENTATION: RIGHT;LEFT
ORIENTATION: LEFT;RIGHT
ORIENTATION: RIGHT;LEFT
ORIENTATION: RIGHT;LEFT

## 2020-08-10 ASSESSMENT — PAIN DESCRIPTION - DESCRIPTORS
DESCRIPTORS: ACHING;DISCOMFORT;SORE
DESCRIPTORS: ACHING;CONSTANT;DISCOMFORT;TENDER
DESCRIPTORS: ACHING;SORE;THROBBING
DESCRIPTORS: THROBBING;ACHING

## 2020-08-10 ASSESSMENT — PAIN - FUNCTIONAL ASSESSMENT: PAIN_FUNCTIONAL_ASSESSMENT: PREVENTS OR INTERFERES SOME ACTIVE ACTIVITIES AND ADLS

## 2020-08-10 ASSESSMENT — PAIN DESCRIPTION - FREQUENCY
FREQUENCY: CONTINUOUS

## 2020-08-10 ASSESSMENT — PAIN DESCRIPTION - PROGRESSION: CLINICAL_PROGRESSION: GRADUALLY WORSENING

## 2020-08-10 NOTE — PROGRESS NOTES
[facility] PROGRESS NOTE      Chief complaint: Follow-up of bilateral septic knee arthritis    The patient is a 63 y. o. male with history of DM, chronic low back pain with severe spinal stenosis, previously admitted from 07/21-07/31 with Streptococcus agalactiae bilateral septic knee arthritis s/p wash-out on 07/23, bilateral knee arthrotomy with exploration and irrigation and debridement on 07/27, discharged on a 4-week course of ceftriaxone from 07/27-08/24, presented on 08/07 with hypotension and bilateral knee pain and swelling for which arthrocentesis showed 921,000 RBCs and 108,300 WBCs (predominantly neutrophilic at 15%) but with cultures showing no growth, prompting bilateral knee arthrotomy with exploration, irrigation and debridement on 08/09 during which dark gelatinous hematoma was noted. Tissue Gram stain and culture showed few polymorphonuclear leukocytes, no epithelial cells, no organisms, no growth to date. He received piperacillin-tazobactam and vancomycin on admission then switched to daptomycin on 08/08. Subjective: Patient was seen and examined. No chills, no abdominal pain, no diarrhea, no rash, no itching. He reports pain on bilateral knees. Objective:    Vitals:    08/10/20 0823   BP: 133/75   Pulse: 82   Resp: 16   Temp: 99.7 °F (37.6 °C)   SpO2: 96%     Constitutional: Alert, not in distress  Respiratory: Clear breath sounds, no crackles, no wheezes  Cardiovascular: Regular rate and rhythm, no murmurs  Gastrointestinal: Bowel sounds present, soft, nontender  Skin: Warm and dry, no active dermatoses  Musculoskeletal: Bilateral lower extremities with dressing, Ace wrap and drains in place    Labs, imaging, and medical records/notes were personally reviewed. Assessment:  Bilateral septic knee arthritis, s/p bilateral knee arthrotomy with exploration, irrigation and debridement on 08/09    Recommendations:  Continue daptomycin 6mg/kg q24h pending blood and tissue culture results.   Follow

## 2020-08-10 NOTE — OP NOTE
surgery outlined in detail with the patient he verbalized understanding was in agreement with the plans. Operative course in detail:     The patient was seen and identified outside the operative theater. Operative extremities were marked by the surgeon and agreed upon by all parties present. The patient was taken to the operative theater. Transferred from the bed to the operative table. All bony prominences and neurovascular structures were well-padded and protected. Anesthesia was induced by the anesthesia team.  Emi Graham were placed high on the thighs of the operative extremities. Patient was sedated under the care of the anesthesia team.  Patient was prepped and draped in standard sterile fashion. Before beginning formal surgical timeout was called and all parties were in agreement before proceeding.     We elected to start with the left knee first.  The limb was elevated and exsanguinated via gravity assist.  The tourniquet was inflated to 275 mmHg. Previous midline incision was utilized. Scalpel was used to incise through the healing incision this was taken to the subcutaneous tissues. Full-thickness flaps were taken down to the extensor mechanism. The previous retained sutures were identified to the prior arthrotomy. A new formal formal medial parapatellar arthrotomy to gain access to the joint. Care was taken to protect the medial border of the patellar tendon as well as providing access into the joint. Incisional debridement was now performed taking care to explore the joint. Dark discolored gelatinous hematoma was now encountered within the joint. This did have grey discoloration and mucoserous type fluid interposed within the hematoma fluid. The fluid was captured with suction trap and sent off for microbiology analysis.  The joint was now copiously irrgiated with multiple liters normal saline solution throughout all compartments and after further inspection, there was no other obvious infected material.      After thorough irrigation and debridement the arthrotomy was closed with interrupted monofilament suture. .  The wound was again irrigated. 2-0 Monocryl was used subcutaneously. The the skin was closed with 3-0 nylon. Standard sterile dressing was applied.     Tourniquet was deflated.     Attention was then turned to the right knee.   The limb was elevated and exsanguinated via gravity assist.  The tourniquet was inflated to 275 mmHg. Previous midline incision was utilized. Scalpel was used to incise through the healing incision this was taken to the subcutaneous tissues. Full-thickness flaps were taken down to the extensor mechanism. The previous retained sutures were identified to the prior arthrotomy. A new formal formal medial parapatellar arthrotomy to gain access to the joint. Care was taken to protect the medial border of the patellar tendon as well as providing access into the joint. Incisional debridement was now performed taking care to explore the joint. Dark discolored gelatinous hematoma was now encountered within the joint. This did have grey discoloration and mucoserous type fluid interposed within the hematoma fluid. The fluid was captured with suction trap and sent off for microbiology analysis. The joint was now copiously irrgiated with multiple liters normal saline solution throughout all compartments and after further inspection, there was no other obvious infected material.      After thorough irrigation and debridement the arthrotomy was closed with interrupted monofilament suture. .  The wound was again irrigated. 2-0 Monocryl was used subcutaneously. The the skin was closed with 3-0 nylon. Standard sterile dressing was applied.     Tourniquet was deflated. Patient was reversed from anesthesia.   He was transferred from the operative bed to a cart and taken to PACU for recovery.     Disposition:  Patient will be taken back to the medical surgical hanna. He will continue antibiotics we will defer duration to the infectious disease team.  We did repeat culture analysis bilateral knees as well as apply Hemovac drains today. Therapy can work with him he can be weightbearing as tolerated on the bilateral lower extremities.   Orthopedics will continue to follow along    Electronically signed by Lavonne Watt DO on 8/9/2020

## 2020-08-10 NOTE — PROGRESS NOTES
Physical Therapy  Physical Therapy Initial Assessment     Name: Ghada Pardo  : 1965  MRN: [id number]    Referring Provider:  Jennifer Wills DO     Date of Service: 8/10/2020    Evaluating PT:  Suzy Oas, PT, DPT. [id number]    Room #:  0062/7194-L  Diagnosis:  Sepsis   Reason for admission:  Dizziness, hypotension, recent B knee washout , knee pain  Precautions:  Falls, WBAT BLE, B knee hemovac   Pertinent PMHx: DM, OA, HLD, neuropathy   Procedures:  B knee I&D  Equipment Recommendations:  Wheelchair, [employer]    SUBJECTIVE:  Pt admitted from SNF. States he has not started therapy there yet. Prior, lives with his kids in a 2 story home with 2+1 stair(s) to enter and 2 rail(s). 1st floor setup. Mostly bedridden since first procedure on knees d/t pain . OBJECTIVE:   Initial Evaluation  Date:  Treatment   Short Term/ Long Term   Goals   AM-PAC 6 Clicks      Was pt agreeable to Eval/treatment? Yes      Does pt have pain?  10/10 B knees      Bed Mobility  Rolling: MaxA  Supine to sit: NT  Sit to supine: NT  Scooting: MaxA  -pt refused further d/t pain  Vicki   Transfers Sit to stand: NT  Stand to sit: NT  Stand pivot: NT  -pt refused d/t pain  Vicki   Ambulation   NT    >10 feet with [employer] ModA   Stair negotiation: ascended and descended  NT  TBD   ROM BUE:  See OT eval   BLE:  Limited by pain     Strength BUE:  See OT eval   BLE:  Unable to assess d/t pain  Increase by 1/3 MMT grade    Balance Sitting EOB:  NT  Dynamic Standing:  NT  Sitting EOB:  SBA  Dynamic Standing:  ModA     -Pt is A & O x 3  -Sensation:  Neuropathy BLEs   -Edema:  unremarkable     Therapeutic Exercises:  functional activity     Patient education  Pt educated on safety, sequencing of transfers, and role of PT    Patient response to education:   Pt verbalized understanding Pt demonstrated skill Pt requires further education in this area   Yes  No  Yes      ASSESSMENT:    Comments:  Pt received supine in bed and agreeable to PT session  Pt in significant amount of pain which limited his participation. Would only minimally allow therapist to move LEs a few degrees before yelling in pain. Pt did agree to bed mobility and required heavy assist and moving very slowly to complete rolling. PROM completed to BLEs in small ROM that pt would allow. Repositioned with pillows for comfort. Pt with all needs met and call light in reach. Pt would benefit from continued PT POC to address functional deficits described above. Treatment:  Patient practiced and was instructed in the following treatment:     Patient education provided continuously throughout session for sequencing, safety maintenance, and improving any deficits found during the evaluation.  Bed mobility training - pt given verbal and tactile cues to facilitate proper sequencing and safety during rolling and supine>sit as well as provided with physical assistance to complete task    · Skilled positioning - Pt placed in semi reclined position with pillows utilized to facilitate upright posture, joint and skin integrity, and interaction with environment. Pt's/ family goals   1. None stated     Patient and or family understand(s) diagnosis, prognosis, and plan of care. Yes     PLAN:    PT care will be provided in accordance with the objectives noted above. Exercises and functional mobility practice will be used as well as appropriate assistive devices or modalities to obtain goals. Patient and family education will also be administered as needed. Frequency of treatments: 2-5x/week x 1-2 weeks. Time in  1005  Time out  1035    Total Treatment Time 8 minutes     Evaluation Time includes thorough review of current medical information, gathering information on past medical history/social history and prior level of function, completion of standardized testing/informal observation of tasks, assessment of data and education on plan of care and goals.     CPT codes:  [x] Low

## 2020-08-10 NOTE — PROGRESS NOTES
[facility]  Internal Medicine Residency Program  Progress Note - House Team 1    Patient:  Francis Bonilla 63 y.o. male MRN: [id number]     Date of Service: 8/10/2020     CC:   Chief Complaint   Patient presents with    Dizziness     sent from ortho clinic for hypotension today, recent bilateral knee surgery, c/o dizziness, abd pain, BLE pain     Overnight events:     Subjective     Seen and examined this a.m. Pis postop day 2 after incision incision and drainage. Patient pain is controlled with oxycodone ER. This AM, patient complains of continues pain especially from his R knee. Nurse also reports the drain is not working appropriately and Ortho needs to come to evaluate. Denies fevers, chills, chest pain, shortness of breath, abdominal pain, nausea, vomiting, diarrhea, constipation, hematuria, dysuria or melena. Pt expresses that he does not wish to reside at [facility] nursing facility [facility] any longer. He wishes to find a a new place. Objective     Physical Exam:  · Vitals: /75   Pulse 82   Temp 99.7 °F (37.6 °C) (Temporal)   Resp 16   Ht 5' 11\" (1.803 m)   Wt 208 lb 3.2 oz (94.4 kg)   SpO2 96%   BMI 29.04 kg/m²     · I & O - 24hr: No intake/output data recorded. · General Appearance: alert, appears stated age and cooperative. White male. · HEENT:  Head: Normocephalic, no lesions, without obvious abnormality. · Neck: no adenopathy, no carotid bruit, no JVD, supple, symmetrical, trachea midline and thyroid not enlarged, symmetric, no tenderness/mass/nodules  · Lung: clear to auscultation bilaterally  · Heart: regular rate and rhythm, S1, S2 normal, no murmur, click, rub or gallop  · Abdomen: soft, non-tender; bowel sounds normal; no masses,  no organomegaly  · Extremities:  Extremities appear without edema. b/l dressings in place on b/l knees. Some drainage seeping from dressing. B/l knee have attachment to Hemovac. Minimal drainage.    · Musculokeletal: No joint swelling, no muscle tenderness. ROM normal in all joints of extremities. · Neurologic: Mental status: Alert, oriented, thought content appropriate  Subject  Pertinent Labs & Imaging Studies   malcolm  CBC:   Lab Results   Component Value Date    WBC 7.4 08/10/2020    RBC 2.75 08/10/2020    HGB 8.0 08/10/2020    HCT 24.4 08/10/2020    MCV 88.7 08/10/2020    MCH 29.1 08/10/2020    MCHC 32.8 08/10/2020    RDW 12.5 08/10/2020     08/10/2020    MPV 8.7 08/10/2020     CMP:    Lab Results   Component Value Date     08/10/2020    K 4.3 08/10/2020    K 3.6 08/08/2020    CL 97 08/10/2020    CO2 24 08/10/2020    BUN 18 08/10/2020    CREATININE 0.8 08/10/2020    GFRAA >60 08/10/2020    LABGLOM >60 08/10/2020    GLUCOSE 144 08/10/2020    PROT 5.9 08/08/2020    LABALBU 2.4 08/08/2020    CALCIUM 9.2 08/10/2020    BILITOT 0.3 08/08/2020    ALKPHOS 155 08/08/2020    AST 15 08/08/2020    ALT 18 08/08/2020       Resident's Assessment and Plan     Mihaela Mandel is a 63 y.o. male with a PMHx of insulin dependent Type 2 DM, HLD, GERD, OA, lumbar stenosis, and bipolar disorder who was admitted from the ED on 8/7/2020 for hypotension, fatigue, and suspected sepsis 2/2 recent septic arthritis of B/L knees.     1.  Sepsis secondary to recurrent bilateral septic arthritis-resolving    - Patient initially underwent washout on 7/27/2020 for bilateral septic arthritis (cultures grew strep agalactiae) and was discharged on ceftriaxone IV via midline catheter. - found to be hypotensive in the Ortho clinic PTA and was readmitted   - B/L knee synovial fluid aspiration performed on 8/7 by Orthopedic Surgery. Gram stain showing abundant PMNs with no organisms.    -Underwent bilateral knee arthrotomy with exploration irritation and debridement on 8/9/2020. Today is postop day 2. He was placed on Hemovac drains. - cultures still pending. No growth yet. - Continue Daptomycin. ID on board. Recs appreciated.     - Ortho

## 2020-08-10 NOTE — PROGRESS NOTES
C/o b/l knee incisional pain, 8/10 scale, medicated w/ oxycodone per prn order. Will continue to monitor.  Electronically signed by Mars Hebert RN on 8/10/2020 at 4:15 AM

## 2020-08-10 NOTE — PROGRESS NOTES
Department of Orthopedic Surgery  Resident Progress Note    Patient seen and examined. Pain controlled. No new complaints. Denies chest pain, shortness of breath, calf pain, dizziness/lightheadedness. Patient feels his knee pain is improved again. VITALS:  /75   Pulse 88   Temp 98.3 °F (36.8 °C) (Oral)   Resp 16   Ht 5' 11\" (1.803 m)   Wt 208 lb 3.2 oz (94.4 kg)   SpO2 96%   BMI 29.04 kg/m²     GENERAL: alert and awake  MUSCULOSKELETAL:   bilateral lower extremity:  · Dressing C/D/I  · Compartments soft and compressible, calf non-tender  · Palpable dorsalis pedis and posterior tibialis pulse, brisk cap refill to toes, foot warm and perfused  · Sensation intact to light touch in sural/deep peroneal/superficial peroneal/saphenous/posterior tibial nerve distributions to foot/ankle. · Demonstrates active ankle plantar/dorsiflexion/great toe extension  · Drains in place. CBC:   Lab Results   Component Value Date    WBC 8.5 08/09/2020    HGB 8.6 08/09/2020    HCT 25.5 08/09/2020     08/09/2020       ASSESSMENT  · S/p3rd  I&D bilateral knee septic arthritis 8/9    PLAN    · WBAT BLE  · Pain control PO and IV- wean to orals  · DVT prophy per primary  · Continue abx treatment per primary/ ID  · Monitor vitals  · Trend labs  · Await surgical cultures  · Pt/ot  · Possible drain removal later today vs tomorrow pending if re vacuum measure holds and drainage persists   · Discuss with Dr. Ainsley Billy Attending    I have seen and evaluated the patient with the resident and agree with the above assessments on today's visit. I have performed the key components of the history and physical examination and concur completely with the findings and plans as documented above.     Electronically signed by   Camila Mckoy DO  8/10/2020

## 2020-08-10 NOTE — PROGRESS NOTES
Dr Tyler Kaplan will be up to evaluate pt's hemovacs this am. Electronically signed by Dunia Gay RN on 8/10/2020 at 6:33 AM

## 2020-08-10 NOTE — PROGRESS NOTES
Patricia served Dr Lisa Shukla w/ ortho surgery regarding b/l knee hemovacs. Both hemovacs are not working correctly and are leaking and not staying compressed. Awaiting for evaluation.  Electronically signed by Israel Melgar RN on 8/10/2020 at 12:54 AM

## 2020-08-10 NOTE — PROGRESS NOTES
RDW 12.5 08/10/2020    SEGSPCT 55 02/06/2014    METASPCT 0.9 07/27/2020    LYMPHOPCT 18.1 08/10/2020    MONOPCT 7.3 08/10/2020    MYELOPCT 0.9 07/27/2020    BASOPCT 0.3 08/10/2020    MONOSABS 0.54 08/10/2020    LYMPHSABS 1.33 08/10/2020    EOSABS 0.00 08/10/2020    BASOSABS 0.02 08/10/2020     BMP:    Lab Results   Component Value Date     08/10/2020    K 4.3 08/10/2020    K 3.6 08/08/2020    CL 97 08/10/2020    CO2 24 08/10/2020    BUN 18 08/10/2020    LABALBU 2.4 08/08/2020    CREATININE 0.8 08/10/2020    CALCIUM 9.2 08/10/2020    GFRAA >60 08/10/2020    LABGLOM >60 08/10/2020    GLUCOSE 144 08/10/2020       Student's Assessment and Plan     Ancelmo Hagan is a 63 y.o. male with a PMHx of type 2 DM, peripheral neuropathy, osteoarthritis, lumbar stenosis, HLD, S. agalactiae BL knee septic arthritis, and bipolar disorder. He was recently hospitalized from 7/ with the bilateral knee septic arthritis mentioned above. While hospitalized he had an arthroscopic washout on 7/23 and open I&D on 2/27 that showed hematomas in BL knees. He presented to the ED 8/7 because he was found to be hypotensive at a follow up orthopedic appointment with a BP 79/56. Repeat I&D on 8/9 showed hematomas in both knees. 1. Sepsis 2/2 BL knee septic arthritis vs less likely pneumonia   Repeat CXR showed no acute airway disease. No organisms seen on gram stain of BL knees. Currently normotensive at 135/75. Sed rate 138, CRP 26.1, lactic acid trending down 0.6 > 0.5, no longer trending. Still laying in a slightly flexed, abducted, and externally rotation position indicative of a septic knee.    · WBC trending down 9.6 > 8.5  · HgB trending up 7.6 > 8.6 > 8.0  · Daptomycin and cefazolin per ID  · Pain management per Ortho  · Tylenol 650 mg Q6H PRN  · Oxycodone 5 mg Q4H PRN  · Oxycodone 10 mg Q12H PRN  · Robaxin 1000 mg Q4H PRN- muscle spasms  · Tissue gram stain and cultures initially showed no growth   · F/u with

## 2020-08-10 NOTE — PROGRESS NOTES
[facility]  Internal Medicine Residency / [address]    Attending Physician Statement  I have discussed the case, including pertinent history and exam findings with the resident and the team.  I have seen and examined the patient and the key elements of the encounter have been performed by me. I agree with the assessment, plan and orders as documented by the resident. Case Discussed During AM Rounds    Recently admitted for septic arthritis and repeat admission noted s/p Bilateral knee I & D    Tmax 99.7 this am    Still complaining of knee pain- orthopedics continues to follow    Bilateral drains in place- management per Ortho     Sepsis secondary to Group B strep- septic arthritis    S/P knee wash out by Ortho on 8/9   Daptomycin continued per ID     Anemia- H/H stable    S/p transfusion during admission    ? Resume dvt prophylaxis- discuss with Ortho    Type II DM- insulin requiring    Resumed home lantus   Hyperglycemia noted   Monitor closely due to poor PO intake   May need to reduce     Reactive Thrombocytosis     Disposition- social work assessment for DC planning needs likely to [employer] of medical problems as per resident note.       Tony Zuniga  Internal Medicine Residency Faculty

## 2020-08-11 LAB
ABO/RH: NORMAL
ANION GAP SERPL CALCULATED.3IONS-SCNC: 12 MMOL/L (ref 7–16)
ANISOCYTOSIS: ABNORMAL
ANTIBODY SCREEN: NORMAL
BASOPHILS ABSOLUTE: 0 E9/L (ref 0–0.2)
BASOPHILS RELATIVE PERCENT: 1 % (ref 0–2)
BLOOD BANK DISPENSE STATUS: NORMAL
BLOOD BANK PRODUCT CODE: NORMAL
BPU ID: NORMAL
BUN BLDV-MCNC: 19 MG/DL (ref 6–20)
BURR CELLS: ABNORMAL
CALCIUM SERPL-MCNC: 8.6 MG/DL (ref 8.6–10.2)
CHLORIDE BLD-SCNC: 97 MMOL/L (ref 98–107)
CO2: 27 MMOL/L (ref 22–29)
CREAT SERPL-MCNC: 1 MG/DL (ref 0.7–1.2)
CRYSTALS, FLUID: NORMAL
CRYSTALS, FLUID: NORMAL
DESCRIPTION BLOOD BANK: NORMAL
EOSINOPHILS ABSOLUTE: 0.24 E9/L (ref 0.05–0.5)
EOSINOPHILS RELATIVE PERCENT: 2.6 % (ref 0–6)
GFR AFRICAN AMERICAN: >60
GFR NON-AFRICAN AMERICAN: >60 ML/MIN/1.73
GLUCOSE BLD-MCNC: 124 MG/DL (ref 74–99)
HCT VFR BLD CALC: 20.3 % (ref 37–54)
HCT VFR BLD CALC: 27.7 % (ref 37–54)
HEMOGLOBIN: 6.5 G/DL (ref 12.5–16.5)
HEMOGLOBIN: 8.9 G/DL (ref 12.5–16.5)
LYMPHOCYTES ABSOLUTE: 4.7 E9/L (ref 1.5–4)
LYMPHOCYTES RELATIVE PERCENT: 50.4 % (ref 20–42)
MCH RBC QN AUTO: 29.1 PG (ref 26–35)
MCHC RBC AUTO-ENTMCNC: 32 % (ref 32–34.5)
MCV RBC AUTO: 91 FL (ref 80–99.9)
METER GLUCOSE: 124 MG/DL (ref 74–99)
METER GLUCOSE: 134 MG/DL (ref 74–99)
METER GLUCOSE: 138 MG/DL (ref 74–99)
METER GLUCOSE: 139 MG/DL (ref 74–99)
MONOCYTES ABSOLUTE: 0.38 E9/L (ref 0.1–0.95)
MONOCYTES RELATIVE PERCENT: 3.5 % (ref 2–12)
NEUTROPHILS ABSOLUTE: 4.14 E9/L (ref 1.8–7.3)
NEUTROPHILS RELATIVE PERCENT: 43.5 % (ref 43–80)
PDW BLD-RTO: 12.6 FL (ref 11.5–15)
PLATELET # BLD: 655 E9/L (ref 130–450)
PMV BLD AUTO: 8.6 FL (ref 7–12)
POIKILOCYTES: ABNORMAL
POLYCHROMASIA: ABNORMAL
POTASSIUM SERPL-SCNC: 4.1 MMOL/L (ref 3.5–5)
RBC # BLD: 2.23 E12/L (ref 3.8–5.8)
SODIUM BLD-SCNC: 136 MMOL/L (ref 132–146)
SOURCE BODY FLUID: NORMAL
SOURCE BODY FLUID: NORMAL
WBC # BLD: 9.4 E9/L (ref 4.5–11.5)

## 2020-08-11 PROCEDURE — 36430 TRANSFUSION BLD/BLD COMPNT: CPT

## 2020-08-11 PROCEDURE — 80048 BASIC METABOLIC PNL TOTAL CA: CPT

## 2020-08-11 PROCEDURE — 85014 HEMATOCRIT: CPT

## 2020-08-11 PROCEDURE — 85025 COMPLETE CBC W/AUTO DIFF WBC: CPT

## 2020-08-11 PROCEDURE — 2580000003 HC RX 258: Performed by: INTERNAL MEDICINE

## 2020-08-11 PROCEDURE — 6360000002 HC RX W HCPCS: Performed by: STUDENT IN AN ORGANIZED HEALTH CARE EDUCATION/TRAINING PROGRAM

## 2020-08-11 PROCEDURE — 94660 CPAP INITIATION&MGMT: CPT

## 2020-08-11 PROCEDURE — 2060000000 HC ICU INTERMEDIATE R&B

## 2020-08-11 PROCEDURE — 6370000000 HC RX 637 (ALT 250 FOR IP): Performed by: INTERNAL MEDICINE

## 2020-08-11 PROCEDURE — 36415 COLL VENOUS BLD VENIPUNCTURE: CPT

## 2020-08-11 PROCEDURE — P9016 RBC LEUKOCYTES REDUCED: HCPCS

## 2020-08-11 PROCEDURE — 6370000000 HC RX 637 (ALT 250 FOR IP): Performed by: STUDENT IN AN ORGANIZED HEALTH CARE EDUCATION/TRAINING PROGRAM

## 2020-08-11 PROCEDURE — 36592 COLLECT BLOOD FROM PICC: CPT

## 2020-08-11 PROCEDURE — 2580000003 HC RX 258: Performed by: STUDENT IN AN ORGANIZED HEALTH CARE EDUCATION/TRAINING PROGRAM

## 2020-08-11 PROCEDURE — 99232 SBSQ HOSP IP/OBS MODERATE 35: CPT | Performed by: INTERNAL MEDICINE

## 2020-08-11 PROCEDURE — 86900 BLOOD TYPING SEROLOGIC ABO: CPT

## 2020-08-11 PROCEDURE — 86850 RBC ANTIBODY SCREEN: CPT

## 2020-08-11 PROCEDURE — 6360000002 HC RX W HCPCS: Performed by: INTERNAL MEDICINE

## 2020-08-11 PROCEDURE — 86901 BLOOD TYPING SEROLOGIC RH(D): CPT

## 2020-08-11 PROCEDURE — 82962 GLUCOSE BLOOD TEST: CPT

## 2020-08-11 PROCEDURE — 86923 COMPATIBILITY TEST ELECTRIC: CPT

## 2020-08-11 PROCEDURE — 85018 HEMOGLOBIN: CPT

## 2020-08-11 RX ORDER — 0.9 % SODIUM CHLORIDE 0.9 %
20 INTRAVENOUS SOLUTION INTRAVENOUS ONCE
Status: COMPLETED | OUTPATIENT
Start: 2020-08-11 | End: 2020-08-11

## 2020-08-11 RX ORDER — PANTOPRAZOLE SODIUM 40 MG/1
40 TABLET, DELAYED RELEASE ORAL
Status: DISCONTINUED | OUTPATIENT
Start: 2020-08-11 | End: 2020-08-12

## 2020-08-11 RX ORDER — SENNA PLUS 8.6 MG/1
1 TABLET ORAL NIGHTLY
Status: DISCONTINUED | OUTPATIENT
Start: 2020-08-11 | End: 2020-08-14 | Stop reason: HOSPADM

## 2020-08-11 RX ADMIN — OXYCODONE HYDROCHLORIDE 10 MG: 10 TABLET, FILM COATED, EXTENDED RELEASE ORAL at 21:19

## 2020-08-11 RX ADMIN — MORPHINE SULFATE 2 MG: 2 INJECTION, SOLUTION INTRAMUSCULAR; INTRAVENOUS at 05:59

## 2020-08-11 RX ADMIN — VENLAFAXINE HYDROCHLORIDE 37.5 MG: 75 TABLET ORAL at 09:09

## 2020-08-11 RX ADMIN — ASPIRIN 325 MG: 325 TABLET, COATED ORAL at 09:08

## 2020-08-11 RX ADMIN — PANTOPRAZOLE SODIUM 40 MG: 40 TABLET, DELAYED RELEASE ORAL at 18:15

## 2020-08-11 RX ADMIN — OXYCODONE HYDROCHLORIDE 10 MG: 10 TABLET, FILM COATED, EXTENDED RELEASE ORAL at 09:08

## 2020-08-11 RX ADMIN — PANTOPRAZOLE SODIUM 40 MG: 40 TABLET, DELAYED RELEASE ORAL at 05:52

## 2020-08-11 RX ADMIN — OXYCODONE 5 MG: 5 TABLET ORAL at 22:37

## 2020-08-11 RX ADMIN — AMPICILLIN SODIUM 2 G: 2 INJECTION, POWDER, FOR SOLUTION INTRAMUSCULAR; INTRAVENOUS at 18:05

## 2020-08-11 RX ADMIN — LAMOTRIGINE 25 MG: 25 TABLET ORAL at 09:09

## 2020-08-11 RX ADMIN — METHOCARBAMOL TABLETS 1000 MG: 500 TABLET, COATED ORAL at 09:08

## 2020-08-11 RX ADMIN — Medication 10 ML: at 21:20

## 2020-08-11 RX ADMIN — ASPIRIN 325 MG: 325 TABLET, COATED ORAL at 23:56

## 2020-08-11 RX ADMIN — METHOCARBAMOL TABLETS 1000 MG: 500 TABLET, COATED ORAL at 23:56

## 2020-08-11 RX ADMIN — ATORVASTATIN CALCIUM 20 MG: 20 TABLET, FILM COATED ORAL at 09:09

## 2020-08-11 RX ADMIN — OXYCODONE 5 MG: 5 TABLET ORAL at 04:10

## 2020-08-11 RX ADMIN — OXYCODONE 5 MG: 5 TABLET ORAL at 14:49

## 2020-08-11 RX ADMIN — STANDARDIZED SENNA CONCENTRATE 8.6 MG: 8.6 TABLET ORAL at 21:19

## 2020-08-11 RX ADMIN — AMPICILLIN SODIUM 2 G: 2 INJECTION, POWDER, FOR SOLUTION INTRAMUSCULAR; INTRAVENOUS at 14:49

## 2020-08-11 RX ADMIN — INSULIN GLARGINE 15 UNITS: 100 INJECTION, SOLUTION SUBCUTANEOUS at 21:20

## 2020-08-11 RX ADMIN — Medication 10 ML: at 09:09

## 2020-08-11 RX ADMIN — SODIUM CHLORIDE 20 ML: 9 INJECTION, SOLUTION INTRAVENOUS at 09:08

## 2020-08-11 RX ADMIN — MULTIVITAMIN TABLET 1 TABLET: TABLET at 09:10

## 2020-08-11 RX ADMIN — AMPICILLIN SODIUM 2 G: 2 INJECTION, POWDER, FOR SOLUTION INTRAMUSCULAR; INTRAVENOUS at 23:56

## 2020-08-11 RX ADMIN — Medication 10 ML: at 09:10

## 2020-08-11 ASSESSMENT — PAIN SCALES - GENERAL
PAINLEVEL_OUTOF10: 9
PAINLEVEL_OUTOF10: 10
PAINLEVEL_OUTOF10: 9
PAINLEVEL_OUTOF10: 10
PAINLEVEL_OUTOF10: 9
PAINLEVEL_OUTOF10: 9

## 2020-08-11 ASSESSMENT — PAIN DESCRIPTION - PAIN TYPE
TYPE: SURGICAL PAIN

## 2020-08-11 ASSESSMENT — PAIN DESCRIPTION - FREQUENCY
FREQUENCY: CONTINUOUS

## 2020-08-11 ASSESSMENT — PAIN DESCRIPTION - ORIENTATION
ORIENTATION: RIGHT;LEFT

## 2020-08-11 ASSESSMENT — PAIN DESCRIPTION - LOCATION
LOCATION: KNEE

## 2020-08-11 ASSESSMENT — PAIN DESCRIPTION - DESCRIPTORS
DESCRIPTORS: ACHING;THROBBING
DESCRIPTORS: THROBBING;ACHING
DESCRIPTORS: DISCOMFORT;THROBBING

## 2020-08-11 ASSESSMENT — PAIN DESCRIPTION - ONSET
ONSET: ON-GOING

## 2020-08-11 ASSESSMENT — PAIN DESCRIPTION - PROGRESSION: CLINICAL_PROGRESSION: GRADUALLY WORSENING

## 2020-08-11 NOTE — PLAN OF CARE
Problem: Falls - Risk of:  Goal: Will remain free from falls  Description: Will remain free from falls  Outcome: Ongoing  Goal: Absence of physical injury  Description: Absence of physical injury  Outcome: Ongoing     Problem: Pain:  Goal: Pain level will decrease  Description: Pain level will decrease  Outcome: Ongoing     Problem: Skin Integrity:  Goal: Absence of new skin breakdown  Description: Absence of new skin breakdown  Outcome: Ongoing

## 2020-08-11 NOTE — PROGRESS NOTES
[facility] PROGRESS NOTE      Chief complaint: Follow-up of bilateral septic knee arthritis    The patient is a 63 y. o. male with history of DM, chronic low back pain with severe spinal stenosis, previously admitted from 07/21-07/31 with Streptococcus agalactiae (susceptible to penicillin) bilateral septic knee arthritis s/p wash-out on 07/23, bilateral knee arthrotomy with exploration and irrigation and debridement on 07/27, discharged on a 4-week course of ceftriaxone from 07/27-08/24, presented on 08/07 with hypotension and bilateral knee pain and swelling for which arthrocentesis showed 921,000 RBCs and 108,300 WBCs (predominantly neutrophilic at 28%) but with cultures showing no growth, prompting bilateral knee arthrotomy with exploration, irrigation and debridement on 08/09 during which dark gelatinous hematoma was noted. Tissue Gram stain and culture showed few polymorphonuclear leukocytes, no epithelial cells, no organisms, no growth to date. He received piperacillin-tazobactam and vancomycin on admission then switched to daptomycin on 08/08. Subjective: Patient was seen and examined. No chills, no abdominal pain, no diarrhea, no rash, no itching. He reports less pain on bilateral knees. Objective:    Vitals:    08/11/20 0807   BP: 127/65   Pulse: 71   Resp: (!) 6   Temp: 97.9 °F (36.6 °C)   SpO2:      Constitutional: Alert, not in distress  Respiratory: Clear breath sounds, no crackles, no wheezes  Cardiovascular: Regular rate and rhythm, no murmurs  Gastrointestinal: Bowel sounds present, soft, nontender  Skin: Warm and dry, no active dermatoses  Musculoskeletal: Bilateral lower extremities with dressing, Ace wrap in place    Labs, imaging, and medical records/notes were personally reviewed. Assessment:  Bilateral septic knee arthritis, s/p bilateral knee arthrotomy with exploration, irrigation and debridement on 08/09.  Synovial fluid and tissue cultures showed no growth to date.    Recommendations:  Change daptomycin to ampicillin 2g q6h to finish 4 weeks of antibiotic therapy from 08/09-09/06. Follow up blood and tissue cultures. Thank you for involving me in the care of Abelardo Elio. I will continue to follow. Please do not hesitate to call for any questions or concerns.     Electronically signed by Obed Edmondson MD on 8/11/2020 at 10:29 AM

## 2020-08-11 NOTE — PROGRESS NOTES
[facility]  Internal Medicine Residency / [address]    Attending Physician Statement  I have discussed the case, including pertinent history and exam findings with the resident and the team.  I have seen and examined the patient and the key elements of the encounter have been performed by me. I agree with the assessment, plan and orders as documented by the resident. Case Discussed During AM Rounds    DVT prophylaxis on hold    H/H reduced again today    Monitor for bleeding- may need assessment at this time    Ortho/ID following   PT/OT pending at this time    Atbs continued per ID    Denies any melena, hematochezia or additional findings during assessment; hx of hematoma removal by Ortho previously    Overall is feeling improved compared to admission     Anemia- H/H reduced this am- hemodynamically stable     Transfuse 1 unit pRBCs today    H/H monitoring   States remotely may have had melanotic stools- denies currently (4 weeks prior)   Recent CSCOPE and EGD per patient by Dr. Javier Gomez- states hx of \"stomach inflammation\" and polyp removal    Consider repeat endoscopy   Monitor to ensure not repeat findings of potential joint hematoma- given previous evacuation    Increase PPI to BID     Type II DM- insulin requiring    Still PO intake- but improving    Glucose improved   Continue insulin regimen     Reactive Thrombocytosis     Disposition- social work assessment for DC planning needs [email or web address] [employer] of medical problems as per resident note.       Kelli Luong  Internal Medicine Residency Faculty

## 2020-08-11 NOTE — CARE COORDINATION
[facility] can accept patient. Will start precert when medically stable. Hgb 6.5 today, getting 1 unit blood. Ortho doing dressing changes. IV Dapto changed to IV Ampicillin qid. Cultures pending. Covid pending from 8/10. Ambulance form on soft chart. CM will continue to follow.   Amaury Goncalves, RN  [facility] Case Management  [phone]

## 2020-08-11 NOTE — PROGRESS NOTES
Department of Orthopedic Surgery  Resident Progress Note    Patient seen and examined. Pain controlled. No new complaints. Denies chest pain, shortness of breath, calf pain, dizziness/lightheadedness. Patient feels his knee pain is still mildly improved but has difficulty with motion. VITALS:  /70   Pulse 86   Temp 98.4 °F (36.9 °C) (Temporal)   Resp 18   Ht 5' 11\" (1.803 m)   Wt 208 lb 3.2 oz (94.4 kg)   SpO2 96%   BMI 29.04 kg/m²     GENERAL: alert and awake  MUSCULOSKELETAL:   bilateral lower extremity:  · Dressing C/D/I  · Compartments soft and compressible, calf non-tender  · Palpable dorsalis pedis and posterior tibialis pulse, brisk cap refill to toes, foot warm and perfused  · Sensation intact to light touch in sural/deep peroneal/superficial peroneal/saphenous/posterior tibial nerve distributions to foot/ankle. · Demonstrates active ankle plantar/dorsiflexion/great toe extension  · Drains in place. CBC:   Lab Results   Component Value Date    WBC 9.4 08/11/2020    HGB 6.5 08/11/2020    HCT 20.3 08/11/2020     08/11/2020       ASSESSMENT  · S/p 3rd  I&D bilateral knee septic arthritis 8/9    PLAN    · WBAT BLE  · Pain control PO and IV- wean to orals  · DVT prophy per primary  · Continue abx treatment per primary/ ID  · Monitor vitals  · Trend labs  · Await surgical cultures- currently no growth  · Pt/ot- work to ambulate patient as able- had discussion with patient about working on motion.    · Drains removed on 8/10 afternoon  · Discuss with Dr. Julianne Lanes

## 2020-08-11 NOTE — PROGRESS NOTES
[facility]  Internal Medicine Residency Program  Progress Note - House Team 2    Patient:  Ivette hCan 63 y.o. male MRN: [id number]     Date of Service: 8/11/2020     CC: hypotension  Overnight events: hemoglobin dropped to 6.5 again, transfused another unit of packed red blood cells    Subjective     Mr. Luis Ceron was much better subjectively today. He was talking a lot and seemed more awake and in less pain. He said his knees felt better the less abducted they were so I adjusted pillows to his comfort. He still endorses pain with pressure from the hospital blanket when it rests on his knees. He denied chest pain, SOB, abdominal pain, fever, chills. He said his appetite is still low but he is trying to eat. He also mentioned that he needs a CPAP at home so we will follow up to ensure he gets the treatment here. Objective     Physical Exam:  · Vitals: /65   Pulse 71   Temp 97.9 °F (36.6 °C) (Temporal)   Resp (!) 6   Ht 5' 11\" (1.803 m)   Wt 208 lb 3.2 oz (94.4 kg)   SpO2 96%   BMI 29.04 kg/m²     · I & O - 24hr: I/O this shift:  · In: -   · Out: 850 [Urine:850]   · General Appearance: alert, appears stated age and cooperative  · HEENT:  Head: Normocephalic, no lesions, without obvious abnormality. · Neck: no adenopathy, no JVD, supple, symmetrical, trachea midline and thyroid not enlarged, symmetric, no tenderness/mass/nodules  · Lung: clear to auscultation bilaterally  · Heart: regular rate and rhythm, S1, S2 normal, no murmur, click, rub or gallop  · Abdomen: soft, non-tender; bowel sounds normal; no masses,  no organomegaly  · Extremities:  upper extremities normal, atraumatic, no cyanosis or edema and BL knees wraped in bandages with some fluid leakage on the right knee, Hemovac drains removed 8/10  · Musculokeletal: Moderate pain with passive ROM in both knees. Knees currently bandaged. Pain improved from yesterday.    · Neurologic: Mental status: Alert, oriented, thought content appropriate  Subject  Pertinent Labs & Imaging Studies   malcolm  CBC with Differential:    Lab Results   Component Value Date    WBC 9.4 08/11/2020    RBC 2.23 08/11/2020    HGB 6.5 08/11/2020    HCT 20.3 08/11/2020     08/11/2020    MCV 91.0 08/11/2020    MCH 29.1 08/11/2020    MCHC 32.0 08/11/2020    RDW 12.6 08/11/2020    SEGSPCT 55 02/06/2014    METASPCT 0.9 07/27/2020    LYMPHOPCT 50.4 08/11/2020    MONOPCT 3.5 08/11/2020    MYELOPCT 0.9 07/27/2020    BASOPCT 1.0 08/11/2020    MONOSABS 0.38 08/11/2020    LYMPHSABS 4.70 08/11/2020    EOSABS 0.24 08/11/2020    BASOSABS 0.00 08/11/2020     BMP:    Lab Results   Component Value Date     08/11/2020    K 4.1 08/11/2020    K 3.6 08/08/2020    CL 97 08/11/2020    CO2 27 08/11/2020    BUN 19 08/11/2020    LABALBU 2.4 08/08/2020    CREATININE 1.0 08/11/2020    CALCIUM 8.6 08/11/2020    GFRAA >60 08/11/2020    LABGLOM >60 08/11/2020    GLUCOSE 124 08/11/2020       Student's Assessment and Plan     Francis Bonilla is a 63 y.o. male with a PMHx of type 2 DM, peripheral neuropathy, osteoarthritis, lumbar stenosis, HLD, S. agalactiae BL knee septic arthritis, and bipolar disorder.      He was recently hospitalized from 7/18-7/31 with the bilateral knee septic arthritis mentioned above. While hospitalized he had an arthroscopic washout on 7/23 and open I&D on 7/27 that showed hematomas in BL knees.      He presented to the ED 8/7 because he was found to be hypotensive at a follow up orthopedic appointment with a BP 79/56. Repeat I&D on 8/9 showed hematomas in both knees.      1. Sepsis 2/2 BL knee septic arthritis vs less likely pneumonia   Repeat CXR showed no acute airway disease. No organisms seen on gram stain of BL knees. Currently normotensive at 135/75.  Sed rate 138, CRP 26.1, lactic acid trending down 0.6 > 0.5, no longer trending. Hemovac drains removed 8/10. Patient now has more ROM in his knees but still c/o pain on PROM.    · WBC trending up 8.5 > 9.4  · HgB trending down 8.0 > 6.5  ? Transfused 1 unit this morning  ? Second transfusion in 3 days  · Ampicillin 2g Q6H until 09/06 per ID   · Pain management per Ortho  ? Tylenol 650 mg Q6H PRN  ? Oxycodone 5 mg Q4H PRN   ? Oxycodone 10 mg Q12H PRN  ? Robaxin 1000 mg Q4H PRN- muscle spasms  · Tissue gram stain and cultures initially showed no growth   · Surgical cultures currently show no growth     2. REIC 2/2 hypotension due to sepsis vs diabetic nephropathy - resolved ? · BUN trending up 12 > 18 > 19  · Cr trending up 0.8 > 1.0     3. Normocytic Normochromic Anemia 2/2 acute bleed vs decreased erythropoiesis vs hemarthrosis   Has a history of gastritis and had 3-4 polyps removed during colonoscopy. Also had a recent EGD done. HgB 6.5 on 8/8, transfused 1 unit pRBCs. HgB 6.5 on 8/11, transfused 1 unit pRBCs. · HgB trending down 8.0 > 6.5  · Follow H&H Q8H  · Increase PPI to twice a day due to pertinent GI hx     4. Thrombocytosis 2/2 septic arthritis vs infection/ inflammation   · Platelets trended down but elevating steadily after open I&D  ? 656 > 776 > 655  · Continue abx per ID recommendations above   · Follow daily CBC      5. Ho Type 2 DM  · A1c 10.1 on 7/22  · Blood glucose 124 this morning  · Lantus 15 units nightly  · Humalog 0-6 units Q4H  · Continue to monitor blood glucose      6. Ho HLD  · Atorvastatin 20 mg daily  · Cholesterol panel WNL     7. Ho Bipolar Disorder- mood is stable, oriented x3  · Treatment with Effexor, Lamictal      8. Ho Osteoarthritis of the Lumbar Spine   Chronic norco for this issue made pain management difficult during last admission, though he has improved mobility in his knees.     Other concerns: patient was using CPAP at home but did not remember his settings, said he will call his daughter and ask so we can put the order in for him to have it overnight     PT/OT evaluation: AM-PAC 8/24  DVT prophylaxis/ GI prophylaxis: lovenox / protonix  Disposition: continue current level of care    Briseida Ronny, [id number] [facility]  Attending physician: Dr. Rigo Aguilar

## 2020-08-11 NOTE — PROGRESS NOTES
[facility]  Internal Medicine Residency Program  Progress Note - House Team 2    Patient:  Francis Bonilla 63 y.o. male MRN: [id number]     Date of Service: 8/11/2020     CC: hypotension  Overnight events: hemoglobin dropped to 6.5 again, transfused another unit of packed red blood cells, hold LEVONOX    Subjective     Patient was seen this morning, His condition has improved. He was talking a lot and seemed more awake and in less pain. He said his knees felt better the less abducted they were so I adjusted pillows to his comfort. He still endorses pain with pressure from the hospital blanket when it rests on his knees. He denied chest pain, SOB, abdominal pain, fever, chills. He said his appetite is still low but he is trying to eat. He also mentioned that he needs a CPAP at home so we will follow up to ensure he gets the treatment here. Objective     Physical Exam:  · Vitals: /65   Pulse 71   Temp 97.9 °F (36.6 °C) (Temporal)   Resp (!) 6   Ht 5' 11\" (1.803 m)   Wt 208 lb 3.2 oz (94.4 kg)   SpO2 96%   BMI 29.04 kg/m²     · I & O - 24hr: No intake/output data recorded. · General Appearance: alert, appears stated age and cooperative  · HEENT:  Head: Normocephalic, no lesions, without obvious abnormality. · Neck: no adenopathy, no JVD, supple, symmetrical, trachea midline and thyroid not enlarged, symmetric, no tenderness/mass/nodules  · Lung: clear to auscultation bilaterally  · Heart: regular rate and rhythm, S1, S2 normal, no murmur, click, rub or gallop  · Abdomen: soft, non-tender; bowel sounds normal; no masses,  no organomegaly  · Extremities:  upper extremities normal, atraumatic, no cyanosis or edema and BL knees wraped in bandages with some fluid leakage on the right knee, Hemovac drains removed 8/10  · Musculokeletal: Moderate pain with passive ROM in both knees. Knees currently bandaged. Pain improved from yesterday.    · Neurologic: Mental status: Alert, oriented, thought content appropriate  Subject  Pertinent Labs & Imaging Studies   malcolm  CBC with Differential:    Lab Results   Component Value Date    WBC 9.4 08/11/2020    RBC 2.23 08/11/2020    HGB 6.5 08/11/2020    HCT 20.3 08/11/2020     08/11/2020    MCV 91.0 08/11/2020    MCH 29.1 08/11/2020    MCHC 32.0 08/11/2020    RDW 12.6 08/11/2020    SEGSPCT 55 02/06/2014    METASPCT 0.9 07/27/2020    LYMPHOPCT 50.4 08/11/2020    MONOPCT 3.5 08/11/2020    MYELOPCT 0.9 07/27/2020    BASOPCT 1.0 08/11/2020    MONOSABS 0.38 08/11/2020    LYMPHSABS 4.70 08/11/2020    EOSABS 0.24 08/11/2020    BASOSABS 0.00 08/11/2020     BMP:    Lab Results   Component Value Date     08/11/2020    K 4.1 08/11/2020    K 3.6 08/08/2020    CL 97 08/11/2020    CO2 27 08/11/2020    BUN 19 08/11/2020    LABALBU 2.4 08/08/2020    CREATININE 1.0 08/11/2020    CALCIUM 8.6 08/11/2020    GFRAA >60 08/11/2020    LABGLOM >60 08/11/2020    GLUCOSE 124 08/11/2020       Student's Assessment and Plan     Wero Haynes is a 63 y.o. male with a PMHx of type 2 DM, peripheral neuropathy, osteoarthritis, lumbar stenosis, HLD, S. agalactiae BL knee septic arthritis, and bipolar disorder.      He was recently hospitalized from 7/18-7/31 with the bilateral knee septic arthritis mentioned above. While hospitalized he had an arthroscopic washout on 7/23 and open I&D on 7/27 that showed hematomas in BL knees.      He presented to the ED 8/7 because he was found to be hypotensive at a follow up orthopedic appointment with a BP 79/56. Repeat I&D on 8/9 showed hematomas in both knees.      Sepsis 2/2 knee septic arthritis   Repeat CXR showed no acute airway disease. No organisms seen on gram stain of BL knees. Currently normotensive at 135/75.  Sed rate 138, CRP 26.1, lactic acid trending down 0.6 > 0.5, no longer trending. Hemovac drains removed 8/10. Patient now has more ROM in his knees but still c/o pain on PROM.    WBC trending up 8.5 > 9.4  HgB trending down 8.0 > 6.5  Transfused 1 unit this morning  ASA BID per Ortho for DVT prophylaxis   DC daptomycin and start on Ampicillin 2g Q6H until 09/06 per ID   Pain management per Ortho  Tylenol 650 mg, Oxycodone 5 mg, Oxycodone 10 mg, Robaxin 1000 mg  Tissue gram stain and cultures initially showed no growth   Surgical cultures currently show no growth     ERIC 2/2 hypotension   BUN trending up 12 > 18 > 19  Cr trending up 0.8 > 1.0     Normocytic Normochromic Anemia   Has a history of gastritis and had 3-4 polyps removed during colonoscopy. Also had a recent EGD done. HgB 6.5 on 8/8, transfused 1 unit pRBCs. HgB 6.5 on 8/11, transfused 1 unit pRBCs. HgB trending down 8.0 > 6.5  Follow H&H Q8H  Increase PPI to twice a day due to pertinent GI hx     Thrombocytosis 2/2 septic arthritis vs infection/ inflammation   Platelets trended down but elevating steadily after open I&D  656 > 776 > 655  Continue abx per ID recommendations above   Follow daily CBC      PMH of Type 2 DM  A1c 10.1 on 7/22  Blood glucose 124 this morning  Lantus 15 units nightly  Humalog 0-6 units Q4H  Continue to monitor blood glucose      PMH of HLD  Atorvastatin 20 mg daily  Cholesterol panel WNL     Ho Bipolar Disorder  Mood is stable, moriented x3  Treatment with Effexor, Lamictal      PMH Osteoarthritis of the Lumbar Spine   Chronic norco for this issue made pain management difficult during last admission, though he has improved mobility in his knees.         PT/OT evaluation: AM-PAC 8/24  DVT prophylaxis/ GI prophylaxis: lovenox (HOLD)/ protonix  Disposition: continue current level of care    Dameon Simons MD, PGY1  Attending physician: Dr. Rubio Lau

## 2020-08-12 LAB
ANION GAP SERPL CALCULATED.3IONS-SCNC: 10 MMOL/L (ref 7–16)
ANISOCYTOSIS: ABNORMAL
BASOPHILS ABSOLUTE: 0.08 E9/L (ref 0–0.2)
BASOPHILS RELATIVE PERCENT: 0.9 % (ref 0–2)
BLOOD CULTURE, ROUTINE: NORMAL
BUN BLDV-MCNC: 13 MG/DL (ref 6–20)
BURR CELLS: ABNORMAL
CALCIUM SERPL-MCNC: 9 MG/DL (ref 8.6–10.2)
CHLORIDE BLD-SCNC: 97 MMOL/L (ref 98–107)
CO2: 28 MMOL/L (ref 22–29)
CREAT SERPL-MCNC: 0.8 MG/DL (ref 0.7–1.2)
CULTURE SURGICAL: NORMAL
CULTURE, BLOOD 2: NORMAL
EOSINOPHILS ABSOLUTE: 0.23 E9/L (ref 0.05–0.5)
EOSINOPHILS RELATIVE PERCENT: 2.6 % (ref 0–6)
GFR AFRICAN AMERICAN: >60
GFR NON-AFRICAN AMERICAN: >60 ML/MIN/1.73
GLUCOSE BLD-MCNC: 107 MG/DL (ref 74–99)
HCT VFR BLD CALC: 26.7 % (ref 37–54)
HCT VFR BLD CALC: 27.1 % (ref 37–54)
HCT VFR BLD CALC: 27.5 % (ref 37–54)
HEMOGLOBIN: 8.6 G/DL (ref 12.5–16.5)
HEMOGLOBIN: 8.8 G/DL (ref 12.5–16.5)
HEMOGLOBIN: 8.9 G/DL (ref 12.5–16.5)
LYMPHOCYTES ABSOLUTE: 2.87 E9/L (ref 1.5–4)
LYMPHOCYTES RELATIVE PERCENT: 33 % (ref 20–42)
MCH RBC QN AUTO: 28.9 PG (ref 26–35)
MCHC RBC AUTO-ENTMCNC: 32 % (ref 32–34.5)
MCV RBC AUTO: 90.5 FL (ref 80–99.9)
METER GLUCOSE: 108 MG/DL (ref 74–99)
METER GLUCOSE: 159 MG/DL (ref 74–99)
METER GLUCOSE: 182 MG/DL (ref 74–99)
METER GLUCOSE: 189 MG/DL (ref 74–99)
MONOCYTES ABSOLUTE: 0.26 E9/L (ref 0.1–0.95)
MONOCYTES RELATIVE PERCENT: 2.6 % (ref 2–12)
NEUTROPHILS ABSOLUTE: 5.31 E9/L (ref 1.8–7.3)
NEUTROPHILS RELATIVE PERCENT: 60.9 % (ref 43–80)
NUCLEATED RED BLOOD CELLS: 0.9 /100 WBC
PDW BLD-RTO: 12.9 FL (ref 11.5–15)
PLATELET # BLD: 735 E9/L (ref 130–450)
PMV BLD AUTO: 8.6 FL (ref 7–12)
POIKILOCYTES: ABNORMAL
POLYCHROMASIA: ABNORMAL
POTASSIUM SERPL-SCNC: 4 MMOL/L (ref 3.5–5)
RBC # BLD: 3.04 E12/L (ref 3.8–5.8)
SARS-COV-2: NOT DETECTED
SODIUM BLD-SCNC: 135 MMOL/L (ref 132–146)
SOURCE: NORMAL
WBC # BLD: 8.7 E9/L (ref 4.5–11.5)

## 2020-08-12 PROCEDURE — 97535 SELF CARE MNGMENT TRAINING: CPT

## 2020-08-12 PROCEDURE — 6360000002 HC RX W HCPCS: Performed by: INTERNAL MEDICINE

## 2020-08-12 PROCEDURE — 85025 COMPLETE CBC W/AUTO DIFF WBC: CPT

## 2020-08-12 PROCEDURE — 2580000003 HC RX 258: Performed by: INTERNAL MEDICINE

## 2020-08-12 PROCEDURE — 2060000000 HC ICU INTERMEDIATE R&B

## 2020-08-12 PROCEDURE — 36415 COLL VENOUS BLD VENIPUNCTURE: CPT

## 2020-08-12 PROCEDURE — 80048 BASIC METABOLIC PNL TOTAL CA: CPT

## 2020-08-12 PROCEDURE — 97530 THERAPEUTIC ACTIVITIES: CPT | Performed by: PHYSICAL THERAPIST

## 2020-08-12 PROCEDURE — 2580000003 HC RX 258: Performed by: STUDENT IN AN ORGANIZED HEALTH CARE EDUCATION/TRAINING PROGRAM

## 2020-08-12 PROCEDURE — 6370000000 HC RX 637 (ALT 250 FOR IP): Performed by: INTERNAL MEDICINE

## 2020-08-12 PROCEDURE — 85018 HEMOGLOBIN: CPT

## 2020-08-12 PROCEDURE — 6360000002 HC RX W HCPCS: Performed by: STUDENT IN AN ORGANIZED HEALTH CARE EDUCATION/TRAINING PROGRAM

## 2020-08-12 PROCEDURE — 6370000000 HC RX 637 (ALT 250 FOR IP): Performed by: STUDENT IN AN ORGANIZED HEALTH CARE EDUCATION/TRAINING PROGRAM

## 2020-08-12 PROCEDURE — 94660 CPAP INITIATION&MGMT: CPT

## 2020-08-12 PROCEDURE — 82962 GLUCOSE BLOOD TEST: CPT

## 2020-08-12 PROCEDURE — 97166 OT EVAL MOD COMPLEX 45 MIN: CPT

## 2020-08-12 PROCEDURE — 85014 HEMATOCRIT: CPT

## 2020-08-12 PROCEDURE — 99231 SBSQ HOSP IP/OBS SF/LOW 25: CPT | Performed by: INTERNAL MEDICINE

## 2020-08-12 RX ORDER — SODIUM CHLORIDE 0.9 % (FLUSH) 0.9 %
10 SYRINGE (ML) INJECTION PRN
Status: DISCONTINUED | OUTPATIENT
Start: 2020-08-12 | End: 2020-08-14 | Stop reason: HOSPADM

## 2020-08-12 RX ORDER — HEPARIN SODIUM (PORCINE) LOCK FLUSH IV SOLN 100 UNIT/ML 100 UNIT/ML
1 SOLUTION INTRAVENOUS PRN
Status: DISCONTINUED | OUTPATIENT
Start: 2020-08-12 | End: 2020-08-14 | Stop reason: HOSPADM

## 2020-08-12 RX ORDER — PANTOPRAZOLE SODIUM 40 MG/1
40 TABLET, DELAYED RELEASE ORAL
Status: DISCONTINUED | OUTPATIENT
Start: 2020-08-13 | End: 2020-08-14 | Stop reason: HOSPADM

## 2020-08-12 RX ORDER — HEPARIN SODIUM (PORCINE) LOCK FLUSH IV SOLN 100 UNIT/ML 100 UNIT/ML
1 SOLUTION INTRAVENOUS EVERY 12 HOURS SCHEDULED
Status: DISCONTINUED | OUTPATIENT
Start: 2020-08-12 | End: 2020-08-14 | Stop reason: HOSPADM

## 2020-08-12 RX ORDER — MULTIVITAMIN WITH FOLIC ACID 400 MCG
1 TABLET ORAL DAILY
Status: DISCONTINUED | OUTPATIENT
Start: 2020-08-12 | End: 2020-08-14 | Stop reason: HOSPADM

## 2020-08-12 RX ADMIN — INSULIN GLARGINE 15 UNITS: 100 INJECTION, SOLUTION SUBCUTANEOUS at 20:53

## 2020-08-12 RX ADMIN — SODIUM CHLORIDE, PRESERVATIVE FREE 10 ML: 5 INJECTION INTRAVENOUS at 14:44

## 2020-08-12 RX ADMIN — OXYCODONE HYDROCHLORIDE 10 MG: 10 TABLET, FILM COATED, EXTENDED RELEASE ORAL at 20:46

## 2020-08-12 RX ADMIN — MORPHINE SULFATE 4 MG: 4 INJECTION, SOLUTION INTRAMUSCULAR; INTRAVENOUS at 19:18

## 2020-08-12 RX ADMIN — MULTIVITAMIN TABLET 1 TABLET: TABLET at 17:54

## 2020-08-12 RX ADMIN — LAMOTRIGINE 25 MG: 25 TABLET ORAL at 08:04

## 2020-08-12 RX ADMIN — OXYCODONE HYDROCHLORIDE 10 MG: 10 TABLET, FILM COATED, EXTENDED RELEASE ORAL at 08:04

## 2020-08-12 RX ADMIN — VENLAFAXINE HYDROCHLORIDE 37.5 MG: 75 TABLET ORAL at 08:03

## 2020-08-12 RX ADMIN — OXYCODONE 5 MG: 5 TABLET ORAL at 16:34

## 2020-08-12 RX ADMIN — METHOCARBAMOL TABLETS 1000 MG: 500 TABLET, COATED ORAL at 18:00

## 2020-08-12 RX ADMIN — INSULIN LISPRO 1 UNITS: 100 INJECTION, SOLUTION INTRAVENOUS; SUBCUTANEOUS at 20:52

## 2020-08-12 RX ADMIN — ASPIRIN 325 MG: 325 TABLET, COATED ORAL at 20:46

## 2020-08-12 RX ADMIN — ATORVASTATIN CALCIUM 20 MG: 20 TABLET, FILM COATED ORAL at 08:03

## 2020-08-12 RX ADMIN — MORPHINE SULFATE 4 MG: 4 INJECTION, SOLUTION INTRAMUSCULAR; INTRAVENOUS at 00:39

## 2020-08-12 RX ADMIN — INSULIN LISPRO 1 UNITS: 100 INJECTION, SOLUTION INTRAVENOUS; SUBCUTANEOUS at 11:22

## 2020-08-12 RX ADMIN — AMPICILLIN SODIUM 2 G: 2 INJECTION, POWDER, FOR SOLUTION INTRAMUSCULAR; INTRAVENOUS at 17:54

## 2020-08-12 RX ADMIN — INSULIN LISPRO 1 UNITS: 100 INJECTION, SOLUTION INTRAVENOUS; SUBCUTANEOUS at 16:59

## 2020-08-12 RX ADMIN — SODIUM CHLORIDE, PRESERVATIVE FREE 10 ML: 5 INJECTION INTRAVENOUS at 19:18

## 2020-08-12 RX ADMIN — Medication 10 ML: at 08:04

## 2020-08-12 RX ADMIN — Medication 100 UNITS: at 20:47

## 2020-08-12 RX ADMIN — Medication 10 ML: at 20:47

## 2020-08-12 RX ADMIN — MORPHINE SULFATE 4 MG: 4 INJECTION, SOLUTION INTRAMUSCULAR; INTRAVENOUS at 14:44

## 2020-08-12 RX ADMIN — OXYCODONE 5 MG: 5 TABLET ORAL at 23:48

## 2020-08-12 RX ADMIN — AMPICILLIN SODIUM 2 G: 2 INJECTION, POWDER, FOR SOLUTION INTRAMUSCULAR; INTRAVENOUS at 23:48

## 2020-08-12 RX ADMIN — MORPHINE SULFATE 4 MG: 4 INJECTION, SOLUTION INTRAMUSCULAR; INTRAVENOUS at 10:24

## 2020-08-12 RX ADMIN — AMPICILLIN SODIUM 2 G: 2 INJECTION, POWDER, FOR SOLUTION INTRAMUSCULAR; INTRAVENOUS at 05:24

## 2020-08-12 RX ADMIN — OXYCODONE 5 MG: 5 TABLET ORAL at 12:01

## 2020-08-12 RX ADMIN — STANDARDIZED SENNA CONCENTRATE 8.6 MG: 8.6 TABLET ORAL at 20:46

## 2020-08-12 RX ADMIN — SODIUM CHLORIDE, PRESERVATIVE FREE 10 ML: 5 INJECTION INTRAVENOUS at 10:24

## 2020-08-12 RX ADMIN — OXYCODONE 5 MG: 5 TABLET ORAL at 03:31

## 2020-08-12 RX ADMIN — MORPHINE SULFATE 4 MG: 4 INJECTION, SOLUTION INTRAMUSCULAR; INTRAVENOUS at 05:24

## 2020-08-12 RX ADMIN — AMPICILLIN SODIUM 2 G: 2 INJECTION, POWDER, FOR SOLUTION INTRAMUSCULAR; INTRAVENOUS at 13:33

## 2020-08-12 RX ADMIN — ASPIRIN 325 MG: 325 TABLET, COATED ORAL at 08:03

## 2020-08-12 RX ADMIN — PANTOPRAZOLE SODIUM 40 MG: 40 TABLET, DELAYED RELEASE ORAL at 05:24

## 2020-08-12 ASSESSMENT — PAIN SCALES - GENERAL
PAINLEVEL_OUTOF10: 9
PAINLEVEL_OUTOF10: 10
PAINLEVEL_OUTOF10: 9
PAINLEVEL_OUTOF10: 10
PAINLEVEL_OUTOF10: 9
PAINLEVEL_OUTOF10: 10
PAINLEVEL_OUTOF10: 9
PAINLEVEL_OUTOF10: 9
PAINLEVEL_OUTOF10: 10
PAINLEVEL_OUTOF10: 10

## 2020-08-12 ASSESSMENT — PAIN DESCRIPTION - PAIN TYPE
TYPE: SURGICAL PAIN

## 2020-08-12 ASSESSMENT — PAIN DESCRIPTION - ORIENTATION
ORIENTATION: RIGHT;LEFT

## 2020-08-12 ASSESSMENT — PAIN DESCRIPTION - PROGRESSION
CLINICAL_PROGRESSION: GRADUALLY WORSENING
CLINICAL_PROGRESSION: GRADUALLY WORSENING

## 2020-08-12 ASSESSMENT — PAIN DESCRIPTION - ONSET
ONSET: ON-GOING

## 2020-08-12 ASSESSMENT — PAIN DESCRIPTION - DESCRIPTORS
DESCRIPTORS: DISCOMFORT;THROBBING;SHARP
DESCRIPTORS: DISCOMFORT;SHARP;THROBBING
DESCRIPTORS: ACHING;SORE
DESCRIPTORS: DISCOMFORT;CONSTANT;THROBBING
DESCRIPTORS: THROBBING;SORE
DESCRIPTORS: DISCOMFORT;THROBBING
DESCRIPTORS: SORE;THROBBING

## 2020-08-12 ASSESSMENT — PAIN DESCRIPTION - LOCATION
LOCATION: KNEE
LOCATION: FOOT;KNEE
LOCATION: KNEE;FOOT
LOCATION: KNEE
LOCATION: FOOT;KNEE
LOCATION: FOOT;KNEE
LOCATION: KNEE

## 2020-08-12 ASSESSMENT — PAIN DESCRIPTION - FREQUENCY
FREQUENCY: CONTINUOUS

## 2020-08-12 ASSESSMENT — PAIN - FUNCTIONAL ASSESSMENT
PAIN_FUNCTIONAL_ASSESSMENT: PREVENTS OR INTERFERES WITH MANY ACTIVE NOT PASSIVE ACTIVITIES
PAIN_FUNCTIONAL_ASSESSMENT: PREVENTS OR INTERFERES WITH MANY ACTIVE NOT PASSIVE ACTIVITIES

## 2020-08-12 NOTE — PROGRESS NOTES
[facility]  Internal Medicine Residency / [address]    Attending Physician Statement  I have discussed the case, including pertinent history and exam findings with the resident and the team.  I have seen and examined the patient and the key elements of the encounter have been performed by me. I agree with the assessment, plan and orders as documented by the resident. Case Discussed During AM Rounds    H/H stable    No signs of bleeding    Improved above 8 with only 1 unit pRBCs- possible initial error in reduction yesterday given significant improvement    Pain is unchanged    PT/OT continued    ID following for ATBs   Plan DC to [facility]- coordinate today as able      Anemia- H/H stable    S/p Transfusion on 8/11     H/H monitoring   States remotely may have had melanotic stools- denies currently (4 weeks prior)   Recent CSCOPE and EGD per patient by Dr. Nathanael Luo- states hx of \"stomach inflammation\" and polyp removal    Consider repeat endoscopy in future if further concerns    Monitor to ensure not repeat findings of potential joint hematoma- given previous evacuation- currently no findings    Reduce PPI back to daily given no signs of GI  Bleeding currently      Septic Arthritis    Continue IV atbs per ID until stop date   Access in place     Type II DM- insulin requiring    Still PO intake- but improving    Glucose improved   Continue insulin regimen     Reactive Thrombocytosis- stable      Disposition- social work assessment for DC planning needs likely to [employer] of medical problems as per resident note.       Warrick Dakin  Internal Medicine Residency Faculty

## 2020-08-12 NOTE — PROGRESS NOTES
Physical Therapy    Facility/Department: [address] PICU  Daily Treatment Note    NAME: Phuc Valdez  : 1965  MRN: [id number]    Date of Service: 2020    Referring Provider:  Ronan Dickson DO      Date of Service: 8/10/2020     Evaluating PT:  An Liu PT, DPT. [id number]     Room #:  4705/5531-R  Diagnosis:  Sepsis   Reason for admission:  Dizziness, hypotension, recent B knee washout , knee pain  Precautions:  Falls, WBAT BLE, B knee hemovac   Pertinent PMHx: DM, OA, HLD, neuropathy   Procedures:  B knee I&D  Equipment Recommendations:  Wheelchair, [employer]     SUBJECTIVE:  Pt admitted from SNF. States he has not started therapy there yet. Prior, lives with his kids in a 2 story home with 2+1 stair(s) to enter and 2 rail(s). 1st floor setup. Mostly bedridden since first procedure on knees d/t pain .     OBJECTIVE:    Initial Evaluation  Date: 8/10 Treatment  20  Short Term/ Long Term   Goals   AM-PAC 6 Clicks      Was pt agreeable to Eval/treatment? Yes   yes     Does pt have pain? 10/10 B knees   9/10 B LEs     Bed Mobility  Rolling: MaxA  Supine to sit: NT  Sit to supine: NT  Scooting: MaxA  -pt refused further d/t pain Rolling: Max A  Supine to sit: Mod A x2  Sit to supine: Max A x2  Scooting: Max A to EOB  Vicki   Transfers Sit to stand: NT  Stand to sit: NT  Stand pivot: NT  -pt refused d/t pain Sit to stand: Max A x2  Stand to sit:  Max A x2  Stand pivot: NT  Vicki   Ambulation   NT     NT >10 feet with [employer] ModA   Stair negotiation: ascended and descended  NT   TBD   ROM BUE:  See OT eval   BLE:  Limited by pain       Strength BUE:  See OT eval   BLE:  Unable to assess d/t pain   Increase by 1/3 MMT grade    Balance Sitting EOB:  NT  Dynamic Standing:  NT  Sitting EOB: SBA  Dynamic standing: NT Sitting EOB:  SBA  Dynamic Standing:  ModA      Pt is A & O x 4  Sensation:  Burning sensation of B LEs  Edema:  unremarkable    Therapeutic Exercises:    10x PROM B LE LAQ    Patient education  Pt educated on importance of continued mobility, positioning in bed, completion of bed mobility, completion of transfers    Patient response to education:   Pt verbalized understanding Pt demonstrated skill Pt requires further education in this area   yes With assist yes     ASSESSMENT:    Comments:  Pt resting semi-supine upon arrival, agreeable to PT session. Pt demonstrates very slow movements of B LEs but is able to assist with progression of each LE towards EOB with assist to clear heels. Pt demonstrates improved use of B UEs and core to complete trunk lift but does require additional external assistance to come to upright sitting position. Pt progressively completing increased flexion of each knee while sitting EOB as tolerated due to pain. Pt hesitant to attempt standing but agreeable to attempt once with assistance. Pt requires max cues for progression of UEs from EOB to walker and max cues for full hip and knee extension. Pt maintaining forward flexed posture with crouched stance with standing tolerance <30 seconds. He was unable to complete side stepping due to pain and poor standing balance. Pt returned to semi-supine upon completion of session with all needs in reach and B LEs positioned to improve B knee extension and decreased external rotation of B LEs.     Treatment:  Patient practiced and was instructed in the following treatment:     Bed mobility: cues for progression of each LE towards EOB, manual assist to complete transitions as noted above, max cues to avoid breath holding, cues for improved use of B UEs to assist with transitions   Transfer training: verbal/tactile cues for safe hand placement with modified technique (one UE on EOB, one on FWW), manual assist for lift/lower, max cues for knee and hip extension to achieve upright stance   Therapeutic activities: Sitting EOB x25 minutes with periods of no UE support vs single UE support, PROM of B knee flexion/extension to improve ROM and mobility. PLAN:    Patient is making continued progress towards established goals. Will continue with current POC.         PLAN:    Time in  0939  Time out  1020    Total Treatment Time  41    CPT codes:  [] Gait training 20957 0 minutes  [] Manual therapy 32221 0 minutes  [x] Therapeutic activities 96309 41 minutes  [] Therapeutic exercises 26591 0 minutes  [] Neuromuscular reeducation 24107 0 minutes      Nikunj Parr PT, DPT  [id number]

## 2020-08-12 NOTE — PROGRESS NOTES
Date: 8/11/2020    Time: 11:11 PM    Patient Placed On BIPAP/CPAP/ Non-Invasive Ventilation? Yes    If no must comment. Facial area red/color change? No           If YES are Blister/Lesion present? Yes and No   If yes must notify nursing staff  BIPAP/CPAP skin barrier?   No    Skin barrier type:nasal pillows       Comments:        Lidia Rojo

## 2020-08-12 NOTE — PROGRESS NOTES
Occupational Therapy  OCCUPATIONAL THERAPY INITIAL EVALUATION        Date:2020  Patient Name: Abelardo Ballard  MRN: [id number]  : 1965  Room: [address] [place] [address]    Referring Physician:  Marcelo Lim DO    Evaluating OT:  ROLLY Henson, OTR/L #[id number]      AM-PAC Daily Activity Raw Score:    Recommended Adaptive Equipment:  TBD as pt progresses -     Reason for Admission:  Pt was transferred from SNF w/ Dizziness, Hypotension    Diagnosis:  Acute on Chronic Anemia, ERIC, Thrombocytosis, Septicemia, PNA    Procedures this admission:  Bilateral knee arthrotomy with exploration, irrigation and debridement     Pertinent Medical History:  Chronic Pain, Depression, Neuropathy, Osteoarthritis, DM,  Septic Arthritis Satish Knees - Satish Knee Arthroscopy 20, Satish Knee I&D/Washout 20, I&D Satish Knees 20      Precautions:  Falls  WBAT Satish LE  General Diet  Wound Right 5th Toe    Home Living: Pt lives with his teen-aged children in a 2-story house. Bed/bath on the 1st floor. Laundry in basement. Bathroom setup:  Tub-Shower, Standard Commode   Equipment owned:  [employer], Standard Walker    Available Family Assist:  Sister lives locally - currently assisting to care for pt's children - unknown  Availability to provide assist to pt    Prior Level of Function:  Has been receiving assist w/ ADLs, IADLs, Transfers and Mobility using OR in July for ambulation.   W/C for mobility  Driving:  Yes - prior to July  Occupation:  Currently on disability for Chronic back pain    Pain Level:  10/10 Saitsh Knees;  Nsg Notified   Additional Complaints:  Anxiety, SOB, Nausea, sense of \"Blacking out\" required Gentle Reassurance, Pt ed/VCs for Pursed-lip breathing/Coping Skills/Relaxation Techs    Vitals/Lab Values:  WFL Room Air, Hgb 8.8    Cognition: A & O x 3 - generally oriented   Able to Follow Multi-Step Commands w/ Min-Mod VCs - decreased attn to task w/ increased anxiety/pain   Memory:  fair (+)   Sequencing:  fair Problem solving:  fair    Judgement/safety:  fair   Additional Comments:  Pt was pleasant and cooperative - required encouragement and much reassurance to participate in ax       Functional Assessment:   Initial Eval Status  Date: 8-12-20 Treatment Status  Date: Short Term/Long Term Goals  Treatment frequency: PRN 2-4 x/week  1-2 weeks   Feeding SUP/Set up    Able to feed self per pt report     Grooming SUP/Set up    Able to wash hands/comb hair/perform oral care after set up seated EOB - unable to ambulate to or stand at sink for ax d/t pain/weakness  (Hair matted posteriorly)  Mod A for safety  Standing At The Sink   UB Dressing Mod A/Set up    Simulated - seated EOB  (Pt sweating vs diaphoretic)    SUP/Set up   LB Dressing Dep    Max A to don socks seated EOB despite Pt ed/demo for adaptive techs  Max A of 2 for standing balance + Max  A for simulated adjustment of clothing over hips    Mod A   Bathing NT      Mod A   Toileting Dep    Simulated - Max A of 2 for standing balance  Max A for simulated bowel hygiene/clothing adjustment     Mod A   Bed Mobility  Rolling: Mod A  Repositioning: Mod A   Supine to Sit:  Mod A of 2    Sit to Supine:  Max A of 2     Pt ed for safe tech     Min A   Functional Transfers Sit to stand: Max A of 2  Stand to sit:   Max A of 2      From EOB  Mod VCs/Pt ed re: safety/hand placement/PLB/Coping Skills to decrease anxiety w/ ax    Max A   Functional Mobility Dep    Unable to take a single step despite Max A of 2 + use of [employer] d/t severity of pain, developed nausea/dizziness    Max A   Balance Sitting:      Static:  SUP EOB    Dynamic:  Close SUP w/ functional ax EOB    Standing:      Static:  Max A of 2 w/ [employer]    Dynamic:  Max A of 2 w/ [employer] w/ functional ax/mobility        Activity Tolerance Fair(-)  Limited by Severity of pain Satish Knees, Anxiety, Nausea, Dizziness    Tolerated Sitting:  EOB ~ 20 mins w/ functional ax     Tolerated Standing:  ~ 1-2 mins w/ functional ax Visual/  Perceptual WFL  Glasses:  None present      Hearing WFL  Hearing Aids  No       Hand dominance: Right    UE ROM: RUE:  WFL      LUE:  WFL    Strength: RUE: grossly 4+/5     LUE: grossly 4+/5     Strength:  WFL Satish UEs    Fine Motor Coordination:  WFL Satish UEs    Sensation:  Denies numbness or tingling Satish UEs  Tone:  WFL Satish UEs  Edema:  None Noted                            Upon arrival, pt was found in supine. He was agreeable to participate in therapeutic ax. No Family members present during session. Received permission from RN prior to engaging pt in OT services. Treatment:      Provided Skilled SUP/Assist w/ Pt safety, Proper Positioning, ADLs, Functional Transfers and Functional Mobility as noted above, as well as set up and clean up for session. Skilled monitoring of Vitals and pts response to treatment. Consulted RN, PT    -- Education:  Provided Extensive Pt/Family ed re: Purpose of OT services;  OT Plan of Care;  Techs for improved Safety/Safety Awareness w/ Functional Activity/Mobility; Walker safety w/ Functional Activity/Mobility;  Use of DME/AD/Adaptive equip/techs to improve safety/IND with Functional Ax - adaptive techs/equip for LB dressing;  Relaxation Techs/Coping Skills//Pursed-Lip Breathing for Anxiety; Positioning for Pain mgmt, Skin Integrity, Edema Control;  Recommendations for Continued Participation in OT services during hospitalization and at D/C      Pt and/or Family verbalized/demonstrated a Fair(+) understanding of education provided. Will Review PRN. At the end of the session, patient was properly positioned in Semi-Supine w/ LEs elevated. Call light and phone within reach, all lines and tubes intact. Oriented pt to call bell. Made all appropriate Environmental Modifications to facilitate pt's level of IND and safety. All needs met. Bed Alarm activated.            Assessment of current deficits   Functional mobility [x]  ADLs [x] Strength [x]  Cognition [x]  Functional transfers  [x] IADLs [x] Safety Awareness [x]  Endurance [x]  Fine Motor Coordination [] Balance [x] Vision/perception [] Sensation []   Gross Motor Coordination [] ROM [] Delirium []                  Motor Control []    Plan of Care:   ADL retraining [x]   Equipment needs [x]   Neuromuscular re-education [] Energy Conservation Techniques [x]  Functional Transfer training [x] Patient and/or Family Education [x]  Functional Mobility training [x]  Environmental Modifications [x]  Cognitive re-training [x]   Compensatory techniques for ADLs [x]  Splinting Needs []   Positioning to improve overall function [x]   Therapeutic Activity [x]   Therapeutic Exercise  [x]  Visual/Perceptual: []    Delirium prevention/treatment  []  Other:  [x]    Pt would benefit from continued skilled OT services to increase safety and independence with completion of ADL/IADL tasks for functional independence and quality of life. Pt/Family actively participated in the establishment of goals. Rehab Potential:  Fair for established goals    Patient / Family Goal:  Decrease LE pain, Participate in post-acute therapy     Patient and/or Family were instructed on Functional Diagnosis, Prognosis/Goals and OT Plan of Care. Demonstrated Fair(+) understanding. Evaluation Time includes thorough review of current medical information, gathering information on past medical history/social history and prior level of function, completion of standardized testing/informal observation of tasks, assessment of data and education on plan of care and goals.      Eval Complexity: Mod  Profile and History - Mod  Assessment of Occupational Performance and Identification of Deficits - Mod  Clinical Decision Making - Mod       Mod Evaluation + 31 timed treatment minutes    Time In:  0939              Time Out:  1020    Treatment Charges: Mins Units   Ther Ex  [id number]     Manual Therapy Donavan Viveros [id number]     ADL/Home Mgt [id number] 31 2   Neuro Re-ed [id number]     Group Therapy      Orthotic manage/training  [id number]     Non-Billable Time     Total Timed Treatment 31 [facility] [address], [address] [place], [place] #[id number]

## 2020-08-12 NOTE — PROGRESS NOTES
wiggle toes and move his ankle with moderate knee pain. · Musculokeletal: No joint swelling, no muscle tenderness. ROM normal in all joints of extremities. · Neurologic: Mental status: Alert, oriented, thought content appropriate  Subject  Pertinent Labs & Imaging Studies   malcolm  CBC with Differential:    Lab Results   Component Value Date    WBC 8.7 08/12/2020    RBC 3.04 08/12/2020    HGB 8.8 08/12/2020    HCT 27.5 08/12/2020     08/12/2020    MCV 90.5 08/12/2020    MCH 28.9 08/12/2020    MCHC 32.0 08/12/2020    RDW 12.9 08/12/2020    NRBC 0.9 08/12/2020    SEGSPCT 55 02/06/2014    METASPCT 0.9 07/27/2020    LYMPHOPCT 33.0 08/12/2020    MONOPCT 2.6 08/12/2020    MYELOPCT 0.9 07/27/2020    BASOPCT 0.9 08/12/2020    MONOSABS 0.26 08/12/2020    LYMPHSABS 2.87 08/12/2020    EOSABS 0.23 08/12/2020    BASOSABS 0.08 08/12/2020     BMP:    Lab Results   Component Value Date     08/12/2020    K 4.0 08/12/2020    K 3.6 08/08/2020    CL 97 08/12/2020    CO2 28 08/12/2020    BUN 13 08/12/2020    LABALBU 2.4 08/08/2020    CREATININE 0.8 08/12/2020    CALCIUM 9.0 08/12/2020    GFRAA >60 08/12/2020    LABGLOM >60 08/12/2020    GLUCOSE 107 08/12/2020       Student's Assessment and Plan     Abelardo Ballard is a 63 y.o. male with a PMHx of T2DM, peripheral neuropathy, osteoarthritis, lumbar stenosis, HLD, S. agalactiae BL knee septic arthritis, and bipolar disorder. He was recently hospitalized from 7/18-7/31 with the BL knee septic arthritis. While hospitalized he had an arthroscopic washout 7/23 and open I&D on 7/27 that showed hematomas in BL knees. He presented to the ED 8/7 because he was found to be hypotensive at a follow up orthopedic appointment with a BP of 76/56. Repeat I&D on 8/9 showed hematomas in both knees. 1. Sepsis 2/2 BL knee septic arthritis    Repeat CXR showed no acute airway disease. No organisms seen on gram stain of BL knees.  Currently hypertensive at 158/81.  Sed rate 138, CRP 26.1, lactic acid trending down 0.6 > 0.5, no longer trending. Hemovac drains removed 8/10. Patient now has more ROM in his knees but still c/o pain on PROM. · WBC stable at 8.7  · HgB trending up after transfusion 8/11 at 6.5 > 8.8  ? Believe 6.5 value to be a lab error due to HgB value over-correction   ? Restart Lovenox  ? Monitor H&H Q24H  · Ampicillin 2g Q6H until 09/06 per ID   ? F/u with ID to determine if patient needs a PICC line   · Pain management per Ortho  ? Tylenol 650 mg Q6H PRN  ? Oxycodone 5 mg Q4H PRN   ? Oxycodone 10 mg Q12H PRN  ? Robaxin 1000 mg Q4H PRN- muscle spasms  · Tissue gram stain and cultures show no growth   · Surgical cultures currently show no growth     2. ERIC 2/2 hypotension due to sepsis vs diabetic nephropathy - resolved ?   · BUN trending down 19 > 13  · Cr trending down 1.0 > 0.8     3. Normocytic Normochromic Anemia 2/2 acute bleed vs decreased erythropoiesis vs hemarthrosis   Has a history of gastritis and had 3-4 polyps removed during colonoscopy. Also had a recent EGD done. HgB 6.5 on 8/8, transfused 1 unit pRBCs. HgB 6.5 on 8/11, transfused 1 unit pRBCs (believed to be lab error as stated above).   · HgB up from 6.5 > 8.8  · Follow H&H Q24H  · Restart Lovenox   · Reduce PPI to once per day     4. Thrombocytosis 2/2 septic arthritis vs infection/ inflammation   · Platelets trended down but elevating steadily after open I&D  ? 776 > 655 > 735  · Continue abx per ID recommendations above   · Follow daily CBC      5. Ho Type 2 DM  · A1c 10.1 on 7/22  · Blood glucose 107 this morning  · Lantus 15 units nightly  · Humalog 0-6 units Q4H  · Continue to monitor  · Diet slightly improved today, still struggling with appetite       6. Ho HLD- Atorvastatin 20 mg daily     7. Ho Bipolar Disorder- mood is stable, oriented x3  · Treatment with Effexor, Lamictal      8. Ho Osteoarthritis of the Lumbar Spine   Chronic norco for this issue made pain management difficult during last admission, though he has improved mobility in his knees.     PT/OT evaluation: AM-PAC 11/24  DVT prophylaxis/ GI prophylaxis: lovenox / protonix  Disposition: continue current level of care    Rebeca Hernandez, MS3  Attending physician: Dr. Dale Rivers

## 2020-08-12 NOTE — PROGRESS NOTES
[facility] PROGRESS NOTE      Chief complaint: Follow-up of bilateral septic knee arthritis    The patient is a 63 y. o. male with history of DM, chronic low back pain with severe spinal stenosis, previously admitted from 07/21-07/31 with Streptococcus agalactiae (susceptible to penicillin) bilateral septic knee arthritis s/p wash-out on 07/23, bilateral knee arthrotomy with exploration and irrigation and debridement on 07/27, discharged on a 4-week course of ceftriaxone from 07/27-08/24, presented on 08/07 with hypotension and bilateral knee pain and swelling for which arthrocentesis showed 921,000 RBCs and 108,300 WBCs (predominantly neutrophilic at 06%) but with cultures showing no growth, prompting bilateral knee arthrotomy with exploration, irrigation and debridement on 08/09 during which dark gelatinous hematoma was noted. Tissue Gram stain and culture showed few polymorphonuclear leukocytes, no epithelial cells, no organisms, no growth to date. He received piperacillin-tazobactam and vancomycin on admission then switched to daptomycin on 08/08. Subjective: Patient was seen and examined. No chills, no abdominal pain, no diarrhea, no rash, no itching. He reports more pain on bilateral knees after doing some exercises during physical therapy. Objective:    Vitals:    08/12/20 0740   BP: (!) 158/81   Pulse: 80   Resp: 16   Temp: 96.9 °F (36.1 °C)   SpO2: 95%     Constitutional: Alert, not in distress  Respiratory: Clear breath sounds, no crackles, no wheezes  Cardiovascular: Regular rate and rhythm, no murmurs  Gastrointestinal: Bowel sounds present, soft, nontender  Skin: Warm and dry, no active dermatoses  Musculoskeletal: Bilateral lower extremities with dressing, Ace wrap in place    Labs, imaging, and medical records/notes were personally reviewed. Assessment:  Bilateral septic knee arthritis, s/p bilateral knee arthrotomy with exploration, irrigation and debridement on 08/09.  Synovial fluid and

## 2020-08-12 NOTE — CARE COORDINATION
Precert initiated today for [facility]. Covid pending from 8/10. Ambulance form on soft chart. CM will continue to follow. IV Ampicillin q 6 continues.  CM to follow  Madhu Velazquez, RN  [facility] Case Management  [phone]

## 2020-08-12 NOTE — PROGRESS NOTES
Department of Orthopedic Surgery  Resident Progress Note    Patient seen and examined. Pain controlled. No new complaints. Denies chest pain, shortness of breath, calf pain, dizziness/lightheadedness. Patient feels his knee pain is still mildly improved but has difficulty with motion. VITALS:  BP (!) 147/78   Pulse 81   Temp 97 °F (36.1 °C) (Temporal)   Resp 20   Ht 5' 11\" (1.803 m)   Wt 201 lb (91.2 kg)   SpO2 98%   BMI 28.03 kg/m²     GENERAL: alert and awake  MUSCULOSKELETAL:   bilateral lower extremity:  · Dressing C/D/I  · Compartments soft and compressible, calf non-tender  · Palpable dorsalis pedis and posterior tibialis pulse, brisk cap refill to toes, foot warm and perfused  · Sensation intact to light touch in sural/deep peroneal/superficial peroneal/saphenous/posterior tibial nerve distributions to foot/ankle. · Demonstrates active ankle plantar/dorsiflexion/great toe extension  · Drains in place. CBC:   Lab Results   Component Value Date    WBC 8.7 08/12/2020    HGB 8.8 08/12/2020    HCT 27.5 08/12/2020     08/12/2020       ASSESSMENT  · S/p 3rd  I&D bilateral knee septic arthritis 8/9    PLAN    · WBAT BLE  · Pain control PO and IV- wean to orals  · Recommend pain management consult  · DVT prophy per primary  · Continue abx treatment per primary/ ID  · Monitor vitals  · Trend labs  · Await surgical cultures- currently no growth  · Pt/ot- work to ambulate patient as able- had discussion with patient about working on motion. · Drains removed on 8/10 afternoon  · Ortho to follow peripherally at this time.    · Discuss with Dr. Waldron

## 2020-08-12 NOTE — ADT AUTH CERT
Sepsis and Other Febrile Illness, without Focal Infection - Care Day 6 (8/12/2020) by Trever Smith RN         Review Status  Review Entered    Completed  8/12/2020 15:12        Criteria Review       Care Day: 6 Care Date: 8/12/2020 Level of Care: Intermediate Care    Guideline Day 4    Clinical Status    (X) * Hemodynamic stability    (X) * Fever absent or acceptable for next level of care    (X) * Hypoxemia absent    (X) * Tachypnea absent    ( ) * Cultures negative or infection identified and under adequate treatment    (X) * Mental status at baseline    (X) * Metabolic derangement (eg, dehydration, acidosis) absent    (X) * End-organ dysfunction (eg, myocardial ischemia, renal failure) absent    ( ) * Discharge plans and education understood    Activity    ( ) * Ambulatory    Routes    (X) * Oral hydration, medications [K]    (X) Usual diet    Interventions    (X) WBC    Medications    ( ) * Antimicrobial treatment not necessary or treatment at next level of care arranged [E]    * Milestone    Additional Notes    8/12    day 6       VITALS:  BP (!) 147/78   Pulse 81   Temp 97 °F (36.1 °C) (Temporal)   Resp 20   Ht 5' 11\" (1.803 m)   Wt 201 lb (91.2 kg)   SpO2 98% RA       MEDS    Morphine 4mg ivp x4        pantoprazole, 40 mg, Oral, QAM AC    lidocaine, 5 mL, Intradermal, Once    heparin flush, 1 mL, Intravenous, 2 times per day    multivitamin, 1 tablet, Oral, Daily    ampicillin IV, 2 g, Intravenous, 4 times per day    senna, 1 tablet, Oral, Nightly    insulin lispro, 0-6 Units, Subcutaneous, TID WC    insulin lispro, 0-3 Units, Subcutaneous, Nightly    insulin glargine, 15 Units, Subcutaneous, Nightly    [Held by provider] enoxaparin, 40 mg, Subcutaneous, Daily    sodium chloride flush, 10 mL, Intravenous, 2 times per day    aspirin, 325 mg, Oral, BID    sodium chloride flush, 10 mL, Intravenous, 2 times per day    atorvastatin, 20 mg, Oral, Daily    lamoTRIgine, 25 mg, Oral, Daily    venlafaxine, 37.5 mg, Oral, Daily    oxyCODONE, 10 mg, Oral, 2 times per day       **Urology note-    ASSESSMENT    · S/p 3rd  I&D bilateral knee septic arthritis 8/9         PLAN      · WBAT BLE    · Pain control PO and IV- wean to orals    · Recommend pain management consult    · DVT prophy per primary    · Continue abx treatment per primary/ ID    · Monitor vitals    · Trend labs    · Await surgical cultures- currently no growth    · Pt/ot- work to ambulate patient as able- had discussion with patient about working on motion. · Drains removed on 8/10 afternoon       **Internal MD note-                H/H stable                No signs of bleeding                Improved above 8 with only 1 unit pRBCs- possible initial error in reduction yesterday given significant improvement                Pain is unchanged                PT/OT continued                ID following for ATBs                Plan DC to [facility]- coordinate today as able                    Anemia- H/H stable                S/p Transfusion on 8/11                  H/H monitoring                States remotely may have had melanotic stools- denies currently (4 weeks prior)                Recent CSCOPE and EGD per patient by Dr. Maxwell Collet- states hx of \"stomach inflammation\" and polyp removal                Consider repeat endoscopy in future if further concerns                Monitor to ensure not repeat findings of potential joint hematoma- given previous evacuation- currently no findings                Reduce PPI back to daily given no signs of GI  Bleeding currently           Septic Arthritis                Continue IV atbs per ID until stop date                Access in place         Type II DM- insulin requiring                Still PO intake- but improving                Glucose improved                Continue insulin regimen         Reactive Thrombocytosis- stable         Case management note    Precert initiated today for Guardian. Covid pending from 8/10. Ambulance form on soft chart. CM will continue to follow.  IV Ampicillin q 6 continues           Sepsis and Other Febrile Illness, without Focal Infection - Care Day 5 (8/11/2020) by Maggie Brar RN         Review Status  Review Entered    Completed  8/12/2020 15:05        Criteria Review       Care Day: 5 Care Date: 8/11/2020 Level of Care: Intermediate Care    Guideline Day 4    Clinical Status    (X) * Hemodynamic stability    (X) * Fever absent or acceptable for next level of care    (X) * Hypoxemia absent    (X) * Tachypnea absent    ( ) * Cultures negative or infection identified and under adequate treatment    (X) * Mental status at baseline    ( ) * Metabolic derangement (eg, dehydration, acidosis) absent    ( ) * End-organ dysfunction (eg, myocardial ischemia, renal failure) absent    ( ) * Discharge plans and education understood    Activity    ( ) * Ambulatory    Routes    (X) * Oral hydration, medications [K]    (X) Usual diet    8/12/2020 3:05 PM EDT by Kristel Stanley      general    Interventions    (X) WBC    8/12/2020 3:05 PM EDT by Kristel Stanley      9.4    Medications    ( ) * Antimicrobial treatment not necessary or treatment at next level of care arranged [E]    * Milestone    Additional Notes    8/11    day 5       VITALS:  /70   Pulse 86   Temp 98.4 °F (36.9 °C) (Temporal)   Resp 18   Ht 5' 11\" (1.803 m)   Wt 208 lb 3.2 oz (94.4 kg)   SpO2 96% RA       MEDS-    Ampicillin 2gm ivpb qid, asa 325mg po bid, lipitor 20mg po qd, lantus 15 units qhs, humalog ssi, lamictal 25mg po qd, protonix 40mg po qd, oxycodone 10mg po bid, morphine 2mg ivp x1          LABS-    Hgb 6.5    Hct 20.3       **Ortho note**    ASSESSMENT    · S/p 3rd  I&D bilateral knee septic arthritis 8/9         PLAN      · WBAT BLE    · Pain control PO and IV- wean to orals    · DVT prophy per primary    · Continue abx treatment per primary/ ID    · Monitor vitals    · Trend labs    · Await surgical cultures- currently no growth    · Pt/ot- work to ambulate patient as able- had discussion with patient about working on motion. · Drains removed on 8/10 afternoon       **Internal MD note-            DVT prophylaxis on hold                H/H reduced again today                Monitor for bleeding- may need assessment at this time                Ortho/ID following                PT/OT pending at this time                Atbs continued per ID                Denies any melena, hematochezia or additional findings during assessment; hx of hematoma removal by Ortho previously                Overall is feeling improved compared to admission         Anemia- H/H reduced this am- hemodynamically stable                  Transfuse 1 unit pRBCs today                H/H monitoring                States remotely may have had melanotic stools- denies currently (4 weeks prior)                Recent CSCOPE and EGD per patient by Dr. Fulton Free- states hx of \"stomach inflammation\" and polyp removal                Consider repeat endoscopy                Monitor to ensure not repeat findings of potential joint hematoma- given previous evacuation                Increase PPI to BID         Type II DM- insulin requiring                Still PO intake- but improving                Glucose improved                Continue insulin regimen         Reactive Thrombocytosis         Disposition- social work assessment for DC planning needs likely to [facility]       **ID note**    Assessment:    Bilateral septic knee arthritis, s/p bilateral knee arthrotomy with exploration, irrigation and debridement on 08/09. Synovial fluid and tissue cultures showed no growth to date. Recommendations:    Change daptomycin to ampicillin 2g q6h to finish 4 weeks of antibiotic therapy from 08/09-09/06. Follow up blood and tissue cultures.

## 2020-08-12 NOTE — PROGRESS NOTES
status: Alert, oriented, thought content appropriate  Subject  Pertinent Labs & Imaging Studies   malcolm  CBC with Differential:    Lab Results   Component Value Date    WBC 8.7 08/12/2020    RBC 3.04 08/12/2020    HGB 8.8 08/12/2020    HCT 27.5 08/12/2020     08/12/2020    MCV 90.5 08/12/2020    MCH 28.9 08/12/2020    MCHC 32.0 08/12/2020    RDW 12.9 08/12/2020    NRBC 0.9 08/12/2020    SEGSPCT 55 02/06/2014    METASPCT 0.9 07/27/2020    LYMPHOPCT 33.0 08/12/2020    MONOPCT 2.6 08/12/2020    MYELOPCT 0.9 07/27/2020    BASOPCT 0.9 08/12/2020    MONOSABS 0.26 08/12/2020    LYMPHSABS 2.87 08/12/2020    EOSABS 0.23 08/12/2020    BASOSABS 0.08 08/12/2020     BMP:    Lab Results   Component Value Date     08/12/2020    K 4.0 08/12/2020    K 3.6 08/08/2020    CL 97 08/12/2020    CO2 28 08/12/2020    BUN 13 08/12/2020    LABALBU 2.4 08/08/2020    CREATININE 0.8 08/12/2020    CALCIUM 9.0 08/12/2020    GFRAA >60 08/12/2020    LABGLOM >60 08/12/2020    GLUCOSE 107 08/12/2020       Student's Assessment and Plan     Rosalinda Essex is a 63 y.o. male with a PMHx of type 2 DM, peripheral neuropathy, osteoarthritis, lumbar stenosis, HLD, S. agalactiae BL knee septic arthritis, and bipolar disorder. He was recently hospitalized from 7/18-7/31 with the bilateral knee septic arthritis mentioned above. While hospitalized he had an arthroscopic washout on 7/23 and open I&D on 7/27 that showed hematomas in BL knees. He presented to the ED 8/7 because he was found to be hypotensive at a follow up orthopedic appointment with a BP 79/56. Repeat I&D on 8/9 showed hematomas in both knees.        Sepsis 2/2 knee septic arthritis   Repeat CXR showed no acute airway disease. No organisms seen on gram stain of BL knees. Currently normotensive at 135/75.  Sed rate 138, CRP 26.1, lactic acid trending down 0.6 > 0.5, no longer trending. Hemovac drains removed 8/10. Patient now has more ROM in his knees but still c/o pain on PROM. WBC trending up 8.5 > 9.4  HgB trending down 8.0 > 6.5--> 8.9 after 1 unit blood   BID ASA for DVT prophylaxis per ORTHO- holding LMWH  DC daptomycin and start on Ampicillin 2g Q6H until 09/06 per ID   Medline ordered for Abx course   Pain management per Ortho  Tylenol 650 mg, Oxycodone 5 mg, Oxycodone 10 mg, Robaxin 1000 mg  Tissue gram stain and cultures initially showed no growth   Surgical cultures currently show no growth     ERIC 2/2 hypotension   BUN trending up 12 > 18 > 19. Today its 13  Cr trending up 0.8 > 1.0 today its . 8     Normocytic Normochromic Anemia   Has a history of gastritis and had 3-4 polyps removed during colonoscopy. Also had a recent EGD done. HgB 6.5 on 8/8, transfused 1 unit pRBCs. HgB 6.5 on 8/11, transfused 1 unit pRBCs. HgB trending down 8.0 > 6.5--> 8.9  Follow H&H Q8H  Increase PPI to twice a day due to pertinent GI hx     Thrombocytosis 2/2 septic arthritis vs infection/ inflammation   Platelets trended down but elevating steadily after open I&D  656 > 776 > 655--> 735  Continue abx per ID recommendations above   Follow daily CBC      PMH of Type 2 DM  A1c 10.1 on 7/22  Blood glucose 124 this morning  Lantus 15 units nightly  Humalog 0-6 units Q4H  Continue to monitor blood glucose      PMH of HLD  Atorvastatin 20 mg daily  Cholesterol panel WNL     Ho Bipolar Disorder  Mood is stable, moriented x3  Treatment with Effexor, Lamictal      PMH Osteoarthritis of the Lumbar Spine   Chronic norco for this issue made pain management difficult during last admission, though he has improved mobility in his knees.         PT/OT evaluation: AM-PAC 8/24  DVT prophylaxis/ GI prophylaxis: lovenox (HOLD)/ protonix  Disposition: continue current level of care    Melody Harrington MD, PGY1  Attending physician: Dr. Amy Clancy

## 2020-08-13 LAB
ANION GAP SERPL CALCULATED.3IONS-SCNC: 12 MMOL/L (ref 7–16)
BASOPHILS ABSOLUTE: 0.08 E9/L (ref 0–0.2)
BASOPHILS RELATIVE PERCENT: 1 % (ref 0–2)
BODY FLUID CULTURE, STERILE: NORMAL
BODY FLUID CULTURE, STERILE: NORMAL
BUN BLDV-MCNC: 11 MG/DL (ref 6–20)
CALCIUM SERPL-MCNC: 9.1 MG/DL (ref 8.6–10.2)
CHLORIDE BLD-SCNC: 99 MMOL/L (ref 98–107)
CO2: 26 MMOL/L (ref 22–29)
CREAT SERPL-MCNC: 0.8 MG/DL (ref 0.7–1.2)
EOSINOPHILS ABSOLUTE: 0.23 E9/L (ref 0.05–0.5)
EOSINOPHILS RELATIVE PERCENT: 2.9 % (ref 0–6)
GFR AFRICAN AMERICAN: >60
GFR NON-AFRICAN AMERICAN: >60 ML/MIN/1.73
GLUCOSE BLD-MCNC: 125 MG/DL (ref 74–99)
GRAM STAIN RESULT: NORMAL
GRAM STAIN RESULT: NORMAL
HCT VFR BLD CALC: 26.8 % (ref 37–54)
HCT VFR BLD CALC: 26.9 % (ref 37–54)
HEMOGLOBIN: 8.8 G/DL (ref 12.5–16.5)
HEMOGLOBIN: 8.9 G/DL (ref 12.5–16.5)
IMMATURE GRANULOCYTES #: 0.05 E9/L
IMMATURE GRANULOCYTES %: 0.6 % (ref 0–5)
LYMPHOCYTES ABSOLUTE: 2.81 E9/L (ref 1.5–4)
LYMPHOCYTES RELATIVE PERCENT: 35.7 % (ref 20–42)
MCH RBC QN AUTO: 29.2 PG (ref 26–35)
MCHC RBC AUTO-ENTMCNC: 32.8 % (ref 32–34.5)
MCV RBC AUTO: 89 FL (ref 80–99.9)
METER GLUCOSE: 124 MG/DL (ref 74–99)
METER GLUCOSE: 141 MG/DL (ref 74–99)
METER GLUCOSE: 143 MG/DL (ref 74–99)
METER GLUCOSE: 163 MG/DL (ref 74–99)
MONOCYTES ABSOLUTE: 0.94 E9/L (ref 0.1–0.95)
MONOCYTES RELATIVE PERCENT: 11.9 % (ref 2–12)
NEUTROPHILS ABSOLUTE: 3.76 E9/L (ref 1.8–7.3)
NEUTROPHILS RELATIVE PERCENT: 47.9 % (ref 43–80)
PDW BLD-RTO: 12.9 FL (ref 11.5–15)
PLATELET # BLD: 732 E9/L (ref 130–450)
PMV BLD AUTO: 8.4 FL (ref 7–12)
POTASSIUM SERPL-SCNC: 4 MMOL/L (ref 3.5–5)
RBC # BLD: 3.01 E12/L (ref 3.8–5.8)
SODIUM BLD-SCNC: 137 MMOL/L (ref 132–146)
WBC # BLD: 7.9 E9/L (ref 4.5–11.5)

## 2020-08-13 PROCEDURE — 85018 HEMOGLOBIN: CPT

## 2020-08-13 PROCEDURE — 6370000000 HC RX 637 (ALT 250 FOR IP): Performed by: INTERNAL MEDICINE

## 2020-08-13 PROCEDURE — 6370000000 HC RX 637 (ALT 250 FOR IP): Performed by: STUDENT IN AN ORGANIZED HEALTH CARE EDUCATION/TRAINING PROGRAM

## 2020-08-13 PROCEDURE — 94660 CPAP INITIATION&MGMT: CPT

## 2020-08-13 PROCEDURE — 6360000002 HC RX W HCPCS: Performed by: INTERNAL MEDICINE

## 2020-08-13 PROCEDURE — 85014 HEMATOCRIT: CPT

## 2020-08-13 PROCEDURE — 6360000002 HC RX W HCPCS: Performed by: STUDENT IN AN ORGANIZED HEALTH CARE EDUCATION/TRAINING PROGRAM

## 2020-08-13 PROCEDURE — 85025 COMPLETE CBC W/AUTO DIFF WBC: CPT

## 2020-08-13 PROCEDURE — 82962 GLUCOSE BLOOD TEST: CPT

## 2020-08-13 PROCEDURE — 80048 BASIC METABOLIC PNL TOTAL CA: CPT

## 2020-08-13 PROCEDURE — 2580000003 HC RX 258: Performed by: INTERNAL MEDICINE

## 2020-08-13 PROCEDURE — 2580000003 HC RX 258: Performed by: STUDENT IN AN ORGANIZED HEALTH CARE EDUCATION/TRAINING PROGRAM

## 2020-08-13 PROCEDURE — 36415 COLL VENOUS BLD VENIPUNCTURE: CPT

## 2020-08-13 PROCEDURE — 2060000000 HC ICU INTERMEDIATE R&B

## 2020-08-13 PROCEDURE — 99231 SBSQ HOSP IP/OBS SF/LOW 25: CPT | Performed by: INTERNAL MEDICINE

## 2020-08-13 RX ADMIN — PANTOPRAZOLE SODIUM 40 MG: 40 TABLET, DELAYED RELEASE ORAL at 06:00

## 2020-08-13 RX ADMIN — ASPIRIN 325 MG: 325 TABLET, COATED ORAL at 20:41

## 2020-08-13 RX ADMIN — Medication 100 UNITS: at 08:48

## 2020-08-13 RX ADMIN — INSULIN LISPRO 1 UNITS: 100 INJECTION, SOLUTION INTRAVENOUS; SUBCUTANEOUS at 17:18

## 2020-08-13 RX ADMIN — Medication 10 ML: at 20:42

## 2020-08-13 RX ADMIN — LAMOTRIGINE 25 MG: 25 TABLET ORAL at 08:47

## 2020-08-13 RX ADMIN — OXYCODONE HYDROCHLORIDE 10 MG: 10 TABLET, FILM COATED, EXTENDED RELEASE ORAL at 20:42

## 2020-08-13 RX ADMIN — INSULIN LISPRO 1 UNITS: 100 INJECTION, SOLUTION INTRAVENOUS; SUBCUTANEOUS at 11:24

## 2020-08-13 RX ADMIN — OXYCODONE HYDROCHLORIDE 10 MG: 10 TABLET, FILM COATED, EXTENDED RELEASE ORAL at 08:48

## 2020-08-13 RX ADMIN — METHOCARBAMOL TABLETS 1000 MG: 500 TABLET, COATED ORAL at 20:49

## 2020-08-13 RX ADMIN — OXYCODONE 5 MG: 5 TABLET ORAL at 17:20

## 2020-08-13 RX ADMIN — MULTIVITAMIN TABLET 1 TABLET: TABLET at 08:47

## 2020-08-13 RX ADMIN — ATORVASTATIN CALCIUM 20 MG: 20 TABLET, FILM COATED ORAL at 08:47

## 2020-08-13 RX ADMIN — Medication 10 ML: at 08:48

## 2020-08-13 RX ADMIN — MORPHINE SULFATE 4 MG: 4 INJECTION, SOLUTION INTRAMUSCULAR; INTRAVENOUS at 05:05

## 2020-08-13 RX ADMIN — VENLAFAXINE HYDROCHLORIDE 37.5 MG: 75 TABLET ORAL at 08:47

## 2020-08-13 RX ADMIN — INSULIN GLARGINE 15 UNITS: 100 INJECTION, SOLUTION SUBCUTANEOUS at 20:43

## 2020-08-13 RX ADMIN — OXYCODONE 5 MG: 5 TABLET ORAL at 11:22

## 2020-08-13 RX ADMIN — INSULIN LISPRO 1 UNITS: 100 INJECTION, SOLUTION INTRAVENOUS; SUBCUTANEOUS at 08:50

## 2020-08-13 RX ADMIN — AMPICILLIN SODIUM 2 G: 2 INJECTION, POWDER, FOR SOLUTION INTRAMUSCULAR; INTRAVENOUS at 06:00

## 2020-08-13 RX ADMIN — Medication 100 UNITS: at 20:42

## 2020-08-13 RX ADMIN — AMPICILLIN SODIUM 2 G: 2 INJECTION, POWDER, FOR SOLUTION INTRAMUSCULAR; INTRAVENOUS at 11:19

## 2020-08-13 RX ADMIN — ASPIRIN 325 MG: 325 TABLET, COATED ORAL at 08:47

## 2020-08-13 RX ADMIN — AMPICILLIN SODIUM 2 G: 2 INJECTION, POWDER, FOR SOLUTION INTRAMUSCULAR; INTRAVENOUS at 17:15

## 2020-08-13 RX ADMIN — SODIUM CHLORIDE, PRESERVATIVE FREE 10 ML: 5 INJECTION INTRAVENOUS at 05:06

## 2020-08-13 RX ADMIN — STANDARDIZED SENNA CONCENTRATE 8.6 MG: 8.6 TABLET ORAL at 20:41

## 2020-08-13 RX ADMIN — MORPHINE SULFATE 4 MG: 4 INJECTION, SOLUTION INTRAMUSCULAR; INTRAVENOUS at 01:20

## 2020-08-13 ASSESSMENT — PAIN DESCRIPTION - ONSET
ONSET: ON-GOING

## 2020-08-13 ASSESSMENT — PAIN DESCRIPTION - PAIN TYPE
TYPE: SURGICAL PAIN

## 2020-08-13 ASSESSMENT — PAIN SCALES - GENERAL
PAINLEVEL_OUTOF10: 9
PAINLEVEL_OUTOF10: 9
PAINLEVEL_OUTOF10: 10
PAINLEVEL_OUTOF10: 8
PAINLEVEL_OUTOF10: 10

## 2020-08-13 ASSESSMENT — PAIN DESCRIPTION - FREQUENCY
FREQUENCY: CONTINUOUS

## 2020-08-13 ASSESSMENT — PAIN DESCRIPTION - LOCATION
LOCATION: KNEE

## 2020-08-13 ASSESSMENT — PAIN DESCRIPTION - ORIENTATION
ORIENTATION: RIGHT;LEFT

## 2020-08-13 ASSESSMENT — PAIN DESCRIPTION - DESCRIPTORS
DESCRIPTORS: ACHING;DISCOMFORT;SORE
DESCRIPTORS: THROBBING;SORE

## 2020-08-13 NOTE — PROGRESS NOTES
[facility]  Internal Medicine Residency / [address]    Attending Physician Statement  I have discussed the case, including pertinent history and exam findings with the resident and the team.  I have seen and examined the patient and the key elements of the encounter have been performed by me. I agree with the assessment, plan and orders as documented by the resident. Case Discussed During AM Rounds    H/H remains stable   Ready for DC to [facility]- awaiting precert    No signs of bleeding   Still pain but improving   Improved PT per patient and continued needs at this time     Anemia- H/H stable    S/p Transfusion on 8/11     H/H monitoring   States remotely may have had melanotic stools- denies currently (4 weeks prior)   Recent CSCOPE and EGD per patient by Dr. Alec Theodore- states hx of \"stomach inflammation\" and polyp removal    Consider repeat endoscopy in future if further concerns    Monitor to ensure not repeat findings of potential joint hematoma- given previous evacuation- currently no findings    Continue PPI     Septic Arthritis    Continue IV atbs per ID until stop date   Right midline in place for continued use upon transfer to [facility]     Type II DM- insulin requiring    Improved PO intake over last 24 hours    Glucose improved   Continue insulin regimen     Reactive Thrombocytosis- stable      Disposition- social work assessment for DC planning needs likely to [employer] of medical problems as per resident note.       Otoniel England  Internal Medicine Residency Faculty

## 2020-08-13 NOTE — PROGRESS NOTES
[facility]  Internal Medicine Residency Program  Progress Note - House Team 1    Patient:  Osman King 63 y.o. male MRN: [id number]     Date of Service: 8/13/2020     CC: Hypotension   Overnight events: patient refuses CPAP. He is okay without using it     Subjective     Patient seen this morning at bedside. He is complaining of pain in his knee after PT yesterday. Pain is 8/10. He is also complaining of right middle finger stiffness. No fever, chest pain. Objective     Physical Exam:  · Vitals: /63   Pulse 85   Temp 97.6 °F (36.4 °C) (Temporal)   Resp 18   Ht 5' 11\" (1.803 m)   Wt 201 lb (91.2 kg)   SpO2 95%   BMI 28.03 kg/m²     · I & O - 24hr: No intake/output data recorded. · General Appearance: alert, appears stated age and cooperative  · HEENT:  Head: Normocephalic, no lesions, without obvious abnormality. · Neck: no adenopathy, no carotid bruit, no JVD, supple, symmetrical, trachea midline and thyroid not enlarged, symmetric, no tenderness/mass/nodules  · Lung: clear to auscultation bilaterally  · Heart: regular rate and rhythm, S1, S2 normal, no murmur, click, rub or gallop  · Abdomen: soft, non-tender; bowel sounds normal; no masses,  no organomegaly  · Extremities:  upper extremities normal, atraumatic, no cyanosis or edema and BL knees wraped in bandages with some fluid leakage on the right knee, Hemovac drains removed 8/10  · Musculokeletal: Moderate pain with passive ROM in both knees. Knees currently bandaged.  Pain improved from yesterday  · Neurologic: Mental status: Alert, oriented, thought content appropriate  Subject  Pertinent Labs & Imaging Studies   malcolm  CBC:   Lab Results   Component Value Date    WBC 7.9 08/13/2020    RBC 3.01 08/13/2020    HGB 8.8 08/13/2020    HGB 8.9 08/13/2020    HCT 26.8 08/13/2020    HCT 26.9 08/13/2020    MCV 89.0 08/13/2020    MCH 29.2 08/13/2020    MCHC 32.8 08/13/2020    RDW 12.9 08/13/2020     08/13/2020    MPV CREATININE 0.8 08/13/2020     Hepatic Function Panel:    Lab Results   Component Value Date    ALKPHOS 155 08/08/2020    ALT 18 08/08/2020    AST 15 08/08/2020    PROT 5.9 08/08/2020    BILITOT 0.3 08/08/2020    LABALBU 2.4 08/08/2020     Albumin:    Lab Results   Component Value Date    LABALBU 2.4 08/08/2020       Resident's Assessment and Plan       Naeem Harp is a 63 y.o. male with a PMHx of type 2 DM, peripheral neuropathy, osteoarthritis, lumbar stenosis, HLD, S. agalactiae BL knee septic arthritis, and bipolar disorder. He was recently hospitalized from 7/18-7/31 with the bilateral knee septic arthritis mentioned above. While hospitalized he had an arthroscopic washout on 7/23 and open I&D on 7/27 that showed hematomas in BL knees. He presented to the ED 8/7 because he was found to be hypotensive at a follow up orthopedic appointment with a BP 79/56. Repeat I&D on 8/9 showed hematomas in both knees.         Sepsis 2/2 knee septic arthritis Resolved     WBC trending up 8.5 > 9.4-->7.9  HgB trending up--> 8.9--->8.8  BID ASA for DVT prophylaxis per ORTHO- holding LMWH  Continue  Ampicillin 2g Q6H until 09/06 per ID   Pain management per Ortho  Discontinue IV morphine per Ortho   Tylenol 650 mg, Oxycodone 5 mg, Oxycodone 10 mg, Robaxin 1000 mg  Tissue gram stain and cultures initially showed no growth   Surgical cultures currently show no growth     ERIC 2/2 hypotension resolved   BUN trending up 12 > 18 > 19. Today its 13  Cr trending up 0.8 > 1.0 today its . 8     Normocytic Normochromic Anemia   Has a history of gastritis and had 3-4 polyps removed during colonoscopy. Also had a recent EGD done. HgB 6.5 on 8/8, transfused 1 unit pRBCs. HgB 6.5 on 8/11, transfused 1 unit pRBCs.    HgB trending down 8.0 > 6.5--> 8.9  Follow H&H Q12H  Increase PPI to twice a day due to pertinent GI hx     Thrombocytosis 2/2 septic arthritis vs infection/ inflammation   Platelets trended down but elevating steadily after open I&D  656 > 776 > 655--> 735  Continue abx per ID recommendations above   Follow daily CBC      PMH of Type 2 DM  A1c 10.1 on 7/22  Blood glucose 124 this morning  Lantus 15 units nightly  Humalog 0-6 units Q4H  Continue to monitor blood glucose      PMH of HLD  Atorvastatin 20 mg daily  Cholesterol panel WNL     PMH Bipolar Disorder  Mood is stable, oriented x3  Treatment with Effexor, Lamictal      PMH Osteoarthritis of the Lumbar Spine   Chronic norco for this issue made pain management difficult during last admission, though he has improved mobility in his knees.     Follow with  for precert discharge tomorrow      PT/OT evaluation:  DVT prophylaxis/ GI prophylaxis:   Disposition: Possible tomrrow waiting on precert Dillan Lozano MD, PGY-1  Attending physician: Dr. Zakiya Abarca

## 2020-08-13 NOTE — PROGRESS NOTES
[facility] PROGRESS NOTE      Chief complaint: Follow-up of bilateral septic knee arthritis    The patient is a 63 y. o. male with history of DM, chronic low back pain with severe spinal stenosis, previously admitted from 07/21-07/31 with Streptococcus agalactiae (susceptible to penicillin) bilateral septic knee arthritis s/p wash-out on 07/23, bilateral knee arthrotomy with exploration and irrigation and debridement on 07/27, discharged on a 4-week course of ceftriaxone from 07/27-08/24, presented on 08/07 with hypotension and bilateral knee pain and swelling for which arthrocentesis showed 921,000 RBCs and 108,300 WBCs (predominantly neutrophilic at 30%) but with cultures showing no growth, prompting bilateral knee arthrotomy with exploration, irrigation and debridement on 08/09 during which dark gelatinous hematoma was noted. Tissue Gram stain and culture showed few polymorphonuclear leukocytes, no epithelial cells, no organisms, no growth to date. He received piperacillin-tazobactam and vancomycin on admission then switched to daptomycin on 08/08. Subjective: Patient was seen and examined. No chills, no abdominal pain, no diarrhea, no rash, no itching. He reports unchanged pain on bilateral knees. He has questions about his narcotic medication dosing. Objective:    Vitals:    08/13/20 0800   BP: (!) 150/81   Pulse: 80   Resp: 18   Temp: 97.8 °F (36.6 °C)   SpO2: 95%     Constitutional: Alert, not in distress  Respiratory: Clear breath sounds, no crackles, no wheezes  Cardiovascular: Regular rate and rhythm, no murmurs  Gastrointestinal: Bowel sounds present, soft, nontender  Skin: Warm and dry, no active dermatoses  Musculoskeletal: Bilateral lower extremities with dressing, Ace wrap in place    Labs, imaging, and medical records/notes were personally reviewed. Assessment:  Bilateral septic knee arthritis, s/p bilateral knee arthrotomy with exploration, irrigation and debridement on 08/09.  Synovial fluid and tissue cultures showed no growth to date. Recommendations:  Continue ampicillin 2g q6h to finish 4 weeks of antibiotic therapy from 08/09-09/06. Maintain midline care and monitor labs and schedule follow-up appointment per IV antibiotic protocol for OPAT as indicated on discharge instructions. Follow up blood and tissue cultures. Follow up with me at [address] [place] [address] on 09/03. Thank you for involving me in the care of Greyson Beatty. I will continue to follow. Please do not hesitate to call for any questions or concerns.     Electronically signed by Lisandro Gonzáles MD on 8/13/2020 at 10:09 AM

## 2020-08-13 NOTE — PROGRESS NOTES
[facility]  Internal Medicine Residency Program  Progress Note - House Team 2    Patient:  Sawyer Sanchez 63 y.o. male MRN: [id number]     Date of Service: 8/13/2020     CC: hypotension  Overnight events: none    Subjective     Mr. Merly Franco was in more pain than usual when I went to see him today, but I was there earlier in the morning than usual and he hadn't gotten his pain meds yet. He denied chest pain, SOB, abdominal pain, fevers, chills. He endorsed pain on his right 3rd MCP. He had negative Tinel's and sensation intact BL. He had decreased  strength and pain with passive ROM. Might be related to recent use during PT. No plan for workup at this time. Objective     Physical Exam:  · Vitals: BP (!) 150/81   Pulse 80   Temp 97.8 °F (36.6 °C) (Temporal)   Resp 18   Ht 5' 11\" (1.803 m)   Wt 201 lb (91.2 kg)   SpO2 95%   BMI 28.03 kg/m²     · I & O - 24hr: No intake/output data recorded. · General Appearance: alert, appears stated age and cooperative  · HEENT:  Head: Normocephalic, no lesions, without obvious abnormality. · Neck: no adenopathy, no carotid bruit, supple, symmetrical, trachea midline and thyroid not enlarged, symmetric, no tenderness/mass/nodules  · Lung: clear to auscultation bilaterally  · Heart: regular rate and rhythm, S1, S2 normal, no murmur, click, rub or gallop  · Abdomen: soft, non-tender; bowel sounds normal; no masses,  no organomegaly  · Extremities:  upper extremities normal, atraumatic, no cyanosis or edema and BL knees wraped in bandages with some fluid leakage on the right knee, Hemovac drains removed 08/10. Patient could wiggle toes and move his ankle with moderate knee pain. · Musculokeletal: No joint swelling, no muscle tenderness. ROM normal in all joints of upper extremities. BL knee pain with passive and active ROM. No LE edema.    · Neurologic: Mental status: Alert, oriented, thought content appropriate  Subject  Pertinent Labs & Imaging Studies   malcolm  CBC with Differential:    Lab Results   Component Value Date    WBC 7.9 08/13/2020    RBC 3.01 08/13/2020    HGB 8.8 08/13/2020    HGB 8.9 08/13/2020    HCT 26.8 08/13/2020    HCT 26.9 08/13/2020     08/13/2020    MCV 89.0 08/13/2020    MCH 29.2 08/13/2020    MCHC 32.8 08/13/2020    RDW 12.9 08/13/2020    NRBC 0.9 08/12/2020    SEGSPCT 55 02/06/2014    METASPCT 0.9 07/27/2020    LYMPHOPCT 35.7 08/13/2020    MONOPCT 11.9 08/13/2020    MYELOPCT 0.9 07/27/2020    BASOPCT 1.0 08/13/2020    MONOSABS 0.94 08/13/2020    LYMPHSABS 2.81 08/13/2020    EOSABS 0.23 08/13/2020    BASOSABS 0.08 08/13/2020     BMP:    Lab Results   Component Value Date     08/13/2020    K 4.0 08/13/2020    K 3.6 08/08/2020    CL 99 08/13/2020    CO2 26 08/13/2020    BUN 11 08/13/2020    LABALBU 2.4 08/08/2020    CREATININE 0.8 08/13/2020    CALCIUM 9.1 08/13/2020    GFRAA >60 08/13/2020    LABGLOM >60 08/13/2020    GLUCOSE 125 08/13/2020       Student's Assessment and Plan     Renuka Epps is a 63 y.o. male with a PMHx of T2DM, peripheral neuropathy, osteoarthritis, lumbar stenosis, HLD, S. agalactiae BL knee septic arthritis, and bipolar disorder.      He was recently hospitalized from 7/18-7/31 with the BL knee septic arthritis. While hospitalized he had an arthroscopic washout 7/23 and open I&D on 7/27 that showed hematomas in BL knees. He presented to the ED 8/7 because he was found to be hypotensive at a follow up orthopedic appointment with a BP of 76/56. Repeat I&D on 8/9 showed hematomas in both knees.      1. Sepsis 2/2 BL knee septic arthritis    Repeat CXR showed no acute airway disease. No organisms seen on gram stain of BL knees.  Currently hypertensive at 158/81.  Sed rate 138, CRP 26.1, lactic acid trending down 0.6 > 0.5, no longer trending. Hemovac drains removed 8/10. Patient was able to stand during therapy with extreme pain.    · WBC stable at 8.7  · HgB trending up after transfusion 8/11 at 6.5 physician: Dr. Asael Kruse

## 2020-08-13 NOTE — PLAN OF CARE
Problem: Falls - Risk of:  Goal: Will remain free from falls  Description: Will remain free from falls  Outcome: Ongoing  Goal: Absence of physical injury  Description: Absence of physical injury  Outcome: Ongoing     Problem: Pain:  Goal: Pain level will decrease  Description: Pain level will decrease  Outcome: Ongoing  Goal: Control of acute pain  Description: Control of acute pain  Outcome: Ongoing     Problem: Skin Integrity:  Goal: Will show no infection signs and symptoms  Description: Will show no infection signs and symptoms  Outcome: Ongoing  Goal: Absence of new skin breakdown  Description: Absence of new skin breakdown  Outcome: Ongoing

## 2020-08-13 NOTE — CARE COORDINATION
Precert pending to [facility], initiated yesterday. Covid 8/10 (-). Ambulance form on soft chart. CM will continue to follow. IV Ampicillin q 6 continues.  CM to follow  Micha Venegas RN  [facility] Case Management  [phone]

## 2020-08-14 VITALS
DIASTOLIC BLOOD PRESSURE: 67 MMHG | RESPIRATION RATE: 18 BRPM | SYSTOLIC BLOOD PRESSURE: 134 MMHG | WEIGHT: 197.7 LBS | TEMPERATURE: 97.3 F | HEART RATE: 84 BPM | HEIGHT: 71 IN | OXYGEN SATURATION: 98 % | BODY MASS INDEX: 27.68 KG/M2

## 2020-08-14 LAB
ANAEROBIC CULTURE: NORMAL
ANAEROBIC CULTURE: NORMAL
ANION GAP SERPL CALCULATED.3IONS-SCNC: 12 MMOL/L (ref 7–16)
ANISOCYTOSIS: ABNORMAL
BASOPHILS ABSOLUTE: 0 E9/L (ref 0–0.2)
BASOPHILS RELATIVE PERCENT: 1.2 % (ref 0–2)
BUN BLDV-MCNC: 12 MG/DL (ref 6–20)
BURR CELLS: ABNORMAL
CALCIUM SERPL-MCNC: 9.4 MG/DL (ref 8.6–10.2)
CHLORIDE BLD-SCNC: 100 MMOL/L (ref 98–107)
CO2: 28 MMOL/L (ref 22–29)
CREAT SERPL-MCNC: 0.8 MG/DL (ref 0.7–1.2)
CULTURE SURGICAL: NORMAL
EOSINOPHILS ABSOLUTE: 0.3 E9/L (ref 0.05–0.5)
EOSINOPHILS RELATIVE PERCENT: 3.6 % (ref 0–6)
GFR AFRICAN AMERICAN: >60
GFR NON-AFRICAN AMERICAN: >60 ML/MIN/1.73
GLUCOSE BLD-MCNC: 104 MG/DL (ref 74–99)
HCT VFR BLD CALC: 27.6 % (ref 37–54)
HEMOGLOBIN: 8.9 G/DL (ref 12.5–16.5)
LYMPHOCYTES ABSOLUTE: 2.13 E9/L (ref 1.5–4)
LYMPHOCYTES RELATIVE PERCENT: 25.9 % (ref 20–42)
MCH RBC QN AUTO: 28.9 PG (ref 26–35)
MCHC RBC AUTO-ENTMCNC: 32.2 % (ref 32–34.5)
MCV RBC AUTO: 89.6 FL (ref 80–99.9)
METER GLUCOSE: 129 MG/DL (ref 74–99)
METER GLUCOSE: 148 MG/DL (ref 74–99)
MONOCYTES ABSOLUTE: 0.41 E9/L (ref 0.1–0.95)
MONOCYTES RELATIVE PERCENT: 5.4 % (ref 2–12)
NEUTROPHILS ABSOLUTE: 5.33 E9/L (ref 1.8–7.3)
NEUTROPHILS RELATIVE PERCENT: 65.2 % (ref 43–80)
PDW BLD-RTO: 12.9 FL (ref 11.5–15)
PLATELET # BLD: 773 E9/L (ref 130–450)
PMV BLD AUTO: 8.4 FL (ref 7–12)
POIKILOCYTES: ABNORMAL
POLYCHROMASIA: ABNORMAL
POTASSIUM SERPL-SCNC: 4.3 MMOL/L (ref 3.5–5)
RBC # BLD: 3.08 E12/L (ref 3.8–5.8)
SCHISTOCYTES: ABNORMAL
SODIUM BLD-SCNC: 140 MMOL/L (ref 132–146)
WBC # BLD: 8.2 E9/L (ref 4.5–11.5)

## 2020-08-14 PROCEDURE — 36415 COLL VENOUS BLD VENIPUNCTURE: CPT

## 2020-08-14 PROCEDURE — 2580000003 HC RX 258: Performed by: STUDENT IN AN ORGANIZED HEALTH CARE EDUCATION/TRAINING PROGRAM

## 2020-08-14 PROCEDURE — 6370000000 HC RX 637 (ALT 250 FOR IP): Performed by: INTERNAL MEDICINE

## 2020-08-14 PROCEDURE — 6360000002 HC RX W HCPCS: Performed by: INTERNAL MEDICINE

## 2020-08-14 PROCEDURE — 80048 BASIC METABOLIC PNL TOTAL CA: CPT

## 2020-08-14 PROCEDURE — 6370000000 HC RX 637 (ALT 250 FOR IP): Performed by: STUDENT IN AN ORGANIZED HEALTH CARE EDUCATION/TRAINING PROGRAM

## 2020-08-14 PROCEDURE — 94660 CPAP INITIATION&MGMT: CPT

## 2020-08-14 PROCEDURE — 99238 HOSP IP/OBS DSCHRG MGMT 30/<: CPT | Performed by: INTERNAL MEDICINE

## 2020-08-14 PROCEDURE — 2580000003 HC RX 258: Performed by: INTERNAL MEDICINE

## 2020-08-14 PROCEDURE — 85025 COMPLETE CBC W/AUTO DIFF WBC: CPT

## 2020-08-14 PROCEDURE — 82962 GLUCOSE BLOOD TEST: CPT

## 2020-08-14 RX ORDER — OXYCODONE HYDROCHLORIDE 5 MG/1
5 TABLET ORAL EVERY 6 HOURS PRN
Qty: 12 TABLET | Refills: 0 | Status: SHIPPED | OUTPATIENT
Start: 2020-08-14 | End: 2020-08-17

## 2020-08-14 RX ORDER — INSULIN GLARGINE 100 [IU]/ML
15 INJECTION, SOLUTION SUBCUTANEOUS NIGHTLY
Qty: 1 VIAL | Refills: 3 | Status: SHIPPED | OUTPATIENT
Start: 2020-08-14 | End: 2021-11-08 | Stop reason: SDUPTHER

## 2020-08-14 RX ORDER — OXYCODONE HCL 10 MG/1
10 TABLET, FILM COATED, EXTENDED RELEASE ORAL EVERY 12 HOURS SCHEDULED
Qty: 6 EACH | Refills: 0 | Status: SHIPPED | OUTPATIENT
Start: 2020-08-14 | End: 2020-08-17

## 2020-08-14 RX ORDER — SENNA PLUS 8.6 MG/1
1 TABLET ORAL NIGHTLY
Qty: 30 TABLET | Refills: 0 | Status: SHIPPED | OUTPATIENT
Start: 2020-08-14 | End: 2020-09-13

## 2020-08-14 RX ADMIN — PANTOPRAZOLE SODIUM 40 MG: 40 TABLET, DELAYED RELEASE ORAL at 05:53

## 2020-08-14 RX ADMIN — OXYCODONE HYDROCHLORIDE 10 MG: 10 TABLET, FILM COATED, EXTENDED RELEASE ORAL at 09:50

## 2020-08-14 RX ADMIN — Medication 10 ML: at 09:51

## 2020-08-14 RX ADMIN — AMPICILLIN SODIUM 2 G: 2 INJECTION, POWDER, FOR SOLUTION INTRAMUSCULAR; INTRAVENOUS at 05:53

## 2020-08-14 RX ADMIN — AMPICILLIN SODIUM 2 G: 2 INJECTION, POWDER, FOR SOLUTION INTRAMUSCULAR; INTRAVENOUS at 11:38

## 2020-08-14 RX ADMIN — OXYCODONE 5 MG: 5 TABLET ORAL at 12:45

## 2020-08-14 RX ADMIN — METHOCARBAMOL TABLETS 1000 MG: 500 TABLET, COATED ORAL at 11:38

## 2020-08-14 RX ADMIN — Medication 10 ML: at 09:52

## 2020-08-14 RX ADMIN — AMPICILLIN SODIUM 2 G: 2 INJECTION, POWDER, FOR SOLUTION INTRAMUSCULAR; INTRAVENOUS at 00:47

## 2020-08-14 RX ADMIN — MULTIVITAMIN TABLET 1 TABLET: TABLET at 09:50

## 2020-08-14 RX ADMIN — INSULIN LISPRO 1 UNITS: 100 INJECTION, SOLUTION INTRAVENOUS; SUBCUTANEOUS at 12:44

## 2020-08-14 RX ADMIN — VENLAFAXINE HYDROCHLORIDE 37.5 MG: 75 TABLET ORAL at 09:50

## 2020-08-14 RX ADMIN — LAMOTRIGINE 25 MG: 25 TABLET ORAL at 09:50

## 2020-08-14 RX ADMIN — OXYCODONE 5 MG: 5 TABLET ORAL at 04:59

## 2020-08-14 RX ADMIN — ATORVASTATIN CALCIUM 20 MG: 20 TABLET, FILM COATED ORAL at 09:50

## 2020-08-14 RX ADMIN — POLYETHYLENE GLYCOL 3350 17 G: 17 POWDER, FOR SOLUTION ORAL at 09:50

## 2020-08-14 RX ADMIN — ASPIRIN 325 MG: 325 TABLET, COATED ORAL at 09:50

## 2020-08-14 RX ADMIN — Medication 100 UNITS: at 09:51

## 2020-08-14 RX ADMIN — OXYCODONE 5 MG: 5 TABLET ORAL at 00:47

## 2020-08-14 ASSESSMENT — PAIN DESCRIPTION - PAIN TYPE
TYPE: CHRONIC PAIN;SURGICAL PAIN
TYPE: SURGICAL PAIN

## 2020-08-14 ASSESSMENT — PAIN DESCRIPTION - PROGRESSION
CLINICAL_PROGRESSION: NOT CHANGED
CLINICAL_PROGRESSION: GRADUALLY WORSENING

## 2020-08-14 ASSESSMENT — PAIN DESCRIPTION - LOCATION
LOCATION: KNEE

## 2020-08-14 ASSESSMENT — PAIN DESCRIPTION - FREQUENCY: FREQUENCY: CONTINUOUS

## 2020-08-14 ASSESSMENT — PAIN SCALES - GENERAL
PAINLEVEL_OUTOF10: 9
PAINLEVEL_OUTOF10: 9
PAINLEVEL_OUTOF10: 10
PAINLEVEL_OUTOF10: 5
PAINLEVEL_OUTOF10: 10
PAINLEVEL_OUTOF10: 10

## 2020-08-14 ASSESSMENT — PAIN DESCRIPTION - ORIENTATION
ORIENTATION: RIGHT;LEFT

## 2020-08-14 ASSESSMENT — PAIN DESCRIPTION - ONSET: ONSET: ON-GOING

## 2020-08-14 ASSESSMENT — PAIN - FUNCTIONAL ASSESSMENT: PAIN_FUNCTIONAL_ASSESSMENT: PREVENTS OR INTERFERES WITH ALL ACTIVE AND SOME PASSIVE ACTIVITIES

## 2020-08-14 ASSESSMENT — PAIN DESCRIPTION - DESCRIPTORS: DESCRIPTORS: STABBING;BURNING;PRESSURE

## 2020-08-14 NOTE — DISCHARGE INSTR - COC
Continuity of Care Form    Patient Name: Parul Lan   :  1965  MRN:  [id number]    Admit date:  2020  Discharge date:  2020    Code Status Order: Full Code   Advance Directives:   [facility] Documentation     Date/Time Healthcare Directive Type of Healthcare Directive Copy in [address] Agent's Name Healthcare Agent's Phone Number    20 1839  No, patient does not have an advance directive for healthcare treatment -- -- -- -- --          Admitting Physician:  Maria R Mendoza MD  PCP: No primary care provider on file. Discharging Nurse: [address] [place] [address] Unit/Room#: 2725/2361-V  Discharging Unit Phone Number: [phone]    Emergency Contact:   Extended Emergency Contact Information  Primary Emergency Contact: Sebastien Rosas [address] Phone: [phone]  Mobile Phone: [phone]  Relation: Brother/Sister  Secondary Emergency Contact: Sandra Friedman [address] Phone: [phone]  Mobile Phone: [phone]  Relation: Brother/Sister    Past Surgical History:  Past Surgical History:   Procedure Laterality Date    ARM SURGERY      left arm    INCISION AND DRAINAGE Bilateral 2020    REPEAT BILATERAL KNEE WASHOUT performed by Sea Hess DO at [address] [place] [address] Bilateral 2020    INCISION AND DRAINAGE bilateral knees performed by Sea Hess DO at [facility] ARTHROSCOPY Bilateral 2020    KNEE ARTHROSCOPY performed by Ariadna Evans MD at [facility] [address] COLONOSCOPY W/BIOPSY SINGLE/MULTIPLE N/A 2018    COLONOSCOPY WITH BIOPSY performed by Belem Campuzano MD at [address] [place] [address] Dr CAI W/RMVL OF TUMOR POLYP LESION SNARE TQ  2018    COLONOSCOPY POLYPECTOMY SNARE/COLD BIOPSY performed by Belem Campuzano MD at [facility]         Immunization History:      There is no immunization history on file for this patient.     Active Problems:  Patient Active Problem List   Diagnosis Code    Chronic low back pain M54.5, G89.29    Diabetic neuropathy ([facility]) E11.40    Anxiety and depression F41.9, F32.9    Hyperlipidemia E78.5    Insulin-requiring or dependent type II diabetes mellitus ([facility]) E11.9, Z79.4    Testicle pain N50.819    Pain in both knees M25.561, M25.562    Septic arthritis of knee, bilateral ([facility]) M00.9    Acute midline low back pain with bilateral sciatica M54.42, M54.41    Sepsis due to group B Streptococcus ([facility]) A40.1    Hypokalemia E87.6    Septic arthritis ([facility]) M00.9    Acute pain R52    Sepsis ([facility]) A41.9    Acute on chronic anemia D64.9    ERIC (acute kidney injury) ([facility]) N17.9    Thrombocytosis ([facility]) D47.3    Type 2 diabetes mellitus with hyperglycemia, with long-term current use of insulin ([facility]) E11.65, Z79.4       Isolation/Infection:   Isolation          No Isolation        Patient Infection Status     Infection Onset Added Last Indicated Last Indicated By Review Planned Expiration Resolved Resolved By    None active    Resolved    COVID-19 Rule Out 08/10/20 08/10/20 08/10/20 Covid-19 Ambulatory (Ordered)   08/12/20 Rule-Out Test Resulted    COVID-19 Rule Out 07/25/20 07/25/20 07/25/20 Covid-19 Ambulatory (Ordered)   07/27/20 Rule-Out Test Resulted          Nurse Assessment:  Last Vital Signs: /67   Pulse 84   Temp 97.3 °F (36.3 °C) (Temporal)   Resp 18   Ht 5' 11\" (1.803 m)   Wt 197 lb 11.2 oz (89.7 kg)   SpO2 98%   BMI 27.57 kg/m²     Last documented pain score (0-10 scale): Pain Level: 9  Last Weight:   Wt Readings from Last 1 Encounters:   08/14/20 197 lb 11.2 oz (89.7 kg)     Mental Status:  oriented and alert    IV Access:  - midline right upper extremity    Nursing Mobility/ADLs:  Walking   Assisted  Transfer  Assisted  Bathing  Assisted  Dressing  Assisted  Toileting  Assisted  Feeding  Independent  Med Admin  Assisted  Med Delivery   whole    Wound Care Documentation and Therapy:        Elimination:  Continence:   · Bowel: Yes  · Bladder: Yes  Urinary Catheter: None   Colostomy/Ileostomy/Ileal Conduit: No       Date of Last BM: 8/8/2020      Intake/Output Summary (Last 24 hours) at 8/14/2020 1013  Last data filed at 8/14/2020 0952  Gross per 24 hour   Intake 119 ml   Output --   Net 119 ml     I/O last 3 completed shifts: In: 80 [I.V.:98]  Out: -     Safety Concerns: At Risk for Falls    Impairments/Disabilities:      None    Nutrition Therapy:  Current Nutrition Therapy:   - Oral Diet:  Carb Control 3 carbs/meal (1500kcals/day)    Routes of Feeding: Oral  Liquids: Thin Liquids  Daily Fluid Restriction: no  Last Modified Barium Swallow with Video (Video Swallowing Test): not done    Treatments at the Time of Hospital Discharge:   Respiratory Treatments: n/a  Oxygen Therapy:  is not on home oxygen therapy.   Ventilator:    - No ventilator support    Rehab Therapies: Physical Therapy and Occupational Therapy  Weight Bearing Status/Restrictions: No weight bearing restirctions  Other Medical Equipment (for information only, NOT a DME order):  walker and bedside commode  Other Treatments: n/a      Patient's personal belongings (please select all that are sent with patient):  None    RN SIGNATURE:  Electronically signed by Vaibhav Clement RN on 8/14/20 at 12:32 PM EDT    CASE MANAGEMENT/SOCIAL WORK SECTION    Inpatient Status Date: ***    Readmission Risk Assessment Score:  Readmission Risk              Risk of Unplanned Readmission:        24           Discharging to Facility/ Agency   · Name:   · Address:  · Phone:  · Fax:    Dialysis Facility (if applicable)   · Name:  · Address:  · Dialysis Schedule:  · Phone:  · Fax:    / signature: {Esignature:[id number]}    PHYSICIAN SECTION    Prognosis: Good    Condition at Discharge: Stable    Rehab Potential (if transferring to Rehab): Good    Recommended Labs or Other Treatments After Discharge: None    Physician Certification: I certify the above information and transfer of Phuc Valdez  is necessary for the continuing treatment of the diagnosis listed and that he requires [facility] for greater 30 days. Update Admission H&P:   The patient is a 63 y.o. male with a PMHx of insulin-dependent Type 2 DM, HLD, GERD, B/L osteoarthritis, and lumbar stenosis, who was admitted on 8/7/2020 for hypotension, fatigue, and suspected sepsis. He was recently admitted from 7/21-7/31 for septic arthritis of B/L knees, with cultures positive for Strep Agalactiae. Was placed on IV Ceftriaxone. Surgical washout of bilateral knees was performed on 7/27. He was discharged on IV Ceftriaxone via midline IV, with last dose scheduled for 8/24. He was scheduled for f/u with Ortho (8/7) and ID (pt instructed to call for appt. ). Pt presented to Ortho Clinic for f/u. While there, he was found to be hypotensive to 79/56, HR 91. He was then sent to ED and subsequently admitted to the hospital for further management. Labs and clinical parameters indicated patient had sepsis 2/2 to possible b/l septic arthritis. Infectious Diseases and Orthopedics were consulted for the same. IVF was given and cultures drawn . Antibiotics were tailored as per ID. Orthopedics performed a bilateral knee arthrotomy with exploration, irrigation and debridement. Drains were placed which were then removed on 08/10. Cultures drawn returned negative. Patient was stable post op with concern for possible anemia/bleeding. Due to a drop in Hb, 1 unit pRBC was transfused. Hb has been stable since . Patient on multiple pain meds as per ortho - Tylenol 650mg q6 PRN, oxycontin 10mg BID and roxicodone 5mg q4 PRN. Glucose management was optimised. Labs have normalised. Pt/ot have been consulted and advise work to ambulate patient as able. They also had a discussion with patient about working on motion.   Patient is now being discharged to [facility] for further rehab. ID has advised to continue ampicillin 2g q6 until 09/06/2020. Rt midline is in place for continued use at [facility].  Ortho will follow peripherally.           PHYSICIAN SIGNATURE:  Electronically signed by Tabby Redding MD on 8/14/20 at 10:16 AM EDT

## 2020-08-14 NOTE — DISCHARGE SUMMARY
[facility]  Discharge Summary    PCP: No primary care provider on file. Admit Date:8/7/2020  Discharge Date: 8/14/2020    Admission Diagnosis:   1. Sepsis 2/2 recurrent septic arthritis  2. ERIC  3. Thrombocytosis  4. Type 2 Diabetes Mellitus  5. Bipolar disorder  6. HLD  7. Osteoarthritis of lumbar spine     Discharge Diagnosis:  1. Sepsis 2/2 recurrent septic arthritis  2. ERIC  3. Thrombocytosis  4. Type 2 Diabetes Mellitus  5. Bipolar Disorder  6. HLD  7. Osteoarthritis of lumbar spine    Hospital Course: The patient is a 63 y.o. male with a PMHx of insulin-dependent Type 2 DM, HLD, GERD, B/L osteoarthritis, and lumbar stenosis, who was admitted on 8/7/2020 for hypotension, fatigue, and suspected sepsis. He was recently admitted from 7/21-7/31 for septic arthritis of B/L knees, with cultures positive for Strep Agalactiae. Was placed on IV Ceftriaxone. Surgical washout of bilateral knees was performed on 7/27. He was discharged on IV Ceftriaxone via midline IV, with last dose scheduled for 8/24. He was scheduled for f/u with Ortho (8/7) and ID (pt instructed to call for appt. ). Pt presented to Ortho Clinic for f/u. While there, he was found to be hypotensive to 79/56, HR 91. He was then sent to ED and subsequently admitted to the hospital for further management. Labs and clinical parameters indicated patient had sepsis 2/2 to possible b/l septic arthritis. Infectious Diseases and Orthopedics were consulted for the same. IVF was given and cultures drawn . Antibiotics were tailored as per ID. Orthopedics performed a bilateral knee arthrotomy with exploration, irrigation and debridement. Drains were placed which were then removed on 08/10. Cultures drawn returned negative. Patient was stable post op with concern for possible anemia/bleeding. Due to a drop in Hb, 1 unit pRBC was transfused. Hb has been stable since .  Patient on multiple pain meds as per ortho - Tylenol 650mg q6 PRN, oxycontin 10mg BID and roxicodone 5mg q4 PRN. Glucose management was optimised. Labs have normalised. Pt/ot have been consulted and advise work to ambulate patient as able. They also had a discussion with patient about working on motion. Patient is now being discharged to [facility] for further rehab. ID has advised to continue ampicillin 2g q6 until 09/06/2020. Rt midline is in place for continued use at [facility]. Ortho will follow peripherally. Significant findings (history and exam, laboratory, radiological, pathology, other tests):   · General Appearance: alert, appears stated age and cooperative  · HEENT:  Head: Normocephalic, no lesions, without obvious abnormality. · Neck: no adenopathy, no carotid bruit, no JVD, supple, symmetrical, trachea midline and thyroid not enlarged, symmetric, no tenderness/mass/nodules  · Lung: clear to auscultation bilaterally  · Heart: regular rate and rhythm, S1, S2 normal, no murmur, click, rub or gallop  · Abdomen: soft, non-tender; bowel sounds normal; no masses,  no organomegaly  · Extremities:  non edematous  · Musculokeletal: Dressings over both knees placed post op. No drains in place  · Neurologic: Mental status: Alert, oriented, thought content appropriate      Pending test results: None    Consults:  1. Infectious Disease  2. Orthopedics  3. Podiatry     Procedures:   bilateral knee arthrotomy with exploration, irrigation and debridement ( See Ortho OP note     Condition at discharge: Stable    Disposition: [facility]    Discharge Medications:  Current Discharge Medication List      START taking these medications    Details   ampicillin (OMNIPEN) infusion Infuse 2,000 mg intravenously every 6 hours for 23 days Compound per protocol.   Qty: 184 g, Refills: 0      aspirin 325 MG EC tablet Take 1 tablet by mouth 2 times daily  Qty: 30 tablet, Refills: 3      senna (SENOKOT) 8.6 MG tablet Take 1 tablet by mouth nightly  Qty: 30 tablet, Refills: 0         CONTINUE these medications which have CHANGED    Details   oxyCODONE (OXYCONTIN) 10 MG extended release tablet Take 1 tablet by mouth every 12 hours for 3 days. Qty: 6 each, Refills: 0    Comments: Reduce doses taken as pain becomes manageable  Associated Diagnoses: Acute pain of both knees; Septic arthritis of knee, bilateral ([address] Acute pain      oxyCODONE (ROXICODONE) 5 MG immediate release tablet Take 1 tablet by mouth every 6 hours as needed for Pain for up to 3 days. Qty: 12 tablet, Refills: 0    Comments: Reduce doses taken as pain becomes manageable  Associated Diagnoses: Acute pain of both knees; Septic arthritis of knee, bilateral ([address] Acute pain      insulin glargine (LANTUS) 100 UNIT/ML injection vial Inject 15 Units into the skin nightly  Qty: 1 vial, Refills: 3         CONTINUE these medications which have NOT CHANGED    Details   atorvastatin (LIPITOR) 20 MG tablet Take 1 tablet by mouth daily  Qty: 7 tablet, Refills: 0      methocarbamol (ROBAXIN) 500 MG tablet Take 2 tablets by mouth 4 times daily as needed (As needed for muscle spasms)  Qty: 56 tablet, Refills: 0      pantoprazole (PROTONIX) 40 MG tablet Take 1 tablet by mouth every morning (before breakfast)  Qty: 7 tablet, Refills: 0      !! insulin lispro (HUMALOG) 100 UNIT/ML injection vial Inject 0-18 Units into the skin 3 times daily (with meals)  Qty: 1 vial, Refills: 3      !! insulin lispro (HUMALOG) 100 UNIT/ML injection vial Inject 0-9 Units into the skin nightly  Qty: 1 vial, Refills: 3      doxepin (SINEQUAN) 10 MG capsule take 1 capsule by mouth nightly  Qty: 30 capsule, Refills: 2      venlafaxine (EFFEXOR) 37.5 MG tablet Take 37.5 mg by mouth daily      lamoTRIgine (LAMICTAL) 25 MG tablet Take 25 mg by mouth daily      Multiple Vitamin (MULTI VITAMIN MENS PO) Take by mouth daily        ! ! - Potential duplicate medications found. Please discuss with provider.       STOP taking these medications       cefTRIAXone (ROCEPHIN) infusion Comments:   Reason for Stopping:               Activity: activity as tolerated  Diet: regular diet    Follow-up appointments:   1. Dr. Agatha Gardner ( Orthopedics ) in 2 weeks  2. Infectious Disease in the clinic   3. [id number] [address] [place] [address] outpatient clinic   4. Pain management at [facility]    Patient Instructions: Follow up with Dr. Agatha Gardner (Orthopedics ) in 2 weeks . Follow up with Infectious disease doctor with need to call for appointment. Follow up with [facility] ( call to make an appointment after discharge from [place] [address] ) . Need to follow up with pain medication clinic for pain meds.      Medication Changes:   - Lantus has been changed to 15 units nightly + normal sliding scale insulin     Jarred Blackburn MD  PGY 1   3:32 PM 8/14/2020

## 2020-08-14 NOTE — CARE COORDINATION
Precert obtained for [facility]. Perfect serve to resident for discharge order. Covid 8/10 (-). Ambulance form on soft chart. CM will continue to follow  Leo Elliott RN  [facility] Case Management  [phone]    101--Resident plans to discharge today.  Transport set up with [facility] for 1 pm. Patient at bedside, charge nurse and facility notified

## 2020-08-14 NOTE — PROGRESS NOTES
[facility]  Internal Medicine Residency Program  Progress Note - House Team 2    Patient:  Naeem Harp 63 y.o. male MRN: [id number]     Date of Service: 8/14/2020     CC: hypotension  Overnight events: none- did not need pain meds during the night    Subjective     Mr. Jackie Kapadia was resting when I saw him this morning. He denied chest pain, SOB, nausea, vomiting, fever, chills. He endorsed not having had a BM since 08/08, though he \"believes it will happen today. \" His appetite is still suppressed but he is trying to eat more. He denied needing pains meds during the night. His right 3rd MCP pain is much improved from yesterday and  strength is improved as well. He endorsed some anxiety over going to a new facility for rehab, especially after the experience he had at [facility]. Objective     Physical Exam:  · Vitals: /67   Pulse 84   Temp 97.3 °F (36.3 °C) (Temporal)   Resp 18   Ht 5' 11\" (1.803 m)   Wt 197 lb 11.2 oz (89.7 kg)   SpO2 98%   BMI 27.57 kg/m²     · I & O - 24hr: No intake/output data recorded. · General Appearance: alert, appears stated age and cooperative  · HEENT:  Head: Normal, normocephalic, atraumatic. · Neck: no adenopathy, no JVD, supple, symmetrical, trachea midline and thyroid not enlarged, symmetric, no tenderness/mass/nodules  · Lung: clear to auscultation bilaterally  · Heart: regular rate and rhythm, S1, S2 normal, no murmur, click, rub or gallop  · Abdomen: soft, non-tender; bowel sounds normal; no masses,  no organomegaly  · Extremities:  extremities normal, atraumatic, no cyanosis or edema and BL knee incisions, knees bandaged so I am unable to assess  · Musculokeletal: No joint swelling, no muscle tenderness. · ROM decreased in both knees 2/2 open I&D. Painful passive ROM. Patient has improved ROM from yesterday, he was adducting his knees more than I have ever seen, though he still endorses 8-9/10 pain.    · Right 3rd MCP pain- improved from yesterday, no sensation loss,  strength improved   · Neurologic: Mental status: Alert, oriented, thought content appropriate  Subject  Pertinent Labs & Imaging Studies   malcolm  CBC with Differential:    Lab Results   Component Value Date    WBC 8.2 08/14/2020    RBC 3.08 08/14/2020    HGB 8.9 08/14/2020    HCT 27.6 08/14/2020     08/14/2020    MCV 89.6 08/14/2020    MCH 28.9 08/14/2020    MCHC 32.2 08/14/2020    RDW 12.9 08/14/2020    NRBC 0.9 08/12/2020    SEGSPCT 55 02/06/2014    METASPCT 0.9 07/27/2020    LYMPHOPCT 35.7 08/13/2020    MONOPCT 11.9 08/13/2020    MYELOPCT 0.9 07/27/2020    BASOPCT 1.0 08/13/2020    MONOSABS 0.94 08/13/2020    LYMPHSABS 2.81 08/13/2020    EOSABS 0.23 08/13/2020    BASOSABS 0.08 08/13/2020     BMP:    Lab Results   Component Value Date     08/14/2020    K 4.3 08/14/2020    K 3.6 08/08/2020     08/14/2020    CO2 28 08/14/2020    BUN 12 08/14/2020    LABALBU 2.4 08/08/2020    CREATININE 0.8 08/14/2020    CALCIUM 9.4 08/14/2020    GFRAA >60 08/14/2020    LABGLOM >60 08/14/2020    GLUCOSE 104 08/14/2020       Student's Assessment and Plan     Ghada Pardo is a 63 y.o. male with a PMHx of T2DM, peripheral neuropathy, osteoarthritis, lumbar stenosis, HLD, S. agalactiae BL knee septic arthritis, and bipolar disorder.      He was recently hospitalized from 7/18-7/31 with the BL knee septic arthritis. While hospitalized he had an arthroscopic washout 7/23 and open I&D on 7/27 that showed hematomas in BL knees. He presented to the ED 8/7 because he was found to be hypotensive at a follow up orthopedic appointment with a BP of 76/56. Repeat I&D on 8/9 showed hematomas in both knees.      1. Sepsis 2/2 BL knee septic arthritis    Repeat CXR showed no acute airway disease. No organisms seen on gram stain of BL knees.  Currently hypertensive at 158/81.  Sed rate 138, CRP 26.1, lactic acid trending down 0.6 > 0.5, no longer trending. Hemovac drains removed 8/10. Patient was able to stand during therapy with extreme pain.    · WBC stable at 8.2  · HgB stable at 8.9  · Ampicillin 2g Q6H until 09/06 per ID via midline, to follow up outpatient   · Pain management per Ortho- consider outpatient pain management consult  ? Tylenol 650 mg Q6H PRN  ? Oxycodone 5 mg Q4H PRN   ? Oxycodone 10 mg Q12H PRN  ? Morphine 2 mg injection Q4H PRN- d/c on 08/13  ? Robaxin 1000 mg Q4H PRN- muscle spasms  · Tissue gram stain and cultures show no growth   · Surgical cultures currently show no growth  · Medically stable for discharge to [facility] for subacute rehab- percert done this morning     2. ERIC 2/2 hypotension due to sepsis vs diabetic nephropathy - resolved   · BUN stable- 12  · Cr stable- 0.8     3. Normocytic Normochromic Anemia 2/2 acute bleed vs decreased erythropoiesis vs hemarthrosis   Has a history of gastritis and had 3-4 polyps removed during colonoscopy. Also had a recent EGD done.   · HgB 6.5 on 8/8, transfused 1 unit pRBCs  · HgB 6.5 on 8/11, transfused 1 unit pRBCs (believed to be lab error due to over-correction of HgB)  · HgB stable at 8.9     4. Thrombocytosis 2/2 septic arthritis vs infection/ inflammation   · Platelets trended down but elevating steadily after open I&D  ? 735 > 732 > 773  · Continue abx per ID recommendations above      5. Ho Type 2 DM  A1c 10.1 on 07/22. Patient continues to struggle to eat, making progress towards full diet. · Blood glucose 104 this morning  · Lantus 15 units nightly  · Humalog 0-6 units Q4H     6. Ho HLD- Atorvastatin 20 mg daily     7. Ho Bipolar Disorder- mood is stable, oriented x3  · Treatment with Effexor, Lamictal      8. Ho Osteoarthritis of the Lumbar Spine   Chronic norco for this issue made pain management difficult during last admission, though he has improved mobility in his knees.     PT/OT evaluation:  DVT prophylaxis/ GI prophylaxis: Lovenox / Protonix   Disposition: discharge to [facility] for subacute rehab    Christen Washburn MS3  Attending physician: Dr. Dale Rivers

## 2020-08-14 NOTE — PLAN OF CARE
Problem: Falls - Risk of:  Goal: Will remain free from falls  Description: Will remain free from falls  Outcome: Met This Shift  Goal: Absence of physical injury  Description: Absence of physical injury  Outcome: Met This Shift     Problem: Pain:  Goal: Pain level will decrease  Description: Pain level will decrease  Outcome: Met This Shift  Goal: Control of acute pain  Description: Control of acute pain  Outcome: Met This Shift  Goal: Control of chronic pain  Description: Control of chronic pain  Outcome: Met This Shift     Problem: Skin Integrity:  Goal: Will show no infection signs and symptoms  Description: Will show no infection signs and symptoms  Outcome: Met This Shift  Goal: Absence of new skin breakdown  Description: Absence of new skin breakdown  Outcome: Met This Shift     Problem: Musculor/Skeletal Functional Status  Goal: Highest potential functional level  Outcome: Met This Shift  Goal: Absence of falls  Outcome: Met This Shift

## 2020-08-14 NOTE — PROGRESS NOTES
LE[facility] PROGRESS NOTE      Chief complaint: Follow-up of bilateral septic knee arthritis    The patient is a 63 y. o. male with history of DM, chronic low back pain with severe spinal stenosis, previously admitted from 07/21-07/31 with Streptococcus agalactiae (susceptible to penicillin) bilateral septic knee arthritis s/p wash-out on 07/23, bilateral knee arthrotomy with exploration and irrigation and debridement on 07/27, discharged on a 4-week course of ceftriaxone from 07/27-08/24, presented on 08/07 with hypotension and bilateral knee pain and swelling for which arthrocentesis showed 921,000 RBCs and 108,300 WBCs (predominantly neutrophilic at 31%) but with cultures showing no growth, prompting bilateral knee arthrotomy with exploration, irrigation and debridement on 08/09 during which dark gelatinous hematoma was noted. Tissue Gram stain and culture showed few polymorphonuclear leukocytes, no epithelial cells, no organisms, no growth to date. He received piperacillin-tazobactam and vancomycin on admission then switched to daptomycin on 08/08. Subjective: Patient was seen and examined. No chills, no abdominal pain, no diarrhea, no rash, no itching. He reports less pain on bilateral knees. He reports experiencing something like a panic attack this morning. Objective:    Vitals:    08/14/20 0800   BP: 134/67   Pulse: 84   Resp: 18   Temp: 97.3 °F (36.3 °C)   SpO2:      Constitutional: Alert, not in distress  Respiratory: Clear breath sounds, no crackles, no wheezes  Cardiovascular: Regular rate and rhythm, no murmurs  Gastrointestinal: Bowel sounds present, soft, nontender  Skin: Warm and dry, no active dermatoses  Musculoskeletal: Bilateral lower extremities with dressing, Ace wrap in place    Labs, imaging, and medical records/notes were personally reviewed. Assessment:  Bilateral septic knee arthritis, s/p bilateral knee arthrotomy with exploration, irrigation and debridement on 08/09.  Synovial fluid and tissue cultures showed no growth to date. Recommendations:  Continue ampicillin 2g q6h to finish 4 weeks of antibiotic therapy from 08/09-09/06. Maintain midline care and monitor labs and schedule follow-up appointment per IV antibiotic protocol for OPAT as indicated on discharge instructions. Follow up blood and tissue cultures. Follow up with me at [address] [place] [address] on 09/03. Thank you for involving me in the care of Ivette Chan. I will continue to follow. Please do not hesitate to call for any questions or concerns.     Electronically signed by Dewayne Gaxiola MD on 8/14/2020 at 10:24 AM

## 2020-08-14 NOTE — PROGRESS NOTES
Department of Orthopedic Surgery  Resident Progress Note    Patient seen and examined. Pain controlled. No new complaints. Denies chest pain, shortness of breath, calf pain, dizziness/lightheadedness. Patient feels his knee pain is still mildly improved but has difficulty with motion. VITALS:  /71   Pulse 84   Temp 97.1 °F (36.2 °C) (Temporal)   Resp 18   Ht 5' 11\" (1.803 m)   Wt 197 lb 11.2 oz (89.7 kg)   SpO2 98%   BMI 27.57 kg/m²     GENERAL: alert and awake  MUSCULOSKELETAL:   bilateral lower extremity:  · Dressing C/D/I  · Compartments soft and compressible, calf non-tender  · Palpable dorsalis pedis and posterior tibialis pulse, brisk cap refill to toes, foot warm and perfused  · Sensation intact to light touch in sural/deep peroneal/superficial peroneal/saphenous/posterior tibial nerve distributions to foot/ankle. · Demonstrates active ankle plantar/dorsiflexion/great toe extension  · Drains in place. CBC:   Lab Results   Component Value Date    WBC 8.2 08/14/2020    HGB 8.9 08/14/2020    HCT 27.6 08/14/2020     08/14/2020       ASSESSMENT  · S/p 3rd  I&D bilateral knee septic arthritis 8/9    PLAN    · WBAT BLE  · Pain control PO and IV- wean to orals  · Recommend pain management consult  · DVT prophy per primary  · Continue abx treatment per primary/ ID  · Monitor vitals  · Trend labs  · Await surgical cultures- currently no growth  · Pt/ot- work to ambulate patient as able- had discussion with patient about working on motion. · Drains removed on 8/10 afternoon  · Ortho to follow peripherally at this time.    · Discuss with Dr. Malcolm Johns

## 2020-08-14 NOTE — PROGRESS NOTES
[facility]  Internal Medicine Residency / [address]    Attending Physician Statement  I have discussed the case, including pertinent history and exam findings with the resident and the team.  I have seen and examined the patient and the key elements of the encounter have been performed by me. I agree with the assessment, plan and orders as documented by the resident. Case Discussed During AM Rounds    No overnight events   H/H remains stable    Glucose remains stable    Vitals remain stable and afebrile without oxygen requirements   Plan for DC to facility- awaiting precert- ok to DC when precert complete     Anemia- H/H stable    S/p Transfusion on 8/11     H/H monitoring   States remotely may have had melanotic stools- denies currently (4 weeks prior)   Recent CSCOPE and EGD per patient by Dr. Shala Reyes- states hx of \"stomach inflammation\" and polyp removal    Consider repeat endoscopy in future if further concerns    Monitor to ensure not repeat findings of potential joint hematoma- given previous evacuation- currently no findings    Continue PPI     Septic Arthritis    Continue IV atbs per ID until stop date   Right midline in place for continued use upon transfer to [facility]     Type II DM- insulin requiring    Improved PO intake over last 24 hours    Glucose improved   Continue insulin regimen     Reactive Thrombocytosis- stable      Disposition- social work assessment for DC planning needs likely to [facility] today     Remainder of medical problems as per resident note.       Mack Andino  Internal Medicine Residency Faculty

## 2020-08-21 ENCOUNTER — TELEPHONE (OUTPATIENT)
Dept: ORTHOPEDIC SURGERY | Age: 55
End: 2020-08-21

## 2020-08-21 NOTE — TELEPHONE ENCOUNTER
Rhina Acuna from [employer] called in to reschedule pt's appt. she would like to know if he can keep his appt for 8/27/2020 but change the time until after 1pm, they are having transportation issues. If not, the pt will need to reschedule the appt. Please, advise. Thank you. Rhina Acuna can be reached at [phone].

## 2020-08-24 LAB
FUNGUS (MYCOLOGY) CULTURE: NORMAL
FUNGUS (MYCOLOGY) CULTURE: NORMAL
FUNGUS STAIN: NORMAL
FUNGUS STAIN: NORMAL

## 2020-09-02 ENCOUNTER — HOSPITAL ENCOUNTER (OUTPATIENT)
Age: 55
Discharge: HOME | End: 2020-09-04
Payer: COMMERCIAL

## 2020-09-02 PROCEDURE — U0003 INFECTIOUS AGENT DETECTION BY NUCLEIC ACID (DNA OR RNA); SEVERE ACUTE RESPIRATORY SYNDROME CORONAVIRUS 2 (SARS-COV-2) (CORONAVIRUS DISEASE [COVID-19]), AMPLIFIED PROBE TECHNIQUE, MAKING USE OF HIGH THROUGHPUT TECHNOLOGIES AS DESCRIBED BY CMS-2020-01-R: HCPCS

## 2020-09-03 ENCOUNTER — OFFICE VISIT (OUTPATIENT)
Dept: ORTHOPEDIC SURGERY | Age: 55
End: 2020-09-03
Payer: COMMERCIAL

## 2020-09-03 VITALS — HEART RATE: 99 BPM | DIASTOLIC BLOOD PRESSURE: 68 MMHG | SYSTOLIC BLOOD PRESSURE: 104 MMHG

## 2020-09-03 PROCEDURE — 99212 OFFICE O/P EST SF 10 MIN: CPT | Performed by: NURSE PRACTITIONER

## 2020-09-03 PROCEDURE — 99024 POSTOP FOLLOW-UP VISIT: CPT | Performed by: NURSE PRACTITIONER

## 2020-09-03 RX ORDER — OXYCODONE HCL 10 MG/1
10 TABLET, FILM COATED, EXTENDED RELEASE ORAL EVERY 12 HOURS
COMMUNITY
End: 2020-11-06

## 2020-09-03 RX ORDER — OXYCODONE HYDROCHLORIDE 5 MG/1
5 TABLET ORAL EVERY 4 HOURS PRN
COMMUNITY
End: 2021-02-19 | Stop reason: ALTCHOICE

## 2020-09-03 NOTE — PATIENT INSTRUCTIONS
New orders for [facility]:    Continue weightbearing as tolerated bilateral lower extremities. Sutures were removed today, Steri-Strips are applied. Steri-Strips should fall off on their own, if they do not fall off after 10 days okay to remove. Pain control per facility physician. Wounds were not rewrapped due to follow-up with infectious disease in an hour. Okay to shower and allow water to run over the incisions, no soaking or submerging until the incisions are completely healed. Follow-up with ID recommendations for IV and oral antibiotics. Call if you develop any fevers or chills or any other systemic signs or symptoms of infection. Follow-up in 4 weeks. Call sooner with any problems or concerns.

## 2020-09-03 NOTE — PROGRESS NOTES
OP:Date of Procedure: 8/9/2020  Procedure(s): bilateral knee arthrotomy with exploration, irrigation and debridement   Surgeon(s): Sea Hess DO     Subjective:  Parul Lan is approximately 3 weeks follow-up from the above surgery. Patient is WBAT on that extremity. He ambulates with assistive device, walker. Pain to extremity is mild and is  taking pain medication, Oxycodone and Robaxin with good control. They denies numbness, tingling, weakness. Denies Calf pain. Patient continues to use DVT prophylaxis, Aspirin twice daily. Patient is  participating in therapy at the facility. He continues to follow with ID. PICC line to the RUE. He continues with IV ampicillin. He is to see ID this afternoon. He denies any fevers or chills. Review of Systems -    General ROS: negative for - chills, fatigue, fever or night sweats  Respiratory ROS: no cough, shortness of breath, or wheezing  Cardiovascular ROS: no chest pain or dyspnea on exertion  Gastrointestinal ROS: no abdominal pain, nausea, vomiting, diarrhea, constipation,or black or bloody stools  Genitourinary: no hematuria, dysuria, or incontinence   Musculoskeletal ROS: negative for -back or neck pain or stiffness, also see HPI  Neurological ROS: no TIA or stroke symptoms     Objective:    General: Alert and oriented X 3, normocephalic atraumatic, external ears and eye normal, sclera clear, no acute distress, respirations easy and unlabored with no audible wheezes, skin warm and dry, speech and dress appropriate for noted age, affect euthymic.     Extremity:  Bilateral Lower Extremity  Skin clean dry and intact, without signs of infection  Incisions well approximated without signs of redness, warmth or drainage- sutures intact and ready for removal  No erythema  Mild pain to palpation over the knees  Trace edema noted over bilateral knees, but improving  Compartments supple throughout thigh and leg,   Calf supple and nontender to palaption  Demonstrates active knee flexion/extension 0 to 100 degrees bilaterally, ankle plantar/dorsiflexion/great toe extension. States sensation intact to touch in sural/deep peroneal/superficial peroneal/saphenous/posterior tibial nerve distributions to foot/ankle. Palpable dorsalis pedis and posterior tibialis pulses, cap refill brisk in toes, foot warm/perfused. /68 (Site: Left Upper Arm, Position: Sitting, Cuff Size: Medium Adult)   Pulse 99     XR: None obtained today    Assessment:   Diagnosis Orders   1. Septic arthritis of knee, bilateral ([facility] [address]         Plan:   New orders for [facility]:    Continue weightbearing as tolerated bilateral lower extremities. Sutures were removed today, Steri-Strips are applied. Steri-Strips should fall off on their own, if they do not fall off after 10 days okay to remove. Pain control per facility physician. Wounds were not rewrapped due to follow-up with infectious disease in an hour. Okay to shower and allow water to run over the incisions, no soaking or submerging until the incisions are completely healed. Follow-up with ID recommendations for IV and oral antibiotics. Call if you develop any fevers or chills or any other systemic signs or symptoms of infection. Follow-up in 4 weeks. Call sooner with any problems or concerns. Electronically signed by FRANCIE Franco CNP on 9/3/2020 at 11:48 AM    Note: This report was completed using computerOcapo voiced recognition software.  Every effort has been made to ensure accuracy; however, inadvertent computerized transcription errors may be present.

## 2020-09-08 LAB
AFB CULTURE (MYCOBACTERIA): NORMAL
AFB CULTURE (MYCOBACTERIA): NORMAL
AFB SMEAR: NORMAL
AFB SMEAR: NORMAL

## 2020-09-09 LAB
SARS-COV-2: NOT DETECTED
SOURCE: NORMAL

## 2020-09-29 NOTE — PROGRESS NOTES
itching, masses, hair or nail changes    Outpatient Medications Marked as Taking for the 9/30/20 encounter (Office Visit) with Waqas Persaud MD   Medication Sig Dispense Refill    senna (SENOKOT) 8.6 MG tablet Take 1 tablet by mouth daily      oxyCODONE (OXYCONTIN) 10 MG extended release tablet Take 10 mg by mouth every 12 hours. 10 mg PO every 12 hrs PRN pain      aspirin 325 MG EC tablet Take 1 tablet by mouth 2 times daily 30 tablet 3    insulin glargine (LANTUS) 100 UNIT/ML injection vial Inject 15 Units into the skin nightly (Patient taking differently: Inject 15 Units into the skin nightly Taking 20 units at bedtime) 1 vial 3    atorvastatin (LIPITOR) 20 MG tablet Take 1 tablet by mouth daily 7 tablet 0    methocarbamol (ROBAXIN) 500 MG tablet Take 2 tablets by mouth 4 times daily as needed (As needed for muscle spasms) 56 tablet 0    pantoprazole (PROTONIX) 40 MG tablet Take 1 tablet by mouth every morning (before breakfast) 7 tablet 0    insulin lispro (HUMALOG) 100 UNIT/ML injection vial Inject 0-18 Units into the skin 3 times daily (with meals) 1 vial 3    venlafaxine (EFFEXOR) 37.5 MG tablet Take 37.5 mg by mouth daily      lamoTRIgine (LAMICTAL) 25 MG tablet Take 25 mg by mouth daily      Multiple Vitamin (MULTI VITAMIN MENS PO) Take by mouth daily          I have reviewed all pertinent PMHx, PSHx, FamHx, SocialHx, medications, and allergies and updated history as appropriate. OBJECTIVE:    VS: BP (!) 83/52 (Site: Right Upper Arm, Position: Sitting, Cuff Size: Medium Adult)   Pulse 91   Temp 97.2 °F (36.2 °C) (Oral)   Resp 20   Ht 5' 11\" (1.803 m)   Wt 191 lb 11.2 oz (87 kg)   SpO2 96%   BMI 26.74 kg/m²   General appearance: Alert awake oriented x3, looks tired  Lungs: Lungs clear to auscultation bilaterally. No rhonchi, crackles or wheezes  Heart: S1 S2  Regular rate and rhythm. No rub, murmur or gallop  Abdomen: Abdomen soft, non-tender.  non-distended BS normal. No masses, organomegaly, no guarding rebound or rigidity. Extremities: No edema, Peripheral pulses palpable 2/4; Knee scar/wounds stable, they don't look actively infected  Neuro: Alert awake oriented to time place person, no gross focal neurologic deficit    ASSESSMENT/PLAN:    Hypotension  Likely 2/2 poor oral intake/ oxycodone? Reports dizziness, headache, blurry vision, nausea  BP of 80/50 mmHg  Orthostatic vitals negative  Finished percocet yesterday for bilateral knee pain S/p bilateral knee arthrotomy and debridement  Blood sugar of 201 mg/dl, oxygen saturation of 96%  Due to symptomatic persistent hypotension, will send to ER for further evaluation.  Eventhough blood sugar of 201, DM has been uncontrolled with HbA1C of 10.1%, needs to rule out DKA as well    Type 2 diabetes mellitus  Uncontrolled at home-- we did fingerstick today >200  Last HbA1c 10.1% (7/20)    History of bilateral septic arthritis s/p bilateral knee arthrotomy and debridement  Finished antibiotic in 9/6/2020  Pain controlled  Wound area looks clean and does not look infected    I have reviewed my findings and recommendations with Magdalena Collado and Dr Marianela Kyle MD PGY-2  9/30/2020 9:49 AM

## 2020-09-30 ENCOUNTER — APPOINTMENT (OUTPATIENT)
Dept: GENERAL RADIOLOGY | Age: 55
DRG: 137 | End: 2020-09-30
Payer: COMMERCIAL

## 2020-09-30 ENCOUNTER — APPOINTMENT (OUTPATIENT)
Dept: CT IMAGING | Age: 55
DRG: 137 | End: 2020-09-30
Payer: COMMERCIAL

## 2020-09-30 ENCOUNTER — HOSPITAL ENCOUNTER (INPATIENT)
Age: 55
LOS: 3 days | Discharge: HOME | DRG: 137 | End: 2020-10-03
Attending: EMERGENCY MEDICINE | Admitting: INTERNAL MEDICINE
Payer: COMMERCIAL

## 2020-09-30 ENCOUNTER — OFFICE VISIT (OUTPATIENT)
Dept: INTERNAL MEDICINE | Age: 55
DRG: 137 | End: 2020-09-30
Payer: COMMERCIAL

## 2020-09-30 VITALS
OXYGEN SATURATION: 96 % | SYSTOLIC BLOOD PRESSURE: 83 MMHG | RESPIRATION RATE: 20 BRPM | HEIGHT: 71 IN | BODY MASS INDEX: 26.84 KG/M2 | TEMPERATURE: 97.2 F | WEIGHT: 191.7 LBS | DIASTOLIC BLOOD PRESSURE: 52 MMHG | HEART RATE: 91 BPM

## 2020-09-30 DIAGNOSIS — U07.1 COVID-19: Primary | ICD-10-CM

## 2020-09-30 PROBLEM — A41.9 SEPSIS (HCC): Status: RESOLVED | Noted: 2020-08-07 | Resolved: 2020-09-30

## 2020-09-30 PROBLEM — N50.819 TESTICLE PAIN: Status: RESOLVED | Noted: 2018-07-31 | Resolved: 2020-09-30

## 2020-09-30 LAB
ABO/RH: NORMAL
ADENOVIRUS BY PCR: NOT DETECTED
ALBUMIN SERPL-MCNC: 3.7 G/DL (ref 3.5–5.2)
ALP BLD-CCNC: 84 U/L (ref 40–129)
ALT SERPL-CCNC: 22 U/L (ref 0–40)
ANION GAP SERPL CALCULATED.3IONS-SCNC: 14 MMOL/L (ref 7–16)
ANTIBODY SCREEN: NORMAL
AST SERPL-CCNC: 29 U/L (ref 0–39)
BASOPHILS ABSOLUTE: 0.04 E9/L (ref 0–0.2)
BASOPHILS RELATIVE PERCENT: 0.4 % (ref 0–2)
BILIRUB SERPL-MCNC: 0.4 MG/DL (ref 0–1.2)
BORDETELLA PARAPERTUSSIS BY PCR: NOT DETECTED
BORDETELLA PERTUSSIS BY PCR: NOT DETECTED
BUN BLDV-MCNC: 25 MG/DL (ref 6–20)
CALCIUM SERPL-MCNC: 9.8 MG/DL (ref 8.6–10.2)
CHLAMYDOPHILIA PNEUMONIAE BY PCR: NOT DETECTED
CHLORIDE BLD-SCNC: 100 MMOL/L (ref 98–107)
CHP ED QC CHECK: NORMAL
CO2: 23 MMOL/L (ref 22–29)
CORONAVIRUS 229E BY PCR: NOT DETECTED
CORONAVIRUS HKU1 BY PCR: NOT DETECTED
CORONAVIRUS NL63 BY PCR: NOT DETECTED
CORONAVIRUS OC43 BY PCR: NOT DETECTED
CREAT SERPL-MCNC: 1.2 MG/DL (ref 0.7–1.2)
EKG ATRIAL RATE: 81 BPM
EKG P AXIS: 66 DEGREES
EKG P-R INTERVAL: 186 MS
EKG Q-T INTERVAL: 404 MS
EKG QRS DURATION: 90 MS
EKG QTC CALCULATION (BAZETT): 469 MS
EKG R AXIS: -14 DEGREES
EKG T AXIS: 53 DEGREES
EKG VENTRICULAR RATE: 81 BPM
EOSINOPHILS ABSOLUTE: 0.06 E9/L (ref 0.05–0.5)
EOSINOPHILS RELATIVE PERCENT: 0.6 % (ref 0–6)
GFR AFRICAN AMERICAN: >60
GFR NON-AFRICAN AMERICAN: >60 ML/MIN/1.73
GLUCOSE BLD-MCNC: 203 MG/DL
GLUCOSE BLD-MCNC: 220 MG/DL (ref 74–99)
HCT VFR BLD CALC: 33.3 % (ref 37–54)
HEMOGLOBIN: 10.5 G/DL (ref 12.5–16.5)
HUMAN METAPNEUMOVIRUS BY PCR: NOT DETECTED
HUMAN RHINOVIRUS/ENTEROVIRUS BY PCR: NOT DETECTED
IMMATURE GRANULOCYTES #: 0.04 E9/L
IMMATURE GRANULOCYTES %: 0.4 % (ref 0–5)
INFLUENZA A BY PCR: NOT DETECTED
INFLUENZA B BY PCR: NOT DETECTED
LACTIC ACID, SEPSIS: 1.3 MMOL/L (ref 0.5–1.9)
LYMPHOCYTES ABSOLUTE: 1.6 E9/L (ref 1.5–4)
LYMPHOCYTES RELATIVE PERCENT: 16 % (ref 20–42)
MAGNESIUM: 2 MG/DL (ref 1.6–2.6)
MCH RBC QN AUTO: 26.4 PG (ref 26–35)
MCHC RBC AUTO-ENTMCNC: 31.5 % (ref 32–34.5)
MCV RBC AUTO: 83.9 FL (ref 80–99.9)
METER GLUCOSE: 155 MG/DL (ref 74–99)
MONOCYTES ABSOLUTE: 0.65 E9/L (ref 0.1–0.95)
MONOCYTES RELATIVE PERCENT: 6.5 % (ref 2–12)
MYCOPLASMA PNEUMONIAE BY PCR: NOT DETECTED
NEUTROPHILS ABSOLUTE: 7.61 E9/L (ref 1.8–7.3)
NEUTROPHILS RELATIVE PERCENT: 76.1 % (ref 43–80)
PARAINFLUENZA VIRUS 1 BY PCR: NOT DETECTED
PARAINFLUENZA VIRUS 2 BY PCR: NOT DETECTED
PARAINFLUENZA VIRUS 3 BY PCR: NOT DETECTED
PARAINFLUENZA VIRUS 4 BY PCR: NOT DETECTED
PDW BLD-RTO: 15.4 FL (ref 11.5–15)
PLATELET # BLD: 523 E9/L (ref 130–450)
PMV BLD AUTO: 8.7 FL (ref 7–12)
POTASSIUM SERPL-SCNC: 4.3 MMOL/L (ref 3.5–5)
PROCALCITONIN: 0.07 NG/ML (ref 0–0.08)
RBC # BLD: 3.97 E12/L (ref 3.8–5.8)
RESPIRATORY SYNCYTIAL VIRUS BY PCR: NOT DETECTED
SARS-COV-2, NAAT: DETECTED
SODIUM BLD-SCNC: 137 MMOL/L (ref 132–146)
TOTAL PROTEIN: 7.6 G/DL (ref 6.4–8.3)
TROPONIN: <0.01 NG/ML (ref 0–0.03)
WBC # BLD: 10 E9/L (ref 4.5–11.5)

## 2020-09-30 PROCEDURE — G8427 DOCREV CUR MEDS BY ELIG CLIN: HCPCS | Performed by: INTERNAL MEDICINE

## 2020-09-30 PROCEDURE — 36415 COLL VENOUS BLD VENIPUNCTURE: CPT

## 2020-09-30 PROCEDURE — 71250 CT THORAX DX C-: CPT

## 2020-09-30 PROCEDURE — 82728 ASSAY OF FERRITIN: CPT

## 2020-09-30 PROCEDURE — 6370000000 HC RX 637 (ALT 250 FOR IP): Performed by: INTERNAL MEDICINE

## 2020-09-30 PROCEDURE — 84484 ASSAY OF TROPONIN QUANT: CPT

## 2020-09-30 PROCEDURE — 80053 COMPREHEN METABOLIC PANEL: CPT

## 2020-09-30 PROCEDURE — G8419 CALC BMI OUT NRM PARAM NOF/U: HCPCS | Performed by: INTERNAL MEDICINE

## 2020-09-30 PROCEDURE — 93010 ELECTROCARDIOGRAM REPORT: CPT | Performed by: INTERNAL MEDICINE

## 2020-09-30 PROCEDURE — 82962 GLUCOSE BLOOD TEST: CPT

## 2020-09-30 PROCEDURE — 83615 LACTATE (LD) (LDH) ENZYME: CPT

## 2020-09-30 PROCEDURE — 1036F TOBACCO NON-USER: CPT | Performed by: INTERNAL MEDICINE

## 2020-09-30 PROCEDURE — U0002 COVID-19 LAB TEST NON-CDC: HCPCS

## 2020-09-30 PROCEDURE — 3046F HEMOGLOBIN A1C LEVEL >9.0%: CPT | Performed by: INTERNAL MEDICINE

## 2020-09-30 PROCEDURE — 93005 ELECTROCARDIOGRAM TRACING: CPT | Performed by: STUDENT IN AN ORGANIZED HEALTH CARE EDUCATION/TRAINING PROGRAM

## 2020-09-30 PROCEDURE — 99214 OFFICE O/P EST MOD 30 MIN: CPT | Performed by: INTERNAL MEDICINE

## 2020-09-30 PROCEDURE — 96374 THER/PROPH/DIAG INJ IV PUSH: CPT

## 2020-09-30 PROCEDURE — 0100U HC RESPIRPTHGN MULT REV TRANS & AMP PRB TECH 21 TRGT: CPT

## 2020-09-30 PROCEDURE — 86850 RBC ANTIBODY SCREEN: CPT

## 2020-09-30 PROCEDURE — 3017F COLORECTAL CA SCREEN DOC REV: CPT | Performed by: INTERNAL MEDICINE

## 2020-09-30 PROCEDURE — 2580000003 HC RX 258: Performed by: INTERNAL MEDICINE

## 2020-09-30 PROCEDURE — 99283 EMERGENCY DEPT VISIT LOW MDM: CPT

## 2020-09-30 PROCEDURE — 87040 BLOOD CULTURE FOR BACTERIA: CPT

## 2020-09-30 PROCEDURE — 84145 PROCALCITONIN (PCT): CPT

## 2020-09-30 PROCEDURE — 6370000000 HC RX 637 (ALT 250 FOR IP): Performed by: EMERGENCY MEDICINE

## 2020-09-30 PROCEDURE — 71045 X-RAY EXAM CHEST 1 VIEW: CPT

## 2020-09-30 PROCEDURE — 2022F DILAT RTA XM EVC RTNOPTHY: CPT | Performed by: INTERNAL MEDICINE

## 2020-09-30 PROCEDURE — 6360000002 HC RX W HCPCS: Performed by: STUDENT IN AN ORGANIZED HEALTH CARE EDUCATION/TRAINING PROGRAM

## 2020-09-30 PROCEDURE — 86900 BLOOD TYPING SEROLOGIC ABO: CPT

## 2020-09-30 PROCEDURE — 82962 GLUCOSE BLOOD TEST: CPT | Performed by: INTERNAL MEDICINE

## 2020-09-30 PROCEDURE — 85025 COMPLETE CBC W/AUTO DIFF WBC: CPT

## 2020-09-30 PROCEDURE — 99284 EMERGENCY DEPT VISIT MOD MDM: CPT

## 2020-09-30 PROCEDURE — 99212 OFFICE O/P EST SF 10 MIN: CPT | Performed by: INTERNAL MEDICINE

## 2020-09-30 PROCEDURE — 83735 ASSAY OF MAGNESIUM: CPT

## 2020-09-30 PROCEDURE — 2140000000 HC CCU INTERMEDIATE R&B

## 2020-09-30 PROCEDURE — 86901 BLOOD TYPING SEROLOGIC RH(D): CPT

## 2020-09-30 PROCEDURE — 83605 ASSAY OF LACTIC ACID: CPT

## 2020-09-30 RX ORDER — OXYCODONE HYDROCHLORIDE 5 MG/1
5 TABLET ORAL EVERY 4 HOURS PRN
Status: DISCONTINUED | OUTPATIENT
Start: 2020-09-30 | End: 2020-10-03 | Stop reason: HOSPADM

## 2020-09-30 RX ORDER — PANTOPRAZOLE SODIUM 40 MG/1
40 TABLET, DELAYED RELEASE ORAL
Status: DISCONTINUED | OUTPATIENT
Start: 2020-10-01 | End: 2020-10-03 | Stop reason: HOSPADM

## 2020-09-30 RX ORDER — SODIUM CHLORIDE 0.9 % (FLUSH) 0.9 %
10 SYRINGE (ML) INJECTION PRN
Status: DISCONTINUED | OUTPATIENT
Start: 2020-09-30 | End: 2020-10-03 | Stop reason: HOSPADM

## 2020-09-30 RX ORDER — NICOTINE POLACRILEX 4 MG
15 LOZENGE BUCCAL PRN
Status: DISCONTINUED | OUTPATIENT
Start: 2020-09-30 | End: 2020-10-03 | Stop reason: HOSPADM

## 2020-09-30 RX ORDER — ACETAMINOPHEN 325 MG/1
650 TABLET ORAL EVERY 6 HOURS PRN
Status: DISCONTINUED | OUTPATIENT
Start: 2020-09-30 | End: 2020-10-03 | Stop reason: HOSPADM

## 2020-09-30 RX ORDER — DEXTROSE MONOHYDRATE 50 MG/ML
100 INJECTION, SOLUTION INTRAVENOUS PRN
Status: DISCONTINUED | OUTPATIENT
Start: 2020-09-30 | End: 2020-10-03 | Stop reason: HOSPADM

## 2020-09-30 RX ORDER — ONDANSETRON 2 MG/ML
4 INJECTION INTRAMUSCULAR; INTRAVENOUS EVERY 6 HOURS PRN
Status: DISCONTINUED | OUTPATIENT
Start: 2020-09-30 | End: 2020-10-03 | Stop reason: HOSPADM

## 2020-09-30 RX ORDER — ASPIRIN 81 MG/1
81 TABLET ORAL DAILY
Qty: 90 TABLET | Refills: 1 | Status: SHIPPED
Start: 2020-09-30 | End: 2021-11-08 | Stop reason: SDUPTHER

## 2020-09-30 RX ORDER — SODIUM CHLORIDE 0.9 % (FLUSH) 0.9 %
10 SYRINGE (ML) INJECTION EVERY 12 HOURS SCHEDULED
Status: DISCONTINUED | OUTPATIENT
Start: 2020-09-30 | End: 2020-10-03 | Stop reason: HOSPADM

## 2020-09-30 RX ORDER — POLYETHYLENE GLYCOL 3350 17 G/17G
17 POWDER, FOR SOLUTION ORAL DAILY PRN
Status: DISCONTINUED | OUTPATIENT
Start: 2020-09-30 | End: 2020-10-03 | Stop reason: HOSPADM

## 2020-09-30 RX ORDER — HYDROCODONE BITARTRATE AND ACETAMINOPHEN 5; 325 MG/1; MG/1
1 TABLET ORAL ONCE
Status: COMPLETED | OUTPATIENT
Start: 2020-09-30 | End: 2020-09-30

## 2020-09-30 RX ORDER — METHOCARBAMOL 500 MG/1
1000 TABLET, FILM COATED ORAL 4 TIMES DAILY PRN
Status: DISCONTINUED | OUTPATIENT
Start: 2020-09-30 | End: 2020-10-03 | Stop reason: HOSPADM

## 2020-09-30 RX ORDER — INSULIN GLARGINE 100 [IU]/ML
15 INJECTION, SOLUTION SUBCUTANEOUS NIGHTLY
Status: DISCONTINUED | OUTPATIENT
Start: 2020-09-30 | End: 2020-10-03 | Stop reason: HOSPADM

## 2020-09-30 RX ORDER — SENNA PLUS 8.6 MG/1
1 TABLET ORAL DAILY
Status: DISCONTINUED | OUTPATIENT
Start: 2020-09-30 | End: 2020-10-03 | Stop reason: HOSPADM

## 2020-09-30 RX ORDER — ASPIRIN 81 MG/1
81 TABLET ORAL DAILY
Status: DISCONTINUED | OUTPATIENT
Start: 2020-09-30 | End: 2020-10-03 | Stop reason: HOSPADM

## 2020-09-30 RX ORDER — ACETAMINOPHEN 650 MG/1
650 SUPPOSITORY RECTAL EVERY 6 HOURS PRN
Status: DISCONTINUED | OUTPATIENT
Start: 2020-09-30 | End: 2020-10-03 | Stop reason: HOSPADM

## 2020-09-30 RX ORDER — VENLAFAXINE 75 MG/1
37.5 TABLET ORAL DAILY
Status: DISCONTINUED | OUTPATIENT
Start: 2020-09-30 | End: 2020-10-03 | Stop reason: HOSPADM

## 2020-09-30 RX ORDER — ATORVASTATIN CALCIUM 20 MG/1
20 TABLET, FILM COATED ORAL NIGHTLY
Status: DISCONTINUED | OUTPATIENT
Start: 2020-09-30 | End: 2020-10-03 | Stop reason: HOSPADM

## 2020-09-30 RX ORDER — DEXTROSE MONOHYDRATE 25 G/50ML
12.5 INJECTION, SOLUTION INTRAVENOUS PRN
Status: DISCONTINUED | OUTPATIENT
Start: 2020-09-30 | End: 2020-10-03 | Stop reason: HOSPADM

## 2020-09-30 RX ORDER — LAMOTRIGINE 25 MG/1
25 TABLET ORAL DAILY
Status: DISCONTINUED | OUTPATIENT
Start: 2020-09-30 | End: 2020-10-03 | Stop reason: HOSPADM

## 2020-09-30 RX ORDER — SENNA PLUS 8.6 MG/1
1 TABLET ORAL DAILY
COMMUNITY
End: 2021-11-08 | Stop reason: SDUPTHER

## 2020-09-30 RX ADMIN — INSULIN GLARGINE 15 UNITS: 100 INJECTION, SOLUTION SUBCUTANEOUS at 22:23

## 2020-09-30 RX ADMIN — HYDROCODONE BITARTRATE AND ACETAMINOPHEN 1 TABLET: 5; 325 TABLET ORAL at 18:28

## 2020-09-30 RX ADMIN — INSULIN LISPRO 1 UNITS: 100 INJECTION, SOLUTION INTRAVENOUS; SUBCUTANEOUS at 22:24

## 2020-09-30 RX ADMIN — HYDROMORPHONE HYDROCHLORIDE 0.5 MG: 1 INJECTION, SOLUTION INTRAMUSCULAR; INTRAVENOUS; SUBCUTANEOUS at 13:04

## 2020-09-30 RX ADMIN — VENLAFAXINE HYDROCHLORIDE 37.5 MG: 75 TABLET ORAL at 23:01

## 2020-09-30 RX ADMIN — Medication 10 ML: at 22:27

## 2020-09-30 RX ADMIN — ATORVASTATIN CALCIUM 20 MG: 20 TABLET, FILM COATED ORAL at 23:01

## 2020-09-30 RX ADMIN — LAMOTRIGINE 25 MG: 25 TABLET ORAL at 23:01

## 2020-09-30 RX ADMIN — SENNOSIDES 8.6 MG: 8.6 TABLET, FILM COATED ORAL at 21:49

## 2020-09-30 RX ADMIN — ASPIRIN 81 MG: 81 TABLET, COATED ORAL at 21:49

## 2020-09-30 SDOH — ECONOMIC STABILITY: FOOD INSECURITY: WITHIN THE PAST 12 MONTHS, YOU WORRIED THAT YOUR FOOD WOULD RUN OUT BEFORE YOU GOT MONEY TO BUY MORE.: OFTEN TRUE

## 2020-09-30 SDOH — ECONOMIC STABILITY: INCOME INSECURITY: HOW HARD IS IT FOR YOU TO PAY FOR THE VERY BASICS LIKE FOOD, HOUSING, MEDICAL CARE, AND HEATING?: VERY HARD

## 2020-09-30 SDOH — ECONOMIC STABILITY: FOOD INSECURITY: WITHIN THE PAST 12 MONTHS, THE FOOD YOU BOUGHT JUST DIDN'T LAST AND YOU DIDN'T HAVE MONEY TO GET MORE.: OFTEN TRUE

## 2020-09-30 SDOH — ECONOMIC STABILITY: TRANSPORTATION INSECURITY
IN THE PAST 12 MONTHS, HAS THE LACK OF TRANSPORTATION KEPT YOU FROM MEDICAL APPOINTMENTS OR FROM GETTING MEDICATIONS?: NO

## 2020-09-30 SDOH — ECONOMIC STABILITY: TRANSPORTATION INSECURITY
IN THE PAST 12 MONTHS, HAS LACK OF TRANSPORTATION KEPT YOU FROM MEETINGS, WORK, OR FROM GETTING THINGS NEEDED FOR DAILY LIVING?: NO

## 2020-09-30 ASSESSMENT — PAIN DESCRIPTION - PAIN TYPE
TYPE: ACUTE PAIN
TYPE: ACUTE PAIN

## 2020-09-30 ASSESSMENT — PAIN SCALES - GENERAL
PAINLEVEL_OUTOF10: 9
PAINLEVEL_OUTOF10: 10
PAINLEVEL_OUTOF10: 9

## 2020-09-30 ASSESSMENT — PAIN DESCRIPTION - ORIENTATION
ORIENTATION: RIGHT;LEFT
ORIENTATION: LOWER;RIGHT;LEFT

## 2020-09-30 ASSESSMENT — PATIENT HEALTH QUESTIONNAIRE - PHQ9
SUM OF ALL RESPONSES TO PHQ QUESTIONS 1-9: 2
2. FEELING DOWN, DEPRESSED OR HOPELESS: 0
1. LITTLE INTEREST OR PLEASURE IN DOING THINGS: 2
SUM OF ALL RESPONSES TO PHQ QUESTIONS 1-9: 2
SUM OF ALL RESPONSES TO PHQ9 QUESTIONS 1 & 2: 2

## 2020-09-30 ASSESSMENT — PAIN DESCRIPTION - DESCRIPTORS: DESCRIPTORS: BURNING;STABBING

## 2020-09-30 ASSESSMENT — PAIN DESCRIPTION - LOCATION: LOCATION: KNEE

## 2020-09-30 ASSESSMENT — PAIN DESCRIPTION - FREQUENCY: FREQUENCY: CONTINUOUS

## 2020-09-30 NOTE — ACP (ADVANCE CARE PLANNING)
ADVANCED CARE PLANNING    Patient Name: Dyan Butler       YOB: 1965              MRN:    [id number]  Admission Date:  9/30/2020 11:29 AM    Active Diagnoses:    COVID-19 infection  Acute hypoxic respiratory failure  Previous history of septic arthritis  S/p bilateral knee arthrotomy  Diabetes mellitus  Neuropathy  Hyperlipidemia  Depression by history    These active diagnoses are of sufficient risk that focused discussion on advanced care planning is indicated in order to allow the patinet to thoughtfully consider personal goals of care; and, if situations arise that prevent the patient to personally give input, to ensure appropriate representation of their personal desire for different levels and levels of care. Persons present in discussion: Dyan Butler    Discussion: I reviewed his admission for COVID-19 infection, hypoxemia, respiratory failure and his desires for ongoing aggresive care, including potential intubation and mechanical ventilation; also discussed who would speak on his behalf should he be unable to do so and discussed what conversations he has had with his family so they understand his desires if such a situation occurred now or in the future. I presented and explained the availability of our palliaitve care team to him patient wants to be full code. In case necessity arises patient wants to be intubated and placed on mechanical ventilation.       CODE STATUS FULL CODE      Time Spent on Advanced Planning Documents: 30 minutes    Electronically signed by Allyssa Sutton MD on 9/30/2020 at 7:10 PM

## 2020-09-30 NOTE — ACP (ADVANCE CARE PLANNING)
Advance Care Planning     Advance Care Planning Activator (Inpatient)  Conversation Note      Date of ACP Conversation: 9/30/2020    Conversation Conducted with: Patient with Decision Making Capacity    ACP Activator: [address] [facility], [address] [place] [address] Decision Maker:     Current Designated Health Care Decision Maker:   Primary Decision Maker: Leslie Jensen Brother/Sister - [phone]    Care Preferences    Ventilation: \"If you were in your present state of health and suddenly became very ill and were unable to breathe on your own, what would your preference be about the use of a ventilator (breathing machine) if it were available to you? \"      Would the patient desire the use of ventilator (breathing machine)?: yes    \"If your health worsens and it becomes clear that your chance of recovery is unlikely, what would your preference be about the use of a ventilator (breathing machine) if it were available to you? \"     Would the patient desire the use of ventilator (breathing machine)?: Yes      Resuscitation  \"CPR works best to restart the heart when there is a sudden event, like a heart attack, in someone who is otherwise healthy. Unfortunately, CPR does not typically restart the heart for people who have serious health conditions or who are very sick. \"    \"In the event your heart stopped as a result of an underlying serious health condition, would you want attempts to be made to restart your heart (answer \"yes\" for attempt to resuscitate) or would you prefer a natural death (answer \"no\" for do not attempt to resuscitate)? \" yes      NOTE: If the patient has a valid advance directive AND now provides care preference(s) that are inconsistent with that prior directive, advise the patient to consider either: creating a new advance directive that complies with state-specific requirements; or, if that is not possible, orally revoking that prior directive in accordance with state-specific requirements, which must be documented in the EHR. [] Yes   [x] No   Educated Patient / Sebas  regarding differences between Advance Directives and portable DNR orders.     Length of ACP Conversation in minutes:   10    Conversation Outcomes:  [x] ACP discussion completed  [] Existing advance directive reviewed with patient; no changes to patient's previously recorded wishes  [] New Advance Directive completed  [] Portable Do Not Rescitate prepared for Provider review and signature  [] POLST/POST/MOLST/MOST prepared for Provider review and signature      Follow-up plan:    [] Schedule follow-up conversation to continue planning  [] Referred individual to Provider for additional questions/concerns   [x] Advised patient/agent/surrogate to review completed ACP document and update if needed with changes in condition, patient preferences or care setting    [x] This note routed to one or more involved healthcare providers

## 2020-09-30 NOTE — PROGRESS NOTES
[facility]  [facility] Clinic    Attending Physician Statement:  Promise Boston M.D., F.A.C.P. I have discussed the case, including pertinent history and exam findings with the resident. I have seen and examined the patient and the key elements of the encounter have been performed by me. I agree with the assessment, plan and orders as documented by the resident. Patient is seen for fu visit today. Last office notes reviewed, relative labs and imaging. Hx of septic arthritis-- knee   Strep agalactia  Orthopedic fu  Pending -- also noted  bid, will taper for dvt prophylaxis    Took his last percocet last night  Follows with pain management-- on opiods--but not recently  (in past [facility] and [facility])  Was in hospital  OARS reviewed -- from [address] sent out with 13 tabs  On 9/24  Doubt opiod withdrawal like symptoms    Orthostatic like complaints  Dizzy, light headed - blurry vision, while sitting  Very nausea    BP 80/50  Repeat 79/50  Dec PO intake  BS uncontrolled  --   Polyuria due to uncontrolled DM  Possibility of DKA as well. ER recommended  - we called and discussed with ER attending  IV fluid and evaluation, ? opiods till fu pain doc? Or ortho? Remainder of medical problems as per resident note.

## 2020-09-30 NOTE — H&P
Hospital Medicine History & Physical      PCP: Micah Hoffman MD    Date of Admission: 9/30/2020    Date of Service: Pt seen/examined on 9/30/2020 and is admitted to Inpatient with expected LOS greater than two midnights due to medical therapy. Chief Complaint:  had concerns including Hypotension (Was at the doctor today for a check up and was hypotensive. ). History Of Present Illness:    Mr. Yunior Salazar, a 63y.o. year old male  who  has a past medical history of Chronic pain, Depression, HLD (hyperlipidemia), Neuropathy, LIDYA on CPAP, Osteoarthritis, and Type II or unspecified type diabetes mellitus without mention of complication, not stated as uncontrolled. Patient was recently discharged from this hospital.  Patient was discharged to nursing facility. Patient refers she has been feeling very weak over the last few days. Patient refers no cough, no phlegm, no fever no chills. As described patient was previously admitted from 07/21-07/31 with Streptococcus agalactiae (susceptible to penicillin) bilateral septic knee arthritis s/p wash-out on 07/23, bilateral knee arthrotomy with exploration and irrigation and debridement on 07/27, discharged on a 4-week course of ceftriaxone from 39/60-87/67 complicated by retained hematoma with arthrocentesis showing 921,000 RBCs and 108,300 WBCs (predominantly neutrophilic at 14%) but with cultures showing no growth, prompting bilateral knee arthrotomy with exploration, irrigation and debridement on 08/09 during which dark gelatinous hematoma was noted. Tissue Gram stain and culture showed few polymorphonuclear leukocytes, no epithelial cells, no organisms, no growth to date. He was discharged on a 4-week course of ampicillin from 08/09-09/06. Patient was evaluated in the ER, he was found with hypoxemia. His sats were 88%. Patient was found positive for COVID 19.         Past Medical History:        Diagnosis Date    Chronic pain     Depression  HLD (hyperlipidemia)     Neuropathy     LIDYA on CPAP     Osteoarthritis     Type II or unspecified type diabetes mellitus without mention of complication, not stated as uncontrolled        Past Surgical History:        Procedure Laterality Date    ARM SURGERY      left arm    INCISION AND DRAINAGE Bilateral 7/27/2020    REPEAT BILATERAL KNEE WASHOUT performed by Lena Snowden DO at [address] [place] [address] Avenue Bilateral 8/9/2020    INCISION AND DRAINAGE bilateral knees performed by Lena Snowden DO at [facility] ARTHROSCOPY Bilateral 7/23/2020    KNEE ARTHROSCOPY performed by Gautam Ruiz MD at [address] W/BIOPSY SINGLE/MULTIPLE N/A 9/20/2018    COLONOSCOPY WITH BIOPSY performed by Teo Howard MD at [address] FLX W/RMVL OF TUMOR POLYP LESION SNARE TQ  9/20/2018    COLONOSCOPY POLYPECTOMY SNARE/COLD BIOPSY performed by Teo Howard MD at [facility]         Medications Prior to Admission:      Prior to Admission medications    Medication Sig Start Date End Date Taking? Authorizing Provider   senna (SENOKOT) 8.6 MG tablet Take 1 tablet by mouth daily    Historical Provider, MD   aspirin EC 81 MG EC tablet Take 1 tablet by mouth daily 9/30/20   Lorna Doss MD   oxyCODONE (ROXICODONE) 5 MG immediate release tablet Take 5 mg by mouth every 4 hours as needed for Pain (1.5 tabs PRN pain). Historical Provider, MD   oxyCODONE (OXYCONTIN) 10 MG extended release tablet Take 10 mg by mouth every 12 hours.  10 mg PO every 12 hrs PRN pain    Historical Provider, MD   insulin glargine (LANTUS) 100 UNIT/ML injection vial Inject 15 Units into the skin nightly  Patient taking differently: Inject 15 Units into the skin nightly Taking 20 units at bedtime 8/14/20   Sharri Byrne MD   atorvastatin (LIPITOR) 20 MG tablet Take 1 tablet by mouth daily 8/1/20   Anurag Hutchins MD   methocarbamol (ROBAXIN) 500 MG tablet Take 2 tablets by mouth 4 times daily as needed (As needed for muscle spasms) 7/31/20   Anurag Hutchins MD   pantoprazole (PROTONIX) 40 MG tablet Take 1 tablet by mouth every morning (before breakfast) 8/1/20   Anurag Hutchins MD   insulin lispro (HUMALOG) 100 UNIT/ML injection vial Inject 0-18 Units into the skin 3 times daily (with meals) 7/31/20   Anurag Hutchins MD   insulin lispro (HUMALOG) 100 UNIT/ML injection vial Inject 0-9 Units into the skin nightly 7/31/20   Anurag Hutchins MD   venlafaxine (EFFEXOR) 37.5 MG tablet Take 37.5 mg by mouth daily    Historical Provider, MD   lamoTRIgine (LAMICTAL) 25 MG tablet Take 25 mg by mouth daily    Historical Provider, MD   Multiple Vitamin (MULTI VITAMIN MENS PO) Take by mouth daily     Historical Provider, MD       Allergies:  Adhesive tape; Tramadol; Cymbalta [duloxetine hcl]; Lidocaine; Lyrica [pregabalin]; and Strattera [atomoxetine]    Social History:    TOBACCO:   reports that he has quit smoking. He smoked 0.00 packs per day for 30.00 years. He has never used smokeless tobacco.  ETOH:   reports no history of alcohol use. Family History:    Reviewed in detail and negative for DM, CAD, Cancer, CVA. Positive as follows\"      Problem Relation Age of Onset    COPD Father    Georgianna Olszewski Stroke Father     Diabetes Paternal Grandfather     Heart Disease Brother          REVIEW OF SYSTEMS:   Pertinent positives as noted in the HPI. All other systems reviewed and negative. PHYSICAL EXAM:  /82   Pulse 80   Temp 96.8 °F (36 °C)   Resp 18   Ht 5' 11\" (1.803 m)   Wt 200 lb (90.7 kg)   SpO2 97%   BMI 27.89 kg/m²   General appearance: No apparent distress, appears stated age and cooperative. HEENT: Normal cephalic, atraumatic without obvious deformity. Pupils equal, round, and reactive to light. Extra ocular muscles intact. Conjunctivae/corneas clear. Neck: Supple, with full range of motion. No jugular venous distention. Trachea midline.   Respiratory:  Normal respiratory effort. Clear to auscultation, bilaterally without Rales/Wheezes/Rhonchi. Cardiovascular:  S1/S2    Abdomen: Soft, non-tender, non-distended with normal bowel sounds. Musculoskeletal: No clubbing, cyanosis or edema bilaterally. Full range of motion without deformity. Skin: Normal skin color. No rashes or lesions. Neurologic:  Neurovascularly intact without any focal sensory/motor   Reviewed EKG and CXR personally    CBC:   Recent Labs     09/30/20  1147   WBC 10.0   RBC 3.97   HGB 10.5*   HCT 33.3*   MCV 83.9   RDW 15.4*   *     BMP:   Recent Labs     09/30/20  1147      K 4.3      CO2 23   BUN 25*   CREATININE 1.2   MG 2.0     LFT:  Recent Labs     09/30/20  1147   PROT 7.6   ALKPHOS 84   ALT 22   AST 29   BILITOT 0.4     CE:  Recent Labs     09/30/20  1147   TROPONINI <0.01     PT/INR: No results for input(s): INR, APTT in the last 72 hours. BNP: No results for input(s): BNP in the last 72 hours.   ESR:   Lab Results   Component Value Date    SEDRATE 138 (H) 08/07/2020     CRP:   Lab Results   Component Value Date    CRP 26.1 (H) 08/07/2020     D Dimer: No results found for: DDIMER  Folate and B12:   Lab Results   Component Value Date    [id number] 569 07/22/2020   , No results found for: FOLATE  Lactic Acid:   Lab Results   Component Value Date    LACTA 0.5 08/08/2020     Thyroid Studies:   Lab Results   Component Value Date    TSH 2.620 07/31/2018       Oupatient labs:  Lab Results   Component Value Date    CHOL 100 08/07/2020    TRIG 51 08/07/2020    HDL 42 08/07/2020    LDLCALC 48 08/07/2020    TSH 2.620 07/31/2018    INR 1.2 07/22/2020    LABA1C 10.1 (H) 07/22/2020       Urinalysis:    Lab Results   Component Value Date    NITRU Negative 08/07/2020    WBCUA 0-1 08/07/2020    BACTERIA FEW 08/07/2020    RBCUA NONE 08/07/2020    BLOODU Negative 08/07/2020    SPECGRAV 1.015 08/07/2020    GLUCOSEU Negative 08/07/2020       Imaging:  Xr Chest Portable    Result Date: 2020  Patient MRN: [id number] : 1965 Age:  63 years Gender: Male Order Date: 2020 2:14 PM Exam: XR CHEST PORTABLE Number of Images: 1 view Indication:   Shortness of breath Shortness of breath Comparison: Prior study from 2020 is available Findings: The lungs are clear. There is no evidence of pulmonary infiltrate or pleural effusion. The pulmonary vascularity is unremarkable. The cardiac, hilar and mediastinal silhouettes are satisfactory. The bony thorax demonstrates no gross abnormality. The study is unchanged from prior study. NO ACUTE CARDIOPULMONARY PROCESS       ASSESSMENT:    COVID-19 infection  Acute hypoxic respiratory failure  Previous history of septic arthritis  S/p bilateral knee arthrotomy  Diabetes mellitus  Neuropathy  Hyperlipidemia  Depression by history      PLAN:    Chest CT  Procalcitonin  Infectious disease consultation  Pulmonology consultation  Bilateral septic knee arthritis, s/p s/p wash-out on , bilateral knee arthrotomy with exploration and irrigation and debridement on , complicated by retained hematoma, s/p bilateral knee arthrotomy with exploration, irrigation and debridement on . Insulin sliding scale coverage  Continue Lantus      Diet: No diet orders on file  Code Status: Prior    PT/OT Eval Status: [] Ordered [] Evaluation noted [x] Not applicable  DVT Prophylaxis: [x]Lovenox []Heparin []PCD [] [address] []Encouraged ambulation    Disposition: [x]Med/Surg [] Intermediate [] ICU/CCU  Admit status: [] Observation [x] Inpatient     +++++++++++++++++++++++++++++++++++++++++++++++++  Kanika Yang MD, Hospitalist  +++++++++++++++++++++++++++++++++++++++++++++++++  NOTE: This report was transcribed using voice recognition software. Every effort was made to ensure accuracy; however, inadvertent computerized transcription errors may be present.

## 2020-10-01 LAB
ALBUMIN SERPL-MCNC: 3.4 G/DL (ref 3.5–5.2)
ALP BLD-CCNC: 77 U/L (ref 40–129)
ALT SERPL-CCNC: 17 U/L (ref 0–40)
ANION GAP SERPL CALCULATED.3IONS-SCNC: 14 MMOL/L (ref 7–16)
APTT: 28.5 SEC (ref 24.5–35.1)
AST SERPL-CCNC: 22 U/L (ref 0–39)
BACTERIA: ABNORMAL /HPF
BASOPHILS ABSOLUTE: 0.04 E9/L (ref 0–0.2)
BASOPHILS RELATIVE PERCENT: 0.6 % (ref 0–2)
BILIRUB SERPL-MCNC: 0.2 MG/DL (ref 0–1.2)
BILIRUBIN URINE: NEGATIVE
BLOOD, URINE: NEGATIVE
BUN BLDV-MCNC: 22 MG/DL (ref 6–20)
CALCIUM SERPL-MCNC: 9.2 MG/DL (ref 8.6–10.2)
CHLORIDE BLD-SCNC: 101 MMOL/L (ref 98–107)
CLARITY: CLEAR
CO2: 24 MMOL/L (ref 22–29)
COLOR: YELLOW
CREAT SERPL-MCNC: 0.8 MG/DL (ref 0.7–1.2)
D DIMER: 394 NG/ML DDU
EOSINOPHILS ABSOLUTE: 0.19 E9/L (ref 0.05–0.5)
EOSINOPHILS RELATIVE PERCENT: 2.6 % (ref 0–6)
FERRITIN: 3081 NG/ML
FIBRINOGEN: 697 MG/DL (ref 225–540)
GFR AFRICAN AMERICAN: >60
GFR NON-AFRICAN AMERICAN: >60 ML/MIN/1.73
GLUCOSE BLD-MCNC: 124 MG/DL (ref 74–99)
GLUCOSE URINE: NEGATIVE MG/DL
HCT VFR BLD CALC: 28.3 % (ref 37–54)
HEMOGLOBIN: 8.9 G/DL (ref 12.5–16.5)
IMMATURE GRANULOCYTES #: 0.02 E9/L
IMMATURE GRANULOCYTES %: 0.3 % (ref 0–5)
INR BLD: 1.1
KETONES, URINE: NEGATIVE MG/DL
L. PNEUMOPHILA SEROGP 1 UR AG: NORMAL
LACTATE DEHYDROGENASE: 253 U/L (ref 135–225)
LEUKOCYTE ESTERASE, URINE: NEGATIVE
LYMPHOCYTES ABSOLUTE: 2.73 E9/L (ref 1.5–4)
LYMPHOCYTES RELATIVE PERCENT: 37.8 % (ref 20–42)
MCH RBC QN AUTO: 26 PG (ref 26–35)
MCHC RBC AUTO-ENTMCNC: 31.4 % (ref 32–34.5)
MCV RBC AUTO: 82.7 FL (ref 80–99.9)
METER GLUCOSE: 130 MG/DL (ref 74–99)
METER GLUCOSE: 140 MG/DL (ref 74–99)
METER GLUCOSE: 150 MG/DL (ref 74–99)
METER GLUCOSE: 185 MG/DL (ref 74–99)
MONOCYTES ABSOLUTE: 0.71 E9/L (ref 0.1–0.95)
MONOCYTES RELATIVE PERCENT: 9.8 % (ref 2–12)
NEUTROPHILS ABSOLUTE: 3.54 E9/L (ref 1.8–7.3)
NEUTROPHILS RELATIVE PERCENT: 48.9 % (ref 43–80)
NITRITE, URINE: NEGATIVE
PDW BLD-RTO: 15.5 FL (ref 11.5–15)
PH UA: 5.5 (ref 5–9)
PLATELET # BLD: 564 E9/L (ref 130–450)
PMV BLD AUTO: 8.7 FL (ref 7–12)
POTASSIUM REFLEX MAGNESIUM: 3.6 MMOL/L (ref 3.5–5)
PROCALCITONIN: 0.06 NG/ML (ref 0–0.08)
PROTEIN UA: 100 MG/DL
PROTHROMBIN TIME: 12.7 SEC (ref 9.3–12.4)
RBC # BLD: 3.42 E12/L (ref 3.8–5.8)
RBC UA: ABNORMAL /HPF (ref 0–2)
SODIUM BLD-SCNC: 139 MMOL/L (ref 132–146)
SPECIFIC GRAVITY UA: >=1.03 (ref 1–1.03)
STREP PNEUMONIAE ANTIGEN, URINE: NORMAL
TOTAL PROTEIN: 6.7 G/DL (ref 6.4–8.3)
UROBILINOGEN, URINE: 0.2 E.U./DL
WBC # BLD: 7.2 E9/L (ref 4.5–11.5)
WBC UA: ABNORMAL /HPF (ref 0–5)

## 2020-10-01 PROCEDURE — 81001 URINALYSIS AUTO W/SCOPE: CPT

## 2020-10-01 PROCEDURE — 87450 HC DIRECT STREP B ANTIGEN: CPT

## 2020-10-01 PROCEDURE — 99223 1ST HOSP IP/OBS HIGH 75: CPT | Performed by: INTERNAL MEDICINE

## 2020-10-01 PROCEDURE — 2580000003 HC RX 258: Performed by: INTERNAL MEDICINE

## 2020-10-01 PROCEDURE — 84145 PROCALCITONIN (PCT): CPT

## 2020-10-01 PROCEDURE — 6370000000 HC RX 637 (ALT 250 FOR IP): Performed by: INTERNAL MEDICINE

## 2020-10-01 PROCEDURE — 85610 PROTHROMBIN TIME: CPT

## 2020-10-01 PROCEDURE — 82962 GLUCOSE BLOOD TEST: CPT

## 2020-10-01 PROCEDURE — 80053 COMPREHEN METABOLIC PANEL: CPT

## 2020-10-01 PROCEDURE — 85378 FIBRIN DEGRADE SEMIQUANT: CPT

## 2020-10-01 PROCEDURE — 85025 COMPLETE CBC W/AUTO DIFF WBC: CPT

## 2020-10-01 PROCEDURE — 85730 THROMBOPLASTIN TIME PARTIAL: CPT

## 2020-10-01 PROCEDURE — 85384 FIBRINOGEN ACTIVITY: CPT

## 2020-10-01 PROCEDURE — 2700000000 HC OXYGEN THERAPY PER DAY

## 2020-10-01 PROCEDURE — 2140000000 HC CCU INTERMEDIATE R&B

## 2020-10-01 RX ADMIN — VENLAFAXINE HYDROCHLORIDE 37.5 MG: 75 TABLET ORAL at 10:49

## 2020-10-01 RX ADMIN — Medication 10 ML: at 20:59

## 2020-10-01 RX ADMIN — Medication 10 ML: at 10:50

## 2020-10-01 RX ADMIN — ASPIRIN 81 MG: 81 TABLET, COATED ORAL at 10:49

## 2020-10-01 RX ADMIN — SENNOSIDES 8.6 MG: 8.6 TABLET, FILM COATED ORAL at 10:49

## 2020-10-01 RX ADMIN — OXYCODONE 5 MG: 5 TABLET ORAL at 16:45

## 2020-10-01 RX ADMIN — OXYCODONE 5 MG: 5 TABLET ORAL at 21:07

## 2020-10-01 RX ADMIN — INSULIN LISPRO 1 UNITS: 100 INJECTION, SOLUTION INTRAVENOUS; SUBCUTANEOUS at 21:26

## 2020-10-01 RX ADMIN — OXYCODONE 5 MG: 5 TABLET ORAL at 04:43

## 2020-10-01 RX ADMIN — PANTOPRAZOLE SODIUM 40 MG: 40 TABLET, DELAYED RELEASE ORAL at 06:12

## 2020-10-01 RX ADMIN — INSULIN GLARGINE 15 UNITS: 100 INJECTION, SOLUTION SUBCUTANEOUS at 20:59

## 2020-10-01 RX ADMIN — INSULIN LISPRO 2 UNITS: 100 INJECTION, SOLUTION INTRAVENOUS; SUBCUTANEOUS at 06:12

## 2020-10-01 RX ADMIN — ATORVASTATIN CALCIUM 20 MG: 20 TABLET, FILM COATED ORAL at 20:59

## 2020-10-01 RX ADMIN — INSULIN LISPRO 2 UNITS: 100 INJECTION, SOLUTION INTRAVENOUS; SUBCUTANEOUS at 11:55

## 2020-10-01 RX ADMIN — LAMOTRIGINE 25 MG: 25 TABLET ORAL at 10:49

## 2020-10-01 ASSESSMENT — PAIN SCALES - GENERAL
PAINLEVEL_OUTOF10: 8
PAINLEVEL_OUTOF10: 8

## 2020-10-01 ASSESSMENT — PAIN DESCRIPTION - LOCATION: LOCATION: KNEE

## 2020-10-01 ASSESSMENT — PAIN DESCRIPTION - PAIN TYPE: TYPE: CHRONIC PAIN

## 2020-10-01 ASSESSMENT — PAIN DESCRIPTION - DESCRIPTORS: DESCRIPTORS: ACHING;BURNING;STABBING

## 2020-10-01 ASSESSMENT — PAIN DESCRIPTION - FREQUENCY: FREQUENCY: CONTINUOUS

## 2020-10-01 NOTE — CARE COORDINATION
SOCIAL WORK/DISCHARGE PLANNING;  Care coordination assessment with pt via phone. Pt is co-vid positive. He reported that he was recently in maren at [facility] where he received IV ATB treatment for four weeks. Pt states he has just been home from the facility for about a week. Pt resides with his three children(17-14-9). Per pt, the 9y.o is staying with his aunt. Pt reported that when he was discharged, he was not set up with any [facility] services but he would be receptive to having [facility] upon discharge. If needed, will need [facility] orders. Pt offered list/choices. He will make decision/choice prior to discharge per pt. Pt states he already has a walker and wheelchair at home from before and does not feel any other dme will be needed. He is newly established with Dr. Ale Mark, [facility]. His pharmacy is [facility] in [place]. Will continue to follow and further assess discharge needs.   Isabella Gomes [facility]  [phone]

## 2020-10-01 NOTE — PROGRESS NOTES
Comprehensive Nutrition Assessment    Type and Reason for Visit:  Initial, Positive Nutrition Screen    Nutrition Recommendations/Plan: Recommend and start Glucerna supplement BID d/t decreased po intake of meals and to help meet nutritional needs. Nutrition Assessment:  Patient at nutritional risk d/t decreased po intake x 1 week PTA 2/2 to nausea ; pt also COVID-19 positive ; s/p recent bilateral knee arthrotomy ; s/p I&D 8/9 ; will start ONS    Malnutrition Assessment:  Malnutrition Status: At risk for malnutrition (Comment)    Context:  Acute Illness     Findings of the 6 clinical characteristics of malnutrition:  Energy Intake:  1 - 75% or less of estimated energy requirements for 7 or more days  Weight Loss:  No significant weight loss     Body Fat Loss:  Unable to assess(data not available to assess at this time d/t COVID isolation)     Muscle Mass Loss:  Unable to assess(data not available to assess at this time d/t COVID isolation)    Fluid Accumulation:  No significant fluid accumulation     Strength:  Not Performed    Estimated Daily Nutrient Needs:  Energy (kcal):  4831-6613 (REE 1761 x 1.1 SF); Weight Used for Energy Requirements:  Current     Protein (g):  100-115 (1.3-1.5g/kg IBW); Weight Used for Protein Requirements:  Ideal        Fluid (ml/day):  9026-9041; Weight Used for Fluid Requirements:  [place]      Nutrition Related Findings:  I&Os WNL, no edema, active BS, rounded abd, constipation, upper dentures, nausea PTA, A&O x 4, redness to buttocks      Wounds:  None       Current Nutrition Therapies:    DIET CARB CONTROL;     Anthropometric Measures:  · Height: 5' 11\" (180.3 cm)  · Current Body Weight: 200 lb (90.7 kg)(9/30/20, stated)   · Admission Body Weight: 200 lb (90.7 kg)(9/30/20, stated)    · Usual Body Weight: 197 lb (89.4 kg)(8/7/20, bedscale ; EMR also shows past weight of 209# no method on 11/24/19)     · Ideal Body Weight: 172 lbs; % Ideal Body Weight 116.3 %   · BMI:

## 2020-10-01 NOTE — PLAN OF CARE
Problem: Airway Clearance - Ineffective  Goal: Achieve or maintain patent airway  Outcome: Met This Shift     Problem: Gas Exchange - Impaired  Goal: Absence of hypoxia  Outcome: Met This Shift  Goal: Promote optimal lung function  Outcome: Met This Shift     Problem: Breathing Pattern - Ineffective  Goal: Ability to achieve and maintain a regular respiratory rate  Outcome: Met This Shift     Problem:  Body Temperature -  Risk of, Imbalanced  Goal: Ability to maintain a body temperature within defined limits  Outcome: Met This Shift     Problem: Isolation Precautions - Risk of Spread of Infection  Goal: Prevent transmission of infection  Outcome: Met This Shift     Problem: Risk for Fluid Volume Deficit  Goal: Maintain normal heart rhythm  Outcome: Met This Shift  Goal: Maintain absence of muscle cramping  Outcome: Met This Shift     Problem: Loneliness or Risk for Loneliness  Goal: Demonstrate positive use of time alone when socialization is not possible  Outcome: Met This Shift     Problem: Falls - Risk of:  Goal: Will remain free from falls  Description: Will remain free from falls  Outcome: Met This Shift

## 2020-10-01 NOTE — PROGRESS NOTES
[facility] [address]   Department of Pharmacy   Pharmacist Transition of Care Services         Patient Demographics  Name:  [address] [facility] Record Number:  [id number]  Gender:  male   Age:  63 y.o. YOB: 1965    Primary Care Physician: Georgina Pittman MD  Primary Care Physician phone number:  [phone]  Readmission Risk (% from [facility] Patient List): 25 %       Pharmacist Review and Summary of Medications     Date of last reviewed/update: 10/1/20    Category Comments   New Medication Started   1. lovenox 30mg subcut daily        Change in Outpatient Medication  (Dosage Form, Route,   Dose, or Frequency) 1. Discontinued Outpatient Medication   (or on Hold During Admission) 1. Other              Pharmacist Patient Education:    Date  Person Educated Content of Education                 Documentation of Pharmacist Interventions and Follow-up Plan:     The following Pharmacist Transition of Care Services were completed:   [x]  Reviewed and summarized medication changes  []  Entire home medication list was reviewed for accuracy (sources: **)  []  Home medication list was updated or corrected     []  Reviewed discharge medication reconciliation  []  Discharge medication list was updated or corrected  []  Patient education was provided on new medications  []  Patient education was provided on medication changes  []  Reviewed the After Visit Summary (AVS) with patient    Additional Interventions:  []  Inpatient prescriber was contacted and the following pharmacy recommendations        were accepted: **     [] Other interventions: **        Pharmacist: Shellie Simmonds PharmD, [facility]  Date:  10/1/2020 1:31 PM

## 2020-10-01 NOTE — CONSULTS
[facility] CONSULT NOTE    Reason for Consult: COVID-19   Requested by: Dr. Floresita Longoria     Chief complaint: Weakness    History Obtained From: Patient and EMR    HISTORY Mikala              The patient is a 63 y.o. male with history of DM, chronic low back pain with severe spinal stenosis, Streptococcus agalactiae (susceptible to penicillin) bilateral septic knee arthritis s/p wash-out on 07/23, bilateral knee arthrotomy with exploration and irrigation and debridement on 69/37 complicated by retained hematoma s/p bilateral knee arthrotomy with exploration, irrigation and debridement on 08/09, discharged to nursing facility on 08/14 on a 4-week course of ampicillin from 08/09-09/06, presented on 09/30 with weakness a week after discharge from the nursing facility, found to be hypoxic with CT chest showing bilateral pulmonary parenchymal opacities and positive SARS-CoV-2 PCR. On admission, he was afebrile and hemodynamically stable with no leukocytosis. Serum procalcitonin level was not elevated at 0.06 ng/mL. Respiratory pathogen PCR panel, urine Streptococcus pneumonia and Legionella antigens were negative. He has been tolerating room air well. ID service was subsequently consulted for further recommendations.     Past Medical History  Past Medical History:   Diagnosis Date    Chronic pain     Depression     HLD (hyperlipidemia)     Neuropathy     LIDYA on CPAP     Osteoarthritis     Type II or unspecified type diabetes mellitus without mention of complication, not stated as uncontrolled        Current Facility-Administered Medications   Medication Dose Route Frequency Provider Last Rate Last Dose    aspirin EC tablet 81 mg  81 mg Oral Daily Kimo Vidales MD   81 mg at 10/01/20 1049    atorvastatin (LIPITOR) tablet 20 mg  20 mg Oral Nightly Kimo Vidales MD   20 mg at 09/30/20 2301    insulin glargine (LANTUS) injection vial 15 Units  15 Units Subcutaneous Nightly Leena Irving MD 15 Units at 09/30/20 2223    lamoTRIgine (LAMICTAL) tablet 25 mg  25 mg Oral Daily Raf Jolly MD   25 mg at 10/01/20 1049    methocarbamol (ROBAXIN) tablet 1,000 mg  1,000 mg Oral 4x Daily PRN Raf Jolly MD        oxyCODONE (ROXICODONE) immediate release tablet 5 mg  5 mg Oral Q4H PRN Raf Jolly MD   5 mg at 10/01/20 0443    pantoprazole (PROTONIX) tablet 40 mg  40 mg Oral QAM AC Raf Jolly MD   40 mg at 10/01/20 0612    senna (SENOKOT) tablet 8.6 mg  1 tablet Oral Daily Raf Jolly MD   8.6 mg at 10/01/20 1049    venlafaxine (EFFEXOR) tablet 37.5 mg  37.5 mg Oral Daily Petra Vidales MD   37.5 mg at 10/01/20 1049    sodium chloride flush 0.9 % injection 10 mL  10 mL Intravenous 2 times per day Raf Jolly MD   10 mL at 10/01/20 1050    sodium chloride flush 0.9 % injection 10 mL  10 mL Intravenous PRN Raf Jolly MD        acetaminophen (TYLENOL) tablet 650 mg  650 mg Oral Q6H PRN Raf Jolly MD        Or   [facility] acetaminophen (TYLENOL) suppository 650 mg  650 mg Rectal Q6H PRN Raf Jolly MD        polyethylene glycol (GLYCOLAX) packet 17 g  17 g Oral Daily PRN Raf Jolly MD        ondansetron [facility]) injection 4 mg  4 mg Intravenous Q6H PRN aRf Jolly MD        enoxaparin (LOVENOX) injection 30 mg  30 mg Subcutaneous BID Raf Jolly MD        insulin lispro (HUMALOG) injection vial 0-12 Units  0-12 Units Subcutaneous TID WC Raf Jolly MD   2 Units at 10/01/20 1155    insulin lispro (HUMALOG) injection vial 0-6 Units  0-6 Units Subcutaneous Nightly Raf Jolly MD   1 Units at 09/30/20 2224    glucose (GLUTOSE) 40 % oral gel 15 g  15 g Oral PRN Raf Jolly MD        dextrose 50 % IV solution  12.5 g Intravenous PRN Raf Jolly MD        glucagon (rDNA) injection 1 mg  1 mg Intramuscular PRN Raf oJlly MD        dextrose 5 % solution  100 mL/hr Intravenous PRN Raf Jolly MD Allergies   Allergen Reactions    Adhesive Tape Hives    Tramadol Other (See Comments)     With psych medicine    Cymbalta [Duloxetine Hcl] Rash    Lidocaine Rash and Swelling    Lyrica [Pregabalin] Rash    Strattera [Atomoxetine] Rash     And nausea       Surgical History  Past Surgical History:   Procedure Laterality Date    ARM SURGERY      left arm    INCISION AND DRAINAGE Bilateral 7/27/2020    REPEAT BILATERAL KNEE WASHOUT performed by Sana Moreno DO at [facility] Bilateral 8/9/2020    INCISION AND DRAINAGE bilateral knees performed by Sana Moreno DO at [facility] ARTHROSCOPY Bilateral 7/23/2020    KNEE ARTHROSCOPY performed by Juan Duval MD at [facility] [address] COLONOSCOPY W/BIOPSY SINGLE/MULTIPLE N/A 9/20/2018    COLONOSCOPY WITH BIOPSY performed by Mame Berkowitz MD at [address]  FLX W/RMVL OF TUMOR POLYP LESION SNARE TQ  9/20/2018    COLONOSCOPY POLYPECTOMY SNARE/COLD BIOPSY performed by Mame Berkowitz MD at [address] [facility] History  Social History     Socioeconomic History    Marital status: Single   Tobacco Use    Smoking status: Former Smoker     Packs/day: 0.00     Years: 30.00     Pack years: 0.00    Smokeless tobacco: Never Used    Tobacco comment: States stopped using cigarettes   Substance and Sexual Activity    Alcohol use: No     Comment: none since 2017, drank heavily in early 2000s    Drug use: No    Sexual activity: Not Currently     Partners: Female     Comment: Single;  Last STD check was 2012       Family Medical History  Family History   Problem Relation Age of Onset    COPD Father    Ron Chandlerig Stroke Father     Diabetes Paternal Grandfather     Heart Disease Brother        Review of Systems:  Constitutional: No fever, has chills  Eyes: No vision changes, no retroorbital pain  ENT: No hearing changes, right ear pain  Respiratory: No cough, no dyspnea  Cardiovascular: No chest pain, no palpitations  Gastrointestinal: No abdominal pain, no diarrhea  Genitourinary: No dysuria, no hematuria  Integumentary: No rash, no itching  Musculoskeletal: No muscle pain, no joint pain  Neurologic: No headache, no numbness in extremities    Physical Examination:  Vitals:    10/01/20 0410 10/01/20 1045 10/01/20 1130 10/01/20 1330   BP: (!) 152/82 (!) 140/76  108/72   Pulse:  92  88   Resp: 16 18  16   Temp: 97.2 °F (36.2 °C) 97.7 °F (36.5 °C)  97.4 °F (36.3 °C)   TempSrc: Oral Oral  Axillary   SpO2: 98% 95%     Weight:       Height:   5' 11\" (1.803 m)      Constitutional: Alert, not in distress  Eyes: Sclerae anicteric, no conjunctival erythema  ENT: No buccal lesion, no pharyngeal exudates  Neck: No nuchal rigidity, no cervical adenopathy  Lungs: Clear breath sounds, no crackles, no wheezes  Heart: Regular rate and rhythm, no murmurs  Abdomen: Bowel sounds present, soft, nontender  Skin: Warm and dry, no active dermatoses  Musculoskeletal: No joint erythema, no joint swelling. Bilateral knee incision scar well-healed. Labs, imaging, and medical records/notes were personally reviewed. Assessment:  SARS-CoV-2 infection  Pneumonia    Plan:  Monitor clinically off antibiotics for now. Follow-up blood cultures. Monitor respiratory status. Continue supportive care. Thank you for involving me in the care of Vipin Lorenzo. I will continue to follow. Please do not hesitate to call for any questions or concerns.     Electronically signed by Mirtha Bell MD on 10/1/2020 at 4:02 PM

## 2020-10-01 NOTE — PROGRESS NOTES
Hospital Medicine Progress Note      Date of Admission: 9/30/2020  Hospital day # 1    Course: Follow-up on Covid 19 pneumonia    Subjective    Clinically improving. Feeling better. Stable overnight. No other overnight issues reported. Exam:    BP (!) 152/82   Pulse 83   Temp 97.2 °F (36.2 °C) (Oral)   Resp 16   Ht 5' 11\" (1.803 m)   Wt 200 lb (90.7 kg)   SpO2 98%   BMI 27.89 kg/m²     General appearance: Acutely ill, appears stated age and cooperative. HEENT: Pupils equal, round, and reactive to light. Conjunctivae/corneas clear. Neck: Supple. No jugular venous distention. Trachea midline. Respiratory: Rhonchi bilaterally  Cardiovascular:  S1/S2   Abdomen: Soft, non-tender, non-distended with normal bowel sounds. Musculoskeletal: No clubbing, cyanosis or edema bilaterally. Brisk capillary refill. 2+ lower extremity pulses (dorsalis pedis).    Skin:  No rashes    Neurologic: awake, alert and following commands     Medications:  Reviewed    Infusion Medications    dextrose       Scheduled Medications    aspirin EC  81 mg Oral Daily    atorvastatin  20 mg Oral Nightly    insulin glargine  15 Units Subcutaneous Nightly    lamoTRIgine  25 mg Oral Daily    pantoprazole  40 mg Oral QAM AC    senna  1 tablet Oral Daily    venlafaxine  37.5 mg Oral Daily    sodium chloride flush  10 mL Intravenous 2 times per day    enoxaparin  30 mg Subcutaneous BID    insulin lispro  0-12 Units Subcutaneous TID WC    insulin lispro  0-6 Units Subcutaneous Nightly     PRN Meds: methocarbamol, oxyCODONE, sodium chloride flush, acetaminophen **OR** acetaminophen, polyethylene glycol, ondansetron, glucose, dextrose, glucagon (rDNA), dextrose    I/O    Intake/Output Summary (Last 24 hours) at 10/1/2020 1015  Last data filed at 10/1/2020 1012  Gross per 24 hour   Intake 440 ml   Output 600 ml   Net -160 ml       Labs:   Recent Labs     09/30/20  1147 10/01/20  0427   WBC 10.0 7.2   HGB 10.5* 8.9*   HCT 33.3* 28.3*   * 564*       Recent Labs     09/30/20  1147 10/01/20  0427    139   K 4.3 3.6    101   CO2 23 24   BUN 25* 22*   CREATININE 1.2 0.8   CALCIUM 9.8 9.2       Recent Labs     09/30/20  1147 10/01/20  0427   PROT 7.6 6.7   ALKPHOS 84 77   ALT 22 17   AST 29 22   BILITOT 0.4 0.2       Recent Labs     10/01/20  0427   INR 1.1       Recent Labs     09/30/20  1147   TROPONINI <0.01       Other labs:  Lab Results   Component Value Date    CHOL 100 08/07/2020    TRIG 51 08/07/2020    HDL 42 08/07/2020    LDLCALC 48 08/07/2020    TSH 2.620 07/31/2018    INR 1.1 10/01/2020    LABA1C 10.1 (H) 07/22/2020       Radiology:  Imaging studies reviewed today. ASSESSMENT:    COVID-19 infection  Pneumonia  Previous history of septic arthritis  S/p bilateral knee arthrotomy  Diabetes mellitus  Neuropathy  Hyperlipidemia  Depression by history      PLAN:      Chest CT  evidence of pneumonia  Procalcitonin 0.06  Infectious disease following  Pulmonology following  Bilateral septic knee arthritis, s/p s/p wash-out on 07/23, bilateral knee arthrotomy with exploration and irrigation and debridement on 37/59, complicated by retained hematoma, s/p bilateral knee arthrotomy with exploration, irrigation and debridement on 08/09. Insulin sliding scale coverage  Continue Lantus           Diet: DIET CARB CONTROL;  Code Status: Full Code    PT/OT Eval Status: [] Ordered [] Evaluation noted [x] Not applicable    DVT Prophylaxis: [x]Lovenox []Heparin []PCD [] [address] []Encouraged ambulation []N/A    Likely disposition when able:  [x]Home [] Home with [facility] [] SNF/[facility] [] Acute Rehab [] LTAC []Other    +++++++++++++++++++++++++++++++++++++++++++++++++  Clif You MD, Hospitalist  +++++++++++++++++++++++++++++++++++++++++++++++++  NOTE: This report was transcribed using voice recognition software.  Every effort was made to ensure accuracy; however, inadvertent computerized transcription errors may be present.

## 2020-10-01 NOTE — CONSULTS
Pulmonary [facility]                             Pulmonary Consult/Progress Note :          Patient: Mattie Harrington  MRN: [id number]  : 1965      Date of Admission: .2020 11:29 AM    Consulting Physician:Dr Vidales         Reason for Consultation:  [id number] positive test (U07.1, COVID-19) with Acute Pneumonia (J12.89, Other viral pneumonia)  (If respiratory failure or sepsis present, add as separate assessment)      CC :  Worsening shortness of breath       HPI:   Mattie Harrington is a 63y.o. year old with 27 pack/year smoking history who quit 2 months ago, presented to the hospital from nursing home as he was there after bilateral knee surgery as he had septic knee followed by knee arthrotomy and he was sent on antibiotics with fever and worsening shortness of breath along with hypoxia and his oxygen saturation was 88 he was tested positive for COVID-19      PAST MEDICAL HISTORY:   Past Medical History:   Diagnosis Date    Chronic pain     Depression     HLD (hyperlipidemia)     Neuropathy     LIDYA on CPAP     Osteoarthritis     Type II or unspecified type diabetes mellitus without mention of complication, not stated as uncontrolled        PAST SURGICAL HISTORY:   Past Surgical History:   Procedure Laterality Date    ARM SURGERY      left arm    INCISION AND DRAINAGE Bilateral 2020    REPEAT BILATERAL KNEE WASHOUT performed by Andrew Hoffman DO at [address] [place] [address] Avenue Bilateral 2020    INCISION AND DRAINAGE bilateral knees performed by Andrew Hoffman DO at [facility] ARTHROSCOPY Bilateral 2020    KNEE ARTHROSCOPY performed by Dolly Silva MD at [address]/BIOPSY SINGLE/MULTIPLE N/A 2018    COLONOSCOPY WITH BIOPSY performed by Hans Lozada MD at [address] [place] W/LILIANAL OF TUMOR POLYP LESION SNARE TQ  2018    COLONOSCOPY POLYPECTOMY Pack years: 0.00   Smokeless Tobacco Never Used   Tobacco Comment    States stopped using cigarettes     Social History     Substance and Sexual Activity   Alcohol Use No    Comment: none since 2017, drank heavily in early 2000s     Social History     Substance and Sexual Activity   Drug Use No             HOME MEDICATIONS:  Prior to Admission medications    Medication Sig Start Date End Date Taking? Authorizing Provider   senna (SENOKOT) 8.6 MG tablet Take 1 tablet by mouth daily    Historical Provider, MD   aspirin EC 81 MG EC tablet Take 1 tablet by mouth daily 9/30/20   Binu Renee MD   oxyCODONE (ROXICODONE) 5 MG immediate release tablet Take 5 mg by mouth every 4 hours as needed for Pain (1.5 tabs PRN pain). Historical Provider, MD   oxyCODONE (OXYCONTIN) 10 MG extended release tablet Take 10 mg by mouth every 12 hours.  10 mg PO every 12 hrs PRN pain    Historical Provider, MD   insulin glargine (LANTUS) 100 UNIT/ML injection vial Inject 15 Units into the skin nightly  Patient taking differently: Inject 15 Units into the skin nightly Taking 20 units at bedtime 8/14/20   Seth Bansal MD   atorvastatin (LIPITOR) 20 MG tablet Take 1 tablet by mouth daily 8/1/20   Jaylene Robertson MD   methocarbamol (ROBAXIN) 500 MG tablet Take 2 tablets by mouth 4 times daily as needed (As needed for muscle spasms) 7/31/20   Jaylene Robertson MD   pantoprazole (PROTONIX) 40 MG tablet Take 1 tablet by mouth every morning (before breakfast) 8/1/20   Jaylene Robertson MD   insulin lispro (HUMALOG) 100 UNIT/ML injection vial Inject 0-18 Units into the skin 3 times daily (with meals) 7/31/20   Jaylene Robertson MD   insulin lispro (HUMALOG) 100 UNIT/ML injection vial Inject 0-9 Units into the skin nightly 7/31/20   Jaylene Robertson MD   venlafaxine (EFFEXOR) 37.5 MG tablet Take 37.5 mg by mouth daily    Historical Provider, MD   lamoTRIgine (LAMICTAL) 25 MG tablet Take 25 mg by mouth daily    Historical Provider, MD   Multiple Vitamin (MULTI ENT ROS:   No Sore throat ,no lymphoadenopathy,no nasal stuffiness     Hematological and Lymphatic ROS:   No ecchymosis ,no tendency to bleed  Respiratory ROS:   Shortness of breath  Cardiovascular ROS:   No CP,No Palpitation   Gastrointestinal ROS:   No Gi bleed,no nausea or vomiting      - Musculoskeletal ROS:      - no joint swelling ,no joint pain   Neurological ROS:     -no weakness or numbness    Dermatological ROS:   No skin rash ,no urticaria     PHYSICAL EXAMINATION:     VITAL SIGNS:  BP (!) 152/82   Pulse 83   Temp 97.2 °F (36.2 °C) (Oral)   Resp 16   Ht 5' 11\" (1.803 m)   Wt 200 lb (90.7 kg)   SpO2 98%   BMI 27.89 kg/m²   Wt Readings from Last 3 Encounters:   09/30/20 200 lb (90.7 kg)   09/30/20 191 lb 11.2 oz (87 kg)   08/14/20 197 lb 11.2 oz (89.7 kg)     Temp Readings from Last 3 Encounters:   10/01/20 97.2 °F (36.2 °C) (Oral)   09/30/20 97.2 °F (36.2 °C) (Oral)   09/03/20 98.2 °F (36.8 °C)     TMAX:  BP Readings from Last 3 Encounters:   10/01/20 (!) 152/82   09/30/20 (!) 83/52   09/03/20 (!) 89/57     Pulse Readings from Last 3 Encounters:   10/01/20 83   09/30/20 91   09/03/20 97           INTAKE/OUTPUTS:  I/O last 3 completed shifts:   In: 200 [P.O.:200]  Out: 600 [Urine:600]    Intake/Output Summary (Last 24 hours) at 10/1/2020 1048  Last data filed at 10/1/2020 1012  Gross per 24 hour   Intake 440 ml   Output 600 ml   Net -160 ml       General Appearance: alert and oriented to person, place and time, well-developed and   well-nourished, in no acute distress   Eyes: pupils equal, round, and reactive to light, extraocular eye movements intact, conjunctivae normal and sclera anicteric   Neck: neck supple and non tender without mass, no thyromegaly, no thyroid nodules and no cervical adenopathy   Pulmonary/Chest:scattered Rhonchi   Cardiovascular: normal rate, regular rhythm, normal S1 and S2, no murmurs, rubs, clicks or gallops, distal pulses intact, no carotid bruits, no murmurs, no gallops, no carotid bruits and no JVD   Abdomen: obese, soft, non-tender, non-distended, normal bowel sounds, no masses or organomegaly   Extremities:no edema or cyanosis   Musculoskeletal: normal range of motion, no joint swelling, deformity or tenderness   Neurologic: reflexes normal and symmetric, no cranial nerve deficit noted    LABS/IMAGING:    CBC:  Lab Results   Component Value Date    WBC 7.2 10/01/2020    HGB 8.9 (L) 10/01/2020    HCT 28.3 (L) 10/01/2020    MCV 82.7 10/01/2020     (H) 10/01/2020    LYMPHOPCT 37.8 10/01/2020    RBC 3.42 (L) 10/01/2020    MCH 26.0 10/01/2020    MCHC 31.4 (L) 10/01/2020    RDW 15.5 (H) 10/01/2020    NEUTOPHILPCT 48.9 10/01/2020    MONOPCT 9.8 10/01/2020    BASOPCT 0.6 10/01/2020    NEUTROABS 3.54 10/01/2020    LYMPHSABS 2.73 10/01/2020    MONOSABS 0.71 10/01/2020    EOSABS 0.19 10/01/2020    BASOSABS 0.04 10/01/2020       Recent Labs     10/01/20  0427 09/30/20  1147   WBC 7.2 10.0   HGB 8.9* 10.5*   HCT 28.3* 33.3*   MCV 82.7 83.9   * 523*       BMP:   Recent Labs     09/30/20  1147 10/01/20  0427    139   K 4.3 3.6    101   CO2 23 24   BUN 25* 22*   CREATININE 1.2 0.8       MG:   Lab Results   Component Value Date    MG 2.0 09/30/2020     Ca/Phos:   Lab Results   Component Value Date    CALCIUM 9.2 10/01/2020     Amylase:   Lab Results   Component Value Date    AMYLASE 67 12/23/2014     Lipase:   Lab Results   Component Value Date    LIPASE 24 07/19/2020     LIVER PROFILE:   Recent Labs     09/30/20  1147 10/01/20  0427   AST 29 22   ALT 22 17   BILITOT 0.4 0.2   ALKPHOS 84 77       PT/INR:   Recent Labs     10/01/20  0427   PROTIME 12.7*   INR 1.1     APTT:   Recent Labs     10/01/20  0427   APTT 28.5       Cardiac Enzymes:  Lab Results   Component Value Date    TROPONINI <0.01 09/30/2020                   PROBLEM LIST:  Patient Active Problem List   Diagnosis    Chronic low back pain    Diabetic neuropathy (HCC)    Anxiety and depression    Hyperlipidemia    Insulin-requiring or dependent type II diabetes mellitus ([facility]    Pain in both knees    Septic arthritis of knee, bilateral (HCC)    Acute midline low back pain with bilateral sciatica    Sepsis due to group B Streptococcus (HCC)    Hypokalemia    Septic arthritis (HCC)    Acute on chronic anemia    ERIC (acute kidney injury) ([facility]    Thrombocytosis (HCC)    Type 2 diabetes mellitus with hyperglycemia, with long-term current use of insulin (HCC)    COVID-19               ASSESSMENT:  1.)  COVID-19 positive test (U07.1, COVID-19) with Acute Pneumonia (J12.89, Other viral pneumonia)    2.)  Mild hypoxia  3.)  Recent knee infection status post surgery   4.)  Possible COPD with   Smoking        PLAN:  *-Patient symptoms seems very mild, he denies any fever or chills, he denies any chest pain,  *-He has significant infiltrate in the lung and he was more hypoxic I would proceed with Decadron 6 mg daily  *-Recommended ID consult to start remedosever clear as indicated  *-Right his symptoms is mild I will hold Tocilizumab  *_Incentive spirometry  *-DVT prophylaxis as he is considered high risk  Check inflammatory markers        Thank you very much for allowing me to participate in the care of this pleasant patient , should you have any questions ,please do not hesitate to contact me      Conner Aparicio MD,[facility]  Pulmonary&Critical Care Medicine   Director of [address] [place] [address] Director of [address] [place] [address]    Kaylan Reis    NOTE: This report was transcribed using voice recognition software. Every effort was made to ensure accuracy; however, inadvertent computerized transcription errors may be present.

## 2020-10-02 LAB
ALBUMIN SERPL-MCNC: 3.3 G/DL (ref 3.5–5.2)
ALP BLD-CCNC: 75 U/L (ref 40–129)
ALT SERPL-CCNC: 13 U/L (ref 0–40)
ANION GAP SERPL CALCULATED.3IONS-SCNC: 12 MMOL/L (ref 7–16)
APTT: 29.7 SEC (ref 24.5–35.1)
AST SERPL-CCNC: 18 U/L (ref 0–39)
BASOPHILS ABSOLUTE: 0.05 E9/L (ref 0–0.2)
BASOPHILS RELATIVE PERCENT: 0.7 % (ref 0–2)
BILIRUB SERPL-MCNC: 0.3 MG/DL (ref 0–1.2)
BUN BLDV-MCNC: 13 MG/DL (ref 6–20)
CALCIUM SERPL-MCNC: 9.2 MG/DL (ref 8.6–10.2)
CHLORIDE BLD-SCNC: 102 MMOL/L (ref 98–107)
CO2: 24 MMOL/L (ref 22–29)
CREAT SERPL-MCNC: 0.7 MG/DL (ref 0.7–1.2)
D DIMER: 378 NG/ML DDU
EOSINOPHILS ABSOLUTE: 0.18 E9/L (ref 0.05–0.5)
EOSINOPHILS RELATIVE PERCENT: 2.4 % (ref 0–6)
FIBRINOGEN: >700 MG/DL (ref 225–540)
GFR AFRICAN AMERICAN: >60
GFR NON-AFRICAN AMERICAN: >60 ML/MIN/1.73
GLUCOSE BLD-MCNC: 90 MG/DL (ref 74–99)
HCT VFR BLD CALC: 28.3 % (ref 37–54)
HEMOGLOBIN: 8.9 G/DL (ref 12.5–16.5)
IMMATURE GRANULOCYTES #: 0.03 E9/L
IMMATURE GRANULOCYTES %: 0.4 % (ref 0–5)
INR BLD: 1.2
LYMPHOCYTES ABSOLUTE: 2.86 E9/L (ref 1.5–4)
LYMPHOCYTES RELATIVE PERCENT: 38.7 % (ref 20–42)
MAGNESIUM: 1.8 MG/DL (ref 1.6–2.6)
MCH RBC QN AUTO: 25.7 PG (ref 26–35)
MCHC RBC AUTO-ENTMCNC: 31.4 % (ref 32–34.5)
MCV RBC AUTO: 81.8 FL (ref 80–99.9)
METER GLUCOSE: 102 MG/DL (ref 74–99)
METER GLUCOSE: 179 MG/DL (ref 74–99)
METER GLUCOSE: 183 MG/DL (ref 74–99)
MONOCYTES ABSOLUTE: 0.85 E9/L (ref 0.1–0.95)
MONOCYTES RELATIVE PERCENT: 11.5 % (ref 2–12)
NEUTROPHILS ABSOLUTE: 3.42 E9/L (ref 1.8–7.3)
NEUTROPHILS RELATIVE PERCENT: 46.3 % (ref 43–80)
PDW BLD-RTO: 15.5 FL (ref 11.5–15)
PLATELET # BLD: 607 E9/L (ref 130–450)
PMV BLD AUTO: 8.7 FL (ref 7–12)
POTASSIUM REFLEX MAGNESIUM: 3.4 MMOL/L (ref 3.5–5)
PROTHROMBIN TIME: 13.1 SEC (ref 9.3–12.4)
RBC # BLD: 3.46 E12/L (ref 3.8–5.8)
SODIUM BLD-SCNC: 138 MMOL/L (ref 132–146)
TOTAL PROTEIN: 6.6 G/DL (ref 6.4–8.3)
WBC # BLD: 7.4 E9/L (ref 4.5–11.5)

## 2020-10-02 PROCEDURE — 85730 THROMBOPLASTIN TIME PARTIAL: CPT

## 2020-10-02 PROCEDURE — 2580000003 HC RX 258: Performed by: INTERNAL MEDICINE

## 2020-10-02 PROCEDURE — 83735 ASSAY OF MAGNESIUM: CPT

## 2020-10-02 PROCEDURE — 82962 GLUCOSE BLOOD TEST: CPT

## 2020-10-02 PROCEDURE — 80053 COMPREHEN METABOLIC PANEL: CPT

## 2020-10-02 PROCEDURE — 2140000000 HC CCU INTERMEDIATE R&B

## 2020-10-02 PROCEDURE — 99232 SBSQ HOSP IP/OBS MODERATE 35: CPT | Performed by: INTERNAL MEDICINE

## 2020-10-02 PROCEDURE — 85384 FIBRINOGEN ACTIVITY: CPT

## 2020-10-02 PROCEDURE — 85025 COMPLETE CBC W/AUTO DIFF WBC: CPT

## 2020-10-02 PROCEDURE — 85610 PROTHROMBIN TIME: CPT

## 2020-10-02 PROCEDURE — 85378 FIBRIN DEGRADE SEMIQUANT: CPT

## 2020-10-02 PROCEDURE — 6370000000 HC RX 637 (ALT 250 FOR IP): Performed by: INTERNAL MEDICINE

## 2020-10-02 PROCEDURE — 6360000002 HC RX W HCPCS: Performed by: INTERNAL MEDICINE

## 2020-10-02 RX ORDER — MAGNESIUM SULFATE IN WATER 40 MG/ML
2 INJECTION, SOLUTION INTRAVENOUS ONCE
Status: COMPLETED | OUTPATIENT
Start: 2020-10-02 | End: 2020-10-02

## 2020-10-02 RX ORDER — SODIUM CHLORIDE 9 MG/ML
INJECTION, SOLUTION INTRAVENOUS CONTINUOUS
Status: DISCONTINUED | OUTPATIENT
Start: 2020-10-02 | End: 2020-10-03 | Stop reason: HOSPADM

## 2020-10-02 RX ORDER — POTASSIUM CHLORIDE 20 MEQ/1
40 TABLET, EXTENDED RELEASE ORAL ONCE
Status: COMPLETED | OUTPATIENT
Start: 2020-10-02 | End: 2020-10-02

## 2020-10-02 RX ADMIN — SODIUM CHLORIDE: 9 INJECTION, SOLUTION INTRAVENOUS at 15:20

## 2020-10-02 RX ADMIN — POTASSIUM CHLORIDE 40 MEQ: 1500 TABLET, EXTENDED RELEASE ORAL at 15:21

## 2020-10-02 RX ADMIN — ONDANSETRON 4 MG: 2 INJECTION INTRAMUSCULAR; INTRAVENOUS at 10:19

## 2020-10-02 RX ADMIN — MAGNESIUM SULFATE 2 G: 2 INJECTION INTRAVENOUS at 15:46

## 2020-10-02 RX ADMIN — INSULIN GLARGINE 15 UNITS: 100 INJECTION, SOLUTION SUBCUTANEOUS at 21:27

## 2020-10-02 RX ADMIN — OXYCODONE 5 MG: 5 TABLET ORAL at 09:43

## 2020-10-02 RX ADMIN — INSULIN LISPRO 1 UNITS: 100 INJECTION, SOLUTION INTRAVENOUS; SUBCUTANEOUS at 21:28

## 2020-10-02 RX ADMIN — SODIUM CHLORIDE, PRESERVATIVE FREE 10 ML: 5 INJECTION INTRAVENOUS at 10:19

## 2020-10-02 RX ADMIN — SENNOSIDES 8.6 MG: 8.6 TABLET, FILM COATED ORAL at 09:43

## 2020-10-02 RX ADMIN — PANTOPRAZOLE SODIUM 40 MG: 40 TABLET, DELAYED RELEASE ORAL at 06:19

## 2020-10-02 RX ADMIN — ATORVASTATIN CALCIUM 20 MG: 20 TABLET, FILM COATED ORAL at 20:45

## 2020-10-02 RX ADMIN — LAMOTRIGINE 25 MG: 25 TABLET ORAL at 09:43

## 2020-10-02 RX ADMIN — SODIUM CHLORIDE, PRESERVATIVE FREE 10 ML: 5 INJECTION INTRAVENOUS at 15:21

## 2020-10-02 RX ADMIN — ASPIRIN 81 MG: 81 TABLET, COATED ORAL at 09:44

## 2020-10-02 RX ADMIN — INSULIN LISPRO 2 UNITS: 100 INJECTION, SOLUTION INTRAVENOUS; SUBCUTANEOUS at 15:45

## 2020-10-02 RX ADMIN — OXYCODONE 5 MG: 5 TABLET ORAL at 23:06

## 2020-10-02 RX ADMIN — OXYCODONE 5 MG: 5 TABLET ORAL at 16:01

## 2020-10-02 RX ADMIN — VENLAFAXINE HYDROCHLORIDE 37.5 MG: 75 TABLET ORAL at 09:44

## 2020-10-02 RX ADMIN — ENOXAPARIN SODIUM 30 MG: 30 INJECTION SUBCUTANEOUS at 21:27

## 2020-10-02 RX ADMIN — Medication 10 ML: at 09:44

## 2020-10-02 ASSESSMENT — PAIN DESCRIPTION - LOCATION: LOCATION: KNEE

## 2020-10-02 ASSESSMENT — PAIN DESCRIPTION - ORIENTATION: ORIENTATION: RIGHT;LEFT

## 2020-10-02 ASSESSMENT — PAIN SCALES - GENERAL
PAINLEVEL_OUTOF10: 8
PAINLEVEL_OUTOF10: 8
PAINLEVEL_OUTOF10: 7

## 2020-10-02 NOTE — PROGRESS NOTES
TEXT:    Acute Respiratory Failure has been ruled out after study.     Query created by: Nyla Castrejon on 10/1/2020 9:03 AM      Electronically signed by:  Obed Mcfarland MD 10/1/2020 8:35 PM

## 2020-10-02 NOTE — PROGRESS NOTES
Hospital Medicine Progress Note      Date of Admission: 9/30/2020  Hospital day # 2    Course: Follow-up on Covid 19 pneumonia    Subjective    Patient feels very weak and tired  Stable overnight. No other overnight issues reported. Exam:    BP (!) 142/84   Pulse 106   Temp 98.4 °F (36.9 °C) (Oral)   Resp 18   Ht 5' 11\" (1.803 m)   Wt 200 lb (90.7 kg)   SpO2 96%   BMI 27.89 kg/m²     General appearance: Acutely ill, appears stated age and cooperative. HEENT: Pupils equal, round, and reactive to light. Conjunctivae/corneas clear. Neck: Supple. No jugular venous distention. Trachea midline. Respiratory: Rhonchi bilaterally  Cardiovascular:  S1/S2   Abdomen: Soft, non-tender, non-distended with normal bowel sounds. Musculoskeletal: No clubbing, cyanosis or edema bilaterally. Brisk capillary refill. 2+ lower extremity pulses (dorsalis pedis).    Skin:  No rashes    Neurologic: awake, alert and following commands     Medications:  Reviewed    Infusion Medications    dextrose       Scheduled Medications    aspirin EC  81 mg Oral Daily    atorvastatin  20 mg Oral Nightly    insulin glargine  15 Units Subcutaneous Nightly    lamoTRIgine  25 mg Oral Daily    pantoprazole  40 mg Oral QAM AC    senna  1 tablet Oral Daily    venlafaxine  37.5 mg Oral Daily    sodium chloride flush  10 mL Intravenous 2 times per day    enoxaparin  30 mg Subcutaneous BID    insulin lispro  0-12 Units Subcutaneous TID WC    insulin lispro  0-6 Units Subcutaneous Nightly     PRN Meds: methocarbamol, oxyCODONE, sodium chloride flush, acetaminophen **OR** acetaminophen, polyethylene glycol, ondansetron, glucose, dextrose, glucagon (rDNA), dextrose    I/O    Intake/Output Summary (Last 24 hours) at 10/2/2020 1219  Last data filed at 10/2/2020 0653  Gross per 24 hour   Intake 960 ml   Output 0 ml   Net 960 ml       Labs:   Recent Labs     09/30/20  1147 10/01/20  0427 10/02/20  0459   WBC 10.0 7.2 7.4   HGB 10.5* 8.9* 8.9* HCT 33.3* 28.3* 28.3*   * 564* 607*       Recent Labs     09/30/20  1147 10/01/20  0427 10/02/20  0459    139 138   K 4.3 3.6 3.4*    101 102   CO2 23 24 24   BUN 25* 22* 13   CREATININE 1.2 0.8 0.7   CALCIUM 9.8 9.2 9.2       Recent Labs     09/30/20  1147 10/01/20  0427 10/02/20  0459   PROT 7.6 6.7 6.6   ALKPHOS 84 77 75   ALT 22 17 13   AST 29 22 18   BILITOT 0.4 0.2 0.3       Recent Labs     10/01/20  0427 10/02/20  0459   INR 1.1 1.2       Recent Labs     09/30/20  1147   TROPONINI <0.01       Other labs:  Lab Results   Component Value Date    CHOL 100 08/07/2020    TRIG 51 08/07/2020    HDL 42 08/07/2020    LDLCALC 48 08/07/2020    TSH 2.620 07/31/2018    INR 1.2 10/02/2020    LABA1C 10.1 (H) 07/22/2020       Radiology:  Imaging studies reviewed today. ASSESSMENT:    COVID-19 infection  Pneumonia  Hypokalemia  Hypomagnesemia  Dehydration  Orthostatic hypotension  Previous history of septic arthritis  S/p bilateral knee arthrotomy  Diabetes mellitus  Neuropathy  Hyperlipidemia  Depression by history      PLAN:    Replace potassium  Optimize magnesium  IV fluids  Chest CT  evidence of pneumonia  Procalcitonin 0.06  Infectious disease following  Pulmonology following  Bilateral septic knee arthritis, s/p s/p wash-out on 07/23, bilateral knee arthrotomy with exploration and irrigation and debridement on 76/47, complicated by retained hematoma, s/p bilateral knee arthrotomy with exploration, irrigation and debridement on 08/09. Insulin sliding scale coverage  Continue Lantus           Diet: DIET CARB CONTROL;   Dietary Nutrition Supplements: Diabetic Oral Supplement  Code Status: Full Code    PT/OT Eval Status: [] Ordered [] Evaluation noted [x] Not applicable    DVT Prophylaxis: [x]Lovenox []Heparin []PCD [] [address] []Encouraged ambulation []N/A    Likely disposition when able:  [x]Home [] Home with [facility] [] SNF/[facility] [] Acute Rehab [] LTAC []Other    +++++++++++++++++++++++++++++++++++++++++++++++++  Ubaldo Driscoll MD, Hospitalist  +++++++++++++++++++++++++++++++++++++++++++++++++  NOTE: This report was transcribed using voice recognition software.  Every effort was made to ensure accuracy; however, inadvertent computerized transcription errors may be present.

## 2020-10-02 NOTE — PROGRESS NOTES
[facility] PROGRESS NOTE      Chief complaint: Follow-up of COVID-19    The patient is a 63 y.o. male with history of DM, chronic low back pain with severe spinal stenosis, Streptococcus agalactiae (susceptible to penicillin) bilateral septic knee arthritis s/p wash-out on 07/23, bilateral knee arthrotomy with exploration and irrigation and debridement on 30/96 complicated by retained hematoma s/p bilateral knee arthrotomy with exploration, irrigation and debridement on 08/09, discharged to nursing facility on 08/14 on a 4-week course of ampicillin from 08/09-09/06, presented on 09/30 with weakness a week after discharge from the nursing facility, found to be hypoxic with CT chest showing bilateral pulmonary parenchymal opacities and positive SARS-CoV-2 PCR. On admission, he was afebrile and hemodynamically stable with no leukocytosis. Serum procalcitonin level was not elevated at 0.06 ng/mL. Respiratory pathogen PCR panel, urine Streptococcus pneumonia and Legionella antigens were negative. He has been tolerating room air well. Subjective: Patient was seen and examined. No chills, no abdominal pain, no diarrhea, no rash, no itching, no shortness of breath.       Objective:    Vitals:    10/02/20 0850   BP: (!) 142/84   Pulse: 106   Resp: 18   Temp: 98.4 °F (36.9 °C)   SpO2:      Constitutional: Alert, not in distress  Respiratory: Clear breath sounds, no crackles, no wheezes  Cardiovascular: Regular rate and rhythm, no murmurs  Gastrointestinal: Bowel sounds present, soft, nontender  Skin: Warm and dry, no active dermatoses  Musculoskeletal: No joint swelling, no joint erythema    Laboratory:  Lab Results   Component Value Date    WBC 7.4 10/02/2020    WBC 7.2 10/01/2020    WBC 10.0 09/30/2020    HGB 8.9 (L) 10/02/2020    HCT 28.3 (L) 10/02/2020    MCV 81.8 10/02/2020     (H) 10/02/2020     Lab Results   Component Value Date    NEUTROABS 3.42 10/02/2020    NEUTROABS 3.54 10/01/2020    NEUTROABS 7.61 (H) 09/30/2020     Lab Results   Component Value Date    CRP 26.1 (H) 08/07/2020    CRP 41.4 (H) 07/22/2020     No results found for: CRPHS  Lab Results   Component Value Date    SEDRATE 138 (H) 08/07/2020    SEDRATE 90 (H) 07/22/2020     Lab Results   Component Value Date    ALT 13 10/02/2020    AST 18 10/02/2020    ALKPHOS 75 10/02/2020    BILITOT 0.3 10/02/2020     Lab Results   Component Value Date     10/02/2020    K 3.4 10/02/2020     10/02/2020    CO2 24 10/02/2020    BUN 13 10/02/2020    CREATININE 0.7 10/02/2020    CREATININE 0.8 10/01/2020    CREATININE 1.2 09/30/2020    GFRAA >60 10/02/2020    LABGLOM >60 10/02/2020    GLUCOSE 90 10/02/2020    PROT 6.6 10/02/2020    LABALBU 3.3 10/02/2020    CALCIUM 9.2 10/02/2020    BILITOT 0.3 10/02/2020    ALKPHOS 75 10/02/2020    AST 18 10/02/2020    ALT 13 10/02/2020       Radiology: CT chest: Bilateral pulmonary parenchymal opacities     Microbiology:     Blood cx  No results found for: BLOODCULT1  Culture, Blood 2   Date Value Ref Range Status   09/30/2020 24 Hours no growth  Preliminary   08/07/2020 5 Days no growth  Final   07/22/2020 5 Days no growth  Final   12/26/2014 5 Days- no growth  Final   SARS-CoV-2 PCR detected on 09/30  Ferritin: 3081 ng/mL  Procalcitonin: 0.07 ng/mL    Assessment:  SARS-CoV-2 infection  Pneumonia  Orthostatic hypotension     Plan:  Monitor clinically off antibiotics for now. Follow-up blood cultures. Monitor respiratory status. Continue supportive care.     Thank you for involving me in the care of Rocío Tsang. Dr. Sarina Peterson will continue to follow. Please do not hesitate to call for any questions or concerns.       Electronically signed by Jessa Guallpa MD on 10/2/2020 at 11:15 AM

## 2020-10-03 VITALS
BODY MASS INDEX: 28 KG/M2 | DIASTOLIC BLOOD PRESSURE: 70 MMHG | HEIGHT: 71 IN | WEIGHT: 200 LBS | SYSTOLIC BLOOD PRESSURE: 128 MMHG | OXYGEN SATURATION: 92 % | HEART RATE: 86 BPM | RESPIRATION RATE: 16 BRPM | TEMPERATURE: 97 F

## 2020-10-03 LAB
ALBUMIN SERPL-MCNC: 2.8 G/DL (ref 3.5–5.2)
ALP BLD-CCNC: 67 U/L (ref 40–129)
ALT SERPL-CCNC: 10 U/L (ref 0–40)
ANION GAP SERPL CALCULATED.3IONS-SCNC: 10 MMOL/L (ref 7–16)
APTT: 31.9 SEC (ref 24.5–35.1)
AST SERPL-CCNC: 13 U/L (ref 0–39)
BASOPHILS ABSOLUTE: 0.03 E9/L (ref 0–0.2)
BASOPHILS RELATIVE PERCENT: 0.4 % (ref 0–2)
BILIRUB SERPL-MCNC: 0.2 MG/DL (ref 0–1.2)
BUN BLDV-MCNC: 11 MG/DL (ref 6–20)
CALCIUM SERPL-MCNC: 8 MG/DL (ref 8.6–10.2)
CHLORIDE BLD-SCNC: 106 MMOL/L (ref 98–107)
CO2: 21 MMOL/L (ref 22–29)
CREAT SERPL-MCNC: 0.6 MG/DL (ref 0.7–1.2)
D DIMER: 701 NG/ML DDU
EOSINOPHILS ABSOLUTE: 0.18 E9/L (ref 0.05–0.5)
EOSINOPHILS RELATIVE PERCENT: 2.3 % (ref 0–6)
FIBRINOGEN: >700 MG/DL (ref 225–540)
GFR AFRICAN AMERICAN: >60
GFR NON-AFRICAN AMERICAN: >60 ML/MIN/1.73
GLUCOSE BLD-MCNC: 132 MG/DL (ref 74–99)
HCT VFR BLD CALC: 24.1 % (ref 37–54)
HEMOGLOBIN: 7.6 G/DL (ref 12.5–16.5)
IMMATURE GRANULOCYTES #: 0.03 E9/L
IMMATURE GRANULOCYTES %: 0.4 % (ref 0–5)
INR BLD: 1.3
LYMPHOCYTES ABSOLUTE: 2.05 E9/L (ref 1.5–4)
LYMPHOCYTES RELATIVE PERCENT: 26.7 % (ref 20–42)
MCH RBC QN AUTO: 25.7 PG (ref 26–35)
MCHC RBC AUTO-ENTMCNC: 31.5 % (ref 32–34.5)
MCV RBC AUTO: 81.4 FL (ref 80–99.9)
METER GLUCOSE: 145 MG/DL (ref 74–99)
MONOCYTES ABSOLUTE: 0.84 E9/L (ref 0.1–0.95)
MONOCYTES RELATIVE PERCENT: 11 % (ref 2–12)
NEUTROPHILS ABSOLUTE: 4.54 E9/L (ref 1.8–7.3)
NEUTROPHILS RELATIVE PERCENT: 59.2 % (ref 43–80)
PDW BLD-RTO: 15.5 FL (ref 11.5–15)
PLATELET # BLD: 549 E9/L (ref 130–450)
PMV BLD AUTO: 8.4 FL (ref 7–12)
POTASSIUM REFLEX MAGNESIUM: 3.7 MMOL/L (ref 3.5–5)
PROTHROMBIN TIME: 14.9 SEC (ref 9.3–12.4)
RBC # BLD: 2.96 E12/L (ref 3.8–5.8)
SODIUM BLD-SCNC: 137 MMOL/L (ref 132–146)
TOTAL PROTEIN: 5.6 G/DL (ref 6.4–8.3)
WBC # BLD: 7.7 E9/L (ref 4.5–11.5)

## 2020-10-03 PROCEDURE — 85730 THROMBOPLASTIN TIME PARTIAL: CPT

## 2020-10-03 PROCEDURE — 2580000003 HC RX 258: Performed by: INTERNAL MEDICINE

## 2020-10-03 PROCEDURE — 82962 GLUCOSE BLOOD TEST: CPT

## 2020-10-03 PROCEDURE — 80053 COMPREHEN METABOLIC PANEL: CPT

## 2020-10-03 PROCEDURE — 85378 FIBRIN DEGRADE SEMIQUANT: CPT

## 2020-10-03 PROCEDURE — 6360000002 HC RX W HCPCS: Performed by: INTERNAL MEDICINE

## 2020-10-03 PROCEDURE — 85025 COMPLETE CBC W/AUTO DIFF WBC: CPT

## 2020-10-03 PROCEDURE — 85610 PROTHROMBIN TIME: CPT

## 2020-10-03 PROCEDURE — 6370000000 HC RX 637 (ALT 250 FOR IP): Performed by: INTERNAL MEDICINE

## 2020-10-03 PROCEDURE — 36415 COLL VENOUS BLD VENIPUNCTURE: CPT

## 2020-10-03 PROCEDURE — 85384 FIBRINOGEN ACTIVITY: CPT

## 2020-10-03 RX ADMIN — ASPIRIN 81 MG: 81 TABLET, COATED ORAL at 09:47

## 2020-10-03 RX ADMIN — Medication 10 ML: at 09:48

## 2020-10-03 RX ADMIN — ONDANSETRON 4 MG: 2 INJECTION INTRAMUSCULAR; INTRAVENOUS at 09:57

## 2020-10-03 RX ADMIN — ACETAMINOPHEN 650 MG: 325 TABLET ORAL at 06:12

## 2020-10-03 RX ADMIN — LAMOTRIGINE 25 MG: 25 TABLET ORAL at 09:47

## 2020-10-03 RX ADMIN — PANTOPRAZOLE SODIUM 40 MG: 40 TABLET, DELAYED RELEASE ORAL at 06:11

## 2020-10-03 RX ADMIN — SODIUM CHLORIDE: 9 INJECTION, SOLUTION INTRAVENOUS at 03:14

## 2020-10-03 RX ADMIN — OXYCODONE 5 MG: 5 TABLET ORAL at 09:50

## 2020-10-03 RX ADMIN — VENLAFAXINE HYDROCHLORIDE 37.5 MG: 75 TABLET ORAL at 09:47

## 2020-10-03 RX ADMIN — SENNOSIDES 8.6 MG: 8.6 TABLET, FILM COATED ORAL at 09:47

## 2020-10-03 RX ADMIN — ENOXAPARIN SODIUM 30 MG: 30 INJECTION SUBCUTANEOUS at 09:48

## 2020-10-03 ASSESSMENT — PAIN DESCRIPTION - PROGRESSION: CLINICAL_PROGRESSION: NOT CHANGED

## 2020-10-03 ASSESSMENT — ENCOUNTER SYMPTOMS
COUGH: 0
ABDOMINAL PAIN: 0
SHORTNESS OF BREATH: 0
BACK PAIN: 0
VOMITING: 0
WHEEZING: 0
COLOR CHANGE: 0
CONSTIPATION: 0
NAUSEA: 0
DIARRHEA: 0
CHEST TIGHTNESS: 0

## 2020-10-03 ASSESSMENT — PAIN DESCRIPTION - LOCATION: LOCATION: HEAD

## 2020-10-03 ASSESSMENT — PAIN DESCRIPTION - ONSET: ONSET: AWAKENED FROM SLEEP

## 2020-10-03 ASSESSMENT — PAIN SCALES - GENERAL: PAINLEVEL_OUTOF10: 7

## 2020-10-03 ASSESSMENT — PAIN - FUNCTIONAL ASSESSMENT: PAIN_FUNCTIONAL_ASSESSMENT: PREVENTS OR INTERFERES SOME ACTIVE ACTIVITIES AND ADLS

## 2020-10-03 NOTE — ED PROVIDER NOTES
Patient is a 76-year-old male with past medical history significant for hyperlipidemia, LIDYA, diabetes, recent bilateral septic knee arthritis who presents the ER today with chief complaint of hypotension. Patient states that he was at his [de-identified] office today for checkup and was found to be hypotensive so he was sent to the ED for further evaluation. Patient was recently hospitalized from July 21-31 with bilateral knee septic arthritis secondary to strep agalactiae. He had bilateral knee arthrotomies with irrigation and debridement done and discharged with Rocephin and ampicillin. Patient states that for the last few days he has been feeling increasingly weak and fatigued. Otherwise he denies no symptoms. Denies any headache, dizziness, fever, chest pain, cough, shortness of breath, nausea, vomiting, abdominal pain, diarrhea, constipation, urinary symptoms. Review of Systems   Constitutional: Positive for fatigue. Negative for chills, diaphoresis and fever. HENT: Negative for drooling. Eyes: Negative for visual disturbance. Respiratory: Negative for cough, chest tightness, shortness of breath and wheezing. Cardiovascular: Negative for chest pain and palpitations. Gastrointestinal: Negative for abdominal pain, constipation, diarrhea, nausea and vomiting. Genitourinary: Negative for dysuria and frequency. Musculoskeletal: Positive for arthralgias and joint swelling. Negative for back pain. Skin: Negative for color change and wound. Neurological: Positive for weakness. Negative for headaches. Psychiatric/Behavioral: Negative for confusion. Physical Exam  Constitutional:       Appearance: Normal appearance. HENT:      Head: Normocephalic and atraumatic. Right Ear: External ear normal.      Left Ear: External ear normal.      Nose: Nose normal.      Mouth/Throat:      Mouth: Mucous membranes are moist.      Pharynx: Oropharynx is clear.    Eyes:      Extraocular Movements: Extraocular movements intact. Conjunctiva/sclera: Conjunctivae normal.      Pupils: Pupils are equal, round, and reactive to light. Neck:      Musculoskeletal: Normal range of motion and neck supple. Cardiovascular:      Rate and Rhythm: Normal rate and regular rhythm. Pulses: Normal pulses. Heart sounds: Normal heart sounds. Pulmonary:      Effort: Pulmonary effort is normal.      Breath sounds: Normal breath sounds. Abdominal:      Palpations: Abdomen is soft. Tenderness: There is no abdominal tenderness. There is no guarding or rebound. Musculoskeletal:      Comments: Scars noted over knees bilaterally status post recent arthrotomy. Skin:     General: Skin is warm and dry. Neurological:      General: No focal deficit present. Mental Status: He is alert and oriented to person, place, and time. Psychiatric:         Mood and Affect: Mood normal.         Behavior: Behavior normal.         Thought Content: Thought content normal.         Judgment: Judgment normal.          Procedures     MDM  Number of Diagnoses or Management Options  Diagnosis management comments: Patient presented to the ED today with chief complaint of hypotension per his PCPs office. Patient not hypotensive here in the ED, however he was toxic with a blood saturation of 88% when he first arrived. Patient was placed on submental oxygen. Initial labs were largely unremarkable, chest x-ray also within normal limits, however patient's COVID-19 PCR test was positive. EKG demonstrates no acute changes. Given that patient was hypoxic upon arrival and is positive for COVID-19 I feel that he would benefit from admission for further evaluation management. Speak to Dr. Falguni Wetzel who is agreeable to admission at this time. She is agreeable to plan as well. EKG:  This EKG is signed and interpreted by me.     Rate: 81  Rhythm: Sinus  Interpretation: Normal sinus rhythm, no acute ST segment changes, QTC prolonged at 469 ms  Comparison: changes compared to previous EKG         --------------------------------------------- PAST HISTORY ---------------------------------------------  Past Medical History:  has a past medical history of Chronic pain, Depression, HLD (hyperlipidemia), Neuropathy, LIDYA on CPAP, Osteoarthritis, and Type II or unspecified type diabetes mellitus without mention of complication, not stated as uncontrolled. Past Surgical History:  has a past surgical history that includes Varicose vein surgery; Arm Surgery; pr colonoscopy w/biopsy single/multiple (N/A, 9/20/2018); pr colsc flx w/rmvl of tumor polyp lesion snare tq (9/20/2018); Knee arthroscopy (Bilateral, 7/23/2020); incision and drainage (Bilateral, 7/27/2020); and incision and drainage (Bilateral, 8/9/2020). Social History:  reports that he has quit smoking. He smoked 0.00 packs per day for 30.00 years. He has never used smokeless tobacco. He reports that he does not drink alcohol or use drugs. Family History: family history includes COPD in his father; Diabetes in his paternal grandfather; Heart Disease in his brother; Stroke in his father. The patients home medications have been reviewed. Allergies: Adhesive tape; Tramadol; Cymbalta [duloxetine hcl];  Lidocaine; Lyrica [pregabalin]; and Strattera [atomoxetine]    -------------------------------------------------- RESULTS -------------------------------------------------    LABS:  Results for orders placed or performed during the hospital encounter of 09/30/20   Respiratory Panel, Molecular    Specimen: Nasopharyngeal   Result Value Ref Range    Adenovirus by PCR Not Detected Not Detected    Bordetella parapertussis by PCR Not Detected Not Detected    Bordetella pertussis by PCR Not Detected Not Detected    Chlamydophilia pneumoniae by PCR Not Detected Not Detected    Coronavirus 229E by PCR Not Detected Not Detected    Coronavirus HKU1 by PCR Not Detected Not Detected    Coronavirus NL63 by PCR Not Detected Not Detected    Coronavirus OC43 by PCR Not Detected Not Detected    Human Metapneumovirus by PCR Not Detected Not Detected    Human Rhinovirus/Enterovirus by PCR Not Detected Not Detected    Influenza A by PCR Not Detected Not Detected    Influenza B by PCR Not Detected Not Detected    Mycoplasma pneumoniae by PCR Not Detected Not Detected    Parainfluenza Virus 1 by PCR Not Detected Not Detected    Parainfluenza Virus 2 by PCR Not Detected Not Detected    Parainfluenza Virus 3 by PCR Not Detected Not Detected    Parainfluenza Virus 4 by PCR Not Detected Not Detected    Respiratory Syncytial Virus by PCR Not Detected Not Detected   Culture, Blood 2    Specimen: Blood   Result Value Ref Range    Culture, Blood 2 24 Hours no growth    Culture, Blood 1    Specimen: Blood   Result Value Ref Range    Blood Culture, Routine 24 Hours no growth    Legionella antigen, urine    Specimen: Urine voided   Result Value Ref Range    L. pneumophila Serogp 1 Ur Ag       Presumptive Negative -suggesting no recent or current infections  with Legionella pneumophila serogroup 1. Infection to Legionella cannot be ruled out since other serogroups  and species may cause infection, antigen may not be present in  early infection, or level of antigen may be below the  detection limit. Normal Range: Presumptive Negative     Strep Pneumoniae Antigen    Specimen: Urine, clean catch   Result Value Ref Range    STREP PNEUMONIAE ANTIGEN, URINE       Presumptive negative- suggests no current or recent  pneumococcal infection.   Infection due to Strep pneumoniae cannot be  ruled out since the antigen present in the sample  may be below the detection limit of the test.  Normal Range:Presumptive Negative     CBC auto differential   Result Value Ref Range    WBC 10.0 4.5 - 11.5 E9/L    RBC 3.97 3.80 - 5.80 E12/L    Hemoglobin 10.5 (L) 12.5 - 16.5 g/dL    Hematocrit 33.3 (L) 37.0 - 54.0 %    MCV 83.9 80.0 - 99.9 fL    MCH 26.4 26.0 - 35.0 pg    MCHC 31.5 (L) 32.0 - 34.5 %    RDW 15.4 (H) 11.5 - 15.0 fL    Platelets 492 (H) 453 - 450 E9/L    MPV 8.7 7.0 - 12.0 fL    Neutrophils % 76.1 43.0 - 80.0 %    Immature Granulocytes % 0.4 0.0 - 5.0 %    Lymphocytes % 16.0 (L) 20.0 - 42.0 %    Monocytes % 6.5 2.0 - 12.0 %    Eosinophils % 0.6 0.0 - 6.0 %    Basophils % 0.4 0.0 - 2.0 %    Neutrophils Absolute 7.61 (H) 1.80 - 7.30 E9/L    Immature Granulocytes # 0.04 E9/L    Lymphocytes Absolute 1.60 1.50 - 4.00 E9/L    Monocytes Absolute 0.65 0.10 - 0.95 E9/L    Eosinophils Absolute 0.06 0.05 - 0.50 E9/L    Basophils Absolute 0.04 0.00 - 0.20 E9/L   Comprehensive Metabolic Panel   Result Value Ref Range    Sodium 137 132 - 146 mmol/L    Potassium 4.3 3.5 - 5.0 mmol/L    Chloride 100 98 - 107 mmol/L    CO2 23 22 - 29 mmol/L    Anion Gap 14 7 - 16 mmol/L    Glucose 220 (H) 74 - 99 mg/dL    BUN 25 (H) 6 - 20 mg/dL    CREATININE 1.2 0.7 - 1.2 mg/dL    GFR Non-African American >60 >=60 mL/min/1.73    GFR African American >60     Calcium 9.8 8.6 - 10.2 mg/dL    Total Protein 7.6 6.4 - 8.3 g/dL    Alb 3.7 3.5 - 5.2 g/dL    Total Bilirubin 0.4 0.0 - 1.2 mg/dL    Alkaline Phosphatase 84 40 - 129 U/L    ALT 22 0 - 40 U/L    AST 29 0 - 39 U/L   Magnesium   Result Value Ref Range    Magnesium 2.0 1.6 - 2.6 mg/dL   Lactate, Sepsis   Result Value Ref Range    Lactic Acid, Sepsis 1.3 0.5 - 1.9 mmol/L   Troponin   Result Value Ref Range    Troponin <0.01 0.00 - 0.03 ng/mL   COVID-19   Result Value Ref Range    SARS-CoV-2, NAAT DETECTED (A) Not Detected   Procalcitonin   Result Value Ref Range    Procalcitonin 0.07 0.00 - 0.08 ng/mL   D-Dimer, Quantitative   Result Value Ref Range    D-Dimer, Quant 394 ng/mL DDU   Ferritin   Result Value Ref Range    Ferritin 3,081 ng/mL   Lactate Dehydrogenase   Result Value Ref Range     (H) 135 - 225 U/L   Fibrinogen   Result Value Ref Range    Fibrinogen 697 (H) 225 - 540 mg/dL   APTT   Result  (A) Negative mg/dL    Urobilinogen, Urine 0.2 <2.0 E.U./dL    Nitrite, Urine Negative Negative    Leukocyte Esterase, Urine Negative Negative    WBC, UA NONE 0 - 5 /HPF    RBC, UA NONE 0 - 2 /HPF    Bacteria, UA NONE SEEN None Seen /HPF   Procalcitonin   Result Value Ref Range    Procalcitonin 0.06 0.00 - 0.08 ng/mL   D-Dimer, Quantitative   Result Value Ref Range    D-Dimer, Quant 378 ng/mL DDU   Fibrinogen   Result Value Ref Range    Fibrinogen >700 (H) 225 - 540 mg/dL   APTT   Result Value Ref Range    aPTT 29.7 24.5 - 35.1 sec   Protime-INR   Result Value Ref Range    Protime 13.1 (H) 9.3 - 12.4 sec    INR 1.2    Comprehensive Metabolic Panel w/ Reflex to MG   Result Value Ref Range    Sodium 138 132 - 146 mmol/L    Potassium reflex Magnesium 3.4 (L) 3.5 - 5.0 mmol/L    Chloride 102 98 - 107 mmol/L    CO2 24 22 - 29 mmol/L    Anion Gap 12 7 - 16 mmol/L    Glucose 90 74 - 99 mg/dL    BUN 13 6 - 20 mg/dL    CREATININE 0.7 0.7 - 1.2 mg/dL    GFR Non-African American >60 >=60 mL/min/1.73    GFR African American >60     Calcium 9.2 8.6 - 10.2 mg/dL    Total Protein 6.6 6.4 - 8.3 g/dL    Alb 3.3 (L) 3.5 - 5.2 g/dL    Total Bilirubin 0.3 0.0 - 1.2 mg/dL    Alkaline Phosphatase 75 40 - 129 U/L    ALT 13 0 - 40 U/L    AST 18 0 - 39 U/L   CBC Auto Differential   Result Value Ref Range    WBC 7.4 4.5 - 11.5 E9/L    RBC 3.46 (L) 3.80 - 5.80 E12/L    Hemoglobin 8.9 (L) 12.5 - 16.5 g/dL    Hematocrit 28.3 (L) 37.0 - 54.0 %    MCV 81.8 80.0 - 99.9 fL    MCH 25.7 (L) 26.0 - 35.0 pg    MCHC 31.4 (L) 32.0 - 34.5 %    RDW 15.5 (H) 11.5 - 15.0 fL    Platelets 540 (H) 468 - 450 E9/L    MPV 8.7 7.0 - 12.0 fL    Neutrophils % 46.3 43.0 - 80.0 %    Immature Granulocytes % 0.4 0.0 - 5.0 %    Lymphocytes % 38.7 20.0 - 42.0 %    Monocytes % 11.5 2.0 - 12.0 %    Eosinophils % 2.4 0.0 - 6.0 %    Basophils % 0.7 0.0 - 2.0 %    Neutrophils Absolute 3.42 1.80 - 7.30 E9/L    Immature Granulocytes # 0.03 E9/L    Lymphocytes Absolute 2.86 1.50 - 4.00 E9/L    Monocytes Absolute 0.85 0.10 - 0.95 E9/L    Eosinophils Absolute 0.18 0.05 - 0.50 E9/L    Basophils Absolute 0.05 0.00 - 0.20 E9/L   Magnesium   Result Value Ref Range    Magnesium 1.8 1.6 - 2.6 mg/dL   D-Dimer, Quantitative   Result Value Ref Range    D-Dimer, Quant 701 ng/mL DDU   Fibrinogen   Result Value Ref Range    Fibrinogen >700 (H) 225 - 540 mg/dL   APTT   Result Value Ref Range    aPTT 31.9 24.5 - 35.1 sec   Protime-INR   Result Value Ref Range    Protime 14.9 (H) 9.3 - 12.4 sec    INR 1.3    Comprehensive Metabolic Panel w/ Reflex to MG   Result Value Ref Range    Sodium 137 132 - 146 mmol/L    Potassium reflex Magnesium 3.7 3.5 - 5.0 mmol/L    Chloride 106 98 - 107 mmol/L    CO2 21 (L) 22 - 29 mmol/L    Anion Gap 10 7 - 16 mmol/L    Glucose 132 (H) 74 - 99 mg/dL    BUN 11 6 - 20 mg/dL    CREATININE 0.6 (L) 0.7 - 1.2 mg/dL    GFR Non-African American >60 >=60 mL/min/1.73    GFR African American >60     Calcium 8.0 (L) 8.6 - 10.2 mg/dL    Total Protein 5.6 (L) 6.4 - 8.3 g/dL    Alb 2.8 (L) 3.5 - 5.2 g/dL    Total Bilirubin 0.2 0.0 - 1.2 mg/dL    Alkaline Phosphatase 67 40 - 129 U/L    ALT 10 0 - 40 U/L    AST 13 0 - 39 U/L   CBC Auto Differential   Result Value Ref Range    WBC 7.7 4.5 - 11.5 E9/L    RBC 2.96 (L) 3.80 - 5.80 E12/L    Hemoglobin 7.6 (L) 12.5 - 16.5 g/dL    Hematocrit 24.1 (L) 37.0 - 54.0 %    MCV 81.4 80.0 - 99.9 fL    MCH 25.7 (L) 26.0 - 35.0 pg    MCHC 31.5 (L) 32.0 - 34.5 %    RDW 15.5 (H) 11.5 - 15.0 fL    Platelets 676 (H) 619 - 450 E9/L    MPV 8.4 7.0 - 12.0 fL    Neutrophils % 59.2 43.0 - 80.0 %    Immature Granulocytes % 0.4 0.0 - 5.0 %    Lymphocytes % 26.7 20.0 - 42.0 %    Monocytes % 11.0 2.0 - 12.0 %    Eosinophils % 2.3 0.0 - 6.0 %    Basophils % 0.4 0.0 - 2.0 %    Neutrophils Absolute 4.54 1.80 - 7.30 E9/L    Immature Granulocytes # 0.03 E9/L    Lymphocytes Absolute 2.05 1.50 - 4.00 E9/L    Monocytes Absolute 0.84 0.10 - 0.95 E9/L    Eosinophils Absolute 0. 18 0.05 - 0.50 E9/L    Basophils Absolute 0.03 0.00 - 0.20 E9/L   POCT Glucose   Result Value Ref Range    Meter Glucose 155 (H) 74 - 99 mg/dL   POCT Glucose   Result Value Ref Range    Meter Glucose 150 (H) 74 - 99 mg/dL   POCT Glucose   Result Value Ref Range    Meter Glucose 185 (H) 74 - 99 mg/dL   POCT Glucose   Result Value Ref Range    Meter Glucose 130 (H) 74 - 99 mg/dL   POCT Glucose   Result Value Ref Range    Meter Glucose 140 (H) 74 - 99 mg/dL   POCT Glucose   Result Value Ref Range    Meter Glucose 102 (H) 74 - 99 mg/dL   POCT Glucose   Result Value Ref Range    Meter Glucose 179 (H) 74 - 99 mg/dL   POCT Glucose   Result Value Ref Range    Meter Glucose 183 (H) 74 - 99 mg/dL   POCT Glucose   Result Value Ref Range    Meter Glucose 145 (H) 74 - 99 mg/dL   EKG 12 Lead   Result Value Ref Range    Ventricular Rate 81 BPM    Atrial Rate 81 BPM    P-R Interval 186 ms    QRS Duration 90 ms    Q-T Interval 404 ms    QTc Calculation (Bazett) 469 ms    P Axis 66 degrees    R Axis -14 degrees    T Axis 53 degrees   TYPE AND SCREEN   Result Value Ref Range    ABO/Rh A POS     Antibody Screen NEG        RADIOLOGY:  CT CHEST WO CONTRAST   Final Result   1. Bilateral pulmonary parenchymal opacities which can indicate a   different phases of an acute viral infection, some findings are more   towards the late subacute phase others are more towards an acute   phase. Bilateral viral pneumonia will be part of the differential diagnosis. Please correlate clinically. XR CHEST PORTABLE   Final Result      NO ACUTE CARDIOPULMONARY PROCESS                   ------------------------- NURSING NOTES AND VITALS REVIEWED ---------------------------  Date / Time Roomed:  9/30/2020 11:29 AM  ED Bed Assignment:  [id number]    The nursing notes within the ED encounter and vital signs as below have been reviewed.      Patient Vitals for the past 24 hrs:   BP Temp Temp src Pulse Resp SpO2   10/03/20 0943 128/70 97 °F (36.1 °C) Infrared 86 16 92 %   10/02/20 2308 130/74 97.4 °F (36.3 °C) Oral 95 17 95 %   10/02/20 2030 132/84 -- -- -- -- --   10/02/20 1528 130/76 98.7 °F (37.1 °C) Oral 90 18 94 %       Oxygen Saturation Interpretation: Abnormal and Improved after treatment    ------------------------------------------ PROGRESS NOTES ------------------------------------------  Re-evaluation(s):  Time: 1450  Patients symptoms show no change  Repeat physical examination is not changed    Counseling:  I have spoken with the patient and discussed todays results, in addition to providing specific details for the plan of care and counseling regarding the diagnosis and prognosis. Their questions are answered at this time and they are agreeable with the plan of admission.    --------------------------------- ADDITIONAL PROVIDER NOTES ---------------------------------  Consultations:  Time: 3367. Spoke with Dr. Jesus Draper. Discussed case. They will admit the patient. This patient's ED course included: a personal history and physicial examination and re-evaluation prior to disposition    This patient has remained hemodynamically stable during their ED course. Diagnosis:  1. COVID-19        Disposition:  Patient's disposition: Admit to telemetry  Patient's condition is stable.            Jsavir Hernandez DO  Resident  10/03/20 5934

## 2020-10-03 NOTE — DISCHARGE SUMMARY
Hospital Medicine Discharge Summary    Patient ID: Trey Alvarez   Patient's PCP: Roger Carney MD    Admit Date: 9/30/2020   Discharge Date:  10/3/2020    Admitting Physician: Faizan Betancourt MD  Discharge Physician: Faizan Betancourt MD     Discharge Diagnoses:    COVID-19 infection  Pneumonia  Hypokalemia  Hypomagnesemia  Dehydration  Orthostatic hypotension  Previous history of septic arthritis  S/p bilateral knee arthrotomy  Diabetes mellitus  Neuropathy  Hyperlipidemia  Depression by history    Hospital course in brief (Please refer to daily progress notes for a comprehensive review of the hospitalization by requesting medical records)    63 y.o. male with history of DM, chronic low back pain with severe spinal stenosis, Streptococcus agalactiae (susceptible to penicillin) bilateral septic knee arthritis s/p wash-out on 07/23, bilateral knee arthrotomy with exploration and irrigation and debridement on 76/30 complicated by retained hematoma s/p bilateral knee arthrotomy with exploration, irrigation and debridement on 08/09, discharged to nursing facility on 08/14 on a 4-week course of ampicillin from 08/09-09/06, presented on 09/30 with weakness a week after discharge from the nursing facility, found to be hypoxic with CT chest showing bilateral pulmonary parenchymal opacities and positive SARS-CoV-2 PCR. On admission, he was afebrile and hemodynamically stable with no leukocytosis. Serum procalcitonin level was not elevated at 0.06 ng/mL. Respiratory pathogen PCR panel, urine Streptococcus pneumonia and Legionella antigens were negative    Please see consultations for details      PHYSICAL EXAM:  /70   Pulse 86   Temp 97 °F (36.1 °C) (Infrared)   Resp 16   Ht 5' 11\" (1.803 m)   Wt 200 lb (90.7 kg)   SpO2 92%   BMI 27.89 kg/m²     General appearance: No apparent distress appears stated age and cooperative. HEENT Normal cephalic, atraumatic without obvious deformity.   Pupils equal, round, and reactive to light. Extra ocular muscles intact. Conjunctivae/corneas clear. Neck: Supple, No jugular venous distention/bruits. Trachea midline without thyromegaly or adenopathy with full range of motion. Lungs: Clear to auscultation, bilaterally without Rales/Wheezes/Rhonchi with good respiratory effort. Heart:  S1/S2   Abdomen: Soft, non-tender or non-distended without rigidity or guarding and positive bowel sounds all four quadrants. Extremities: No clubbing, cyanosis, or edema bilaterally. Skin: Skin color, texture, turgor normal.  No rashes or lesions. Neurologic: Alert and oriented X 3      Consults:   IP CONSULT TO INTERNAL MEDICINE  IP CONSULT TO INFECTIOUS DISEASES  IP CONSULT TO PULMONOLOGY    Discharge Instructions / Follow up:  PCP    Activity: activity as tolerated    Significant labs:    Labs: For convenience and continuity at follow-up the following most recent labs are provided:  CBC:   No results for input(s): WBC, RBC, HGB, HCT, MCV, RDW, PLT in the last 72 hours. BMP:   No results for input(s): NA, K, CL, CO2, BUN, CREATININE, MG, PHOS in the last 72 hours. Invalid input(s): CA  LFT:  No results for input(s): PROT, ALB, ALKPHOS, ALT, AST, BILITOT, AMYLASE, LIPASE in the last 72 hours. PT/INR:   No results for input(s): INR, APTT in the last 72 hours. BNP: No results for input(s): BNP in the last 72 hours.   Hgb A1C:   Lab Results   Component Value Date    LABA1C 10.1 (H) 07/22/2020     Folate and B12:   Lab Results   Component Value Date    [id number] 569 07/22/2020   , No results found for: FOLATE  Thyroid Studies:   Lab Results   Component Value Date    TSH 2.620 07/31/2018       Urinalysis:    Lab Results   Component Value Date    NITRU Negative 10/01/2020    WBCUA NONE 10/01/2020    BACTERIA NONE SEEN 10/01/2020    RBCUA NONE 10/01/2020    BLOODU Negative 10/01/2020    SPECGRAV >=1.030 10/01/2020    GLUCOSEU Negative 10/01/2020       Imaging:  Ct Chest Wo Contrast    Result Date: 2020  Patient MRN:  [id number] : 1965 Age: 63 years Gender: Male Order Date:  2020 9:05 PM TECHNIQUE/NUMBER OF IMAGES/COMPARISON/CLINICAL HISTORY: CT chest Axial images were obtained with sagittal and coronal reconstructions 55 images 58-year-old male patient with a history of viral infection . Pneumonia, positive Covid-19 test.. Reviewed chest radiographs of August findings: Presence of 5 areas of peripheral parenchymal physis in both lungs with some confluent densities forming more peripheral subpleural consolidations in the lower lobes and in the posterior aspect of the right upper lobe with additional areas of groundglass opacity in both upper lobes the end in the right middle lobe. The findings can be indication for different stage of acute viral infection including with the findings more towards the subacute phase, and also finds towards an acute phase. Air cysts are seen in the right upper lobe superiorly. Heart has normal size. There is no pericardial effusion. Diameter for the ascending aorta is 3.2 cm and for the main pulmonary arteries 2.3 cm. No mediastinal masses or adenopathy seen. No conspicuous pleural effusions are seen. There is no pericardial effusion. Upper abdominal structures are not fully covered study. 1. Bilateral pulmonary parenchymal opacities which can indicate a different phases of an acute viral infection, some findings are more towards the late subacute phase others are more towards an acute phase. Bilateral viral pneumonia will be part of the differential diagnosis. Please correlate clinically. Xr Chest Portable    Result Date: 2020  Patient MRN: [id number] : 1965 Age:  63 years Gender: Male Order Date: 2020 2:14 PM Exam: XR CHEST PORTABLE Number of Images: 1 view Indication:   Shortness of breath Shortness of breath Comparison: Prior study from 2020 is available Findings: The lungs are clear.   There is no evidence of pulmonary infiltrate or pleural effusion. The pulmonary vascularity is unremarkable. The cardiac, hilar and mediastinal silhouettes are satisfactory. The bony thorax demonstrates no gross abnormality. The study is unchanged from prior study. NO ACUTE CARDIOPULMONARY PROCESS       Discharge Medications:      Medication List      CHANGE how you take these medications    insulin glargine 100 UNIT/ML injection vial  Commonly known as:  LANTUS  Inject 15 Units into the skin nightly  What changed:  additional instructions        CONTINUE taking these medications    aspirin EC 81 MG EC tablet  Take 1 tablet by mouth daily     atorvastatin 20 MG tablet  Commonly known as:  LIPITOR  Take 1 tablet by mouth daily     * insulin lispro 100 UNIT/ML injection vial  Commonly known as:  HUMALOG  Inject 0-18 Units into the skin 3 times daily (with meals)     * insulin lispro 100 UNIT/ML injection vial  Commonly known as:  HUMALOG  Inject 0-9 Units into the skin nightly     lamoTRIgine 25 MG tablet  Commonly known as:  LAMICTAL     methocarbamol 500 MG tablet  Commonly known as:  ROBAXIN  Take 2 tablets by mouth 4 times daily as needed (As needed for muscle spasms)     MULTI VITAMIN MENS PO     * oxyCODONE 5 MG immediate release tablet  Commonly known as:  ROXICODONE     * oxyCODONE 10 MG extended release tablet  Commonly known as:  OXYCONTIN     pantoprazole 40 MG tablet  Commonly known as:  PROTONIX  Take 1 tablet by mouth every morning (before breakfast)     senna 8.6 MG tablet  Commonly known as:  SENOKOT     venlafaxine 37.5 MG tablet  Commonly known as:  EFFEXOR         * This list has 4 medication(s) that are the same as other medications prescribed for you. Read the directions carefully, and ask your doctor or other care provider to review them with you. Condition at discharge: Stable     Disposition:  Home    Thank you for the opportunity to be involved in this patient's care.  If you have any

## 2020-10-03 NOTE — PROGRESS NOTES
[facility] PROGRESS NOTE      Chief complaint: Follow-up of COVID-19    Denies fever and chills  Report dry cough, denies SOB   Afebrile      Current Facility-Administered Medications   Medication Dose Route Frequency Provider Last Rate Last Dose    0.9 % sodium chloride infusion   Intravenous Continuous Violeta Dunlap  mL/hr at 10/03/20 0314      aspirin EC tablet 81 mg  81 mg Oral Daily Ibeth Vidales MD   81 mg at 10/03/20 0947    atorvastatin (LIPITOR) tablet 20 mg  20 mg Oral Nightly Violeta Dunlap MD   20 mg at 10/02/20 2045    insulin glargine (LANTUS) injection vial 15 Units  15 Units Subcutaneous Nightly Violeta Dunlap MD   15 Units at 10/02/20 2127    lamoTRIgine (LAMICTAL) tablet 25 mg  25 mg Oral Daily Ibeth Vidales MD   25 mg at 10/03/20 0947    methocarbamol (ROBAXIN) tablet 1,000 mg  1,000 mg Oral 4x Daily PRN Violeta Dunlap MD        oxyCODONE (ROXICODONE) immediate release tablet 5 mg  5 mg Oral Q4H PRN Violeta Dunlap MD   5 mg at 10/03/20 0950    pantoprazole (PROTONIX) tablet 40 mg  40 mg Oral QAM AC Violeta Dunlap MD   40 mg at 10/03/20 5885    senna (SENOKOT) tablet 8.6 mg  1 tablet Oral Daily Ibeth Vidales MD   8.6 mg at 10/03/20 0947    venlafaxine (EFFEXOR) tablet 37.5 mg  37.5 mg Oral Daily Ibeth Vidales MD   37.5 mg at 10/03/20 0947    sodium chloride flush 0.9 % injection 10 mL  10 mL Intravenous 2 times per day Violeta Dunlap MD   10 mL at 10/03/20 0948    sodium chloride flush 0.9 % injection 10 mL  10 mL Intravenous PRN Violeta Dunlap MD   10 mL at 10/02/20 1521    acetaminophen (TYLENOL) tablet 650 mg  650 mg Oral Q6H PRN Violeta Dunlap MD   650 mg at 10/03/20 1729    Or    acetaminophen (TYLENOL) suppository 650 mg  650 mg Rectal Q6H PRN Violeta Dunlap MD        polyethylene glycol (GLYCOLAX) packet 17 g  17 g Oral Daily PRN Violeta Dunlap MD        ondansetron [facility]) injection 4 mg  4 mg Intravenous Q6H PRN Ibeth Mckeon MD Flash   4 mg at 10/03/20 0957    enoxaparin (LOVENOX) injection 30 mg  30 mg Subcutaneous BID Nadege Haley MD   30 mg at 10/03/20 0948    insulin lispro (HUMALOG) injection vial 0-12 Units  0-12 Units Subcutaneous TID WC Nadege Haley MD   2 Units at 10/02/20 1545    insulin lispro (HUMALOG) injection vial 0-6 Units  0-6 Units Subcutaneous Nightly Nadege Haley MD   1 Units at 10/02/20 2128    glucose (GLUTOSE) 40 % oral gel 15 g  15 g Oral PRN Nadege Haley MD        dextrose 50 % IV solution  12.5 g Intravenous PRN Nadege Haley MD        glucagon (rDNA) injection 1 mg  1 mg Intramuscular PRN Nadege Haley MD        dextrose 5 % solution  100 mL/hr Intravenous PRN Nadege Haley MD             REVIEW OF SYSTEMS:      CONSTITUTIONAL:  Denies fever   HEENT: denies blurring of vision or double vision, denies hearing problem  RESPIRATORY: Denies SOB    CARDIOVASCULAR:  Denies palpitation  GASTROINTESTINAL:  Denies Nausea, diarrhea . GENITOURINARY:  Denies burning urination or frequency of urination  INTEGUMENT: denies wound , rash  HEMATOLOGIC/LYMPHATIC:  Denies lymph node swelling, gum bleeding or easy bruising.   MUSCULOSKELETAL:  Denies leg pain , joint pain , joint swelling  NEUROLOGICAL:  Denies light headed, dizziness      Objective:    Vitals:    10/03/20 0943   BP: 128/70   Pulse: 86   Resp: 16   Temp: 97 °F (36.1 °C)   SpO2: 92%     Constitutional: Alert, not in distress  HEENT : no pallor, no icterus ,no LN   Respiratory: Clear breath sounds, no crackles, no wheezes  Cardiovascular: Regular rate and rhythm, no murmurs  Gastrointestinal: Bowel sounds present, soft, nontender  Skin: Warm and dry, no active dermatoses  Musculoskeletal: No joint swelling, no joint erythema    Laboratory:  Lab Results   Component Value Date    WBC 7.7 10/03/2020    WBC 7.4 10/02/2020    WBC 7.2 10/01/2020    HGB 7.6 (L) 10/03/2020    HCT 24.1 (L) 10/03/2020    MCV 81.4 10/03/2020    PLT 549 (H) 10/03/2020     Lab Results   Component Value Date    NEUTROABS 4.54 10/03/2020    NEUTROABS 3.42 10/02/2020    NEUTROABS 3.54 10/01/2020     Lab Results   Component Value Date    CRP 26.1 (H) 08/07/2020    CRP 41.4 (H) 07/22/2020     No results found for: CRPHS  Lab Results   Component Value Date    SEDRATE 138 (H) 08/07/2020    SEDRATE 90 (H) 07/22/2020     Lab Results   Component Value Date    ALT 10 10/03/2020    AST 13 10/03/2020    ALKPHOS 67 10/03/2020    BILITOT 0.2 10/03/2020     Lab Results   Component Value Date     10/03/2020    K 3.7 10/03/2020     10/03/2020    CO2 21 10/03/2020    BUN 11 10/03/2020    CREATININE 0.6 10/03/2020    CREATININE 0.7 10/02/2020    CREATININE 0.8 10/01/2020    GFRAA >60 10/03/2020    LABGLOM >60 10/03/2020    GLUCOSE 132 10/03/2020    PROT 5.6 10/03/2020    LABALBU 2.8 10/03/2020    CALCIUM 8.0 10/03/2020    BILITOT 0.2 10/03/2020    ALKPHOS 67 10/03/2020    AST 13 10/03/2020    ALT 10 10/03/2020       Radiology: CT chest: Bilateral pulmonary parenchymal opacities     Microbiology:     Blood cx  No results found for: BLOODCULT1  Culture, Blood 2   Date Value Ref Range Status   09/30/2020 24 Hours no growth  Preliminary   08/07/2020 5 Days no growth  Final   07/22/2020 5 Days no growth  Final   12/26/2014 5 Days- no growth  Final   SARS-CoV-2 PCR detected on 09/30  Ferritin: 3081 ng/mL  Procalcitonin: 0.07 ng/mL    Assessment:  SARS-CoV-2 infection - mild  Orthostatic hypotension     Plan:  Monitor clinically off antibiotics for now.         Electronically signed by Mariam Bailey MD on 10/3/2020 at 10:40 AM

## 2020-10-03 NOTE — PROGRESS NOTES
Pulmonary [facility]                             Pulmonary Consult/Progress Note :              epsis present, add as separate assessment)      CC : Worsening shortness of breath       HPI:   Doing very well  Still on RA  Resting at bed  No chest pain       PHYSICAL EXAMINATION:     VITAL SIGNS:  /84   Pulse 90   Temp 98.7 °F (37.1 °C) (Oral)   Resp 18   Ht 5' 11\" (1.803 m)   Wt 200 lb (90.7 kg)   SpO2 94%   BMI 27.89 kg/m²   Wt Readings from Last 3 Encounters:   09/30/20 200 lb (90.7 kg)   09/30/20 191 lb 11.2 oz (87 kg)   08/14/20 197 lb 11.2 oz (89.7 kg)     Temp Readings from Last 3 Encounters:   10/02/20 98.7 °F (37.1 °C) (Oral)   09/30/20 97.2 °F (36.2 °C) (Oral)   09/03/20 98.2 °F (36.8 °C)     TMAX:  BP Readings from Last 3 Encounters:   10/02/20 132/84   09/30/20 (!) 83/52   09/03/20 (!) 89/57     Pulse Readings from Last 3 Encounters:   10/02/20 90   09/30/20 91   09/03/20 97           INTAKE/OUTPUTS:  I/O last 3 completed shifts:   In: 12 [P.O.:960]  Out: 0     Intake/Output Summary (Last 24 hours) at 10/2/2020 2239  Last data filed at 10/2/2020 1730  Gross per 24 hour   Intake 480 ml   Output 400 ml   Net 80 ml       General Appearance: alert and oriented to person, place and time, well-developed and   well-nourished, in no acute distress   Eyes: pupils equal, round, and reactive to light, extraocular eye movements intact, conjunctivae normal and sclera anicteric   Neck: neck supple and non tender without mass, no thyromegaly, no thyroid nodules and no cervical adenopathy   Pulmonary/Chest:scattered Rhonchi   Cardiovascular: normal rate, regular rhythm, normal S1 and S2, no murmurs, rubs, clicks or gallops, distal pulses intact, no carotid bruits, no murmurs, no gallops, no carotid bruits and no JVD   Abdomen: obese, soft, non-tender, non-distended, normal bowel sounds, no masses or organomegaly   Extremities:no edema or cyanosis Musculoskeletal: normal range of motion, no joint swelling, deformity or tenderness   Neurologic: reflexes normal and symmetric, no cranial nerve deficit noted    LABS/IMAGING:    CBC:  Lab Results   Component Value Date    WBC 7.4 10/02/2020    HGB 8.9 (L) 10/02/2020    HCT 28.3 (L) 10/02/2020    MCV 81.8 10/02/2020     (H) 10/02/2020    LYMPHOPCT 38.7 10/02/2020    RBC 3.46 (L) 10/02/2020    MCH 25.7 (L) 10/02/2020    MCHC 31.4 (L) 10/02/2020    RDW 15.5 (H) 10/02/2020    NEUTOPHILPCT 46.3 10/02/2020    MONOPCT 11.5 10/02/2020    BASOPCT 0.7 10/02/2020    NEUTROABS 3.42 10/02/2020    LYMPHSABS 2.86 10/02/2020    MONOSABS 0.85 10/02/2020    EOSABS 0.18 10/02/2020    BASOSABS 0.05 10/02/2020       Recent Labs     10/02/20  0459 10/01/20  0427 09/30/20  1147   WBC 7.4 7.2 10.0   HGB 8.9* 8.9* 10.5*   HCT 28.3* 28.3* 33.3*   MCV 81.8 82.7 83.9   * 564* 523*       BMP:   Recent Labs     09/30/20  1147 10/01/20  0427 10/02/20  0459    139 138   K 4.3 3.6 3.4*    101 102   CO2 23 24 24   BUN 25* 22* 13   CREATININE 1.2 0.8 0.7       MG:   Lab Results   Component Value Date    MG 1.8 10/02/2020     Ca/Phos:   Lab Results   Component Value Date    CALCIUM 9.2 10/02/2020     Amylase:   Lab Results   Component Value Date    AMYLASE 67 12/23/2014     Lipase:   Lab Results   Component Value Date    LIPASE 24 07/19/2020     LIVER PROFILE:   Recent Labs     09/30/20  1147 10/01/20  0427 10/02/20  0459   AST 29 22 18   ALT 22 17 13   BILITOT 0.4 0.2 0.3   ALKPHOS 84 77 75       PT/INR:   Recent Labs     10/01/20  0427 10/02/20  0459   PROTIME 12.7* 13.1*   INR 1.1 1.2     APTT:   Recent Labs     10/01/20  0427 10/02/20  0459   APTT 28.5 29.7       Cardiac Enzymes:  Lab Results   Component Value Date    TROPONINI <0.01 09/30/2020                   PROBLEM LIST:  Patient Active Problem List   Diagnosis    Chronic low back pain    Diabetic neuropathy (HCC)    Anxiety and depression    Hyperlipidemia  Insulin-requiring or dependent type II diabetes mellitus (HCC)    Pain in both knees    Septic arthritis of knee, bilateral (HCC)    Acute midline low back pain with bilateral sciatica    Sepsis due to group B Streptococcus (HCC)    Hypokalemia    Septic arthritis (HCC)    Acute on chronic anemia    ERIC (acute kidney injury) ([facility] [address]    Thrombocytosis (HCC)    Type 2 diabetes mellitus with hyperglycemia, with long-term current use of insulin (HCC)    COVID-19               ASSESSMENT:  1.)  COVID-19 positive test (U07.1, COVID-19) with Acute Pneumonia (J12.89, Other viral pneumonia)    2.)  Mild hypoxia  3.)  Recent knee infection status post surgery   4.)  Possible COPD with   Smoking        PLAN:  *-Patient symptoms seems very mild, he denies any fever or chills, he denies any chest pain,  *-He has significant infiltrate in the lung and he was more hypoxic I would proceed with Decadron 6 mg dailyX 10 days   *-Right his symptoms is mild I will hold Tocilizumab  *_Incentive spirometry  *-DVT prophylaxis as he is considered high risk   inflammatory markers high but he is symptoms free    Can go from pulmonary stand point if remain stable          Conner Aparicio MD,[facility]  Pulmonary&Critical Care Medicine   Director of [address] [place] [address] Director of [address] [place] [address]    Micki Douglass    NOTE: This report was transcribed using voice recognition software. Every effort was made to ensure accuracy; however, inadvertent computerized transcription errors may be present.

## 2020-10-03 NOTE — PLAN OF CARE
Problem: Pain:  Goal: Pain level will decrease  Description: Pain level will decrease  Outcome: Met This Shift  Goal: Control of acute pain  Description: Control of acute pain  Outcome: Met This Shift  Goal: Control of chronic pain  Description: Control of chronic pain  Outcome: Met This Shift     Problem: Airway Clearance - Ineffective  Goal: Achieve or maintain patent airway  Outcome: Met This Shift     Problem: Gas Exchange - Impaired  Goal: Absence of hypoxia  Outcome: Met This Shift  Goal: Promote optimal lung function  Outcome: Met This Shift     Problem: Breathing Pattern - Ineffective  Goal: Ability to achieve and maintain a regular respiratory rate  Outcome: Met This Shift     Problem:  Body Temperature -  Risk of, Imbalanced  Goal: Ability to maintain a body temperature within defined limits  Outcome: Met This Shift  Goal: Will regain or maintain usual level of consciousness  Outcome: Met This Shift  Goal: Complications related to the disease process, condition or treatment will be avoided or minimized  Outcome: Met This Shift     Problem: Isolation Precautions - Risk of Spread of Infection  Goal: Prevent transmission of infection  Outcome: Met This Shift     Problem: Nutrition Deficits  Goal: Optimize nutrtional status  Outcome: Met This Shift     Problem: Risk for Fluid Volume Deficit  Goal: Maintain normal heart rhythm  Outcome: Met This Shift  Goal: Maintain absence of muscle cramping  Outcome: Met This Shift  Goal: Maintain normal serum potassium, sodium, calcium, phosphorus, and pH  Outcome: Met This Shift     Problem: Loneliness or Risk for Loneliness  Goal: Demonstrate positive use of time alone when socialization is not possible  Outcome: Met This Shift     Problem: Fatigue  Goal: Verbalize increase energy and improved vitality  Outcome: Met This Shift     Problem: Patient Education: Go to Patient Education Activity  Goal: Patient/Family Education  Outcome: Met This Shift     Problem: Falls - Risk of:  Goal: Will remain free from falls  Description: Will remain free from falls  Outcome: Met This Shift  Goal: Absence of physical injury  Description: Absence of physical injury  Outcome: Met This Shift

## 2020-10-05 ENCOUNTER — TELEPHONE (OUTPATIENT)
Dept: ORTHOPEDIC SURGERY | Age: 55
End: 2020-10-05

## 2020-10-05 LAB
BLOOD CULTURE, ROUTINE: NORMAL
CULTURE, BLOOD 2: NORMAL

## 2020-10-05 NOTE — TELEPHONE ENCOUNTER
Pt called to state he missed his recent appt with Dr Rosalie Phillips because he was hosptalized with Covid 19, positive test on 09-30-20. Pt is not able to do his therapy. Pt will call back to r/s after he has tested negative.

## 2020-11-02 NOTE — TELEPHONE ENCOUNTER
Patient called stated he had knee sx and he had a f/u 10/1 that he did not show for with Dr. Espinoza Romero and he states his right knee is swollen and painful and needs seen sooner than December which was the first appt I found, he can be reached at [phone].

## 2020-11-03 NOTE — TELEPHONE ENCOUNTER
Call placed to patient. Appointment scheduled at this time. Directions provided at this time. Encouraged to call with questions or concerns.     Future Appointments   Date Time Provider August Barlow   11/6/2020  9:00 AM SCHEDULE, [facility]  [facility]

## 2020-11-06 ENCOUNTER — OFFICE VISIT (OUTPATIENT)
Dept: ORTHOPEDIC SURGERY | Age: 55
End: 2020-11-06
Payer: COMMERCIAL

## 2020-11-06 VITALS
DIASTOLIC BLOOD PRESSURE: 92 MMHG | WEIGHT: 207 LBS | HEART RATE: 86 BPM | HEIGHT: 71 IN | BODY MASS INDEX: 28.98 KG/M2 | TEMPERATURE: 97.2 F | SYSTOLIC BLOOD PRESSURE: 186 MMHG

## 2020-11-06 PROCEDURE — 99212 OFFICE O/P EST SF 10 MIN: CPT | Performed by: PHYSICIAN ASSISTANT

## 2020-11-06 PROCEDURE — 99024 POSTOP FOLLOW-UP VISIT: CPT | Performed by: ORTHOPAEDIC SURGERY

## 2020-11-06 RX ORDER — HYDROCODONE BITARTRATE AND ACETAMINOPHEN 7.5; 325 MG/1; MG/1
1 TABLET ORAL 2 TIMES DAILY
COMMUNITY
End: 2021-02-19 | Stop reason: ALTCHOICE

## 2020-11-06 NOTE — PROGRESS NOTES
Orthopaedic Clinic Note     S: Marietta Quijano is a 63 y.o. male, he is here today for his 8 week follow-up from a bilateral knee irrigation debridement from septic arthritis. Patient states that he has been doing well and his pain is improving. The patient states that he is still having some pain with full knee flexion and extension as well as lower leg pain and muscle soreness. Patient was in the nursing home for a month after his hospital stay in which time he was receiving physical therapy. He has not been having therapy since that time. She using a cane to walk mostly around the house. He still uses the wheelchair at times when needed. Patient has finished his antibiotics and has had his PICC line removed. Denies any fevers, chills, erythema or swelling of the bilateral knees. his pain has improved. ROS:  Denies fever, chills and recent illness. Denies CP, SOB and calf pain. Denies issues with bowel or bladder. Patient Active Problem List   Diagnosis    Chronic low back pain    Diabetic neuropathy (HCC)    Anxiety and depression    Hyperlipidemia    Insulin-requiring or dependent type II diabetes mellitus ([address]    Pain in both knees    Septic arthritis of knee, bilateral (HCC)    Acute midline low back pain with bilateral sciatica    Sepsis due to group B Streptococcus (HCC)    Hypokalemia    Septic arthritis (HCC)    Acute on chronic anemia    ERIC (acute kidney injury) ([address]    Thrombocytosis (HCC)    Type 2 diabetes mellitus with hyperglycemia, with long-term current use of insulin (HCC)    COVID-19       Physical Exam    BP (!) 186/92   Pulse 86   Temp 97.2 °F (36.2 °C)   Ht 5' 11\" (1.803 m)   Wt 207 lb (93.9 kg)   BMI 28.87 kg/m²     O: Alert and oriented X 3, no acute distress, respirations easy and unlabored with no audible wheezes, skin warm and dry, speech and dress appropriate for noted age, affect euthymic.      Bilateral Lower Extremity Exam:  Incision over bilateral knees healing well, dry and intact, no erythema, induration, or fluctuance. Skin intact. Possible small effusion of bilateral knees. No ecchymosis present. Demonstrates active knee flexion/extension, ankle plantar/dorsiflexion/great toe extension. States sensation intact to touch in sural/deep peroneal/superficial peroneal/saphenous/posterior tibial nerve distributions to foot/ankle. Active range of motion of  degrees. Palpable dorsalis pedis and posterior tibialis pulses, cap refill brisk in toes, foot warm/perfused. Compartments supple throughout thigh and leg, calves supple/NT      Xrays not obtained today will consider at next visit. A:     ICD-10-CM    1. Septic arthritis of knee, bilateral ([facility]  M00.9 [facility]       P:   Continue weightbearing as tolerated to bilateral lower extremities  Continue ice and elevation for swelling. Order given for home health care to begin physical therapy  Will consider x-rays at next visit  Follow-up in 8 weeks to reevaluate patient progress. NOTE: This report was transcribed using voice recognition software.  Every effort was made to ensure accuracy; however, inadvertent computerized transcription errors may be present

## 2020-11-06 NOTE — PATIENT INSTRUCTIONS
Therapy ordered to work on range of motion and strengthening    Compounding pain cream has been ordered for you from [facility]  Any questions about this compounding pain cream please contact [facility]    [facility], [address] [place] [address],[facility] [phone]  Phone: [phone]    Continue with ice or heat as needed    Follow up in 8 weeks, call sooner with any questions or concerns

## 2021-01-05 ENCOUNTER — TELEPHONE (OUTPATIENT)
Dept: ORTHOPEDIC SURGERY | Age: 56
End: 2021-01-05

## 2021-01-05 DIAGNOSIS — M00.9 SEPTIC ARTHRITIS OF KNEE, BILATERAL (HCC): Primary | ICD-10-CM

## 2021-01-05 NOTE — TELEPHONE ENCOUNTER
Patient left a VM inquiring about getting a Order/Referral for Physical therapy at [facility]. He states he has not seen anyone for PT.     Date of Procedure: 8/9/2020  Procedure(s): bilateral knee arthrotomy with exploration, irrigation and debridement   Surgeon(s):  Ford Ness, DO    Future Appointments   Date Time Provider August Barlow   1/14/2021 11:30  [facility]

## 2021-01-06 NOTE — TELEPHONE ENCOUNTER
Order for PT placed, please fax to location of preference  Electronically signed by Ezequiel Kussmaul, PA-C on 1/6/2021 at 9:00 AM

## 2021-01-11 DIAGNOSIS — M00.9 SEPTIC ARTHRITIS OF KNEE, BILATERAL (HCC): Primary | ICD-10-CM

## 2021-01-12 PROBLEM — M00.9 SEPTIC ARTHRITIS (HCC): Status: RESOLVED | Noted: 2020-07-23 | Resolved: 2021-01-12

## 2021-01-14 ENCOUNTER — HOSPITAL ENCOUNTER (OUTPATIENT)
Dept: GENERAL RADIOLOGY | Age: 56
Discharge: HOME | End: 2021-01-16
Payer: COMMERCIAL

## 2021-01-14 ENCOUNTER — OFFICE VISIT (OUTPATIENT)
Dept: ORTHOPEDIC SURGERY | Age: 56
End: 2021-01-14
Payer: COMMERCIAL

## 2021-01-14 VITALS — TEMPERATURE: 98.6 F

## 2021-01-14 DIAGNOSIS — M00.9 SEPTIC ARTHRITIS OF KNEE, BILATERAL (HCC): ICD-10-CM

## 2021-01-14 DIAGNOSIS — M00.9 SEPTIC ARTHRITIS OF KNEE, BILATERAL (HCC): Primary | ICD-10-CM

## 2021-01-14 DIAGNOSIS — G89.29 CHRONIC LOW BACK PAIN, UNSPECIFIED BACK PAIN LATERALITY, UNSPECIFIED WHETHER SCIATICA PRESENT: ICD-10-CM

## 2021-01-14 DIAGNOSIS — M25.562 CHRONIC PAIN OF BOTH KNEES: ICD-10-CM

## 2021-01-14 DIAGNOSIS — M25.561 CHRONIC PAIN OF BOTH KNEES: ICD-10-CM

## 2021-01-14 DIAGNOSIS — G89.29 CHRONIC PAIN OF BOTH KNEES: ICD-10-CM

## 2021-01-14 DIAGNOSIS — M54.50 CHRONIC LOW BACK PAIN, UNSPECIFIED BACK PAIN LATERALITY, UNSPECIFIED WHETHER SCIATICA PRESENT: ICD-10-CM

## 2021-01-14 PROCEDURE — G8419 CALC BMI OUT NRM PARAM NOF/U: HCPCS | Performed by: PHYSICIAN ASSISTANT

## 2021-01-14 PROCEDURE — G8427 DOCREV CUR MEDS BY ELIG CLIN: HCPCS | Performed by: PHYSICIAN ASSISTANT

## 2021-01-14 PROCEDURE — G8484 FLU IMMUNIZE NO ADMIN: HCPCS | Performed by: PHYSICIAN ASSISTANT

## 2021-01-14 PROCEDURE — 3017F COLORECTAL CA SCREEN DOC REV: CPT | Performed by: PHYSICIAN ASSISTANT

## 2021-01-14 PROCEDURE — 99212 OFFICE O/P EST SF 10 MIN: CPT

## 2021-01-14 PROCEDURE — 73560 X-RAY EXAM OF KNEE 1 OR 2: CPT

## 2021-01-14 PROCEDURE — 1036F TOBACCO NON-USER: CPT | Performed by: PHYSICIAN ASSISTANT

## 2021-01-14 PROCEDURE — 99213 OFFICE O/P EST LOW 20 MIN: CPT | Performed by: PHYSICIAN ASSISTANT

## 2021-01-14 NOTE — PROGRESS NOTES
Chief Complaint   Patient presents with    Follow-up     B/L Knee arthrotomy with exploration I&D 8/9/2020       OP:SURGEON: Dr. Rachelle Weeks, DO  DATE OF PROCEDURE: 8/9/2020  PROCEDURE: bilateral knee arthrotomy with exploration, irrigation and debridement    Subjective:  Puneet Luna is approximately 5 mo from above DOS. WBAT bilateral LE using cane for support. Denies any reoccurring signs or symptoms of infection including erythema, increasing tenderness, warmth, open skin to the bilateral knees. States he still does have some limitations to his range of motion to starting physical therapy at [address] [place] [address] soon and would like aquatic therapy as well. No new injuries or other orthopedic complaints today. States that [facility] pain management dismissed him from the practice due to a missed urine drug screen which was unintentional.      Review of Systems -    General ROS: negative for - chills, fatigue, fever or night sweats  Respiratory ROS: no cough, shortness of breath, or wheezing  Cardiovascular ROS: no chest pain or dyspnea on exertion  Gastrointestinal ROS: no abdominal pain, nausea, vomiting, diarrhea, constipation,or black or bloody stools  Genitourinary: no hematuria, dysuria, or incontinence   Musculoskeletal ROS: negative for -back or neck pain or stiffness, also see HPI  Neurological ROS: no TIA or stroke symptoms       Objective:    General: Alert and oriented X 3, normocephalic atraumatic, external ears and eye normal, sclera clear, no acute distress, respirations easy and unlabored with no audible wheezes, skin warm and dry, speech and dress appropriate for noted age, affect euthymic. Extremity:  Bilateral Lower Extremity  Skin clean dry and intact, without signs of infection  No effusions or edema noted  Compartments supple throughout thigh and leg  Calf supple and nontender  Demonstrates active knee ankle plantar/dorsiflexion/great toe extension. States sensation intact to touch in sural/deep peroneal/superficial peroneal/saphenous/posterior tibial nerve distributions to foot/ankle. Palpable dorsalis pedis and posterior tibialis pulses, cap refill brisk in toes, foot warm/perfused. AROM R knee , L knee   nontender about bilateral knees   No erythema, fluctuance, warmth, or open skin noted to bilateral knees   Varus alignment noted when patient stands full WB without cane           Temp 98.6 °F (37 °C)     XR:   3 views of bilateral knees demonstrating moderate-severe OA especially in bilateral medial compartments with associated osteophytic formation. No effusions noted. No acute fractures or dislocations or any other osseus abnormality identified. Assessment:   Diagnosis Orders   1. Septic arthritis of knee, bilateral [facility])  External Referral To Pain Clinic    Amb External Referral To Physical Therapy   2. Chronic low back pain, unspecified back pain laterality, unspecified whether sciatica present  External Referral To Pain Clinic   3. Chronic pain of both knees  External Referral To Pain Clinic    Amb External Referral To Physical Therapy       Plan:  ? Reviewed x-rays with patient today in office   ? WBAT bilateral LE  ? Use cane for support PRN  ? Updated PT order [place] for aquatic therapy as well  ? Discussed holding off on knee CSI for as long as possible due to hx of bilateral septic knees - will stick to therapy currently for improvement to ROM and strength  ? Extensively discussed signs/sxs of infection and to continue to monitor for this and to go to the ED immediately if infx recurs     Follow up PRN    Electronically signed by Dilshad Martin PA-C on 1/14/2021 at 12:51 PM  Note: This report was completed using [employer] voiced recognition software. Every effort has been made to ensure accuracy; however, inadvertent computerized transcription errors may be present.

## 2021-02-05 ENCOUNTER — TELEPHONE (OUTPATIENT)
Dept: ORTHOPEDIC SURGERY | Age: 56
End: 2021-02-05

## 2021-02-05 DIAGNOSIS — M54.50 CHRONIC LOW BACK PAIN, UNSPECIFIED BACK PAIN LATERALITY, UNSPECIFIED WHETHER SCIATICA PRESENT: Primary | ICD-10-CM

## 2021-02-05 DIAGNOSIS — M25.562 CHRONIC PAIN OF BOTH KNEES: ICD-10-CM

## 2021-02-05 DIAGNOSIS — G89.29 CHRONIC LOW BACK PAIN, UNSPECIFIED BACK PAIN LATERALITY, UNSPECIFIED WHETHER SCIATICA PRESENT: Primary | ICD-10-CM

## 2021-02-05 DIAGNOSIS — M00.9 SEPTIC ARTHRITIS OF KNEE, BILATERAL (HCC): ICD-10-CM

## 2021-02-05 DIAGNOSIS — M25.561 CHRONIC PAIN OF BOTH KNEES: ICD-10-CM

## 2021-02-05 DIAGNOSIS — G89.29 CHRONIC PAIN OF BOTH KNEES: ICD-10-CM

## 2021-02-05 NOTE — TELEPHONE ENCOUNTER
Patient called office requesting order to [place] pain management [facility]. Order pended and routed.

## 2021-02-05 NOTE — TELEPHONE ENCOUNTER
Orders signed. Please see attached. Thank you.   Electronically signed by Bo Esquivel PA-C on 2/5/2021 at 2:39 PM

## 2021-02-05 NOTE — TELEPHONE ENCOUNTER
Call placed to patient to advise of order. Patient states he has their office number to call. Answered all questions at this time.

## 2021-02-19 ENCOUNTER — OFFICE VISIT (OUTPATIENT)
Dept: PAIN MANAGEMENT | Age: 56
End: 2021-02-19
Payer: COMMERCIAL

## 2021-02-19 VITALS
TEMPERATURE: 96.8 F | HEIGHT: 71 IN | OXYGEN SATURATION: 91 % | RESPIRATION RATE: 16 BRPM | BODY MASS INDEX: 28.98 KG/M2 | HEART RATE: 95 BPM | DIASTOLIC BLOOD PRESSURE: 82 MMHG | SYSTOLIC BLOOD PRESSURE: 115 MMHG | WEIGHT: 207 LBS

## 2021-02-19 DIAGNOSIS — M19.011 PRIMARY OSTEOARTHRITIS OF RIGHT SHOULDER: ICD-10-CM

## 2021-02-19 DIAGNOSIS — M47.812 CERVICAL SPONDYLOSIS: ICD-10-CM

## 2021-02-19 DIAGNOSIS — M51.9 LUMBAR DISC DISORDER: ICD-10-CM

## 2021-02-19 DIAGNOSIS — M47.812 CERVICAL FACET JOINT SYNDROME: Primary | ICD-10-CM

## 2021-02-19 DIAGNOSIS — M47.816 LUMBAR FACET ARTHROPATHY: ICD-10-CM

## 2021-02-19 DIAGNOSIS — M50.90 CERVICAL DISC DISORDER: ICD-10-CM

## 2021-02-19 DIAGNOSIS — M17.0 PRIMARY OSTEOARTHRITIS OF BOTH KNEES: ICD-10-CM

## 2021-02-19 DIAGNOSIS — M47.816 LUMBAR SPONDYLOSIS: ICD-10-CM

## 2021-02-19 LAB — SPECIFIC GRAVITY UA: >=1.03 (ref 1–1.03)

## 2021-02-19 PROCEDURE — 4004F PT TOBACCO SCREEN RCVD TLK: CPT | Performed by: PAIN MEDICINE

## 2021-02-19 PROCEDURE — G8427 DOCREV CUR MEDS BY ELIG CLIN: HCPCS | Performed by: PAIN MEDICINE

## 2021-02-19 PROCEDURE — G8419 CALC BMI OUT NRM PARAM NOF/U: HCPCS | Performed by: PAIN MEDICINE

## 2021-02-19 PROCEDURE — 3017F COLORECTAL CA SCREEN DOC REV: CPT | Performed by: PAIN MEDICINE

## 2021-02-19 PROCEDURE — 99204 OFFICE O/P NEW MOD 45 MIN: CPT

## 2021-02-19 PROCEDURE — 99204 OFFICE O/P NEW MOD 45 MIN: CPT | Performed by: PAIN MEDICINE

## 2021-02-19 PROCEDURE — G8484 FLU IMMUNIZE NO ADMIN: HCPCS | Performed by: PAIN MEDICINE

## 2021-02-19 NOTE — PROGRESS NOTES
[address]        [address] [facility], [address] [facility] [place]      [phone]          Consult Note      Patient:  JAMARI Fuentes 1965    Date of Service:  21    Requesting Physician:  Edra Olszewski, *    Reason for Consult:      Patient presents with complaints of neck/shoulder/low back and bilateral knee pain that started a long time ago and has been progressively getting worse. HISTORY OF PRESENT ILLNESS:      Pain does not radiate. He  has numbness, tingling of both feet and legs and does not have bladder or bowel dysfunction. He has not been on anticoagulation medications to include none. The patient  has not been on herbal supplements. The patient is not diabetic. Imaging:   Bilateral knee Xray:  Osteoarthritis at both knees.  It is most severe at the medial weight-bearing   compartment of the left knee.  Bilateral knee joint effusions. Bilateral hip Xray   Mild osteoarthropathy of both hip joints    CT lumbar spine : Moderate spinal canal stenosis related to disc bulging and facet   arthropathy at L3-4 and L4-5. No evidence of spinal infection. CT cervical spine   here is no evidence of fracture or dislocation. Vertebral   body height is well-maintained. No obvious prevertebral soft tissue   edema.  Posterior disc/osteophyte complex is seen at multiple levels   results in mild to moderate effacement of ventral thecal sac notable   at C4/C5 and C5/C6. Right shoulder Xray :     Hyperostotic acromioclavicular joint findings disposing to rotator   cuff impingement.       Hypertrophic degenerative changes of the inferior glenohumeral   articular margin suggesting degenerative osteoarthropathy.       No evidence of acute traumatic findings. Previous treatments: Physical Therapy, Epidural Steroid Injection, Surgery bilateral knee surgery for septic arthritis and medications. Patient was a former patient of  and was discharged because of refusing to do a urine screen. Left Ulnar nerve damage       Opioid Agreement:  Date enacted:02/2021  Renewal date:02/2022    Past Medical History:   Diagnosis Date    Chronic pain     Depression     HLD (hyperlipidemia)     Neuropathy     LIDYA on CPAP     Osteoarthritis     Type II or unspecified type diabetes mellitus without mention of complication, not stated as uncontrolled      Past Surgical History:   Procedure Laterality Date    ARM SURGERY      left arm    INCISION AND DRAINAGE Bilateral 7/27/2020    REPEAT BILATERAL KNEE WASHOUT performed by Sancho Tay DO at [address] Bilateral 8/9/2020    INCISION AND DRAINAGE bilateral knees performed by Sancho Tay DO at [facility] ARTHROSCOPY Bilateral 7/23/2020    KNEE ARTHROSCOPY performed by Janet Durham MD at [address] W/BIOPSY SINGLE/MULTIPLE N/A 9/20/2018    COLONOSCOPY WITH BIOPSY performed by Sophia Vizcarra MD at [address]  FLX W/RMVL OF TUMOR POLYP LESION SNARE TQ  9/20/2018    COLONOSCOPY POLYPECTOMY SNARE/COLD BIOPSY performed by Sophia Vizcarra MD at [facility]       Prior to Admission medications    Medication Sig Start Date End Date Taking?  Authorizing Provider   senna (SENOKOT) 8.6 MG tablet Take 1 tablet by mouth daily   Yes Historical Provider, MD   aspirin EC 81 MG EC tablet Take 1 tablet by mouth daily 9/30/20  Yes Radha Key MD   insulin glargine (LANTUS) 100 UNIT/ML injection vial Inject 15 Units into the skin nightly  Patient taking differently: Inject 15 Units into the skin nightly Taking 20 units at bedtime 8/14/20  Yes Nilam Gold MD   pantoprazole (PROTONIX) 40 MG tablet Take 1 tablet by mouth every morning (before breakfast) 8/1/20  Yes Sundar Ha MD insulin lispro (HUMALOG) 100 UNIT/ML injection vial Inject 0-18 Units into the skin 3 times daily (with meals) 7/31/20  Yes Librado Solis MD   insulin lispro (HUMALOG) 100 UNIT/ML injection vial Inject 0-9 Units into the skin nightly 7/31/20  Yes Librado Solis MD   venlafaxine (EFFEXOR) 37.5 MG tablet Take 37.5 mg by mouth daily   Yes Historical Provider, MD   lamoTRIgine (LAMICTAL) 25 MG tablet Take 25 mg by mouth daily   Yes Historical Provider, MD   Multiple Vitamin (MULTI VITAMIN MENS PO) Take by mouth daily    Yes Historical Provider, MD     Allergies   Allergen Reactions    Adhesive Tape Hives    Tramadol Other (See Comments)     With psych medicine    Cymbalta [Duloxetine Hcl] Rash    Lidocaine Rash and Swelling    Lyrica [Pregabalin] Rash    Strattera [Atomoxetine] Rash     And nausea     Social History     Socioeconomic History    Marital status: Single     Spouse name: Not on file    Number of children: Not on file    Years of education: Not on file    Highest education level: Not on file   Occupational History    Not on file   Social Needs    Financial resource strain: Very hard    Food insecurity     Worry: Often true     Inability: Often true    Transportation needs     Medical: No     Non-medical: No   Tobacco Use    Smoking status: Current Every Day Smoker     Packs/day: 1.00     Years: 30.00     Pack years: 30.00    Smokeless tobacco: Never Used    Tobacco comment:     Substance and Sexual Activity    Alcohol use: No     Comment: none since 2017, drank heavily in early 2000s    Drug use: No    Sexual activity: Not Currently     Partners: Female     Comment: Single;  Last STD check was 2012   Lifestyle    Physical activity     Days per week: Not on file     Minutes per session: Not on file    Stress: Not on file   Relationships    Social connections     Talks on phone: Not on file     Gets together: Not on file     Attends Congregational service: Not on file CALE test:negative right, negative             left  Piriformis tenderness:negative right, negative left  Trochanteric bursa tenderness:positive right, positive left  SLR:negative right, negative left, sitting     Extremities:    Tremors:None bilaterally upper and lower  Range of motion:Limited abduction of right shoulder above 100 degrees with tenderness over right AC joint. pain with internal rotation of hips negative. Intact:Yes  Edema:Normal    Knee: Inspection:symmetric, swelling none bilaterally  Tenderness of Bony Landmarks:Medial, bilateral  Drawer Test:negative  Effusion:absent bilaterally  Crepitus:present bilaterally  ROM:Left limited by pain  Right limited by pain     Neurological:    Sensory:Diminished to light touch Left Ulnar nerve distribution below Left elbow. Motor:              Right Bicep4+/5           Left Bicep4+/5              Right Triceps4+/5       Left Triceps4+/5          Right Deltoid4+/5     Left Deltoid4+/5                  Right Quadriceps4+/5          Left Quadriceps4+/5           Right Gastrocnemius4+/5    Left Gastrocnemius4+/5  Right Ant Tibialis4+/5  Left Ant Tibialis4+/5  Left hand  2/5  Reflexes:    Right Brachioradialis reflex2+  Left Brachioradialis reflex2+  Right Biceps reflex2+  Left Biceps reflex2+  Right Triceps reflex2+  Left Triceps reflex2+  Right Quadriceps reflexNOT DONE  Left Quadriceps reflexNOT DONE  Right Achilles reflex2+  Left Achilles reflex2+  Gait:antalgic with a crutch    Dermatology:    Skin:no unusual rashes and no skin lesions    Impression:  Chronic neck/low back right shoulder and bilateral knee pain. Patient is s/p bilateral knee surgery for septic arthritis. Patient was a former patient of  and was discharged because of refusing to do a urine screen. Patient had interventional procedures with  which per patient didn't help. Plan:  Axial neck/low back and multiple joint pain. Reviewed imaging studies and discussed with the patient. Discussed long term side effects of opioids and expectations from pain management. Will have patient sign a release form to obtain records from [facility]. Urine screen today. OARRS report reviewed 02/2021. Patient encouraged to stay active, currently doing aquatherapy. Treatment plan discussed with the patient including medications side effects. We discussed with the patient that combining opioids, benzodiazepines, alcohol, illicit drugs or sleep aids increases the risk of respiratory depression including death. We discussed that these medications may cause drowsiness, sedation or dizziness and have counseled the patient not to drive or operate machinery. We have discussed that these medications will be prescribed only by one provider. We have discussed with the patient about age related risk factors and have thoroughly discussed the importance of taking these medications as prescribed. The patient verbalizes understanding. joseph Jung M.D.

## 2021-02-23 LAB
6AM URINE: <10 NG/ML
CODEINE, URINE: <20 NG/ML
HYDROCODONE, URINE: <20 NG/ML
HYDROMORPHONE, URINE: <20 NG/ML
MORPHINE URINE: <20 NG/ML
NORHYDROCODONE, URINE: <20 NG/ML
NOROXYCODONE, URINE: <20 NG/ML
NOROXYMORPHONE, URINE: <20 NG/ML
OXYCODONE, URINE CONFIRMATION: <20 NG/ML
OXYMORPHONE, URINE: <20 NG/ML

## 2021-02-24 LAB
7-AMINOCLONAZEPAM, URINE: <5 NG/ML
ALPHA-HYDROXYALPRAZOLAM, URINE: <5 NG/ML
ALPHA-HYDROXYMIDAZOLAM, URINE: <20 NG/ML
ALPRAZOLAM, URINE: <5 NG/ML
CHLORDIAZEPOXIDE, URINE: <20 NG/ML
CLONAZEPAM, URINE: <5 NG/ML
DIAZEPAM, URINE: <20 NG/ML
LORAZEPAM, URINE: <20 NG/ML
MIDAZOLAM, URINE: <20 NG/ML
NORDIAZEPAM, URINE: <20 NG/ML
OXAZEPAM, URINE: <20 NG/ML
TEMAZEPAM, URINE: <20 NG/ML

## 2021-02-25 LAB
Lab: NORMAL
REPORT: NORMAL
THIS TEST SENT TO: NORMAL

## 2021-03-26 ENCOUNTER — OFFICE VISIT (OUTPATIENT)
Dept: PAIN MANAGEMENT | Age: 56
End: 2021-03-26
Payer: COMMERCIAL

## 2021-03-26 VITALS
RESPIRATION RATE: 16 BRPM | HEIGHT: 71 IN | BODY MASS INDEX: 29.4 KG/M2 | WEIGHT: 210 LBS | DIASTOLIC BLOOD PRESSURE: 80 MMHG | TEMPERATURE: 95.9 F | HEART RATE: 70 BPM | SYSTOLIC BLOOD PRESSURE: 132 MMHG

## 2021-03-26 DIAGNOSIS — M54.41 ACUTE MIDLINE LOW BACK PAIN WITH BILATERAL SCIATICA: ICD-10-CM

## 2021-03-26 DIAGNOSIS — M51.9 LUMBAR DISC DISORDER: ICD-10-CM

## 2021-03-26 DIAGNOSIS — M50.90 CERVICAL DISC DISORDER: ICD-10-CM

## 2021-03-26 DIAGNOSIS — M47.812 CERVICAL SPONDYLOSIS: ICD-10-CM

## 2021-03-26 DIAGNOSIS — M17.0 PRIMARY OSTEOARTHRITIS OF BOTH KNEES: ICD-10-CM

## 2021-03-26 DIAGNOSIS — M19.011 PRIMARY OSTEOARTHRITIS OF RIGHT SHOULDER: ICD-10-CM

## 2021-03-26 DIAGNOSIS — M47.812 CERVICAL FACET JOINT SYNDROME: ICD-10-CM

## 2021-03-26 DIAGNOSIS — M47.816 LUMBAR SPONDYLOSIS: ICD-10-CM

## 2021-03-26 DIAGNOSIS — M47.816 LUMBAR FACET ARTHROPATHY: Primary | ICD-10-CM

## 2021-03-26 DIAGNOSIS — M54.42 ACUTE MIDLINE LOW BACK PAIN WITH BILATERAL SCIATICA: ICD-10-CM

## 2021-03-26 PROCEDURE — 99213 OFFICE O/P EST LOW 20 MIN: CPT | Performed by: PAIN MEDICINE

## 2021-03-26 PROCEDURE — 3017F COLORECTAL CA SCREEN DOC REV: CPT | Performed by: PAIN MEDICINE

## 2021-03-26 PROCEDURE — G8427 DOCREV CUR MEDS BY ELIG CLIN: HCPCS | Performed by: PAIN MEDICINE

## 2021-03-26 PROCEDURE — 4004F PT TOBACCO SCREEN RCVD TLK: CPT | Performed by: PAIN MEDICINE

## 2021-03-26 PROCEDURE — G8484 FLU IMMUNIZE NO ADMIN: HCPCS | Performed by: PAIN MEDICINE

## 2021-03-26 PROCEDURE — G8419 CALC BMI OUT NRM PARAM NOF/U: HCPCS | Performed by: PAIN MEDICINE

## 2021-03-26 RX ORDER — ACETAMINOPHEN/DIPHENHYDRAMINE 500MG-25MG
TABLET ORAL
Qty: 90 TABLET | Refills: 1 | OUTPATIENT
Start: 2021-03-26

## 2021-03-26 NOTE — PROGRESS NOTES
Do you currently have any of the following:    Fever: No  Headache:  No  Cough: No  Shortness of breath: No  Exposed to anyone with these symptoms: No                                                                                                                Audrey Kaufman presents to the [address] [address] on 3/26/2021. Jhony Gillette is complaining of pain in his neck and right shoulder. The pain is constant. The pain is described as shooting, stabbing and sharp. Pain is rated on his best day at a 6, on his worst day at a 8, and on average at a 7 on the VAS scale. He took his last dose of Tylenol last night. Jhony Gillette does not have issues with constipation. Any procedures since your last visit: No    He is  on NSAIDS and  is not on anticoagulation medications to include ASA and is managed by himself. Pacemaker or defibrillator: No.    Medication Contract and Consent for Opioid Use Documents Filed      No documents found                   /80   Pulse 70   Temp 95.9 °F (35.5 °C) (Temporal)   Resp 16   Ht 5' 11\" (1.803 m)   Wt 210 lb (95.3 kg)   BMI 29.29 kg/m²      No LMP for male patient.

## 2021-03-26 NOTE — PROGRESS NOTES
Do you currently have any of the following:    Fever: {YES/NO:20444}  Headache:  {YES/NO:20444}  Cough: {YES/NO:20444}  Shortness of breath: {YES/NO:20444}  Exposed to anyone with these symptoms: {YES/NO:20444}                                                                                                                Mozell Ganser presents to the [facility] on 3/26/2021. Maya Valero is complaining of pain ***. The pain is {Desc; intermittent/persistent/constant:29286}. The pain is described as {PAIN ASSESSMENT:72665}. Pain is rated on his best day at a [address] [facility] 0-10:793316}, on his worst day at a [address] [facility] 0-10:271697}, and on average at a [address] [facility] 0-10:627085} on the VAS scale. He took his last dose of {Pain Meds:38099} ***. Wallace {DOES/DOES ILJ:36136} have issues with constipation. Any procedures since your last visit: {YES/NO:20444}, with *** % relief. He {IS/IS ENOC:81106} on NSAIDS and  {IS/IS GZR:93295} on anticoagulation medications to include {anticoagulants:89176} and is managed by ***. Pacemaker or defibrillator: {YES/NO:20444} Physician managing device is ***. Medication Contract and Consent for Opioid Use Documents Filed      No documents found                   There were no vitals taken for this visit. No LMP for male patient.

## 2021-03-26 NOTE — PROGRESS NOTES
[address] [facility], [address] [place] [address] [place]  [phone]    Follow up Note      Day Ramos     Date of Visit:  3/26/2021    CC:  Patient presents for follow up   Chief Complaint   Patient presents with    Follow-up    Neck Pain    Shoulder Pain     right shoulder    Knee Pain     both knees     HPI:    Pain is unchanged. Appropriate analgesia with current medications regimen: N/A. Change in quality of symptoms:no. Medication side effects:not applicable . Recent diagnostic testing:none. Recent interventional procedures:none. .    He has not been on anticoagulation medications to include none. The patient  has not been on herbal supplements. The patient is not diabetic. Imaging:   Bilateral knee Xray:  Osteoarthritis at both knees.  It is most severe at the medial weight-bearing   compartment of the left knee.  Bilateral knee joint effusions. Bilateral hip Xray 2020  Mild osteoarthropathy of both hip joints    CT lumbar spine 2020: Moderate spinal canal stenosis related to disc bulging and facet   arthropathy at L3-4 and L4-5. No evidence of spinal infection. CT cervical spine 2020  here is no evidence of fracture or dislocation. Vertebral   body height is well-maintained. No obvious prevertebral soft tissue   edema.  Posterior disc/osteophyte complex is seen at multiple levels   results in mild to moderate effacement of ventral thecal sac notable   at C4/C5 and C5/C6. Right shoulder Xray 2019:  Hyperostotic acromioclavicular joint findings disposing to rotator   cuff impingement.       Hypertrophic degenerative changes of the inferior glenohumeral   articular margin suggesting degenerative osteoarthropathy.       No evidence of acute traumatic findings. Previous treatments: Physical Therapy, Epidural Steroid Injection, Surgery bilateral knee surgery for septic arthritis and medications.   Patient was a former patient of tablet by mouth every morning (before breakfast) 8/1/20  Yes Cherri Pinto MD   insulin lispro (HUMALOG) 100 UNIT/ML injection vial Inject 0-18 Units into the skin 3 times daily (with meals) 7/31/20  Yes Cherri Pinto MD   insulin lispro (HUMALOG) 100 UNIT/ML injection vial Inject 0-9 Units into the skin nightly 7/31/20  Yes Cherri Pinto MD   venlafaxine (EFFEXOR) 37.5 MG tablet Take 37.5 mg by mouth daily   Yes Historical Provider, MD   lamoTRIgine (LAMICTAL) 25 MG tablet Take 25 mg by mouth daily   Yes Historical Provider, MD   Multiple Vitamin (MULTI VITAMIN MENS PO) Take by mouth daily    Yes Historical Provider, MD     Allergies   Allergen Reactions    Adhesive Tape Hives    Tramadol Other (See Comments)     With psych medicine    Cymbalta [Duloxetine Hcl] Rash    Lidocaine Rash and Swelling    Lyrica [Pregabalin] Rash    Strattera [Atomoxetine] Rash     And nausea     Social History     Socioeconomic History    Marital status: Single     Spouse name: Not on file    Number of children: Not on file    Years of education: Not on file    Highest education level: Not on file   Occupational History    Not on file   Social Needs    Financial resource strain: Very hard    Food insecurity     Worry: Often true     Inability: Often true    Transportation needs     Medical: No     Non-medical: No   Tobacco Use    Smoking status: Current Every Day Smoker     Packs/day: 1.00     Years: 30.00     Pack years: 30.00    Smokeless tobacco: Never Used    Tobacco comment:     Substance and Sexual Activity    Alcohol use: No     Comment: none since 2017, drank heavily in early 2000s    Drug use: No    Sexual activity: Not Currently     Partners: Female     Comment: Single;  Last STD check was 2012   Lifestyle    Physical activity     Days per week: Not on file     Minutes per session: Not on file    Stress: Not on file   Relationships    Social connections     Talks on phone: Not on file     Gets together: Not on file     Attends [employer] service: Not on file     Active member of club or organization: Not on file     Attends meetings of clubs or organizations: Not on file     Relationship status: Not on file    Intimate partner violence     Fear of current or ex partner: Not on file     Emotionally abused: Not on file     Physically abused: Not on file     Forced sexual activity: Not on file   Other Topics Concern    Not on file   Social History Narrative    Not on file     Family History   Problem Relation Age of Onset    COPD Father     Stroke Father     Diabetes Paternal Grandfather     Heart Disease Brother      REVIEW OF SYSTEMS:     [place] Deanna denies fever/chills, chest pain, shortness of breath, new bowel or bladder complaints. All other review of systems was negative. PHYSICAL EXAMINATION:      /80   Pulse 70   Temp 95.9 °F (35.5 °C) (Temporal)   Resp 16   Ht 5' 11\" (1.803 m)   Wt 210 lb (95.3 kg)   BMI 29.29 kg/m²     General:       General appearance:   pleasant and well-hydrated. , in moderate discomfort and A & O x3  Build:Normal Weight     HEENT:     Head:normocephalic and atraumatic  Sclera: icterus absent,      Lungs:     Breathing:Breathing Pattern: regular, no distress     Abdomen:     Shape:non-distended and normal  Tenderness:none     Cervical spine:     Inspection:normal  Palpation:tenderness paravertebral muscles, facet loading, left, right, positive and tenderness. Range of motion:abnormal mildly flexion, extension rotation bilateral and is  painful.     Lumbar spine:     Spine inspection:normal   CVA tenderness:No   Palpation:tenderness paravertebral muscles, facet loading, left, right, positive and tenderness.   Range of motion:abnormal moderately Lateral bending, flexion, extension rotation bilateral and is  painful.     Musculoskeletal:     Trigger points in Paraveteral:absent bilaterally  Santana's:negative right, negative left   SI joint tenderness:positive right, positive left              CALE test:negative right, negative             left  Piriformis tenderness:negative right, negative left  Trochanteric bursa tenderness:positive right, positive left  SLR:negative right, negative left, sitting      Extremities:     Tremors:None bilaterally upper and lower  Range of motion:Limited abduction of right shoulder above 100 degrees with tenderness over right AC joint. pain with internal rotation of hips negative. Intact:Yes  Edema:Normal     Knee:     Inspection:symmetric, swelling none bilaterally  Tenderness of Bony Landmarks:Medial, bilateral  Drawer Test:negative  Effusion:absent bilaterally  Crepitus:present bilaterally  ROM:Left limited by pain  Right limited by pain      Neurological:     Sensory:Diminished to light touch Left Ulnar nerve distribution below Left elbow. Motor:              Right Bicep4+/5           Left Bicep4+/5              Right Triceps4+/5       Left Triceps4+/5          Right Deltoid4+/5     Left Deltoid4+/5                  Right Quadriceps4+/5          Left Quadriceps4+/5           Right Gastrocnemius4+/5    Left Gastrocnemius4+/5  Right Ant Tibialis4+/5  Left Ant Tibialis4+/5  Left hand  2/5  Reflexes:    Right Brachioradialis reflex2+  Left Brachioradialis reflex2+  Right Biceps reflex2+  Left Biceps reflex2+  Right Triceps reflex2+  Left Triceps reflex2+  Right Quadriceps reflexNOT DONE  Left Quadriceps reflexNOT DONE  Right Achilles reflex2+  Left Achilles reflex2+  Gait:antalgic with a crutch     Dermatology:     Skin:no unusual rashes and no skin lesions     Impression:  Chronic neck/low back right shoulder and bilateral knee pain. Patient is s/p bilateral knee surgery for septic arthritis. Patient was a former patient of  and was discharged because of refusing to do a urine screen. Patient had interventional procedures with  which per patient didn't help.   Plan:  Follow up on his chronic neck and low back pain with no acute issues. Results of first office visit urine screen discussed with the patient. Advised patient that I won't be able to start chronic opioid therapy if he continues to use THC. OARRS report reviewed 03/2021. Patient encouraged to stay active, currently doing aquatherapy. Treatment plan discussed with the patient including medications side effects. We discussed with the patient that combining opioids, benzodiazepines, alcohol, illicit drugs or sleep aids increases the risk of respiratory depression including death. We discussed that these medications may cause drowsiness, sedation or dizziness and have counseled the patient not to drive or operate machinery. We have discussed that these medications will be prescribed only by one provider. We have discussed with the patient about age related risk factors and have thoroughly discussed the importance of taking these medications as prescribed. The patient verbalizes understanding. joseph Hood M.D.

## 2021-04-27 ENCOUNTER — TELEPHONE (OUTPATIENT)
Dept: ADMINISTRATIVE | Age: 56
End: 2021-04-27

## 2021-06-09 NOTE — PLAN OF CARE
INR of 2.2 in lab on 6/9.  Unable to leave message as busy signal.   Will continue attempts to reach pt.          Problem: Pain:  Goal: Pain level will decrease  Description: Pain level will decrease  7/29/2020 1016 by Colby Pritchett RN  Outcome: Met This Shift  7/28/2020 2233 by Mychal Loaiza RN  Outcome: Met This Shift  Goal: Control of acute pain  Description: Control of acute pain  7/29/2020 1016 by Colby Pritchett RN  Outcome: Met This Shift  7/28/2020 2233 by Mychal Loaiza RN  Outcome: Met This Shift  Goal: Control of chronic pain  Description: Control of chronic pain  7/29/2020 1016 by Colby Pritchett RN  Outcome: Met This Shift  7/28/2020 2233 by Mychal Loaiza RN  Outcome: Met This Shift     Problem: Skin Integrity:  Goal: Will show no infection signs and symptoms  Description: Will show no infection signs and symptoms  7/29/2020 1016 by Colby Pritchett RN  Outcome: Met This Shift  7/28/2020 2233 by Mychal Loaiza RN  Outcome: Met This Shift  Goal: Absence of new skin breakdown  Description: Absence of new skin breakdown  7/29/2020 1016 by Colby Pritchett RN  Outcome: Met This Shift  7/28/2020 2233 by Mychal Loaiza RN  Outcome: Met This Shift     Problem: Falls - Risk of:  Goal: Will remain free from falls  Description: Will remain free from falls  7/29/2020 1016 by Colby Pritchett RN  Outcome: Met This Shift  7/28/2020 2233 by Mychal Loaiza RN  Outcome: Met This Shift  Goal: Absence of physical injury  Description: Absence of physical injury  7/29/2020 1016 by Colby Pritchett RN  Outcome: Met This Shift  7/28/2020 2233 by Mychal Loaiza RN  Outcome: Met This Shift     Problem: Mobility - Impaired:  Goal: Mobility will improve  Description: Mobility will improve  7/29/2020 1016 by Colby Pritchett RN  Outcome: Met This Shift  7/28/2020 2233 by Mychal Loaiza RN  Outcome: Met This Shift

## 2021-07-29 ENCOUNTER — OFFICE VISIT (OUTPATIENT)
Dept: ORTHOPEDIC SURGERY | Age: 56
End: 2021-07-29
Payer: COMMERCIAL

## 2021-07-29 ENCOUNTER — HOSPITAL ENCOUNTER (OUTPATIENT)
Dept: GENERAL RADIOLOGY | Age: 56
Discharge: HOME | End: 2021-07-31
Payer: COMMERCIAL

## 2021-07-29 VITALS — TEMPERATURE: 97.7 F

## 2021-07-29 DIAGNOSIS — M25.562 PAIN IN BOTH KNEES, UNSPECIFIED CHRONICITY: ICD-10-CM

## 2021-07-29 DIAGNOSIS — M25.561 PAIN IN BOTH KNEES, UNSPECIFIED CHRONICITY: ICD-10-CM

## 2021-07-29 DIAGNOSIS — M17.2 BILATERAL POST-TRAUMATIC OSTEOARTHRITIS OF KNEE: Primary | ICD-10-CM

## 2021-07-29 DIAGNOSIS — M00.9 SEPTIC ARTHRITIS OF KNEE, BILATERAL (HCC): ICD-10-CM

## 2021-07-29 PROCEDURE — G8419 CALC BMI OUT NRM PARAM NOF/U: HCPCS | Performed by: PHYSICIAN ASSISTANT

## 2021-07-29 PROCEDURE — 73565 X-RAY EXAM OF KNEES: CPT

## 2021-07-29 PROCEDURE — G8427 DOCREV CUR MEDS BY ELIG CLIN: HCPCS | Performed by: PHYSICIAN ASSISTANT

## 2021-07-29 PROCEDURE — 99212 OFFICE O/P EST SF 10 MIN: CPT | Performed by: PHYSICIAN ASSISTANT

## 2021-07-29 PROCEDURE — 99214 OFFICE O/P EST MOD 30 MIN: CPT | Performed by: PHYSICIAN ASSISTANT

## 2021-07-29 PROCEDURE — 3017F COLORECTAL CA SCREEN DOC REV: CPT | Performed by: PHYSICIAN ASSISTANT

## 2021-07-29 PROCEDURE — 4004F PT TOBACCO SCREEN RCVD TLK: CPT | Performed by: PHYSICIAN ASSISTANT

## 2021-07-29 NOTE — PROGRESS NOTES
Natalee Tran is a 61 y.o. male who presents for follow up of Bilateral Knees    SURGEON: Dr. Layla Arredondo DO  Date of Injury/Surgery: 8/20/2020  Date last seen in office: 1/14/2021    Symptoms: better  New complaints: Patient expressed having difficulty walking. Patient expressed having episodes of feeling faint. Patient feels fatigue when walking around to the point of passing out. Patient walks with a cane. Assistive device cane. Participating in therapy: patient was performing therapy until May 2021 until patient got sick.      Refills Needed: None  Order/Referral Needed: no

## 2021-08-09 NOTE — PROGRESS NOTES
Chief Complaint   Patient presents with    Knee Pain     Patient expressed having bilateral knee pain. Patient said right knee is worse than the left knee. Patient mentioned having crepitus in both knees. Patient expressed having stiffness in both knees at all times. Patient has no altered sensation from Bilateral knees pathology. SUBJECTIVE: Ace Ocasio is an 61 y.o. male who presents to the office for bilateral knee pain. Right is worse than left. Patient does have history of recurrent bilateral septic knees, he is 1 year out from most recent irrigation and debridement. Patient ambulates with a cane at all times. Continues to have sensation of weakness to bilateral lower extremities, pain with increased activities. Feels that he has a lot of popping and catching sensation to bilateral knees but no sensation of the knee is getting stuck. He does mostly have the sensation of instability and early fatiguing. Continues to walk with a cane, he does have persistent episodes of dizziness which he is establishing with a new primary care provider. Patient is an insulin-dependent diabetic. He is not on any suppressive antibiotics. Denies any fall, trauma, injury. He is not currently participating in any therapy. Review of Systems -   All pertinent positives per HPI       OBJECTIVE:   Alert and oriented X 3, no acute distress, respirations easy and unlabored with no audible wheezes, skin warm and dry, speech and dress appropriate for noted age, affect euthymic. Extremity:  Bilateral Lower Extremity  Skin clean dry and intact, without signs of infection  No overlying redness/warmth/induration  No effusion to either knee or edema noted  Compartments supple throughout thigh and leg  Calf supple and nontender  TTP to bilateral knees at the medial joint line>lateral  Correctable varus alignment noted to bilateral knees. Demonstrates active ankle plantar/dorsiflexion/great toe extension.    States sensation intact to touch in sural/deep peroneal/superficial peroneal/saphenous/posterior tibial nerve distributions to foot/ankle. Palpable dorsalis pedis and posterior tibialis pulses, cap refill brisk in toes, foot warm/perfused. AROM R knee 5-120, L knee 5-120  No pain with hip range of motion       XR: AP and lateral weightbearing views of bilateral knees are obtained today. This demonstrates tricompartmental degenerative changes most severe at the medial compartment with near end-stage joint space loss bilaterally. Trace suprapatellar effusion noted bilaterally. No acute fracture dislocation. Temp 97.7 °F (36.5 °C) (Oral)     ASSESSMENT:     Diagnosis Orders   1. Bilateral post-traumatic osteoarthritis of knee  XR KNEE BILATERAL STANDING    External Referral To Physical Therapy    Amb External Referral For Orthotics   2.  Septic arthritis of knee, bilateral [facility])  External Referral To Physical Therapy    Amb External Referral For Orthotics       PLAN:  Discussed with the patient that he is not currently a candidate for any invasive treatment of his knee degenerative changes given his prior septic arthritis  He would be high risk for recurrent infection, patient is also an insulin-dependent diabetic and in addition to his infection he is a poor candidate for steroid use due to his diabetes  Discussed with the patient use of  bracing given most significant medial compartment wear  Also discussed having patient use land and aquatic therapy for global lower extremity strengthening and endurance, he is agreeable to this treatment plan and referrals are placed    The patient is diagnosed with Post Traumatic Arthritis involving the bilateral lower limb and is positive for joint laxity Valgus with associated pain, has failed to respond to conventional medical therapy (e.g. NSAIDs, OTC oral analgesics) and knee bracing with a neoprene sleeve and presents with mechanical axis deformity with severe patellofemoral arthrosis with Medial compartment arthrosis and patient elects to for non-surgical treatment at this time and brace also to assist in predicting the success of HTO versus TKA, At this time he is not a candidate for surgical intervention. The patient is in need of a custom orthosis to increase knee joint stability, decrease associated pain, offset weakness and increase proprioceptic/tactile feedback. The orthosis will improve gait, balance and reduce pain, enabling the patient to safely and effectively achieve an increased level of physical activity, leading to overall improved quality of life      Electronically signed by Desmond Daily PA-C on 8/9/2021 at 10:29 AM  Note: This report was completed using Blogic voiced recognition software. Every effort has been made to ensure accuracy; however, inadvertent computerized transcription errors may be present.

## 2021-11-08 ENCOUNTER — CARE COORDINATION (OUTPATIENT)
Dept: INTERNAL MEDICINE | Age: 56
End: 2021-11-08

## 2021-11-08 ENCOUNTER — OFFICE VISIT (OUTPATIENT)
Dept: INTERNAL MEDICINE | Age: 56
End: 2021-11-08
Payer: COMMERCIAL

## 2021-11-08 VITALS
SYSTOLIC BLOOD PRESSURE: 134 MMHG | WEIGHT: 211 LBS | RESPIRATION RATE: 16 BRPM | DIASTOLIC BLOOD PRESSURE: 84 MMHG | HEART RATE: 96 BPM | BODY MASS INDEX: 29.54 KG/M2 | HEIGHT: 71 IN | TEMPERATURE: 97.6 F

## 2021-11-08 DIAGNOSIS — K59.00 CONSTIPATION, UNSPECIFIED CONSTIPATION TYPE: ICD-10-CM

## 2021-11-08 DIAGNOSIS — H92.01 RIGHT EAR PAIN: ICD-10-CM

## 2021-11-08 DIAGNOSIS — Z23 FLU VACCINE NEED: ICD-10-CM

## 2021-11-08 DIAGNOSIS — E11.65 UNCONTROLLED TYPE 2 DIABETES MELLITUS WITH HYPERGLYCEMIA (HCC): Primary | ICD-10-CM

## 2021-11-08 DIAGNOSIS — Z87.891 PERSONAL HISTORY OF TOBACCO USE: ICD-10-CM

## 2021-11-08 DIAGNOSIS — B35.1 ONYCHOMYCOSIS: ICD-10-CM

## 2021-11-08 DIAGNOSIS — D64.9 NORMOCYTIC ANEMIA: ICD-10-CM

## 2021-11-08 PROBLEM — A40.1 SEPSIS DUE TO GROUP B STREPTOCOCCUS (HCC): Status: RESOLVED | Noted: 2020-07-22 | Resolved: 2021-11-08

## 2021-11-08 PROBLEM — M00.9 SEPTIC ARTHRITIS OF KNEE, BILATERAL (HCC): Status: RESOLVED | Noted: 2020-07-22 | Resolved: 2021-11-08

## 2021-11-08 PROBLEM — U07.1 COVID-19: Status: RESOLVED | Noted: 2020-09-30 | Resolved: 2021-11-08

## 2021-11-08 LAB — HBA1C MFR BLD: 12.7 %

## 2021-11-08 PROCEDURE — G8419 CALC BMI OUT NRM PARAM NOF/U: HCPCS | Performed by: INTERNAL MEDICINE

## 2021-11-08 PROCEDURE — 2022F DILAT RTA XM EVC RTNOPTHY: CPT | Performed by: INTERNAL MEDICINE

## 2021-11-08 PROCEDURE — 99213 OFFICE O/P EST LOW 20 MIN: CPT | Performed by: INTERNAL MEDICINE

## 2021-11-08 PROCEDURE — 3017F COLORECTAL CA SCREEN DOC REV: CPT | Performed by: INTERNAL MEDICINE

## 2021-11-08 PROCEDURE — G0008 ADMIN INFLUENZA VIRUS VAC: HCPCS

## 2021-11-08 PROCEDURE — 3046F HEMOGLOBIN A1C LEVEL >9.0%: CPT | Performed by: INTERNAL MEDICINE

## 2021-11-08 PROCEDURE — 4004F PT TOBACCO SCREEN RCVD TLK: CPT | Performed by: INTERNAL MEDICINE

## 2021-11-08 PROCEDURE — 99212 OFFICE O/P EST SF 10 MIN: CPT | Performed by: INTERNAL MEDICINE

## 2021-11-08 PROCEDURE — G0296 VISIT TO DETERM LDCT ELIG: HCPCS | Performed by: INTERNAL MEDICINE

## 2021-11-08 PROCEDURE — G8427 DOCREV CUR MEDS BY ELIG CLIN: HCPCS | Performed by: INTERNAL MEDICINE

## 2021-11-08 PROCEDURE — 83036 HEMOGLOBIN GLYCOSYLATED A1C: CPT | Performed by: INTERNAL MEDICINE

## 2021-11-08 PROCEDURE — G8482 FLU IMMUNIZE ORDER/ADMIN: HCPCS | Performed by: INTERNAL MEDICINE

## 2021-11-08 PROCEDURE — 6360000002 HC RX W HCPCS

## 2021-11-08 PROCEDURE — 90686 IIV4 VACC NO PRSV 0.5 ML IM: CPT

## 2021-11-08 RX ORDER — ASPIRIN 81 MG/1
81 TABLET ORAL DAILY
Qty: 90 TABLET | Refills: 1 | Status: SHIPPED | OUTPATIENT
Start: 2021-11-08

## 2021-11-08 RX ORDER — SENNA PLUS 8.6 MG/1
1 TABLET ORAL DAILY
Qty: 30 TABLET | Refills: 5 | Status: SHIPPED | OUTPATIENT
Start: 2021-11-08

## 2021-11-08 RX ORDER — INSULIN GLARGINE 100 [IU]/ML
20 INJECTION, SOLUTION SUBCUTANEOUS NIGHTLY
Qty: 5 PEN | Refills: 3 | Status: SHIPPED | OUTPATIENT
Start: 2021-11-08

## 2021-11-08 RX ORDER — OXCARBAZEPINE 150 MG/1
TABLET, FILM COATED ORAL
COMMUNITY
Start: 2021-08-04 | End: 2022-01-14

## 2021-11-08 RX ORDER — ATORVASTATIN CALCIUM 20 MG/1
20 TABLET, FILM COATED ORAL DAILY
Qty: 90 TABLET | Refills: 1 | Status: SHIPPED
Start: 2021-11-08 | End: 2022-08-05 | Stop reason: SDUPTHER

## 2021-11-08 NOTE — CARE COORDINATION
Outpatient education tracking log    Participant Name: Crystal Herbert  Referring Provider: Rozann Jeans, MD    Topics/Learning Objectives Initial visit   Follow-up  Follow-up Follow-up Comments   Diabetes disease process & Treatment process:   -Define diabetes & prediabetes and ABCs of     diabetes   -Identify own type of diabetes  -Identify lifestyle changes/treatment options 11/8/2021     Lab Results   Component Value Date    LABA1C 10.1 (H) 07/22/2020    LABA1C 10.2 (H) 07/22/2020    LABA1C 14.5 (H) 12/23/2014     IDDM 2, neuropathy, hyperlipidemia,  hx of low back pain, smoker,   Met with [facility] A1c done today 12.1 . He was ordered diabetic education today, Very nice man wants to do better on managing his diabetes, Lantus being increased from 15  to 20 units hs today and Lipitor being added. States he has been a diabetic for 20 years. Has meal time Humalog insulin see doctor progress notes. . Discussed the importance of checking his BG before each meal. Reviewed diabetic portion plate, carbohydrates, eating his breakfast lunch and dinner at consistent times daily. Cutting out the sweetened beverages. Has 3 children  Age 8 17 and 25. Has not been consistent on taking care of himself, checking his blood sugars and taking his medications. Reviewed what his current A1c means and the Targets for BG and A1c. Meal planning reviewed and he is open to diabetic education classes and making changes to help manage his diabetes.      Developing strategies for Healthy coping/psychosocial issues:    -Describe feelings about living with diabetes  -Identify coping strategies;   -Identify support needed & support network available 11/8/2021       Bipolar disorder anxiety and depression follows with [facility] on venlafaxine and trileptal.     Prevention, detection & treatment of Chronic complications:    -Identify the prevention, detection and treatment for complications including immunizations, preventive eye, foot, dental and renal exams as indicated per the participant's duration of diabetes and health status.  -Define the natural course of diabetes and the relationship of blood glucose levels to long term complications of diabetes. 11/8/2021 SM     Revewed: Definition of diabetes, Diabetic Targets, A1c and what it means. Self-monitoring of blood glucose and the importance of checking blood sugars Hypo and Hyperglycemia signs and symptoms, possible causes and treatment. The importance of Medication adherence. The plate method and meal planning guidelines, what carbohydrates are and how they effect your blood sugar. The benefits of exercise. What uncontrolled diabetes does to your body and reducing the risk of chronic complications. Referred to diabetic education states it has been 11 years.                Prevention, detection & treatment of acute complications:    -List symptoms of hyper & hypoglycemia, and prevention & treatment strategies.   -Describe sick day guidelines  DKA /indications for ketone testing &  when to call physician  11/8/2021 SM       Identify severe weather/situation crisis  & diabetes supplies management 11/8/2021 SM       Using medications safely:   -State effects of diabetes medicines on blood glucose levels;  -List diabetes medication taken, action & side effects 11/8/2021 SM    Lantus 20 units hs  Humalog tid requiring 16 to 18 units    Insulin/Injectables  -Name appropriate injection sites; proper storage; supplies needed;  11/8/2021 SM        Demonstrate proper technique        Monitoring blood glucose, interpreting and using results:   -Identify recommended & personal blood glucose targets & HgbA1C target levels  -State the Importance of logging blood glucose levels for pattern recognition;   -State benefits of reading/using pt generated health data  -Verbalize safe lancet disposal 11/8/2021 SM       -Demonstrate proper testing technique        Incorporating physical activity into lifestyle:   -State effect of exercise on blood glucose levels;   -State benefits of regular exercise;   -Define safety considerations;  -Describe contraindications/maintenance of activity. 11/8/2021 SM       Incorporating nutritional management into lifestyle:   -Describe effect of type, amount & timing of food on blood glucose  -Describe methods for preparing and planning healthy meals 11/8/2021 SM                   Supporting Education Materials/Equipment Provided: Diabetic Survival Packet, Diabetes and the Body    A1c Tracking  Lab Results   Component Value Date    LABA1C 10.1 (H) 07/22/2020    LABA1C 10.2 (H) 07/22/2020    LABA1C 14.5 (H) 12/23/2014           Date Initial A1c 6 month f/u  1 year post ed.  Difference  Comments                    []Patient lost to follow-up : date-      [] Attended DSMES on:  [] Attended yearly DSMES follow-up on:   [] Attended MNTon :

## 2021-11-08 NOTE — PROGRESS NOTES
[id number]    Attending Physician Statement  I have discussed the case, including pertinent history and exam findings with the resident physician. I agree with the assessment, plan and orders as documented by the resident. I have reviewed all pertinent PMHx, PSHx, FamHx, SocialHx, medications, and allergies and updated history as appropriate. Patient presents for routine follow up of medical problems. DM - HbA1c - 12.1. Uses a sliding scale insulin prior to meals. Requiring 16 - 18 Units extra daily. Fasting sugars are high. No hypoglycemia   Increase Lantus to 20 Units Daily   Return in 2 weeks for further titration. Patient needs to keep a log of blood sugar. Needs to begin a statin. Bipolar/Depression - on oxcarbazepine ad venlafaxine. [facility]. OA - complains of chronic pain. Continue follow-up with orthopedics. Ear pain with tinnitus - ok for audiology referral and ENT. May need hearing aids. Remainder of medical problems as per resident note.     Grover White MD  11/8/2021 2:24 PM

## 2021-11-08 NOTE — PATIENT INSTRUCTIONS
Discharge Instructions:   Be compliant with medications   Please have labs done before coming to next visit   Start taking lantus 18 units at night   Continue taking sliding scale insulin as instructed   Please check blood sugar regularly and maintain a blood sugar log   Follow up in 2 weeks , or sooner if symptoms get worse or do not resolve    Patient Education        Learning About Meal Planning for Diabetes  Why plan your meals? Meal planning can be a key part of managing diabetes. Planning meals and snacks with the right balance of carbohydrate, protein, and fat can help you keep your blood sugar at the target level you set with your doctor. You don't have to eat special foods. You can eat what your family eats, including sweets once in a while. But you do have to pay attention to how often you eat and how much you eat of certain foods. You may want to work with a dietitian or a certified diabetes educator. He or she can give you tips and meal ideas and can answer your questions about meal planning. This health professional can also help you reach a healthy weight if that is one of your goals. What plan is right for you? Your dietitian or diabetes educator may suggest that you start with the plate format or carbohydrate counting. The plate format  The plate format is a simple way to help you manage how you eat. You plan meals by learning how much space each food should take on a plate. Using the plate format helps you spread carbohydrate throughout the day. It can make it easier to keep your blood sugar level within your target range. It also helps you see if you're eating healthy portion sizes. To use the plate format, you put non-starchy vegetables on half your plate. Add meat or meat substitutes on one-quarter of the plate. Put a grain or starchy vegetable (such as brown rice or a potato) on the final quarter of the plate.  You can add a small piece of fruit and some low-fat or fat-free milk or yogurt, depending on your carbohydrate goal for each meal.  Here are some tips for using the plate format:  · Make sure that you are not using an oversized plate. A 9-inch plate is best. Many restaurants use larger plates. · Get used to using the plate format at home. Then you can use it when you eat out. · Write down your questions about using the plate format. Talk to your doctor, a dietitian, or a diabetes educator about your concerns. Carbohydrate counting  With carbohydrate counting, you plan meals based on the amount of carbohydrate in each food. Carbohydrate raises blood sugar higher and more quickly than any other nutrient. It is found in desserts, breads and cereals, and fruit. It's also found in starchy vegetables such as potatoes and corn, grains such as rice and pasta, and milk and yogurt. Spreading carbohydrate throughout the day helps keep your blood sugar levels within your target range. Your daily amount depends on several things, including your weight, how active you are, which diabetes medicines you take, and what your goals are for your blood sugar levels. A registered dietitian or diabetes educator can help you plan how much carbohydrate to include in each meal and snack. A guideline for your daily amount of carbohydrate is:  · 45 to 60 grams at each meal. That's about the same as 3 to 4 carbohydrate servings. · 15 to 20 grams at each snack. That's about the same as 1 carbohydrate serving. The Nutrition Facts label on packaged foods tells you how much carbohydrate is in a serving of the food. First, look at the serving size on the food label. Is that the amount you eat in a serving? All of the nutrition information on a food label is based on that serving size. So if you eat more or less than that, you'll need to adjust the other numbers. Total carbohydrate is the next thing you need to look for on the label.  If you count carbohydrate servings, one serving of carbohydrate is 15 grams.  For foods that don't come with labels, such as fresh fruits and vegetables, you'll need a guide that lists carbohydrate in these foods. Ask your doctor, dietitian, or diabetes educator about books or other nutrition guides you can use. If you take insulin, you need to know how many grams of carbohydrate are in a meal. This lets you know how much rapid-acting insulin to take before you eat. If you use an insulin pump, you get a constant rate of insulin during the day. So the pump must be programmed at meals to give you extra insulin to cover the rise in blood sugar after meals. When you know how much carbohydrate you will eat, you can take the right amount of insulin. Or, if you always use the same amount of insulin, you need to make sure that you eat the same amount of carbohydrate at meals. If you need more help to understand carbohydrate counting and food labels, ask your doctor, dietitian, or diabetes educator. How can you plan healthy meals? Here are some tips to get started:  · Plan your meals a week at a time. Don't forget to include snacks too. · Use cookbooks or online recipes to plan several main meals. Plan some quick meals for busy nights. You also can double some recipes that freeze well. Then you can save half for other busy nights when you don't have time to cook. · Make sure you have the ingredients you need for your recipes. If you're running low on basic items, put these items on your shopping list too. · List foods that you use to make breakfasts, lunches, and snacks. List plenty of fruits and vegetables. · Post this list on the refrigerator. Add to it as you think of more things you need. · Take the list to the store to do your weekly shopping. Follow-up care is a key part of your treatment and safety. Be sure to make and go to all appointments, and call your doctor if you are having problems.  It's also a good idea to know your test results and keep a list of the medicines you take.  Where can you learn more? Go to [email or web address]. org and sign in to your [employer] account. Enter H096 in the [facility] box to learn more about \"Learning About Meal Planning for Diabetes. \"     If you do not have an account, please click on the \"Sign Up Now\" link. Current as of: December 17, 2020               Content Version: 13.0  © 2006-2021 [employer]. Care instructions adapted under license by [facility] ([facility]). If you have questions about a medical condition or this instruction, always ask your healthcare professional. [place] [address] any warranty or liability for your use of this information. Patient Education        Learning About Benefits From Quitting Smoking  How does quitting smoking make you healthier? If you're thinking about quitting smoking, you may have a few reasons to be smoke-free. Your health may be one of them. · When you quit smoking, you lower your risks for cancer, lung disease, heart attack, stroke, blood vessel disease, and blindness from macular degeneration. · When you're smoke-free, you get sick less often, and you heal faster. You are less likely to get colds, flu, bronchitis, and pneumonia. · As a nonsmoker, you may find that your mood is better and you are less stressed. When and how will you feel healthier? Quitting has real health benefits that start from day 1 of being smoke-free. And the longer you stay smoke-free, the healthier you get and the better you feel. The first hours  · After just 20 minutes, your blood pressure and heart rate go down. That means there's less stress on your heart and blood vessels. · Within 12 hours, the level of carbon monoxide in your blood drops back to normal. That makes room for more oxygen. With more oxygen in your body, you may notice that you have more energy than when you smoked. After 2 weeks  · Your lungs start to work better.   · Your risk of heart detailed image of the lungs than a standard X-ray. The goal of lung cancer screening is to find cancer early, before it has a chance to grow, spread, or cause problems. One large study found that smokers who were screened with low-dose CT scans were less likely to die of lung cancer than those who were screened with standard X-ray. Below is a summary of the things you need to know regarding screening for lung cancer with low-dose computed tomography (LDCT). This is a screening program that involves routine annual screening with LDCT studies of the lung. The LDCTs are done using low-dose radiation that is not thought to increase your cancer risk. If you have other serious medical conditions (other cancers, congestive heart failure) that limit your life expectancy to less than 10 years, you should not undergo lung cancer screening with LDCT. The chance is 20%-60% that the LDCT result will show abnormalities. This would require additional testing which could include repeat imaging or even invasive procedures. Most (about 95%) of \"abnormal\" LDCT results are false in the sense that no lung cancer is ultimately found. Additionally, some (about 10%) of the cancers found would not affect your life expectancy, even if undetected and untreated. If you are still smoking, the single most important thing that you can do to reduce your risk of dying of lung cancer is to quit. For this screening to be covered by Medicare and most other insurers, strict criteria must be met. If you do not meet these criteria, but still wish to undergo LDCT testing, you will be required to sign a waiver indicating your willingness to pay for the scan.

## 2021-11-08 NOTE — PROGRESS NOTES
Kemi Pastrana 89  Internal Medicine Residency Program  [facility] Note      SUBJECTIVE:  CC: had concerns including Diabetes. Angie Gauthier presented to the [facility] for a routine visit  He stated that he is feeling well today  Blood sugar at home ranging from 110-320 mg/dl. Fasting blood sugar ranges from 180-210  Mg/dl. He has been requiring about 16-18units of sliding scale insulin over 24 hour period    HbA1c today 10.1%-->12%, worsening    He denies any nausea, vomiting    Last seen by orthopedic surgery on 7/21- not a candidate for invasive treatment of knee; advised to use bracing, aqua therapy    Got steroid shot for trigger finger left middle finger. Follows with  at [place] [id number]:  General: no fevers, chills, weight loss or gain.    Ears/Nose/Throat: no hearing loss, tinnitus, vertigo, nosebleed, nasal congestion, rhinorrhea, sore throat  Respiratory: no cough, pleuritic chest pain, dyspnea, or wheezing  Cardiovascular: no chest pain, angina, dyspnea on exertion, orthopnea, PND, palpitations, or claudication  Gastrointestinal: no nausea, vomiting, heartburn, diarrhea, constipation, abdominal pain, hematochezia or melena  Genitourinary: no urinary urgency, frequency, dysuria, nocturia, hesitancy, or incontinence  Musculoskeletal: no arthritis, arthralgia, myalgia, weakness, or morning stiffness  Skin: no abnormal pigmentation, rash, itching, masses, hair or nail changes    Outpatient Medications Marked as Taking for the 11/8/21 encounter (Office Visit) with Arsenio Esparza MD   Medication Sig Dispense Refill    OXcarbazepine (TRILEPTAL) 150 MG tablet take 1 tablet by mouth twice a day      senna (SENOKOT) 8.6 MG tablet Take 1 tablet by mouth daily 30 tablet 5    aspirin EC 81 MG EC tablet Take 1 tablet by mouth daily 90 tablet 1    insulin glargine (LANTUS) 100 UNIT/ML injection vial Inject 20 Units into the skin nightly 5 pen 3    insulin lispro (HUMALOG) 100 screening    Constipation  Continue senna    Normocytic anemia  Last H/H 7.6/24.1 (2020)  No symptoms of dizziness, light headedness  Will check CBC    Flu vaccine need  INFLUENZA, QUADV, 3 YRS AND OLDER, IM PF, PREFILL SYR OR SDV, 0.5ML (AFLURIA QUADV, PF)    Right ear pain  Reports tinnitus, no hearing difficulty  No discharge noted  Main Francisco MD, Otolaryngology, [place] ([facility])    Onychomycosis  ADOLFO - Nathan Wei DPM, Podiatry, [place]    I have reviewed my findings and recommendations with Nate Mckenna and Dr Justyn Alfred MD PGY-3  11/8/2021 4:07 PM        Low Dose CT (LDCT) Lung Screening criteria met:     Age 55-77(Medicare) or 50-80 ([facility])   Pack year smoking >30 (Medicare) or >20 ([facility])   Still smoking or less than 15 year since quit   No sign or symptoms of lung cancer   > 11 months since last LDCT     Risks and benefits of lung cancer screening with LDCT scans discussed:    Significance of positive screen - False-positive LDCT results often occur. 95% of all positive results do not lead to a diagnosis of cancer. Usually further imaging can resolve most false-positive results; however, some patients may require invasive procedures. Over diagnosis risk - 10% to 12% of screen-detected lung cancer cases are over diagnosedthat is, the cancer would not have been detected in the patient's lifetime without the screening. Need for follow up screens annually to continue lung cancer screening effectiveness     Risks associated with radiation from annual LDCT- Radiation exposure is about the same as for a mammogram, which is about 1/3 of the annual background radiation exposure from everyday life. Starting screening at age 54 is not likely to increase cancer risk from radiation exposure.     Patients with comorbidities resulting in life expectancy of < 10 years, or that would preclude treatment of an abnormality identified on CT, should not be screened due to lack of benefit.     To obtain maximal benefit from this screening, smoking cessation and long-term abstinence from smoking is critical

## 2021-11-08 NOTE — PROGRESS NOTES
Discharge instructions reviewed with patient per Dr. Aviva Vera. Follow up appt scheduled and AVS given to patient.      POCT A1C: 12.7  Flu vaccine given IM left deltoid

## 2021-11-09 ENCOUNTER — TELEPHONE (OUTPATIENT)
Dept: INTERNAL MEDICINE | Age: 56
End: 2021-11-09

## 2021-11-09 NOTE — TELEPHONE ENCOUNTER
Left message for patient w/ an appt reminder for CT Lung Screening on 12-9-21 at 12: 30pm at [facility].

## 2021-11-10 ENCOUNTER — TELEPHONE (OUTPATIENT)
Dept: INTERNAL MEDICINE | Age: 56
End: 2021-11-10

## 2021-11-10 NOTE — TELEPHONE ENCOUNTER
Left message w/ appointment reminder for 11-12-21 1pm at [facility] [place], [place].

## 2021-12-08 ENCOUNTER — TELEPHONE (OUTPATIENT)
Dept: CASE MANAGEMENT | Age: 56
End: 2021-12-08

## 2021-12-08 NOTE — TELEPHONE ENCOUNTER
I called the patient and he confirmed his CT lung screening at [facility] on 12/9/2021 at 1:00 pm.  I reminded the patient to arrive at 12:30 pm, enter through the main entrance, and register. Patient confirmed.               Electronically signed by Mark Avila on 12/8/21 at 2:13 PM EST

## 2021-12-09 ENCOUNTER — HOSPITAL ENCOUNTER (OUTPATIENT)
Dept: CT IMAGING | Age: 56
Discharge: HOME | End: 2021-12-11
Payer: COMMERCIAL

## 2021-12-09 DIAGNOSIS — Z87.891 PERSONAL HISTORY OF TOBACCO USE: ICD-10-CM

## 2021-12-09 PROCEDURE — 71271 CT THORAX LUNG CANCER SCR C-: CPT

## 2021-12-10 ENCOUNTER — TELEPHONE (OUTPATIENT)
Dept: CASE MANAGEMENT | Age: 56
End: 2021-12-10

## 2021-12-10 NOTE — TELEPHONE ENCOUNTER
No call, encounter opened to process CT Lung Screening. CT Lung Screen: 12/9/2021    Impression   1. No pulmonary infiltrate, mass or suspicious pulmonary nodule. 2. Emphysematous changes. 3. Interval clearing of the previously noted multifocal pulmonary infiltrates.       LUNG RADS:   Per ACR Lung-RADS Version 1.1       Category 2, Benign appearance or behavior.       Management:  Continue annual lung screening with LDCT in 12 months.       RECOMMENDATIONS:   If you would like to [facility] your patient [facility], please contact the Nurse Navigator at   [phone]. Pack years: 27    Social History     Tobacco Use  Smoking Status: Current Every Day Smoker    Start Date:    Quit Date:    Types: Cigarettes   Packs/Day: 1   Years: 30   Pack Years: 27   Smokeless Tobacco: Never Used         Results letter sent to patient via my chart or mailed.      [facility]

## 2022-01-14 ENCOUNTER — OFFICE VISIT (OUTPATIENT)
Dept: PAIN MANAGEMENT | Age: 57
End: 2022-01-14
Payer: COMMERCIAL

## 2022-01-14 VITALS
HEART RATE: 86 BPM | SYSTOLIC BLOOD PRESSURE: 140 MMHG | RESPIRATION RATE: 16 BRPM | WEIGHT: 211 LBS | TEMPERATURE: 95.9 F | DIASTOLIC BLOOD PRESSURE: 86 MMHG | BODY MASS INDEX: 29.54 KG/M2 | HEIGHT: 71 IN | OXYGEN SATURATION: 93 %

## 2022-01-14 DIAGNOSIS — M47.816 LUMBAR FACET ARTHROPATHY: ICD-10-CM

## 2022-01-14 DIAGNOSIS — M54.42 ACUTE MIDLINE LOW BACK PAIN WITH BILATERAL SCIATICA: ICD-10-CM

## 2022-01-14 DIAGNOSIS — M51.9 LUMBAR DISC DISORDER: ICD-10-CM

## 2022-01-14 DIAGNOSIS — M47.812 CERVICAL SPONDYLOSIS: ICD-10-CM

## 2022-01-14 DIAGNOSIS — M47.816 LUMBAR SPONDYLOSIS: Primary | ICD-10-CM

## 2022-01-14 DIAGNOSIS — M54.41 ACUTE MIDLINE LOW BACK PAIN WITH BILATERAL SCIATICA: ICD-10-CM

## 2022-01-14 DIAGNOSIS — M17.0 PRIMARY OSTEOARTHRITIS OF BOTH KNEES: ICD-10-CM

## 2022-01-14 DIAGNOSIS — G89.4 CHRONIC PAIN SYNDROME: ICD-10-CM

## 2022-01-14 DIAGNOSIS — M19.011 PRIMARY OSTEOARTHRITIS OF RIGHT SHOULDER: ICD-10-CM

## 2022-01-14 DIAGNOSIS — M47.812 CERVICAL FACET JOINT SYNDROME: ICD-10-CM

## 2022-01-14 DIAGNOSIS — M50.90 CERVICAL DISC DISORDER: ICD-10-CM

## 2022-01-14 PROCEDURE — G8482 FLU IMMUNIZE ORDER/ADMIN: HCPCS | Performed by: PAIN MEDICINE

## 2022-01-14 PROCEDURE — 3017F COLORECTAL CA SCREEN DOC REV: CPT | Performed by: PAIN MEDICINE

## 2022-01-14 PROCEDURE — 4004F PT TOBACCO SCREEN RCVD TLK: CPT | Performed by: PAIN MEDICINE

## 2022-01-14 PROCEDURE — G8427 DOCREV CUR MEDS BY ELIG CLIN: HCPCS | Performed by: PAIN MEDICINE

## 2022-01-14 PROCEDURE — 99213 OFFICE O/P EST LOW 20 MIN: CPT | Performed by: PAIN MEDICINE

## 2022-01-14 PROCEDURE — G8419 CALC BMI OUT NRM PARAM NOF/U: HCPCS | Performed by: PAIN MEDICINE

## 2022-01-14 RX ORDER — IBUPROFEN 600 MG/1
TABLET ORAL
COMMUNITY
Start: 2021-10-20

## 2022-01-14 RX ORDER — INSULIN GLARGINE 100 [IU]/ML
INJECTION, SOLUTION SUBCUTANEOUS
COMMUNITY
Start: 2021-11-08 | End: 2022-01-14

## 2022-01-14 RX ORDER — ACETAMINOPHEN 650 MG/1
TABLET, FILM COATED, EXTENDED RELEASE ORAL
COMMUNITY
Start: 2021-10-20 | End: 2022-01-14

## 2022-01-14 RX ORDER — INSULIN LISPRO 100 [IU]/ML
INJECTION, SOLUTION INTRAVENOUS; SUBCUTANEOUS
COMMUNITY
Start: 2021-11-08 | End: 2022-01-14

## 2022-01-14 RX ORDER — VENLAFAXINE HYDROCHLORIDE 75 MG/1
CAPSULE, EXTENDED RELEASE ORAL
COMMUNITY
Start: 2021-10-21 | End: 2022-01-14

## 2022-01-14 NOTE — PROGRESS NOTES
[address] [facility], [address] [place] [address] Emeterio  [phone]    Follow up Note      Leopoldo Bertin     Date of Visit:  1/14/2022    CC:  Patient presents for follow up   Chief Complaint   Patient presents with    Lower Back Pain     bilateral knees, neck and right shoulder pain     HPI:  Office extension for worsening neck/knee and shoulder pain, last seen 03/2021 with no change in symptoms  Appropriate analgesia with current medications regimen: N/A. Change in quality of symptoms:no. Medication side effects:not applicable . Recent diagnostic testing:none. Recent interventional procedures:none. .    He has not been on anticoagulation medications to include none. The patient  has not been on herbal supplements. The patient is not diabetic. Imaging:   Bilateral knee Xray:  Osteoarthritis at both knees.  It is most severe at the medial weight-bearing   compartment of the left knee.  Bilateral knee joint effusions. Bilateral hip Xray 2020  Mild osteoarthropathy of both hip joints    CT lumbar spine 2020: Moderate spinal canal stenosis related to disc bulging and facet   arthropathy at L3-4 and L4-5. No evidence of spinal infection. CT cervical spine 2020  here is no evidence of fracture or dislocation. Vertebral   body height is well-maintained. No obvious prevertebral soft tissue   edema.  Posterior disc/osteophyte complex is seen at multiple levels   results in mild to moderate effacement of ventral thecal sac notable   at C4/C5 and C5/C6. Right shoulder Xray 2019:  Hyperostotic acromioclavicular joint findings disposing to rotator   cuff impingement.       Hypertrophic degenerative changes of the inferior glenohumeral   articular margin suggesting degenerative osteoarthropathy.       No evidence of acute traumatic findings.      Previous treatments: Physical Therapy, Epidural Steroid Injection, Surgery bilateral knee surgery for septic arthritis and medications. Patient was a former patient of  and was discharged because of refusing to do a urine screen. Left Ulnar nerve damage       Potential Aberrant Drug-Related Behavior:      Urine Drug Screening:  First office visit urine screen showed no opioids, positive for THC    OARRS report:  01/2022 consistent    Opioid Agreement:  Date enacted:02/2021  Renewal date:02/2022    Past Medical History:   Diagnosis Date    Chronic pain     Depression     HLD (hyperlipidemia)     Neuropathy     LIDYA on CPAP     Osteoarthritis     Type II or unspecified type diabetes mellitus without mention of complication, not stated as uncontrolled      Past Surgical History:   Procedure Laterality Date    ARM SURGERY      left arm    INCISION AND DRAINAGE Bilateral 7/27/2020    REPEAT BILATERAL KNEE WASHOUT performed by Sari Teixeira DO at [address] [place] [address] Avenue Bilateral 8/9/2020    INCISION AND DRAINAGE bilateral knees performed by Sari Teixeira DO at [facility] ARTHROSCOPY Bilateral 7/23/2020    KNEE ARTHROSCOPY performed by Suzette Guerrero MD at [address] W/BIOPSY SINGLE/MULTIPLE N/A 9/20/2018    COLONOSCOPY WITH BIOPSY performed by Jose Luis Jacobsen MD at [address] [place] [address]  FLX W/RMVL OF TUMOR POLYP LESION SNARE TQ  9/20/2018    COLONOSCOPY POLYPECTOMY SNARE/COLD BIOPSY performed by Jose Luis Jacobsen MD at [facility]       Prior to Admission medications    Medication Sig Start Date End Date Taking?  Authorizing Provider   ibuprofen (ADVIL;MOTRIN) 600 MG tablet take 1 tablet by mouth every 6 to 8 hours if needed 10/20/21  Yes Historical Provider, MD   senna (SENOKOT) 8.6 MG tablet Take 1 tablet by mouth daily 11/8/21  Yes Giancarlo Weiner MD   aspirin EC 81 MG EC tablet Take 1 tablet by mouth daily 11/8/21  Yes Giancarlo Weiner MD   insulin glargine (LANTUS) 100 UNIT/ML injection vial Inject 20 Units into the skin nightly 11/8/21  Yes Alvaro Aceves MD   insulin lispro (HUMALOG) 100 UNIT/ML injection vial Inject 0-18 Units into the skin 3 times daily (with meals) 11/8/21  Yes Alvaro Aceves MD   atorvastatin (LIPITOR) 20 MG tablet Take 1 tablet by mouth daily 11/8/21  Yes Alvaro Aceves MD   venlafaxine (EFFEXOR) 37.5 MG tablet Take 37.5 mg by mouth daily   Yes Historical Provider, MD   Multiple Vitamin (MULTI VITAMIN MENS PO) Take by mouth daily    Yes Historical Provider, MD     Allergies   Allergen Reactions    Adhesive Tape Hives    Tramadol Other (See Comments)     With psych medicine    Cymbalta [Duloxetine Hcl] Rash    Lidocaine Rash and Swelling    Lyrica [Pregabalin] Rash    Strattera [Atomoxetine] Rash     And nausea     Social History     Socioeconomic History    Marital status: Single     Spouse name: Not on file    Number of children: Not on file    Years of education: Not on file    Highest education level: Not on file   Occupational History    Not on file   Tobacco Use    Smoking status: Current Every Day Smoker     Packs/day: 1.00     Years: 30.00     Pack years: 30.00    Smokeless tobacco: Never Used    Tobacco comment:     Vaping Use    Vaping Use: Never used   Substance and Sexual Activity    Alcohol use: No     Comment: none since 2017, drank heavily in early 2000s    Drug use: No    Sexual activity: Not Currently     Partners: Female     Comment: Single; Last STD check was 2012   Other Topics Concern    Not on file   Social History Narrative    Not on file     Social Determinants of Health     Financial Resource Strain:     Difficulty of Paying Living Expenses: Not on file   Food Insecurity:     Worried About Running Out of Food in the Last Year: Not on file    Jorge of Food in the Last Year: Not on file   Transportation Needs:     Lack of Transportation (Medical): Not on file    Lack of Transportation (Non-Medical):  Not on file   Physical Activity:     Days of Exercise per Week: Not on file    Minutes of Exercise per Session: Not on file   Stress:     Feeling of Stress : Not on file   Social Connections:     Frequency of Communication with Friends and Family: Not on file    Frequency of Social Gatherings with Friends and Family: Not on file    Attends [employer] Services: Not on file    Active Member of [address] [place] [address] [employer] or Organizations: Not on file    Attends Club or Organization Meetings: Not on file    Marital Status: Not on file   Intimate Partner Violence:     Fear of Current or Ex-Partner: Not on file    Emotionally Abused: Not on file    Physically Abused: Not on file    Sexually Abused: Not on file   Housing Stability:     Unable to Pay for Housing in the Last Year: Not on file    Number of [place] in the Last Year: Not on file    Unstable Housing in the Last Year: Not on file     Family History   Problem Relation Age of Onset    COPD Father     Stroke Father     Diabetes Paternal Grandfather     Heart Disease Brother      REVIEW OF SYSTEMS:     Tory Malling denies fever/chills, chest pain, shortness of breath, new bowel or bladder complaints. All other review of systems was negative. PHYSICAL EXAMINATION:      BP (!) 140/86   Pulse 86   Temp 95.9 °F (35.5 °C) (Infrared)   Resp 16   Ht 5' 11\" (1.803 m)   Wt 211 lb (95.7 kg)   SpO2 93%   BMI 29.43 kg/m²     General:       General appearance:   pleasant and well-hydrated. , in moderate discomfort and A & O x3  Build:Normal Weight     HEENT:     Head:normocephalic and atraumatic  Sclera: icterus absent,      Lungs:     Breathing:Breathing Pattern: regular, no distress     Abdomen:     Shape:non-distended and normal  Tenderness:none     Cervical spine:     Inspection:normal  Palpation:tenderness paravertebral muscles, facet loading, left, right, positive and tenderness.   Range of motion:abnormal mildly flexion, extension rotation bilateral and is  painful.     Lumbar spine:     Spine inspection:normal   CVA tenderness:No   Palpation:tenderness paravertebral muscles, facet loading, left, right, positive and tenderness. Range of motion:abnormal moderately Lateral bending, flexion, extension rotation bilateral and is  painful.     Musculoskeletal:     Trigger points in Paraveteral:absent bilaterally  Santana's:negative right, negative left   SI joint tenderness:positive right, positive left  Trochanteric bursa tenderness:positive right, positive left  SLR:negative right, negative left, sitting      Extremities:     Tremors:None bilaterally upper and lower  Range of motion:Limited abduction of right shoulder above 100 degrees with tenderness over right AC joint. pain with internal rotation of hips negative. Intact:Yes  Edema:Normal     Knee:     Inspection:symmetric, swelling none bilaterally  Tenderness of Bony Landmarks:Medial, bilateral  Drawer Test:negative  Effusion:absent bilaterally  Crepitus:present bilaterally  ROM:Left limited by pain  Right limited by pain      Neurological:     Sensory:Diminished to light touch Left Ulnar nerve distribution below Left elbow. Motor:              Right Bicep4+/5           Left Bicep4+/5              Right Triceps4+/5       Left Triceps4+/5          Right Deltoid4+/5     Left Deltoid4+/5                  Right Quadriceps4+/5          Left Quadriceps4+/5           Right Gastrocnemius4+/5    Left Gastrocnemius4+/5  Right Ant Tibialis4+/5  Left Ant Tibialis4+/5  Left hand  2/5  Gait:antalgic with a crutch     Dermatology:     Skin:no unusual rashes and no skin lesions     Impression:  Chronic neck/low back right shoulder and bilateral knee pain. Patient is s/p bilateral knee surgery for septic arthritis. Patient was a former patient of  and was discharged because of refusing to do a urine screen. Patient had interventional procedures with  which per patient didn't help.   Plan:  Office extension for worsening neck/knee and right shoulder pain, last seen 03/2021. Currently seeing  for his knee pain(not a candidate for knee injections h/o septic arthritis). Failed interventional procedures in the past with [facility] pain management. Npt a candidate for chronic opioids. OARRS report reviewed 01/2022. Patient encouraged to stay active. Treatment plan discussed with the patient. Recommend second opinion pain management. We discussed with the patient that combining opioids, benzodiazepines, alcohol, illicit drugs or sleep aids increases the risk of respiratory depression including death. We discussed that these medications may cause drowsiness, sedation or dizziness and have counseled the patient not to drive or operate machinery. We have discussed that these medications will be prescribed only by one provider. We have discussed with the patient about age related risk factors and have thoroughly discussed the importance of taking these medications as prescribed. The patient verbalizes understanding. [email or web address] Whittington M.D.

## 2022-01-14 NOTE — PROGRESS NOTES
Do you currently have any of the following:    Fever: No  Headache:  No  Cough: No  Shortness of breath: No  Exposed to anyone with these symptoms: No                                                                                                                Chula López presents to the [facility] on 1/14/2022. Nima Burnette is complaining of pain in his lower back, neck, right shoulder and knees. . The pain is constant. The pain is described as aching, throbbing, shooting, stabbing and sharp. Pain is rated on his best day at a 4, on his worst day at a 8, and on average at a 7 on the VAS scale. He took his last dose of Motrin and Tylenol . Nima Burnette does have issues with constipation. Any procedures since your last visit: No,    He is  on NSAIDS and  is  on anticoagulation medications to include ASA and is managed by Alex Archuleta MD  .     Pacemaker or defibrillator: No Physician managing device is NA. Medication Contract and Consent for Opioid Use Documents Filed      No documents found                   BP (!) 140/86   Pulse 86   Temp 95.9 °F (35.5 °C) (Infrared)   Resp 16   Ht 5' 11\" (1.803 m)   Wt 211 lb (95.7 kg)   SpO2 93%   BMI 29.43 kg/m²      No LMP for male patient.

## 2022-02-02 ENCOUNTER — TELEPHONE (OUTPATIENT)
Dept: ORTHOPEDIC SURGERY | Age: 57
End: 2022-02-02

## 2022-02-02 NOTE — TELEPHONE ENCOUNTER
Call from pt r/s 2/3/ appt d/t weather, r/s to 2/24 Thurs @ 11:30 am.    Pt questioning if he can have a inj at the dentist today re: teeth being pulled. Per Nash, cannot have CSI d/t condition, but can have novocain inj for dental procedure.

## 2022-02-24 ENCOUNTER — OFFICE VISIT (OUTPATIENT)
Dept: ORTHOPEDIC SURGERY | Age: 57
End: 2022-02-24
Payer: COMMERCIAL

## 2022-02-24 DIAGNOSIS — M17.2 BILATERAL POST-TRAUMATIC OSTEOARTHRITIS OF KNEE: Primary | ICD-10-CM

## 2022-02-24 DIAGNOSIS — M54.41 CHRONIC LOW BACK PAIN WITH BILATERAL SCIATICA, UNSPECIFIED BACK PAIN LATERALITY: ICD-10-CM

## 2022-02-24 DIAGNOSIS — M00.9 SEPTIC ARTHRITIS OF KNEE, BILATERAL (HCC): ICD-10-CM

## 2022-02-24 DIAGNOSIS — M54.42 CHRONIC LOW BACK PAIN WITH BILATERAL SCIATICA, UNSPECIFIED BACK PAIN LATERALITY: ICD-10-CM

## 2022-02-24 DIAGNOSIS — G89.29 CHRONIC LOW BACK PAIN WITH BILATERAL SCIATICA, UNSPECIFIED BACK PAIN LATERALITY: ICD-10-CM

## 2022-02-24 PROCEDURE — 99212 OFFICE O/P EST SF 10 MIN: CPT

## 2022-02-24 PROCEDURE — 99214 OFFICE O/P EST MOD 30 MIN: CPT | Performed by: PHYSICIAN ASSISTANT

## 2022-02-24 PROCEDURE — 4004F PT TOBACCO SCREEN RCVD TLK: CPT | Performed by: PHYSICIAN ASSISTANT

## 2022-02-24 PROCEDURE — 3017F COLORECTAL CA SCREEN DOC REV: CPT | Performed by: PHYSICIAN ASSISTANT

## 2022-02-24 PROCEDURE — G8482 FLU IMMUNIZE ORDER/ADMIN: HCPCS | Performed by: PHYSICIAN ASSISTANT

## 2022-02-24 PROCEDURE — G8419 CALC BMI OUT NRM PARAM NOF/U: HCPCS | Performed by: PHYSICIAN ASSISTANT

## 2022-02-24 PROCEDURE — G8427 DOCREV CUR MEDS BY ELIG CLIN: HCPCS | Performed by: PHYSICIAN ASSISTANT

## 2022-02-24 NOTE — PROGRESS NOTES
Chief Complaint   Patient presents with    Lower Back Pain     Pt has increased lower back pain due to increased soreness/pain with ambulation.  Knee Pain     Pt has bilateral knee pain. OP:SURGEON: Dr. Ricardo Valenzuela DO  DATE OF PROCEDURE: 8-9-20  PROCEDURE: bilateral knee arthrotomy with exploration, irrigation and debridement    Subjective:  Ruchi Lou is following up from the above surgery. He is WBAT on right lower and left lower extremity. He ambulates with assistive device, cane. Pain to extremity is moderate and is not taking prescribed pain medication. They complains of numbness and tingling to the right lower and left lower extremity. Denies calf pain, chest pain, or shortness of breath. Patient has finished DVT prophylaxis. Patient is not participating in therapy at this time. He does complain of bilateral knee pain worse on the right. He does have a history of recurrent bilateral septic knees most recent I&D was in August 2020 he continues to feel weakness in bilateral lower extremities as well as pain with increased activities. Feels that he has a lot of popping and catching sensation to bilateral knees but no feeling of the knee getting stuck. He is an insulin-dependent diabetic. He is not on any suppressive antibiotics. Denies recent fall, trauma or injury. His worst complaint today is actually low back pain with diffuse radiating pain and numbness in both legs. He said he has a history of spine problems in his neck and back. Denies saddle anesthesias, bladder or bowel issues. He has done land-based and aquatic therapy with some relief.      Review of Systems -  All pertinent positives/negative in HPI     Objective:    General: Alert and oriented X 3, normocephalic atraumatic, external ears and eye normal, sclera clear, no acute distress, respirations easy and unlabored with no audible wheezes, skin warm and dry, speech and dress appropriate for noted age, affect

## 2022-02-24 NOTE — PROGRESS NOTES
Lucien Ramos is a 61 y.o. male who presents for follow up of Bilateral Knee F/u    SURGEON: Dr. Toi Pickard DO  Date of Injury/Surgery: 7-  Date last seen in office: 7-    Symptoms: worse  New complaints:Pt expressed having lower back pain due to increased knee pain with ambulation. Pt has increased soreness with the upper back due to increased pain with lower back. Pt noted having increased fatigued with ambulation due to increased pain. Pt has crepitus within bilateral knee joints. Pt noted that the Right Knee hurts worse than the Left Knee. Pt has shooting pain in the bilateral knees that moves proximal to the anterior mid thigh. Pt noted having vertigo with sit to stand. Weightbearing: right lower and left lower Partial weight bearing      Assistive device Straight cane  Participating in therapy (location if yes)? Pt was doing physical therapy at [address] [place] [address]. Refills Needed: None and Pt was declined at Pain management.    Order/Referral Needed: N/A

## 2022-07-14 ENCOUNTER — OFFICE VISIT (OUTPATIENT)
Dept: PRIMARY CARE CLINIC | Age: 57
End: 2022-07-14
Payer: COMMERCIAL

## 2022-07-14 VITALS
WEIGHT: 210 LBS | SYSTOLIC BLOOD PRESSURE: 122 MMHG | DIASTOLIC BLOOD PRESSURE: 68 MMHG | RESPIRATION RATE: 19 BRPM | HEIGHT: 71 IN | TEMPERATURE: 97.1 F | BODY MASS INDEX: 29.4 KG/M2

## 2022-07-14 DIAGNOSIS — E11.65 UNCONTROLLED TYPE 2 DIABETES MELLITUS WITH HYPERGLYCEMIA (HCC): Primary | ICD-10-CM

## 2022-07-14 DIAGNOSIS — E11.65 UNCONTROLLED TYPE 2 DIABETES MELLITUS WITH HYPERGLYCEMIA (HCC): ICD-10-CM

## 2022-07-14 PROCEDURE — 3046F HEMOGLOBIN A1C LEVEL >9.0%: CPT | Performed by: STUDENT IN AN ORGANIZED HEALTH CARE EDUCATION/TRAINING PROGRAM

## 2022-07-14 PROCEDURE — G8419 CALC BMI OUT NRM PARAM NOF/U: HCPCS | Performed by: STUDENT IN AN ORGANIZED HEALTH CARE EDUCATION/TRAINING PROGRAM

## 2022-07-14 PROCEDURE — G8427 DOCREV CUR MEDS BY ELIG CLIN: HCPCS | Performed by: STUDENT IN AN ORGANIZED HEALTH CARE EDUCATION/TRAINING PROGRAM

## 2022-07-14 PROCEDURE — 83036 HEMOGLOBIN GLYCOSYLATED A1C: CPT | Performed by: STUDENT IN AN ORGANIZED HEALTH CARE EDUCATION/TRAINING PROGRAM

## 2022-07-14 PROCEDURE — 4004F PT TOBACCO SCREEN RCVD TLK: CPT | Performed by: STUDENT IN AN ORGANIZED HEALTH CARE EDUCATION/TRAINING PROGRAM

## 2022-07-14 PROCEDURE — 2022F DILAT RTA XM EVC RTNOPTHY: CPT | Performed by: STUDENT IN AN ORGANIZED HEALTH CARE EDUCATION/TRAINING PROGRAM

## 2022-07-14 PROCEDURE — 3017F COLORECTAL CA SCREEN DOC REV: CPT | Performed by: STUDENT IN AN ORGANIZED HEALTH CARE EDUCATION/TRAINING PROGRAM

## 2022-07-14 PROCEDURE — 99214 OFFICE O/P EST MOD 30 MIN: CPT | Performed by: STUDENT IN AN ORGANIZED HEALTH CARE EDUCATION/TRAINING PROGRAM

## 2022-07-14 PROCEDURE — 82044 UR ALBUMIN SEMIQUANTITATIVE: CPT | Performed by: STUDENT IN AN ORGANIZED HEALTH CARE EDUCATION/TRAINING PROGRAM

## 2022-07-14 RX ORDER — VILAZODONE HYDROCHLORIDE 20 MG/1
20 TABLET ORAL DAILY
COMMUNITY

## 2022-07-14 RX ORDER — BUSPIRONE HYDROCHLORIDE 10 MG/1
10 TABLET ORAL 3 TIMES DAILY
COMMUNITY

## 2022-07-14 RX ORDER — DULAGLUTIDE 1.5 MG/.5ML
1.5 INJECTION, SOLUTION SUBCUTANEOUS WEEKLY
Qty: 4 PEN | Refills: 2 | Status: SHIPPED | OUTPATIENT
Start: 2022-07-14

## 2022-07-14 RX ORDER — LANCETS 30 GAUGE
1 EACH MISCELLANEOUS 4 TIMES DAILY
Qty: 600 EACH | Refills: 1 | Status: SHIPPED | OUTPATIENT
Start: 2022-07-14

## 2022-07-14 ASSESSMENT — PATIENT HEALTH QUESTIONNAIRE - PHQ9
4. FEELING TIRED OR HAVING LITTLE ENERGY: 0
SUM OF ALL RESPONSES TO PHQ QUESTIONS 1-9: 0
7. TROUBLE CONCENTRATING ON THINGS, SUCH AS READING THE NEWSPAPER OR WATCHING TELEVISION: 0
8. MOVING OR SPEAKING SO SLOWLY THAT OTHER PEOPLE COULD HAVE NOTICED. OR THE OPPOSITE, BEING SO FIGETY OR RESTLESS THAT YOU HAVE BEEN MOVING AROUND A LOT MORE THAN USUAL: 0
SUM OF ALL RESPONSES TO PHQ QUESTIONS 1-9: 0
6. FEELING BAD ABOUT YOURSELF - OR THAT YOU ARE A FAILURE OR HAVE LET YOURSELF OR YOUR FAMILY DOWN: 0
10. IF YOU CHECKED OFF ANY PROBLEMS, HOW DIFFICULT HAVE THESE PROBLEMS MADE IT FOR YOU TO DO YOUR WORK, TAKE CARE OF THINGS AT HOME, OR GET ALONG WITH OTHER PEOPLE: 0
2. FEELING DOWN, DEPRESSED OR HOPELESS: 0
5. POOR APPETITE OR OVEREATING: 0
SUM OF ALL RESPONSES TO PHQ9 QUESTIONS 1 & 2: 0
1. LITTLE INTEREST OR PLEASURE IN DOING THINGS: 0
9. THOUGHTS THAT YOU WOULD BE BETTER OFF DEAD, OR OF HURTING YOURSELF: 0
3. TROUBLE FALLING OR STAYING ASLEEP: 0
SUM OF ALL RESPONSES TO PHQ QUESTIONS 1-9: 0
SUM OF ALL RESPONSES TO PHQ QUESTIONS 1-9: 0

## 2022-07-14 NOTE — PATIENT INSTRUCTIONS
Patient Education        Learning About Meal Planning for Diabetes  Why plan your meals? Meal planning can be a key part of managing diabetes. Planning meals and snacks with the right balance of carbohydrate, protein, and fat can help you keep yourblood sugar at the target level you set with your doctor. You don't have to eat special foods. You can eat what your family eats, including sweets once in a while. But you do have to pay attention to how oftenyou eat and how much you eat of certain foods. You may want to work with a dietitian or a diabetes educator. They can give you tips and meal ideas and can answer your questions about meal planning. This health professional can also help you reach a healthy weight if that is one ofyour goals. What plan is right for you? Your dietitian or diabetes educator may suggest that you start with the plateformat or carbohydrate counting. The plate format  The plate format is a simple way to help you manage how you eat. You plan meals by learning how much space each food should take on a plate. Using the plate format helps you manage the amount of carbohydrate you eat. It can make it easier to keep your blood sugar level within your target range. It also helpsyou see if you're eating healthy portion sizes. To use the plate format, you put non-starchy vegetables on half your plate. Add lean protein foods, such as fish, lean meats and poultry, or soy products, on one-quarter of the plate. Put a grain or starchy vegetable (such as brown rice or a potato) on the final quarter of the plate. You can add a small piece of fruit and some low-fat or fat-free milk or yogurt, depending on yourcarbohydrate goal for each meal.  Here are some tips for using the plate format:   Make sure that you are not using an oversized plate. A 9-inch plate is best. Many restaurants use larger plates.  Get used to using the plate format at home. Then you can use it when you eat out.    Write down your questions about using the plate format. Talk to your doctor, a dietitian, or a diabetes educator about your concerns. Carbohydrate counting  With carbohydrate counting, you plan meals based on the amount of carbohydrate in each food. Carbohydrate raises blood sugar higher and more quickly than any other nutrient. It is found in desserts, breads and cereals, and fruit. It's also found in starchy vegetables such as potatoes and corn, grains such as rice and pasta, and milk and yogurt. You can help keep your blood sugar levels within your target range by planning how much carbohydrate to have at meals andsnacks. The amount you need depends on several things. These include your weight, how active you are, which diabetes medicines you take, and what your goals are for your blood sugar levels. A registered dietitian or diabetes educator can helpyou plan how much carbohydrate to include in each meal and snack. An example of a carbohydrate counting plan is:   45 to 60 grams at each meal. That's about the same as 3 to 4 carbohydrate servings.  15 to 20 grams at each snack. That's about the same as 1 carbohydrate serving. The Nutrition Facts label on packaged foods tells you how much carbohydrate is in a serving of the food. First, look at the serving size on the food label. Is that the amount you eat in a serving? All of the nutrition information on a food label is based on that serving size. So if you eat more or less than that, you'll need to adjust the other numbers. Total carbohydrate is the next thing you need to look for on the label. If you count carbohydrate servings, oneserving of carbohydrate is 15 grams. For foods that don't come with labels, such as fresh fruits and vegetables, you'll need a guide that lists carbohydrate in these foods. Ask your doctor, dietitian, or diabetes educator about books or other nutrition guides you canuse.   If you take insulin, you need to know how many grams of carbohydrate are in a meal. This lets you know how much rapid-acting insulin to take before you eat. If you use an insulin pump, you get a constant rate of insulin during the day. So the pump must be programmed at meals to give you extra insulin to cover therise in blood sugar after meals. When you know how much carbohydrate you will eat, you can take the right amount of insulin. Or, if you always use the same amount of insulin, you need to [facility] that you eat the same amount of carbohydrate at meals. If you need more help to understand carbohydrate counting and food labels, askyour doctor, dietitian, or diabetes educator. How can you plan healthy meals? Here are some tips to get started:  [facility] your meals a week at a time. Don't forget to include snacks too.  Use cookbooks or online recipes to plan several main meals. Plan some quick meals for busy nights. You also can double some recipes that freeze well. Then you can save half for other busy nights when you don't have time to cook.  Make sure you have the ingredients you need for your recipes. If you're running low on basic items, put these items on your shopping list too.  List foods that you use to make breakfasts, lunches, and snacks. List plenty of fruits and vegetables.  Post this list on the refrigerator. Add to it as you think of more things you need.  Take the list to the store to do your weekly shopping. Follow-up care is a key part of your treatment and safety. Be sure to make and go to all appointments, and call your doctor if you are having problems. It's also a good idea to know your test results and keep alist of the medicines you take. Where can you learn more? Go to https://chpeprimoewrsosy.Vidible. org and sign in to your [employer] account. Enter X702 in the TRUECar box to learn more about \"Learning About Meal Planning for Diabetes. \"     If you do not have an account, please click on the \"Sign Up Now\" link.  Current as of: September 8, 2021               Content Version: 13.3  © 2006-2022 Healthwise, [employer]. Care instructions adapted under license by [facility] ([facility]). If you have questions about a medical condition or this instruction, always ask your healthcare professional. [address] any warranty or liability for your use of this information. Patient Education        Learning About Carbohydrate (Carb) Counting and Eating Out When You Have Diabetes  Why plan your meals? Meal planning can be a key part of managing diabetes. Planning meals and snacks with the right balance of carbohydrate, protein, and fat can help you keep yourblood sugar at the target level you set with your doctor. You don't have to eat special foods. You can eat what your family eats, including sweets once in a while. But you do have to pay attention to how oftenyou eat and how much you eat of certain foods. You may want to work with a dietitian or a diabetes educator. They can give you tips and meal ideas and can answer your questions about meal planning. This health professional can also help you reach a healthy weight if that is one ofyour goals. What should you know about eating carbs? Managing the amount of carbohydrate (carbs) you eat is an important part ofhealthy meals when you have diabetes. Carbohydrate is found in many foods.  Learn which foods have carbs. And learn the amounts of carbs in different foods. ? Bread, cereal, pasta, and rice have about 15 grams of carbs in a serving. A serving is 1 slice of bread (1 ounce), ½ cup of cooked cereal, or 1/3 cup of cooked pasta or rice. ? Fruits have 15 grams of carbs in a serving. A serving is 1 small fresh fruit, such as an apple or orange; ½ of a banana; ½ cup of cooked or canned fruit; ½ cup of fruit juice; 1 cup of melon or raspberries; or 2 tablespoons of dried fruit.   ? Milk and no-sugar-added yogurt have 15 grams of carbs in a serving. A serving is 1 cup of milk or 3/4 cup (6 oz) of no-sugar-added yogurt. ? Starchy vegetables have 15 grams of carbs in a serving. A serving is ½ cup of mashed potatoes or sweet potato; 1 cup winter squash; ½ of a small baked potato; ½ cup of cooked beans; or ½ cup cooked corn or green peas.  Learn how much carbs to eat each day and at each meal. A dietitian or certified diabetes educator can teach you how to keep track of the amount of carbs you eat. This is called carbohydrate counting.  If you are not sure how to count carbohydrate grams, use the plate method to plan meals. It is a quick way to make sure that you have a balanced meal. It also can help you manage the amount of carbohydrate you eat at meals. ? Divide your plate by types of foods. Put non-starchy vegetables on half the plate, meat or other protein food on one-quarter of the plate, and a grain or starchy vegetable in the final quarter of the plate. To this you can add a small piece of fruit and 1 cup of milk or yogurt, depending on how many carbs you are supposed to eat at a meal.   Try to eat about the same amount of carbs at each meal. Do not \"save up\" your daily allowance of carbs to eat at one meal.   Proteins have very little or no carbs. Examples of proteins are beef, chicken, turkey, fish, eggs, tofu, cheese, cottage cheese, and peanut butter. How can you eat out and still eat healthy?  Learn to estimate the serving sizes of foods that have carbohydrate. If you measure food at home, it will be easier to estimate the amount in a serving of restaurant food.  If the meal you order has too much carbohydrate (such as potatoes, corn, or baked beans), ask to have a low-carbohydrate food instead. Ask for a salad or non-starchy vegetables like broccoli, cauliflower, green beans, or peppers.  If you eat more carbohydrate at a meal than you had planned, take a walk or do other exercise. This will help lower your blood sugar.   What

## 2022-07-14 NOTE — PROGRESS NOTES
[facility]  Department of Family Medicine      Patient:  Sheba Banda 61 y.o. male     Date of Service: 7/14/22      Chief complaint:   Chief Complaint   Patient presents with    Establish Care         History ofPresent Illness   The patient is a 61 y.o. male  presented to the clinic with complaints as above.     NTP    Diabetes  -f/u  -on long and short acting insulin, not on any oral medications  -was on metformin however could not tolerate it   -is checking his sugars at home, typically in 300-400's, lowest would be 200  -having excessive thirst and urination   -went to ED a couple months ago for hyperglycemia  -taking his short acting sliding scale 1-2 times a day instead 3 times a day, feels like this happens secondary to his mood and him sleeping a lot   -is trying to eat better     Past Medical History:      Diagnosis Date    Chronic pain     Depression     HLD (hyperlipidemia)     Neuropathy     LIDYA on CPAP     Osteoarthritis     Type II or unspecified type diabetes mellitus without mention of complication, not stated as uncontrolled        PastSurgical History:        Procedure Laterality Date    ARM SURGERY      left arm    INCISION AND DRAINAGE Bilateral 7/27/2020    REPEAT BILATERAL KNEE WASHOUT performed by Cheyenne García DO at [address] [place] [address] Bilateral 8/9/2020    INCISION AND DRAINAGE bilateral knees performed by Cheyenne García DO at [facility] ARTHROSCOPY Bilateral 7/23/2020    KNEE ARTHROSCOPY performed by Jorge Kruse MD at [address] [facility] [address] COLONOSCOPY W/BIOPSY SINGLE/MULTIPLE N/A 9/20/2018    COLONOSCOPY WITH BIOPSY performed by Obey Boles MD at [address] [place] [address] W/RMVL OF TUMOR POLYP LESION [address]  9/20/2018    COLONOSCOPY POLYPECTOMY SNARE/COLD BIOPSY performed by Obey Boles MD at [address] [facility]         Allergies:    Adhesive tape, Tramadol, Cymbalta [duloxetine hcl], Lidocaine, Lyrica [pregabalin], and Strattera [atomoxetine]    Social History:   Social History     Socioeconomic History    Marital status: Single     Spouse name: Not on file    Number of children: Not on file    Years of education: Not on file    Highest education level: Not on file   Occupational History    Not on file   Tobacco Use    Smoking status: Every Day     Packs/day: 1.00     Years: 30.00     Pack years: 30.00     Types: Cigarettes    Smokeless tobacco: Never    Tobacco comments:         Vaping Use    Vaping Use: Never used   Substance and Sexual Activity    Alcohol use: No     Comment: none since 2017, drank heavily in early 2000s    Drug use: No    Sexual activity: Not Currently     Partners: Female     Comment: Single; Last STD check was 2012   Other Topics Concern    Not on file   Social History Narrative    Not on file     Social Determinants of Health     Financial Resource Strain: Not on file   Food Insecurity: Not on file   Transportation Needs: Not on file   Physical Activity: Not on file   Stress: Not on file   Social Connections: Not on file   Intimate Partner Violence: Not on file   Housing Stability: Not on file        Family History:       Problem Relation Age of Onset    COPD Father     Stroke Father     Diabetes Paternal Grandfather     Heart Disease Brother        Review of Systems:   Review of Systems - as above    Physical Exam   Vitals: /68   Temp 97.1 °F (36.2 °C) (Infrared)   Resp 19   Ht 5' 11\" (1.803 m)   Wt 210 lb (95.3 kg)   BMI 29.29 kg/m²   Physical Exam  Constitutional:       Appearance: He is well-developed. HENT:      Head: Normocephalic and atraumatic. Eyes:      General:         Right eye: No discharge. Left eye: No discharge. Conjunctiva/sclera: Conjunctivae normal.   Neck:      Trachea: No tracheal deviation. Cardiovascular:      Rate and Rhythm: Normal rate and regular rhythm. Heart sounds: Normal heart sounds.    Pulmonary: Effort: Pulmonary effort is normal. No respiratory distress. Breath sounds: Normal breath sounds. No wheezing. Abdominal:      General: Bowel sounds are normal. There is no distension. Palpations: Abdomen is soft. Tenderness: There is no abdominal tenderness. Musculoskeletal:         General: No tenderness. Cervical back: Normal range of motion and neck supple. Skin:     General: Skin is warm and dry. Neurological:      Mental Status: He is alert. Psychiatric:         Behavior: Behavior normal.           Assessment and Plan       1. Uncontrolled type 2 diabetes mellitus with hyperglycemia (HCC)  F/u of chronic issue  -Worsening as a1c now above 15, patient uncertain about his insulin dosage and having issues with compliance, therefore made small adjustment to his insulin dosage and started him on oral medications with close f/u, unclear if patient would benefit from insulin pump, therefore referred to endocrinology  - Asher Mills MD, Endocrinology, [place]  - CBC; Future  - Comprehensive Metabolic Panel; Future  - Lipid Panel; Future  - PSA Screening; Future  - metFORMIN (GLUCOPHAGE) 500 MG tablet; Take 1 tablet by mouth daily (with breakfast)  Dispense: 90 tablet; Refill: 1  - insulin lispro (HUMALOG) 100 UNIT/ML injection vial; Inject 15 Units into the skin 2 times daily (with meals)  Dispense: 5 pen; Refill: 3  - blood glucose monitor kit and supplies; Dispense sufficient amount for indicated testing frequency plus additional to accommodate PRN testing needs. Dispense all needed supplies to include: monitor, strips, lancing device, lancets, control solutions, alcohol swabs. Dispense: 1 kit; Refill: 0  - blood glucose test strips (ASCENSIA AUTODISC VI;ONE TOUCH ULTRA TEST VI) strip; Check four times a day  Dispense: 200 strip; Refill: 5  - Lancets MISC; 1 each by Does not apply route 4 times daily  Dispense: 600 each;  Refill: 1  - Dulaglutide (TRULICITY) 1.5 NA/8.9XW

## 2022-07-15 LAB
ALBUMIN SERPL-MCNC: 4.6 G/DL (ref 3.5–5.2)
ALP BLD-CCNC: 119 U/L (ref 40–129)
ALT SERPL-CCNC: 15 U/L (ref 0–40)
ANION GAP SERPL CALCULATED.3IONS-SCNC: 14 MMOL/L (ref 7–16)
AST SERPL-CCNC: 17 U/L (ref 0–39)
BILIRUB SERPL-MCNC: 0.3 MG/DL (ref 0–1.2)
BUN BLDV-MCNC: 16 MG/DL (ref 6–20)
CALCIUM SERPL-MCNC: 9.8 MG/DL (ref 8.6–10.2)
CHLORIDE BLD-SCNC: 95 MMOL/L (ref 98–107)
CHOLESTEROL, TOTAL: 316 MG/DL (ref 0–199)
CO2: 22 MMOL/L (ref 22–29)
CREAT SERPL-MCNC: 0.9 MG/DL (ref 0.7–1.2)
GFR AFRICAN AMERICAN: >60
GFR NON-AFRICAN AMERICAN: >60 ML/MIN/1.73
GLUCOSE BLD-MCNC: 485 MG/DL (ref 74–99)
HCT VFR BLD CALC: 45 % (ref 37–54)
HDLC SERPL-MCNC: 48 MG/DL
HEMOGLOBIN: 14.7 G/DL (ref 12.5–16.5)
LDL CHOLESTEROL CALCULATED: 228 MG/DL (ref 0–99)
MCH RBC QN AUTO: 30.3 PG (ref 26–35)
MCHC RBC AUTO-ENTMCNC: 32.7 % (ref 32–34.5)
MCV RBC AUTO: 92.8 FL (ref 80–99.9)
PDW BLD-RTO: 13.1 FL (ref 11.5–15)
PLATELET # BLD: 367 E9/L (ref 130–450)
PMV BLD AUTO: 9.8 FL (ref 7–12)
POTASSIUM SERPL-SCNC: 4.7 MMOL/L (ref 3.5–5)
PROSTATE SPECIFIC ANTIGEN: 0.75 NG/ML (ref 0–4)
RBC # BLD: 4.85 E12/L (ref 3.8–5.8)
SODIUM BLD-SCNC: 131 MMOL/L (ref 132–146)
TOTAL PROTEIN: 7.1 G/DL (ref 6.4–8.3)
TRIGL SERPL-MCNC: 199 MG/DL (ref 0–149)
VLDLC SERPL CALC-MCNC: 40 MG/DL
WBC # BLD: 8.2 E9/L (ref 4.5–11.5)

## 2022-07-17 LAB — C-PEPTIDE: 1.9 NG/ML (ref 0.5–3.3)

## 2022-07-21 ENCOUNTER — CARE COORDINATION (OUTPATIENT)
Dept: CARE COORDINATION | Age: 57
End: 2022-07-21

## 2022-07-22 ENCOUNTER — CARE COORDINATION (OUTPATIENT)
Dept: CARE COORDINATION | Age: 57
End: 2022-07-22

## 2022-07-22 NOTE — CARE COORDINATION
Ambulatory Care Management Note    Date/Time:  7/22/2022 12:34 PM    This patient was received as a referral from Provider. Ambulatory Care Manager outreached to patient today to offer care management services. Introduction to self and role of care manager provided. Patient accepted care management services at this time. Follow up call scheduled at this time. Patient has Ambulatory Care Manager's contact number for for any questions or concerns. ACM and patient discussed patient's availability for next outreach to complete enrollment process and agreed on date and time for ACM to contact patient.      PLAN:  Contact patient on agreed upon date and time to complete enrollment into Care Coordination

## 2022-07-29 ENCOUNTER — CARE COORDINATION (OUTPATIENT)
Dept: CARE COORDINATION | Age: 57
End: 2022-07-29

## 2022-07-29 SDOH — ECONOMIC STABILITY: FOOD INSECURITY: WITHIN THE PAST 12 MONTHS, YOU WORRIED THAT YOUR FOOD WOULD RUN OUT BEFORE YOU GOT MONEY TO BUY MORE.: SOMETIMES TRUE

## 2022-07-29 SDOH — HEALTH STABILITY: PHYSICAL HEALTH: ON AVERAGE, HOW MANY DAYS PER WEEK DO YOU ENGAGE IN MODERATE TO STRENUOUS EXERCISE (LIKE A BRISK WALK)?: 0 DAYS

## 2022-07-29 SDOH — ECONOMIC STABILITY: FOOD INSECURITY: WITHIN THE PAST 12 MONTHS, THE FOOD YOU BOUGHT JUST DIDN'T LAST AND YOU DIDN'T HAVE MONEY TO GET MORE.: SOMETIMES TRUE

## 2022-07-29 SDOH — HEALTH STABILITY: PHYSICAL HEALTH: ON AVERAGE, HOW MANY MINUTES DO YOU ENGAGE IN EXERCISE AT THIS LEVEL?: 0 MIN

## 2022-07-29 ASSESSMENT — SOCIAL DETERMINANTS OF HEALTH (SDOH)
DO YOU BELONG TO ANY CLUBS OR ORGANIZATIONS SUCH AS CHURCH GROUPS UNIONS, FRATERNAL OR ATHLETIC GROUPS, OR SCHOOL GROUPS?: NO
HOW HARD IS IT FOR YOU TO PAY FOR THE VERY BASICS LIKE FOOD, HOUSING, MEDICAL CARE, AND HEATING?: VERY HARD
HOW OFTEN DO YOU ATTENT MEETINGS OF THE CLUB OR ORGANIZATION YOU BELONG TO?: NEVER
ARE YOU MARRIED, WIDOWED, DIVORCED, SEPARATED, NEVER MARRIED, OR LIVING WITH A PARTNER?: NEVER MARRIED
HOW OFTEN DO YOU ATTEND CHURCH OR RELIGIOUS SERVICES?: NEVER
IN A TYPICAL WEEK, HOW MANY TIMES DO YOU TALK ON THE PHONE WITH FAMILY, FRIENDS, OR NEIGHBORS?: TWICE A WEEK
HOW OFTEN DO YOU GET TOGETHER WITH FRIENDS OR RELATIVES?: ONCE A WEEK

## 2022-07-29 ASSESSMENT — LIFESTYLE VARIABLES
HOW OFTEN DO YOU HAVE A DRINK CONTAINING ALCOHOL: NEVER
HOW MANY STANDARD DRINKS CONTAINING ALCOHOL DO YOU HAVE ON A TYPICAL DAY: PATIENT DOES NOT DRINK

## 2022-07-29 NOTE — LETTER
7/29/2022    [address] [place] [id number]    Dear Stacie Salas,    I enjoyed speaking with you and wanted to send some additional information. Dr.Benjamin YOHANNES Mcknight, DO and I will work together to ensure your needs are met and help you achieve your health goals. We are committed to walk with you on this journey and look forward to working with you. Please feel free to contact me with any questions or concerns. I am available by phone.     You can reach me at [phone]    In good health,     Pari Louise RN     ENCLOSED: DM ZONE TOOL, DM LOGS

## 2022-07-29 NOTE — CARE COORDINATION
Ambulatory Care Coordination Note  7/29/2022    ACC: Kae Alonso, RN    Summary Note:   [facility] contacted patient on date and time patient agreed to outreach to complete enrollment for Care Coordination (PCP referral). Medications were reviewed. [facility] will inform PCP of prescribed medications patient is not taking. Assessment was also completed on this outreach. Patient lives with his 3 children (ages 6, 12, and 23) in a 2 story home with 2 steps to enter. Patient is independent with ADLS. His daughters do help with cleaning and laundry. Patient does provide his own transportation to and from his medical appointments (his car is currently not working, he is aware of transportation benefit through his insurance). Patient is active with [facility] for Medications and Counseling. He follow with them on a monthly basis. Patient is active with Dr. Deepika Westbrook. His pharmacy is [facility] on [address]. Patient requests [facility]'s next outreach to be in approximately 2 weeks. Advance Directive  Patient has NOT completed an advance directive   Discussed with: Patient  ACM reviewed Healthcare decision makers with patient. Information is current. Patient would like to talk to ACP specialist for education/assistance in completing ACP documentation. Patient verified email address as: [email or web address]  -Patient would like to discuss food insecurities and possible financial assistance with utilities with LSW. SMOKING CESSATION  -Patient states he is ready to stop smoking. -[facility] provided contact information for [facility]) Time to Quit program.   -ACM educated patient he could call [phone]. DM  -See assessment  -Patient states he does monitor his blood sugars at least 3x/daily. Patient is not always compliant with checking his fast blood sugars.  ACM did educate patient on the importance of obtaining fasting blood sugar results.   -Patient states he is not taking Lipitor or Metformin d/t side effects of the medications. He is also not taking Lantus @ HS d/t not sure if he should be taking this medication.   -Patient is not compliant in documenting his blood sugars. ACM will send a DM log.   -Patient states his pharmacy contacted him to inform him they were placing an order for CGM. -Patient would like to talk to RD regarding dietary management of his DM.  -Patient is unable to exercise d/t mobility status.   -Patient has been referred to Endocrinology. Appointment is scheduled for November 14, 2022.   -Patient is not aware of the DM zone tool. ACM educated patient based on his elevated A1C (>15), he is in the red zone. PLAN  -Continue Care Coordination  -Inform PCP of medication issues.  -Place referrals as requested. -F/U to determine status of smoking cessation  -Send diabetic logs and DM zone tool to patient. -F/U to determine if patient was able to receive a CGM. -Review blood sugar trends and provide ongoing education on the DM zone tool. Ambulatory Care Coordination Assessment    Care Coordination Protocol  Referral from Primary Care Provider: No  Week 1 - Initial Assessment     Do you have all of your prescriptions and are they filled?: Yes  Barriers to medication adherence: Adverse effects/side effects  Are you able to afford your medications?: Yes  How often do you have trouble taking your medications the way you have been told to take them?: Sometimes I take them as prescribed. Do you have Home O2 Therapy?: No      Ability to seek help/take action for Emergent Urgent situations i.e. fire, crime, inclement weather or health crisis. : Independent  Ability to ambulate to restroom: Independent  Ability handle personal hygeine needs (bathing/dressing/grooming): Independent  Ability to manage Medications:  Independent  Ability to prepare Food Preparation: Independent  Ability to maintain home (clean home, laundry): Needs Assistance  Ability to drive and/or has transportation: Independent  Ability to do shopping: Independent  Ability to manage finances: Independent  Is patient able to live independently?: Yes     Current Housing: Private Residence  For whom are you the caregiver?: Youngest son - age 6, issues with 23year old - on anti depressents  Does the person that you care for see a [facility] PCP?: Yes        Per the Fall Risk Screening, did the patient have 2 or more falls or 1 fall with injury in the past year?: No     Frequent urination at night?: Yes  Do you use rails/bars?: No  Do you have a non-slip tub mat?: No     Are you experiencing loss of meaning?: Yes (Comment: 07/29/22: in counseling)  Are you experiencing loss of hope and peace?: Yes (Comment: 07/29/22: Active in counseling)     Thinking about your patient's physical health needs, are there any symptoms or problems (risk indicators) you are unsure about that require further investigation?: No identified areas of uncertainly or problems already being investigated   Are the patients physical health problems impacting on their mental well-being?: Moderate to severe impact upon mental well-being and preventing enjoyment of usual activities   Are there any problems with your patients lifestyle behaviors (alcohol, drugs, diet, exercise) that are impacting on physical or mental well-being?: Some mild concern of potential negative impact on well-being   Do you have any other concerns about your patients mental well-being? How would you rate their severity and impact on the patient?: Mild problems - don't interfere with function   How would you rate their home environment in terms of safety and stability (including domestic violence, insecure housing, neighbor harassment)?: Consistently safe, supportive, stable, no identified problems   How do daily activities impact on the patient's well-being? (include current or anticipated unemployment, work, caregiving, access to transportation or other):  Contributes to low mood or stress at times   How would you rate their social network (family, work, friends)?: Restricted participation with some degree of social isolation   How would you rate their financial resources (including ability to afford all required medical care)?: [address] insecure, some resource challenges   How wells does the patient now understand their health and well-being (symptoms, signs or risk factors) and what they need to do to manage their health?: Reasonable to good understanding and already engages in managing health or is willing to undertake better management   How well do you think your patient can engage in healthcare discussions? (Barriers include language, deafness, aphasia, alcohol or drug problems, learning difficulties, concentration): Clear and open communication, no identified barriers   Do other services need to be involved to help this patient?: Other care/services not in place and required   Suggested Interventions and [employer] [facility]: In Process (Comment: 07/29/22: Active with [facility])      Diabetes Education: Declined (Comment: 07/29/22: Not at this time)    Pharmacist: Declined   Physical Therapy: Not Started   Registered Dietician: In Process   Smoking Cessation: In Process   Social Work: In Process   Specialty Service Referral: In Process   Zone Management Tools: In Process         Set up/Review an Education Plan, Set up/Review Goals                Prior to Admission medications    Medication Sig Start Date End Date Taking?  Authorizing Provider   vilazodone HCl (VIIBRYD) 20 MG TABS Take 20 mg by mouth daily   Yes Historical Provider, MD   busPIRone (BUSPAR) 10 MG tablet Take 10 mg by mouth 3 times daily   Yes Historical Provider, MD   insulin lispro (HUMALOG) 100 UNIT/ML injection vial Inject 15 Units into the skin 2 times daily (with meals) 7/14/22  Yes Juan Luis Mcknight, DO   blood glucose monitor kit and supplies Dispense sufficient amount for indicated testing frequency plus additional to accommodate PRN testing needs. Dispense all needed supplies to include: monitor, strips, lancing device, lancets, control solutions, alcohol swabs.  7/14/22  Yes Juan Luis Mcknight DO   blood glucose test strips (ASCENSIA AUTODISC VI;ONE TOUCH ULTRA TEST VI) strip Check four times a day 7/14/22  Yes Juan Luis Mcknight DO   Lancets MISC 1 each by Does not apply route 4 times daily 7/14/22  Yes Juan Luis Mcknight DO   Dulaglutide (TRULICITY) 1.5 XP/1.8LX SOPN Inject 1.5 mg into the skin once a week 7/14/22  Yes Juan Luis Mcknight DO   empagliflozin (JARDIANCE) 10 MG tablet Take 1 tablet by mouth daily 7/14/22  Yes Juan Luis Mcknight DO   ibuprofen (ADVIL;MOTRIN) 600 MG tablet take 1 tablet by mouth every 6 to 8 hours if needed 10/20/21  Yes Historical Provider, MD   senna (SENOKOT) 8.6 MG tablet Take 1 tablet by mouth daily 11/8/21  Yes Cecilio Goodson MD   aspirin EC 81 MG EC tablet Take 1 tablet by mouth daily 11/8/21  Yes Cecilio Goodson MD   Multiple Vitamin (MULTI VITAMIN MENS PO) Take by mouth daily    Yes Historical Provider, MD   Esketamine HCl (SPRAVATO, 56 MG DOSE, NA) by Nasal route  Patient not taking: Reported on 7/29/2022    Historical Provider, MD   metFORMIN (GLUCOPHAGE) 500 MG tablet Take 1 tablet by mouth daily (with breakfast)  Patient not taking: Reported on 7/29/2022 7/14/22   Juan Luis Mcknight DO   insulin glargine (LANTUS) 100 UNIT/ML injection vial Inject 20 Units into the skin nightly  Patient not taking: Reported on 7/29/2022 11/8/21   Cecilio Goodson MD   atorvastatin (LIPITOR) 20 MG tablet Take 1 tablet by mouth daily  Patient not taking: No sig reported 11/8/21   Cecilio Goodson MD   venlafaxine [facility]) 37.5 MG tablet Take 37.5 mg by mouth daily  Patient not taking: Reported on 7/29/2022    Historical Provider, MD       Future Appointments   Date Time Provider August Barlow   8/5/2022  2:00 PM DO Yoan Manley 11/14/2022  1:00 PM FRANCIE Rascon - CNS [facility]      and   Diabetes Assessment    Medic Alert ID: No  Meal Planning: Avoidance of concentrated sweets   How often do you test your blood sugar?: Daily, Meals, Bedtime   Do you have barriers with adherence to non-pharmacologic self-management interventions?  (Nutrition/Exercise/Self-Monitoring): Yes   Have you ever had to go to the ED for symptoms of low blood sugar?: No       Do you have hyperglycemia symptoms?: No   Do you have hypoglycemia symptoms?: No   Last Blood Sugar Value: 311   Blood Sugar Monitoring Regimen: Morning Fasting, At Bedtime, Before Meals   Blood Sugar Trends: Other

## 2022-08-01 ENCOUNTER — CARE COORDINATION (OUTPATIENT)
Dept: CARE COORDINATION | Age: 57
End: 2022-08-01

## 2022-08-01 ENCOUNTER — TELEPHONE (OUTPATIENT)
Dept: CARE COORDINATION | Age: 57
End: 2022-08-01

## 2022-08-01 NOTE — CARE COORDINATION
ACM attempted to contact patient to inform him PCP does want him to take Lantus 20 units at night. ACM did leave a message with this information and ACM's contact information asking for a return call.      PLAN  -Continue Care Coordination  -F/U with patient as previously scheduled

## 2022-08-01 NOTE — CARE COORDINATION
[facility] received a referral from the [facility] for food and utility resources for [place]. [facility] placed an outreach call to [place] today and only reached his voicemail.  was left with [facility] information and request for a returned call.     [facility] to follow accordingly

## 2022-08-01 NOTE — CARE COORDINATION
Contacted Jayne Sheppard and left voicemail regarding Dietitian referral. Left call back number and will follow up as appropriate.        [address] [facility], [address] [place] [address]

## 2022-08-01 NOTE — TELEPHONE ENCOUNTER
This ACPS made an outreach call to [facility] regarding AD's and goals of care. There was no answer.  was left with ACPS information and request for a return call.     ACPS to follow up accordingly
brother and sister

## 2022-08-04 ENCOUNTER — CARE COORDINATION (OUTPATIENT)
Dept: CARE COORDINATION | Age: 57
End: 2022-08-04

## 2022-08-04 NOTE — CARE COORDINATION
Contacted Nate Mckenna and left voicemail regarding Dietitian referral. Left call back number and will follow up as appropriate.          [facility], [address] [place] [address]

## 2022-08-05 ENCOUNTER — OFFICE VISIT (OUTPATIENT)
Dept: PRIMARY CARE CLINIC | Age: 57
End: 2022-08-05
Payer: COMMERCIAL

## 2022-08-05 VITALS
TEMPERATURE: 97.6 F | RESPIRATION RATE: 18 BRPM | BODY MASS INDEX: 28.84 KG/M2 | HEIGHT: 71 IN | DIASTOLIC BLOOD PRESSURE: 68 MMHG | WEIGHT: 206 LBS | SYSTOLIC BLOOD PRESSURE: 100 MMHG | HEART RATE: 90 BPM | OXYGEN SATURATION: 96 %

## 2022-08-05 DIAGNOSIS — G89.29 CHRONIC NECK PAIN: ICD-10-CM

## 2022-08-05 DIAGNOSIS — E11.65 UNCONTROLLED TYPE 2 DIABETES MELLITUS WITH HYPERGLYCEMIA (HCC): Primary | ICD-10-CM

## 2022-08-05 DIAGNOSIS — M54.2 CHRONIC NECK PAIN: ICD-10-CM

## 2022-08-05 DIAGNOSIS — M17.0 PRIMARY OSTEOARTHRITIS OF BOTH KNEES: ICD-10-CM

## 2022-08-05 LAB — HBA1C MFR BLD: 14.3 %

## 2022-08-05 PROCEDURE — 82044 UR ALBUMIN SEMIQUANTITATIVE: CPT | Performed by: STUDENT IN AN ORGANIZED HEALTH CARE EDUCATION/TRAINING PROGRAM

## 2022-08-05 PROCEDURE — 4004F PT TOBACCO SCREEN RCVD TLK: CPT | Performed by: STUDENT IN AN ORGANIZED HEALTH CARE EDUCATION/TRAINING PROGRAM

## 2022-08-05 PROCEDURE — 3017F COLORECTAL CA SCREEN DOC REV: CPT | Performed by: STUDENT IN AN ORGANIZED HEALTH CARE EDUCATION/TRAINING PROGRAM

## 2022-08-05 PROCEDURE — 83036 HEMOGLOBIN GLYCOSYLATED A1C: CPT | Performed by: STUDENT IN AN ORGANIZED HEALTH CARE EDUCATION/TRAINING PROGRAM

## 2022-08-05 PROCEDURE — 2022F DILAT RTA XM EVC RTNOPTHY: CPT | Performed by: STUDENT IN AN ORGANIZED HEALTH CARE EDUCATION/TRAINING PROGRAM

## 2022-08-05 PROCEDURE — G8419 CALC BMI OUT NRM PARAM NOF/U: HCPCS | Performed by: STUDENT IN AN ORGANIZED HEALTH CARE EDUCATION/TRAINING PROGRAM

## 2022-08-05 PROCEDURE — 99214 OFFICE O/P EST MOD 30 MIN: CPT | Performed by: STUDENT IN AN ORGANIZED HEALTH CARE EDUCATION/TRAINING PROGRAM

## 2022-08-05 PROCEDURE — G8427 DOCREV CUR MEDS BY ELIG CLIN: HCPCS | Performed by: STUDENT IN AN ORGANIZED HEALTH CARE EDUCATION/TRAINING PROGRAM

## 2022-08-05 PROCEDURE — 3046F HEMOGLOBIN A1C LEVEL >9.0%: CPT | Performed by: STUDENT IN AN ORGANIZED HEALTH CARE EDUCATION/TRAINING PROGRAM

## 2022-08-05 RX ORDER — BLOOD-GLUCOSE TRANSMITTER
1 EACH MISCELLANEOUS DAILY
Qty: 1 EACH | Refills: 0 | Status: SHIPPED | OUTPATIENT
Start: 2022-08-05

## 2022-08-05 RX ORDER — ATORVASTATIN CALCIUM 20 MG/1
20 TABLET, FILM COATED ORAL DAILY
Qty: 90 TABLET | Refills: 1 | Status: SHIPPED | OUTPATIENT
Start: 2022-08-05

## 2022-08-05 RX ORDER — BLOOD-GLUCOSE SENSOR
1 EACH MISCELLANEOUS DAILY
Qty: 1 EACH | Refills: 0 | Status: SHIPPED | OUTPATIENT
Start: 2022-08-05

## 2022-08-05 RX ORDER — BLOOD-GLUCOSE,RECEIVER,CONT
1 EACH MISCELLANEOUS DAILY
Qty: 1 EACH | Refills: 0 | Status: SHIPPED | OUTPATIENT
Start: 2022-08-05

## 2022-08-05 RX ORDER — GLIPIZIDE 5 MG/1
5 TABLET ORAL DAILY
Qty: 60 TABLET | Refills: 0 | Status: SHIPPED
Start: 2022-08-05 | End: 2022-10-03

## 2022-08-05 NOTE — PROGRESS NOTES
[facility]  Department of Family Medicine      Patient:  Ramon Loredo 61 y.o. male     Date of Service: 7/14/22      Chief complaint:   Chief Complaint   Patient presents with    Diabetes         History ofPresent Illness   The patient is a 61 y.o. male  presented to the clinic with complaints as above.       Diabetes  -f/u  -started on oral medications and trulicity during last visit  -states could not tolerate metformin  -states he is taking long acting and short acting, sometimes however issues with compliance as he forgets to take this  -is checking his sugars at home, typically in 300-400's, lowest would be 200  -having excessive thirst and urination   -is trying to eat better    Chronic neck pain  -chronic issue  -used to follow with pain management, was on pain medications for this and saw pain management for this was getting injections which did not help     Chronic knee pain  -chronic issue  -used to follow with ortho for this, previous imaging a year ago showed severe osteoarthritis    Past Medical History:      Diagnosis Date    Chronic pain     Depression     HLD (hyperlipidemia)     Neuropathy     LIDYA on CPAP     Osteoarthritis     Type II or unspecified type diabetes mellitus without mention of complication, not stated as uncontrolled        PastSurgical History:        Procedure Laterality Date    ARM SURGERY      left arm    INCISION AND DRAINAGE Bilateral 7/27/2020    REPEAT BILATERAL KNEE WASHOUT performed by Humberto Kwon DO at [facility] Bilateral 8/9/2020    INCISION AND DRAINAGE bilateral knees performed by Humberto Kwon DO at [place] [address] ARTHROSCOPY Bilateral 7/23/2020    KNEE ARTHROSCOPY performed by Marceil Landau, MD at [facility] COLONOSCOPY W/BIOPSY SINGLE/MULTIPLE N/A 9/20/2018    COLONOSCOPY WITH BIOPSY performed by Cynthia Camargo MD at [address] [place] [address] W/RMVL OF TUMOR POLYP LESION SNARE TQ  9/20/2018    COLONOSCOPY POLYPECTOMY SNARE/COLD BIOPSY performed by Anand Hameed MD at [facility]         Allergies:    Adhesive tape, Tramadol, Cymbalta [duloxetine hcl], Lidocaine, Lyrica [pregabalin], and Strattera [atomoxetine]    Social History:   Social History     Socioeconomic History    Marital status: Single     Spouse name: Not on file    Number of children: 3    Years of education: 12    Highest education level: High school graduate   Occupational History    Not on file   Tobacco Use    Smoking status: Every Day     Packs/day: 1.00     Years: 30.00     Pack years: 30.00     Types: Cigarettes    Smokeless tobacco: Never    Tobacco comments:         Vaping Use    Vaping Use: Never used   Substance and Sexual Activity    Alcohol use: No     Comment: none since 2017, drank heavily in early 2000s    Drug use: No     Comment: history in younger days    Sexual activity: Not Currently     Partners: Female     Comment: Single; Last STD check was 2012   Other Topics Concern    Not on file   Social History Narrative    Does feel socially isolated at times     Social Determinants of Health     Financial Resource Strain: High Risk    Difficulty of Paying Living Expenses: Very hard   Food Insecurity: Food Insecurity Present    Worried About [facility] in the Last Year: Sometimes true    Ran Out of Food in the Last Year: Sometimes true   Transportation Needs: No Transportation Needs    Lack of Transportation (Medical): No    Lack of Transportation (Non-Medical):  No   Physical Activity: Inactive    Days of Exercise per Week: 0 days    Minutes of Exercise per Session: 0 min   Stress: Stress Concern Present    Feeling of Stress : Very much   Social Connections: Socially Isolated    Frequency of Communication with Friends and Family: Twice a week    Frequency of Social Gatherings with Friends and Family: Once a week    Attends Pent[employer] Services: Never    Active coordination following  - POCT glycosylated hemoglobin (Hb A1C)  - POCT Microalbumin  - atorvastatin (LIPITOR) 20 MG tablet; Take 1 tablet by mouth in the morning. Dispense: 90 tablet; Refill: 1  - glipiZIDE (GLUCOTROL) 5 MG tablet; Take 1 tablet by mouth in the morning. Dispense: 60 tablet; Refill: 0  - Continuous Blood Gluc Transmit (DEXCOM G6 TRANSMITTER) MISC; 1 each by Does not apply route daily  Dispense: 1 each; Refill: 0  - Continuous Blood Gluc Sensor (DEXCOM G6 SENSOR) MISC; 1 each by Does not apply route daily  Dispense: 1 each; Refill: 0  - Continuous Blood Gluc  ([address]) NICOLASA; 1 each by Does not apply route daily  Dispense: 1 each; Refill: 0    2. Chronic neck pain  F/u of chronic issue  -Worsening as pain is worsening, did trial injections and pain medication for this with no improvement, therefore will refer to neurosurgery for further evaluation   - Morenita Duke MD, Neurosurgery, [place]    3. Primary osteoarthritis of both knees  F/u of chronic issue  -Wants to try injection as feels PT won't help as much as injections would, given his severe OA will refer to sports medicine for possible injections under Sera Desir MD, Sports Medicine, [facility] regarding above diagnosis, including possible risks and complications,  especially if left uncontrolled. Counseled regarding the possible side effects, risks, benefits and alternatives to treatment;patient and/or guardian verbalizes understanding, agrees, feels comfortable with and wishes to proceed with above treatment plan. Call or go to [address] [facility] if symptoms worsen or persist. Advised patient to call with any new medication issues, and, as applicable, read all Rx info from pharmacy to assure aware of all possible risks and side effects of medicationbefore taking. Patient and/or guardian given opportunity to ask questions/raise concerns.   The patient verbalized comfort and understanding ofinstructions. I encourage further reading and education about your health conditions. Information on many health conditions is provided by [facility] [employer]: [email or web address]  Please bring any questions to me at your nextvisit. Return to Office: Return in about 3 months (around 11/5/2022) for F/U DIABETES, NEEDS A1C. Medication List:    Current Outpatient Medications   Medication Sig Dispense Refill    atorvastatin (LIPITOR) 20 MG tablet Take 1 tablet by mouth in the morning. 90 tablet 1    glipiZIDE (GLUCOTROL) 5 MG tablet Take 1 tablet by mouth in the morning. 60 tablet 0    Continuous Blood Gluc Transmit (DEXCOM G6 TRANSMITTER) MISC 1 each by Does not apply route daily 1 each 0    Continuous Blood Gluc Sensor (DEXCOM G6 SENSOR) MISC 1 each by Does not apply route daily 1 each 0    Continuous Blood Gluc  (DEXCOM G6 ) NICOLASA 1 each by Does not apply route daily 1 each 0    vilazodone HCl (VIIBRYD) 20 MG TABS Take 20 mg by mouth daily      busPIRone (BUSPAR) 10 MG tablet Take 10 mg by mouth 3 times daily      insulin lispro (HUMALOG) 100 UNIT/ML injection vial Inject 15 Units into the skin 2 times daily (with meals) 5 pen 3    blood glucose monitor kit and supplies Dispense sufficient amount for indicated testing frequency plus additional to accommodate PRN testing needs. Dispense all needed supplies to include: monitor, strips, lancing device, lancets, control solutions, alcohol swabs.  1 kit 0    blood glucose test strips (ASCENSIA AUTODISC VI;ONE TOUCH ULTRA TEST VI) strip Check four times a day 200 strip 5    Lancets MISC 1 each by Does not apply route 4 times daily 600 each 1    Dulaglutide (TRULICITY) 1.5 NC/8.0AP SOPN Inject 1.5 mg into the skin once a week 4 pen 2    empagliflozin (JARDIANCE) 10 MG tablet Take 1 tablet by mouth daily 30 tablet 3    ibuprofen (ADVIL;MOTRIN) 600 MG tablet take 1 tablet by mouth every 6 to 8 hours if needed senna (SENOKOT) 8.6 MG tablet Take 1 tablet by mouth daily 30 tablet 5    aspirin EC 81 MG EC tablet Take 1 tablet by mouth daily 90 tablet 1    insulin glargine (LANTUS) 100 UNIT/ML injection vial Inject 20 Units into the skin nightly 5 pen 3    Multiple Vitamin (MULTI VITAMIN MENS PO) Take by mouth daily        No current facility-administered medications for this visit. Jennifer Urbina DO       This document may have been prepared at least partially through the use of voice recognition software. Although effort is taken to assure the accuracy ofthis document, it is possible that grammatical, syntax,  or spelling errors may occur.

## 2022-08-08 ENCOUNTER — TELEPHONE (OUTPATIENT)
Dept: CARE COORDINATION | Age: 57
End: 2022-08-08

## 2022-08-08 ENCOUNTER — CARE COORDINATION (OUTPATIENT)
Dept: CARE COORDINATION | Age: 57
End: 2022-08-08

## 2022-08-08 NOTE — TELEPHONE ENCOUNTER
This ACPS made a 2nd outreach attempt to contact [place] regarding the referral received for AD's and goals of care.   There was no answer;  left with ACPS information and request for return call    Inna Mooney will follow accordingly with 3rd attempt to contact

## 2022-08-08 NOTE — CARE COORDINATION
[facility] made a 2nd outreach attempt to contact [place] regarding ACM referral for food and utility resources  There was no answer;  left with [facility] information and request for a return call    [facility] to follow accordingly with 3rd attempt

## 2022-08-10 ENCOUNTER — TELEPHONE (OUTPATIENT)
Dept: CARE COORDINATION | Age: 57
End: 2022-08-10

## 2022-08-10 ENCOUNTER — CARE COORDINATION (OUTPATIENT)
Dept: CARE COORDINATION | Age: 57
End: 2022-08-10

## 2022-08-10 NOTE — TELEPHONE ENCOUNTER
This ACPS made a 3rd attempt to contact [facility] after receiving a VM from him. When call was placed this ACPS only reached [place]'s voicemail.   A 3rd VM was left with ACPS information and call back number    ACPS will make another outreach attempt accordingly

## 2022-08-10 NOTE — CARE COORDINATION
[facility] received a VM late yesterday from [facility] after this [facility] had attempted to contact him twice. [facility] returned the call today but only reached the voicemail for [facility].   [facility] left a 3rd VM for [facility] with call back number and a request again for a return call    [facility] will continue to follow accordingly

## 2022-08-11 ENCOUNTER — CARE COORDINATION (OUTPATIENT)
Dept: CARE COORDINATION | Age: 57
End: 2022-08-11

## 2022-08-11 NOTE — CARE COORDINATION
Contacted Husam Rodriguez and left voicemail regarding Dietitian referral. Left call back number. RD outreached 8/1/22, 8/4/22 and today 8/11/22- left VM all three outreaches. RD will notify Ryan Waller. RD will continue to follow/assist with patient return call.        [address] [facility], [address] [place] [address]

## 2022-08-12 ENCOUNTER — CARE COORDINATION (OUTPATIENT)
Dept: CARE COORDINATION | Age: 57
End: 2022-08-12

## 2022-08-15 ENCOUNTER — OFFICE VISIT (OUTPATIENT)
Dept: ORTHOPEDIC SURGERY | Age: 57
End: 2022-08-15
Payer: COMMERCIAL

## 2022-08-15 VITALS — HEIGHT: 71 IN | WEIGHT: 206 LBS | BODY MASS INDEX: 28.84 KG/M2

## 2022-08-15 DIAGNOSIS — M17.4 OTHER SECONDARY OSTEOARTHRITIS OF BOTH KNEES: ICD-10-CM

## 2022-08-15 DIAGNOSIS — M25.562 CHRONIC PAIN OF BOTH KNEES: Primary | ICD-10-CM

## 2022-08-15 DIAGNOSIS — G89.29 CHRONIC PAIN OF BOTH KNEES: Primary | ICD-10-CM

## 2022-08-15 DIAGNOSIS — M25.561 CHRONIC PAIN OF BOTH KNEES: Primary | ICD-10-CM

## 2022-08-15 PROCEDURE — G8419 CALC BMI OUT NRM PARAM NOF/U: HCPCS | Performed by: FAMILY MEDICINE

## 2022-08-15 PROCEDURE — 4004F PT TOBACCO SCREEN RCVD TLK: CPT | Performed by: FAMILY MEDICINE

## 2022-08-15 PROCEDURE — 3017F COLORECTAL CA SCREEN DOC REV: CPT | Performed by: FAMILY MEDICINE

## 2022-08-15 PROCEDURE — 99203 OFFICE O/P NEW LOW 30 MIN: CPT | Performed by: FAMILY MEDICINE

## 2022-08-15 PROCEDURE — G8427 DOCREV CUR MEDS BY ELIG CLIN: HCPCS | Performed by: FAMILY MEDICINE

## 2022-08-15 NOTE — PROGRESS NOTES
[facility]  PRIMARY CARE SPORTS MEDICINE  DATE OF VISIT : 8/15/2022    Patient : Robel Calhoun  Age : 61 y.o.   : 1965  MRN : [id number]   ______________________________________________________________________    Chief Complaint :   Chief Complaint   Patient presents with    Knee Pain     Bilateral knee pain. Pain has been ongoing for 2 years time. 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 Patient has had 3-4 scopes on each sided. Patient has a hx of back and neck pain. Patient has had injections in his back but pain has continued. HPI : Robel Calhoun is 61 y.o. male who presented to the clinic today for evaluation of bilateral knee pain. Onset of the symptoms was several years ago, associated with a history of bilateral septic knee joints of unknown cause. Current symptoms include giving out, pain and swelling. Patient denies mechanical symptoms. Pain is aggravated by any weight bearing activity. Evaluation to date: XRs of bilateral knees which demonstrate no acute fractures or dislocations. Treatment to date: avoidance of offending activity and OTC analgesics which are not very effective.      Past Medical History :  Past Medical History:   Diagnosis Date    Chronic pain     Depression     HLD (hyperlipidemia)     Neuropathy     LIDYA on CPAP     Osteoarthritis     Type II or unspecified type diabetes mellitus without mention of complication, not stated as uncontrolled      Past Surgical History:   Procedure Laterality Date    ARM SURGERY      left arm    INCISION AND DRAINAGE Bilateral 2020    REPEAT BILATERAL KNEE WASHOUT performed by Chloe Molina DO at [address] [facility] [address] Bilateral 2020    INCISION AND DRAINAGE bilateral knees performed by Chloe Molina DO at [facility] 22 ARTHROSCOPY Bilateral 2020    KNEE ARTHROSCOPY performed by Magnus Delgado MD at [facility] 59 W/BIOPSY SINGLE/MULTIPLE N/A 2018 empagliflozin (JARDIANCE) 10 MG tablet Take 1 tablet by mouth daily 30 tablet 3    ibuprofen (ADVIL;MOTRIN) 600 MG tablet take 1 tablet by mouth every 6 to 8 hours if needed      senna (SENOKOT) 8.6 MG tablet Take 1 tablet by mouth daily 30 tablet 5    aspirin EC 81 MG EC tablet Take 1 tablet by mouth daily 90 tablet 1    insulin glargine (LANTUS) 100 UNIT/ML injection vial Inject 20 Units into the skin nightly 5 pen 3    Multiple Vitamin (MULTI VITAMIN MENS PO) Take by mouth daily        No current facility-administered medications for this visit.      ______________________________________________________________________    Physical Exam :    Vitals: Ht 5' 11\" (1.803 m)   Wt 206 lb (93.4 kg)   BMI 28.73 kg/m²   General Appearance: Nontoxic, awake, alert, and in no acute distress. Chest wall/Lung: Respirations regular and unlabored. No cyanosis. Heart: RRR, distal pulses intact. Neurologic: Alert&Oriented x3. Sensation grossly intact. No focal motor deficits detected. Musculokeletal:   RIGHT Knee:  ROM: flexion to 130, extension to 0. No effusion. No obvious bony abnormality or ecchymosis. TTP: ( + ) MJL, ( + ) LJL, ( - ) patellar tendon, ( - ) quadriceps tendon, ( + ) patellar facets  Special Tests: ( - ) Patellar apprehension, ( - ) patellar grind  Stable and without pain to varus and valgus stress at 0 and 30 degrees of knee flexion. ACL: ( - ) Lachman's, ( - ) anterior drawer  PCL: ( - ) posterior drawer, ( - ) sag sign  Meniscus: ( + ) Robina's   LEFT Knee:  ROM: flexion to 130, extension to 0. No effusion. No obvious bony abnormality or ecchymosis. TTP: ( + ) MJL, ( + ) LJL, ( - ) patellar tendon, ( - ) quadriceps tendon, ( + ) patellar facets  Special Tests: ( - ) Patellar apprehension, ( - ) patellar grind  Stable and without pain to varus and valgus stress at 0 and 30 degrees of knee flexion.   ACL: ( - ) Lachman's, ( - ) anterior drawer  PCL: ( - ) posterior drawer, ( - ) sag sign  Meniscus: ( + ) Robina's   ______________________________________________________________________    Assessment & Plan :    1. Chronic pain of both knees  2. Other secondary osteoarthritis of both knees  Patient presents to the office today for evaluation of bilateral knee pain. History, referring provider note, physical exam and imaging (as interpreted by me) are consistent with bilateral knee osteoarthritis secondary to previous infection. Treatment options discussed with patient in the office today including activity modification, oral anti-inflammatories, physical therapy, injection options, advanced imaging the form of a MRI and referral to an orthopedic surgeon for discussion of surgical opinion. Patient wishes to proceed with conservative treatment in the form of a referral to PT. Hesitant to preform CSI due to previous infection. Patient will follow up in 6 weeks for reevaluation of symptoms and consider escalation therapy should symptoms persist.  Patient is agreeable with above plan all questions and concerns were addressed in the office today. - External Referral To Physical Therapy    Return to Office: Return if symptoms worsen or fail to improve.     Margarita Martinez MD

## 2022-08-19 ENCOUNTER — CARE COORDINATION (OUTPATIENT)
Dept: CARE COORDINATION | Age: 57
End: 2022-08-19

## 2022-08-19 NOTE — LETTER
August 19, 2022    [address]      Dear Jefe Raw:    I have been unable to reach you by phone for our care coordination follow up telephone call. I have missed our conversations and am interested in your health. Please call me at [phone], so that we can continue to work together to improve your health. If you have any questions or concerns, please don't hesitate to call.     Sincerely,        Virgilio Gilmore RN

## 2022-08-19 NOTE — CARE COORDINATION
Voice message left on patient's phone with contact information requesting a return call so that ACM can follow up on his status. PLAN  -If no return call, send trying to reach letter per the mail (patient does not read information sent per My Chart) and continue to attempt to contact patient.

## 2022-09-08 ENCOUNTER — CARE COORDINATION (OUTPATIENT)
Dept: CARE COORDINATION | Age: 57
End: 2022-09-08

## 2022-09-08 NOTE — CARE COORDINATION
Voice message left on patient's phone with contact information requesting a return call so that ACM can follow up on his status. PLAN  -Send dis-enrollment letter per the mail (patient does not consistently read information sent per My Chart) d/t unable to reach after multiple attempts.

## 2022-09-08 NOTE — LETTER
9/8/2022    [address] [place] [address]     I have enjoyed working with you to improve your health. I would like to continue to provide you support. However, I have been unable to reach you at, [phone] (home)  . Please let me know if I can help schedule an office visit for you or answer any questions. Aminata Patel DO is interested in your health and hopes to hear from you soon. If you would like continued access to your Nurse Care Coordinator, aFbiola Trivedi RN, you can reach me at [phone]. I will wait to hear from you and will no longer reach out to you. Aminata Patel DO and his/her team will continue to provide care and be available for questions.       In good health,     Fabiola Trivedi RN

## 2022-09-20 ENCOUNTER — TELEPHONE (OUTPATIENT)
Dept: CARE COORDINATION | Age: 57
End: 2022-09-20

## 2022-09-20 ENCOUNTER — CARE COORDINATION (OUTPATIENT)
Dept: CARE COORDINATION | Age: 57
End: 2022-09-20

## 2022-09-20 NOTE — CARE COORDINATION
Pauline Larsen placed a final outreach attempt to Enrique De Oliveira. There was no answer.   Pauline Larsen will sign off today

## 2022-09-26 ENCOUNTER — CARE COORDINATION (OUTPATIENT)
Dept: CARE COORDINATION | Age: 57
End: 2022-09-26

## 2022-09-26 NOTE — CARE COORDINATION
Patient to be dis-enrolled from Care Coordination d/t UTR after multiple attempts.     PLAN  -Inform PCP patient will be dis-enrolled from Care Coordination  -ACM to remove name from the car team.  used

## 2022-10-01 DIAGNOSIS — E11.65 UNCONTROLLED TYPE 2 DIABETES MELLITUS WITH HYPERGLYCEMIA (HCC): ICD-10-CM

## 2022-10-03 RX ORDER — GLIPIZIDE 5 MG/1
TABLET ORAL
Qty: 60 TABLET | Refills: 3 | Status: SHIPPED | OUTPATIENT
Start: 2022-10-03

## 2022-10-12 ENCOUNTER — HOSPITAL ENCOUNTER (OUTPATIENT)
Dept: GENERAL RADIOLOGY | Age: 57
Discharge: HOME | End: 2022-10-14
Payer: COMMERCIAL

## 2022-10-12 ENCOUNTER — HOSPITAL ENCOUNTER (OUTPATIENT)
Age: 57
Discharge: HOME | End: 2022-10-14
Payer: COMMERCIAL

## 2022-10-12 DIAGNOSIS — M54.2 CERVICAL PAIN: ICD-10-CM

## 2022-10-12 DIAGNOSIS — M54.2 CERVICAL PAIN: Primary | ICD-10-CM

## 2022-10-12 PROCEDURE — 72050 X-RAY EXAM NECK SPINE 4/5VWS: CPT

## 2022-10-21 ENCOUNTER — OFFICE VISIT (OUTPATIENT)
Dept: NEUROSURGERY | Age: 57
End: 2022-10-21
Payer: COMMERCIAL

## 2022-10-21 VITALS
DIASTOLIC BLOOD PRESSURE: 80 MMHG | TEMPERATURE: 97.2 F | BODY MASS INDEX: 28.84 KG/M2 | OXYGEN SATURATION: 98 % | HEIGHT: 71 IN | RESPIRATION RATE: 18 BRPM | HEART RATE: 94 BPM | SYSTOLIC BLOOD PRESSURE: 120 MMHG | WEIGHT: 206 LBS

## 2022-10-21 DIAGNOSIS — G89.29 CHRONIC BILATERAL LOW BACK PAIN WITH BILATERAL SCIATICA: ICD-10-CM

## 2022-10-21 DIAGNOSIS — M54.12 CERVICAL RADICULOPATHY: ICD-10-CM

## 2022-10-21 DIAGNOSIS — M54.41 CHRONIC BILATERAL LOW BACK PAIN WITH BILATERAL SCIATICA: ICD-10-CM

## 2022-10-21 DIAGNOSIS — M54.16 LUMBAR RADICULOPATHY: ICD-10-CM

## 2022-10-21 DIAGNOSIS — M54.42 CHRONIC BILATERAL LOW BACK PAIN WITH BILATERAL SCIATICA: ICD-10-CM

## 2022-10-21 DIAGNOSIS — M54.2 NECK PAIN: Primary | ICD-10-CM

## 2022-10-21 PROCEDURE — G8419 CALC BMI OUT NRM PARAM NOF/U: HCPCS | Performed by: STUDENT IN AN ORGANIZED HEALTH CARE EDUCATION/TRAINING PROGRAM

## 2022-10-21 PROCEDURE — G8484 FLU IMMUNIZE NO ADMIN: HCPCS | Performed by: STUDENT IN AN ORGANIZED HEALTH CARE EDUCATION/TRAINING PROGRAM

## 2022-10-21 PROCEDURE — G8427 DOCREV CUR MEDS BY ELIG CLIN: HCPCS | Performed by: STUDENT IN AN ORGANIZED HEALTH CARE EDUCATION/TRAINING PROGRAM

## 2022-10-21 PROCEDURE — 4004F PT TOBACCO SCREEN RCVD TLK: CPT | Performed by: STUDENT IN AN ORGANIZED HEALTH CARE EDUCATION/TRAINING PROGRAM

## 2022-10-21 PROCEDURE — 3017F COLORECTAL CA SCREEN DOC REV: CPT | Performed by: STUDENT IN AN ORGANIZED HEALTH CARE EDUCATION/TRAINING PROGRAM

## 2022-10-21 PROCEDURE — 99204 OFFICE O/P NEW MOD 45 MIN: CPT | Performed by: STUDENT IN AN ORGANIZED HEALTH CARE EDUCATION/TRAINING PROGRAM

## 2022-10-21 ASSESSMENT — ENCOUNTER SYMPTOMS
TROUBLE SWALLOWING: 0
ABDOMINAL PAIN: 0
SHORTNESS OF BREATH: 0
PHOTOPHOBIA: 0
BACK PAIN: 1

## 2022-10-21 NOTE — PROGRESS NOTES
Subjective:      Patient ID: Deshawn Bridges is a 61 y.o. male with a PMH of DM, anxiety, depression and HLD who presents with neck and low back pain. He describes his neck pain as a stabbing aching pain that shoots down into both arms, no arm worse than the other. He admits to associated numbness and weakness with this pain. He describes this low back pain as a stabbing pain that radiates down both of his legs. There is associated numbness and weakness with this pain. Both pains are worsened by movement. Pain get so bad he it requires him to walk with a cane. He denies any bowel or bladder incontinence. Gabapentin, tylenol and ibuporfen provide minimal relief. He has tried PT at [facility] and at [facility] in [place] for both his neck and back and it made the pain worse. He also has had Cervical GERSON 3 years ago by Dr. Megan Chavarria with no relief. He has not had GERSON for his lumbar pain. He is a current smoker, smoking 1ppd. He denies any blood thinner usage. Review of Systems   Constitutional:  Negative for chills and fever. HENT:  Negative for trouble swallowing. Eyes:  Negative for photophobia. Respiratory:  Negative for shortness of breath. Cardiovascular:  Negative for chest pain. Gastrointestinal:  Negative for abdominal pain. Endocrine: Negative for heat intolerance. Genitourinary:  Negative for difficulty urinating and flank pain. Musculoskeletal:  Positive for arthralgias, back pain, gait problem and neck pain. Negative for myalgias. Skin:  Negative for wound. Neurological:  Positive for weakness and numbness. Negative for headaches. Psychiatric/Behavioral:  Negative for confusion. Objective:   Physical Exam  HENT:      Head: Normocephalic. Eyes:      Pupils: Pupils are equal, round, and reactive to light. Cardiovascular:      Rate and Rhythm: Normal rate. Pulmonary:      Effort: Pulmonary effort is normal.   Abdominal:      General: There is no distension.    Musculoskeletal: General: Normal range of motion. Cervical back: Normal range of motion. Skin:     General: Skin is warm and dry. Neurological:      Mental Status: He is alert. Comments: A&Ox3  CN3-12 intact  BHF 4/5 secondary to pain otherwise Motor Strength full   Sensation intact to light touch except bilateral fingers. Reflexes normal   (-)Hoffmans  (-)Bilateral Straight Leg test  Nontender to palpation of cervical and lumbar spine   Psychiatric:         Thought Content: Thought content normal.       Assessment:      Tobi Paredes is a 60 y/o male who presents with radiating neck and low back pain. Has tried PT and GERSON in the past, no recent. No updated imaging.        Plan:      -Pain control and expectations discussed  -Obtain MRI Cervical and Lumbar spine to evaluate for stenosis   -OARRS report reviewed   -Return to Neurosurgery clinic after completion of imaging to discuss results and further treatment plan  -Call/return sooner if symptoms worsen or new issues arise in the interim           Antonio Reeder PA-C

## 2022-11-14 ENCOUNTER — OFFICE VISIT (OUTPATIENT)
Dept: PRIMARY CARE CLINIC | Age: 57
End: 2022-11-14
Payer: COMMERCIAL

## 2022-11-14 ENCOUNTER — OFFICE VISIT (OUTPATIENT)
Dept: ENDOCRINOLOGY | Age: 57
End: 2022-11-14
Payer: COMMERCIAL

## 2022-11-14 VITALS
WEIGHT: 215 LBS | HEIGHT: 71 IN | BODY MASS INDEX: 30.1 KG/M2 | DIASTOLIC BLOOD PRESSURE: 89 MMHG | HEART RATE: 79 BPM | OXYGEN SATURATION: 96 % | SYSTOLIC BLOOD PRESSURE: 159 MMHG

## 2022-11-14 VITALS
HEIGHT: 71 IN | WEIGHT: 216 LBS | TEMPERATURE: 96.8 F | RESPIRATION RATE: 17 BRPM | OXYGEN SATURATION: 100 % | DIASTOLIC BLOOD PRESSURE: 80 MMHG | HEART RATE: 68 BPM | SYSTOLIC BLOOD PRESSURE: 120 MMHG | BODY MASS INDEX: 30.24 KG/M2

## 2022-11-14 DIAGNOSIS — F41.9 ANXIETY AND DEPRESSION: ICD-10-CM

## 2022-11-14 DIAGNOSIS — E78.2 MIXED HYPERLIPIDEMIA: ICD-10-CM

## 2022-11-14 DIAGNOSIS — E11.65 UNCONTROLLED TYPE 2 DIABETES MELLITUS WITH HYPERGLYCEMIA (HCC): Primary | ICD-10-CM

## 2022-11-14 DIAGNOSIS — K21.9 GASTROESOPHAGEAL REFLUX DISEASE, UNSPECIFIED WHETHER ESOPHAGITIS PRESENT: Primary | ICD-10-CM

## 2022-11-14 DIAGNOSIS — E11.65 UNCONTROLLED TYPE 2 DIABETES MELLITUS WITH HYPERGLYCEMIA (HCC): ICD-10-CM

## 2022-11-14 DIAGNOSIS — E11.42 TYPE 2 DIABETES MELLITUS WITH POLYNEUROPATHY (HCC): ICD-10-CM

## 2022-11-14 DIAGNOSIS — F32.A ANXIETY AND DEPRESSION: ICD-10-CM

## 2022-11-14 LAB — HBA1C MFR BLD: 10.3 %

## 2022-11-14 PROCEDURE — 99205 OFFICE O/P NEW HI 60 MIN: CPT | Performed by: CLINICAL NURSE SPECIALIST

## 2022-11-14 PROCEDURE — 3046F HEMOGLOBIN A1C LEVEL >9.0%: CPT | Performed by: CLINICAL NURSE SPECIALIST

## 2022-11-14 PROCEDURE — 3046F HEMOGLOBIN A1C LEVEL >9.0%: CPT | Performed by: STUDENT IN AN ORGANIZED HEALTH CARE EDUCATION/TRAINING PROGRAM

## 2022-11-14 PROCEDURE — G8427 DOCREV CUR MEDS BY ELIG CLIN: HCPCS | Performed by: STUDENT IN AN ORGANIZED HEALTH CARE EDUCATION/TRAINING PROGRAM

## 2022-11-14 PROCEDURE — G8417 CALC BMI ABV UP PARAM F/U: HCPCS | Performed by: STUDENT IN AN ORGANIZED HEALTH CARE EDUCATION/TRAINING PROGRAM

## 2022-11-14 PROCEDURE — 2022F DILAT RTA XM EVC RTNOPTHY: CPT | Performed by: STUDENT IN AN ORGANIZED HEALTH CARE EDUCATION/TRAINING PROGRAM

## 2022-11-14 PROCEDURE — G8484 FLU IMMUNIZE NO ADMIN: HCPCS | Performed by: STUDENT IN AN ORGANIZED HEALTH CARE EDUCATION/TRAINING PROGRAM

## 2022-11-14 PROCEDURE — 3017F COLORECTAL CA SCREEN DOC REV: CPT | Performed by: CLINICAL NURSE SPECIALIST

## 2022-11-14 PROCEDURE — 99213 OFFICE O/P EST LOW 20 MIN: CPT | Performed by: STUDENT IN AN ORGANIZED HEALTH CARE EDUCATION/TRAINING PROGRAM

## 2022-11-14 PROCEDURE — 2022F DILAT RTA XM EVC RTNOPTHY: CPT | Performed by: CLINICAL NURSE SPECIALIST

## 2022-11-14 PROCEDURE — 4004F PT TOBACCO SCREEN RCVD TLK: CPT | Performed by: STUDENT IN AN ORGANIZED HEALTH CARE EDUCATION/TRAINING PROGRAM

## 2022-11-14 PROCEDURE — 3017F COLORECTAL CA SCREEN DOC REV: CPT | Performed by: STUDENT IN AN ORGANIZED HEALTH CARE EDUCATION/TRAINING PROGRAM

## 2022-11-14 PROCEDURE — 83036 HEMOGLOBIN GLYCOSYLATED A1C: CPT | Performed by: CLINICAL NURSE SPECIALIST

## 2022-11-14 PROCEDURE — G8419 CALC BMI OUT NRM PARAM NOF/U: HCPCS | Performed by: CLINICAL NURSE SPECIALIST

## 2022-11-14 PROCEDURE — G8484 FLU IMMUNIZE NO ADMIN: HCPCS | Performed by: CLINICAL NURSE SPECIALIST

## 2022-11-14 PROCEDURE — 4004F PT TOBACCO SCREEN RCVD TLK: CPT | Performed by: CLINICAL NURSE SPECIALIST

## 2022-11-14 PROCEDURE — G8427 DOCREV CUR MEDS BY ELIG CLIN: HCPCS | Performed by: CLINICAL NURSE SPECIALIST

## 2022-11-14 RX ORDER — VILAZODONE HYDROCHLORIDE 10 MG/1
10 TABLET ORAL DAILY
Qty: 1 TABLET | Refills: 0
Start: 2022-11-14

## 2022-11-14 RX ORDER — INSULIN LISPRO 100 [IU]/ML
INJECTION, SOLUTION INTRAVENOUS; SUBCUTANEOUS
Qty: 10 ADJUSTABLE DOSE PRE-FILLED PEN SYRINGE | Refills: 5 | Status: SHIPPED | OUTPATIENT
Start: 2022-11-14

## 2022-11-14 RX ORDER — PEN NEEDLE, DIABETIC 30 GX5/16"
1 NEEDLE, DISPOSABLE MISCELLANEOUS 4 TIMES DAILY
Qty: 150 EACH | Refills: 5 | Status: SHIPPED | OUTPATIENT
Start: 2022-11-14

## 2022-11-14 RX ORDER — INSULIN GLARGINE 100 [IU]/ML
INJECTION, SOLUTION SUBCUTANEOUS
Qty: 5 ADJUSTABLE DOSE PRE-FILLED PEN SYRINGE | Refills: 5 | Status: SHIPPED | OUTPATIENT
Start: 2022-11-14

## 2022-11-14 RX ORDER — DULAGLUTIDE 3 MG/.5ML
3 INJECTION, SOLUTION SUBCUTANEOUS WEEKLY
Qty: 4 ADJUSTABLE DOSE PRE-FILLED PEN SYRINGE | Refills: 4 | Status: SHIPPED | OUTPATIENT
Start: 2022-11-14

## 2022-11-14 RX ORDER — FLUCONAZOLE 150 MG/1
150 TABLET ORAL ONCE
Qty: 1 TABLET | Refills: 0 | Status: SHIPPED | OUTPATIENT
Start: 2022-11-14 | End: 2022-11-14

## 2022-11-14 RX ORDER — PANTOPRAZOLE SODIUM 40 MG/1
40 TABLET, DELAYED RELEASE ORAL
Qty: 30 TABLET | Refills: 3 | Status: SHIPPED | OUTPATIENT
Start: 2022-11-14

## 2022-11-14 NOTE — PROGRESS NOTES
[address] [facility] Department of Endocrinology Diabetes and Metabolism   [address] [facility] [address]   Phone: [phone]  Fax: [phone]    Date of Service: 11/14/2022    Primary Care Physician: Diane Hays DO  Referring physician: Judy Henry DO  Provider: FRANCIE Mosley     Reason for the visit:  Type 2 DM       History of Present Illness: The history is provided by the patient. No  was used. Accuracy of the patient data is excellent. Bradley Josue is a very pleasant 61 y.o. male seen today for diabetes management     Bradley Josue was diagnosed with diabetes at age of 35  and currently on Humalog 15 units with meals plus mod dose ISS, (Eats small BF but does not take H log with BF)Lantus 20 units at night, jardiance 10 mg daily, Trulicity 1.5 mg once weekly   Misses doses of Hlog 3 times per week    The patient has been checking blood sugar  once per day.    130-300's but usually < 200  Has dexcom but has not used it   Most recent A1c results summarized below  Lab Results   Component Value Date/Time    LABA1C 10.3 11/14/2022 01:17 PM    LABA1C 14.3 08/05/2022 02:13 PM    LABA1C 12.7 11/08/2021 01:49 PM     Patient has had no hypoglycemic episodes   The patient has been mindful of what has been eating and following diabetic diet as encouraged  I reviewed current medications and the patient has no issues with diabetes medications  The patient is due for an eye exam. Last eye exam was > 1 year ago, no h/o diabetic retinopathy  The patient seeing podiatrist every   And also performs  own feet care  Microvascular complications:  No Retinopathy, Nephropathy or Neuropathy   Macrovascular complications: no CAD, PVD, or Stroke      PAST MEDICAL HISTORY   Past Medical History:   Diagnosis Date    Chronic pain     Depression     HLD (hyperlipidemia)     Neuropathy     LIDYA on CPAP     Osteoarthritis     Type II or unspecified type diabetes mellitus without mention of complication, not stated as uncontrolled        PAST SURGICAL HISTORY   Past Surgical History:   Procedure Laterality Date    ARM SURGERY      left arm    INCISION AND DRAINAGE Bilateral 7/27/2020    REPEAT BILATERAL KNEE WASHOUT performed by Devon Zambrano DO at [address] Bilateral 8/9/2020    INCISION AND DRAINAGE bilateral knees performed by Devon Zambrano DO at [facility] 22 ARTHROSCOPY Bilateral 7/23/2020    KNEE ARTHROSCOPY performed by Augustine Coronado MD at [facility] [address] COLONOSCOPY W/BIOPSY SINGLE/MULTIPLE N/A 9/20/2018    COLONOSCOPY WITH BIOPSY performed by Izzy Bell MD at [address] [facility] FLX W/RMVL OF TUMOR POLYP LESION SNARE TQ  9/20/2018    COLONOSCOPY POLYPECTOMY SNARE/COLD BIOPSY performed by Izzy Bell MD at [address] [facility]   Tobacco:   reports that he has been smoking cigarettes. He has a 30.00 pack-year smoking history. He has never used smokeless tobacco.  Alcohol:   reports no history of alcohol use. Drugs:   reports no history of drug use.     FAMILY HISTORY   Family History   Problem Relation Age of Onset    COPD Father     Stroke Father     Diabetes Paternal Grandfather     Heart Disease Brother        ALLERGIES AND DRUG REACTIONS   Allergies   Allergen Reactions    Adhesive Tape Hives    Tramadol Other (See Comments)     With psych medicine    Cymbalta [Duloxetine Hcl] Rash    Lidocaine Rash and Swelling    Lyrica [Pregabalin] Rash    Strattera [Atomoxetine] Rash     And nausea       CURRENT MEDICATIONS   Current Outpatient Medications   Medication Sig Dispense Refill    Dulaglutide (TRULICITY) 3 MT/6.0DN SOPN Inject 3 mg into the skin once a week 4 Adjustable Dose Pre-filled Pen Syringe 4    fluconazole (DIFLUCAN) 150 MG tablet Take 1 tablet by mouth once for 1 dose 1 tablet 0    insulin lispro, 1 Unit Dial, (HUMALOG KWIKPEN) 100 UNIT/ML SOPN 8-15-15 plus mod dose ISS max dose per day 60 units 10 Adjustable Dose Pre-filled Pen Syringe 5    insulin glargine (LANTUS SOLOSTAR) 100 UNIT/ML injection pen 24 units at night 5 Adjustable Dose Pre-filled Pen Syringe 5    Insulin Pen Needle (PEN NEEDLES 3/16\") 31G X 5 MM MISC 1 each by Does not apply route in the morning, at noon, in the evening, and at bedtime 150 each 5    insulin lispro (HUMALOG) 100 UNIT/ML injection vial Inject 15 Units into the skin 2 times daily (with meals) 5 pen 3    empagliflozin (JARDIANCE) 10 MG tablet Take 1 tablet by mouth daily 30 tablet 3    atorvastatin (LIPITOR) 20 MG tablet Take 1 tablet by mouth in the morning. 90 tablet 1    Continuous Blood Gluc Transmit (DEXCOM G6 TRANSMITTER) MISC 1 each by Does not apply route daily 1 each 0    Continuous Blood Gluc Sensor (DEXCOM G6 SENSOR) MISC 1 each by Does not apply route daily 1 each 0    Continuous Blood Gluc  (DEXCOM G6 ) NICOLASA 1 each by Does not apply route daily 1 each 0    vilazodone HCl (VIIBRYD) 20 MG TABS Take 20 mg by mouth daily      busPIRone (BUSPAR) 10 MG tablet Take 10 mg by mouth 3 times daily      blood glucose monitor kit and supplies Dispense sufficient amount for indicated testing frequency plus additional to accommodate PRN testing needs. Dispense all needed supplies to include: monitor, strips, lancing device, lancets, control solutions, alcohol swabs.  1 kit 0    blood glucose test strips (ASCENSIA AUTODISC VI;ONE TOUCH ULTRA TEST VI) strip Check four times a day 200 strip 5    Lancets MISC 1 each by Does not apply route 4 times daily 600 each 1    ibuprofen (ADVIL;MOTRIN) 600 MG tablet take 1 tablet by mouth every 6 to 8 hours if needed      senna (SENOKOT) 8.6 MG tablet Take 1 tablet by mouth daily 30 tablet 5    aspirin EC 81 MG EC tablet Take 1 tablet by mouth daily 90 tablet 1    Multiple Vitamin (MULTI VITAMIN MENS PO) Take by mouth daily        No current facility-administered medications for this visit. Review of Systems  Constitutional: No fever, no chills, no diaphoresis, no generalized weakness. HEENT: No blurred vision, No sore throat, no ear pain, no hair loss  Neck: denied any neck swelling, difficulty swallowing,   Cardio-pulmonary: No CP, SOB or palpitation, No orthopnea or PND. No cough or wheezing. GI: No N/V/D, no constipation, No abdominal pain, no melena or hematochezia   : Denied any dysuria, hematuria, flank pain, discharge, or incontinence. Skin: denied any rash, ulcer, Hirsute, or hyperpigmentation. MSK: denied any joint deformity, joint pain/swelling, muscle pain, or back pain. Neuro: no numbness, no tingling, no weakness, _    OBJECTIVE    BP (!) 159/89   Pulse 79   Ht 5' 11\" (1.803 m)   Wt 215 lb (97.5 kg)   SpO2 96%   BMI 29.99 kg/m²   BP Readings from Last 4 Encounters:   11/14/22 (!) 159/89   10/21/22 120/80   08/05/22 100/68   07/14/22 122/68     Wt Readings from Last 6 Encounters:   11/14/22 215 lb (97.5 kg)   10/21/22 206 lb (93.4 kg)   08/15/22 206 lb (93.4 kg)   08/05/22 206 lb (93.4 kg)   07/14/22 210 lb (95.3 kg)   01/14/22 211 lb (95.7 kg)       Physical examination:  General: awake alert, oriented x3, no abnormal position or movements. HEENT: normocephalic non-traumatic, no exophthalmos   Neck: supple, no LN enlargement, no thyromegaly, no thyroid tenderness, no JVD. Pulm: Clear equal air entry no added sounds, no wheezing or rhonchi    CVS: S1 + S2, no murmur, no heave. Dorsalis pedis pulse palpable   Abd: soft lax, no tenderness, no organomegaly, audible bowel sounds. Skin: warm, no lesions, no rash.  No callus, no Ulcers, No acanthosis nigricans  Musculoskeletal: No back tenderness, no kyphosis/scoliosis    Neuro: CN intact, Monofilament sensation decreased bilateral , muscle power normal  Psych: normal mood, and affect      Review of Laboratory Data:  I personally reviewed the following lab:  Lab Results   Component Value Date/Time    WBC 8.2 07/14/2022 12:00 PM    RBC 4.85 07/14/2022 12:00 PM    HGB 14.7 07/14/2022 12:00 PM    HCT 45.0 07/14/2022 12:00 PM    MCV 92.8 07/14/2022 12:00 PM    MCH 30.3 07/14/2022 12:00 PM    MCHC 32.7 07/14/2022 12:00 PM    RDW 13.1 07/14/2022 12:00 PM     07/14/2022 12:00 PM    MPV 9.8 07/14/2022 12:00 PM      Lab Results   Component Value Date/Time     (L) 07/14/2022 12:00 PM    K 4.7 07/14/2022 12:00 PM    K 3.7 10/03/2020 03:10 AM    CO2 22 07/14/2022 12:00 PM    BUN 16 07/14/2022 12:00 PM    CREATININE 0.9 07/14/2022 12:00 PM    CALCIUM 9.8 07/14/2022 12:00 PM    LABGLOM >60 07/14/2022 12:00 PM    GFRAA >60 07/14/2022 12:00 PM      Lab Results   Component Value Date/Time    TSH 2.620 07/31/2018 09:01 PM    T4FREE 1.14 02/06/2014 10:00 AM     Lab Results   Component Value Date/Time    LABA1C 10.3 11/14/2022 01:17 PM    GLUCOSE 485 07/14/2022 12:00 PM    LABCREA 50 08/08/2020 07:25 AM     Lab Results   Component Value Date/Time    LABA1C 10.3 11/14/2022 01:17 PM    LABA1C 14.3 08/05/2022 02:13 PM    LABA1C 12.7 11/08/2021 01:49 PM     Lab Results   Component Value Date/Time    TRIG 199 07/14/2022 12:00 PM    HDL 48 07/14/2022 12:00 PM    LDLCALC 228 07/14/2022 12:00 PM    CHOL 316 07/14/2022 12:00 PM     No results found for: [address] [place] [address], a 61 y.o.-old male seen in for the following issues       Assessment:      Diagnosis Orders   1. Uncontrolled type 2 diabetes mellitus with hyperglycemia (HCC)  POCT glycosylated hemoglobin (Hb A1C)    External Referral To Ophthalmology    Microalbumin / Creatinine Urine Ratio    Comprehensive Metabolic Panel    Lipid Panel      2. Type 2 diabetes mellitus with polyneuropathy (HCC)        3. Mixed hyperlipidemia            Plan:     1. Uncontrolled type 2 diabetes mellitus with hyperglycemia ([facility] [address]   Patient's diabetes is uncontrolled.   Hemoglobin A1c 10.3%  Plan: Increase Trulicity to 3 mg once weekly  Continue Jardiance 10 mg once daily. Patient complaining of mycotic infection. We will treat with Diflucan x1. If symptoms continue we will stop Jardiance  Adjust Lantus to 24 units at night  Adjust Humalog to 8 units with breakfast and continue 15 units with lunch and dinner plus moderate dose insulin sliding scale  Patient does miss doses on occasion of Humalog. I encourage compliance with medication use  The patient was advised to check blood sugars 4 times a day before meals and at bedtime and send BS readings to our office in a week. Advised patient to start Dexcom ASAP  Discussed with patient A1c and blood sugar goals   Optimal blood sugars: 100-140 pre-prandial, < 180 peak post-prandial  The patient counseled about the complications of uncontrolled diabetes   Patient was counselled about the importance of self-blood glucose monitoring and eating consistent carb diet to avoid blood sugar fluctuations   Patient will need routine diabetes maintenance and prevention  The patient refused diabetic education at this point  I encouraged encouraged diet and exercise  Diabetes labs before next visit   Will refer to ophthalmology   2. Type 2 diabetes mellitus with polyneuropathy (HCC)   Counseled on optimal foot care   3. Mixed hyperlipidemia   Continue atorvastatin 20 mg at night  Will reassess lipids           I personally spent > 60 minutes reviewing external notes from PCP and other patient's care team providers, and personally interpreted labs associated with the above diagnosis. I also ordered labs to further assess and manage the above addressed medical conditions. Return in about 3 months (around 2/14/2023). The above issues were reviewed with the patient who understood and agreed with the plan. Thank you for allowing us to participate in the care of this patient. Please do not hesitate to contact us with any additional questions.      FRANCIE Bergman - CNS     [facility]   [address] [place] [id number]   Phone: [phone]  Fax: [phone]  --------------------------------------------  An electronic signature was used to authenticate this note.  Shoaib Owens, APRN - CNS on 11/14/2022 at 1:38 PM

## 2022-11-18 RX ORDER — DULAGLUTIDE 3 MG/.5ML
3 INJECTION, SOLUTION SUBCUTANEOUS WEEKLY
Qty: 4 ADJUSTABLE DOSE PRE-FILLED PEN SYRINGE | Refills: 4 | OUTPATIENT
Start: 2022-11-18

## 2022-11-24 DIAGNOSIS — E11.65 UNCONTROLLED TYPE 2 DIABETES MELLITUS WITH HYPERGLYCEMIA (HCC): ICD-10-CM

## 2022-11-28 RX ORDER — EMPAGLIFLOZIN 10 MG/1
TABLET, FILM COATED ORAL
Qty: 30 TABLET | Refills: 3 | Status: SHIPPED | OUTPATIENT
Start: 2022-11-28

## 2023-01-05 ENCOUNTER — OFFICE VISIT (OUTPATIENT)
Dept: PRIMARY CARE CLINIC | Age: 58
End: 2023-01-05
Payer: COMMERCIAL

## 2023-01-05 VITALS
TEMPERATURE: 96 F | OXYGEN SATURATION: 93 % | HEIGHT: 71 IN | WEIGHT: 217 LBS | BODY MASS INDEX: 30.38 KG/M2 | HEART RATE: 84 BPM | SYSTOLIC BLOOD PRESSURE: 130 MMHG | DIASTOLIC BLOOD PRESSURE: 70 MMHG | RESPIRATION RATE: 18 BRPM

## 2023-01-05 DIAGNOSIS — F32.A ANXIETY AND DEPRESSION: ICD-10-CM

## 2023-01-05 DIAGNOSIS — F41.9 ANXIETY AND DEPRESSION: ICD-10-CM

## 2023-01-05 DIAGNOSIS — K21.9 GASTROESOPHAGEAL REFLUX DISEASE, UNSPECIFIED WHETHER ESOPHAGITIS PRESENT: Primary | ICD-10-CM

## 2023-01-05 DIAGNOSIS — E11.65 UNCONTROLLED TYPE 2 DIABETES MELLITUS WITH HYPERGLYCEMIA (HCC): ICD-10-CM

## 2023-01-05 DIAGNOSIS — G47.33 OSA (OBSTRUCTIVE SLEEP APNEA): ICD-10-CM

## 2023-01-05 PROCEDURE — 99214 OFFICE O/P EST MOD 30 MIN: CPT | Performed by: STUDENT IN AN ORGANIZED HEALTH CARE EDUCATION/TRAINING PROGRAM

## 2023-01-05 PROCEDURE — 4004F PT TOBACCO SCREEN RCVD TLK: CPT | Performed by: STUDENT IN AN ORGANIZED HEALTH CARE EDUCATION/TRAINING PROGRAM

## 2023-01-05 PROCEDURE — G8427 DOCREV CUR MEDS BY ELIG CLIN: HCPCS | Performed by: STUDENT IN AN ORGANIZED HEALTH CARE EDUCATION/TRAINING PROGRAM

## 2023-01-05 PROCEDURE — G8417 CALC BMI ABV UP PARAM F/U: HCPCS | Performed by: STUDENT IN AN ORGANIZED HEALTH CARE EDUCATION/TRAINING PROGRAM

## 2023-01-05 PROCEDURE — 2022F DILAT RTA XM EVC RTNOPTHY: CPT | Performed by: STUDENT IN AN ORGANIZED HEALTH CARE EDUCATION/TRAINING PROGRAM

## 2023-01-05 PROCEDURE — 3017F COLORECTAL CA SCREEN DOC REV: CPT | Performed by: STUDENT IN AN ORGANIZED HEALTH CARE EDUCATION/TRAINING PROGRAM

## 2023-01-05 PROCEDURE — 3046F HEMOGLOBIN A1C LEVEL >9.0%: CPT | Performed by: STUDENT IN AN ORGANIZED HEALTH CARE EDUCATION/TRAINING PROGRAM

## 2023-01-05 PROCEDURE — G8484 FLU IMMUNIZE NO ADMIN: HCPCS | Performed by: STUDENT IN AN ORGANIZED HEALTH CARE EDUCATION/TRAINING PROGRAM

## 2023-01-05 RX ORDER — QUETIAPINE FUMARATE 25 MG/1
25 TABLET, FILM COATED ORAL DAILY
Qty: 60 TABLET | Refills: 3
Start: 2023-01-05

## 2023-01-05 RX ORDER — OMEPRAZOLE 40 MG/1
40 CAPSULE, DELAYED RELEASE ORAL
Qty: 30 CAPSULE | Refills: 0 | Status: SHIPPED | OUTPATIENT
Start: 2023-01-05

## 2023-01-05 NOTE — PROGRESS NOTES
[facility]  Department of Family Medicine      Patient:  Bev Banerjee 61 y.o. male     Date of Service: 1/5/23      Chief complaint:   Chief Complaint   Patient presents with    Diabetes    Sleep Study         History ofPresent Illness   The patient is a 61 y.o. male  presented to the clinic with complaints as above.     GERD   -f/u  -started on ppi during last visit  -currently, states it is better, however states protonix is making him sick  -feels like nausea is his main concern, does not have burning in his throat or other acid reflux symptoms, states he felt much better on prilosec      Diabetes  -f/u  -being managed by endocrinology  -states his fasting glucose this morning was 138, highest he has been is upper 200's     Hx of LIDYA, wants his CPAP has been recalled, wants re evaluated   -states he is still having a lot of fatigue     Past Medical History:      Diagnosis Date    Chronic pain     Depression     HLD (hyperlipidemia)     Neuropathy     LIDYA on CPAP     Osteoarthritis     Type II or unspecified type diabetes mellitus without mention of complication, not stated as uncontrolled        PastSurgical History:        Procedure Laterality Date    ARM SURGERY      left arm    INCISION AND DRAINAGE Bilateral 7/27/2020    REPEAT BILATERAL KNEE WASHOUT performed by Kaleigh Coles DO at [facility] Bilateral 8/9/2020    INCISION AND DRAINAGE bilateral knees performed by Kaleigh Coles DO at [facility] [address] ARTHROSCOPY Bilateral 7/23/2020    KNEE ARTHROSCOPY performed by Claudy Chang MD at [facility] [address] COLONOSCOPY W/BIOPSY SINGLE/MULTIPLE N/A 9/20/2018    COLONOSCOPY WITH BIOPSY performed by Vik Reddy MD at [facility] FLX W/RMVL OF TUMOR POLYP LESION [address]  9/20/2018    COLONOSCOPY POLYPECTOMY SNARE/COLD BIOPSY performed by Vik Reddy MD at [facility] Allergies:    Adhesive tape, Tramadol, Cymbalta [duloxetine hcl], Lidocaine, Lyrica [pregabalin], and Strattera [atomoxetine]    Social History:   Social History     Socioeconomic History    Marital status: Single     Spouse name: Not on file    Number of children: 3    Years of education: 12    Highest education level: High school graduate   Occupational History    Not on file   Tobacco Use    Smoking status: Every Day     Packs/day: 1.00     Years: 30.00     Pack years: 30.00     Types: Cigarettes    Smokeless tobacco: Never    Tobacco comments:         Vaping Use    Vaping Use: Never used   Substance and Sexual Activity    Alcohol use: No     Comment: none since 2017, drank heavily in early 2000s    Drug use: No     Comment: history in younger days    Sexual activity: Not Currently     Partners: Female     Comment: Single; Last STD check was 2012   Other Topics Concern    Not on file   Social History Narrative    Does feel socially isolated at times     Social Determinants of Health     Financial Resource Strain: High Risk    Difficulty of Paying Living Expenses: Very hard   Food Insecurity: Food Insecurity Present    Worried About [employer] in the Last Year: Sometimes true    Ran Out of Food in the Last Year: Sometimes true   Transportation Needs: No Transportation Needs    Lack of Transportation (Medical): No    Lack of Transportation (Non-Medical):  No   Physical Activity: Inactive    Days of Exercise per Week: 0 days    Minutes of Exercise per Session: 0 min   Stress: Stress Concern Present    Feeling of Stress : Very much   Social Connections: Socially Isolated    Frequency of Communication with Friends and Family: Twice a week    Frequency of Social Gatherings with Friends and Family: Once a week    Attends Anabaptism Services: Never    Active Member of Clubs or Organizations: No    Attends Club or Organization Meetings: Never    Marital Status: Never    Intimate Partner Violence: Not on file   Housing Stability: Not on file        Family History:       Problem Relation Age of Onset    COPD Father     Stroke Father     Diabetes Paternal Grandfather     Heart Disease Brother        Review of Systems:   Review of Systems - as above     Physical Exam   Vitals: /70   Pulse 84   Temp (!) 96 °F (35.6 °C) (Infrared)   Resp 18   Ht 5' 11\" (1.803 m)   Wt 217 lb (98.4 kg)   SpO2 93%   BMI 30.27 kg/m²   Physical Exam  Constitutional:       Appearance: He is well-developed. HENT:      Head: Normocephalic and atraumatic. Eyes:      General:         Right eye: No discharge. Left eye: No discharge. Conjunctiva/sclera: Conjunctivae normal.   Neck:      Trachea: No tracheal deviation. Cardiovascular:      Rate and Rhythm: Normal rate and regular rhythm. Heart sounds: Normal heart sounds. Pulmonary:      Effort: Pulmonary effort is normal. No respiratory distress. Breath sounds: Normal breath sounds. No wheezing. Abdominal:      General: Bowel sounds are normal. There is no distension. Palpations: Abdomen is soft. Tenderness: There is no abdominal tenderness. Musculoskeletal:         General: No tenderness. Cervical back: Normal range of motion and neck supple. Skin:     General: Skin is warm and dry. Neurological:      Mental Status: He is alert. Psychiatric:         Behavior: Behavior normal.           Assessment and Plan       1. Gastroesophageal reflux disease, unspecified whether esophagitis present  F/u of chronic issue  -Feels better on prilosec, will thus start this as patient was having side effects from protonix, close f/u in 3 months   - omeprazole (PRILOSEC) 40 MG delayed release capsule; Take 1 capsule by mouth every morning (before breakfast)  Dispense: 30 capsule; Refill: 0    2.  Uncontrolled type 2 diabetes mellitus with hyperglycemia (HCC)  F/u of chronic issue  -Improving somewhat, however still high thus increased trulicity  - Dulaglutide 4.5 MG/0.5ML SOPN; Inject 4.5 mg into the skin once a week  Dispense: 4 Adjustable Dose Pre-filled Pen Syringe; Refill: 1    3. LIDYA (obstructive sleep apnea)  F/u of chronic issue  -Needs referred to pap titration and sleep specialist for further evaluation as he may need new cpap  - Sleep Study with PAP Titration; Future  - [facility] MD Jada, Sleep Medicine, [facility]    4. Anxiety and depression  Med clerical   - QUEtiapine (SEROQUEL) 25 MG tablet; Take 1 tablet by mouth daily  Dispense: 60 tablet; Refill: 3    Counseled regarding above diagnosis, including possible risks and complications,  especially if left uncontrolled. Counseled regarding the possible side effects, risks, benefits and alternatives to treatment;patient and/or guardian verbalizes understanding, agrees, feels comfortable with and wishes to proceed with above treatment plan. Call or go to [address] if symptoms worsen or persist. Advised patient to call with any new medication issues, and, as applicable, read all Rx info from pharmacy to assure aware of all possible risks and side effects of medicationbefore taking. Patient and/or guardian given opportunity to ask questions/raise concerns. The patient verbalized comfort and understanding ofinstructions. I encourage further reading and education about your health conditions. Information on many health conditions is provided by [facility] [employer]: [email or web address]  Please bring any questions to me at your nextvisit. Return to Office: Return in about 3 months (around 4/5/2023) for f/u diabetes .     Medication List:    Current Outpatient Medications   Medication Sig Dispense Refill    Dulaglutide 4.5 MG/0.5ML SOPN Inject 4.5 mg into the skin once a week 4 Adjustable Dose Pre-filled Pen Syringe 1    QUEtiapine (SEROQUEL) 25 MG tablet Take 1 tablet by mouth daily 60 tablet 3    omeprazole (PRILOSEC) 40 MG delayed release capsule Take 1 capsule by mouth every morning (before breakfast) 30 capsule 0    JARDIANCE 10 MG tablet take 1 tablet by mouth once daily 30 tablet 3    insulin lispro, 1 Unit Dial, (HUMALOG KWIKPEN) 100 UNIT/ML SOPN 8-15-15 plus mod dose ISS max dose per day 60 units 10 Adjustable Dose Pre-filled Pen Syringe 5    insulin glargine (LANTUS SOLOSTAR) 100 UNIT/ML injection pen 24 units at night 5 Adjustable Dose Pre-filled Pen Syringe 5    Insulin Pen Needle (PEN NEEDLES 3/16\") 31G X 5 MM MISC 1 each by Does not apply route in the morning, at noon, in the evening, and at bedtime 150 each 5    brexpiprazole (REXULTI) 0.25 MG TABS tablet Take 1 tablet by mouth daily 60 tablet 0    vilazodone HCl (VIIBRYD) 10 MG TABS Take 1 tablet by mouth daily 1 tablet 0    atorvastatin (LIPITOR) 20 MG tablet Take 1 tablet by mouth in the morning. 90 tablet 1    Continuous Blood Gluc Transmit (DEXCOM G6 TRANSMITTER) MISC 1 each by Does not apply route daily 1 each 0    Continuous Blood Gluc Sensor (DEXCOM G6 SENSOR) MISC 1 each by Does not apply route daily 1 each 0    Continuous Blood Gluc  (DEXCOM G6 ) NICOLASA 1 each by Does not apply route daily 1 each 0    insulin lispro (HUMALOG) 100 UNIT/ML injection vial Inject 15 Units into the skin 2 times daily (with meals) 5 pen 3    blood glucose monitor kit and supplies Dispense sufficient amount for indicated testing frequency plus additional to accommodate PRN testing needs. Dispense all needed supplies to include: monitor, strips, lancing device, lancets, control solutions, alcohol swabs.  1 kit 0    blood glucose test strips (ASCENSIA AUTODISC VI;ONE TOUCH ULTRA TEST VI) strip Check four times a day 200 strip 5    Lancets MISC 1 each by Does not apply route 4 times daily 600 each 1    ibuprofen (ADVIL;MOTRIN) 600 MG tablet take 1 tablet by mouth every 6 to 8 hours if needed      senna (SENOKOT) 8.6 MG tablet Take 1 tablet by mouth daily 30 tablet 5    aspirin EC 81 MG EC tablet Take 1 tablet by mouth daily 90 tablet 1    Multiple Vitamin (MULTI VITAMIN MENS PO) Take by mouth daily        No current facility-administered medications for this visit. Floyd Merida,        This document may have been prepared at least partially through the use of voice recognition software. Although effort is taken to assure the accuracy ofthis document, it is possible that grammatical, syntax,  or spelling errors may occur.

## 2023-01-28 DIAGNOSIS — K21.9 GASTROESOPHAGEAL REFLUX DISEASE, UNSPECIFIED WHETHER ESOPHAGITIS PRESENT: ICD-10-CM

## 2023-01-28 DIAGNOSIS — E11.65 UNCONTROLLED TYPE 2 DIABETES MELLITUS WITH HYPERGLYCEMIA (HCC): ICD-10-CM

## 2023-01-28 RX ORDER — ATORVASTATIN CALCIUM 20 MG/1
TABLET, FILM COATED ORAL
Qty: 90 TABLET | Refills: 1 | Status: SHIPPED | OUTPATIENT
Start: 2023-01-28

## 2023-01-28 RX ORDER — OMEPRAZOLE 40 MG/1
CAPSULE, DELAYED RELEASE ORAL
Qty: 30 CAPSULE | Refills: 0 | Status: SHIPPED | OUTPATIENT
Start: 2023-01-28

## 2023-03-15 DIAGNOSIS — K21.9 GASTROESOPHAGEAL REFLUX DISEASE, UNSPECIFIED WHETHER ESOPHAGITIS PRESENT: ICD-10-CM

## 2023-03-15 RX ORDER — PANTOPRAZOLE SODIUM 40 MG/1
TABLET, DELAYED RELEASE ORAL
Qty: 30 TABLET | Refills: 3 | OUTPATIENT
Start: 2023-03-15

## 2023-04-25 ENCOUNTER — TELEPHONE (OUTPATIENT)
Dept: PRIMARY CARE CLINIC | Age: 58
End: 2023-04-25

## 2023-04-25 NOTE — TELEPHONE ENCOUNTER
[facility] nurse triage call patient is very dizzy when he stands \"everything turns black\" asked if the patient lost consciousness pt stated no not that I know of yet. He states he feels very funny . Advised pt call a friend to take him to ED he stated that if they cant come get him he would call and ambulance to come get him.

## 2023-04-26 ENCOUNTER — HOSPITAL ENCOUNTER (OUTPATIENT)
Dept: SLEEP CENTER | Age: 58
Discharge: HOME | End: 2023-04-26
Payer: COMMERCIAL

## 2023-04-26 DIAGNOSIS — G47.33 OSA (OBSTRUCTIVE SLEEP APNEA): ICD-10-CM

## 2023-04-26 PROCEDURE — 95811 POLYSOM 6/>YRS CPAP 4/> PARM: CPT

## 2023-04-27 VITALS
SYSTOLIC BLOOD PRESSURE: 100 MMHG | HEIGHT: 71 IN | BODY MASS INDEX: 28.7 KG/M2 | DIASTOLIC BLOOD PRESSURE: 66 MMHG | WEIGHT: 205 LBS | OXYGEN SATURATION: 96 % | HEART RATE: 91 BPM

## 2023-04-27 ASSESSMENT — SLEEP AND FATIGUE QUESTIONNAIRES
HOW LIKELY ARE YOU TO NOD OFF OR FALL ASLEEP WHILE SITTING INACTIVE IN A PUBLIC PLACE: 0
HOW LIKELY ARE YOU TO NOD OFF OR FALL ASLEEP WHILE SITTING QUIETLY AFTER LUNCH WITHOUT ALCOHOL: 1
HOW LIKELY ARE YOU TO NOD OFF OR FALL ASLEEP WHILE SITTING AND READING: 2
HOW LIKELY ARE YOU TO NOD OFF OR FALL ASLEEP WHILE WATCHING TV: 2
HOW LIKELY ARE YOU TO NOD OFF OR FALL ASLEEP WHILE LYING DOWN TO REST IN THE AFTERNOON WHEN CIRCUMSTANCES PERMIT: 3
HOW LIKELY ARE YOU TO NOD OFF OR FALL ASLEEP IN A CAR, WHILE STOPPED FOR A FEW MINUTES IN TRAFFIC: 0
HOW LIKELY ARE YOU TO NOD OFF OR FALL ASLEEP WHEN YOU ARE A PASSENGER IN A CAR FOR AN HOUR WITHOUT A BREAK: 1
ESS TOTAL SCORE: 10
HOW LIKELY ARE YOU TO NOD OFF OR FALL ASLEEP WHILE SITTING AND TALKING TO SOMEONE: 1

## 2023-05-10 ENCOUNTER — OFFICE VISIT (OUTPATIENT)
Dept: ENDOCRINOLOGY | Age: 58
End: 2023-05-10
Payer: COMMERCIAL

## 2023-05-10 VITALS
HEIGHT: 71 IN | BODY MASS INDEX: 29.26 KG/M2 | OXYGEN SATURATION: 98 % | SYSTOLIC BLOOD PRESSURE: 122 MMHG | WEIGHT: 209 LBS | DIASTOLIC BLOOD PRESSURE: 80 MMHG | HEART RATE: 87 BPM

## 2023-05-10 DIAGNOSIS — E78.2 MIXED HYPERLIPIDEMIA: ICD-10-CM

## 2023-05-10 DIAGNOSIS — E11.42 TYPE 2 DIABETES MELLITUS WITH POLYNEUROPATHY (HCC): ICD-10-CM

## 2023-05-10 DIAGNOSIS — E11.65 UNCONTROLLED TYPE 2 DIABETES MELLITUS WITH HYPERGLYCEMIA (HCC): ICD-10-CM

## 2023-05-10 DIAGNOSIS — E11.65 UNCONTROLLED TYPE 2 DIABETES MELLITUS WITH HYPERGLYCEMIA (HCC): Primary | ICD-10-CM

## 2023-05-10 LAB
ALBUMIN SERPL-MCNC: 4.3 G/DL (ref 3.5–5.2)
ALP SERPL-CCNC: 100 U/L (ref 40–129)
ALT SERPL-CCNC: 11 U/L (ref 0–40)
ANION GAP SERPL CALCULATED.3IONS-SCNC: 16 MMOL/L (ref 7–16)
AST SERPL-CCNC: 21 U/L (ref 0–39)
BILIRUB SERPL-MCNC: <0.2 MG/DL (ref 0–1.2)
BUN SERPL-MCNC: 21 MG/DL (ref 6–20)
CALCIUM SERPL-MCNC: 10.3 MG/DL (ref 8.6–10.2)
CHLORIDE SERPL-SCNC: 101 MMOL/L (ref 98–107)
CHOLESTEROL, TOTAL: 254 MG/DL (ref 0–199)
CO2 SERPL-SCNC: 23 MMOL/L (ref 22–29)
CREAT SERPL-MCNC: 0.9 MG/DL (ref 0.7–1.2)
CREAT UR-MCNC: 95 MG/DL (ref 40–278)
GLUCOSE SERPL-MCNC: 270 MG/DL (ref 74–99)
HBA1C MFR BLD: 11.4 %
HDLC SERPL-MCNC: 38 MG/DL
LDLC SERPL CALC-MCNC: 184 MG/DL (ref 0–99)
MICROALBUMIN UR-MCNC: 358.3 MG/L
MICROALBUMIN/CREAT UR-RTO: 377.2 (ref 0–30)
POTASSIUM SERPL-SCNC: 5 MMOL/L (ref 3.5–5)
PROT SERPL-MCNC: 7.5 G/DL (ref 6.4–8.3)
SODIUM SERPL-SCNC: 140 MMOL/L (ref 132–146)
TRIGL SERPL-MCNC: 160 MG/DL (ref 0–149)
VLDLC SERPL CALC-MCNC: 32 MG/DL

## 2023-05-10 PROCEDURE — G8427 DOCREV CUR MEDS BY ELIG CLIN: HCPCS | Performed by: CLINICAL NURSE SPECIALIST

## 2023-05-10 PROCEDURE — 83036 HEMOGLOBIN GLYCOSYLATED A1C: CPT | Performed by: CLINICAL NURSE SPECIALIST

## 2023-05-10 PROCEDURE — 3017F COLORECTAL CA SCREEN DOC REV: CPT | Performed by: CLINICAL NURSE SPECIALIST

## 2023-05-10 PROCEDURE — 3046F HEMOGLOBIN A1C LEVEL >9.0%: CPT | Performed by: CLINICAL NURSE SPECIALIST

## 2023-05-10 PROCEDURE — 99214 OFFICE O/P EST MOD 30 MIN: CPT | Performed by: CLINICAL NURSE SPECIALIST

## 2023-05-10 PROCEDURE — 4004F PT TOBACCO SCREEN RCVD TLK: CPT | Performed by: CLINICAL NURSE SPECIALIST

## 2023-05-10 PROCEDURE — 2022F DILAT RTA XM EVC RTNOPTHY: CPT | Performed by: CLINICAL NURSE SPECIALIST

## 2023-05-10 PROCEDURE — G8417 CALC BMI ABV UP PARAM F/U: HCPCS | Performed by: CLINICAL NURSE SPECIALIST

## 2023-05-10 NOTE — PROGRESS NOTES
[address] [facility] Department of Endocrinology Diabetes and Metabolism   [address] [facility] [address]   Phone: [phone]  Fax: [phone]    Date of Service: 5/10/2023    Primary Care Physician: Jamey Alfaro DO  Referring physician: No ref. provider found  Provider: FRANCIE Bradshaw     Reason for the visit:  Type 2 DM       History of Present Illness: The history is provided by the patient. No  was used. Accuracy of the patient data is excellent.   Claude Force is a very pleasant 60 y.o. male seen today for diabetes management     Claude Force was diagnosed with diabetes at age of 35  and currently on Humalog 8-15- 15 units with meals plus mod dose ISS, Lantus 20 units at night (Missing doses of insulin regularly)  jardiance 10 mg daily, Trulicity 4.5 mg once weekly     The patient has been checking blood sugar 4 times per day  BG not at goal   Has dexcom but has not used it   Most recent A1c results summarized below  Lab Results   Component Value Date/Time    LABA1C 11.4 05/10/2023 01:07 PM    LABA1C 10.3 11/14/2022 01:17 PM    LABA1C 14.3 08/05/2022 02:13 PM     Patient has had no hypoglycemic episodes   The patient has been mindful of what has been eating and following diabetic diet as encouraged  I reviewed current medications and the patient has no issues with diabetes medications  The patient is due for an eye exam. Last eye exam was > 1 year ago, no h/o diabetic retinopathy  The patient seeing podiatrist every   And also performs  own feet care  Microvascular complications:  No Retinopathy, Nephropathy or Neuropathy   Macrovascular complications: no CAD, PVD, or Stroke      PAST MEDICAL HISTORY   Past Medical History:   Diagnosis Date    Chronic pain     Depression     HLD (hyperlipidemia)     Neuropathy     LIDYA on CPAP     Osteoarthritis     Type II or unspecified type diabetes mellitus without mention of complication, not

## 2023-05-11 DIAGNOSIS — E11.65 UNCONTROLLED TYPE 2 DIABETES MELLITUS WITH HYPERGLYCEMIA (HCC): ICD-10-CM

## 2023-05-11 RX ORDER — ATORVASTATIN CALCIUM 40 MG/1
40 TABLET, FILM COATED ORAL EVERY MORNING
Qty: 90 TABLET | Refills: 1 | Status: SHIPPED
Start: 2023-05-11 | End: 2023-07-10 | Stop reason: CLARIF

## 2023-05-11 RX ORDER — EMPAGLIFLOZIN 10 MG/1
TABLET, FILM COATED ORAL
Qty: 30 TABLET | Refills: 3 | Status: SHIPPED | OUTPATIENT
Start: 2023-05-11

## 2023-05-15 ENCOUNTER — TELEPHONE (OUTPATIENT)
Dept: ENDOCRINOLOGY | Age: 58
End: 2023-05-15

## 2023-05-15 NOTE — TELEPHONE ENCOUNTER
----- Message from FRANCIE Heredia sent at 5/11/2023  7:45 AM EDT -----  Please call patient and inform him cholesterol is not at goal.  I recommend patient increase atorvastatin to 40 mg once daily. Also urine test showed protein leak.   I advise optimal blood glucose control and will reevaluate once blood sugars are more stable

## 2023-05-30 ENCOUNTER — TELEPHONE (OUTPATIENT)
Dept: SLEEP MEDICINE | Age: 58
End: 2023-05-30

## 2023-06-23 ENCOUNTER — TELEPHONE (OUTPATIENT)
Dept: ADMINISTRATIVE | Age: 58
End: 2023-06-23

## 2023-06-26 DIAGNOSIS — E11.65 UNCONTROLLED TYPE 2 DIABETES MELLITUS WITH HYPERGLYCEMIA (HCC): Primary | ICD-10-CM

## 2023-06-26 RX ORDER — INSULIN LISPRO 100 [IU]/ML
INJECTION, SOLUTION INTRAVENOUS; SUBCUTANEOUS
Qty: 30 ML | Refills: 5 | Status: SHIPPED | OUTPATIENT
Start: 2023-06-26

## 2023-06-26 RX ORDER — LANCETS
EACH MISCELLANEOUS
Qty: 200 EACH | Refills: 5 | Status: SHIPPED | OUTPATIENT
Start: 2023-06-26

## 2023-06-26 RX ORDER — BLOOD SUGAR DIAGNOSTIC
STRIP MISCELLANEOUS
Qty: 200 EACH | Refills: 5 | Status: SHIPPED | OUTPATIENT
Start: 2023-06-26

## 2023-07-03 NOTE — PROGRESS NOTES
Pt here for f/u after bilateral knee arthrotomy with exploration, irrigation and debridement on 8/9/2020. States he never did home health PT has been doing home exercises and walking a lot. States he is going to start PT at [facility] today. Uses a cane and once in a while a crutch. C/o knees both \"popping\". Still has pain, R knee is worse. 5

## 2023-07-07 PROBLEM — M25.562 PAIN IN BOTH KNEES: Status: RESOLVED | Noted: 2020-07-22 | Resolved: 2023-07-07

## 2023-07-07 PROBLEM — M54.41 ACUTE MIDLINE LOW BACK PAIN WITH BILATERAL SCIATICA: Status: RESOLVED | Noted: 2020-07-22 | Resolved: 2023-07-07

## 2023-07-07 PROBLEM — M25.561 PAIN IN BOTH KNEES: Status: RESOLVED | Noted: 2020-07-22 | Resolved: 2023-07-07

## 2023-07-07 PROBLEM — M54.42 ACUTE MIDLINE LOW BACK PAIN WITH BILATERAL SCIATICA: Status: RESOLVED | Noted: 2020-07-22 | Resolved: 2023-07-07

## 2023-07-10 ENCOUNTER — OFFICE VISIT (OUTPATIENT)
Dept: CARDIOLOGY CLINIC | Age: 58
End: 2023-07-10
Payer: COMMERCIAL

## 2023-07-10 VITALS
BODY MASS INDEX: 29.4 KG/M2 | DIASTOLIC BLOOD PRESSURE: 62 MMHG | WEIGHT: 210 LBS | HEART RATE: 87 BPM | RESPIRATION RATE: 12 BRPM | HEIGHT: 71 IN | SYSTOLIC BLOOD PRESSURE: 102 MMHG

## 2023-07-10 DIAGNOSIS — I51.9 HEART PROBLEM: Primary | ICD-10-CM

## 2023-07-10 PROCEDURE — 99205 OFFICE O/P NEW HI 60 MIN: CPT | Performed by: INTERNAL MEDICINE

## 2023-07-10 PROCEDURE — 3017F COLORECTAL CA SCREEN DOC REV: CPT | Performed by: INTERNAL MEDICINE

## 2023-07-10 PROCEDURE — 4004F PT TOBACCO SCREEN RCVD TLK: CPT | Performed by: INTERNAL MEDICINE

## 2023-07-10 PROCEDURE — G8417 CALC BMI ABV UP PARAM F/U: HCPCS | Performed by: INTERNAL MEDICINE

## 2023-07-10 PROCEDURE — 93000 ELECTROCARDIOGRAM COMPLETE: CPT | Performed by: INTERNAL MEDICINE

## 2023-07-10 PROCEDURE — G8427 DOCREV CUR MEDS BY ELIG CLIN: HCPCS | Performed by: INTERNAL MEDICINE

## 2023-07-10 RX ORDER — REGADENOSON 0.08 MG/ML
0.4 INJECTION, SOLUTION INTRAVENOUS
Status: SHIPPED | OUTPATIENT
Start: 2023-07-10

## 2023-07-10 RX ORDER — BUPROPION HYDROCHLORIDE 150 MG/1
150 TABLET ORAL DAILY
COMMUNITY
Start: 2023-06-21

## 2023-07-10 NOTE — PROGRESS NOTES
Chief Complaint   Patient presents with    Heart Problem                Patient Active Problem List    Diagnosis Date Noted    Thrombocytosis     Hyperlipidemia 12/23/2014    Insulin-requiring or dependent type II diabetes mellitus ([address] [facility]) 12/23/2014    Diabetic neuropathy ([address] [facility]) 03/07/2014    Anxiety and depression 03/07/2014       Current Outpatient Medications   Medication Sig Dispense Refill    buPROPion (WELLBUTRIN XL) 150 MG extended release tablet Take 1 tablet by mouth daily      insulin lispro (HUMALOG) 100 UNIT/ML SOLN injection vial Use via insulin pump.  Max 100 units per day 30 mL 5    FLUoxetine (PROZAC) 20 MG capsule Take 1 capsule by mouth daily      JARDIANCE 10 MG tablet take 1 tablet by mouth once daily 30 tablet 3    nicotine polacrilex (NICORETTE) 2 MG gum Take 1 each by mouth as needed for Smoking cessation (Patient taking differently: Take 1 each by mouth as needed for Smoking cessation Does  not use often) 110 each 3    nicotine (NICODERM CQ) 14 MG/24HR Place 1 patch onto the skin daily (Patient taking differently: Place 1 patch onto the skin daily Does not useconsistantly) 14 patch 5    Dulaglutide 4.5 MG/0.5ML SOPN Inject 4.5 mg into the skin once a week 4 Adjustable Dose Pre-filled Pen Syringe 1    QUEtiapine (SEROQUEL) 25 MG tablet Take 1 tablet by mouth daily (Patient taking differently: Take 1 tablet by mouth 2 times daily) 60 tablet 3    insulin glargine (LANTUS SOLOSTAR) 100 UNIT/ML injection pen 24 units at night 5 Adjustable Dose Pre-filled Pen Syringe 5    ibuprofen (ADVIL;MOTRIN) 600 MG tablet take 1 tablet by mouth every 6 to 8 hours if needed      senna (SENOKOT) 8.6 MG tablet Take 1 tablet by mouth daily 30 tablet 5    aspirin EC 81 MG EC tablet Take 1 tablet by mouth daily 90 tablet 1    Multiple Vitamin (MULTI VITAMIN MENS PO) Take by mouth daily        Current Facility-Administered Medications   Medication Dose Route Frequency Provider Last Rate Last Admin    perflutren lipid

## 2023-07-27 ENCOUNTER — TELEPHONE (OUTPATIENT)
Dept: CARDIOLOGY | Age: 58
End: 2023-07-27

## 2023-07-28 ENCOUNTER — TELEPHONE (OUTPATIENT)
Dept: SLEEP CENTER | Age: 58
End: 2023-07-28

## 2023-07-30 ENCOUNTER — HOSPITAL ENCOUNTER (INPATIENT)
Age: 58
LOS: 26 days | Discharge: ANOTHER HOSPITAL | DRG: 004 | End: 2023-08-25
Attending: EMERGENCY MEDICINE | Admitting: SURGERY
Payer: COMMERCIAL

## 2023-07-30 ENCOUNTER — ANESTHESIA EVENT (OUTPATIENT)
Dept: EMERGENCY DEPT | Age: 58
End: 2023-07-30
Payer: COMMERCIAL

## 2023-07-30 ENCOUNTER — APPOINTMENT (OUTPATIENT)
Dept: CT IMAGING | Age: 58
DRG: 004 | End: 2023-07-30
Payer: COMMERCIAL

## 2023-07-30 ENCOUNTER — ANESTHESIA (OUTPATIENT)
Dept: EMERGENCY DEPT | Age: 58
End: 2023-07-30
Payer: COMMERCIAL

## 2023-07-30 ENCOUNTER — APPOINTMENT (OUTPATIENT)
Dept: GENERAL RADIOLOGY | Age: 58
DRG: 004 | End: 2023-07-30
Payer: COMMERCIAL

## 2023-07-30 DIAGNOSIS — T14.90XA TRAUMA: Primary | ICD-10-CM

## 2023-07-30 DIAGNOSIS — S02.642B OPEN FRACTURE OF LEFT RAMUS OF MANDIBLE, INITIAL ENCOUNTER (HCC): ICD-10-CM

## 2023-07-30 DIAGNOSIS — S02.609B OPEN FRACTURE OF LEFT SIDE OF MANDIBLE, UNSPECIFIED MANDIBULAR SITE, INITIAL ENCOUNTER (HCC): ICD-10-CM

## 2023-07-30 DIAGNOSIS — G93.89: ICD-10-CM

## 2023-07-30 DIAGNOSIS — R52 ACUTE PAIN: ICD-10-CM

## 2023-07-30 PROBLEM — J96.00 ACUTE RESPIRATORY FAILURE (HCC): Status: ACTIVE | Noted: 2023-07-30

## 2023-07-30 LAB
AADO2: 309.4 MMHG
ABO + RH BLD: NORMAL
ALBUMIN SERPL-MCNC: 4 G/DL (ref 3.5–5.2)
ALP SERPL-CCNC: 104 U/L (ref 40–129)
ALT SERPL-CCNC: 35 U/L (ref 0–40)
ANION GAP SERPL CALCULATED.3IONS-SCNC: 11 MMOL/L (ref 7–16)
APAP SERPL-MCNC: <5 UG/ML (ref 10–30)
ARM BAND NUMBER: NORMAL
AST SERPL-CCNC: 46 U/L (ref 0–39)
B.E.: -3.4 MMOL/L (ref -3–3)
BILIRUB SERPL-MCNC: 0.3 MG/DL (ref 0–1.2)
BLOOD BANK SAMPLE EXPIRATION: NORMAL
BLOOD GROUP ANTIBODIES SERPL: NEGATIVE
BUN SERPL-MCNC: 16 MG/DL (ref 6–20)
CALCIUM SERPL-MCNC: 9.5 MG/DL (ref 8.6–10.2)
CHLORIDE SERPL-SCNC: 104 MMOL/L (ref 98–107)
CO2 SERPL-SCNC: 21 MMOL/L (ref 22–29)
COHB: 2.4 % (ref 0–1.5)
CREAT SERPL-MCNC: 0.9 MG/DL (ref 0.7–1.2)
CRITICAL: ABNORMAL
DATE ANALYZED: ABNORMAL
DATE OF COLLECTION: ABNORMAL
ERYTHROCYTE [DISTWIDTH] IN BLOOD BY AUTOMATED COUNT: 14.1 % (ref 11.5–15)
ETHANOLAMINE SERPL-MCNC: <10 MG/DL
FIO2: 100 %
GFR SERPL CREATININE-BSD FRML MDRD: >60 ML/MIN/1.73M2
GLUCOSE SERPL-MCNC: 130 MG/DL (ref 74–99)
HCO3: 20.6 MMOL/L (ref 22–26)
HCT VFR BLD AUTO: 41.7 % (ref 37–54)
HGB BLD-MCNC: 14.1 G/DL (ref 12.5–16.5)
HHB: 0.5 % (ref 0–5)
INR PPP: 1.1
LAB: ABNORMAL
LACTATE BLDV-SCNC: 2.5 MMOL/L (ref 0.5–2.2)
Lab: ABNORMAL
MCH RBC QN AUTO: 30.5 PG (ref 26–35)
MCHC RBC AUTO-ENTMCNC: 33.8 G/DL (ref 32–34.5)
MCV RBC AUTO: 90.3 FL (ref 80–99.9)
METHB: 0.4 % (ref 0–1.5)
MODE: ABNORMAL
O2 CONTENT: 21 ML/DL
O2 SATURATION: 99.5 % (ref 92–98.5)
O2HB: 96.7 % (ref 94–97)
OPERATOR ID: 467
PARTIAL THROMBOPLASTIN TIME: 32 SEC (ref 24.5–35.1)
PATIENT TEMP: 37 C
PCO2: 34.1 MMHG (ref 35–45)
PFO2: 3.44 MMHG/%
PH BLOOD GAS: 7.4 (ref 7.35–7.45)
PLATELET # BLD AUTO: 406 K/UL (ref 130–450)
PMV BLD AUTO: 8.9 FL (ref 7–12)
PO2: 344.5 MMHG (ref 75–100)
POTASSIUM SERPL-SCNC: 3.67 MMOL/L (ref 3.5–5)
POTASSIUM SERPL-SCNC: 6.2 MMOL/L (ref 3.5–5)
PROT SERPL-MCNC: 7.8 G/DL (ref 6.4–8.3)
PROTHROMBIN TIME: 12.1 SEC (ref 9.3–12.4)
RBC # BLD AUTO: 4.62 M/UL (ref 3.8–5.8)
RI(T): 0.9
SALICYLATES SERPL-MCNC: <0.3 MG/DL (ref 0–30)
SODIUM SERPL-SCNC: 136 MMOL/L (ref 132–146)
SOURCE, BLOOD GAS: ABNORMAL
THB: 14.8 G/DL (ref 11.5–16.5)
TIME ANALYZED: 2232
TOXIC TRICYCLIC SC,BLOOD: NEGATIVE
WBC OTHER # BLD: 10.9 K/UL (ref 4.5–11.5)

## 2023-07-30 PROCEDURE — G0480 DRUG TEST DEF 1-7 CLASSES: HCPCS

## 2023-07-30 PROCEDURE — 80179 DRUG ASSAY SALICYLATE: CPT

## 2023-07-30 PROCEDURE — 6360000002 HC RX W HCPCS: Performed by: SURGERY

## 2023-07-30 PROCEDURE — 83605 ASSAY OF LACTIC ACID: CPT

## 2023-07-30 PROCEDURE — 82805 BLOOD GASES W/O2 SATURATION: CPT

## 2023-07-30 PROCEDURE — 0BH17EZ INSERTION OF ENDOTRACHEAL AIRWAY INTO TRACHEA, VIA NATURAL OR ARTIFICIAL OPENING: ICD-10-PCS | Performed by: ANESTHESIOLOGY

## 2023-07-30 PROCEDURE — 6360000002 HC RX W HCPCS

## 2023-07-30 PROCEDURE — 80053 COMPREHEN METABOLIC PANEL: CPT

## 2023-07-30 PROCEDURE — 2500000003 HC RX 250 WO HCPCS: Performed by: STUDENT IN AN ORGANIZED HEALTH CARE EDUCATION/TRAINING PROGRAM

## 2023-07-30 PROCEDURE — 76376 3D RENDER W/INTRP POSTPROCES: CPT

## 2023-07-30 PROCEDURE — 99285 EMERGENCY DEPT VISIT HI MDM: CPT

## 2023-07-30 PROCEDURE — 85730 THROMBOPLASTIN TIME PARTIAL: CPT

## 2023-07-30 PROCEDURE — 86900 BLOOD TYPING SEROLOGIC ABO: CPT

## 2023-07-30 PROCEDURE — A4216 STERILE WATER/SALINE, 10 ML: HCPCS | Performed by: STUDENT IN AN ORGANIZED HEALTH CARE EDUCATION/TRAINING PROGRAM

## 2023-07-30 PROCEDURE — 84132 ASSAY OF SERUM POTASSIUM: CPT

## 2023-07-30 PROCEDURE — 70450 CT HEAD/BRAIN W/O DYE: CPT

## 2023-07-30 PROCEDURE — 5A1955Z RESPIRATORY VENTILATION, GREATER THAN 96 CONSECUTIVE HOURS: ICD-10-PCS | Performed by: SURGERY

## 2023-07-30 PROCEDURE — 80307 DRUG TEST PRSMV CHEM ANLYZR: CPT

## 2023-07-30 PROCEDURE — 72125 CT NECK SPINE W/O DYE: CPT

## 2023-07-30 PROCEDURE — 86901 BLOOD TYPING SEROLOGIC RH(D): CPT

## 2023-07-30 PROCEDURE — 85610 PROTHROMBIN TIME: CPT

## 2023-07-30 PROCEDURE — 31500 INSERT EMERGENCY AIRWAY: CPT

## 2023-07-30 PROCEDURE — 36600 WITHDRAWAL OF ARTERIAL BLOOD: CPT | Performed by: SURGERY

## 2023-07-30 PROCEDURE — 2500000003 HC RX 250 WO HCPCS: Performed by: SURGERY

## 2023-07-30 PROCEDURE — 0CDXXZ1 EXTRACTION OF LOWER TOOTH, MULTIPLE, EXTERNAL APPROACH: ICD-10-PCS | Performed by: PLASTIC SURGERY

## 2023-07-30 PROCEDURE — 6810039001 HC L1 TRAUMA PRIORITY

## 2023-07-30 PROCEDURE — 85027 COMPLETE CBC AUTOMATED: CPT

## 2023-07-30 PROCEDURE — 94002 VENT MGMT INPAT INIT DAY: CPT

## 2023-07-30 PROCEDURE — 71045 X-RAY EXAM CHEST 1 VIEW: CPT

## 2023-07-30 PROCEDURE — 96374 THER/PROPH/DIAG INJ IV PUSH: CPT

## 2023-07-30 PROCEDURE — 70486 CT MAXILLOFACIAL W/O DYE: CPT

## 2023-07-30 PROCEDURE — 99291 CRITICAL CARE FIRST HOUR: CPT | Performed by: SURGERY

## 2023-07-30 PROCEDURE — 80143 DRUG ASSAY ACETAMINOPHEN: CPT

## 2023-07-30 PROCEDURE — 2580000003 HC RX 258: Performed by: STUDENT IN AN ORGANIZED HEALTH CARE EDUCATION/TRAINING PROGRAM

## 2023-07-30 PROCEDURE — 86850 RBC ANTIBODY SCREEN: CPT

## 2023-07-30 PROCEDURE — 96375 TX/PRO/DX INJ NEW DRUG ADDON: CPT

## 2023-07-30 PROCEDURE — 2000000000 HC ICU R&B

## 2023-07-30 PROCEDURE — 6360000002 HC RX W HCPCS: Performed by: STUDENT IN AN ORGANIZED HEALTH CARE EDUCATION/TRAINING PROGRAM

## 2023-07-30 PROCEDURE — 0KQ10ZZ REPAIR FACIAL MUSCLE, OPEN APPROACH: ICD-10-PCS | Performed by: PLASTIC SURGERY

## 2023-07-30 PROCEDURE — 6370000000 HC RX 637 (ALT 250 FOR IP): Performed by: STUDENT IN AN ORGANIZED HEALTH CARE EDUCATION/TRAINING PROGRAM

## 2023-07-30 RX ORDER — POLYETHYLENE GLYCOL 3350 17 G/17G
17 POWDER, FOR SOLUTION ORAL DAILY
Status: DISCONTINUED | OUTPATIENT
Start: 2023-07-31 | End: 2023-08-05

## 2023-07-30 RX ORDER — PROPOFOL 10 MG/ML
INJECTION, EMULSION INTRAVENOUS
Status: COMPLETED
Start: 2023-07-30 | End: 2023-07-30

## 2023-07-30 RX ORDER — CHLORHEXIDINE GLUCONATE 0.12 MG/ML
15 RINSE ORAL 2 TIMES DAILY
Status: DISCONTINUED | OUTPATIENT
Start: 2023-07-30 | End: 2023-07-31

## 2023-07-30 RX ORDER — MIDAZOLAM HYDROCHLORIDE 2 MG/2ML
2 INJECTION, SOLUTION INTRAMUSCULAR; INTRAVENOUS ONCE
Status: DISCONTINUED | OUTPATIENT
Start: 2023-07-31 | End: 2023-07-30

## 2023-07-30 RX ORDER — SODIUM CHLORIDE 9 MG/ML
INJECTION, SOLUTION INTRAVENOUS PRN
Status: DISCONTINUED | OUTPATIENT
Start: 2023-07-30 | End: 2023-08-24

## 2023-07-30 RX ORDER — MIDAZOLAM HYDROCHLORIDE 1 MG/ML
INJECTION INTRAMUSCULAR; INTRAVENOUS
Status: COMPLETED
Start: 2023-07-30 | End: 2023-07-30

## 2023-07-30 RX ORDER — MIDAZOLAM HYDROCHLORIDE 2 MG/2ML
2 INJECTION, SOLUTION INTRAMUSCULAR; INTRAVENOUS ONCE
Status: COMPLETED | OUTPATIENT
Start: 2023-07-31 | End: 2023-07-30

## 2023-07-30 RX ORDER — SUCCINYLCHOLINE CHLORIDE 20 MG/ML
INJECTION INTRAMUSCULAR; INTRAVENOUS DAILY PRN
Status: COMPLETED | OUTPATIENT
Start: 2023-07-30 | End: 2023-07-30

## 2023-07-30 RX ORDER — FENTANYL CITRATE 50 UG/ML
100 INJECTION, SOLUTION INTRAMUSCULAR; INTRAVENOUS ONCE
Status: DISCONTINUED | OUTPATIENT
Start: 2023-07-31 | End: 2023-08-02

## 2023-07-30 RX ORDER — SODIUM CHLORIDE 0.9 % (FLUSH) 0.9 %
5-40 SYRINGE (ML) INJECTION EVERY 12 HOURS SCHEDULED
Status: DISCONTINUED | OUTPATIENT
Start: 2023-07-30 | End: 2023-08-25 | Stop reason: HOSPADM

## 2023-07-30 RX ORDER — ONDANSETRON 4 MG/1
4 TABLET, ORALLY DISINTEGRATING ORAL EVERY 8 HOURS PRN
Status: DISCONTINUED | OUTPATIENT
Start: 2023-07-30 | End: 2023-08-25 | Stop reason: HOSPADM

## 2023-07-30 RX ORDER — VECURONIUM BROMIDE 1 MG/ML
INJECTION, POWDER, LYOPHILIZED, FOR SOLUTION INTRAVENOUS DAILY PRN
Status: COMPLETED | OUTPATIENT
Start: 2023-07-30 | End: 2023-07-30

## 2023-07-30 RX ORDER — FENTANYL CITRATE 50 UG/ML
INJECTION, SOLUTION INTRAMUSCULAR; INTRAVENOUS DAILY PRN
Status: COMPLETED | OUTPATIENT
Start: 2023-07-30 | End: 2023-07-30

## 2023-07-30 RX ORDER — SODIUM CHLORIDE 9 MG/ML
INJECTION, SOLUTION INTRAVENOUS CONTINUOUS
Status: DISCONTINUED | OUTPATIENT
Start: 2023-07-30 | End: 2023-08-02

## 2023-07-30 RX ORDER — ONDANSETRON 2 MG/ML
4 INJECTION INTRAMUSCULAR; INTRAVENOUS EVERY 6 HOURS PRN
Status: DISCONTINUED | OUTPATIENT
Start: 2023-07-30 | End: 2023-08-25 | Stop reason: HOSPADM

## 2023-07-30 RX ORDER — PROPOFOL 10 MG/ML
5-50 INJECTION, EMULSION INTRAVENOUS CONTINUOUS
Status: DISCONTINUED | OUTPATIENT
Start: 2023-07-30 | End: 2023-07-31

## 2023-07-30 RX ORDER — ETOMIDATE 2 MG/ML
INJECTION INTRAVENOUS DAILY PRN
Status: COMPLETED | OUTPATIENT
Start: 2023-07-30 | End: 2023-07-30

## 2023-07-30 RX ORDER — SODIUM CHLORIDE 0.9 % (FLUSH) 0.9 %
10 SYRINGE (ML) INJECTION PRN
Status: DISCONTINUED | OUTPATIENT
Start: 2023-07-30 | End: 2023-08-25 | Stop reason: HOSPADM

## 2023-07-30 RX ADMIN — Medication 50 MCG/HR: at 23:19

## 2023-07-30 RX ADMIN — FENTANYL CITRATE 100 MCG: 50 INJECTION, SOLUTION INTRAMUSCULAR; INTRAVENOUS at 23:30

## 2023-07-30 RX ADMIN — MIDAZOLAM HYDROCHLORIDE 2 MG: 2 INJECTION, SOLUTION INTRAMUSCULAR; INTRAVENOUS at 23:35

## 2023-07-30 RX ADMIN — SODIUM CHLORIDE, PRESERVATIVE FREE 10 ML: 5 INJECTION INTRAVENOUS at 23:23

## 2023-07-30 RX ADMIN — 0.12% CHLORHEXIDINE GLUCONATE 15 ML: 1.2 RINSE ORAL at 23:23

## 2023-07-30 RX ADMIN — VECURONIUM BROMIDE 10 MG: 10 INJECTION, POWDER, FOR SOLUTION INTRAVENOUS at 22:27

## 2023-07-30 RX ADMIN — FENTANYL CITRATE 100 MCG: 50 INJECTION, SOLUTION INTRAMUSCULAR; INTRAVENOUS at 22:28

## 2023-07-30 RX ADMIN — SODIUM CHLORIDE, PRESERVATIVE FREE 20 MG: 5 INJECTION INTRAVENOUS at 23:23

## 2023-07-30 RX ADMIN — VECURONIUM BROMIDE 1 MCG/KG/MIN: 1 INJECTION, POWDER, LYOPHILIZED, FOR SOLUTION INTRAVENOUS at 23:38

## 2023-07-30 RX ADMIN — MIDAZOLAM 2 MG: 1 INJECTION INTRAMUSCULAR; INTRAVENOUS at 23:35

## 2023-07-30 RX ADMIN — ETOMIDATE 20 MG: 2 INJECTION, SOLUTION INTRAVENOUS at 22:20

## 2023-07-30 RX ADMIN — SUCCINYLCHOLINE CHLORIDE 100 MG: 20 INJECTION, SOLUTION INTRAMUSCULAR; INTRAVENOUS at 22:20

## 2023-07-30 RX ADMIN — PROPOFOL 10 MCG: 10 INJECTION, EMULSION INTRAVENOUS at 22:25

## 2023-07-30 RX ADMIN — SODIUM CHLORIDE: 9 INJECTION, SOLUTION INTRAVENOUS at 23:24

## 2023-07-30 RX ADMIN — PROPOFOL 50 MCG/KG/MIN: 10 INJECTION, EMULSION INTRAVENOUS at 23:18

## 2023-07-30 ASSESSMENT — PULMONARY FUNCTION TESTS: PIF_VALUE: 19

## 2023-07-30 ASSESSMENT — PAIN SCALES - GENERAL: PAINLEVEL_OUTOF10: 0

## 2023-07-31 ENCOUNTER — APPOINTMENT (OUTPATIENT)
Dept: GENERAL RADIOLOGY | Age: 58
DRG: 004 | End: 2023-07-31
Payer: COMMERCIAL

## 2023-07-31 LAB
AADO2: 129.5 MMHG
ALBUMIN SERPL-MCNC: 3.1 G/DL (ref 3.5–5.2)
ALBUMIN SERPL-MCNC: 3.2 G/DL (ref 3.5–5.2)
ALP SERPL-CCNC: 78 U/L (ref 40–129)
ALP SERPL-CCNC: 80 U/L (ref 40–129)
ALT SERPL-CCNC: 24 U/L (ref 0–40)
ALT SERPL-CCNC: 25 U/L (ref 0–40)
AMPHET UR QL SCN: NEGATIVE
ANION GAP SERPL CALCULATED.3IONS-SCNC: 11 MMOL/L (ref 7–16)
ANION GAP SERPL CALCULATED.3IONS-SCNC: 22 MMOL/L (ref 7–16)
AST SERPL-CCNC: 22 U/L (ref 0–39)
AST SERPL-CCNC: 24 U/L (ref 0–39)
B.E.: -3.3 MMOL/L (ref -3–3)
BARBITURATES UR QL SCN: NEGATIVE
BASOPHILS # BLD: 0.07 K/UL (ref 0–0.2)
BASOPHILS # BLD: 0.1 K/UL (ref 0–0.2)
BASOPHILS NFR BLD: 1 % (ref 0–2)
BASOPHILS NFR BLD: 1 % (ref 0–2)
BENZODIAZ UR QL: NEGATIVE
BILIRUB SERPL-MCNC: 0.3 MG/DL (ref 0–1.2)
BILIRUB SERPL-MCNC: <0.2 MG/DL (ref 0–1.2)
BUN SERPL-MCNC: 15 MG/DL (ref 6–20)
BUN SERPL-MCNC: 16 MG/DL (ref 6–20)
BUPRENORPHINE UR QL: NEGATIVE
CA-I BLD-SCNC: 1.06 MMOL/L (ref 1.15–1.33)
CALCIUM SERPL-MCNC: 7.3 MG/DL (ref 8.6–10.2)
CALCIUM SERPL-MCNC: 7.4 MG/DL (ref 8.6–10.2)
CANNABINOIDS UR QL SCN: NEGATIVE
CHLORIDE SERPL-SCNC: 107 MMOL/L (ref 98–107)
CHLORIDE SERPL-SCNC: 110 MMOL/L (ref 98–107)
CO2 SERPL-SCNC: 21 MMOL/L (ref 22–29)
CO2 SERPL-SCNC: 21 MMOL/L (ref 22–29)
COCAINE UR QL SCN: NEGATIVE
COHB: 0.3 % (ref 0–1.5)
CREAT SERPL-MCNC: 0.9 MG/DL (ref 0.7–1.2)
CREAT SERPL-MCNC: 0.9 MG/DL (ref 0.7–1.2)
CRITICAL: ABNORMAL
DATE ANALYZED: ABNORMAL
DATE OF COLLECTION: ABNORMAL
EOSINOPHIL # BLD: 0.14 K/UL (ref 0.05–0.5)
EOSINOPHIL # BLD: 0.15 K/UL (ref 0.05–0.5)
EOSINOPHILS RELATIVE PERCENT: 2 % (ref 0–6)
EOSINOPHILS RELATIVE PERCENT: 2 % (ref 0–6)
ERYTHROCYTE [DISTWIDTH] IN BLOOD BY AUTOMATED COUNT: 14 % (ref 11.5–15)
ERYTHROCYTE [DISTWIDTH] IN BLOOD BY AUTOMATED COUNT: 14.1 % (ref 11.5–15)
ETHANOLAMINE SERPL-MCNC: <10 MG/DL
FENTANYL UR QL: NEGATIVE
FIO2: 50 %
GFR SERPL CREATININE-BSD FRML MDRD: >60 ML/MIN/1.73M2
GFR SERPL CREATININE-BSD FRML MDRD: >60 ML/MIN/1.73M2
GLUCOSE SERPL-MCNC: 124 MG/DL (ref 74–99)
GLUCOSE SERPL-MCNC: 132 MG/DL (ref 74–99)
HCO3: 21 MMOL/L (ref 22–26)
HCT VFR BLD AUTO: 32.5 % (ref 37–54)
HCT VFR BLD AUTO: 35.6 % (ref 37–54)
HGB BLD-MCNC: 10.7 G/DL (ref 12.5–16.5)
HGB BLD-MCNC: 11.5 G/DL (ref 12.5–16.5)
HHB: 1.4 % (ref 0–5)
IMM GRANULOCYTES # BLD AUTO: 0.03 K/UL (ref 0–0.58)
IMM GRANULOCYTES # BLD AUTO: 0.04 K/UL (ref 0–0.58)
IMM GRANULOCYTES NFR BLD: 0 % (ref 0–5)
IMM GRANULOCYTES NFR BLD: 0 % (ref 0–5)
INR PPP: 1.2
LAB: ABNORMAL
LACTATE BLDV-SCNC: 1.3 MMOL/L (ref 0.5–2.2)
LYMPHOCYTES NFR BLD: 1.72 K/UL (ref 1.5–4)
LYMPHOCYTES NFR BLD: 2.23 K/UL (ref 1.5–4)
LYMPHOCYTES RELATIVE PERCENT: 18 % (ref 20–42)
LYMPHOCYTES RELATIVE PERCENT: 23 % (ref 20–42)
Lab: ABNORMAL
MAGNESIUM SERPL-MCNC: 1.5 MG/DL (ref 1.6–2.6)
MCH RBC QN AUTO: 29.6 PG (ref 26–35)
MCH RBC QN AUTO: 30.5 PG (ref 26–35)
MCHC RBC AUTO-ENTMCNC: 32.3 G/DL (ref 32–34.5)
MCHC RBC AUTO-ENTMCNC: 32.9 G/DL (ref 32–34.5)
MCV RBC AUTO: 91.8 FL (ref 80–99.9)
MCV RBC AUTO: 92.6 FL (ref 80–99.9)
METER GLUCOSE: 143 MG/DL (ref 74–99)
METER GLUCOSE: 146 MG/DL (ref 74–99)
METER GLUCOSE: 160 MG/DL (ref 74–99)
METER GLUCOSE: 165 MG/DL (ref 74–99)
METHADONE UR QL: NEGATIVE
METHB: 0.3 % (ref 0–1.5)
MODE: AC
MONOCYTES NFR BLD: 0.83 K/UL (ref 0.1–0.95)
MONOCYTES NFR BLD: 0.95 K/UL (ref 0.1–0.95)
MONOCYTES NFR BLD: 10 % (ref 2–12)
MONOCYTES NFR BLD: 9 % (ref 2–12)
NEUTROPHILS NFR BLD: 64 % (ref 43–80)
NEUTROPHILS NFR BLD: 70 % (ref 43–80)
NEUTS SEG NFR BLD: 6.1 K/UL (ref 1.8–7.3)
NEUTS SEG NFR BLD: 6.58 K/UL (ref 1.8–7.3)
O2 CONTENT: 18.8 ML/DL
O2 SATURATION: 98.6 % (ref 92–98.5)
O2HB: 98 % (ref 94–97)
OPERATOR ID: 2245
OPIATES UR QL SCN: NEGATIVE
OXYCODONE UR QL SCN: NEGATIVE
PATIENT TEMP: 37 C
PCO2: 35.8 MMHG (ref 35–45)
PCP UR QL SCN: NEGATIVE
PEEP/CPAP: 8 CMH2O
PFO2: 3.48 MMHG/%
PH BLOOD GAS: 7.39 (ref 7.35–7.45)
PHOSPHATE SERPL-MCNC: 2.9 MG/DL (ref 2.5–4.5)
PLATELET # BLD AUTO: 299 K/UL (ref 130–450)
PLATELET # BLD AUTO: 336 K/UL (ref 130–450)
PMV BLD AUTO: 8.5 FL (ref 7–12)
PMV BLD AUTO: 8.8 FL (ref 7–12)
PO2: 174.2 MMHG (ref 75–100)
POTASSIUM SERPL-SCNC: 3.6 MMOL/L (ref 3.5–5)
POTASSIUM SERPL-SCNC: 3.7 MMOL/L (ref 3.5–5)
PROT SERPL-MCNC: 5.4 G/DL (ref 6.4–8.3)
PROT SERPL-MCNC: 6.1 G/DL (ref 6.4–8.3)
PROTHROMBIN TIME: 13 SEC (ref 9.3–12.4)
RBC # BLD AUTO: 3.51 M/UL (ref 3.8–5.8)
RBC # BLD AUTO: 3.88 M/UL (ref 3.8–5.8)
RI(T): 0.74
RR MECHANICAL: 14 B/MIN
SODIUM SERPL-SCNC: 142 MMOL/L (ref 132–146)
SODIUM SERPL-SCNC: 150 MMOL/L (ref 132–146)
SOURCE, BLOOD GAS: ABNORMAL
TEST INFORMATION: NORMAL
THB: 13.4 G/DL (ref 11.5–16.5)
TIME ANALYZED: 509
VT MECHANICAL: 500 ML
WBC OTHER # BLD: 9.4 K/UL (ref 4.5–11.5)
WBC OTHER # BLD: 9.5 K/UL (ref 4.5–11.5)

## 2023-07-31 PROCEDURE — 83735 ASSAY OF MAGNESIUM: CPT

## 2023-07-31 PROCEDURE — 82330 ASSAY OF CALCIUM: CPT

## 2023-07-31 PROCEDURE — 6360000002 HC RX W HCPCS

## 2023-07-31 PROCEDURE — 2000000000 HC ICU R&B

## 2023-07-31 PROCEDURE — 99291 CRITICAL CARE FIRST HOUR: CPT | Performed by: SURGERY

## 2023-07-31 PROCEDURE — 2500000003 HC RX 250 WO HCPCS

## 2023-07-31 PROCEDURE — 82805 BLOOD GASES W/O2 SATURATION: CPT

## 2023-07-31 PROCEDURE — 85027 COMPLETE CBC AUTOMATED: CPT

## 2023-07-31 PROCEDURE — 2500000003 HC RX 250 WO HCPCS: Performed by: STUDENT IN AN ORGANIZED HEALTH CARE EDUCATION/TRAINING PROGRAM

## 2023-07-31 PROCEDURE — 85610 PROTHROMBIN TIME: CPT

## 2023-07-31 PROCEDURE — 6370000000 HC RX 637 (ALT 250 FOR IP)

## 2023-07-31 PROCEDURE — 87081 CULTURE SCREEN ONLY: CPT

## 2023-07-31 PROCEDURE — 82947 ASSAY GLUCOSE BLOOD QUANT: CPT

## 2023-07-31 PROCEDURE — 6370000000 HC RX 637 (ALT 250 FOR IP): Performed by: STUDENT IN AN ORGANIZED HEALTH CARE EDUCATION/TRAINING PROGRAM

## 2023-07-31 PROCEDURE — 94003 VENT MGMT INPAT SUBQ DAY: CPT

## 2023-07-31 PROCEDURE — A4216 STERILE WATER/SALINE, 10 ML: HCPCS | Performed by: STUDENT IN AN ORGANIZED HEALTH CARE EDUCATION/TRAINING PROGRAM

## 2023-07-31 PROCEDURE — 2580000003 HC RX 258

## 2023-07-31 PROCEDURE — 84100 ASSAY OF PHOSPHORUS: CPT

## 2023-07-31 PROCEDURE — 83605 ASSAY OF LACTIC ACID: CPT

## 2023-07-31 PROCEDURE — 71045 X-RAY EXAM CHEST 1 VIEW: CPT

## 2023-07-31 PROCEDURE — 2580000003 HC RX 258: Performed by: STUDENT IN AN ORGANIZED HEALTH CARE EDUCATION/TRAINING PROGRAM

## 2023-07-31 PROCEDURE — 80053 COMPREHEN METABOLIC PANEL: CPT

## 2023-07-31 PROCEDURE — G0480 DRUG TEST DEF 1-7 CLASSES: HCPCS

## 2023-07-31 PROCEDURE — 6360000002 HC RX W HCPCS: Performed by: STUDENT IN AN ORGANIZED HEALTH CARE EDUCATION/TRAINING PROGRAM

## 2023-07-31 RX ORDER — DEXTROSE MONOHYDRATE 100 MG/ML
INJECTION, SOLUTION INTRAVENOUS CONTINUOUS PRN
Status: DISCONTINUED | OUTPATIENT
Start: 2023-07-31 | End: 2023-08-25 | Stop reason: HOSPADM

## 2023-07-31 RX ORDER — SODIUM CHLORIDE 9 MG/ML
INJECTION, SOLUTION INTRAVENOUS ONCE
Status: COMPLETED | OUTPATIENT
Start: 2023-07-31 | End: 2023-07-31

## 2023-07-31 RX ORDER — POTASSIUM CHLORIDE 7.45 MG/ML
10 INJECTION INTRAVENOUS
Status: COMPLETED | OUTPATIENT
Start: 2023-07-31 | End: 2023-07-31

## 2023-07-31 RX ORDER — BACITRACIN ZINC AND POLYMYXIN B SULFATE 500; 10000 [USP'U]/G; [USP'U]/G
OINTMENT OPHTHALMIC 2 TIMES DAILY
Status: COMPLETED | OUTPATIENT
Start: 2023-07-31 | End: 2023-08-02

## 2023-07-31 RX ORDER — ENOXAPARIN SODIUM 100 MG/ML
40 INJECTION SUBCUTANEOUS DAILY
Status: DISCONTINUED | OUTPATIENT
Start: 2023-07-31 | End: 2023-08-01

## 2023-07-31 RX ORDER — MIDAZOLAM HYDROCHLORIDE 1 MG/ML
1-10 INJECTION, SOLUTION INTRAVENOUS CONTINUOUS
Status: DISCONTINUED | OUTPATIENT
Start: 2023-07-31 | End: 2023-07-31

## 2023-07-31 RX ORDER — PROPOFOL 10 MG/ML
INJECTION, EMULSION INTRAVENOUS
Status: COMPLETED
Start: 2023-07-31 | End: 2023-07-31

## 2023-07-31 RX ORDER — CHLORHEXIDINE GLUCONATE 0.12 MG/ML
15 RINSE ORAL EVERY 4 HOURS
Status: DISCONTINUED | OUTPATIENT
Start: 2023-07-31 | End: 2023-08-25 | Stop reason: HOSPADM

## 2023-07-31 RX ORDER — CALCIUM GLUCONATE 10 MG/ML
1000 INJECTION, SOLUTION INTRAVENOUS ONCE
Status: COMPLETED | OUTPATIENT
Start: 2023-07-31 | End: 2023-07-31

## 2023-07-31 RX ORDER — POLYVINYL ALCOHOL 14 MG/ML
1 SOLUTION/ DROPS OPHTHALMIC EVERY 4 HOURS
Status: DISCONTINUED | OUTPATIENT
Start: 2023-07-31 | End: 2023-08-25 | Stop reason: HOSPADM

## 2023-07-31 RX ORDER — PROPOFOL 10 MG/ML
5-50 INJECTION, EMULSION INTRAVENOUS CONTINUOUS
Status: DISCONTINUED | OUTPATIENT
Start: 2023-07-31 | End: 2023-08-03

## 2023-07-31 RX ADMIN — PROPOFOL 50 MCG/KG/MIN: 10 INJECTION, EMULSION INTRAVENOUS at 23:17

## 2023-07-31 RX ADMIN — BACITRACIN ZINC AND POLYMYXIN B SULFATE: 500; 10000 OINTMENT OPHTHALMIC at 08:45

## 2023-07-31 RX ADMIN — SODIUM CHLORIDE, PRESERVATIVE FREE 10 ML: 5 INJECTION INTRAVENOUS at 08:46

## 2023-07-31 RX ADMIN — SODIUM CHLORIDE, PRESERVATIVE FREE 10 ML: 5 INJECTION INTRAVENOUS at 20:24

## 2023-07-31 RX ADMIN — POLYETHYLENE GLYCOL 3350 17 G: 17 POWDER, FOR SOLUTION ORAL at 08:46

## 2023-07-31 RX ADMIN — POLYVINYL ALCOHOL 1 DROP: 14 SOLUTION/ DROPS OPHTHALMIC at 23:18

## 2023-07-31 RX ADMIN — SODIUM CHLORIDE: 9 INJECTION, SOLUTION INTRAVENOUS at 03:49

## 2023-07-31 RX ADMIN — PROPOFOL 50 MCG/KG/MIN: 10 INJECTION, EMULSION INTRAVENOUS at 09:21

## 2023-07-31 RX ADMIN — AMPICILLIN SODIUM AND SULBACTAM SODIUM 3000 MG: 2; 1 INJECTION, POWDER, FOR SOLUTION INTRAMUSCULAR; INTRAVENOUS at 05:55

## 2023-07-31 RX ADMIN — PROPOFOL 20 MCG/KG/MIN: 10 INJECTION, EMULSION INTRAVENOUS at 03:14

## 2023-07-31 RX ADMIN — PROPOFOL 50 MCG/KG/MIN: 10 INJECTION, EMULSION INTRAVENOUS at 12:56

## 2023-07-31 RX ADMIN — AMPICILLIN SODIUM AND SULBACTAM SODIUM 3000 MG: 2; 1 INJECTION, POWDER, FOR SOLUTION INTRAMUSCULAR; INTRAVENOUS at 00:49

## 2023-07-31 RX ADMIN — POLYVINYL ALCOHOL 1 DROP: 14 SOLUTION/ DROPS OPHTHALMIC at 12:08

## 2023-07-31 RX ADMIN — CHLORHEXIDINE GLUCONATE 15 ML: 1.2 RINSE ORAL at 16:28

## 2023-07-31 RX ADMIN — AMPICILLIN SODIUM AND SULBACTAM SODIUM 3000 MG: 2; 1 INJECTION, POWDER, FOR SOLUTION INTRAMUSCULAR; INTRAVENOUS at 23:19

## 2023-07-31 RX ADMIN — POTASSIUM CHLORIDE 10 MEQ: 7.46 INJECTION, SOLUTION INTRAVENOUS at 08:59

## 2023-07-31 RX ADMIN — POTASSIUM PHOSPHATE, MONOBASIC AND POTASSIUM PHOSPHATE, DIBASIC 15 MMOL: 224; 236 INJECTION, SOLUTION, CONCENTRATE INTRAVENOUS at 09:39

## 2023-07-31 RX ADMIN — BACITRACIN ZINC AND POLYMYXIN B SULFATE: 500; 10000 OINTMENT OPHTHALMIC at 20:25

## 2023-07-31 RX ADMIN — CHLORHEXIDINE GLUCONATE 15 ML: 1.2 RINSE ORAL at 20:23

## 2023-07-31 RX ADMIN — ENOXAPARIN SODIUM 40 MG: 100 INJECTION SUBCUTANEOUS at 10:43

## 2023-07-31 RX ADMIN — Medication 200 MCG/HR: at 05:26

## 2023-07-31 RX ADMIN — CHLORHEXIDINE GLUCONATE 15 ML: 1.2 RINSE ORAL at 12:08

## 2023-07-31 RX ADMIN — 0.12% CHLORHEXIDINE GLUCONATE 15 ML: 1.2 RINSE ORAL at 08:46

## 2023-07-31 RX ADMIN — Medication 175 MCG/HR: at 18:25

## 2023-07-31 RX ADMIN — POLYVINYL ALCOHOL 1 DROP: 14 SOLUTION/ DROPS OPHTHALMIC at 09:50

## 2023-07-31 RX ADMIN — SODIUM CHLORIDE: 9 INJECTION, SOLUTION INTRAVENOUS at 17:18

## 2023-07-31 RX ADMIN — AMPICILLIN SODIUM AND SULBACTAM SODIUM 3000 MG: 2; 1 INJECTION, POWDER, FOR SOLUTION INTRAMUSCULAR; INTRAVENOUS at 12:06

## 2023-07-31 RX ADMIN — SODIUM CHLORIDE, PRESERVATIVE FREE 20 MG: 5 INJECTION INTRAVENOUS at 08:46

## 2023-07-31 RX ADMIN — CALCIUM GLUCONATE 1000 MG: 10 INJECTION, SOLUTION INTRAVENOUS at 08:55

## 2023-07-31 RX ADMIN — SODIUM CHLORIDE, PRESERVATIVE FREE 20 MG: 5 INJECTION INTRAVENOUS at 20:24

## 2023-07-31 RX ADMIN — PROPOFOL 40 MCG/KG/MIN: 10 INJECTION, EMULSION INTRAVENOUS at 20:02

## 2023-07-31 RX ADMIN — AMPICILLIN SODIUM AND SULBACTAM SODIUM 3000 MG: 2; 1 INJECTION, POWDER, FOR SOLUTION INTRAMUSCULAR; INTRAVENOUS at 17:59

## 2023-07-31 RX ADMIN — POTASSIUM CHLORIDE 10 MEQ: 7.46 INJECTION, SOLUTION INTRAVENOUS at 09:56

## 2023-07-31 RX ADMIN — PROPOFOL 45 MCG/KG/MIN: 10 INJECTION, EMULSION INTRAVENOUS at 16:23

## 2023-07-31 RX ADMIN — SODIUM CHLORIDE: 9 INJECTION, SOLUTION INTRAVENOUS at 09:44

## 2023-07-31 RX ADMIN — VECURONIUM BROMIDE 1 MCG/KG/MIN: 1 INJECTION, POWDER, LYOPHILIZED, FOR SOLUTION INTRAVENOUS at 15:23

## 2023-07-31 RX ADMIN — POLYVINYL ALCOHOL 1 DROP: 14 SOLUTION/ DROPS OPHTHALMIC at 15:57

## 2023-07-31 RX ADMIN — SODIUM CHLORIDE: 9 INJECTION, SOLUTION INTRAVENOUS at 16:28

## 2023-07-31 RX ADMIN — Medication 2 MG/HR: at 01:40

## 2023-07-31 RX ADMIN — PROPOFOL 50 MCG/KG/MIN: 10 INJECTION, EMULSION INTRAVENOUS at 06:03

## 2023-07-31 RX ADMIN — Medication 200 MCG/HR: at 11:45

## 2023-07-31 RX ADMIN — POTASSIUM CHLORIDE 10 MEQ: 7.46 INJECTION, SOLUTION INTRAVENOUS at 10:59

## 2023-07-31 RX ADMIN — POLYVINYL ALCOHOL 1 DROP: 14 SOLUTION/ DROPS OPHTHALMIC at 20:24

## 2023-07-31 RX ADMIN — MAGNESIUM SULFATE HEPTAHYDRATE 3000 MG: 500 INJECTION, SOLUTION INTRAMUSCULAR; INTRAVENOUS at 09:57

## 2023-07-31 RX ADMIN — BACITRACIN ZINC AND POLYMYXIN B SULFATE: 500; 10000 OINTMENT OPHTHALMIC at 01:42

## 2023-07-31 ASSESSMENT — PULMONARY FUNCTION TESTS
PIF_VALUE: 19
PIF_VALUE: 19
PIF_VALUE: 20
PIF_VALUE: 19
PIF_VALUE: 20
PIF_VALUE: 19
PIF_VALUE: 20
PIF_VALUE: 19
PIF_VALUE: 20
PIF_VALUE: 19
PIF_VALUE: 20
PIF_VALUE: 19
PIF_VALUE: 20
PIF_VALUE: 19
PIF_VALUE: 20
PIF_VALUE: 19
PIF_VALUE: 20
PIF_VALUE: 19
PIF_VALUE: 19
PIF_VALUE: 20

## 2023-07-31 ASSESSMENT — PAIN SCALES - GENERAL
PAINLEVEL_OUTOF10: 0

## 2023-07-31 NOTE — ED NOTES
Pt intubated at this time. End tidal 27.  8.0 et tube     26@ the  lips      George Martinez RN  07/30/23 [id number]

## 2023-07-31 NOTE — PROGRESS NOTES
DAILY VENTILATOR WEANING ASSESSMENT PERFORMED    P/FIO2 Ratio = 348          (<100= do not Wean)                  Cs =   49                       (<32= Instability)  Plat. Pressure = 18  MV = 6.93  RSBI =    Instabilities:       Cardiovascular =       CNS = 1       Respiratory =       Metabolic =    Parameters    no    Wean per protocol  no    Ask Physician for a weaning plan no    Additional Comments: pt remains on AC settings, no parameters or weaning today due to surgery tomorrow    Performed by Linnette Preston RCP RRT      Reference Table:    Cardiovascular     CNS      1. Mean BP less than or equal to 75   1. Neuromuscular blockade  2. Heart Rate greater than 130   2. RASS of -3, -4, -5  3. Myocardial Ischemia    3. RASS of +3, +4  4. Mechanical Assist Device    4. ICP greater than 15 or             Intracranial Hypertension         Respiratory      Metabolic  1. PEEP equal to or greater than 10cm/H20  1. Temp. (8hrs) less than 95 or > 103  2. Respiratory Rate greater than 35   2. WBC < 5000 or > 35855  3. Minute Volume greater than 15L  4. pH less than 7.30  5.  Deteriorating chest X-ray            07/31/23 0824   Patient Observation   Pulse 76   Respirations 23   SpO2 98 %   Breath Sounds   Right Upper Lobe Clear;Diminished   Left Upper Lobe Clear;Diminished   Vent Information   Ventilator Day(s) 1   Vent Mode AC/VC   Ventilator Settings   FiO2  40 %   Vt (Set, mL) 500 mL   Resp Rate (Set) 14 bmp   PEEP/CPAP (cmH2O) 8   Pressure Support (cm H2O) 0 cm H2O   Vent Patient Data (Readings)   Vt (Measured) 497 mL   Peak Inspiratory Pressure (cmH2O) 20 cmH2O   Rate Measured 14 br/min   Minute Volume (L/min) 6.93 Liters   Mean Airway Pressure (cmH2O) 11 cmH20   Plateau Pressure (cm H2O) 18 cm H2O   Driving Pressure 10   I:E Ratio 1:3.80   PEEP Intrinsic (cm H2O) 0.3 cm H2O   Static Compliance (L/cm H2O) 49   Backup Apnea On   Backup Vt 500   Vent Alarm Settings   High Pressure (cmH2O) 40 cmH2O   Low Minute Volume (lpm)

## 2023-07-31 NOTE — PLAN OF CARE
Problem: Discharge Planning  Goal: Discharge to home or other facility with appropriate resources  7/31/2023 0759 by Bhakti Guevara RN  Outcome: Not Progressing  7/31/2023 0410 by Marbella Turner RN  Outcome: Progressing     Problem: Neurosensory - Adult  Goal: Achieves maximal functionality and self care  7/31/2023 0759 by Bhakti Guevara RN  Outcome: Not Progressing  7/31/2023 0410 by Marbella Turner RN  Outcome: Progressing     Problem: Musculoskeletal - Adult  Goal: Return mobility to safest level of function  7/31/2023 0759 by Bhakti Guevara RN  Outcome: Not Progressing  7/31/2023 0410 by Marbella Turner RN  Outcome: Progressing  Goal: Return ADL status to a safe level of function  7/31/2023 0759 by Bhakti Guevara RN  Outcome: Not Progressing  7/31/2023 0410 by Marbella Turner RN  Outcome: Progressing     Problem: Gastrointestinal - Adult  Goal: Maintains adequate nutritional intake  7/31/2023 0759 by Bhakti Guevara RN  Outcome: Not Progressing  7/31/2023 0410 by Marbella Turner RN  Outcome: Progressing     Problem: Discharge Planning  Goal: Discharge to home or other facility with appropriate resources  7/31/2023 0759 by Bhakti Guevara RN  Outcome: Not Progressing  7/31/2023 0410 by Marbella Turner RN  Outcome: Progressing     Problem: Neurosensory - Adult  Goal: Achieves maximal functionality and self care  7/31/2023 0759 by Bhakti Guevara RN  Outcome: Not Progressing  7/31/2023 0410 by Marbella Turner RN  Outcome: Progressing     Problem: Musculoskeletal - Adult  Goal: Return mobility to safest level of function  7/31/2023 0759 by Bhakti Guevara RN  Outcome: Not Progressing  7/31/2023 0410 by Marbella Turner RN  Outcome: Progressing  Goal: Return ADL status to a safe level of function  7/31/2023 0759 by Bhakti Guevara RN  Outcome: Not Progressing  7/31/2023 0410 by Marbella Turner RN  Outcome: Progressing     Problem: Gastrointestinal - Adult  Goal: Maintains adequate nutritional
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
## 2023-07-31 NOTE — PROGRESS NOTES
Patient admitted to SICU with the following belongings:  [employer] Phone, and Other keys, insulin pump . The following belongings admitted with the patient, ALL, were sent home with the patient's family.

## 2023-07-31 NOTE — PROGRESS NOTES
Physical Therapy  Physical Therapy Attempt    Name: Leo Nguyen  : 1965  MRN: [id number]      Date of Service: 2023  Chart reviewed. Pt is not medically appropriate for skilled PT. Will re-attempt as able.     Feroz Denny, PT, DPT  [id number]

## 2023-07-31 NOTE — PROGRESS NOTES
Wallet given to PATIENTS [facility]. Cell phone, keys, insulin pump and tennis shoes remain at bedside.

## 2023-07-31 NOTE — PLAN OF CARE
Problem: Respiratory - Adult  Goal: Achieves optimal ventilation and oxygenation  7/31/2023 0937 by Lelo Briceno RCP  Outcome: Progressing      pt remains on [facility] settings, no parameters or weaning today due to surgery tomorrow.

## 2023-07-31 NOTE — PLAN OF CARE
Problem: Discharge Planning  Goal: Discharge to home or other facility with appropriate resources  Outcome: Progressing     Problem: Pain  Goal: Verbalizes/displays adequate comfort level or baseline comfort level  Outcome: Progressing     Problem: Skin/Tissue Integrity  Goal: Absence of new skin breakdown  Description: 1. Monitor for areas of redness and/or skin breakdown  2. Assess vascular access sites hourly  3. Every 4-6 hours minimum:  Change oxygen saturation probe site  4. Every 4-6 hours:  If on nasal continuous positive airway pressure, respiratory therapy assess nares and determine need for appliance change or resting period.   Outcome: Progressing     Problem: Safety - Adult  Goal: Free from fall injury  Outcome: Progressing     Problem: ABCDS Injury Assessment  Goal: Absence of physical injury  Outcome: Progressing     Problem: Neurosensory - Adult  Goal: Achieves stable or improved neurological status  Outcome: Progressing  Goal: Achieves maximal functionality and self care  Outcome: Progressing     Problem: Respiratory - Adult  Goal: Achieves optimal ventilation and oxygenation  Outcome: Progressing     Problem: Cardiovascular - Adult  Goal: Maintains optimal cardiac output and hemodynamic stability  Outcome: Progressing  Goal: Absence of cardiac dysrhythmias or at baseline  Outcome: Progressing     Problem: Skin/Tissue Integrity - Adult  Goal: Skin integrity remains intact  Outcome: Progressing  Flowsheets (Taken 7/31/2023 0243)  Skin Integrity Remains Intact:   Monitor for areas of redness and/or skin breakdown   Assess vascular access sites hourly   Every 4-6 hours minimum: Change oxygen saturation probe site   Every 4-6 hours: If on nasal continuous positive airway pressure, respiratory therapy assesses nares and determine need for appliance change or resting period  Goal: Incisions, wounds, or drain sites healing without S/S of infection  Outcome: Progressing  Goal: Oral mucous membranes
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
## 2023-07-31 NOTE — PROGRESS NOTES
Patient arrived to the Surgical ICU from ED with barajas catheter intact and patent. Securing device applied. Barajas bag is hanging below the level of the bladder, safety clip attached to the bed sheet, tamper seal is intact, drainage bag is not on the floor, lack of dependent loop in tubing, and the drainage bag is less than half full.

## 2023-07-31 NOTE — PROGRESS NOTES
OCCUPATIONAL THERAPY    Date:2023  Patient Name: Latoya García  MRN: [id number]  : 1965  Room: [facility]              Chart reviewed. Pt on hold for medical status. Will re-attempt at later time. Thank you for consult.     Amie Anthony, OTR/L [id number]
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
## 2023-08-01 ENCOUNTER — APPOINTMENT (OUTPATIENT)
Dept: GENERAL RADIOLOGY | Age: 58
DRG: 004 | End: 2023-08-01
Payer: COMMERCIAL

## 2023-08-01 LAB
AADO2: 126.7 MMHG
ALBUMIN SERPL-MCNC: 2.9 G/DL (ref 3.5–5.2)
ALP SERPL-CCNC: 76 U/L (ref 40–129)
ALT SERPL-CCNC: 17 U/L (ref 0–40)
ANION GAP SERPL CALCULATED.3IONS-SCNC: 10 MMOL/L (ref 7–16)
AST SERPL-CCNC: 15 U/L (ref 0–39)
B.E.: -4.2 MMOL/L (ref -3–3)
BASOPHILS # BLD: 0.07 K/UL (ref 0–0.2)
BASOPHILS NFR BLD: 1 % (ref 0–2)
BILIRUB SERPL-MCNC: 0.2 MG/DL (ref 0–1.2)
BUN SERPL-MCNC: 13 MG/DL (ref 6–20)
CA-I BLD-SCNC: 1.13 MMOL/L (ref 1.15–1.33)
CALCIUM SERPL-MCNC: 8 MG/DL (ref 8.6–10.2)
CHLORIDE SERPL-SCNC: 107 MMOL/L (ref 98–107)
CO2 SERPL-SCNC: 20 MMOL/L (ref 22–29)
COHB: 0.3 % (ref 0–1.5)
CREAT SERPL-MCNC: 0.9 MG/DL (ref 0.7–1.2)
CRITICAL: ABNORMAL
DATE ANALYZED: ABNORMAL
DATE OF COLLECTION: ABNORMAL
EOSINOPHIL # BLD: 0.28 K/UL (ref 0.05–0.5)
EOSINOPHILS RELATIVE PERCENT: 4 % (ref 0–6)
ERYTHROCYTE [DISTWIDTH] IN BLOOD BY AUTOMATED COUNT: 14.2 % (ref 11.5–15)
FIO2: 40 %
GFR SERPL CREATININE-BSD FRML MDRD: >60 ML/MIN/1.73M2
GLUCOSE SERPL-MCNC: 134 MG/DL (ref 74–99)
HCO3: 20.4 MMOL/L (ref 22–26)
HCT VFR BLD AUTO: 32.4 % (ref 37–54)
HGB BLD-MCNC: 10.4 G/DL (ref 12.5–16.5)
HHB: 2.6 % (ref 0–5)
IMM GRANULOCYTES # BLD AUTO: 0.03 K/UL (ref 0–0.58)
IMM GRANULOCYTES NFR BLD: 0 % (ref 0–5)
LAB: ABNORMAL
LYMPHOCYTES NFR BLD: 2.55 K/UL (ref 1.5–4)
LYMPHOCYTES RELATIVE PERCENT: 33 % (ref 20–42)
Lab: ABNORMAL
MAGNESIUM SERPL-MCNC: 2.3 MG/DL (ref 1.6–2.6)
MCH RBC QN AUTO: 30 PG (ref 26–35)
MCHC RBC AUTO-ENTMCNC: 32.1 G/DL (ref 32–34.5)
MCV RBC AUTO: 93.4 FL (ref 80–99.9)
METER GLUCOSE: 113 MG/DL (ref 74–99)
METER GLUCOSE: 120 MG/DL (ref 74–99)
METER GLUCOSE: 124 MG/DL (ref 74–99)
METER GLUCOSE: 126 MG/DL (ref 74–99)
METER GLUCOSE: 129 MG/DL (ref 74–99)
METER GLUCOSE: 136 MG/DL (ref 74–99)
METHB: 0.1 % (ref 0–1.5)
MICROORGANISM SPEC CULT: NORMAL
MODE: AC
MONOCYTES NFR BLD: 1.21 K/UL (ref 0.1–0.95)
MONOCYTES NFR BLD: 15 % (ref 2–12)
NEUTROPHILS NFR BLD: 47 % (ref 43–80)
NEUTS SEG NFR BLD: 3.7 K/UL (ref 1.8–7.3)
O2 CONTENT: 15.7 ML/DL
O2 SATURATION: 97.4 % (ref 92–98.5)
O2HB: 97 % (ref 94–97)
OPERATOR ID: 7221
PATIENT TEMP: 37 C
PCO2: 35.6 MMHG (ref 35–45)
PEEP/CPAP: 8 CMH2O
PFO2: 2.69 MMHG/%
PH BLOOD GAS: 7.38 (ref 7.35–7.45)
PHOSPHATE SERPL-MCNC: 2.6 MG/DL (ref 2.5–4.5)
PLATELET # BLD AUTO: 300 K/UL (ref 130–450)
PMV BLD AUTO: 9 FL (ref 7–12)
PO2: 107.6 MMHG (ref 75–100)
POTASSIUM SERPL-SCNC: 4.3 MMOL/L (ref 3.5–5)
PROT SERPL-MCNC: 5.3 G/DL (ref 6.4–8.3)
RBC # BLD AUTO: 3.47 M/UL (ref 3.8–5.8)
RI(T): 1.18
RR MECHANICAL: 14 B/MIN
SODIUM SERPL-SCNC: 137 MMOL/L (ref 132–146)
SOURCE, BLOOD GAS: ABNORMAL
SPECIMEN DESCRIPTION: NORMAL
THB: 11.4 G/DL (ref 11.5–16.5)
TIME ANALYZED: 456
TRIGL SERPL-MCNC: 120 MG/DL
VT MECHANICAL: 500 ML
WBC OTHER # BLD: 7.8 K/UL (ref 4.5–11.5)

## 2023-08-01 PROCEDURE — 99291 CRITICAL CARE FIRST HOUR: CPT | Performed by: SURGERY

## 2023-08-01 PROCEDURE — A4216 STERILE WATER/SALINE, 10 ML: HCPCS | Performed by: STUDENT IN AN ORGANIZED HEALTH CARE EDUCATION/TRAINING PROGRAM

## 2023-08-01 PROCEDURE — 80053 COMPREHEN METABOLIC PANEL: CPT

## 2023-08-01 PROCEDURE — 2000000000 HC ICU R&B

## 2023-08-01 PROCEDURE — 2580000003 HC RX 258: Performed by: STUDENT IN AN ORGANIZED HEALTH CARE EDUCATION/TRAINING PROGRAM

## 2023-08-01 PROCEDURE — 82330 ASSAY OF CALCIUM: CPT

## 2023-08-01 PROCEDURE — 84100 ASSAY OF PHOSPHORUS: CPT

## 2023-08-01 PROCEDURE — 6370000000 HC RX 637 (ALT 250 FOR IP)

## 2023-08-01 PROCEDURE — 94003 VENT MGMT INPAT SUBQ DAY: CPT

## 2023-08-01 PROCEDURE — 83735 ASSAY OF MAGNESIUM: CPT

## 2023-08-01 PROCEDURE — 2500000003 HC RX 250 WO HCPCS: Performed by: STUDENT IN AN ORGANIZED HEALTH CARE EDUCATION/TRAINING PROGRAM

## 2023-08-01 PROCEDURE — 71045 X-RAY EXAM CHEST 1 VIEW: CPT

## 2023-08-01 PROCEDURE — 6360000002 HC RX W HCPCS: Performed by: STUDENT IN AN ORGANIZED HEALTH CARE EDUCATION/TRAINING PROGRAM

## 2023-08-01 PROCEDURE — 84478 ASSAY OF TRIGLYCERIDES: CPT

## 2023-08-01 PROCEDURE — 6360000002 HC RX W HCPCS

## 2023-08-01 PROCEDURE — 6370000000 HC RX 637 (ALT 250 FOR IP): Performed by: STUDENT IN AN ORGANIZED HEALTH CARE EDUCATION/TRAINING PROGRAM

## 2023-08-01 PROCEDURE — 82805 BLOOD GASES W/O2 SATURATION: CPT

## 2023-08-01 PROCEDURE — 85025 COMPLETE CBC W/AUTO DIFF WBC: CPT

## 2023-08-01 PROCEDURE — 82947 ASSAY GLUCOSE BLOOD QUANT: CPT

## 2023-08-01 RX ORDER — LABETALOL HYDROCHLORIDE 5 MG/ML
10 INJECTION, SOLUTION INTRAVENOUS EVERY 10 MIN PRN
Status: DISCONTINUED | OUTPATIENT
Start: 2023-08-01 | End: 2023-08-25 | Stop reason: HOSPADM

## 2023-08-01 RX ORDER — ENOXAPARIN SODIUM 100 MG/ML
40 INJECTION SUBCUTANEOUS 2 TIMES DAILY
Status: DISCONTINUED | OUTPATIENT
Start: 2023-08-01 | End: 2023-08-25 | Stop reason: HOSPADM

## 2023-08-01 RX ORDER — HYDRALAZINE HYDROCHLORIDE 20 MG/ML
10 INJECTION INTRAMUSCULAR; INTRAVENOUS EVERY 10 MIN PRN
Status: DISCONTINUED | OUTPATIENT
Start: 2023-08-01 | End: 2023-08-25 | Stop reason: HOSPADM

## 2023-08-01 RX ADMIN — ENOXAPARIN SODIUM 40 MG: 100 INJECTION SUBCUTANEOUS at 19:58

## 2023-08-01 RX ADMIN — SODIUM CHLORIDE, PRESERVATIVE FREE 20 MG: 5 INJECTION INTRAVENOUS at 08:31

## 2023-08-01 RX ADMIN — SODIUM CHLORIDE: 9 INJECTION, SOLUTION INTRAVENOUS at 10:14

## 2023-08-01 RX ADMIN — POLYVINYL ALCOHOL 1 DROP: 14 SOLUTION/ DROPS OPHTHALMIC at 19:59

## 2023-08-01 RX ADMIN — Medication 200 MCG/HR: at 08:19

## 2023-08-01 RX ADMIN — SODIUM CHLORIDE, PRESERVATIVE FREE 10 ML: 5 INJECTION INTRAVENOUS at 08:31

## 2023-08-01 RX ADMIN — POLYETHYLENE GLYCOL 3350 17 G: 17 POWDER, FOR SOLUTION ORAL at 08:31

## 2023-08-01 RX ADMIN — SODIUM CHLORIDE, PRESERVATIVE FREE 20 MG: 5 INJECTION INTRAVENOUS at 19:58

## 2023-08-01 RX ADMIN — AMPICILLIN SODIUM AND SULBACTAM SODIUM 3000 MG: 2; 1 INJECTION, POWDER, FOR SOLUTION INTRAMUSCULAR; INTRAVENOUS at 06:14

## 2023-08-01 RX ADMIN — ENOXAPARIN SODIUM 40 MG: 100 INJECTION SUBCUTANEOUS at 08:31

## 2023-08-01 RX ADMIN — POLYVINYL ALCOHOL 1 DROP: 14 SOLUTION/ DROPS OPHTHALMIC at 08:30

## 2023-08-01 RX ADMIN — PROPOFOL 50 MCG/KG/MIN: 10 INJECTION, EMULSION INTRAVENOUS at 13:11

## 2023-08-01 RX ADMIN — VECURONIUM BROMIDE 1 MCG/KG/MIN: 1 INJECTION, POWDER, LYOPHILIZED, FOR SOLUTION INTRAVENOUS at 19:54

## 2023-08-01 RX ADMIN — AMPICILLIN SODIUM AND SULBACTAM SODIUM 3000 MG: 2; 1 INJECTION, POWDER, FOR SOLUTION INTRAMUSCULAR; INTRAVENOUS at 18:08

## 2023-08-01 RX ADMIN — CHLORHEXIDINE GLUCONATE 15 ML: 1.2 RINSE ORAL at 08:30

## 2023-08-01 RX ADMIN — SODIUM CHLORIDE: 9 INJECTION, SOLUTION INTRAVENOUS at 19:51

## 2023-08-01 RX ADMIN — SODIUM CHLORIDE: 9 INJECTION, SOLUTION INTRAVENOUS at 01:47

## 2023-08-01 RX ADMIN — POLYVINYL ALCOHOL 1 DROP: 14 SOLUTION/ DROPS OPHTHALMIC at 04:17

## 2023-08-01 RX ADMIN — POLYVINYL ALCOHOL 1 DROP: 14 SOLUTION/ DROPS OPHTHALMIC at 23:54

## 2023-08-01 RX ADMIN — CHLORHEXIDINE GLUCONATE 15 ML: 1.2 RINSE ORAL at 15:44

## 2023-08-01 RX ADMIN — AMPICILLIN SODIUM AND SULBACTAM SODIUM 3000 MG: 2; 1 INJECTION, POWDER, FOR SOLUTION INTRAMUSCULAR; INTRAVENOUS at 11:40

## 2023-08-01 RX ADMIN — POLYVINYL ALCOHOL 1 DROP: 14 SOLUTION/ DROPS OPHTHALMIC at 11:20

## 2023-08-01 RX ADMIN — Medication 200 MCG/HR: at 21:33

## 2023-08-01 RX ADMIN — PROPOFOL 30 MCG/KG/MIN: 10 INJECTION, EMULSION INTRAVENOUS at 22:49

## 2023-08-01 RX ADMIN — LABETALOL HYDROCHLORIDE 10 MG: 5 INJECTION INTRAVENOUS at 19:56

## 2023-08-01 RX ADMIN — VECURONIUM BROMIDE 1 MCG/KG/MIN: 1 INJECTION, POWDER, LYOPHILIZED, FOR SOLUTION INTRAVENOUS at 05:33

## 2023-08-01 RX ADMIN — BACITRACIN ZINC AND POLYMYXIN B SULFATE: 500; 10000 OINTMENT OPHTHALMIC at 08:31

## 2023-08-01 RX ADMIN — Medication 200 MCG/HR: at 15:14

## 2023-08-01 RX ADMIN — CHLORHEXIDINE GLUCONATE 15 ML: 1.2 RINSE ORAL at 12:36

## 2023-08-01 RX ADMIN — Medication 200 MCG/HR: at 01:17

## 2023-08-01 RX ADMIN — PROPOFOL 50 MCG/KG/MIN: 10 INJECTION, EMULSION INTRAVENOUS at 15:46

## 2023-08-01 RX ADMIN — CHLORHEXIDINE GLUCONATE 15 ML: 1.2 RINSE ORAL at 23:53

## 2023-08-01 RX ADMIN — SODIUM CHLORIDE, PRESERVATIVE FREE 10 ML: 5 INJECTION INTRAVENOUS at 20:00

## 2023-08-01 RX ADMIN — AMPICILLIN SODIUM AND SULBACTAM SODIUM 3000 MG: 2; 1 INJECTION, POWDER, FOR SOLUTION INTRAMUSCULAR; INTRAVENOUS at 23:53

## 2023-08-01 RX ADMIN — CHLORHEXIDINE GLUCONATE 15 ML: 1.2 RINSE ORAL at 05:18

## 2023-08-01 RX ADMIN — PROPOFOL 50 MCG/KG/MIN: 10 INJECTION, EMULSION INTRAVENOUS at 02:59

## 2023-08-01 RX ADMIN — BACITRACIN ZINC AND POLYMYXIN B SULFATE: 500; 10000 OINTMENT OPHTHALMIC at 19:59

## 2023-08-01 RX ADMIN — PROPOFOL 50 MCG/KG/MIN: 10 INJECTION, EMULSION INTRAVENOUS at 18:47

## 2023-08-01 RX ADMIN — POLYVINYL ALCOHOL 1 DROP: 14 SOLUTION/ DROPS OPHTHALMIC at 15:44

## 2023-08-01 RX ADMIN — CHLORHEXIDINE GLUCONATE 15 ML: 1.2 RINSE ORAL at 19:59

## 2023-08-01 RX ADMIN — PROPOFOL 50 MCG/KG/MIN: 10 INJECTION, EMULSION INTRAVENOUS at 06:15

## 2023-08-01 RX ADMIN — PROPOFOL 50 MCG/KG/MIN: 10 INJECTION, EMULSION INTRAVENOUS at 10:06

## 2023-08-01 RX ADMIN — CHLORHEXIDINE GLUCONATE 15 ML: 1.2 RINSE ORAL at 00:55

## 2023-08-01 RX ADMIN — LABETALOL HYDROCHLORIDE 10 MG: 5 INJECTION INTRAVENOUS at 23:04

## 2023-08-01 ASSESSMENT — PULMONARY FUNCTION TESTS
PIF_VALUE: 20
PIF_VALUE: 20
PIF_VALUE: 21
PIF_VALUE: 24
PIF_VALUE: 20
PIF_VALUE: 22
PIF_VALUE: 20
PIF_VALUE: 24
PIF_VALUE: 23
PIF_VALUE: 20
PIF_VALUE: 23
PIF_VALUE: 25
PIF_VALUE: 22
PIF_VALUE: 21
PIF_VALUE: 20
PIF_VALUE: 21
PIF_VALUE: 22
PIF_VALUE: 20
PIF_VALUE: 22
PIF_VALUE: 23
PIF_VALUE: 23
PIF_VALUE: 20
PIF_VALUE: 20
PIF_VALUE: 24
PIF_VALUE: 19
PIF_VALUE: 23
PIF_VALUE: 22
PIF_VALUE: 20
PIF_VALUE: 20
PIF_VALUE: 21
PIF_VALUE: 21
PIF_VALUE: 20
PIF_VALUE: 23
PIF_VALUE: 20
PIF_VALUE: 23
PIF_VALUE: 22

## 2023-08-01 ASSESSMENT — PAIN SCALES - GENERAL
PAINLEVEL_OUTOF10: 0

## 2023-08-01 NOTE — PLAN OF CARE
Problem: Discharge Planning  Goal: Discharge to home or other facility with appropriate resources  8/1/2023 0752 by Sabine Donato RN  Outcome: Not Progressing  7/31/2023 2140 by Bennie Rosario RN  Outcome: Not Progressing  Flowsheets (Taken 7/31/2023 2000)  Discharge to home or other facility with appropriate resources: Identify barriers to discharge with patient and caregiver     Problem: Neurosensory - Adult  Goal: Achieves maximal functionality and self care  8/1/2023 0752 by Sabine Donato RN  Outcome: Not Progressing  7/31/2023 2140 by Bennie Rosario RN  Outcome: Not Progressing     Problem: Skin/Tissue Integrity - Adult  Goal: Oral mucous membranes remain intact  Outcome: Not Progressing     Problem: Musculoskeletal - Adult  Goal: Return mobility to safest level of function  8/1/2023 0752 by Sabine Donato RN  Outcome: Not Progressing  7/31/2023 2140 by Bennie Rosario RN  Outcome: Not Progressing  Goal: Return ADL status to a safe level of function  8/1/2023 0752 by Sabine Donato RN  Outcome: Not Progressing  7/31/2023 2140 by Bennie Rosario RN  Outcome: Not Progressing     Problem: Discharge Planning  Goal: Discharge to home or other facility with appropriate resources  8/1/2023 0752 by Sabine Donato RN  Outcome: Not Progressing  7/31/2023 2140 by Bennie Rosario RN  Outcome: Not Progressing  Flowsheets (Taken 7/31/2023 2000)  Discharge to home or other facility with appropriate resources: Identify barriers to discharge with patient and caregiver     Problem: Neurosensory - Adult  Goal: Achieves maximal functionality and self care  8/1/2023 0752 by Sabine Donato RN  Outcome: Not Progressing  7/31/2023 2140 by Bennie Rosario RN  Outcome: Not Progressing     Problem: Skin/Tissue Integrity - Adult  Goal: Oral mucous membranes remain intact  Outcome: Not Progressing     Problem: Musculoskeletal - Adult  Goal: Return mobility to safest level of function  8/1/2023 0752 by Sabine Donato
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
## 2023-08-01 NOTE — PROGRESS NOTES
4 Eyes Skin Assessment     NAME:  Lauren Melvin  YOB: 1965  MEDICAL RECORD NUMBER:  [id number]    The patient is being assessed for  Admission    I agree that at least one RN has performed a thorough Head to Toe Skin Assessment on the patient. ALL assessment sites listed below have been assessed. Areas assessed by both nurses:    Head, Face, Ears, Shoulders, Back, Chest, Arms, Elbows, Hands, Sacrum. Buttock, Coccyx, Ischium, and Legs. Feet and Heels Facial swelling and   bruising with laceration noted left cheek, near corner of mouth. Lips edematous with large amount bloody oral secretions. Blanchable redness noted on coccyx and bilateral buttocks. Does the Patient have a Wound? Yes wound(s) were present on assessment.  LDA wound assessment was Initiated and completed by RN       Waylon Prevention initiated by RN: Yes  Wound Care Orders initiated by RN: Yes    Pressure Injury (Stage 3,4, Unstageable, DTI, NWPT, and Complex wounds) if present, place Wound referral order by RN under : No    New Ostomies, if present place, Ostomy referral order under : No     Nurse 1 eSignature: Electronically signed by Dalia Odell RN on 8/1/23 at 11:12 AM EDT    **SHARE this note so that the co-signing nurse can place an eSignature**    Nurse 2 eSignature: Electronically signed by Jose Luis Ramirez RN on 8/7/23 at 8:58 AM EDT

## 2023-08-02 ENCOUNTER — APPOINTMENT (OUTPATIENT)
Dept: CT IMAGING | Age: 58
DRG: 004 | End: 2023-08-02
Payer: COMMERCIAL

## 2023-08-02 ENCOUNTER — APPOINTMENT (OUTPATIENT)
Dept: GENERAL RADIOLOGY | Age: 58
DRG: 004 | End: 2023-08-02
Payer: COMMERCIAL

## 2023-08-02 LAB
AADO2: 139.9 MMHG
ALBUMIN SERPL-MCNC: 2.9 G/DL (ref 3.5–5.2)
ALP SERPL-CCNC: 68 U/L (ref 40–129)
ALT SERPL-CCNC: 13 U/L (ref 0–40)
ANION GAP SERPL CALCULATED.3IONS-SCNC: 12 MMOL/L (ref 7–16)
AST SERPL-CCNC: 13 U/L (ref 0–39)
B.E.: -5.4 MMOL/L (ref -3–3)
BASOPHILS # BLD: 0.07 K/UL (ref 0–0.2)
BASOPHILS NFR BLD: 1 % (ref 0–2)
BILIRUB SERPL-MCNC: 0.3 MG/DL (ref 0–1.2)
BUN SERPL-MCNC: 10 MG/DL (ref 6–20)
CA-I BLD-SCNC: 1.14 MMOL/L (ref 1.15–1.33)
CALCIUM SERPL-MCNC: 7.9 MG/DL (ref 8.6–10.2)
CHLORIDE SERPL-SCNC: 107 MMOL/L (ref 98–107)
CO2 SERPL-SCNC: 19 MMOL/L (ref 22–29)
COHB: 0.3 % (ref 0–1.5)
CREAT SERPL-MCNC: 0.9 MG/DL (ref 0.7–1.2)
CRITICAL: ABNORMAL
DATE ANALYZED: ABNORMAL
DATE OF COLLECTION: ABNORMAL
EOSINOPHIL # BLD: 0.2 K/UL (ref 0.05–0.5)
EOSINOPHILS RELATIVE PERCENT: 3 % (ref 0–6)
ERYTHROCYTE [DISTWIDTH] IN BLOOD BY AUTOMATED COUNT: 14 % (ref 11.5–15)
FIO2: 40 %
GFR SERPL CREATININE-BSD FRML MDRD: >60 ML/MIN/1.73M2
GLUCOSE SERPL-MCNC: 169 MG/DL (ref 74–99)
HCO3: 19.8 MMOL/L (ref 22–26)
HCT VFR BLD AUTO: 32.6 % (ref 37–54)
HGB BLD-MCNC: 10.2 G/DL (ref 12.5–16.5)
HHB: 3.6 % (ref 0–5)
IMM GRANULOCYTES # BLD AUTO: <0.03 K/UL (ref 0–0.58)
IMM GRANULOCYTES NFR BLD: 0 % (ref 0–5)
LAB: ABNORMAL
LYMPHOCYTES NFR BLD: 1.45 K/UL (ref 1.5–4)
LYMPHOCYTES RELATIVE PERCENT: 21 % (ref 20–42)
Lab: ABNORMAL
MAGNESIUM SERPL-MCNC: 1.9 MG/DL (ref 1.6–2.6)
MCH RBC QN AUTO: 29.7 PG (ref 26–35)
MCHC RBC AUTO-ENTMCNC: 31.3 G/DL (ref 32–34.5)
MCV RBC AUTO: 95 FL (ref 80–99.9)
METER GLUCOSE: 167 MG/DL (ref 74–99)
METER GLUCOSE: 193 MG/DL (ref 74–99)
METHB: 0.1 % (ref 0–1.5)
MODE: AC
MONOCYTES NFR BLD: 0.64 K/UL (ref 0.1–0.95)
MONOCYTES NFR BLD: 9 % (ref 2–12)
NEUTROPHILS NFR BLD: 66 % (ref 43–80)
NEUTS SEG NFR BLD: 4.71 K/UL (ref 1.8–7.3)
O2 CONTENT: 15.9 ML/DL
O2 SATURATION: 96.4 % (ref 92–98.5)
O2HB: 96 % (ref 94–97)
OPERATOR ID: 1893
PATIENT TEMP: 37 C
PCO2: 37.4 MMHG (ref 35–45)
PEEP/CPAP: 8 CMH2O
PFO2: 2.31 MMHG/%
PH BLOOD GAS: 7.34 (ref 7.35–7.45)
PHOSPHATE SERPL-MCNC: 3.2 MG/DL (ref 2.5–4.5)
PLATELET # BLD AUTO: 316 K/UL (ref 130–450)
PMV BLD AUTO: 9.1 FL (ref 7–12)
PO2: 92.3 MMHG (ref 75–100)
POTASSIUM SERPL-SCNC: 4.4 MMOL/L (ref 3.5–5)
PROT SERPL-MCNC: 5.4 G/DL (ref 6.4–8.3)
RBC # BLD AUTO: 3.43 M/UL (ref 3.8–5.8)
RI(T): 1.52
RR MECHANICAL: 14 B/MIN
SODIUM SERPL-SCNC: 138 MMOL/L (ref 132–146)
SOURCE, BLOOD GAS: ABNORMAL
THB: 11.7 G/DL (ref 11.5–16.5)
TIME ANALYZED: 432
VT MECHANICAL: 500 ML
WBC OTHER # BLD: 7.1 K/UL (ref 4.5–11.5)

## 2023-08-02 PROCEDURE — 31600 PLANNED TRACHEOSTOMY: CPT | Performed by: SURGERY

## 2023-08-02 PROCEDURE — 31622 DX BRONCHOSCOPE/WASH: CPT | Performed by: SURGERY

## 2023-08-02 PROCEDURE — 2709999900 HC NON-CHARGEABLE SUPPLY: Performed by: SURGERY

## 2023-08-02 PROCEDURE — 87070 CULTURE OTHR SPECIMN AEROBIC: CPT

## 2023-08-02 PROCEDURE — 82947 ASSAY GLUCOSE BLOOD QUANT: CPT

## 2023-08-02 PROCEDURE — 83735 ASSAY OF MAGNESIUM: CPT

## 2023-08-02 PROCEDURE — 99291 CRITICAL CARE FIRST HOUR: CPT | Performed by: SURGERY

## 2023-08-02 PROCEDURE — 6360000002 HC RX W HCPCS: Performed by: STUDENT IN AN ORGANIZED HEALTH CARE EDUCATION/TRAINING PROGRAM

## 2023-08-02 PROCEDURE — 6360000002 HC RX W HCPCS

## 2023-08-02 PROCEDURE — 6370000000 HC RX 637 (ALT 250 FOR IP): Performed by: STUDENT IN AN ORGANIZED HEALTH CARE EDUCATION/TRAINING PROGRAM

## 2023-08-02 PROCEDURE — 0B113Z4 BYPASS TRACHEA TO CUTANEOUS, PERCUTANEOUS APPROACH: ICD-10-PCS | Performed by: SURGERY

## 2023-08-02 PROCEDURE — 82805 BLOOD GASES W/O2 SATURATION: CPT

## 2023-08-02 PROCEDURE — 2500000003 HC RX 250 WO HCPCS: Performed by: STUDENT IN AN ORGANIZED HEALTH CARE EDUCATION/TRAINING PROGRAM

## 2023-08-02 PROCEDURE — 87077 CULTURE AEROBIC IDENTIFY: CPT

## 2023-08-02 PROCEDURE — 84100 ASSAY OF PHOSPHORUS: CPT

## 2023-08-02 PROCEDURE — 43762 RPLC GTUBE NO REVJ TRC: CPT

## 2023-08-02 PROCEDURE — 85025 COMPLETE CBC W/AUTO DIFF WBC: CPT

## 2023-08-02 PROCEDURE — 80053 COMPREHEN METABOLIC PANEL: CPT

## 2023-08-02 PROCEDURE — 94003 VENT MGMT INPAT SUBQ DAY: CPT

## 2023-08-02 PROCEDURE — 82330 ASSAY OF CALCIUM: CPT

## 2023-08-02 PROCEDURE — 43246 EGD PLACE GASTROSTOMY TUBE: CPT | Performed by: SURGERY

## 2023-08-02 PROCEDURE — A4216 STERILE WATER/SALINE, 10 ML: HCPCS | Performed by: STUDENT IN AN ORGANIZED HEALTH CARE EDUCATION/TRAINING PROGRAM

## 2023-08-02 PROCEDURE — 6370000000 HC RX 637 (ALT 250 FOR IP)

## 2023-08-02 PROCEDURE — 2580000003 HC RX 258

## 2023-08-02 PROCEDURE — 71045 X-RAY EXAM CHEST 1 VIEW: CPT

## 2023-08-02 PROCEDURE — 6360000004 HC RX CONTRAST MEDICATION: Performed by: RADIOLOGY

## 2023-08-02 PROCEDURE — 70498 CT ANGIOGRAPHY NECK: CPT

## 2023-08-02 PROCEDURE — 2000000000 HC ICU R&B

## 2023-08-02 PROCEDURE — 0DH63UZ INSERTION OF FEEDING DEVICE INTO STOMACH, PERCUTANEOUS APPROACH: ICD-10-PCS | Performed by: SURGERY

## 2023-08-02 PROCEDURE — 2500000003 HC RX 250 WO HCPCS

## 2023-08-02 PROCEDURE — 0BJ08ZZ INSPECTION OF TRACHEOBRONCHIAL TREE, VIA NATURAL OR ARTIFICIAL OPENING ENDOSCOPIC: ICD-10-PCS | Performed by: SURGERY

## 2023-08-02 PROCEDURE — 3609013300 HC EGD TUBE PLACEMENT: Performed by: SURGERY

## 2023-08-02 PROCEDURE — 2580000003 HC RX 258: Performed by: STUDENT IN AN ORGANIZED HEALTH CARE EDUCATION/TRAINING PROGRAM

## 2023-08-02 PROCEDURE — 87205 SMEAR GRAM STAIN: CPT

## 2023-08-02 RX ORDER — ACETAMINOPHEN 650 MG/20.3ML
650 SOLUTION ORAL EVERY 6 HOURS PRN
Status: DISCONTINUED | OUTPATIENT
Start: 2023-08-02 | End: 2023-08-08

## 2023-08-02 RX ORDER — SODIUM CHLORIDE, SODIUM LACTATE, POTASSIUM CHLORIDE, CALCIUM CHLORIDE 600; 310; 30; 20 MG/100ML; MG/100ML; MG/100ML; MG/100ML
INJECTION, SOLUTION INTRAVENOUS CONTINUOUS
Status: DISCONTINUED | OUTPATIENT
Start: 2023-08-02 | End: 2023-08-03

## 2023-08-02 RX ORDER — CALCIUM GLUCONATE 10 MG/ML
1000 INJECTION, SOLUTION INTRAVENOUS ONCE
Status: COMPLETED | OUTPATIENT
Start: 2023-08-02 | End: 2023-08-02

## 2023-08-02 RX ORDER — FENTANYL CITRATE 50 UG/ML
INJECTION, SOLUTION INTRAMUSCULAR; INTRAVENOUS
Status: DISPENSED
Start: 2023-08-02 | End: 2023-08-02

## 2023-08-02 RX ORDER — FENTANYL CITRATE 50 UG/ML
200 INJECTION, SOLUTION INTRAMUSCULAR; INTRAVENOUS
Status: COMPLETED | OUTPATIENT
Start: 2023-08-03 | End: 2023-08-02

## 2023-08-02 RX ORDER — FENTANYL CITRATE-0.9 % NACL/PF 10 MCG/ML
25-200 PLASTIC BAG, INJECTION (ML) INTRAVENOUS CONTINUOUS
Status: DISCONTINUED | OUTPATIENT
Start: 2023-08-02 | End: 2023-08-03

## 2023-08-02 RX ORDER — LIDOCAINE HYDROCHLORIDE 10 MG/ML
INJECTION, SOLUTION INFILTRATION; PERINEURAL
Status: COMPLETED
Start: 2023-08-02 | End: 2023-08-02

## 2023-08-02 RX ORDER — MAGNESIUM SULFATE 1 G/100ML
1000 INJECTION INTRAVENOUS ONCE
Status: COMPLETED | OUTPATIENT
Start: 2023-08-02 | End: 2023-08-02

## 2023-08-02 RX ADMIN — BACITRACIN ZINC AND POLYMYXIN B SULFATE: 500; 10000 OINTMENT OPHTHALMIC at 08:31

## 2023-08-02 RX ADMIN — AMPICILLIN SODIUM AND SULBACTAM SODIUM 3000 MG: 2; 1 INJECTION, POWDER, FOR SOLUTION INTRAMUSCULAR; INTRAVENOUS at 05:54

## 2023-08-02 RX ADMIN — AMPICILLIN SODIUM AND SULBACTAM SODIUM 3000 MG: 2; 1 INJECTION, POWDER, FOR SOLUTION INTRAMUSCULAR; INTRAVENOUS at 19:26

## 2023-08-02 RX ADMIN — POLYVINYL ALCOHOL 1 DROP: 14 SOLUTION/ DROPS OPHTHALMIC at 20:29

## 2023-08-02 RX ADMIN — ENOXAPARIN SODIUM 40 MG: 100 INJECTION SUBCUTANEOUS at 08:44

## 2023-08-02 RX ADMIN — CHLORHEXIDINE GLUCONATE 15 ML: 1.2 RINSE ORAL at 13:05

## 2023-08-02 RX ADMIN — CEFAZOLIN 2000 MG: 2 INJECTION, POWDER, FOR SOLUTION INTRAMUSCULAR; INTRAVENOUS at 09:50

## 2023-08-02 RX ADMIN — POLYVINYL ALCOHOL 1 DROP: 14 SOLUTION/ DROPS OPHTHALMIC at 08:31

## 2023-08-02 RX ADMIN — POLYVINYL ALCOHOL 1 DROP: 14 SOLUTION/ DROPS OPHTHALMIC at 03:58

## 2023-08-02 RX ADMIN — MAGNESIUM SULFATE HEPTAHYDRATE 1000 MG: 1 INJECTION, SOLUTION INTRAVENOUS at 12:25

## 2023-08-02 RX ADMIN — LIDOCAINE HYDROCHLORIDE 200 MG: 10 INJECTION, SOLUTION INFILTRATION; PERINEURAL at 12:17

## 2023-08-02 RX ADMIN — POLYVINYL ALCOHOL 1 DROP: 14 SOLUTION/ DROPS OPHTHALMIC at 11:58

## 2023-08-02 RX ADMIN — AMPICILLIN SODIUM AND SULBACTAM SODIUM 3000 MG: 2; 1 INJECTION, POWDER, FOR SOLUTION INTRAMUSCULAR; INTRAVENOUS at 13:12

## 2023-08-02 RX ADMIN — CHLORHEXIDINE GLUCONATE 15 ML: 1.2 RINSE ORAL at 03:58

## 2023-08-02 RX ADMIN — CHLORHEXIDINE GLUCONATE 15 ML: 1.2 RINSE ORAL at 20:29

## 2023-08-02 RX ADMIN — ACETAMINOPHEN 650 MG: 650 SOLUTION ORAL at 22:48

## 2023-08-02 RX ADMIN — Medication 100 MCG/HR: at 17:05

## 2023-08-02 RX ADMIN — Medication 200 MCG/HR: at 03:54

## 2023-08-02 RX ADMIN — SODIUM CHLORIDE, PRESERVATIVE FREE 10 ML: 5 INJECTION INTRAVENOUS at 08:32

## 2023-08-02 RX ADMIN — CHLORHEXIDINE GLUCONATE 15 ML: 1.2 RINSE ORAL at 08:32

## 2023-08-02 RX ADMIN — ENOXAPARIN SODIUM 40 MG: 100 INJECTION SUBCUTANEOUS at 20:29

## 2023-08-02 RX ADMIN — Medication 200 MCG/HR: at 10:16

## 2023-08-02 RX ADMIN — LABETALOL HYDROCHLORIDE 10 MG: 5 INJECTION INTRAVENOUS at 08:38

## 2023-08-02 RX ADMIN — LABETALOL HYDROCHLORIDE 10 MG: 5 INJECTION INTRAVENOUS at 09:15

## 2023-08-02 RX ADMIN — PROPOFOL 30 MCG/KG/MIN: 10 INJECTION, EMULSION INTRAVENOUS at 07:04

## 2023-08-02 RX ADMIN — SODIUM CHLORIDE, POTASSIUM CHLORIDE, SODIUM LACTATE AND CALCIUM CHLORIDE: 600; 310; 30; 20 INJECTION, SOLUTION INTRAVENOUS at 19:23

## 2023-08-02 RX ADMIN — SODIUM CHLORIDE, PRESERVATIVE FREE 20 MG: 5 INJECTION INTRAVENOUS at 20:30

## 2023-08-02 RX ADMIN — SODIUM CHLORIDE, PRESERVATIVE FREE 20 MG: 5 INJECTION INTRAVENOUS at 08:44

## 2023-08-02 RX ADMIN — LABETALOL HYDROCHLORIDE 10 MG: 5 INJECTION INTRAVENOUS at 13:04

## 2023-08-02 RX ADMIN — SODIUM CHLORIDE: 9 INJECTION, SOLUTION INTRAVENOUS at 06:56

## 2023-08-02 RX ADMIN — CALCIUM GLUCONATE 1000 MG: 10 INJECTION, SOLUTION INTRAVENOUS at 12:27

## 2023-08-02 RX ADMIN — PROPOFOL 45 MCG/KG/MIN: 10 INJECTION, EMULSION INTRAVENOUS at 02:51

## 2023-08-02 RX ADMIN — PROPOFOL 35 MCG/KG/MIN: 10 INJECTION, EMULSION INTRAVENOUS at 13:04

## 2023-08-02 RX ADMIN — SODIUM CHLORIDE, POTASSIUM CHLORIDE, SODIUM LACTATE AND CALCIUM CHLORIDE: 600; 310; 30; 20 INJECTION, SOLUTION INTRAVENOUS at 08:35

## 2023-08-02 RX ADMIN — LABETALOL HYDROCHLORIDE 10 MG: 5 INJECTION INTRAVENOUS at 02:43

## 2023-08-02 RX ADMIN — PROPOFOL 35 MCG/KG/MIN: 10 INJECTION, EMULSION INTRAVENOUS at 22:55

## 2023-08-02 RX ADMIN — CHLORHEXIDINE GLUCONATE 15 ML: 1.2 RINSE ORAL at 16:26

## 2023-08-02 RX ADMIN — PROPOFOL 25 MCG/KG/MIN: 10 INJECTION, EMULSION INTRAVENOUS at 18:27

## 2023-08-02 RX ADMIN — FENTANYL CITRATE 100 MCG: 50 INJECTION INTRAMUSCULAR; INTRAVENOUS at 10:48

## 2023-08-02 RX ADMIN — SODIUM CHLORIDE, PRESERVATIVE FREE 10 ML: 5 INJECTION INTRAVENOUS at 20:30

## 2023-08-02 RX ADMIN — POLYVINYL ALCOHOL 1 DROP: 14 SOLUTION/ DROPS OPHTHALMIC at 16:26

## 2023-08-02 RX ADMIN — IOPAMIDOL 60 ML: 755 INJECTION, SOLUTION INTRAVENOUS at 09:34

## 2023-08-02 ASSESSMENT — PULMONARY FUNCTION TESTS
PIF_VALUE: 38
PIF_VALUE: 51
PIF_VALUE: 23
PIF_VALUE: 21
PIF_VALUE: 20
PIF_VALUE: 22
PIF_VALUE: 22
PIF_VALUE: 23
PIF_VALUE: 39
PIF_VALUE: 23
PIF_VALUE: 21
PIF_VALUE: 22
PIF_VALUE: 22
PIF_VALUE: 20
PIF_VALUE: 23
PIF_VALUE: 22
PIF_VALUE: 22
PIF_VALUE: 23
PIF_VALUE: 22
PIF_VALUE: 21
PIF_VALUE: 22
PIF_VALUE: 23
PIF_VALUE: 37
PIF_VALUE: 18
PIF_VALUE: 22
PIF_VALUE: 24
PIF_VALUE: 22
PIF_VALUE: 22
PIF_VALUE: 21
PIF_VALUE: 22

## 2023-08-02 ASSESSMENT — PAIN SCALES - GENERAL
PAINLEVEL_OUTOF10: 0
PAINLEVEL_OUTOF10: 8
PAINLEVEL_OUTOF10: 0

## 2023-08-02 NOTE — OP NOTE
Operative Note      Patient: Jessenia Barriga  YOB: 1965  MRN: [id number]    Date of Procedure: 8/2/2023    Pre-Op Diagnosis Codes:     * Respiratory center failure [G93.89]    Post-Op Diagnosis: Same       Procedure(s):  EGD ESOPHAGOGASTRODUODENOSCOPY PEG TUBE INSERTION BEDSIDE  TRACHEOTOMY PERCUTANEOUS BRONCHOSCOPY AT BEDSIDE    Surgeon(s):  MD John Holt MD    Assistant:   Resident: Wang Kirkpatrick DO    Anesthesia: None    Estimated Blood Loss (mL): < 3cc     Complications: None    Specimens:   ID Type Source Tests Collected by Time Destination   1 : sputum suctioned with Bronch Respiratory Sputum, Suctioned GRAM STAIN, CULTURE, RESPIRATORY John Fields MD 8/2/2023 1039        Implants:  * No implants in log *      Drains:   Gastrostomy/Enterostomy/Jejunostomy Tube Percutaneous Endoscopic Gastrostomy (PEG) LUQ 20 fr (Active)       Urinary Catheter 07/30/23 Mcpherson (Active)   $ Urethral catheter insertion $ Not inserted for procedure 07/30/23 2234   Catheter Indications Need for fluid volume management of the critically ill patient in a critical care setting 08/01/23 2000   Site Assessment No urethral drainage 08/01/23 2000   Urine Color Yellow 08/01/23 2000   Urine Appearance Clear 08/01/23 2000   Collection Container Standard 08/01/23 2000   Securement Method Securing device (Describe) 08/01/23 2000   Catheter Care  Soap and water 08/02/23 0200   Catheter Best Practices  Drainage tube clipped to bed;Catheter secured to thigh; Tamper seal intact; Bag below bladder;Bag not on floor; Lack of dependent loop in tubing;Drainage bag less than half full 08/01/23 2000   Status Draining;Patent 08/01/23 2000   Output (mL) 150 mL 08/02/23 1000       [REMOVED] NG/OG/NJ/NE Tube Nasogastric 16 fr Right nostril (Removed)   Surrounding Skin Clean, dry & intact 08/01/23 2000   Securement device Bridle 08/01/23 2000   Status Clamped 08/01/23 2000   Placement Verified X-Ray (Initial) 08/01/23 2000 NG/OG/NJ/NE External Measurement (cm) 64 cm 08/01/23 2000   Drainage Appearance Brown 08/01/23 2000   Tube Feeding High Protein 08/02/23 0000   Tube feeding/verify rate (mL/hr) 0 mL/hr 08/02/23 0000   Tube Feeding Intake (mL) 57 ml 08/02/23 0000   Tube Feeding Supplement Amount (mL) 60 08/01/23 0819   Free Water/Flush (mL) 60 mL 08/02/23 0000   Output (mL) 100 ml 07/31/23 1400   Action Taken Other (comment) 08/02/23 0000   Residual Volume (ml) 0 ml 08/01/23 2200       Findings: thick tannish secretions in right and left bronchi; Tracheostomy; PEG tube at 3 cm         Detailed Description of Procedure: Within the SICU, the patient was sedated with versed, fentanyl as well as vecuronium. The bronchoscope was inserted through the patients ETT. The bronchoscope was passed through the ETT, which was noted to be in good position above the aden. First the left main stem brochus was viewed. There were a moderate amount of thick, tan secretions which were suctioned until clear. Irrigation with saline was undertaken to thin out secretions which were sent for cultures. The scope was slowly withdrawn and passed into the right mainstem bronchus. There were moderate amount of thick, tan secretions. The bronchoscope was completely withdrawn. Our attention then turned to the tracheostomy portion of the procedure. The neck was then prepped and draped in a standard surgical fashion. A 1-cm transverse incision was made 1 cm below the cricothyroid membrane. Dissection was carried down with a hemostat to the pretracheal fascia. Using Seldinger technique, the trachea was cannulated. The guidewire was noted to pass with ease. A 14 dilator was used to dilate the trachea followed by a 36 blue rhino dilator. The endotracheal tube was then slowly withdrawn under direct visualization via bronchoscopy. A  #8 Cuffed Shiley tracheostomy tube with the dilator inside was placed over the guidewire and passed into the trachea.  The inner

## 2023-08-02 NOTE — PROGRESS NOTES
Physical Therapy  Physical Therapy Attempt    Name: Otto Jewell  : 1965  MRN: [id number]      Date of Service: 2023  Chart reviewed. Spoke with RN - will discontinue skilled PT order as pt is not appropriate. Please re-consult when pt is medically stable, follows commands and can participate in skilled interventions.      Farheen Fritz, PT, DPT  [id number]
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
## 2023-08-02 NOTE — PLAN OF CARE
Problem: Discharge Planning  Goal: Discharge to home or other facility with appropriate resources  Outcome: Progressing     Problem: Pain  Goal: Verbalizes/displays adequate comfort level or baseline comfort level  Outcome: Progressing  Flowsheets (Taken 8/2/2023 0800)  Verbalizes/displays adequate comfort level or baseline comfort level: Assess pain using appropriate pain scale     Problem: Skin/Tissue Integrity  Goal: Absence of new skin breakdown  Description: 1. Monitor for areas of redness and/or skin breakdown  2. Assess vascular access sites hourly  3. Every 4-6 hours minimum:  Change oxygen saturation probe site  4. Every 4-6 hours:  If on nasal continuous positive airway pressure, respiratory therapy assess nares and determine need for appliance change or resting period.   Outcome: Progressing     Problem: Safety - Adult  Goal: Free from fall injury  Outcome: Progressing  Flowsheets (Taken 8/2/2023 1516)  Free From Fall Injury: Instruct family/caregiver on patient safety     Problem: ABCDS Injury Assessment  Goal: Absence of physical injury  Outcome: Progressing  Flowsheets (Taken 8/2/2023 1516)  Absence of Physical Injury: Implement safety measures based on patient assessment     Problem: Neurosensory - Adult  Goal: Achieves stable or improved neurological status  Outcome: Progressing  Goal: Achieves maximal functionality and self care  Outcome: Progressing     Problem: Respiratory - Adult  Goal: Achieves optimal ventilation and oxygenation  Outcome: Progressing     Problem: Cardiovascular - Adult  Goal: Maintains optimal cardiac output and hemodynamic stability  Outcome: Progressing  Goal: Absence of cardiac dysrhythmias or at baseline  Outcome: Progressing     Problem: Skin/Tissue Integrity - Adult  Goal: Skin integrity remains intact  Outcome: Progressing  Flowsheets  Taken 8/2/2023 1516  Skin Integrity Remains Intact: Monitor for areas of redness and/or skin breakdown  Taken 8/2/2023 0800  Skin applying the restraint  2. Evaluate the patient's condition at the time of restraint application  3. Inform patient/family regarding the reason for restraint  4.  Q2H: Monitor safety, psychosocial status, comfort, nutrition and hydration  Outcome: Progressing  Flowsheets (Taken 8/2/2023 1722)  Remains free of injury from restraints (restraint for interference with medical device): Determine that other, less restrictive measures have been tried or would not be effective before applying the restraint

## 2023-08-02 NOTE — PROGRESS NOTES
OCCUPATIONAL THERAPY    Date:2023  Patient Name: Lauren Melvin  MRN: [id number]  : 1965  Room: [address] [place] [address]-A              Chart reviewed. Pt not medically appropriate for therapy at this time  Will discontinue OT order. Please re-order when pt able to participate in sessions. Thank you for consult.     Queen Ute, OTR/L 0222

## 2023-08-02 NOTE — FLOWSHEET NOTE
Patient will reach for lines and tubes that are integral to their care despite redirection and explanation. Pt is a risk of harm to self and is in restraints for their safety. Will continue to monitor for the need.

## 2023-08-02 NOTE — PLAN OF CARE
Problem: Discharge Planning  Goal: Discharge to home or other facility with appropriate resources  8/1/2023 2058 by Comfort Quintana RN  Outcome: Progressing  8/1/2023 0752 by Yamini Walotn RN  Outcome: Not Progressing     Problem: Pain  Goal: Verbalizes/displays adequate comfort level or baseline comfort level  8/1/2023 2058 by Comfort Quintana RN  Outcome: Progressing  8/1/2023 0752 by Yamini Walton RN  Outcome: Progressing     Problem: Skin/Tissue Integrity  Goal: Absence of new skin breakdown  Description: 1. Monitor for areas of redness and/or skin breakdown  2. Assess vascular access sites hourly  3. Every 4-6 hours minimum:  Change oxygen saturation probe site  4. Every 4-6 hours:  If on nasal continuous positive airway pressure, respiratory therapy assess nares and determine need for appliance change or resting period.   8/1/2023 2058 by Comfort Quintana RN  Outcome: Progressing  8/1/2023 0752 by Yamini Walton RN  Outcome: Progressing     Problem: Safety - Adult  Goal: Free from fall injury  8/1/2023 2058 by Comfort Quintana RN  Outcome: Progressing  8/1/2023 0752 by Yamini Walton RN  Outcome: Progressing     Problem: ABCDS Injury Assessment  Goal: Absence of physical injury  8/1/2023 2058 by Comfort Quintana RN  Outcome: Progressing  8/1/2023 0752 by Yamini Walton RN  Outcome: Progressing     Problem: Neurosensory - Adult  Goal: Achieves stable or improved neurological status  8/1/2023 2058 by Comfort Quintana RN  Outcome: Progressing  8/1/2023 0752 by Yamini Walton RN  Outcome: Progressing  Goal: Achieves maximal functionality and self care  8/1/2023 2058 by Comfort Quintana RN  Outcome: Progressing  8/1/2023 0752 by Yamini Walton RN  Outcome: Not Progressing     Problem: Respiratory - Adult  Goal: Achieves optimal ventilation and oxygenation  8/1/2023 2058 by Comfort Quintana RN  Outcome: Progressing  8/1/2023 0752 by Yamini Walton RN  Outcome: Progressing     Problem: Cardiovascular Progressing  Goal: Urinary catheter remains patent  8/1/2023 2058 by Jimmie Powell RN  Outcome: Progressing  8/1/2023 0752 by Charlie Roberson RN  Outcome: Progressing     Problem: Infection - Adult  Goal: Absence of infection at discharge  8/1/2023 2058 by Jimmie Powell RN  Outcome: Progressing  8/1/2023 0752 by Charlie Roberson RN  Outcome: Progressing  Goal: Absence of infection during hospitalization  8/1/2023 2058 by Jimmie Powell RN  Outcome: Progressing  8/1/2023 0752 by Charlie Roberson RN  Outcome: Progressing     Problem: Metabolic/Fluid and Electrolytes - Adult  Goal: Electrolytes maintained within normal limits  8/1/2023 2058 by Jimmie Powell RN  Outcome: Progressing  8/1/2023 0752 by Charlie Roberson RN  Outcome: Progressing  Goal: Hemodynamic stability and optimal renal function maintained  8/1/2023 2058 by Jimmie Powell RN  Outcome: Progressing  8/1/2023 0752 by Charlie Roberson RN  Outcome: Progressing  Flowsheets (Taken 7/31/2023 2000 by Kevin Israel RN)  Hemodynamic stability and optimal renal function maintained:   Monitor labs and assess for signs and symptoms of volume excess or deficit   Monitor intake, output and patient weight  Goal: Glucose maintained within prescribed range  8/1/2023 2058 by Jimmie Powell RN  Outcome: Progressing  8/1/2023 0752 by Charlie Roberson RN  Outcome: Progressing  Flowsheets (Taken 7/31/2023 2000 by Kevin Israel RN)  Glucose maintained within prescribed range: Monitor blood glucose as ordered     Problem: Hematologic - Adult  Goal: Maintains hematologic stability  8/1/2023 2058 by Jimmie Powell RN  Outcome: Progressing  8/1/2023 0752 by Charlie Roberson RN  Outcome: Progressing     Problem: Chronic Conditions and Co-morbidities  Goal: Patient's chronic conditions and co-morbidity symptoms are monitored and maintained or improved  8/1/2023 2058 by Jimmie Powell RN  Outcome: Progressing  8/1/2023 0752 by Charlie Roberson RN  Outcome:
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
## 2023-08-03 ENCOUNTER — APPOINTMENT (OUTPATIENT)
Dept: GENERAL RADIOLOGY | Age: 58
DRG: 004 | End: 2023-08-03
Payer: COMMERCIAL

## 2023-08-03 PROBLEM — E87.8 ELECTROLYTE IMBALANCE: Status: ACTIVE | Noted: 2023-08-03

## 2023-08-03 PROBLEM — E83.42 HYPOMAGNESEMIA: Status: ACTIVE | Noted: 2023-08-03

## 2023-08-03 PROBLEM — E83.51 HYPOCALCEMIA: Status: ACTIVE | Noted: 2023-08-03

## 2023-08-03 PROBLEM — E88.09 HYPOALBUMINEMIA: Status: ACTIVE | Noted: 2023-08-03

## 2023-08-03 LAB
AADO2: 143.9 MMHG
ALBUMIN SERPL-MCNC: 2.3 G/DL (ref 3.5–5.2)
ALP SERPL-CCNC: 57 U/L (ref 40–129)
ALT SERPL-CCNC: 8 U/L (ref 0–40)
ANION GAP SERPL CALCULATED.3IONS-SCNC: 11 MMOL/L (ref 7–16)
AST SERPL-CCNC: 12 U/L (ref 0–39)
B.E.: -3.5 MMOL/L (ref -3–3)
BASOPHILS # BLD: 0.04 K/UL (ref 0–0.2)
BASOPHILS NFR BLD: 1 % (ref 0–2)
BILIRUB SERPL-MCNC: 0.3 MG/DL (ref 0–1.2)
BUN SERPL-MCNC: 10 MG/DL (ref 6–20)
CALCIUM SERPL-MCNC: 8.1 MG/DL (ref 8.6–10.2)
CHLORIDE SERPL-SCNC: 106 MMOL/L (ref 98–107)
CO2 SERPL-SCNC: 19 MMOL/L (ref 22–29)
COHB: 0.3 % (ref 0–1.5)
CREAT SERPL-MCNC: 0.9 MG/DL (ref 0.7–1.2)
CRITICAL: ABNORMAL
DATE ANALYZED: ABNORMAL
DATE OF COLLECTION: ABNORMAL
EOSINOPHIL # BLD: 0.16 K/UL (ref 0.05–0.5)
EOSINOPHILS RELATIVE PERCENT: 2 % (ref 0–6)
ERYTHROCYTE [DISTWIDTH] IN BLOOD BY AUTOMATED COUNT: 14.1 % (ref 11.5–15)
FIO2: 40 %
GFR SERPL CREATININE-BSD FRML MDRD: >60 ML/MIN/1.73M2
GLUCOSE SERPL-MCNC: 156 MG/DL (ref 74–99)
HCO3: 21.7 MMOL/L (ref 22–26)
HCT VFR BLD AUTO: 29.1 % (ref 37–54)
HGB BLD-MCNC: 9.3 G/DL (ref 12.5–16.5)
HHB: 4.4 % (ref 0–5)
IMM GRANULOCYTES # BLD AUTO: <0.03 K/UL (ref 0–0.58)
IMM GRANULOCYTES NFR BLD: 0 % (ref 0–5)
LAB: ABNORMAL
LYMPHOCYTES NFR BLD: 1.93 K/UL (ref 1.5–4)
LYMPHOCYTES RELATIVE PERCENT: 30 % (ref 20–42)
Lab: ABNORMAL
MCH RBC QN AUTO: 29.9 PG (ref 26–35)
MCHC RBC AUTO-ENTMCNC: 32 G/DL (ref 32–34.5)
MCV RBC AUTO: 93.6 FL (ref 80–99.9)
METER GLUCOSE: 204 MG/DL (ref 74–99)
METHB: 0.2 % (ref 0–1.5)
MODE: AC
MONOCYTES NFR BLD: 0.93 K/UL (ref 0.1–0.95)
MONOCYTES NFR BLD: 14 % (ref 2–12)
NEUTROPHILS NFR BLD: 53 % (ref 43–80)
NEUTS SEG NFR BLD: 3.46 K/UL (ref 1.8–7.3)
O2 CONTENT: 14.3 ML/DL
O2 SATURATION: 95.6 % (ref 92–98.5)
O2HB: 95.1 % (ref 94–97)
OPERATOR ID: 8219
PATIENT TEMP: 37 C
PCO2: 39.7 MMHG (ref 35–45)
PEEP/CPAP: 8 CMH2O
PFO2: 2.14 MMHG/%
PH BLOOD GAS: 7.36 (ref 7.35–7.45)
PHOSPHATE SERPL-MCNC: 2.3 MG/DL (ref 2.5–4.5)
PLATELET # BLD AUTO: 290 K/UL (ref 130–450)
PMV BLD AUTO: 9.1 FL (ref 7–12)
PO2: 85.6 MMHG (ref 75–100)
POTASSIUM SERPL-SCNC: 3.6 MMOL/L (ref 3.5–5)
PROT SERPL-MCNC: 5.2 G/DL (ref 6.4–8.3)
RBC # BLD AUTO: 3.11 M/UL (ref 3.8–5.8)
RI(T): 1.68
RR MECHANICAL: 14 B/MIN
SODIUM SERPL-SCNC: 136 MMOL/L (ref 132–146)
SOURCE, BLOOD GAS: ABNORMAL
THB: 10.6 G/DL (ref 11.5–16.5)
TIME ANALYZED: 514
VT MECHANICAL: 500 ML
WBC OTHER # BLD: 6.5 K/UL (ref 4.5–11.5)

## 2023-08-03 PROCEDURE — 99291 CRITICAL CARE FIRST HOUR: CPT | Performed by: SURGERY

## 2023-08-03 PROCEDURE — 94003 VENT MGMT INPAT SUBQ DAY: CPT

## 2023-08-03 PROCEDURE — 6360000002 HC RX W HCPCS

## 2023-08-03 PROCEDURE — 6370000000 HC RX 637 (ALT 250 FOR IP)

## 2023-08-03 PROCEDURE — 80053 COMPREHEN METABOLIC PANEL: CPT

## 2023-08-03 PROCEDURE — 82805 BLOOD GASES W/O2 SATURATION: CPT

## 2023-08-03 PROCEDURE — A4216 STERILE WATER/SALINE, 10 ML: HCPCS | Performed by: STUDENT IN AN ORGANIZED HEALTH CARE EDUCATION/TRAINING PROGRAM

## 2023-08-03 PROCEDURE — 2580000003 HC RX 258: Performed by: STUDENT IN AN ORGANIZED HEALTH CARE EDUCATION/TRAINING PROGRAM

## 2023-08-03 PROCEDURE — 2000000000 HC ICU R&B

## 2023-08-03 PROCEDURE — 82947 ASSAY GLUCOSE BLOOD QUANT: CPT

## 2023-08-03 PROCEDURE — 71045 X-RAY EXAM CHEST 1 VIEW: CPT

## 2023-08-03 PROCEDURE — 2500000003 HC RX 250 WO HCPCS

## 2023-08-03 PROCEDURE — 2580000003 HC RX 258

## 2023-08-03 PROCEDURE — 6360000002 HC RX W HCPCS: Performed by: STUDENT IN AN ORGANIZED HEALTH CARE EDUCATION/TRAINING PROGRAM

## 2023-08-03 PROCEDURE — 85025 COMPLETE CBC W/AUTO DIFF WBC: CPT

## 2023-08-03 PROCEDURE — 6370000000 HC RX 637 (ALT 250 FOR IP): Performed by: STUDENT IN AN ORGANIZED HEALTH CARE EDUCATION/TRAINING PROGRAM

## 2023-08-03 PROCEDURE — 2500000003 HC RX 250 WO HCPCS: Performed by: STUDENT IN AN ORGANIZED HEALTH CARE EDUCATION/TRAINING PROGRAM

## 2023-08-03 PROCEDURE — 84100 ASSAY OF PHOSPHORUS: CPT

## 2023-08-03 RX ORDER — FENTANYL CITRATE-0.9 % NACL/PF 10 MCG/ML
25-200 PLASTIC BAG, INJECTION (ML) INTRAVENOUS CONTINUOUS
Status: DISPENSED | OUTPATIENT
Start: 2023-08-03 | End: 2023-08-03

## 2023-08-03 RX ORDER — PROPOFOL 10 MG/ML
5-50 INJECTION, EMULSION INTRAVENOUS CONTINUOUS
Status: DISPENSED | OUTPATIENT
Start: 2023-08-03 | End: 2023-08-03

## 2023-08-03 RX ORDER — FLUOXETINE 10 MG/1
20 TABLET, FILM COATED ORAL DAILY
Status: DISCONTINUED | OUTPATIENT
Start: 2023-08-04 | End: 2023-08-25 | Stop reason: HOSPADM

## 2023-08-03 RX ORDER — LACTULOSE 10 G/15ML
20 SOLUTION ORAL 2 TIMES DAILY
Status: DISCONTINUED | OUTPATIENT
Start: 2023-08-03 | End: 2023-08-07

## 2023-08-03 RX ORDER — LORAZEPAM 2 MG/ML
1 INJECTION INTRAMUSCULAR EVERY 6 HOURS PRN
Status: DISCONTINUED | OUTPATIENT
Start: 2023-08-03 | End: 2023-08-04

## 2023-08-03 RX ORDER — BUPROPION HYDROCHLORIDE 150 MG/1
150 TABLET, EXTENDED RELEASE ORAL DAILY
Status: DISCONTINUED | OUTPATIENT
Start: 2023-08-04 | End: 2023-08-04

## 2023-08-03 RX ORDER — INSULIN LISPRO 100 [IU]/ML
0-4 INJECTION, SOLUTION INTRAVENOUS; SUBCUTANEOUS EVERY 4 HOURS
Status: DISCONTINUED | OUTPATIENT
Start: 2023-08-04 | End: 2023-08-04

## 2023-08-03 RX ORDER — FLUOXETINE 10 MG/1
10 TABLET, FILM COATED ORAL DAILY
Status: DISCONTINUED | OUTPATIENT
Start: 2023-08-04 | End: 2023-08-03

## 2023-08-03 RX ORDER — FENTANYL CITRATE 50 UG/ML
100 INJECTION, SOLUTION INTRAMUSCULAR; INTRAVENOUS
Status: DISCONTINUED | OUTPATIENT
Start: 2023-08-03 | End: 2023-08-04

## 2023-08-03 RX ORDER — MIDAZOLAM HYDROCHLORIDE 2 MG/2ML
2 INJECTION, SOLUTION INTRAMUSCULAR; INTRAVENOUS ONCE
Status: COMPLETED | OUTPATIENT
Start: 2023-08-03 | End: 2023-08-03

## 2023-08-03 RX ORDER — LORAZEPAM 2 MG/ML
0.5 INJECTION INTRAMUSCULAR EVERY 6 HOURS PRN
Status: DISCONTINUED | OUTPATIENT
Start: 2023-08-03 | End: 2023-08-03

## 2023-08-03 RX ADMIN — Medication 250 MG: at 07:57

## 2023-08-03 RX ADMIN — MIDAZOLAM HYDROCHLORIDE 2 MG: 1 INJECTION, SOLUTION INTRAMUSCULAR; INTRAVENOUS at 21:48

## 2023-08-03 RX ADMIN — ENOXAPARIN SODIUM 40 MG: 100 INJECTION SUBCUTANEOUS at 08:00

## 2023-08-03 RX ADMIN — POLYVINYL ALCOHOL 1 DROP: 14 SOLUTION/ DROPS OPHTHALMIC at 07:58

## 2023-08-03 RX ADMIN — SODIUM CHLORIDE, PRESERVATIVE FREE 10 ML: 5 INJECTION INTRAVENOUS at 19:57

## 2023-08-03 RX ADMIN — Medication 10 ML: at 22:46

## 2023-08-03 RX ADMIN — LORAZEPAM 1 MG: 2 INJECTION INTRAMUSCULAR; INTRAVENOUS at 22:46

## 2023-08-03 RX ADMIN — LORAZEPAM 1 MG: 2 INJECTION INTRAMUSCULAR; INTRAVENOUS at 16:36

## 2023-08-03 RX ADMIN — POLYVINYL ALCOHOL 1 DROP: 14 SOLUTION/ DROPS OPHTHALMIC at 16:36

## 2023-08-03 RX ADMIN — LACTULOSE 20 G: 20 SOLUTION ORAL at 20:35

## 2023-08-03 RX ADMIN — CHLORHEXIDINE GLUCONATE 15 ML: 1.2 RINSE ORAL at 11:55

## 2023-08-03 RX ADMIN — Medication 250 MG: at 16:36

## 2023-08-03 RX ADMIN — CHLORHEXIDINE GLUCONATE 15 ML: 1.2 RINSE ORAL at 00:12

## 2023-08-03 RX ADMIN — AMPICILLIN SODIUM AND SULBACTAM SODIUM 3000 MG: 2; 1 INJECTION, POWDER, FOR SOLUTION INTRAMUSCULAR; INTRAVENOUS at 12:02

## 2023-08-03 RX ADMIN — POLYVINYL ALCOHOL 1 DROP: 14 SOLUTION/ DROPS OPHTHALMIC at 20:10

## 2023-08-03 RX ADMIN — CHLORHEXIDINE GLUCONATE 15 ML: 1.2 RINSE ORAL at 16:36

## 2023-08-03 RX ADMIN — Medication 250 MG: at 20:35

## 2023-08-03 RX ADMIN — POLYVINYL ALCOHOL 1 DROP: 14 SOLUTION/ DROPS OPHTHALMIC at 00:12

## 2023-08-03 RX ADMIN — Medication 10 ML: at 21:07

## 2023-08-03 RX ADMIN — SODIUM CHLORIDE, PRESERVATIVE FREE 20 MG: 5 INJECTION INTRAVENOUS at 07:57

## 2023-08-03 RX ADMIN — SODIUM CHLORIDE, PRESERVATIVE FREE 20 MG: 5 INJECTION INTRAVENOUS at 20:35

## 2023-08-03 RX ADMIN — POLYVINYL ALCOHOL 1 DROP: 14 SOLUTION/ DROPS OPHTHALMIC at 04:00

## 2023-08-03 RX ADMIN — PROPOFOL 45 MCG/KG/MIN: 10 INJECTION, EMULSION INTRAVENOUS at 03:00

## 2023-08-03 RX ADMIN — Medication 10 ML: at 21:48

## 2023-08-03 RX ADMIN — SODIUM CHLORIDE 25 ML: 9 INJECTION, SOLUTION INTRAVENOUS at 21:25

## 2023-08-03 RX ADMIN — POLYVINYL ALCOHOL 1 DROP: 14 SOLUTION/ DROPS OPHTHALMIC at 11:55

## 2023-08-03 RX ADMIN — LACTULOSE 20 G: 20 SOLUTION ORAL at 07:57

## 2023-08-03 RX ADMIN — ENOXAPARIN SODIUM 40 MG: 100 INJECTION SUBCUTANEOUS at 20:35

## 2023-08-03 RX ADMIN — CEFEPIME 2000 MG: 2 INJECTION, POWDER, FOR SOLUTION INTRAVENOUS at 17:08

## 2023-08-03 RX ADMIN — DEXMEDETOMIDINE 0.2 MCG/KG/HR: 100 INJECTION, SOLUTION INTRAVENOUS at 08:30

## 2023-08-03 RX ADMIN — Medication 200 MCG/HR: at 01:20

## 2023-08-03 RX ADMIN — FENTANYL CITRATE 100 MCG: 50 INJECTION INTRAMUSCULAR; INTRAVENOUS at 19:56

## 2023-08-03 RX ADMIN — AMPICILLIN SODIUM AND SULBACTAM SODIUM 3000 MG: 2; 1 INJECTION, POWDER, FOR SOLUTION INTRAMUSCULAR; INTRAVENOUS at 00:14

## 2023-08-03 RX ADMIN — SODIUM CHLORIDE, PRESERVATIVE FREE 10 ML: 5 INJECTION INTRAVENOUS at 07:57

## 2023-08-03 RX ADMIN — AMPICILLIN SODIUM AND SULBACTAM SODIUM 3000 MG: 2; 1 INJECTION, POWDER, FOR SOLUTION INTRAMUSCULAR; INTRAVENOUS at 05:23

## 2023-08-03 RX ADMIN — DEXMEDETOMIDINE 1.4 MCG/KG/HR: 100 INJECTION, SOLUTION INTRAVENOUS at 18:03

## 2023-08-03 RX ADMIN — PROPOFOL 45 MCG/KG/MIN: 10 INJECTION, EMULSION INTRAVENOUS at 05:44

## 2023-08-03 RX ADMIN — POLYETHYLENE GLYCOL 3350 17 G: 17 POWDER, FOR SOLUTION ORAL at 07:57

## 2023-08-03 RX ADMIN — FENTANYL CITRATE 100 MCG: 50 INJECTION INTRAMUSCULAR; INTRAVENOUS at 21:07

## 2023-08-03 RX ADMIN — Medication 250 MG: at 11:55

## 2023-08-03 RX ADMIN — PROPOFOL 40 MCG/KG/MIN: 10 INJECTION, EMULSION INTRAVENOUS at 09:13

## 2023-08-03 RX ADMIN — CHLORHEXIDINE GLUCONATE 15 ML: 1.2 RINSE ORAL at 20:10

## 2023-08-03 RX ADMIN — CHLORHEXIDINE GLUCONATE 15 ML: 1.2 RINSE ORAL at 07:57

## 2023-08-03 RX ADMIN — Medication 75 MCG/HR: at 11:55

## 2023-08-03 RX ADMIN — CHLORHEXIDINE GLUCONATE 15 ML: 1.2 RINSE ORAL at 04:00

## 2023-08-03 RX ADMIN — Medication 200 MCG/HR: at 05:47

## 2023-08-03 RX ADMIN — FENTANYL CITRATE 100 MCG: 50 INJECTION INTRAMUSCULAR; INTRAVENOUS at 22:45

## 2023-08-03 ASSESSMENT — PAIN SCALES - GENERAL
PAINLEVEL_OUTOF10: 0
PAINLEVEL_OUTOF10: 8
PAINLEVEL_OUTOF10: 0
PAINLEVEL_OUTOF10: 0
PAINLEVEL_OUTOF10: 4
PAINLEVEL_OUTOF10: 8
PAINLEVEL_OUTOF10: 8
PAINLEVEL_OUTOF10: 4
PAINLEVEL_OUTOF10: 0
PAINLEVEL_OUTOF10: 4
PAINLEVEL_OUTOF10: 0
PAINLEVEL_OUTOF10: 0
PAINLEVEL_OUTOF10: 7

## 2023-08-03 ASSESSMENT — PULMONARY FUNCTION TESTS
PIF_VALUE: 23
PIF_VALUE: 15
PIF_VALUE: 15
PIF_VALUE: 26
PIF_VALUE: 25
PIF_VALUE: 15
PIF_VALUE: 15
PIF_VALUE: 25
PIF_VALUE: 16
PIF_VALUE: 15
PIF_VALUE: 25
PIF_VALUE: 15
PIF_VALUE: 30
PIF_VALUE: 15
PIF_VALUE: 23
PIF_VALUE: 15
PIF_VALUE: 19
PIF_VALUE: 23
PIF_VALUE: 40
PIF_VALUE: 19
PIF_VALUE: 15
PIF_VALUE: 26
PIF_VALUE: 25
PIF_VALUE: 23
PIF_VALUE: 15
PIF_VALUE: 26

## 2023-08-03 ASSESSMENT — PAIN - FUNCTIONAL ASSESSMENT
PAIN_FUNCTIONAL_ASSESSMENT: INTOLERABLE, UNABLE TO DO ANY ACTIVE OR PASSIVE ACTIVITIES
PAIN_FUNCTIONAL_ASSESSMENT: PREVENTS OR INTERFERES WITH MANY ACTIVE NOT PASSIVE ACTIVITIES
PAIN_FUNCTIONAL_ASSESSMENT: INTOLERABLE, UNABLE TO DO ANY ACTIVE OR PASSIVE ACTIVITIES

## 2023-08-03 NOTE — FLOWSHEET NOTE
Patient continues to reach for trach while performing patient care unable to redirect at this time will continue to monitor

## 2023-08-03 NOTE — FLOWSHEET NOTE
Pt agitated/restless, pulling against restraints, thrashing in bed, pulling for lines/tubes, failed to redirect, soft bilateral wrist restraints continued for patient safety, will reassess for earliest removal capability

## 2023-08-03 NOTE — PLAN OF CARE
Problem: Discharge Planning  Goal: Discharge to home or other facility with appropriate resources  Outcome: Progressing     Problem: Pain  Goal: Verbalizes/displays adequate comfort level or baseline comfort level  Outcome: Progressing     Problem: Skin/Tissue Integrity  Goal: Absence of new skin breakdown  Description: 1. Monitor for areas of redness and/or skin breakdown  2. Assess vascular access sites hourly  3. Every 4-6 hours minimum:  Change oxygen saturation probe site  4. Every 4-6 hours:  If on nasal continuous positive airway pressure, respiratory therapy assess nares and determine need for appliance change or resting period.   Outcome: Progressing     Problem: Safety - Adult  Goal: Free from fall injury  Outcome: Progressing  Flowsheets (Taken 8/3/2023 1012)  Free From Fall Injury: Instruct family/caregiver on patient safety     Problem: ABCDS Injury Assessment  Goal: Absence of physical injury  Outcome: Progressing  Flowsheets (Taken 8/3/2023 1012)  Absence of Physical Injury: Implement safety measures based on patient assessment     Problem: Neurosensory - Adult  Goal: Achieves stable or improved neurological status  Outcome: Progressing  Goal: Achieves maximal functionality and self care  Outcome: Progressing     Problem: Respiratory - Adult  Goal: Achieves optimal ventilation and oxygenation  8/3/2023 1215 by Mariam Villegas RN  Outcome: Progressing  8/3/2023 0837 by Adrienne Germain RCP  Outcome: Progressing     Problem: Cardiovascular - Adult  Goal: Maintains optimal cardiac output and hemodynamic stability  Outcome: Progressing  Goal: Absence of cardiac dysrhythmias or at baseline  Outcome: Progressing     Problem: Skin/Tissue Integrity - Adult  Goal: Skin integrity remains intact  Outcome: Progressing  Flowsheets (Taken 8/3/2023 1012)  Skin Integrity Remains Intact: Monitor for areas of redness and/or skin breakdown  Goal: Incisions, wounds, or drain sites healing without S/S of and demonstrate effective coping strategies  Description: INTERVENTIONS:  1. Assist patient/family to identify coping skills, available support systems and cultural and spiritual values  2. Provide emotional support, including active listening and acknowledgement of concerns of patient and caregivers  3. Reduce environmental stimuli, as able  4. Instruct patient/family in relaxation techniques, as appropriate  5. Assess for spiritual pain/suffering and initiate Spiritual Care, Psychosocial Clinical Specialist consults as needed  Outcome: Progressing     Problem: Decision Making  Goal: Pt/Family able to effectively weigh alternatives and participate in decision making related to treatment and care  Description: INTERVENTIONS:  1. Determine when there are differences between patient's view, family's view, and healthcare provider's view of condition  2. Facilitate patient and family articulation of goals for care  3. Help patient and family identify pros/cons of alternative solutions  4. Provide information as requested by patient/family  5. Respect patient/family right to receive or not to receive information  6. Serve as a liaison between patient and family and health care team  7. Initiate Consults from Ethics, Palliative Care or initiate [id number] as is appropriate  Outcome: Progressing     Problem: Nutrition Deficit:  Goal: Optimize nutritional status  Outcome: Progressing     Problem: Safety - Medical Restraint  Goal: Remains free of injury from restraints (Restraint for Interference with Medical Device)  Description: INTERVENTIONS:  1. Determine that other, less restrictive measures have been tried or would not be effective before applying the restraint  2. Evaluate the patient's condition at the time of restraint application  3. Inform patient/family regarding the reason for restraint  4.  Q2H: Monitor safety, psychosocial status, comfort, nutrition and hydration  8/3/2023 1215 by Tiffanie Wilkes

## 2023-08-03 NOTE — PROGRESS NOTES
DAILY VENTILATOR WEANING ASSESSMENT PERFORMED    P/FIO2 Ratio =   214       (<100= do not Wean)                  Cs =   39                       (<32= Instability)  Plat. Pressure = 19  MV = 5.4  RSBI =    Instabilities:       Cardiovascular =       CNS =       Respiratory =       Metabolic =    Parameters    no    Wean per protocol  no    Ask Physician for a weaning plan yes    Additional Comments:     Performed by Brianna Chin RCP RRT      Reference Table:    Cardiovascular     CNS      1. Mean BP less than or equal to 75   1. Neuromuscular blockade  2. Heart Rate greater than 130   2. RASS of -3, -4, -5  3. Myocardial Ischemia    3. RASS of +3, +4  4. Mechanical Assist Device    4. ICP greater than 15 or             Intracranial Hypertension         Respiratory      Metabolic  1. PEEP equal to or greater than 10cm/H20  1. Temp. (8hrs) less than 95 or > 103  2. Respiratory Rate greater than 35   2. WBC < 5000 or > 62984  3. Minute Volume greater than 15L  4. pH less than 7.30  5.  Deteriorating chest X-ray

## 2023-08-04 ENCOUNTER — APPOINTMENT (OUTPATIENT)
Dept: GENERAL RADIOLOGY | Age: 58
DRG: 004 | End: 2023-08-04
Payer: COMMERCIAL

## 2023-08-04 LAB
AADO2: 231.2 MMHG
ALBUMIN SERPL-MCNC: 3 G/DL (ref 3.5–5.2)
ALP SERPL-CCNC: 67 U/L (ref 40–129)
ALT SERPL-CCNC: 13 U/L (ref 0–40)
ANION GAP SERPL CALCULATED.3IONS-SCNC: 16 MMOL/L (ref 7–16)
AST SERPL-CCNC: 22 U/L (ref 0–39)
B.E.: -5.8 MMOL/L (ref -3–3)
BASOPHILS # BLD: 0.03 K/UL (ref 0–0.2)
BASOPHILS NFR BLD: 1 % (ref 0–2)
BILIRUB SERPL-MCNC: 0.4 MG/DL (ref 0–1.2)
BUN SERPL-MCNC: 18 MG/DL (ref 6–20)
CALCIUM SERPL-MCNC: 8.6 MG/DL (ref 8.6–10.2)
CHLORIDE SERPL-SCNC: 106 MMOL/L (ref 98–107)
CO2 SERPL-SCNC: 20 MMOL/L (ref 22–29)
COHB: 0.3 % (ref 0–1.5)
CREAT SERPL-MCNC: 0.8 MG/DL (ref 0.7–1.2)
CRITICAL: ABNORMAL
DATE ANALYZED: ABNORMAL
DATE OF COLLECTION: ABNORMAL
EKG ATRIAL RATE: 88 BPM
EKG P AXIS: 48 DEGREES
EKG P-R INTERVAL: 202 MS
EKG Q-T INTERVAL: 364 MS
EKG QRS DURATION: 86 MS
EKG QTC CALCULATION (BAZETT): 440 MS
EKG R AXIS: -11 DEGREES
EKG T AXIS: 125 DEGREES
EKG VENTRICULAR RATE: 88 BPM
EOSINOPHIL # BLD: 0.07 K/UL (ref 0.05–0.5)
EOSINOPHILS RELATIVE PERCENT: 1 % (ref 0–6)
ERYTHROCYTE [DISTWIDTH] IN BLOOD BY AUTOMATED COUNT: 13.8 % (ref 11.5–15)
FIO2: 50 %
GFR SERPL CREATININE-BSD FRML MDRD: >60 ML/MIN/1.73M2
GLUCOSE SERPL-MCNC: 170 MG/DL (ref 74–99)
HCO3: 20.8 MMOL/L (ref 22–26)
HCT VFR BLD AUTO: 30 % (ref 37–54)
HGB BLD-MCNC: 9.4 G/DL (ref 12.5–16.5)
HHB: 11.5 % (ref 0–5)
IMM GRANULOCYTES # BLD AUTO: <0.03 K/UL (ref 0–0.58)
IMM GRANULOCYTES NFR BLD: 0 % (ref 0–5)
LAB: ABNORMAL
LYMPHOCYTES NFR BLD: 1.03 K/UL (ref 1.5–4)
LYMPHOCYTES RELATIVE PERCENT: 17 % (ref 20–42)
Lab: ABNORMAL
MCH RBC QN AUTO: 29.4 PG (ref 26–35)
MCHC RBC AUTO-ENTMCNC: 31.3 G/DL (ref 32–34.5)
MCV RBC AUTO: 93.8 FL (ref 80–99.9)
METER GLUCOSE: 145 MG/DL (ref 74–99)
METER GLUCOSE: 189 MG/DL (ref 74–99)
METER GLUCOSE: 196 MG/DL (ref 74–99)
METER GLUCOSE: 207 MG/DL (ref 74–99)
METER GLUCOSE: 209 MG/DL (ref 74–99)
METER GLUCOSE: 222 MG/DL (ref 74–99)
METHB: 0.2 % (ref 0–1.5)
MODE: ABNORMAL
MONOCYTES NFR BLD: 1.09 K/UL (ref 0.1–0.95)
MONOCYTES NFR BLD: 18 % (ref 2–12)
NEUTROPHILS NFR BLD: 64 % (ref 43–80)
NEUTS SEG NFR BLD: 3.95 K/UL (ref 1.8–7.3)
O2 CONTENT: 14.4 ML/DL
O2 SATURATION: 88.4 % (ref 92–98.5)
O2HB: 88 % (ref 94–97)
OPERATOR ID: 7221
PATIENT TEMP: 37 C
PCO2: 45.2 MMHG (ref 35–45)
PEEP/CPAP: 6 CMH2O
PFO2: 1.24 MMHG/%
PH BLOOD GAS: 7.28 (ref 7.35–7.45)
PHOSPHATE SERPL-MCNC: 2 MG/DL (ref 2.5–4.5)
PLATELET # BLD AUTO: 339 K/UL (ref 130–450)
PMV BLD AUTO: 9.2 FL (ref 7–12)
PO2: 62 MMHG (ref 75–100)
POTASSIUM SERPL-SCNC: 4 MMOL/L (ref 3.5–5)
PROT SERPL-MCNC: 6 G/DL (ref 6.4–8.3)
PS: 8 CMH20
RBC # BLD AUTO: 3.2 M/UL (ref 3.8–5.8)
RI(T): 3.73
SODIUM SERPL-SCNC: 142 MMOL/L (ref 132–146)
SOURCE, BLOOD GAS: ABNORMAL
THB: 11.6 G/DL (ref 11.5–16.5)
TIME ANALYZED: 548
WBC OTHER # BLD: 6.2 K/UL (ref 4.5–11.5)

## 2023-08-04 PROCEDURE — 82805 BLOOD GASES W/O2 SATURATION: CPT

## 2023-08-04 PROCEDURE — 6360000002 HC RX W HCPCS

## 2023-08-04 PROCEDURE — 2580000003 HC RX 258: Performed by: STUDENT IN AN ORGANIZED HEALTH CARE EDUCATION/TRAINING PROGRAM

## 2023-08-04 PROCEDURE — 82947 ASSAY GLUCOSE BLOOD QUANT: CPT

## 2023-08-04 PROCEDURE — 93010 ELECTROCARDIOGRAM REPORT: CPT | Performed by: INTERNAL MEDICINE

## 2023-08-04 PROCEDURE — 2580000003 HC RX 258

## 2023-08-04 PROCEDURE — 99291 CRITICAL CARE FIRST HOUR: CPT | Performed by: SURGERY

## 2023-08-04 PROCEDURE — 85025 COMPLETE CBC W/AUTO DIFF WBC: CPT

## 2023-08-04 PROCEDURE — 31624 DX BRONCHOSCOPE/LAVAGE: CPT

## 2023-08-04 PROCEDURE — A4216 STERILE WATER/SALINE, 10 ML: HCPCS | Performed by: STUDENT IN AN ORGANIZED HEALTH CARE EDUCATION/TRAINING PROGRAM

## 2023-08-04 PROCEDURE — 6370000000 HC RX 637 (ALT 250 FOR IP)

## 2023-08-04 PROCEDURE — 0BC38ZZ EXTIRPATION OF MATTER FROM RIGHT MAIN BRONCHUS, VIA NATURAL OR ARTIFICIAL OPENING ENDOSCOPIC: ICD-10-PCS | Performed by: SURGERY

## 2023-08-04 PROCEDURE — 2500000003 HC RX 250 WO HCPCS

## 2023-08-04 PROCEDURE — 0BC78ZZ EXTIRPATION OF MATTER FROM LEFT MAIN BRONCHUS, VIA NATURAL OR ARTIFICIAL OPENING ENDOSCOPIC: ICD-10-PCS | Performed by: SURGERY

## 2023-08-04 PROCEDURE — 84100 ASSAY OF PHOSPHORUS: CPT

## 2023-08-04 PROCEDURE — 93005 ELECTROCARDIOGRAM TRACING: CPT

## 2023-08-04 PROCEDURE — 71045 X-RAY EXAM CHEST 1 VIEW: CPT

## 2023-08-04 PROCEDURE — 6360000002 HC RX W HCPCS: Performed by: STUDENT IN AN ORGANIZED HEALTH CARE EDUCATION/TRAINING PROGRAM

## 2023-08-04 PROCEDURE — 80053 COMPREHEN METABOLIC PANEL: CPT

## 2023-08-04 PROCEDURE — 6370000000 HC RX 637 (ALT 250 FOR IP): Performed by: STUDENT IN AN ORGANIZED HEALTH CARE EDUCATION/TRAINING PROGRAM

## 2023-08-04 PROCEDURE — 2000000000 HC ICU R&B

## 2023-08-04 PROCEDURE — 2720000010 HC SURG SUPPLY STERILE

## 2023-08-04 PROCEDURE — 93005 ELECTROCARDIOGRAM TRACING: CPT | Performed by: STUDENT IN AN ORGANIZED HEALTH CARE EDUCATION/TRAINING PROGRAM

## 2023-08-04 PROCEDURE — 2500000003 HC RX 250 WO HCPCS: Performed by: STUDENT IN AN ORGANIZED HEALTH CARE EDUCATION/TRAINING PROGRAM

## 2023-08-04 PROCEDURE — 94003 VENT MGMT INPAT SUBQ DAY: CPT

## 2023-08-04 RX ORDER — QUETIAPINE FUMARATE 25 MG/1
25 TABLET, FILM COATED ORAL 2 TIMES DAILY
Status: DISCONTINUED | OUTPATIENT
Start: 2023-08-04 | End: 2023-08-05

## 2023-08-04 RX ORDER — FENTANYL CITRATE 50 UG/ML
100 INJECTION, SOLUTION INTRAMUSCULAR; INTRAVENOUS ONCE
Status: COMPLETED | OUTPATIENT
Start: 2023-08-04 | End: 2023-08-04

## 2023-08-04 RX ORDER — INSULIN LISPRO 100 [IU]/ML
0-4 INJECTION, SOLUTION INTRAVENOUS; SUBCUTANEOUS NIGHTLY
Status: DISCONTINUED | OUTPATIENT
Start: 2023-08-04 | End: 2023-08-04

## 2023-08-04 RX ORDER — BUPROPION HYDROCHLORIDE 75 MG/1
75 TABLET ORAL 2 TIMES DAILY
Status: DISCONTINUED | OUTPATIENT
Start: 2023-08-04 | End: 2023-08-25 | Stop reason: HOSPADM

## 2023-08-04 RX ORDER — PROPOFOL 10 MG/ML
INJECTION, EMULSION INTRAVENOUS
Status: COMPLETED
Start: 2023-08-04 | End: 2023-08-04

## 2023-08-04 RX ORDER — LORAZEPAM 1 MG/1
2 TABLET ORAL EVERY 4 HOURS
Status: DISCONTINUED | OUTPATIENT
Start: 2023-08-04 | End: 2023-08-08

## 2023-08-04 RX ORDER — MIDAZOLAM HYDROCHLORIDE 2 MG/2ML
2 INJECTION, SOLUTION INTRAMUSCULAR; INTRAVENOUS 4 TIMES DAILY PRN
Status: DISCONTINUED | OUTPATIENT
Start: 2023-08-04 | End: 2023-08-05

## 2023-08-04 RX ORDER — SODIUM CHLORIDE FOR INHALATION 3 %
4 VIAL, NEBULIZER (ML) INHALATION PRN
Status: DISCONTINUED | OUTPATIENT
Start: 2023-08-04 | End: 2023-08-25 | Stop reason: HOSPADM

## 2023-08-04 RX ORDER — OXYCODONE HCL 5 MG/5 ML
5 SOLUTION, ORAL ORAL EVERY 4 HOURS
Status: DISCONTINUED | OUTPATIENT
Start: 2023-08-04 | End: 2023-08-04

## 2023-08-04 RX ORDER — MIDAZOLAM HYDROCHLORIDE 1 MG/ML
INJECTION INTRAMUSCULAR; INTRAVENOUS
Status: COMPLETED
Start: 2023-08-04 | End: 2023-08-04

## 2023-08-04 RX ORDER — MIDAZOLAM HYDROCHLORIDE 2 MG/2ML
4 INJECTION, SOLUTION INTRAMUSCULAR; INTRAVENOUS ONCE
Status: COMPLETED | OUTPATIENT
Start: 2023-08-04 | End: 2023-08-04

## 2023-08-04 RX ORDER — MIDAZOLAM HYDROCHLORIDE 2 MG/2ML
2 INJECTION, SOLUTION INTRAMUSCULAR; INTRAVENOUS ONCE
Status: DISCONTINUED | OUTPATIENT
Start: 2023-08-04 | End: 2023-08-04

## 2023-08-04 RX ORDER — FENTANYL CITRATE 50 UG/ML
100 INJECTION, SOLUTION INTRAMUSCULAR; INTRAVENOUS
Status: DISCONTINUED | OUTPATIENT
Start: 2023-08-04 | End: 2023-08-25 | Stop reason: HOSPADM

## 2023-08-04 RX ORDER — LORAZEPAM 1 MG/1
2 TABLET ORAL 4 TIMES DAILY
Status: DISCONTINUED | OUTPATIENT
Start: 2023-08-04 | End: 2023-08-04

## 2023-08-04 RX ORDER — MIDAZOLAM HYDROCHLORIDE 2 MG/2ML
2 INJECTION, SOLUTION INTRAMUSCULAR; INTRAVENOUS ONCE
Status: COMPLETED | OUTPATIENT
Start: 2023-08-04 | End: 2023-08-04

## 2023-08-04 RX ORDER — MIDAZOLAM HYDROCHLORIDE 2 MG/2ML
2 INJECTION, SOLUTION INTRAMUSCULAR; INTRAVENOUS
Status: DISCONTINUED | OUTPATIENT
Start: 2023-08-04 | End: 2023-08-04

## 2023-08-04 RX ORDER — OXYCODONE HCL 5 MG/5 ML
5 SOLUTION, ORAL ORAL EVERY 4 HOURS
Status: DISCONTINUED | OUTPATIENT
Start: 2023-08-04 | End: 2023-08-07

## 2023-08-04 RX ORDER — INSULIN LISPRO 100 [IU]/ML
0-16 INJECTION, SOLUTION INTRAVENOUS; SUBCUTANEOUS
Status: DISCONTINUED | OUTPATIENT
Start: 2023-08-05 | End: 2023-08-04

## 2023-08-04 RX ORDER — INSULIN LISPRO 100 [IU]/ML
0-16 INJECTION, SOLUTION INTRAVENOUS; SUBCUTANEOUS EVERY 4 HOURS
Status: DISCONTINUED | OUTPATIENT
Start: 2023-08-05 | End: 2023-08-25 | Stop reason: HOSPADM

## 2023-08-04 RX ORDER — PROPOFOL 10 MG/ML
5-50 INJECTION, EMULSION INTRAVENOUS CONTINUOUS
Status: DISCONTINUED | OUTPATIENT
Start: 2023-08-04 | End: 2023-08-05

## 2023-08-04 RX ADMIN — INSULIN LISPRO 4 UNITS: 100 INJECTION, SOLUTION INTRAVENOUS; SUBCUTANEOUS at 23:42

## 2023-08-04 RX ADMIN — DEXMEDETOMIDINE 1.4 MCG/KG/HR: 100 INJECTION, SOLUTION INTRAVENOUS at 09:15

## 2023-08-04 RX ADMIN — QUETIAPINE FUMARATE 25 MG: 25 TABLET ORAL at 08:12

## 2023-08-04 RX ADMIN — FENTANYL CITRATE 100 MCG: 50 INJECTION INTRAMUSCULAR; INTRAVENOUS at 01:53

## 2023-08-04 RX ADMIN — POLYVINYL ALCOHOL 1 DROP: 14 SOLUTION/ DROPS OPHTHALMIC at 00:00

## 2023-08-04 RX ADMIN — POLYETHYLENE GLYCOL 3350 17 G: 17 POWDER, FOR SOLUTION ORAL at 08:12

## 2023-08-04 RX ADMIN — LORAZEPAM 2 MG: 1 TABLET ORAL at 23:42

## 2023-08-04 RX ADMIN — ENOXAPARIN SODIUM 40 MG: 100 INJECTION SUBCUTANEOUS at 08:11

## 2023-08-04 RX ADMIN — MIDAZOLAM HYDROCHLORIDE 2 MG: 1 INJECTION, SOLUTION INTRAMUSCULAR; INTRAVENOUS at 21:35

## 2023-08-04 RX ADMIN — POLYVINYL ALCOHOL 1 DROP: 14 SOLUTION/ DROPS OPHTHALMIC at 16:20

## 2023-08-04 RX ADMIN — LACTULOSE 20 G: 20 SOLUTION ORAL at 08:12

## 2023-08-04 RX ADMIN — INSULIN LISPRO 1 UNITS: 100 INJECTION, SOLUTION INTRAVENOUS; SUBCUTANEOUS at 16:25

## 2023-08-04 RX ADMIN — Medication 250 MG: at 12:28

## 2023-08-04 RX ADMIN — QUETIAPINE FUMARATE 25 MG: 25 TABLET ORAL at 03:07

## 2023-08-04 RX ADMIN — POLYVINYL ALCOHOL 1 DROP: 14 SOLUTION/ DROPS OPHTHALMIC at 11:11

## 2023-08-04 RX ADMIN — OXYCODONE HYDROCHLORIDE 5 MG: 5 SOLUTION ORAL at 12:28

## 2023-08-04 RX ADMIN — POLYVINYL ALCOHOL 1 DROP: 14 SOLUTION/ DROPS OPHTHALMIC at 04:00

## 2023-08-04 RX ADMIN — FENTANYL CITRATE 100 MCG: 50 INJECTION INTRAMUSCULAR; INTRAVENOUS at 16:46

## 2023-08-04 RX ADMIN — QUETIAPINE FUMARATE 25 MG: 25 TABLET ORAL at 20:33

## 2023-08-04 RX ADMIN — OXYCODONE HYDROCHLORIDE 5 MG: 5 SOLUTION ORAL at 16:43

## 2023-08-04 RX ADMIN — SODIUM CHLORIDE, PRESERVATIVE FREE 10 ML: 5 INJECTION INTRAVENOUS at 08:13

## 2023-08-04 RX ADMIN — MIDAZOLAM HYDROCHLORIDE 4 MG: 1 INJECTION, SOLUTION INTRAMUSCULAR; INTRAVENOUS at 21:43

## 2023-08-04 RX ADMIN — CEFEPIME 2000 MG: 2 INJECTION, POWDER, FOR SOLUTION INTRAVENOUS at 21:29

## 2023-08-04 RX ADMIN — LACTULOSE 20 G: 20 SOLUTION ORAL at 20:33

## 2023-08-04 RX ADMIN — LORAZEPAM 1 MG: 2 INJECTION INTRAMUSCULAR; INTRAVENOUS at 06:24

## 2023-08-04 RX ADMIN — INSULIN LISPRO 1 UNITS: 100 INJECTION, SOLUTION INTRAVENOUS; SUBCUTANEOUS at 20:31

## 2023-08-04 RX ADMIN — BUPROPION HYDROCHLORIDE 75 MG: 75 TABLET, FILM COATED ORAL at 09:10

## 2023-08-04 RX ADMIN — PROPOFOL 20 MCG/KG/MIN: 10 INJECTION, EMULSION INTRAVENOUS at 03:27

## 2023-08-04 RX ADMIN — MIDAZOLAM HYDROCHLORIDE 2 MG: 1 INJECTION, SOLUTION INTRAMUSCULAR; INTRAVENOUS at 00:31

## 2023-08-04 RX ADMIN — FENTANYL CITRATE 100 MCG: 50 INJECTION INTRAMUSCULAR; INTRAVENOUS at 12:23

## 2023-08-04 RX ADMIN — ENOXAPARIN SODIUM 40 MG: 100 INJECTION SUBCUTANEOUS at 20:34

## 2023-08-04 RX ADMIN — Medication 250 MG: at 08:12

## 2023-08-04 RX ADMIN — CHLORHEXIDINE GLUCONATE 15 ML: 1.2 RINSE ORAL at 16:20

## 2023-08-04 RX ADMIN — CHLORHEXIDINE GLUCONATE 15 ML: 1.2 RINSE ORAL at 00:00

## 2023-08-04 RX ADMIN — Medication 250 MG: at 16:43

## 2023-08-04 RX ADMIN — FENTANYL CITRATE 100 MCG: 50 INJECTION INTRAMUSCULAR; INTRAVENOUS at 00:27

## 2023-08-04 RX ADMIN — MIDAZOLAM HYDROCHLORIDE 2 MG: 1 INJECTION, SOLUTION INTRAMUSCULAR; INTRAVENOUS at 01:58

## 2023-08-04 RX ADMIN — SODIUM CHLORIDE, PRESERVATIVE FREE 10 ML: 5 INJECTION INTRAVENOUS at 20:27

## 2023-08-04 RX ADMIN — FENTANYL CITRATE 100 MCG: 50 INJECTION INTRAMUSCULAR; INTRAVENOUS at 05:50

## 2023-08-04 RX ADMIN — FENTANYL CITRATE 100 MCG: 50 INJECTION INTRAMUSCULAR; INTRAVENOUS at 21:34

## 2023-08-04 RX ADMIN — FENTANYL CITRATE 100 MCG: 50 INJECTION INTRAMUSCULAR; INTRAVENOUS at 02:51

## 2023-08-04 RX ADMIN — CHLORHEXIDINE GLUCONATE 15 ML: 1.2 RINSE ORAL at 20:27

## 2023-08-04 RX ADMIN — SODIUM CHLORIDE, PRESERVATIVE FREE 20 MG: 5 INJECTION INTRAVENOUS at 20:34

## 2023-08-04 RX ADMIN — CHLORHEXIDINE GLUCONATE 15 ML: 1.2 RINSE ORAL at 04:00

## 2023-08-04 RX ADMIN — DEXMEDETOMIDINE 1.5 MCG/KG/HR: 100 INJECTION, SOLUTION INTRAVENOUS at 23:22

## 2023-08-04 RX ADMIN — MIDAZOLAM HYDROCHLORIDE 2 MG: 2 INJECTION, SOLUTION INTRAMUSCULAR; INTRAVENOUS at 00:31

## 2023-08-04 RX ADMIN — CHLORHEXIDINE GLUCONATE 15 ML: 1.2 RINSE ORAL at 23:38

## 2023-08-04 RX ADMIN — POLYVINYL ALCOHOL 1 DROP: 14 SOLUTION/ DROPS OPHTHALMIC at 23:38

## 2023-08-04 RX ADMIN — Medication 10 ML: at 21:35

## 2023-08-04 RX ADMIN — LORAZEPAM 2 MG: 1 TABLET ORAL at 12:28

## 2023-08-04 RX ADMIN — DEXMEDETOMIDINE 1.5 MCG/KG/HR: 100 INJECTION, SOLUTION INTRAVENOUS at 17:19

## 2023-08-04 RX ADMIN — OXYCODONE HYDROCHLORIDE 5 MG: 5 SOLUTION ORAL at 23:42

## 2023-08-04 RX ADMIN — POLYVINYL ALCOHOL 1 DROP: 14 SOLUTION/ DROPS OPHTHALMIC at 08:13

## 2023-08-04 RX ADMIN — FENTANYL CITRATE 100 MCG: 50 INJECTION INTRAMUSCULAR; INTRAVENOUS at 21:40

## 2023-08-04 RX ADMIN — BUPROPION HYDROCHLORIDE 75 MG: 75 TABLET, FILM COATED ORAL at 20:33

## 2023-08-04 RX ADMIN — BUPROPION HYDROCHLORIDE 150 MG: 150 TABLET, FILM COATED, EXTENDED RELEASE ORAL at 08:14

## 2023-08-04 RX ADMIN — CEFEPIME 2000 MG: 2 INJECTION, POWDER, FOR SOLUTION INTRAVENOUS at 05:27

## 2023-08-04 RX ADMIN — SODIUM CHLORIDE, PRESERVATIVE FREE 20 MG: 5 INJECTION INTRAVENOUS at 08:12

## 2023-08-04 RX ADMIN — OXYCODONE HYDROCHLORIDE 5 MG: 5 SOLUTION ORAL at 09:10

## 2023-08-04 RX ADMIN — FENTANYL CITRATE 100 MCG: 50 INJECTION INTRAMUSCULAR; INTRAVENOUS at 08:09

## 2023-08-04 RX ADMIN — OXYCODONE HYDROCHLORIDE 5 MG: 5 SOLUTION ORAL at 20:33

## 2023-08-04 RX ADMIN — CHLORHEXIDINE GLUCONATE 15 ML: 1.2 RINSE ORAL at 08:12

## 2023-08-04 RX ADMIN — CEFEPIME 2000 MG: 2 INJECTION, POWDER, FOR SOLUTION INTRAVENOUS at 14:15

## 2023-08-04 RX ADMIN — FLUOXETINE 20 MG: 10 TABLET, FILM COATED ORAL at 03:07

## 2023-08-04 RX ADMIN — DEXMEDETOMIDINE 1.5 MCG/KG/HR: 100 INJECTION, SOLUTION INTRAVENOUS at 00:28

## 2023-08-04 RX ADMIN — LORAZEPAM 2 MG: 1 TABLET ORAL at 16:43

## 2023-08-04 RX ADMIN — Medication 250 MG: at 20:34

## 2023-08-04 RX ADMIN — LORAZEPAM 2 MG: 1 TABLET ORAL at 20:33

## 2023-08-04 RX ADMIN — POLYVINYL ALCOHOL 1 DROP: 14 SOLUTION/ DROPS OPHTHALMIC at 20:27

## 2023-08-04 RX ADMIN — CHLORHEXIDINE GLUCONATE 15 ML: 1.2 RINSE ORAL at 11:11

## 2023-08-04 ASSESSMENT — PULMONARY FUNCTION TESTS
PIF_VALUE: 16
PIF_VALUE: 24
PIF_VALUE: 16
PIF_VALUE: 16
PIF_VALUE: 14
PIF_VALUE: 20
PIF_VALUE: 24
PIF_VALUE: 26
PIF_VALUE: 25
PIF_VALUE: 24
PIF_VALUE: 15
PIF_VALUE: 16
PIF_VALUE: 20
PIF_VALUE: 26
PIF_VALUE: 19
PIF_VALUE: 31
PIF_VALUE: 27
PIF_VALUE: 50
PIF_VALUE: 15
PIF_VALUE: 20
PIF_VALUE: 15
PIF_VALUE: 16
PIF_VALUE: 15
PIF_VALUE: 15
PIF_VALUE: 24
PIF_VALUE: 28
PIF_VALUE: 15
PIF_VALUE: 22
PIF_VALUE: 26
PIF_VALUE: 26
PIF_VALUE: 25
PIF_VALUE: 19
PIF_VALUE: 24

## 2023-08-04 ASSESSMENT — PAIN SCALES - GENERAL
PAINLEVEL_OUTOF10: 8
PAINLEVEL_OUTOF10: 8
PAINLEVEL_OUTOF10: 6
PAINLEVEL_OUTOF10: 7
PAINLEVEL_OUTOF10: 4
PAINLEVEL_OUTOF10: 8
PAINLEVEL_OUTOF10: 4
PAINLEVEL_OUTOF10: 4
PAINLEVEL_OUTOF10: 8
PAINLEVEL_OUTOF10: 8

## 2023-08-04 ASSESSMENT — PAIN SCALES - WONG BAKER: WONGBAKER_NUMERICALRESPONSE: 2

## 2023-08-04 NOTE — PLAN OF CARE
Problem: Discharge Planning  Goal: Discharge to home or other facility with appropriate resources  Outcome: Progressing     Problem: Pain  Goal: Verbalizes/displays adequate comfort level or baseline comfort level  Outcome: Progressing  Flowsheets (Taken 8/4/2023 1000)  Verbalizes/displays adequate comfort level or baseline comfort level: (unable to verbalize) --     Problem: Safety - Adult  Goal: Free from fall injury  Outcome: Progressing     Problem: ABCDS Injury Assessment  Goal: Absence of physical injury  Outcome: Progressing     Problem: Neurosensory - Adult  Goal: Achieves stable or improved neurological status  Outcome: Progressing     Problem: Respiratory - Adult  Goal: Achieves optimal ventilation and oxygenation  Outcome: Progressing     Problem: Cardiovascular - Adult  Goal: Maintains optimal cardiac output and hemodynamic stability  Outcome: Progressing     Problem: Skin/Tissue Integrity - Adult  Goal: Skin integrity remains intact  Outcome: Progressing     Problem: Musculoskeletal - Adult  Goal: Return mobility to safest level of function  Outcome: Progressing     Problem: Gastrointestinal - Adult  Goal: Minimal or absence of nausea and vomiting  Outcome: Progressing  Flowsheets (Taken 8/4/2023 0800)  Minimal or absence of nausea and vomiting: (clamped)   Administer IV fluids as ordered to ensure adequate hydration   Maintain NPO status until nausea and vomiting are resolved  Goal: Maintains or returns to baseline bowel function  Recent Flowsheet Documentation  Taken 8/4/2023 0800 by May Plata RN  Maintains or returns to baseline bowel function: Assess bowel function  Goal: Maintains adequate nutritional intake  Recent Flowsheet Documentation  Taken 8/4/2023 0800 by May Plata RN  Maintains adequate nutritional intake: Monitor percentage of each meal consumed     Problem: Genitourinary - Adult  Goal: Absence of urinary retention  Outcome: Progressing     Problem: Infection - Adult  Goal:

## 2023-08-04 NOTE — PLAN OF CARE
and Electrolytes - Adult  Goal: Electrolytes maintained within normal limits  8/4/2023 1720 by Melva Herzog RN  Outcome: Progressing  8/4/2023 1715 by Melva Herzog RN  Outcome: Progressing  Flowsheets (Taken 8/4/2023 0800)  Electrolytes maintained within normal limits:   Monitor labs and assess patient for signs and symptoms of electrolyte imbalances   Administer electrolyte replacement as ordered   Monitor response to electrolyte replacements, including repeat lab results as appropriate  Goal: Hemodynamic stability and optimal renal function maintained  Recent Flowsheet Documentation  Taken 8/4/2023 0800 by Melva Herzog RN  Hemodynamic stability and optimal renal function maintained:   Monitor labs and assess for signs and symptoms of volume excess or deficit   Monitor intake, output and patient weight   Monitor urine specific gravity, serum osmolarity and serum sodium as indicated or ordered  Goal: Glucose maintained within prescribed range  Recent Flowsheet Documentation  Taken 8/4/2023 0800 by Melva Herzog RN  Glucose maintained within prescribed range:   Monitor blood glucose as ordered   Assess for signs and symptoms of hyperglycemia and hypoglycemia   Administer ordered medications to maintain glucose within target range     Problem: Hematologic - Adult  Goal: Maintains hematologic stability  Outcome: Progressing  Flowsheets (Taken 8/4/2023 0800)  Maintains hematologic stability:   Assess for signs and symptoms of bleeding or hemorrhage   Monitor labs for bleeding or clotting disorders     Problem: Chronic Conditions and Co-morbidities  Goal: Patient's chronic conditions and co-morbidity symptoms are monitored and maintained or improved  8/4/2023 1720 by Melva Herzog RN  Outcome: Progressing  8/4/2023 1715 by Melva Herzog RN  Outcome: Progressing  Flowsheets (Taken 8/4/2023 0800)  Care Plan - Patient's Chronic Conditions and Co-Morbidity Symptoms are Monitored and Maintained or Improved: Monitor and assess patient's chronic conditions and comorbid symptoms for stability, deterioration, or improvement     Problem: Anxiety  Goal: Will report anxiety at manageable levels  8/4/2023 1720 by Jenkins Severe, RN  Outcome: Progressing  8/4/2023 1715 by Jenkins Severe, RN  Outcome: Progressing     Problem: Coping  Goal: Pt/Family able to verbalize concerns and demonstrate effective coping strategies  8/4/2023 1720 by Jenkins Severe, RN  Outcome: Progressing  8/4/2023 1715 by Jenkins Severe, RN  Outcome: Progressing     Problem: Decision Making  Goal: Pt/Family able to effectively weigh alternatives and participate in decision making related to treatment and care  8/4/2023 1720 by Jenkins Severe, RN  Outcome: Progressing  8/4/2023 1715 by Jenkins Severe, RN  Outcome: Progressing     Problem: Nutrition Deficit:  Goal: Optimize nutritional status  8/4/2023 1720 by Jenkins Severe, RN  Outcome: Progressing  8/4/2023 1715 by Jenkins Severe, RN  Outcome: Progressing     Problem: Safety - Medical Restraint  Goal: Remains free of injury from restraints (Restraint for Interference with Medical Device)  8/4/2023 1720 by Jenkins Severe, RN  Outcome: Progressing  8/4/2023 1715 by Jenkins Severe, RN  Outcome: Progressing

## 2023-08-04 NOTE — FLOWSHEET NOTE
Pt extremely agitated; pulling at lines, linens, attempting to grab staff, lifting body up out of bed and kicking legs. Pt does not follow commands to stop behaviors. Less restrictive measures failed. Continue Bilateral soft wrist restraints.

## 2023-08-04 NOTE — FLOWSHEET NOTE
With with extreme agitation with nursing interventions. Changes made to scheduled medication administration times to consolidate nursing care and decrease pt agitation.

## 2023-08-04 NOTE — FLOWSHEET NOTE
Pt is in 2 point restraints due to pulling on lines and tubes. Pt is unable to follow commands but needs to be in restraints for safety. Rn will continue to monitor for need.

## 2023-08-04 NOTE — PROGRESS NOTES
Patient is a 76-year-old female with a past medical history significant for anxiety/depression, cavernous hemangioma, fibromyalgia, hemorrhoid, recent hemorrhoidectomy on June 4, seizure disorder, hypertension, obstructive sleep apnea who was ambulatory to the ED today with complaints of fatigue, vomiting, weakness and weight loss. Patient states since her hemorrhoidectomy on June 4 she is not felt well. She's had decreased p.o. intake since her surgery. Patient is a 24 pound weight loss since surgery. She notes lower abdominal pain, right leg and back pain without sciatica. She states she received a transfusion while in the hospital. She states since the time of the transfusion she has not felt well. She states she felt weak during the transfusion and required assistance walking after receiving a transfusion. Patient notes now within 15 minutes HEENT she has an episode of mucousy liquid diarrhea. She states \"all food just passes through\". Her pain is periumbilical along with the right leg and low back pain she notes only been able to sleep in certain positions. She has bilious emesis, decreased appetite, nausea, diarrhea, subjective fevers and chills. Patient denies chest pain or shortness of breath. Patient states she was sent in by her primary care provider to have \"certain studies done\". Patient's no further complaints at this time. Primary care provider:Neville Mae MD    The history is provided by the patient. No  was used.       Past Medical History:   Diagnosis Date    Anxiety and depression     Cavernous hemangioma of intracranial structure ([facility] [address] 5/2/2011    Fibromyalgia     fibromyalgia    Hemorrhoids     Hiatal hernia with gastroesophageal reflux 04/2018    Hx of migraines     Hx of seizure disorder     Hypertension     Incomplete right bundle branch block (RBBB) determined by electrocardiography 05/10/2018    CARLOS (obstructive sleep apnea)     no cpap    Thyroid Was called to room for patient desat. Patient suctioned for a large amount of tan secretions. Patient found to be on 50% FiO2 SpO2 84%. FiO2 increased to 90% to maintain SpO2 above 90. RR breathing in the 30s and visibly uncomfortable. Placed back on [facility] settings at this time. RN aware.     Elicia Hudson RRT-ACCS nodule      Past Surgical History:   Procedure Laterality Date    COLONOSCOPY N/A 6/3/2019    COLONOSCOPY performed by Xavier Barnes MD at [address] [facility] HX CHOLECYSTECTOMY      HX HEENT  2012    tonsillectomy    HX LITHOTRIPSY      HX ORTHOPAEDIC      disc fusion 2010, right knee cyst removed   [address] [facility], 2006    Cavernous brain angioma(s) removed    SURGERY,BRAIN/SPINE,W/COMPUTER       Family History:   Problem Relation Age of Onset   Dee Cancer Mother         breast    Breast Cancer Mother 76    Cancer Father     Migraines Father     Diabetes Father     Breast Cancer Sister 48    Ovarian Cancer Maternal Aunt      Social History     Socioeconomic History    Marital status: SINGLE     Spouse name: Not on file    Number of children: Not on file    Years of education: Not on file    Highest education level: Not on file   Occupational History    Not on file   Social Needs    Financial resource strain: Not on file    Food insecurity:     Worry: Not on file     Inability: Not on file    Transportation needs:     Medical: Not on file     Non-medical: Not on file   Tobacco Use    Smoking status: Former Smoker    Smokeless tobacco: Never Used    Tobacco comment: on chantix,   Substance and Sexual Activity    Alcohol use: No    Drug use: No    Sexual activity: Not on file   Lifestyle    Physical activity:     Days per week: Not on file     Minutes per session: Not on file    Stress: Not on file   Relationships    Social connections:     Talks on phone: Not on file     Gets together: Not on file     Attends [employer] service: Not on file     Active member of club or organization: Not on file     Attends meetings of clubs or organizations: Not on file     Relationship status: Not on file    Intimate partner violence:     Fear of current or ex partner: Not on file     Emotionally abused: Not on file     Physically abused: Not on file     Forced sexual activity: Not on file   Other Topics Concern    Not on file   Social History Narrative    Not on file     ALLERGIES: Latex, natural rubber; Aspirin; Other medication; Codeine; Imitrex [sumatriptan]; and Percocet [oxycodone-acetaminophen]    Review of Systems   Constitutional: Positive for activity change, appetite change, chills, fatigue, fever and unexpected weight change. Respiratory: Negative for shortness of breath. Cardiovascular: Negative for chest pain. Gastrointestinal: Positive for abdominal distention, abdominal pain, diarrhea, nausea and vomiting. Negative for constipation. Musculoskeletal: Positive for back pain and myalgias. Neurological: Positive for weakness. Psychiatric/Behavioral: Negative. All other systems reviewed and are negative. Vitals:    06/30/19 2044 06/30/19 2218   BP: 118/74    Pulse: (!) 104    Resp: 18    Temp: 99 °F (37.2 °C)    SpO2: 97% 97%   Weight: 97.1 kg (214 lb)    Height: 5' 4\" (1.626 m)           Physical Exam   Constitutional: She is oriented to person, place, and time. She appears well-developed and well-nourished. HENT:   Head: Normocephalic and atraumatic. Neck: Normal range of motion. Neck supple. Cardiovascular: Regular rhythm, normal heart sounds and intact distal pulses. Tachycardia present. Pulses:       Radial pulses are 2+ on the right side, and 2+ on the left side. Dorsalis pedis pulses are 2+ on the right side, and 2+ on the left side. Posterior tibial pulses are 2+ on the right side, and 2+ on the left side. Pulmonary/Chest: Effort normal and breath sounds normal. No respiratory distress. She has no wheezes. She has no rales. She exhibits no tenderness. Abdominal: Soft. Normal appearance and bowel sounds are normal. There is tenderness in the periumbilical area and suprapubic area. There is CVA tenderness (right-sided only). There is no rigidity, no rebound and no guarding. Musculoskeletal: Normal range of motion.         Lumbar back: She exhibits tenderness and pain. She exhibits normal range of motion, no bony tenderness, no swelling, no edema, no deformity, no laceration, no spasm and normal pulse. Back:    TTP over right lower back   Neurological: She is alert and oriented to person, place, and time. Skin: Skin is warm and dry. No erythema. Psychiatric: She has a normal mood and affect. Her behavior is normal. Judgment and thought content normal.   Nursing note and vitals reviewed. MDM     Procedures    Assessment & Plan:     Orders Placed This Encounter    URINE CULTURE HOLD SAMPLE    CT ABDOMEN PELVIS WITH CONTRAST    Hold Sample    CBC WITH AUTOMATED DIFF    METABOLIC PANEL, COMPREHENSIVE    URINALYSIS W/MICROSCOPIC    LIPASE    sodium chloride 0.9 % bolus infusion 1,000 mL    ondansetron (ZOFRAN) injection 4 mg       Discussed with Ephraim Miranda DO,ED Provider    Maria A Mathews NP  06/30/19  10:38 PM    Hypokalemia. repleat IV and PO. Obtain CT Abdomen    Maria A Mathews NP  06/30/19  10:41 PM    Toradol for pain. IV    Maria A Mathews NP  07/01/19  12:12 AM    Pain for potassium infusion. Bolus LR with potassium infusion. Maria A Mathews NP  07/01/19  12:14 AM    Still has pain. Rocking in the bed. Took last 2 oxycodone just before arrival. Percocet now for pain. Maria A Mathews NP  07/01/19  1:08 AM      UA without acute findings. Hypokalemia. Potassium packets for home. BRAT diet. Follow-up with the PCP this week for repeat potassium levels. Mild NATE on labs likely 2/2 dehydration. No vomiting or stools while in the ER. Discussed return precautions.        LABORATORY TESTS:  Labs Reviewed   CBC WITH AUTOMATED DIFF - Abnormal; Notable for the following components:       Result Value    PLATELET 325 (*)     All other components within normal limits   METABOLIC PANEL, COMPREHENSIVE - Abnormal; Notable for the following components:    Potassium 2.7 (*)     Glucose 121 (*)     BUN 22 (*) Creatinine 1.34 (*)     GFR est AA 50 (*)     GFR est non-AA 42 (*)     Protein, total 9.0 (*)     Globulin 4.7 (*)     A-G Ratio 0.9 (*)     All other components within normal limits   URINE CULTURE HOLD SAMPLE   SAMPLES BEING HELD   URINALYSIS W/MICROSCOPIC   LIPASE       IMAGING RESULTS:  Ct Abd Pelv Wo Cont    Result Date: 6/30/2019  EXAM: CT ABD PELV WO CONT INDICATION: persistent abd pain, fevers, chills, nausea and diarrhea COMPARISON: Tatum 10, 2019 CONTRAST:  None. TECHNIQUE: Thin axial images were obtained through the abdomen and pelvis. Coronal and sagittal reconstructions were generated. Oral contrast was not administered. CT dose reduction was achieved through use of a standardized protocol tailored for this examination and automatic exposure control for dose modulation. The absence of intravenous contrast material reduces the sensitivity for evaluation of the solid parenchymal organs of the abdomen. FINDINGS: LUNG BASES: Clear. INCIDENTALLY IMAGED HEART AND MEDIASTINUM: Unremarkable. LIVER: No mass or biliary dilatation. GALLBLADDER: Surgically absent. SPLEEN: No mass. PANCREAS: No mass or ductal dilatation. ADRENALS: Unremarkable. KIDNEYS/URETERS: No mass, calculus, or hydronephrosis. STOMACH: Unremarkable. SMALL BOWEL: No dilatation or wall thickening. COLON: No dilatation or wall thickening. APPENDIX: Normal. PERITONEUM: No ascites or pneumoperitoneum. RETROPERITONEUM: No lymphadenopathy or aortic aneurysm. REPRODUCTIVE ORGANS: Normal uterus and ovaries are URINARY BLADDER: No mass or calculus. BONES: No destructive bone lesion. ADDITIONAL COMMENTS: N/A     IMPRESSION: No evidence of acute abdominal or pelvic process. No significant change since the Tatum 10, 2019 examination.     MEDICATIONS GIVEN:  Medications   potassium chloride 10 mEq in 100 ml IVPB (10 mEq IntraVENous New Bag 7/1/19 0119)   sodium chloride 0.9 % bolus infusion 1,000 mL (0 mL IntraVENous IV Completed 7/1/19 0025)   ondansetron [facility]) injection 4 mg (4 mg IntraVENous Given 6/30/19 2215)   potassium chloride SR (KLOR-CON 10) tablet 40 mEq (40 mEq Oral Given 6/30/19 2303)   ketorolac (TORADOL) injection 30 mg (30 mg IntraVENous Given 7/1/19 0025)   lactated ringers bolus infusion 1,000 mL (1,000 mL IntraVENous New Bag 7/1/19 0023)   oxyCODONE-acetaminophen (PERCOCET) 5-325 mg per tablet 1 Tab (1 Tab Oral Given 7/1/19 0113)       IMPRESSION:  1. Dehydration    2. Hypokalemia    3. NATE (acute kidney injury) ([address]    4. Abnormal weight loss        PLAN:  1. Current Discharge Medication List      START taking these medications    Details   potassium chloride (KLOR-CON) 20 mEq pack Take 1 Packet by mouth two (2) times daily (with meals) for 5 days. Indications: low amount of potassium in the blood  Qty: 10 Packet, Refills: 0           2. Follow-up Information     Follow up With Specialties Details Why Contact Info    Ajay Otero MD [facility] Schedule an appointment as soon as possible for a visit This week for repeat labs and continue work-up [facility]  [address] [facility] MD [facility] Schedule an appointment as soon as possible for a visit  [address]  [facility] [address] [address] [phone]      [facility] EMERGENCY DEPT Emergency Medicine  As needed [address] [facility]  [phone]        3. Return to ED for new or worsening symptoms       Pardeep Calle NP          Please note that this dictation was completed with [employer], the [employer] voice recognition software. Quite often unanticipated grammatical, syntax, homophones, and other interpretive errors are inadvertently transcribed by the computer software. Please disregard these errors. Please excuse any errors that have escaped final proofreading.
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
## 2023-08-05 ENCOUNTER — APPOINTMENT (OUTPATIENT)
Dept: GENERAL RADIOLOGY | Age: 58
DRG: 004 | End: 2023-08-05
Payer: COMMERCIAL

## 2023-08-05 LAB
AADO2: 580.3 MMHG
ALBUMIN SERPL-MCNC: 3.1 G/DL (ref 3.5–5.2)
ALP SERPL-CCNC: 58 U/L (ref 40–129)
ALT SERPL-CCNC: 18 U/L (ref 0–40)
ANION GAP SERPL CALCULATED.3IONS-SCNC: 14 MMOL/L (ref 7–16)
AST SERPL-CCNC: 26 U/L (ref 0–39)
B.E.: -4.6 MMOL/L (ref -3–3)
BASOPHILS # BLD: 0.03 K/UL (ref 0–0.2)
BASOPHILS NFR BLD: 0 % (ref 0–2)
BILIRUB SERPL-MCNC: 0.5 MG/DL (ref 0–1.2)
BUN SERPL-MCNC: 23 MG/DL (ref 6–20)
CALCIUM SERPL-MCNC: 8.7 MG/DL (ref 8.6–10.2)
CHLORIDE SERPL-SCNC: 109 MMOL/L (ref 98–107)
CO2 SERPL-SCNC: 22 MMOL/L (ref 22–29)
COHB: 0.3 % (ref 0–1.5)
CREAT SERPL-MCNC: 0.8 MG/DL (ref 0.7–1.2)
CRITICAL: ABNORMAL
DATE ANALYZED: ABNORMAL
DATE OF COLLECTION: ABNORMAL
EOSINOPHIL # BLD: 0.04 K/UL (ref 0.05–0.5)
EOSINOPHILS RELATIVE PERCENT: 1 % (ref 0–6)
ERYTHROCYTE [DISTWIDTH] IN BLOOD BY AUTOMATED COUNT: 14.2 % (ref 11.5–15)
FIO2: 100 %
GFR SERPL CREATININE-BSD FRML MDRD: >60 ML/MIN/1.73M2
GLUCOSE SERPL-MCNC: 176 MG/DL (ref 74–99)
HCO3: 21 MMOL/L (ref 22–26)
HCT VFR BLD AUTO: 31.5 % (ref 37–54)
HGB BLD-MCNC: 9.8 G/DL (ref 12.5–16.5)
HHB: 8.2 % (ref 0–5)
IMM GRANULOCYTES # BLD AUTO: 0.05 K/UL (ref 0–0.58)
IMM GRANULOCYTES NFR BLD: 1 % (ref 0–5)
LAB: ABNORMAL
LYMPHOCYTES NFR BLD: 1.9 K/UL (ref 1.5–4)
LYMPHOCYTES RELATIVE PERCENT: 25 % (ref 20–42)
Lab: ABNORMAL
MCH RBC QN AUTO: 29.3 PG (ref 26–35)
MCHC RBC AUTO-ENTMCNC: 31.1 G/DL (ref 32–34.5)
MCV RBC AUTO: 94 FL (ref 80–99.9)
METER GLUCOSE: 160 MG/DL (ref 74–99)
METER GLUCOSE: 160 MG/DL (ref 74–99)
METER GLUCOSE: 161 MG/DL (ref 74–99)
METER GLUCOSE: 173 MG/DL (ref 74–99)
METER GLUCOSE: 173 MG/DL (ref 74–99)
METER GLUCOSE: 202 MG/DL (ref 74–99)
METHB: 0.1 % (ref 0–1.5)
MICROORGANISM SPEC CULT: ABNORMAL
MICROORGANISM/AGENT SPEC: ABNORMAL
MODE: AC
MONOCYTES NFR BLD: 1.21 K/UL (ref 0.1–0.95)
MONOCYTES NFR BLD: 16 % (ref 2–12)
NEUTROPHILS NFR BLD: 57 % (ref 43–80)
NEUTS SEG NFR BLD: 4.33 K/UL (ref 1.8–7.3)
O2 CONTENT: 14.1 ML/DL
O2 SATURATION: 91.8 % (ref 92–98.5)
O2HB: 91.4 % (ref 94–97)
OPERATOR ID: 2962
PATIENT TEMP: 37 C
PCO2: 40.8 MMHG (ref 35–45)
PEEP/CPAP: 8 CMH2O
PFO2: 0.67 MMHG/%
PH BLOOD GAS: 7.33 (ref 7.35–7.45)
PHOSPHATE SERPL-MCNC: 2.3 MG/DL (ref 2.5–4.5)
PLATELET # BLD AUTO: 375 K/UL (ref 130–450)
PMV BLD AUTO: 9 FL (ref 7–12)
PO2: 66.9 MMHG (ref 75–100)
POTASSIUM SERPL-SCNC: 3.9 MMOL/L (ref 3.5–5)
PROT SERPL-MCNC: 6.3 G/DL (ref 6.4–8.3)
RBC # BLD AUTO: 3.35 M/UL (ref 3.8–5.8)
RBC # BLD: ABNORMAL 10*6/UL
RI(T): 8.67
RR MECHANICAL: 16 B/MIN
SODIUM SERPL-SCNC: 145 MMOL/L (ref 132–146)
SOURCE, BLOOD GAS: ABNORMAL
SPECIMEN DESCRIPTION: ABNORMAL
THB: 10.9 G/DL (ref 11.5–16.5)
TIME ANALYZED: 346
VT MECHANICAL: 500 ML
WBC OTHER # BLD: 7.6 K/UL (ref 4.5–11.5)

## 2023-08-05 PROCEDURE — 84100 ASSAY OF PHOSPHORUS: CPT

## 2023-08-05 PROCEDURE — 6370000000 HC RX 637 (ALT 250 FOR IP)

## 2023-08-05 PROCEDURE — 2500000003 HC RX 250 WO HCPCS

## 2023-08-05 PROCEDURE — 82947 ASSAY GLUCOSE BLOOD QUANT: CPT

## 2023-08-05 PROCEDURE — 2500000003 HC RX 250 WO HCPCS: Performed by: STUDENT IN AN ORGANIZED HEALTH CARE EDUCATION/TRAINING PROGRAM

## 2023-08-05 PROCEDURE — 6360000002 HC RX W HCPCS

## 2023-08-05 PROCEDURE — 99291 CRITICAL CARE FIRST HOUR: CPT | Performed by: SURGERY

## 2023-08-05 PROCEDURE — 94003 VENT MGMT INPAT SUBQ DAY: CPT

## 2023-08-05 PROCEDURE — 85025 COMPLETE CBC W/AUTO DIFF WBC: CPT

## 2023-08-05 PROCEDURE — 6370000000 HC RX 637 (ALT 250 FOR IP): Performed by: STUDENT IN AN ORGANIZED HEALTH CARE EDUCATION/TRAINING PROGRAM

## 2023-08-05 PROCEDURE — 71045 X-RAY EXAM CHEST 1 VIEW: CPT

## 2023-08-05 PROCEDURE — 2580000003 HC RX 258

## 2023-08-05 PROCEDURE — 80053 COMPREHEN METABOLIC PANEL: CPT

## 2023-08-05 PROCEDURE — A4216 STERILE WATER/SALINE, 10 ML: HCPCS | Performed by: STUDENT IN AN ORGANIZED HEALTH CARE EDUCATION/TRAINING PROGRAM

## 2023-08-05 PROCEDURE — 2580000003 HC RX 258: Performed by: STUDENT IN AN ORGANIZED HEALTH CARE EDUCATION/TRAINING PROGRAM

## 2023-08-05 PROCEDURE — 2000000000 HC ICU R&B

## 2023-08-05 PROCEDURE — 6360000002 HC RX W HCPCS: Performed by: STUDENT IN AN ORGANIZED HEALTH CARE EDUCATION/TRAINING PROGRAM

## 2023-08-05 PROCEDURE — 82805 BLOOD GASES W/O2 SATURATION: CPT

## 2023-08-05 RX ORDER — MIDAZOLAM HYDROCHLORIDE 2 MG/2ML
2 INJECTION, SOLUTION INTRAMUSCULAR; INTRAVENOUS
Status: DISCONTINUED | OUTPATIENT
Start: 2023-08-05 | End: 2023-08-05 | Stop reason: SDUPTHER

## 2023-08-05 RX ORDER — QUETIAPINE FUMARATE 25 MG/1
50 TABLET, FILM COATED ORAL 2 TIMES DAILY
Status: DISCONTINUED | OUTPATIENT
Start: 2023-08-05 | End: 2023-08-07

## 2023-08-05 RX ORDER — POLYETHYLENE GLYCOL 3350 17 G/17G
17 POWDER, FOR SOLUTION ORAL 2 TIMES DAILY
Status: DISCONTINUED | OUTPATIENT
Start: 2023-08-05 | End: 2023-08-25 | Stop reason: HOSPADM

## 2023-08-05 RX ORDER — MIDAZOLAM HYDROCHLORIDE 2 MG/2ML
4 INJECTION, SOLUTION INTRAMUSCULAR; INTRAVENOUS ONCE
Status: COMPLETED | OUTPATIENT
Start: 2023-08-05 | End: 2023-08-05

## 2023-08-05 RX ORDER — SENNA AND DOCUSATE SODIUM 50; 8.6 MG/1; MG/1
2 TABLET, FILM COATED ORAL 2 TIMES DAILY
Status: DISCONTINUED | OUTPATIENT
Start: 2023-08-05 | End: 2023-08-22

## 2023-08-05 RX ORDER — MIDAZOLAM HYDROCHLORIDE 1 MG/ML
INJECTION INTRAMUSCULAR; INTRAVENOUS
Status: COMPLETED
Start: 2023-08-05 | End: 2023-08-05

## 2023-08-05 RX ORDER — MIDAZOLAM HYDROCHLORIDE 2 MG/2ML
2 INJECTION, SOLUTION INTRAMUSCULAR; INTRAVENOUS ONCE
Status: COMPLETED | OUTPATIENT
Start: 2023-08-05 | End: 2023-08-05

## 2023-08-05 RX ORDER — MIDAZOLAM HYDROCHLORIDE 2 MG/2ML
2 INJECTION, SOLUTION INTRAMUSCULAR; INTRAVENOUS EVERY 4 HOURS PRN
Status: DISCONTINUED | OUTPATIENT
Start: 2023-08-05 | End: 2023-08-08

## 2023-08-05 RX ADMIN — LORAZEPAM 2 MG: 1 TABLET ORAL at 12:29

## 2023-08-05 RX ADMIN — CHLORHEXIDINE GLUCONATE 15 ML: 1.2 RINSE ORAL at 12:30

## 2023-08-05 RX ADMIN — ACETAMINOPHEN 650 MG: 650 SOLUTION ORAL at 18:03

## 2023-08-05 RX ADMIN — CHLORHEXIDINE GLUCONATE 15 ML: 1.2 RINSE ORAL at 15:48

## 2023-08-05 RX ADMIN — FLUOXETINE 20 MG: 10 TABLET, FILM COATED ORAL at 07:58

## 2023-08-05 RX ADMIN — CHLORHEXIDINE GLUCONATE 15 ML: 1.2 RINSE ORAL at 20:01

## 2023-08-05 RX ADMIN — FENTANYL CITRATE 100 MCG: 50 INJECTION INTRAMUSCULAR; INTRAVENOUS at 15:41

## 2023-08-05 RX ADMIN — FENTANYL CITRATE 100 MCG: 50 INJECTION INTRAMUSCULAR; INTRAVENOUS at 12:43

## 2023-08-05 RX ADMIN — ENOXAPARIN SODIUM 40 MG: 100 INJECTION SUBCUTANEOUS at 20:32

## 2023-08-05 RX ADMIN — POLYVINYL ALCOHOL 1 DROP: 14 SOLUTION/ DROPS OPHTHALMIC at 15:48

## 2023-08-05 RX ADMIN — OXYCODONE HYDROCHLORIDE 5 MG: 5 SOLUTION ORAL at 12:29

## 2023-08-05 RX ADMIN — MIDAZOLAM HYDROCHLORIDE 2 MG: 1 INJECTION, SOLUTION INTRAMUSCULAR; INTRAVENOUS at 19:57

## 2023-08-05 RX ADMIN — SODIUM CHLORIDE, PRESERVATIVE FREE 10 ML: 5 INJECTION INTRAVENOUS at 20:03

## 2023-08-05 RX ADMIN — POLYETHYLENE GLYCOL 3350 17 G: 17 POWDER, FOR SOLUTION ORAL at 07:54

## 2023-08-05 RX ADMIN — MAGNESIUM HYDROXIDE 15 ML: 1200 LIQUID ORAL at 09:39

## 2023-08-05 RX ADMIN — CEFEPIME 2000 MG: 2 INJECTION, POWDER, FOR SOLUTION INTRAVENOUS at 14:00

## 2023-08-05 RX ADMIN — MIDAZOLAM HYDROCHLORIDE 2 MG: 1 INJECTION, SOLUTION INTRAMUSCULAR; INTRAVENOUS at 15:42

## 2023-08-05 RX ADMIN — POLYETHYLENE GLYCOL 3350 17 G: 17 POWDER, FOR SOLUTION ORAL at 20:00

## 2023-08-05 RX ADMIN — ENOXAPARIN SODIUM 40 MG: 100 INJECTION SUBCUTANEOUS at 07:55

## 2023-08-05 RX ADMIN — CHLORHEXIDINE GLUCONATE 15 ML: 1.2 RINSE ORAL at 07:57

## 2023-08-05 RX ADMIN — FENTANYL CITRATE 100 MCG: 50 INJECTION INTRAMUSCULAR; INTRAVENOUS at 08:12

## 2023-08-05 RX ADMIN — MIDAZOLAM HYDROCHLORIDE 4 MG: 1 INJECTION, SOLUTION INTRAMUSCULAR; INTRAVENOUS at 13:15

## 2023-08-05 RX ADMIN — POLYVINYL ALCOHOL 1 DROP: 14 SOLUTION/ DROPS OPHTHALMIC at 07:54

## 2023-08-05 RX ADMIN — LORAZEPAM 2 MG: 1 TABLET ORAL at 04:47

## 2023-08-05 RX ADMIN — SODIUM CHLORIDE, PRESERVATIVE FREE 20 MG: 5 INJECTION INTRAVENOUS at 07:55

## 2023-08-05 RX ADMIN — OXYCODONE HYDROCHLORIDE 5 MG: 5 SOLUTION ORAL at 23:41

## 2023-08-05 RX ADMIN — FENTANYL CITRATE 100 MCG: 50 INJECTION INTRAMUSCULAR; INTRAVENOUS at 19:57

## 2023-08-05 RX ADMIN — OXYCODONE HYDROCHLORIDE 5 MG: 5 SOLUTION ORAL at 04:47

## 2023-08-05 RX ADMIN — SENNOSIDES AND DOCUSATE SODIUM 2 TABLET: 50; 8.6 TABLET ORAL at 09:40

## 2023-08-05 RX ADMIN — LACTULOSE 20 G: 20 SOLUTION ORAL at 07:54

## 2023-08-05 RX ADMIN — MIDAZOLAM HYDROCHLORIDE 2 MG: 1 INJECTION, SOLUTION INTRAMUSCULAR; INTRAVENOUS at 03:23

## 2023-08-05 RX ADMIN — LACTULOSE 20 G: 20 SOLUTION ORAL at 20:02

## 2023-08-05 RX ADMIN — POLYVINYL ALCOHOL 1 DROP: 14 SOLUTION/ DROPS OPHTHALMIC at 20:01

## 2023-08-05 RX ADMIN — BUPROPION HYDROCHLORIDE 75 MG: 75 TABLET, FILM COATED ORAL at 20:01

## 2023-08-05 RX ADMIN — MIDAZOLAM HYDROCHLORIDE 4 MG: 2 INJECTION, SOLUTION INTRAMUSCULAR; INTRAVENOUS at 13:15

## 2023-08-05 RX ADMIN — CEFEPIME 2000 MG: 2 INJECTION, POWDER, FOR SOLUTION INTRAVENOUS at 05:51

## 2023-08-05 RX ADMIN — ACETAMINOPHEN 650 MG: 650 SOLUTION ORAL at 23:43

## 2023-08-05 RX ADMIN — INSULIN LISPRO 4 UNITS: 100 INJECTION, SOLUTION INTRAVENOUS; SUBCUTANEOUS at 16:49

## 2023-08-05 RX ADMIN — Medication 10 ML: at 19:57

## 2023-08-05 RX ADMIN — MIDAZOLAM HYDROCHLORIDE 2 MG: 1 INJECTION, SOLUTION INTRAMUSCULAR; INTRAVENOUS at 23:20

## 2023-08-05 RX ADMIN — OXYCODONE HYDROCHLORIDE 5 MG: 5 SOLUTION ORAL at 20:02

## 2023-08-05 RX ADMIN — SODIUM CHLORIDE, PRESERVATIVE FREE 20 MG: 5 INJECTION INTRAVENOUS at 20:02

## 2023-08-05 RX ADMIN — Medication 500 MG: at 07:55

## 2023-08-05 RX ADMIN — FENTANYL CITRATE 100 MCG: 50 INJECTION INTRAMUSCULAR; INTRAVENOUS at 10:29

## 2023-08-05 RX ADMIN — LORAZEPAM 2 MG: 1 TABLET ORAL at 23:41

## 2023-08-05 RX ADMIN — POLYVINYL ALCOHOL 1 DROP: 14 SOLUTION/ DROPS OPHTHALMIC at 04:10

## 2023-08-05 RX ADMIN — OXYCODONE HYDROCHLORIDE 5 MG: 5 SOLUTION ORAL at 15:51

## 2023-08-05 RX ADMIN — MIDAZOLAM HYDROCHLORIDE 2 MG: 1 INJECTION, SOLUTION INTRAMUSCULAR; INTRAVENOUS at 10:49

## 2023-08-05 RX ADMIN — DEXMEDETOMIDINE 1.5 MCG/KG/HR: 100 INJECTION, SOLUTION INTRAVENOUS at 05:52

## 2023-08-05 RX ADMIN — FENTANYL CITRATE 100 MCG: 50 INJECTION INTRAMUSCULAR; INTRAVENOUS at 23:10

## 2023-08-05 RX ADMIN — QUETIAPINE FUMARATE 50 MG: 25 TABLET ORAL at 20:02

## 2023-08-05 RX ADMIN — LORAZEPAM 2 MG: 1 TABLET ORAL at 07:54

## 2023-08-05 RX ADMIN — LORAZEPAM 2 MG: 1 TABLET ORAL at 15:51

## 2023-08-05 RX ADMIN — SENNOSIDES AND DOCUSATE SODIUM 2 TABLET: 50; 8.6 TABLET ORAL at 20:02

## 2023-08-05 RX ADMIN — DEXMEDETOMIDINE 1.5 MCG/KG/HR: 100 INJECTION, SOLUTION INTRAVENOUS at 11:54

## 2023-08-05 RX ADMIN — FENTANYL CITRATE 100 MCG: 50 INJECTION INTRAMUSCULAR; INTRAVENOUS at 03:24

## 2023-08-05 RX ADMIN — LORAZEPAM 2 MG: 1 TABLET ORAL at 20:02

## 2023-08-05 RX ADMIN — Medication 500 MG: at 12:33

## 2023-08-05 RX ADMIN — BUPROPION HYDROCHLORIDE 75 MG: 75 TABLET, FILM COATED ORAL at 07:58

## 2023-08-05 RX ADMIN — QUETIAPINE FUMARATE 25 MG: 25 TABLET ORAL at 07:55

## 2023-08-05 RX ADMIN — POLYVINYL ALCOHOL 1 DROP: 14 SOLUTION/ DROPS OPHTHALMIC at 12:30

## 2023-08-05 RX ADMIN — SODIUM CHLORIDE, PRESERVATIVE FREE 10 ML: 5 INJECTION INTRAVENOUS at 07:57

## 2023-08-05 RX ADMIN — OXYCODONE HYDROCHLORIDE 5 MG: 5 SOLUTION ORAL at 07:56

## 2023-08-05 RX ADMIN — CEFEPIME 2000 MG: 2 INJECTION, POWDER, FOR SOLUTION INTRAVENOUS at 22:04

## 2023-08-05 RX ADMIN — DEXMEDETOMIDINE 1.5 MCG/KG/HR: 100 INJECTION, SOLUTION INTRAVENOUS at 18:17

## 2023-08-05 RX ADMIN — CHLORHEXIDINE GLUCONATE 15 ML: 1.2 RINSE ORAL at 04:10

## 2023-08-05 ASSESSMENT — PULMONARY FUNCTION TESTS
PIF_VALUE: 23
PIF_VALUE: 20
PIF_VALUE: 22
PIF_VALUE: 24
PIF_VALUE: 21
PIF_VALUE: 29
PIF_VALUE: 23
PIF_VALUE: 19
PIF_VALUE: 16
PIF_VALUE: 25
PIF_VALUE: 21
PIF_VALUE: 22
PIF_VALUE: 23
PIF_VALUE: 21
PIF_VALUE: 18
PIF_VALUE: 24
PIF_VALUE: 19
PIF_VALUE: 20
PIF_VALUE: 23
PIF_VALUE: 24
PIF_VALUE: 21
PIF_VALUE: 22
PIF_VALUE: 23
PIF_VALUE: 24
PIF_VALUE: 18
PIF_VALUE: 25
PIF_VALUE: 23
PIF_VALUE: 43
PIF_VALUE: 25
PIF_VALUE: 20
PIF_VALUE: 26
PIF_VALUE: 23
PIF_VALUE: 22
PIF_VALUE: 22
PIF_VALUE: 33

## 2023-08-05 ASSESSMENT — PAIN SCALES - GENERAL
PAINLEVEL_OUTOF10: 8
PAINLEVEL_OUTOF10: 3
PAINLEVEL_OUTOF10: 3
PAINLEVEL_OUTOF10: 8

## 2023-08-05 ASSESSMENT — PAIN - FUNCTIONAL ASSESSMENT: PAIN_FUNCTIONAL_ASSESSMENT: PREVENTS OR INTERFERES WITH MANY ACTIVE NOT PASSIVE ACTIVITIES

## 2023-08-05 NOTE — PROCEDURES
Bronchoscopy Procedure Note     Date of Procedure: 6/8/2022  Pre-op Diagnosis: Mucous plugging  Post-op Diagnosis: same  Physician: Dr. Floyd Alcantar MD  Anesthesia: moderate sedation  Operation: Flexible fiberoptic bronchoscopy, therapeutic     Specimens: not obtained   Estimated Blood Loss: 0 ml  Complications: None    Indications: This patient is on the ventilator and the patient's respiratory status has worsened. I am performing the bronchoscopy with BAL for Diagnostic and Therapeutic Purposes. Informed Consent was obtained prior to the procedure. Description of Procedure: The patient was given sedation under my supervision. 6 mg IV versed and 100 mcg fentanyl were given. Heart rate, blood pressure, respiratory rate and oxygen saturation were monitored while the procedure was being performed. The brochoscope was introduced through the ET tube. The ET tube was confirmed to be in good position. There were no secretions within the trachea. First the right main stem and segmental bronchi were viewed. There were some thick secretions present which were thinned with saline and suctioned clear. The scope was slowly withdrawn and passed into the left mainstem and segmental bronchi. Mild thin secretions were present and suctioned clear. The bronchoscope was completely withdrawn. The patient tolerated the procedure well with no readily apparent complications.     A BAL specimen was not obtained     Findings:  Mucous plugging, pneumonitis     Dr. Raleigh Hampton was available for the procedure    Electronically signed by Maura Trinidad MD on 8/4/2023 at 10:10 PM

## 2023-08-05 NOTE — PLAN OF CARE
Problem: Safety - Medical Restraint  Goal: Remains free of injury from restraints (Restraint for Interference with Medical Device)  Outcome: Progressing  Flowsheets  Taken 8/5/2023 0615 by Mello Harvey RN  Remains free of injury from restraints (restraint for interference with medical device):   Determine that other, less restrictive measures have been tried or would not be effective before applying the restraint   Evaluate the patient's condition at the time of restraint application   Inform patient/family regarding the reason for restraint   Every 2 hours: Monitor safety, psychosocial status, comfort, nutrition and hydration  Taken 8/5/2023 0600 by Mello Harvey RN  Remains free of injury from restraints (restraint for interference with medical device):   Determine that other, less restrictive measures have been tried or would not be effective before applying the restraint   Evaluate the patient's condition at the time of restraint application   Inform patient/family regarding the reason for restraint   Every 2 hours: Monitor safety, psychosocial status, comfort, nutrition and hydration
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
## 2023-08-05 NOTE — FLOWSHEET NOTE
Pt startled without provocation and became extremely agitated. Pt pulled off external catheter, urinated all over, kicking legs at staff, throwing legs over siderails, attempting to bite tubes and spitting. Pt O2 sat decreasing, RR and WOB increasing. Dr. Yumiko Anderson notified. New orders given.

## 2023-08-05 NOTE — FLOWSHEET NOTE
Pt agitation increased. O2 saturation in mid 80s at 80% FiO2. FiO2 increased to 100%, CXR done, EKG done. Dr. Valnecia Mins at bedside for bronchoscopy. PRN fentanyl and versed given; Dr. Valencia Mins ordered additional doses of fentanyl and versed d/t bronchospasm.

## 2023-08-05 NOTE — FLOWSHEET NOTE
Pt with increased agitation and violent behaviors - especially with his legs and feet. Pt is kicking staff, throwing legs over siderails. Unable to redirect patient and all other less restrictive measures have failed. Bilateral soft ankle restraints applied in addition to Bilateral soft wrist restraints.
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
## 2023-08-05 NOTE — FLOWSHEET NOTE
Pt is in 2 point wrist restraints due to he will pull at his trach, external cath and iv lines. Pt is in 2 point leg restraints due to trying to kick legs out of bed and continues to try and get out of bed. Rn will continue to monitor for need.  Pt needs to be in 4 point for his safety at present

## 2023-08-05 NOTE — PLAN OF CARE
Problem: Neurosensory - Adult  Goal: Achieves stable or improved neurological status  8/5/2023 0018 by Christiana Panchal RN  Outcome: Not Progressing  Pt continues to be disoriented and extremely agitated. 8/4/2023 1720 by May Plata RN  Outcome: Progressing  8/4/2023 1715 by May Plata RN  Outcome: Progressing  Goal: Achieves maximal functionality and self care  Outcome: Not Progressing  Pt unable to complete basics ADLs     Problem: Respiratory - Adult  Goal: Achieves optimal ventilation and oxygenation  8/5/2023 0018 by Christiana Panchal RN  Outcome: Not Progressing  Pt continues to have frequent periods of desaturation despite ventilator and increased FiO2.  8/4/2023 1720 by May Plata RN  Outcome: Progressing  8/4/2023 1715 by May Plata RN  Outcome: Progressing     Problem: Musculoskeletal - Adult  Goal: Return mobility to safest level of function  8/5/2023 0018 by Christiana Panchal RN  Outcome: Not Progressing  Pt able to move all extremities independently but not in any way to support ADLs and functionality  8/4/2023 1720 by May Plata RN  Outcome: Progressing  8/4/2023 1715 by May Plata RN  Outcome: Progressing  Goal: Return ADL status to a safe level of function  Outcome: Not Progressing  Pt able to move all extremities independently but not in any way to support ADLs and functionality     Problem: Metabolic/Fluid and Electrolytes - Adult  Goal: Glucose maintained within prescribed range  Outcome: Not Progressing  Pt with elevated glucose levels despite changes to insulin regimen     Problem: Anxiety  Goal: Will report anxiety at manageable levels  Description: INTERVENTIONS:  1. Administer medication as ordered  2. Teach and rehearse alternative coping skills  3.  Provide emotional support with 1:1 interaction with staff  8/5/2023 0018 by Christiana Panchal RN  Outcome: Not Progressing  Pt extremely anxious when awake - agitation interferes with

## 2023-08-06 ENCOUNTER — APPOINTMENT (OUTPATIENT)
Dept: CT IMAGING | Age: 58
DRG: 004 | End: 2023-08-06
Payer: COMMERCIAL

## 2023-08-06 ENCOUNTER — APPOINTMENT (OUTPATIENT)
Dept: GENERAL RADIOLOGY | Age: 58
DRG: 004 | End: 2023-08-06
Payer: COMMERCIAL

## 2023-08-06 DIAGNOSIS — E11.65 UNCONTROLLED TYPE 2 DIABETES MELLITUS WITH HYPERGLYCEMIA (HCC): ICD-10-CM

## 2023-08-06 LAB
AADO2: 556.7 MMHG
ALBUMIN SERPL-MCNC: 2.7 G/DL (ref 3.5–5.2)
ALP SERPL-CCNC: 62 U/L (ref 40–129)
ALT SERPL-CCNC: 16 U/L (ref 0–40)
ANION GAP SERPL CALCULATED.3IONS-SCNC: 14 MMOL/L (ref 7–16)
AST SERPL-CCNC: 21 U/L (ref 0–39)
B.E.: -2.9 MMOL/L (ref -3–3)
BASOPHILS # BLD: 0.07 K/UL (ref 0–0.2)
BASOPHILS NFR BLD: 1 % (ref 0–2)
BILIRUB SERPL-MCNC: 0.5 MG/DL (ref 0–1.2)
BUN SERPL-MCNC: 21 MG/DL (ref 6–20)
CALCIUM SERPL-MCNC: 8.6 MG/DL (ref 8.6–10.2)
CHLORIDE SERPL-SCNC: 108 MMOL/L (ref 98–107)
CO2 SERPL-SCNC: 21 MMOL/L (ref 22–29)
COHB: 0.3 % (ref 0–1.5)
CREAT SERPL-MCNC: 0.7 MG/DL (ref 0.7–1.2)
CRITICAL: ABNORMAL
DATE ANALYZED: ABNORMAL
DATE OF COLLECTION: ABNORMAL
EOSINOPHIL # BLD: 0.29 K/UL (ref 0.05–0.5)
EOSINOPHILS RELATIVE PERCENT: 3 % (ref 0–6)
ERYTHROCYTE [DISTWIDTH] IN BLOOD BY AUTOMATED COUNT: 14.2 % (ref 11.5–15)
FIO2: 100 %
GFR SERPL CREATININE-BSD FRML MDRD: >60 ML/MIN/1.73M2
GLUCOSE SERPL-MCNC: 203 MG/DL (ref 74–99)
HCO3: 22 MMOL/L (ref 22–26)
HCT VFR BLD AUTO: 28.5 % (ref 37–54)
HGB BLD-MCNC: 9 G/DL (ref 12.5–16.5)
HHB: 3.5 % (ref 0–5)
LAB: ABNORMAL
LYMPHOCYTES NFR BLD: 1.22 K/UL (ref 1.5–4)
LYMPHOCYTES RELATIVE PERCENT: 15 % (ref 20–42)
Lab: ABNORMAL
MCH RBC QN AUTO: 29.2 PG (ref 26–35)
MCHC RBC AUTO-ENTMCNC: 31.6 G/DL (ref 32–34.5)
MCV RBC AUTO: 92.5 FL (ref 80–99.9)
METER GLUCOSE: 150 MG/DL (ref 74–99)
METER GLUCOSE: 191 MG/DL (ref 74–99)
METER GLUCOSE: 199 MG/DL (ref 74–99)
METER GLUCOSE: 203 MG/DL (ref 74–99)
METER GLUCOSE: 221 MG/DL (ref 74–99)
METHB: 0.2 % (ref 0–1.5)
MODE: AC
MONOCYTES NFR BLD: 0.86 K/UL (ref 0.1–0.95)
MONOCYTES NFR BLD: 10 % (ref 2–12)
MYELOCYTES ABSOLUTE COUNT: 0.07 K/UL
MYELOCYTES: 1 %
NEUTROPHILS NFR BLD: 70 % (ref 43–80)
NEUTS SEG NFR BLD: 5.8 K/UL (ref 1.8–7.3)
O2 CONTENT: 13.9 ML/DL
O2 SATURATION: 96.5 % (ref 92–98.5)
O2HB: 96 % (ref 94–97)
OPERATOR ID: ABNORMAL
PATIENT TEMP: 37 C
PCO2: 38.4 MMHG (ref 35–45)
PEEP/CPAP: 10 CMH2O
PFO2: 0.93 MMHG/%
PH BLOOD GAS: 7.38 (ref 7.35–7.45)
PHOSPHATE SERPL-MCNC: 1.8 MG/DL (ref 2.5–4.5)
PHOSPHATE SERPL-MCNC: 3 MG/DL (ref 2.5–4.5)
PLATELET # BLD AUTO: 400 K/UL (ref 130–450)
PMV BLD AUTO: 9.3 FL (ref 7–12)
PO2: 92.9 MMHG (ref 75–100)
POTASSIUM SERPL-SCNC: 4 MMOL/L (ref 3.5–5)
PROT SERPL-MCNC: 6 G/DL (ref 6.4–8.3)
RBC # BLD AUTO: 3.08 M/UL (ref 3.8–5.8)
RBC # BLD: ABNORMAL 10*6/UL
RI(T): 5.99
RR MECHANICAL: 14 B/MIN
SODIUM SERPL-SCNC: 143 MMOL/L (ref 132–146)
SOURCE, BLOOD GAS: ABNORMAL
THB: 10.2 G/DL (ref 11.5–16.5)
TIME ANALYZED: 556
VT MECHANICAL: 460 ML
WBC OTHER # BLD: 8.3 K/UL (ref 4.5–11.5)

## 2023-08-06 PROCEDURE — 2580000003 HC RX 258

## 2023-08-06 PROCEDURE — 2000000000 HC ICU R&B

## 2023-08-06 PROCEDURE — 82805 BLOOD GASES W/O2 SATURATION: CPT

## 2023-08-06 PROCEDURE — 6370000000 HC RX 637 (ALT 250 FOR IP)

## 2023-08-06 PROCEDURE — 2500000003 HC RX 250 WO HCPCS

## 2023-08-06 PROCEDURE — 85025 COMPLETE CBC W/AUTO DIFF WBC: CPT

## 2023-08-06 PROCEDURE — 6360000004 HC RX CONTRAST MEDICATION: Performed by: RADIOLOGY

## 2023-08-06 PROCEDURE — 6360000002 HC RX W HCPCS: Performed by: STUDENT IN AN ORGANIZED HEALTH CARE EDUCATION/TRAINING PROGRAM

## 2023-08-06 PROCEDURE — 94003 VENT MGMT INPAT SUBQ DAY: CPT

## 2023-08-06 PROCEDURE — 6370000000 HC RX 637 (ALT 250 FOR IP): Performed by: STUDENT IN AN ORGANIZED HEALTH CARE EDUCATION/TRAINING PROGRAM

## 2023-08-06 PROCEDURE — 2500000003 HC RX 250 WO HCPCS: Performed by: STUDENT IN AN ORGANIZED HEALTH CARE EDUCATION/TRAINING PROGRAM

## 2023-08-06 PROCEDURE — A4216 STERILE WATER/SALINE, 10 ML: HCPCS | Performed by: STUDENT IN AN ORGANIZED HEALTH CARE EDUCATION/TRAINING PROGRAM

## 2023-08-06 PROCEDURE — 80053 COMPREHEN METABOLIC PANEL: CPT

## 2023-08-06 PROCEDURE — 6360000002 HC RX W HCPCS

## 2023-08-06 PROCEDURE — 70450 CT HEAD/BRAIN W/O DYE: CPT

## 2023-08-06 PROCEDURE — 84100 ASSAY OF PHOSPHORUS: CPT

## 2023-08-06 PROCEDURE — 99291 CRITICAL CARE FIRST HOUR: CPT | Performed by: SURGERY

## 2023-08-06 PROCEDURE — 71275 CT ANGIOGRAPHY CHEST: CPT

## 2023-08-06 PROCEDURE — 2580000003 HC RX 258: Performed by: STUDENT IN AN ORGANIZED HEALTH CARE EDUCATION/TRAINING PROGRAM

## 2023-08-06 PROCEDURE — 82947 ASSAY GLUCOSE BLOOD QUANT: CPT

## 2023-08-06 PROCEDURE — 71045 X-RAY EXAM CHEST 1 VIEW: CPT

## 2023-08-06 RX ORDER — BISACODYL 10 MG
10 SUPPOSITORY, RECTAL RECTAL DAILY
Status: DISCONTINUED | OUTPATIENT
Start: 2023-08-06 | End: 2023-08-07

## 2023-08-06 RX ORDER — FUROSEMIDE 10 MG/ML
20 INJECTION INTRAMUSCULAR; INTRAVENOUS ONCE
Status: COMPLETED | OUTPATIENT
Start: 2023-08-06 | End: 2023-08-06

## 2023-08-06 RX ADMIN — IOPAMIDOL 75 ML: 755 INJECTION, SOLUTION INTRAVENOUS at 10:30

## 2023-08-06 RX ADMIN — POLYVINYL ALCOHOL 1 DROP: 14 SOLUTION/ DROPS OPHTHALMIC at 15:49

## 2023-08-06 RX ADMIN — QUETIAPINE FUMARATE 50 MG: 25 TABLET ORAL at 20:35

## 2023-08-06 RX ADMIN — POLYVINYL ALCOHOL 1 DROP: 14 SOLUTION/ DROPS OPHTHALMIC at 07:58

## 2023-08-06 RX ADMIN — LACTULOSE 20 G: 20 SOLUTION ORAL at 20:35

## 2023-08-06 RX ADMIN — OXYCODONE HYDROCHLORIDE 5 MG: 5 SOLUTION ORAL at 15:49

## 2023-08-06 RX ADMIN — ENOXAPARIN SODIUM 40 MG: 100 INJECTION SUBCUTANEOUS at 07:57

## 2023-08-06 RX ADMIN — FUROSEMIDE 20 MG: 10 INJECTION, SOLUTION INTRAMUSCULAR; INTRAVENOUS at 11:43

## 2023-08-06 RX ADMIN — POLYVINYL ALCOHOL 1 DROP: 14 SOLUTION/ DROPS OPHTHALMIC at 20:30

## 2023-08-06 RX ADMIN — CHLORHEXIDINE GLUCONATE 15 ML: 1.2 RINSE ORAL at 00:08

## 2023-08-06 RX ADMIN — SENNOSIDES AND DOCUSATE SODIUM 2 TABLET: 50; 8.6 TABLET ORAL at 20:35

## 2023-08-06 RX ADMIN — LORAZEPAM 2 MG: 1 TABLET ORAL at 15:49

## 2023-08-06 RX ADMIN — MAGNESIUM HYDROXIDE 15 ML: 1200 LIQUID ORAL at 07:58

## 2023-08-06 RX ADMIN — OXYCODONE HYDROCHLORIDE 5 MG: 5 SOLUTION ORAL at 11:45

## 2023-08-06 RX ADMIN — BISACODYL 10 MG: 10 SUPPOSITORY RECTAL at 08:21

## 2023-08-06 RX ADMIN — LORAZEPAM 2 MG: 1 TABLET ORAL at 20:35

## 2023-08-06 RX ADMIN — FENTANYL CITRATE 100 MCG: 50 INJECTION INTRAMUSCULAR; INTRAVENOUS at 04:17

## 2023-08-06 RX ADMIN — DEXMEDETOMIDINE 1.5 MCG/KG/HR: 100 INJECTION, SOLUTION INTRAVENOUS at 07:13

## 2023-08-06 RX ADMIN — DEXMEDETOMIDINE 1.5 MCG/KG/HR: 100 INJECTION, SOLUTION INTRAVENOUS at 00:14

## 2023-08-06 RX ADMIN — OXYCODONE HYDROCHLORIDE 5 MG: 5 SOLUTION ORAL at 07:57

## 2023-08-06 RX ADMIN — CHLORHEXIDINE GLUCONATE 15 ML: 1.2 RINSE ORAL at 11:44

## 2023-08-06 RX ADMIN — FENTANYL CITRATE 100 MCG: 50 INJECTION INTRAMUSCULAR; INTRAVENOUS at 01:05

## 2023-08-06 RX ADMIN — BUPROPION HYDROCHLORIDE 75 MG: 75 TABLET, FILM COATED ORAL at 20:34

## 2023-08-06 RX ADMIN — SODIUM CHLORIDE, PRESERVATIVE FREE 10 ML: 5 INJECTION INTRAVENOUS at 20:36

## 2023-08-06 RX ADMIN — OXYCODONE HYDROCHLORIDE 5 MG: 5 SOLUTION ORAL at 20:35

## 2023-08-06 RX ADMIN — SODIUM CHLORIDE, PRESERVATIVE FREE 20 MG: 5 INJECTION INTRAVENOUS at 20:35

## 2023-08-06 RX ADMIN — CHLORHEXIDINE GLUCONATE 15 ML: 1.2 RINSE ORAL at 20:30

## 2023-08-06 RX ADMIN — SODIUM CHLORIDE, PRESERVATIVE FREE 10 ML: 5 INJECTION INTRAVENOUS at 07:58

## 2023-08-06 RX ADMIN — DEXMEDETOMIDINE 1.5 MCG/KG/HR: 100 INJECTION, SOLUTION INTRAVENOUS at 20:06

## 2023-08-06 RX ADMIN — POLYVINYL ALCOHOL 1 DROP: 14 SOLUTION/ DROPS OPHTHALMIC at 11:44

## 2023-08-06 RX ADMIN — CHLORHEXIDINE GLUCONATE 15 ML: 1.2 RINSE ORAL at 07:56

## 2023-08-06 RX ADMIN — SODIUM CHLORIDE, PRESERVATIVE FREE 20 MG: 5 INJECTION INTRAVENOUS at 07:56

## 2023-08-06 RX ADMIN — BUPROPION HYDROCHLORIDE 75 MG: 75 TABLET, FILM COATED ORAL at 07:56

## 2023-08-06 RX ADMIN — ACETAMINOPHEN 650 MG: 650 SOLUTION ORAL at 20:53

## 2023-08-06 RX ADMIN — INSULIN LISPRO 4 UNITS: 100 INJECTION, SOLUTION INTRAVENOUS; SUBCUTANEOUS at 08:20

## 2023-08-06 RX ADMIN — POLYETHYLENE GLYCOL 3350 17 G: 17 POWDER, FOR SOLUTION ORAL at 20:35

## 2023-08-06 RX ADMIN — POLYVINYL ALCOHOL 1 DROP: 14 SOLUTION/ DROPS OPHTHALMIC at 03:32

## 2023-08-06 RX ADMIN — Medication 10 ML: at 01:06

## 2023-08-06 RX ADMIN — DEXMEDETOMIDINE 1.5 MCG/KG/HR: 100 INJECTION, SOLUTION INTRAVENOUS at 13:41

## 2023-08-06 RX ADMIN — LORAZEPAM 2 MG: 1 TABLET ORAL at 07:57

## 2023-08-06 RX ADMIN — SODIUM CHLORIDE 25 ML: 9 INJECTION, SOLUTION INTRAVENOUS at 02:05

## 2023-08-06 RX ADMIN — OXYCODONE HYDROCHLORIDE 5 MG: 5 SOLUTION ORAL at 03:32

## 2023-08-06 RX ADMIN — CEFEPIME 2000 MG: 2 INJECTION, POWDER, FOR SOLUTION INTRAVENOUS at 21:10

## 2023-08-06 RX ADMIN — LORAZEPAM 2 MG: 1 TABLET ORAL at 11:44

## 2023-08-06 RX ADMIN — Medication 10 ML: at 04:17

## 2023-08-06 RX ADMIN — CHLORHEXIDINE GLUCONATE 15 ML: 1.2 RINSE ORAL at 15:49

## 2023-08-06 RX ADMIN — CEFEPIME 2000 MG: 2 INJECTION, POWDER, FOR SOLUTION INTRAVENOUS at 13:51

## 2023-08-06 RX ADMIN — FLUOXETINE 20 MG: 10 TABLET, FILM COATED ORAL at 07:56

## 2023-08-06 RX ADMIN — CEFEPIME 2000 MG: 2 INJECTION, POWDER, FOR SOLUTION INTRAVENOUS at 05:34

## 2023-08-06 RX ADMIN — ONDANSETRON 4 MG: 2 INJECTION INTRAMUSCULAR; INTRAVENOUS at 04:17

## 2023-08-06 RX ADMIN — CHLORHEXIDINE GLUCONATE 15 ML: 1.2 RINSE ORAL at 03:32

## 2023-08-06 RX ADMIN — POLYETHYLENE GLYCOL 3350 17 G: 17 POWDER, FOR SOLUTION ORAL at 07:57

## 2023-08-06 RX ADMIN — INSULIN LISPRO 4 UNITS: 100 INJECTION, SOLUTION INTRAVENOUS; SUBCUTANEOUS at 15:57

## 2023-08-06 RX ADMIN — POLYVINYL ALCOHOL 1 DROP: 14 SOLUTION/ DROPS OPHTHALMIC at 00:08

## 2023-08-06 RX ADMIN — QUETIAPINE FUMARATE 50 MG: 25 TABLET ORAL at 07:56

## 2023-08-06 RX ADMIN — POTASSIUM PHOSPHATE, MONOBASIC AND POTASSIUM PHOSPHATE, DIBASIC 30 MMOL: 224; 236 INJECTION, SOLUTION, CONCENTRATE INTRAVENOUS at 08:14

## 2023-08-06 RX ADMIN — LACTULOSE 20 G: 20 SOLUTION ORAL at 07:58

## 2023-08-06 RX ADMIN — MIDAZOLAM HYDROCHLORIDE 2 MG: 1 INJECTION, SOLUTION INTRAMUSCULAR; INTRAVENOUS at 04:16

## 2023-08-06 RX ADMIN — LORAZEPAM 2 MG: 1 TABLET ORAL at 03:33

## 2023-08-06 RX ADMIN — SENNOSIDES AND DOCUSATE SODIUM 2 TABLET: 50; 8.6 TABLET ORAL at 07:57

## 2023-08-06 RX ADMIN — ENOXAPARIN SODIUM 40 MG: 100 INJECTION SUBCUTANEOUS at 20:36

## 2023-08-06 ASSESSMENT — PAIN SCALES - GENERAL
PAINLEVEL_OUTOF10: 7
PAINLEVEL_OUTOF10: 8
PAINLEVEL_OUTOF10: 4
PAINLEVEL_OUTOF10: 3

## 2023-08-06 ASSESSMENT — PULMONARY FUNCTION TESTS
PIF_VALUE: 20
PIF_VALUE: 23
PIF_VALUE: 33
PIF_VALUE: 28
PIF_VALUE: 19
PIF_VALUE: 28
PIF_VALUE: 21
PIF_VALUE: 21
PIF_VALUE: 23
PIF_VALUE: 23
PIF_VALUE: 19
PIF_VALUE: 21
PIF_VALUE: 24
PIF_VALUE: 18
PIF_VALUE: 24
PIF_VALUE: 24
PIF_VALUE: 23
PIF_VALUE: 21
PIF_VALUE: 19
PIF_VALUE: 21
PIF_VALUE: 27
PIF_VALUE: 21
PIF_VALUE: 25
PIF_VALUE: 23

## 2023-08-06 ASSESSMENT — PAIN - FUNCTIONAL ASSESSMENT
PAIN_FUNCTIONAL_ASSESSMENT: PREVENTS OR INTERFERES WITH MANY ACTIVE NOT PASSIVE ACTIVITIES
PAIN_FUNCTIONAL_ASSESSMENT: PREVENTS OR INTERFERES WITH ALL ACTIVE AND SOME PASSIVE ACTIVITIES

## 2023-08-06 NOTE — PLAN OF CARE
Problem: Neurosensory - Adult  Goal: Achieves stable or improved neurological status  Outcome: Not Progressing  Pt not following commands or tracking  Flowsheets (Taken 8/5/2023 2000)  Achieves stable or improved neurological status:   Assess for and report changes in neurological status   Initiate measures to prevent increased intracranial pressure   Maintain blood pressure and fluid volume within ordered parameters to optimize cerebral perfusion and minimize risk of hemorrhage   Monitor temperature, glucose, and sodium. Initiate appropriate interventions as ordered  Goal: Achieves maximal functionality and self care  Outcome: Not Progressing    Flowsheets (Taken 8/5/2023 2000)  Achieves maximal functionality and self care:   Monitor swallowing and airway patency with patient fatigue and changes in neurological status   Encourage and assist patient to increase activity and self care with guidance from physical therapy/occupational therapy   Encourage visually impaired, hearing impaired and aphasic patients to use assistive/communication devices     Problem: Respiratory - Adult  Goal: Achieves optimal ventilation and oxygenation  Outcome: Not Progressing  Pt continues to have hypoxic episodes with extreme agitation.   Flowsheets (Taken 8/5/2023 2000)  Achieves optimal ventilation and oxygenation:   Assess for changes in respiratory status   Assess for changes in mentation and behavior   Position to facilitate oxygenation and minimize respiratory effort   Oxygen supplementation based on oxygen saturation or arterial blood gases   Initiate smoking cessation protocol as indicated   Encourage broncho-pulmonary hygiene including cough, deep breathe, incentive spirometry   Assess the need for suctioning and aspirate as needed   Assess and instruct to report shortness of breath or any respiratory difficulty   Respiratory therapy support as indicated     Problem: Skin/Tissue Integrity - Adult  Goal: Skin integrity remains

## 2023-08-06 NOTE — FLOWSHEET NOTE
Pt is in 2 point soft restraints due to he will pull at iv lines, trach and pcd's.   He needs to be in them for safety and rn will continue to monitor for safety

## 2023-08-06 NOTE — PROGRESS NOTES
08/06/23 0749   Vent Information   Ventilator ID [id number]   Vent Mode AC/PRVC   Ventilator Settings   FiO2  (S)  95 %   Insp Time (sec) 0.8 sec   Vent Patient Data (Readings)   Flow Sensitivity 3 L/min   Static Compliance (L/cm H2O) 31   I Time/ I Time % 0.8 s   Backup Apnea On   Backup Rate 14 Breaths Per Minute   Backup Vt 460   Vent Alarm Settings   Low Minute Volume (lpm) 6 L/min   High Minute Volume (lpm) 20 L/min   Low Exhaled Vt (ml) 360 mL   High Exhaled Vt (ml) 900 mL   RR High (bpm) 35 br/min   Apnea (secs) 20 secs   Additional Respiratoray Assessments   Humidification Source Heated wire   Humidification Temp 37   Ambu Bag With Mask At Bedside Yes   Backup Trachs Available (Size) 6.0   Surgical Airway (Trach) 08/02/23 Ismael Cuffed   Placement Date/Time: 08/02/23 1045   Present on Admission/Arrival: No  Placed By: Licensed provider  Placement Verified By: Direct visualization  Surgical Airway Type: Tracheostomy  Brand: Ismael  Style: Cuffed  Size (mm): 8   Status Secured

## 2023-08-06 NOTE — FLOWSHEET NOTE
Pt restless and agitated. Pt moves/wriggles in bed until pt is sideways in bed leaning to the right. When pt is extremely agitated, he violently shakes his head and moves his bottom side to side creating friction. Pt wiggles and uses legs to move off of wedges when turned. Will look for alternative methods to facilitate turning.

## 2023-08-06 NOTE — PLAN OF CARE
assistive/communication devices     Problem: Respiratory - Adult  Goal: Achieves optimal ventilation and oxygenation  8/6/2023 0751 by Selam Mcleod RCP  Outcome: Not Progressing  8/6/2023 0721 by Anderson Cho RN  Outcome: Not Progressing  Flowsheets (Taken 8/5/2023 2000)  Achieves optimal ventilation and oxygenation:   Assess for changes in respiratory status   Assess for changes in mentation and behavior   Position to facilitate oxygenation and minimize respiratory effort   Oxygen supplementation based on oxygen saturation or arterial blood gases   Initiate smoking cessation protocol as indicated   Encourage broncho-pulmonary hygiene including cough, deep breathe, incentive spirometry   Assess the need for suctioning and aspirate as needed   Assess and instruct to report shortness of breath or any respiratory difficulty   Respiratory therapy support as indicated     Problem: Skin/Tissue Integrity - Adult  Goal: Skin integrity remains intact  8/6/2023 0721 by Anderson Cho RN  Outcome: Not Progressing  Flowsheets (Taken 8/5/2023 2000)  Skin Integrity Remains Intact:   Monitor for areas of redness and/or skin breakdown   Assess vascular access sites hourly   Every 4-6 hours minimum: Change oxygen saturation probe site   Every 4-6 hours: If on nasal continuous positive airway pressure, respiratory therapy assesses nares and determine need for appliance change or resting period     Problem: Musculoskeletal - Adult  Goal: Return mobility to safest level of function  8/6/2023 0721 by Anderson Cho RN  Outcome: Not Progressing  Flowsheets (Taken 8/5/2023 2000)  Return Mobility to Safest Level of Function:   Assess patient stability and activity tolerance for standing, transferring and ambulating with or without assistive devices   Assist with transfers and ambulation using safe patient handling equipment as needed   Ensure adequate protection for wounds/incisions during mobilization Obtain physical therapy/occupational therapy consults as needed   Apply continuous passive motion per provider or physical therapy orders to increase flexion toward goal   Instruct patient/family in ordered activity level  Goal: Return ADL status to a safe level of function  8/6/2023 0721 by Stuart Elizabeth RN  Outcome: Not Progressing  Flowsheets (Taken 8/5/2023 2000)  Return ADL Status to a Safe Level of Function:   Administer medication as ordered   Assess activities of daily living deficits and provide assistive devices as needed   Obtain physical therapy/occupational therapy consults as needed   Assist and instruct patient to increase activity and self care as tolerated     Problem: Gastrointestinal - Adult  Goal: Minimal or absence of nausea and vomiting  8/6/2023 0721 by Stuart Elizabeth RN  Outcome: Not Progressing  Flowsheets (Taken 8/5/2023 2000)  Minimal or absence of nausea and vomiting:   Administer IV fluids as ordered to ensure adequate hydration   Maintain NPO status until nausea and vomiting are resolved   Nasogastric tube to low intermittent suction as ordered   Administer ordered antiemetic medications as needed   Provide nonpharmacologic comfort measures as appropriate   Advance diet as tolerated, if ordered   Nutrition consult to assist patient with adequate nutrition and appropriate food choices

## 2023-08-07 ENCOUNTER — ANESTHESIA EVENT (OUTPATIENT)
Dept: OPERATING ROOM | Age: 58
End: 2023-08-07
Payer: COMMERCIAL

## 2023-08-07 ENCOUNTER — APPOINTMENT (OUTPATIENT)
Dept: GENERAL RADIOLOGY | Age: 58
DRG: 004 | End: 2023-08-07
Payer: COMMERCIAL

## 2023-08-07 LAB
AADO2: 333.5 MMHG
ALBUMIN SERPL-MCNC: 2.6 G/DL (ref 3.5–5.2)
ALP SERPL-CCNC: 65 U/L (ref 40–129)
ALT SERPL-CCNC: 16 U/L (ref 0–40)
ANION GAP SERPL CALCULATED.3IONS-SCNC: 12 MMOL/L (ref 7–16)
AST SERPL-CCNC: 20 U/L (ref 0–39)
B PARAP IS1001 DNA NPH QL NAA+NON-PROBE: NOT DETECTED
B PERT DNA SPEC QL NAA+PROBE: NOT DETECTED
B.E.: -0.7 MMOL/L (ref -3–3)
BASOPHILS # BLD: 0.06 K/UL (ref 0–0.2)
BASOPHILS NFR BLD: 1 % (ref 0–2)
BILIRUB SERPL-MCNC: 0.4 MG/DL (ref 0–1.2)
BUN SERPL-MCNC: 16 MG/DL (ref 6–20)
C PNEUM DNA NPH QL NAA+NON-PROBE: NOT DETECTED
CALCIUM SERPL-MCNC: 8.5 MG/DL (ref 8.6–10.2)
CHLORIDE SERPL-SCNC: 107 MMOL/L (ref 98–107)
CO2 SERPL-SCNC: 24 MMOL/L (ref 22–29)
COHB: 0.3 % (ref 0–1.5)
CREAT SERPL-MCNC: 0.8 MG/DL (ref 0.7–1.2)
CRITICAL: ABNORMAL
DATE ANALYZED: ABNORMAL
DATE OF COLLECTION: ABNORMAL
EKG ATRIAL RATE: 95 BPM
EKG P AXIS: 53 DEGREES
EKG P-R INTERVAL: 196 MS
EKG Q-T INTERVAL: 334 MS
EKG QRS DURATION: 90 MS
EKG QTC CALCULATION (BAZETT): 419 MS
EKG R AXIS: -3 DEGREES
EKG T AXIS: -164 DEGREES
EKG VENTRICULAR RATE: 95 BPM
EOSINOPHIL # BLD: 0.49 K/UL (ref 0.05–0.5)
EOSINOPHILS RELATIVE PERCENT: 5 % (ref 0–6)
ERYTHROCYTE [DISTWIDTH] IN BLOOD BY AUTOMATED COUNT: 14.2 % (ref 11.5–15)
FIO2: 65 %
FLUAV RNA NPH QL NAA+NON-PROBE: NOT DETECTED
FLUBV RNA NPH QL NAA+NON-PROBE: NOT DETECTED
GFR SERPL CREATININE-BSD FRML MDRD: >60 ML/MIN/1.73M2
GLUCOSE SERPL-MCNC: 194 MG/DL (ref 74–99)
HADV DNA NPH QL NAA+NON-PROBE: NOT DETECTED
HCO3: 23.3 MMOL/L (ref 22–26)
HCOV 229E RNA NPH QL NAA+NON-PROBE: NOT DETECTED
HCOV HKU1 RNA NPH QL NAA+NON-PROBE: NOT DETECTED
HCOV NL63 RNA NPH QL NAA+NON-PROBE: NOT DETECTED
HCOV OC43 RNA NPH QL NAA+NON-PROBE: NOT DETECTED
HCT VFR BLD AUTO: 30.6 % (ref 37–54)
HGB BLD-MCNC: 9.3 G/DL (ref 12.5–16.5)
HHB: 5.4 % (ref 0–5)
HMPV RNA NPH QL NAA+NON-PROBE: NOT DETECTED
HPIV1 RNA NPH QL NAA+NON-PROBE: NOT DETECTED
HPIV2 RNA NPH QL NAA+NON-PROBE: NOT DETECTED
HPIV3 RNA NPH QL NAA+NON-PROBE: NOT DETECTED
HPIV4 RNA NPH QL NAA+NON-PROBE: NOT DETECTED
IMM GRANULOCYTES # BLD AUTO: 0.11 K/UL (ref 0–0.58)
IMM GRANULOCYTES NFR BLD: 1 % (ref 0–5)
LAB: ABNORMAL
LYMPHOCYTES NFR BLD: 2.4 K/UL (ref 1.5–4)
LYMPHOCYTES RELATIVE PERCENT: 23 % (ref 20–42)
Lab: ABNORMAL
M PNEUMO DNA NPH QL NAA+NON-PROBE: NOT DETECTED
MCH RBC QN AUTO: 29 PG (ref 26–35)
MCHC RBC AUTO-ENTMCNC: 30.4 G/DL (ref 32–34.5)
MCV RBC AUTO: 95.3 FL (ref 80–99.9)
METER GLUCOSE: 155 MG/DL (ref 74–99)
METER GLUCOSE: 169 MG/DL (ref 74–99)
METER GLUCOSE: 170 MG/DL (ref 74–99)
METER GLUCOSE: 172 MG/DL (ref 74–99)
METER GLUCOSE: 189 MG/DL (ref 74–99)
METER GLUCOSE: 219 MG/DL (ref 74–99)
METER GLUCOSE: 234 MG/DL (ref 74–99)
METHB: 0.3 % (ref 0–1.5)
MODE: AC
MONOCYTES NFR BLD: 1.15 K/UL (ref 0.1–0.95)
MONOCYTES NFR BLD: 11 % (ref 2–12)
NEUTROPHILS NFR BLD: 59 % (ref 43–80)
NEUTS SEG NFR BLD: 6.1 K/UL (ref 1.8–7.3)
O2 CONTENT: 14 ML/DL
O2 SATURATION: 94.6 % (ref 92–98.5)
O2HB: 94 % (ref 94–97)
OPERATOR ID: ABNORMAL
PATIENT TEMP: 37 C
PCO2: 36.2 MMHG (ref 35–45)
PEEP/CPAP: 10 CMH2O
PFO2: 1.14 MMHG/%
PH BLOOD GAS: 7.43 (ref 7.35–7.45)
PHOSPHATE SERPL-MCNC: 2 MG/DL (ref 2.5–4.5)
PLATELET # BLD AUTO: 421 K/UL (ref 130–450)
PMV BLD AUTO: 9.1 FL (ref 7–12)
PO2: 74.3 MMHG (ref 75–100)
POTASSIUM SERPL-SCNC: 3.6 MMOL/L (ref 3.5–5)
PROT SERPL-MCNC: 5.8 G/DL (ref 6.4–8.3)
RBC # BLD AUTO: 3.21 M/UL (ref 3.8–5.8)
RBC # BLD: ABNORMAL 10*6/UL
RI(T): 4.49
RR MECHANICAL: 14 B/MIN
RSV RNA NPH QL NAA+NON-PROBE: NOT DETECTED
RV+EV RNA NPH QL NAA+NON-PROBE: NOT DETECTED
SARS-COV-2 RNA NPH QL NAA+NON-PROBE: NOT DETECTED
SODIUM SERPL-SCNC: 143 MMOL/L (ref 132–146)
SOURCE, BLOOD GAS: ABNORMAL
SPECIMEN DESCRIPTION: NORMAL
THB: 10.5 G/DL (ref 11.5–16.5)
TIME ANALYZED: 348
VT MECHANICAL: 460 ML
WBC OTHER # BLD: 10.3 K/UL (ref 4.5–11.5)

## 2023-08-07 PROCEDURE — 2000000000 HC ICU R&B

## 2023-08-07 PROCEDURE — 6360000002 HC RX W HCPCS: Performed by: SURGERY

## 2023-08-07 PROCEDURE — 6370000000 HC RX 637 (ALT 250 FOR IP)

## 2023-08-07 PROCEDURE — 6360000002 HC RX W HCPCS

## 2023-08-07 PROCEDURE — 94003 VENT MGMT INPAT SUBQ DAY: CPT

## 2023-08-07 PROCEDURE — 6370000000 HC RX 637 (ALT 250 FOR IP): Performed by: STUDENT IN AN ORGANIZED HEALTH CARE EDUCATION/TRAINING PROGRAM

## 2023-08-07 PROCEDURE — A4216 STERILE WATER/SALINE, 10 ML: HCPCS | Performed by: STUDENT IN AN ORGANIZED HEALTH CARE EDUCATION/TRAINING PROGRAM

## 2023-08-07 PROCEDURE — 84100 ASSAY OF PHOSPHORUS: CPT

## 2023-08-07 PROCEDURE — 99291 CRITICAL CARE FIRST HOUR: CPT | Performed by: SURGERY

## 2023-08-07 PROCEDURE — 2500000003 HC RX 250 WO HCPCS

## 2023-08-07 PROCEDURE — 2580000003 HC RX 258: Performed by: STUDENT IN AN ORGANIZED HEALTH CARE EDUCATION/TRAINING PROGRAM

## 2023-08-07 PROCEDURE — 0202U NFCT DS 22 TRGT SARS-COV-2: CPT

## 2023-08-07 PROCEDURE — 2580000003 HC RX 258

## 2023-08-07 PROCEDURE — 80053 COMPREHEN METABOLIC PANEL: CPT

## 2023-08-07 PROCEDURE — 82947 ASSAY GLUCOSE BLOOD QUANT: CPT

## 2023-08-07 PROCEDURE — 85025 COMPLETE CBC W/AUTO DIFF WBC: CPT

## 2023-08-07 PROCEDURE — 93010 ELECTROCARDIOGRAM REPORT: CPT | Performed by: INTERNAL MEDICINE

## 2023-08-07 PROCEDURE — 2500000003 HC RX 250 WO HCPCS: Performed by: STUDENT IN AN ORGANIZED HEALTH CARE EDUCATION/TRAINING PROGRAM

## 2023-08-07 PROCEDURE — 2580000003 HC RX 258: Performed by: SURGERY

## 2023-08-07 PROCEDURE — 82805 BLOOD GASES W/O2 SATURATION: CPT

## 2023-08-07 PROCEDURE — 71045 X-RAY EXAM CHEST 1 VIEW: CPT

## 2023-08-07 PROCEDURE — 6360000002 HC RX W HCPCS: Performed by: STUDENT IN AN ORGANIZED HEALTH CARE EDUCATION/TRAINING PROGRAM

## 2023-08-07 RX ORDER — ATORVASTATIN CALCIUM 20 MG/1
TABLET, FILM COATED ORAL
Qty: 90 TABLET | Refills: 1 | OUTPATIENT
Start: 2023-08-07

## 2023-08-07 RX ORDER — LANSOPRAZOLE 30 MG/1
30 TABLET, ORALLY DISINTEGRATING, DELAYED RELEASE ORAL
Status: DISCONTINUED | OUTPATIENT
Start: 2023-08-08 | End: 2023-08-18

## 2023-08-07 RX ORDER — OXYCODONE HCL 5 MG/5 ML
10 SOLUTION, ORAL ORAL EVERY 4 HOURS
Status: DISCONTINUED | OUTPATIENT
Start: 2023-08-07 | End: 2023-08-11

## 2023-08-07 RX ORDER — QUETIAPINE FUMARATE 100 MG/1
100 TABLET, FILM COATED ORAL 2 TIMES DAILY
Status: DISCONTINUED | OUTPATIENT
Start: 2023-08-07 | End: 2023-08-10

## 2023-08-07 RX ADMIN — POLYVINYL ALCOHOL 1 DROP: 14 SOLUTION/ DROPS OPHTHALMIC at 20:01

## 2023-08-07 RX ADMIN — BUPROPION HYDROCHLORIDE 75 MG: 75 TABLET, FILM COATED ORAL at 20:01

## 2023-08-07 RX ADMIN — BUPROPION HYDROCHLORIDE 75 MG: 75 TABLET, FILM COATED ORAL at 08:11

## 2023-08-07 RX ADMIN — DEXMEDETOMIDINE 1.5 MCG/KG/HR: 100 INJECTION, SOLUTION INTRAVENOUS at 08:02

## 2023-08-07 RX ADMIN — OXYCODONE HYDROCHLORIDE 5 MG: 5 SOLUTION ORAL at 03:31

## 2023-08-07 RX ADMIN — ENOXAPARIN SODIUM 40 MG: 100 INJECTION SUBCUTANEOUS at 08:30

## 2023-08-07 RX ADMIN — LACTULOSE 20 G: 20 SOLUTION ORAL at 08:30

## 2023-08-07 RX ADMIN — POTASSIUM PHOSPHATE, MONOBASIC AND POTASSIUM PHOSPHATE, DIBASIC 30 MMOL: 224; 236 INJECTION, SOLUTION, CONCENTRATE INTRAVENOUS at 08:10

## 2023-08-07 RX ADMIN — SENNOSIDES AND DOCUSATE SODIUM 2 TABLET: 50; 8.6 TABLET ORAL at 08:45

## 2023-08-07 RX ADMIN — CEFEPIME 2000 MG: 2 INJECTION, POWDER, FOR SOLUTION INTRAVENOUS at 05:28

## 2023-08-07 RX ADMIN — CEFEPIME 2000 MG: 2 INJECTION, POWDER, FOR SOLUTION INTRAVENOUS at 20:05

## 2023-08-07 RX ADMIN — QUETIAPINE FUMARATE 100 MG: 100 TABLET ORAL at 20:00

## 2023-08-07 RX ADMIN — CHLORHEXIDINE GLUCONATE 15 ML: 1.2 RINSE ORAL at 20:00

## 2023-08-07 RX ADMIN — OXYCODONE HYDROCHLORIDE 10 MG: 5 SOLUTION ORAL at 23:44

## 2023-08-07 RX ADMIN — OXYCODONE HYDROCHLORIDE 5 MG: 5 SOLUTION ORAL at 00:19

## 2023-08-07 RX ADMIN — SODIUM CHLORIDE 25 ML: 9 INJECTION, SOLUTION INTRAVENOUS at 01:16

## 2023-08-07 RX ADMIN — OXYCODONE HYDROCHLORIDE 10 MG: 5 SOLUTION ORAL at 11:58

## 2023-08-07 RX ADMIN — POLYVINYL ALCOHOL 1 DROP: 14 SOLUTION/ DROPS OPHTHALMIC at 11:56

## 2023-08-07 RX ADMIN — INSULIN LISPRO 4 UNITS: 100 INJECTION, SOLUTION INTRAVENOUS; SUBCUTANEOUS at 17:27

## 2023-08-07 RX ADMIN — QUETIAPINE FUMARATE 50 MG: 25 TABLET ORAL at 08:30

## 2023-08-07 RX ADMIN — CHLORHEXIDINE GLUCONATE 15 ML: 1.2 RINSE ORAL at 16:45

## 2023-08-07 RX ADMIN — DEXMEDETOMIDINE 1.5 MCG/KG/HR: 100 INJECTION, SOLUTION INTRAVENOUS at 20:34

## 2023-08-07 RX ADMIN — LORAZEPAM 2 MG: 1 TABLET ORAL at 23:44

## 2023-08-07 RX ADMIN — CHLORHEXIDINE GLUCONATE 15 ML: 1.2 RINSE ORAL at 03:31

## 2023-08-07 RX ADMIN — INSULIN LISPRO 4 UNITS: 100 INJECTION, SOLUTION INTRAVENOUS; SUBCUTANEOUS at 08:45

## 2023-08-07 RX ADMIN — SODIUM CHLORIDE, PRESERVATIVE FREE 20 MG: 5 INJECTION INTRAVENOUS at 08:30

## 2023-08-07 RX ADMIN — CHLORHEXIDINE GLUCONATE 15 ML: 1.2 RINSE ORAL at 00:19

## 2023-08-07 RX ADMIN — CHLORHEXIDINE GLUCONATE 15 ML: 1.2 RINSE ORAL at 23:44

## 2023-08-07 RX ADMIN — DEXMEDETOMIDINE 1.5 MCG/KG/HR: 100 INJECTION, SOLUTION INTRAVENOUS at 13:53

## 2023-08-07 RX ADMIN — LORAZEPAM 2 MG: 1 TABLET ORAL at 08:30

## 2023-08-07 RX ADMIN — MIDAZOLAM HYDROCHLORIDE 2 MG: 1 INJECTION, SOLUTION INTRAMUSCULAR; INTRAVENOUS at 12:51

## 2023-08-07 RX ADMIN — SODIUM CHLORIDE, PRESERVATIVE FREE 10 ML: 5 INJECTION INTRAVENOUS at 08:08

## 2023-08-07 RX ADMIN — FENTANYL CITRATE 100 MCG: 50 INJECTION INTRAMUSCULAR; INTRAVENOUS at 13:08

## 2023-08-07 RX ADMIN — ENOXAPARIN SODIUM 40 MG: 100 INJECTION SUBCUTANEOUS at 20:00

## 2023-08-07 RX ADMIN — POLYVINYL ALCOHOL 1 DROP: 14 SOLUTION/ DROPS OPHTHALMIC at 16:45

## 2023-08-07 RX ADMIN — PETROLATUM: 420 OINTMENT TOPICAL at 20:01

## 2023-08-07 RX ADMIN — CHLORHEXIDINE GLUCONATE 15 ML: 1.2 RINSE ORAL at 08:10

## 2023-08-07 RX ADMIN — OXYCODONE HYDROCHLORIDE 10 MG: 5 SOLUTION ORAL at 16:45

## 2023-08-07 RX ADMIN — CHLORHEXIDINE GLUCONATE 15 ML: 1.2 RINSE ORAL at 11:56

## 2023-08-07 RX ADMIN — POLYETHYLENE GLYCOL 3350 17 G: 17 POWDER, FOR SOLUTION ORAL at 08:31

## 2023-08-07 RX ADMIN — POLYVINYL ALCOHOL 1 DROP: 14 SOLUTION/ DROPS OPHTHALMIC at 00:19

## 2023-08-07 RX ADMIN — POLYVINYL ALCOHOL 1 DROP: 14 SOLUTION/ DROPS OPHTHALMIC at 03:31

## 2023-08-07 RX ADMIN — LORAZEPAM 2 MG: 1 TABLET ORAL at 20:00

## 2023-08-07 RX ADMIN — DEXMEDETOMIDINE 1.5 MCG/KG/HR: 100 INJECTION, SOLUTION INTRAVENOUS at 02:03

## 2023-08-07 RX ADMIN — LORAZEPAM 2 MG: 1 TABLET ORAL at 16:44

## 2023-08-07 RX ADMIN — LORAZEPAM 2 MG: 1 TABLET ORAL at 03:31

## 2023-08-07 RX ADMIN — OXYCODONE HYDROCHLORIDE 5 MG: 5 SOLUTION ORAL at 08:31

## 2023-08-07 RX ADMIN — SODIUM CHLORIDE, PRESERVATIVE FREE 10 ML: 5 INJECTION INTRAVENOUS at 20:00

## 2023-08-07 RX ADMIN — LORAZEPAM 2 MG: 1 TABLET ORAL at 00:19

## 2023-08-07 RX ADMIN — SENNOSIDES AND DOCUSATE SODIUM 2 TABLET: 50; 8.6 TABLET ORAL at 20:00

## 2023-08-07 RX ADMIN — POLYVINYL ALCOHOL 1 DROP: 14 SOLUTION/ DROPS OPHTHALMIC at 08:10

## 2023-08-07 RX ADMIN — OXYCODONE HYDROCHLORIDE 10 MG: 5 SOLUTION ORAL at 20:00

## 2023-08-07 RX ADMIN — POLYETHYLENE GLYCOL 3350 17 G: 17 POWDER, FOR SOLUTION ORAL at 20:00

## 2023-08-07 RX ADMIN — LORAZEPAM 2 MG: 1 TABLET ORAL at 11:58

## 2023-08-07 RX ADMIN — CEFEPIME 2000 MG: 2 INJECTION, POWDER, FOR SOLUTION INTRAVENOUS at 13:58

## 2023-08-07 RX ADMIN — MAGNESIUM HYDROXIDE 15 ML: 1200 LIQUID ORAL at 08:30

## 2023-08-07 RX ADMIN — FLUOXETINE 20 MG: 10 TABLET, FILM COATED ORAL at 08:11

## 2023-08-07 RX ADMIN — POLYVINYL ALCOHOL 1 DROP: 14 SOLUTION/ DROPS OPHTHALMIC at 23:44

## 2023-08-07 ASSESSMENT — PULMONARY FUNCTION TESTS
PIF_VALUE: 24
PIF_VALUE: 22
PIF_VALUE: 26
PIF_VALUE: 23
PIF_VALUE: 18
PIF_VALUE: 20
PIF_VALUE: 21
PIF_VALUE: 20
PIF_VALUE: 21
PIF_VALUE: 22
PIF_VALUE: 21
PIF_VALUE: 24
PIF_VALUE: 22
PIF_VALUE: 23
PIF_VALUE: 23
PIF_VALUE: 26
PIF_VALUE: 19
PIF_VALUE: 24
PIF_VALUE: 22
PIF_VALUE: 18
PIF_VALUE: 22
PIF_VALUE: 41
PIF_VALUE: 24
PIF_VALUE: 23
PIF_VALUE: 23
PIF_VALUE: 20

## 2023-08-07 ASSESSMENT — PAIN SCALES - GENERAL
PAINLEVEL_OUTOF10: 6
PAINLEVEL_OUTOF10: 0
PAINLEVEL_OUTOF10: 6
PAINLEVEL_OUTOF10: 6
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
## 2023-08-07 NOTE — PLAN OF CARE
Problem: Skin/Tissue Integrity  Goal: Absence of new skin breakdown  Description: 1. Monitor for areas of redness and/or skin breakdown  2. Assess vascular access sites hourly  3. Every 4-6 hours minimum:  Change oxygen saturation probe site  4. Every 4-6 hours:  If on nasal continuous positive airway pressure, respiratory therapy assess nares and determine need for appliance change or resting period. Outcome: Not Progressing  Pt has deep tissue injury     Problem: Neurosensory - Adult  Goal: Achieves stable or improved neurological status  Outcome: Not Progressing  Pt with no improvement in neuro status     Problem: Anxiety  Goal: Will report anxiety at manageable levels  Description: INTERVENTIONS:  1. Administer medication as ordered  2. Teach and rehearse alternative coping skills  3.  Provide emotional support with 1:1 interaction with staff  Outcome: Not Progressing  Pt extremely anxious and agitated upon waking and with nursing interventions

## 2023-08-07 NOTE — FLOWSHEET NOTE
Pt in bilateral soft wrist restraints. Unable to discontinue at this time d/t agitation and pulling at lines/tubes. Less restrictive measures failed.  Continue restraints

## 2023-08-07 NOTE — PROGRESS NOTES
08/07/23 0811   Patient Observation   Pulse 76   Respirations 18   SpO2 96 %   Vent Information   Ventilator ID [id number]   Vent Mode AC/PRVC   Ventilator Settings   FiO2  60 %   Insp Time (sec) 0.8 sec   Vt (Set, mL) 460 mL   Resp Rate (Set) 14 bmp   PEEP/CPAP (cmH2O) 10   Pressure Support (cm H2O) 0 cm H2O   Vent Patient Data (Readings)   Vt (Measured) 525 mL   Peak Inspiratory Pressure (cmH2O) 22 cmH2O   Rate Measured 18 br/min   Minute Volume (L/min) 8.66 Liters   Mean Airway Pressure (cmH2O) 13 cmH20   Plateau Pressure (cm H2O) 19 cm H2O   Driving Pressure 9   I:E Ratio 1:3.60   Flow Sensitivity 3 L/min   PEEP Intrinsic (cm H2O) 38 cm H2O   I Time/ I Time % 0.8 s   Backup Apnea On   Backup Rate 14 Breaths Per Minute   Backup Vt 460   Vent Alarm Settings   Low Pressure (cmH2O) 13.5 cmH2O   High Pressure (cmH2O) 50 cmH2O   Low Minute Volume (lpm) 9 L/min   High Minute Volume (lpm) 20 L/min   Low Exhaled Vt (ml) 360 mL   High Exhaled Vt (ml) 900 mL   RR High (bpm) 35 br/min   Apnea (secs) 20 secs   Additional Respiratoray Assessments   Humidification Source Heated wire   Humidification Temp 36.8   Ambu Bag With Mask At Bedside Yes   Backup Trachs Available (Size) 6.0   Surgical Airway (Trach) 08/02/23 Imsael Cuffed   Placement Date/Time: 08/02/23 1045   Present on Admission/Arrival: No  Placed By: Licensed provider  Placement Verified By: Direct visualization  Surgical Airway Type: Tracheostomy  Brand: Ismael  Style: Cuffed  Size (mm): 8   Status Secured

## 2023-08-07 NOTE — FLOWSHEET NOTE
Patient attempting to pull on lines/tubes, unable to redirect at this time. Soft bilateral wrist restraints remain in place for patients safety. Will continue to monitor.
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
## 2023-08-07 NOTE — FLOWSHEET NOTE
Patient attempting to pull on lines and tubes, unable to redirect at this time. Bilateral soft restraints remain in place for patients safety. Will continue to reassess.

## 2023-08-07 NOTE — FLOWSHEET NOTE
Inpatient Wound Care(initial evaluation)  3806    Admit Date: 7/30/2023 10:00 PM    Reason for consult:  buttocks    Significant history:  refer to [facility]    Findings:     08/07/23 1335   Skin Integumentary    Skin Integrity Ecchymosis   Location BUE   Skin Integrity Site 2   Skin Integrity Location 2   (red pin point raised area)   Location 2 tip of penis   Skin Integrity Site 3   Skin Integrity Location 3   (dry patchy discolored area)    Location 3 left inner buttocks   Skin Integrity Site 4   Skin Integrity Location 4   (skin peeling)   Location 4 right inner buttocks     Intubated with vent support  2 point restraints  Promofit in place  photos taken of buttocks and inserted into their chart as part of their permanent medical record for purposes of documentation, treatment management and/or medical review. All Images taken on 8/7/23 of patient name: Calos Barfield were transmitted and stored on secured [employer] located within [employer] by a registered [employer] Mobile Application Device.       Plan:  Aquaphor to buttocks, dry skin  Heelmedix boots  Will need continued preventative care    Rodrickor ISIAH Munroe 8/7/2023 2:37 PM

## 2023-08-07 NOTE — PLAN OF CARE
Problem: Respiratory - Adult  Goal: Achieves optimal ventilation and oxygenation  8/7/2023 0816 by Ana Josue RCP  Outcome: Progressing  8/7/2023 0522 by Michelle Rodriguez RN  Outcome: Progressing  Flowsheets (Taken 8/6/2023 2000)  Achieves optimal ventilation and oxygenation:   Assess for changes in respiratory status   Assess for changes in mentation and behavior   Position to facilitate oxygenation and minimize respiratory effort   Oxygen supplementation based on oxygen saturation or arterial blood gases   Initiate smoking cessation protocol as indicated   Encourage broncho-pulmonary hygiene including cough, deep breathe, incentive spirometry   Assess the need for suctioning and aspirate as needed   Assess and instruct to report shortness of breath or any respiratory difficulty   Respiratory therapy support as indicated
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
## 2023-08-07 NOTE — PLAN OF CARE
Problem: Safety - Medical Restraint  Goal: Remains free of injury from restraints (Restraint for Interference with Medical Device)  Description: INTERVENTIONS:  1. Determine that other, less restrictive measures have been tried or would not be effective before applying the restraint  2. Evaluate the patient's condition at the time of restraint application  3. Inform patient/family regarding the reason for restraint  4. Q2H: Monitor safety, psychosocial status, comfort, nutrition and hydration  8/7/2023 1804 by Karly Moore RN  Outcome: Not Progressing  Flowsheets (Taken 8/7/2023 0600 by Emeli Cabrales RN)  Remains free of injury from restraints (restraint for interference with medical device):   Determine that other, less restrictive measures have been tried or would not be effective before applying the restraint   Evaluate the patient's condition at the time of restraint application   Inform patient/family regarding the reason for restraint   Every 2 hours: Monitor safety, psychosocial status, comfort, nutrition and hydration     Problem: Skin/Tissue Integrity  Goal: Absence of new skin breakdown  Description: 1. Monitor for areas of redness and/or skin breakdown  2. Assess vascular access sites hourly  3. Every 4-6 hours minimum:  Change oxygen saturation probe site  4. Every 4-6 hours:  If on nasal continuous positive airway pressure, respiratory therapy assess nares and determine need for appliance change or resting period.   8/7/2023 0957 by Karly Moore RN  Outcome: Progressing  8/7/2023 0522 by Emeli Cabrales RN  Outcome: Not Progressing     Problem: Neurosensory - Adult  Goal: Achieves stable or improved neurological status  8/7/2023 0957 by Karly Moore RN  Outcome: Progressing  8/7/2023 0522 by Emeli Cabrales RN  Outcome: Not Progressing  Flowsheets (Taken 8/6/2023 2000)  Achieves stable or improved neurological status:   Assess for and report changes in

## 2023-08-07 NOTE — PLAN OF CARE
Problem: Discharge Planning  Goal: Discharge to home or other facility with appropriate resources  8/7/2023 0957 by Lorrie Flannery RN  Outcome: Progressing     Problem: Pain  Goal: Verbalizes/displays adequate comfort level or baseline comfort level  8/7/2023 0957 by Lorrie Flannery RN  Outcome: Progressing     Problem: Skin/Tissue Integrity  Goal: Absence of new skin breakdown  Description: 1. Monitor for areas of redness and/or skin breakdown  2. Assess vascular access sites hourly  3. Every 4-6 hours minimum:  Change oxygen saturation probe site  4. Every 4-6 hours:  If on nasal continuous positive airway pressure, respiratory therapy assess nares and determine need for appliance change or resting period. 8/7/2023 0957 by Lorrie Flannery RN  Outcome: Progressing     Problem: Safety - Adult  Goal: Free from fall injury  8/7/2023 0957 by Lorrie Flannery RN  Outcome: Progressing     Problem: ABCDS Injury Assessment  Goal: Absence of physical injury  8/7/2023 0957 by Lorrie Flannery RN  Outcome: Progressing     Problem: Skin/Tissue Integrity  Goal: Absence of new skin breakdown  Description: 1. Monitor for areas of redness and/or skin breakdown  2. Assess vascular access sites hourly  3. Every 4-6 hours minimum:  Change oxygen saturation probe site  4. Every 4-6 hours:  If on nasal continuous positive airway pressure, respiratory therapy assess nares and determine need for appliance change or resting period.   8/7/2023 0957 by Lorrie Flannery RN  Outcome: Progressing  8/7/2023 0522 by Mally Chirinos RN  Outcome: Not Progressing     Problem: Neurosensory - Adult  Goal: Achieves stable or improved neurological status  8/7/2023 0957 by Lorrie Flannery RN  Outcome: Progressing  8/7/2023 0522 by Mally Chirinos RN  Outcome: Not Progressing  Flowsheets (Taken 8/6/2023 2000)  Achieves stable or improved neurological status:   Assess for and report changes in neurological status

## 2023-08-08 ENCOUNTER — APPOINTMENT (OUTPATIENT)
Dept: GENERAL RADIOLOGY | Age: 58
DRG: 004 | End: 2023-08-08
Payer: COMMERCIAL

## 2023-08-08 ENCOUNTER — ANESTHESIA (OUTPATIENT)
Dept: OPERATING ROOM | Age: 58
End: 2023-08-08
Payer: COMMERCIAL

## 2023-08-08 PROBLEM — R41.0 DELIRIUM: Status: ACTIVE | Noted: 2023-08-08

## 2023-08-08 PROBLEM — K56.7 ILEUS (HCC): Status: ACTIVE | Noted: 2023-08-08

## 2023-08-08 PROBLEM — E83.39 HYPOPHOSPHATEMIA: Status: ACTIVE | Noted: 2023-08-08

## 2023-08-08 PROBLEM — J15.0 PNEUMONIA OF BOTH LUNGS DUE TO KLEBSIELLA PNEUMONIAE (HCC): Status: ACTIVE | Noted: 2023-08-08

## 2023-08-08 PROBLEM — R52 ACUTE PAIN: Status: ACTIVE | Noted: 2023-08-08

## 2023-08-08 PROBLEM — F33.9 RECURRENT MAJOR DEPRESSIVE DISORDER (HCC): Status: ACTIVE | Noted: 2023-08-08

## 2023-08-08 PROBLEM — S02.600B OPEN FRACTURE OF BODY OF MANDIBLE (HCC): Status: ACTIVE | Noted: 2023-08-08

## 2023-08-08 PROBLEM — D62 ACUTE BLOOD LOSS ANEMIA: Status: ACTIVE | Noted: 2023-08-08

## 2023-08-08 LAB
AADO2: 166 MMHG
ALBUMIN SERPL-MCNC: 2.3 G/DL (ref 3.5–5.2)
ALP SERPL-CCNC: 66 U/L (ref 40–129)
ALT SERPL-CCNC: 12 U/L (ref 0–40)
ANION GAP SERPL CALCULATED.3IONS-SCNC: 14 MMOL/L (ref 7–16)
AST SERPL-CCNC: 14 U/L (ref 0–39)
B.E.: 3.2 MMOL/L (ref -3–3)
BASOPHILS # BLD: 0.06 K/UL (ref 0–0.2)
BASOPHILS NFR BLD: 1 % (ref 0–2)
BILIRUB SERPL-MCNC: 0.4 MG/DL (ref 0–1.2)
BUN SERPL-MCNC: 15 MG/DL (ref 6–20)
CA-I BLD-SCNC: 1.15 MMOL/L (ref 1.15–1.33)
CALCIUM SERPL-MCNC: 8.6 MG/DL (ref 8.6–10.2)
CHLORIDE SERPL-SCNC: 106 MMOL/L (ref 98–107)
CO2 SERPL-SCNC: 24 MMOL/L (ref 22–29)
COHB: 0.1 % (ref 0–1.5)
CREAT SERPL-MCNC: 0.7 MG/DL (ref 0.7–1.2)
CRITICAL: ABNORMAL
DATE ANALYZED: ABNORMAL
DATE OF COLLECTION: ABNORMAL
EOSINOPHIL # BLD: 0.46 K/UL (ref 0.05–0.5)
EOSINOPHILS RELATIVE PERCENT: 4 % (ref 0–6)
ERYTHROCYTE [DISTWIDTH] IN BLOOD BY AUTOMATED COUNT: 14 % (ref 11.5–15)
FIO2: 40 %
GFR SERPL CREATININE-BSD FRML MDRD: >60 ML/MIN/1.73M2
GLUCOSE SERPL-MCNC: 204 MG/DL (ref 74–99)
HCO3: 27.2 MMOL/L (ref 22–26)
HCT VFR BLD AUTO: 29.2 % (ref 37–54)
HGB BLD-MCNC: 9.4 G/DL (ref 12.5–16.5)
HHB: 7.6 % (ref 0–5)
IMM GRANULOCYTES # BLD AUTO: 0.19 K/UL (ref 0–0.58)
IMM GRANULOCYTES NFR BLD: 2 % (ref 0–5)
LAB: ABNORMAL
LYMPHOCYTES NFR BLD: 2.66 K/UL (ref 1.5–4)
LYMPHOCYTES RELATIVE PERCENT: 21 % (ref 20–42)
Lab: ABNORMAL
MAGNESIUM SERPL-MCNC: 2 MG/DL (ref 1.6–2.6)
MCH RBC QN AUTO: 29.6 PG (ref 26–35)
MCHC RBC AUTO-ENTMCNC: 32.2 G/DL (ref 32–34.5)
MCV RBC AUTO: 91.8 FL (ref 80–99.9)
METER GLUCOSE: 175 MG/DL (ref 74–99)
METER GLUCOSE: 181 MG/DL (ref 74–99)
METER GLUCOSE: 205 MG/DL (ref 74–99)
METER GLUCOSE: 209 MG/DL (ref 74–99)
METER GLUCOSE: 282 MG/DL (ref 74–99)
METHB: 0 % (ref 0–1.5)
MODE: AC
MONOCYTES NFR BLD: 1 K/UL (ref 0.1–0.95)
MONOCYTES NFR BLD: 8 % (ref 2–12)
NEUTROPHILS NFR BLD: 65 % (ref 43–80)
NEUTS SEG NFR BLD: 8.13 K/UL (ref 1.8–7.3)
O2 CONTENT: 14.2 ML/DL
O2 SATURATION: 92.4 % (ref 92–98.5)
O2HB: 92.3 % (ref 94–97)
OPERATOR ID: 8219
PATIENT TEMP: 37 C
PCO2: 39.4 MMHG (ref 35–45)
PEEP/CPAP: 10 CMH2O
PFO2: 1.6 MMHG/%
PH BLOOD GAS: 7.46 (ref 7.35–7.45)
PHOSPHATE SERPL-MCNC: 1.6 MG/DL (ref 2.5–4.5)
PLATELET # BLD AUTO: 451 K/UL (ref 130–450)
PMV BLD AUTO: 9.2 FL (ref 7–12)
PO2: 63.9 MMHG (ref 75–100)
POTASSIUM SERPL-SCNC: 3.4 MMOL/L (ref 3.5–5)
PROT SERPL-MCNC: 5.7 G/DL (ref 6.4–8.3)
RBC # BLD AUTO: 3.18 M/UL (ref 3.8–5.8)
RI(T): 2.6
RR MECHANICAL: 14 B/MIN
SODIUM SERPL-SCNC: 144 MMOL/L (ref 132–146)
SOURCE, BLOOD GAS: ABNORMAL
THB: 10.9 G/DL (ref 11.5–16.5)
TIME ANALYZED: 441
VT MECHANICAL: 460 ML
WBC OTHER # BLD: 12.5 K/UL (ref 4.5–11.5)

## 2023-08-08 PROCEDURE — 6370000000 HC RX 637 (ALT 250 FOR IP): Performed by: SURGERY

## 2023-08-08 PROCEDURE — 0NSV04Z REPOSITION LEFT MANDIBLE WITH INTERNAL FIXATION DEVICE, OPEN APPROACH: ICD-10-PCS | Performed by: PLASTIC SURGERY

## 2023-08-08 PROCEDURE — 3600000005 HC SURGERY LEVEL 5 BASE: Performed by: PLASTIC SURGERY

## 2023-08-08 PROCEDURE — 2000000000 HC ICU R&B

## 2023-08-08 PROCEDURE — 6360000002 HC RX W HCPCS

## 2023-08-08 PROCEDURE — 6370000000 HC RX 637 (ALT 250 FOR IP)

## 2023-08-08 PROCEDURE — 3600000015 HC SURGERY LEVEL 5 ADDTL 15MIN: Performed by: PLASTIC SURGERY

## 2023-08-08 PROCEDURE — 82962 GLUCOSE BLOOD TEST: CPT

## 2023-08-08 PROCEDURE — 0CDXXZ0 EXTRACTION OF LOWER TOOTH, SINGLE, EXTERNAL APPROACH: ICD-10-PCS | Performed by: PLASTIC SURGERY

## 2023-08-08 PROCEDURE — 15733 MUSC MYOQ/FSCQ FLP H&N PEDCL: CPT | Performed by: PLASTIC SURGERY

## 2023-08-08 PROCEDURE — 82805 BLOOD GASES W/O2 SATURATION: CPT

## 2023-08-08 PROCEDURE — 99291 CRITICAL CARE FIRST HOUR: CPT | Performed by: SURGERY

## 2023-08-08 PROCEDURE — 2580000003 HC RX 258

## 2023-08-08 PROCEDURE — 84100 ASSAY OF PHOSPHORUS: CPT

## 2023-08-08 PROCEDURE — 6370000000 HC RX 637 (ALT 250 FOR IP): Performed by: STUDENT IN AN ORGANIZED HEALTH CARE EDUCATION/TRAINING PROGRAM

## 2023-08-08 PROCEDURE — 6360000002 HC RX W HCPCS: Performed by: STUDENT IN AN ORGANIZED HEALTH CARE EDUCATION/TRAINING PROGRAM

## 2023-08-08 PROCEDURE — 80053 COMPREHEN METABOLIC PANEL: CPT

## 2023-08-08 PROCEDURE — 3700000001 HC ADD 15 MINUTES (ANESTHESIA): Performed by: PLASTIC SURGERY

## 2023-08-08 PROCEDURE — C1713 ANCHOR/SCREW BN/BN,TIS/BN: HCPCS | Performed by: PLASTIC SURGERY

## 2023-08-08 PROCEDURE — 0CQ60ZZ REPAIR LOWER GINGIVA, OPEN APPROACH: ICD-10-PCS | Performed by: PLASTIC SURGERY

## 2023-08-08 PROCEDURE — 2500000003 HC RX 250 WO HCPCS

## 2023-08-08 PROCEDURE — 3700000000 HC ANESTHESIA ATTENDED CARE: Performed by: PLASTIC SURGERY

## 2023-08-08 PROCEDURE — 6360000002 HC RX W HCPCS: Performed by: SURGERY

## 2023-08-08 PROCEDURE — 2580000003 HC RX 258: Performed by: SURGERY

## 2023-08-08 PROCEDURE — 2580000003 HC RX 258: Performed by: STUDENT IN AN ORGANIZED HEALTH CARE EDUCATION/TRAINING PROGRAM

## 2023-08-08 PROCEDURE — 0CQ40ZZ REPAIR BUCCAL MUCOSA, OPEN APPROACH: ICD-10-PCS | Performed by: PLASTIC SURGERY

## 2023-08-08 PROCEDURE — 94003 VENT MGMT INPAT SUBQ DAY: CPT

## 2023-08-08 PROCEDURE — 85025 COMPLETE CBC W/AUTO DIFF WBC: CPT

## 2023-08-08 PROCEDURE — 2500000003 HC RX 250 WO HCPCS: Performed by: PLASTIC SURGERY

## 2023-08-08 PROCEDURE — 7100000001 HC PACU RECOVERY - ADDTL 15 MIN

## 2023-08-08 PROCEDURE — 82330 ASSAY OF CALCIUM: CPT

## 2023-08-08 PROCEDURE — 21462 OPTX MNDBLR FX W/NTRDNTL: CPT | Performed by: PLASTIC SURGERY

## 2023-08-08 PROCEDURE — 2720000010 HC SURG SUPPLY STERILE: Performed by: PLASTIC SURGERY

## 2023-08-08 PROCEDURE — 71045 X-RAY EXAM CHEST 1 VIEW: CPT

## 2023-08-08 PROCEDURE — 2580000003 HC RX 258: Performed by: PHYSICIAN ASSISTANT

## 2023-08-08 PROCEDURE — 88300 SURGICAL PATH GROSS: CPT

## 2023-08-08 PROCEDURE — 83735 ASSAY OF MAGNESIUM: CPT

## 2023-08-08 PROCEDURE — 2709999900 HC NON-CHARGEABLE SUPPLY: Performed by: PLASTIC SURGERY

## 2023-08-08 PROCEDURE — 7100000000 HC PACU RECOVERY - FIRST 15 MIN

## 2023-08-08 DEVICE — BONE SCREWS, CROSS-PIN: Type: IMPLANTABLE DEVICE | Site: MANDIBLE | Status: FUNCTIONAL

## 2023-08-08 DEVICE — LOCKING SCREWS, CROSS-PIN
Type: IMPLANTABLE DEVICE | Site: MANDIBLE | Status: FUNCTIONAL
Brand: SMARTLOCK

## 2023-08-08 DEVICE — MANDIBULAR RECONSTRUCT.PLATE,W.TEMPLATE: Type: IMPLANTABLE DEVICE | Site: MANDIBLE | Status: FUNCTIONAL

## 2023-08-08 RX ORDER — FENTANYL CITRATE 50 UG/ML
INJECTION, SOLUTION INTRAMUSCULAR; INTRAVENOUS PRN
Status: DISCONTINUED | OUTPATIENT
Start: 2023-08-08 | End: 2023-08-08 | Stop reason: SDUPTHER

## 2023-08-08 RX ORDER — DEXAMETHASONE SODIUM PHOSPHATE 10 MG/ML
INJECTION INTRAMUSCULAR; INTRAVENOUS PRN
Status: DISCONTINUED | OUTPATIENT
Start: 2023-08-08 | End: 2023-08-08 | Stop reason: SDUPTHER

## 2023-08-08 RX ORDER — SODIUM CHLORIDE 0.9 % (FLUSH) 0.9 %
5-40 SYRINGE (ML) INJECTION EVERY 12 HOURS SCHEDULED
Status: DISCONTINUED | OUTPATIENT
Start: 2023-08-08 | End: 2023-08-08 | Stop reason: HOSPADM

## 2023-08-08 RX ORDER — SODIUM CHLORIDE 9 MG/ML
INJECTION, SOLUTION INTRAVENOUS PRN
Status: DISCONTINUED | OUTPATIENT
Start: 2023-08-08 | End: 2023-08-08 | Stop reason: HOSPADM

## 2023-08-08 RX ORDER — LORAZEPAM 1 MG/1
1 TABLET ORAL EVERY 4 HOURS
Status: DISCONTINUED | OUTPATIENT
Start: 2023-08-08 | End: 2023-08-09

## 2023-08-08 RX ORDER — GLYCOPYRROLATE 0.2 MG/ML
INJECTION INTRAMUSCULAR; INTRAVENOUS PRN
Status: DISCONTINUED | OUTPATIENT
Start: 2023-08-08 | End: 2023-08-08 | Stop reason: SDUPTHER

## 2023-08-08 RX ORDER — ROCURONIUM BROMIDE 10 MG/ML
INJECTION, SOLUTION INTRAVENOUS PRN
Status: DISCONTINUED | OUTPATIENT
Start: 2023-08-08 | End: 2023-08-08 | Stop reason: SDUPTHER

## 2023-08-08 RX ORDER — CHLORHEXIDINE GLUCONATE 0.12 MG/ML
15 RINSE ORAL 2 TIMES DAILY
Status: DISCONTINUED | OUTPATIENT
Start: 2023-08-08 | End: 2023-08-08 | Stop reason: SDUPTHER

## 2023-08-08 RX ORDER — SODIUM CHLORIDE 0.9 % (FLUSH) 0.9 %
5-40 SYRINGE (ML) INJECTION PRN
Status: DISCONTINUED | OUTPATIENT
Start: 2023-08-08 | End: 2023-08-08 | Stop reason: HOSPADM

## 2023-08-08 RX ORDER — ONDANSETRON 2 MG/ML
INJECTION INTRAMUSCULAR; INTRAVENOUS PRN
Status: DISCONTINUED | OUTPATIENT
Start: 2023-08-08 | End: 2023-08-08 | Stop reason: SDUPTHER

## 2023-08-08 RX ORDER — ACETAMINOPHEN 650 MG/20.3ML
650 SOLUTION ORAL EVERY 4 HOURS PRN
Status: DISCONTINUED | OUTPATIENT
Start: 2023-08-08 | End: 2023-08-25 | Stop reason: HOSPADM

## 2023-08-08 RX ORDER — BUPIVACAINE HYDROCHLORIDE AND EPINEPHRINE 5; 5 MG/ML; UG/ML
INJECTION, SOLUTION EPIDURAL; INTRACAUDAL; PERINEURAL PRN
Status: DISCONTINUED | OUTPATIENT
Start: 2023-08-08 | End: 2023-08-08 | Stop reason: ALTCHOICE

## 2023-08-08 RX ORDER — SODIUM CHLORIDE 9 MG/ML
INJECTION, SOLUTION INTRAVENOUS CONTINUOUS
Status: DISCONTINUED | OUTPATIENT
Start: 2023-08-08 | End: 2023-08-09

## 2023-08-08 RX ORDER — NEOSTIGMINE METHYLSULFATE 1 MG/ML
INJECTION, SOLUTION INTRAVENOUS PRN
Status: DISCONTINUED | OUTPATIENT
Start: 2023-08-08 | End: 2023-08-08 | Stop reason: SDUPTHER

## 2023-08-08 RX ORDER — SODIUM CHLORIDE 9 MG/ML
INJECTION, SOLUTION INTRAVENOUS CONTINUOUS PRN
Status: DISCONTINUED | OUTPATIENT
Start: 2023-08-08 | End: 2023-08-08 | Stop reason: SDUPTHER

## 2023-08-08 RX ADMIN — DEXAMETHASONE SODIUM PHOSPHATE 10 MG: 10 INJECTION INTRAMUSCULAR; INTRAVENOUS at 14:06

## 2023-08-08 RX ADMIN — POTASSIUM BICARBONATE 40 MEQ: 782 TABLET, EFFERVESCENT ORAL at 20:04

## 2023-08-08 RX ADMIN — POLYVINYL ALCOHOL 1 DROP: 14 SOLUTION/ DROPS OPHTHALMIC at 03:57

## 2023-08-08 RX ADMIN — SODIUM CHLORIDE, PRESERVATIVE FREE 10 ML: 5 INJECTION INTRAVENOUS at 20:00

## 2023-08-08 RX ADMIN — ACETAMINOPHEN 650 MG: 650 SOLUTION ORAL at 18:39

## 2023-08-08 RX ADMIN — QUETIAPINE FUMARATE 100 MG: 100 TABLET ORAL at 07:54

## 2023-08-08 RX ADMIN — DEXMEDETOMIDINE 0.4 MCG/KG/HR: 100 INJECTION, SOLUTION INTRAVENOUS at 12:31

## 2023-08-08 RX ADMIN — OXYCODONE HYDROCHLORIDE 10 MG: 5 SOLUTION ORAL at 07:55

## 2023-08-08 RX ADMIN — POLYVINYL ALCOHOL 1 DROP: 14 SOLUTION/ DROPS OPHTHALMIC at 07:57

## 2023-08-08 RX ADMIN — MIDAZOLAM HYDROCHLORIDE 2 MG: 1 INJECTION, SOLUTION INTRAMUSCULAR; INTRAVENOUS at 04:22

## 2023-08-08 RX ADMIN — CHLORHEXIDINE GLUCONATE 15 ML: 1.2 RINSE ORAL at 07:53

## 2023-08-08 RX ADMIN — ROCURONIUM BROMIDE 50 MG: 50 INJECTION INTRAVENOUS at 13:59

## 2023-08-08 RX ADMIN — FENTANYL CITRATE 50 MCG: 0.05 INJECTION, SOLUTION INTRAMUSCULAR; INTRAVENOUS at 14:22

## 2023-08-08 RX ADMIN — LANSOPRAZOLE 30 MG: 30 TABLET, ORALLY DISINTEGRATING, DELAYED RELEASE ORAL at 06:08

## 2023-08-08 RX ADMIN — LORAZEPAM 1 MG: 1 TABLET ORAL at 11:34

## 2023-08-08 RX ADMIN — LORAZEPAM 2 MG: 1 TABLET ORAL at 07:54

## 2023-08-08 RX ADMIN — CHLORHEXIDINE GLUCONATE 15 ML: 1.2 RINSE ORAL at 11:09

## 2023-08-08 RX ADMIN — OXYCODONE HYDROCHLORIDE 10 MG: 5 SOLUTION ORAL at 03:56

## 2023-08-08 RX ADMIN — POLYETHYLENE GLYCOL 3350 17 G: 17 POWDER, FOR SOLUTION ORAL at 07:54

## 2023-08-08 RX ADMIN — FENTANYL CITRATE 50 MCG: 0.05 INJECTION, SOLUTION INTRAMUSCULAR; INTRAVENOUS at 14:03

## 2023-08-08 RX ADMIN — POLYVINYL ALCOHOL 1 DROP: 14 SOLUTION/ DROPS OPHTHALMIC at 20:01

## 2023-08-08 RX ADMIN — Medication 3 MG: at 14:48

## 2023-08-08 RX ADMIN — OXYCODONE HYDROCHLORIDE 10 MG: 5 SOLUTION ORAL at 11:34

## 2023-08-08 RX ADMIN — CHLORHEXIDINE GLUCONATE 15 ML: 1.2 RINSE ORAL at 15:21

## 2023-08-08 RX ADMIN — POLYVINYL ALCOHOL 1 DROP: 14 SOLUTION/ DROPS OPHTHALMIC at 11:34

## 2023-08-08 RX ADMIN — ACETAMINOPHEN 650 MG: 650 SOLUTION ORAL at 12:16

## 2023-08-08 RX ADMIN — LORAZEPAM 1 MG: 1 TABLET ORAL at 15:35

## 2023-08-08 RX ADMIN — DEXMEDETOMIDINE 1.5 MCG/KG/HR: 100 INJECTION, SOLUTION INTRAVENOUS at 03:15

## 2023-08-08 RX ADMIN — PETROLATUM: 420 OINTMENT TOPICAL at 07:56

## 2023-08-08 RX ADMIN — FLUOXETINE 20 MG: 10 TABLET, FILM COATED ORAL at 07:56

## 2023-08-08 RX ADMIN — INSULIN LISPRO 8 UNITS: 100 INJECTION, SOLUTION INTRAVENOUS; SUBCUTANEOUS at 20:12

## 2023-08-08 RX ADMIN — INSULIN LISPRO 4 UNITS: 100 INJECTION, SOLUTION INTRAVENOUS; SUBCUTANEOUS at 03:59

## 2023-08-08 RX ADMIN — ACETAMINOPHEN 650 MG: 650 SOLUTION ORAL at 22:40

## 2023-08-08 RX ADMIN — INSULIN LISPRO 4 UNITS: 100 INJECTION, SOLUTION INTRAVENOUS; SUBCUTANEOUS at 11:08

## 2023-08-08 RX ADMIN — POTASSIUM BICARBONATE 40 MEQ: 782 TABLET, EFFERVESCENT ORAL at 08:00

## 2023-08-08 RX ADMIN — CHLORHEXIDINE GLUCONATE 15 ML: 1.2 RINSE ORAL at 03:57

## 2023-08-08 RX ADMIN — ONDANSETRON 4 MG: 2 INJECTION INTRAMUSCULAR; INTRAVENOUS at 14:43

## 2023-08-08 RX ADMIN — SODIUM CHLORIDE: 9 INJECTION, SOLUTION INTRAVENOUS at 12:54

## 2023-08-08 RX ADMIN — GLYCOPYRROLATE 0.6 MG: 0.2 INJECTION INTRAMUSCULAR; INTRAVENOUS at 14:48

## 2023-08-08 RX ADMIN — CEFEPIME 2000 MG: 2 INJECTION, POWDER, FOR SOLUTION INTRAVENOUS at 05:20

## 2023-08-08 RX ADMIN — LORAZEPAM 1 MG: 1 TABLET ORAL at 20:00

## 2023-08-08 RX ADMIN — OXYCODONE HYDROCHLORIDE 10 MG: 5 SOLUTION ORAL at 15:35

## 2023-08-08 RX ADMIN — CHLORHEXIDINE GLUCONATE 15 ML: 1.2 RINSE ORAL at 20:00

## 2023-08-08 RX ADMIN — LORAZEPAM 2 MG: 1 TABLET ORAL at 03:56

## 2023-08-08 RX ADMIN — POLYVINYL ALCOHOL 1 DROP: 14 SOLUTION/ DROPS OPHTHALMIC at 15:36

## 2023-08-08 RX ADMIN — SENNOSIDES AND DOCUSATE SODIUM 2 TABLET: 50; 8.6 TABLET ORAL at 07:54

## 2023-08-08 RX ADMIN — CEFEPIME 2000 MG: 2 INJECTION, POWDER, FOR SOLUTION INTRAVENOUS at 13:00

## 2023-08-08 RX ADMIN — CEFEPIME 2000 MG: 2 INJECTION, POWDER, FOR SOLUTION INTRAVENOUS at 21:07

## 2023-08-08 RX ADMIN — BUPROPION HYDROCHLORIDE 75 MG: 75 TABLET, FILM COATED ORAL at 20:02

## 2023-08-08 RX ADMIN — SODIUM CHLORIDE, PRESERVATIVE FREE 10 ML: 5 INJECTION INTRAVENOUS at 07:54

## 2023-08-08 RX ADMIN — SENNOSIDES AND DOCUSATE SODIUM 2 TABLET: 50; 8.6 TABLET ORAL at 20:00

## 2023-08-08 RX ADMIN — BUPROPION HYDROCHLORIDE 75 MG: 75 TABLET, FILM COATED ORAL at 07:56

## 2023-08-08 RX ADMIN — POLYETHYLENE GLYCOL 3350 17 G: 17 POWDER, FOR SOLUTION ORAL at 20:00

## 2023-08-08 RX ADMIN — QUETIAPINE FUMARATE 100 MG: 100 TABLET ORAL at 20:00

## 2023-08-08 RX ADMIN — OXYCODONE HYDROCHLORIDE 10 MG: 5 SOLUTION ORAL at 19:59

## 2023-08-08 RX ADMIN — SODIUM CHLORIDE: 9 INJECTION, SOLUTION INTRAVENOUS at 13:43

## 2023-08-08 RX ADMIN — FENTANYL CITRATE 100 MCG: 50 INJECTION INTRAMUSCULAR; INTRAVENOUS at 22:13

## 2023-08-08 RX ADMIN — PETROLATUM: 420 OINTMENT TOPICAL at 20:01

## 2023-08-08 ASSESSMENT — PAIN SCALES - GENERAL
PAINLEVEL_OUTOF10: 0
PAINLEVEL_OUTOF10: 1
PAINLEVEL_OUTOF10: 2
PAINLEVEL_OUTOF10: 0
PAINLEVEL_OUTOF10: 6
PAINLEVEL_OUTOF10: 0

## 2023-08-08 ASSESSMENT — PULMONARY FUNCTION TESTS
PIF_VALUE: 23
PIF_VALUE: 21
PIF_VALUE: 22
PIF_VALUE: 25
PIF_VALUE: 25
PIF_VALUE: 24
PIF_VALUE: 22
PIF_VALUE: 20
PIF_VALUE: 22
PIF_VALUE: 23
PIF_VALUE: 32
PIF_VALUE: 24
PIF_VALUE: 34
PIF_VALUE: 24
PIF_VALUE: 23
PIF_VALUE: 29
PIF_VALUE: 28
PIF_VALUE: 24
PIF_VALUE: 24
PIF_VALUE: 23
PIF_VALUE: 23
PIF_VALUE: 24
PIF_VALUE: 21
PIF_VALUE: 23
PIF_VALUE: 22
PIF_VALUE: 24
PIF_VALUE: 25
PIF_VALUE: 23
PIF_VALUE: 24
PIF_VALUE: 23
PIF_VALUE: 24
PIF_VALUE: 30
PIF_VALUE: 25

## 2023-08-08 ASSESSMENT — PAIN DESCRIPTION - DESCRIPTORS
DESCRIPTORS: PATIENT UNABLE TO DESCRIBE
DESCRIPTORS: PATIENT UNABLE TO DESCRIBE

## 2023-08-08 ASSESSMENT — LIFESTYLE VARIABLES: SMOKING_STATUS: 1
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
## 2023-08-08 NOTE — ANESTHESIA POSTPROCEDURE EVALUATION
Department of Anesthesiology  Postprocedure Note    Patient: Gaby Apple  MRN: [id number]  YOB: 1965  Date of evaluation: 8/8/2023      Procedure Summary     Date: 08/08/23 Room / Location: [place] [address] / [facility]    Anesthesia Start:  Anesthesia Stop:     Procedure: LEFT PARASYNTHESIS MANDIBLE OPEN REDUCTION INTERNAL FIXATION WITH PLATES AND SCREWS MAXILLOMANDIBULAR FIXATION (Left: Face) Diagnosis:       Open fracture of left side of mandible, unspecified mandibular site, initial encounter ([address])      (Open fracture of left side of mandible, unspecified mandibular site, initial encounter ([address]) [S02.609B])    Surgeons: Omi Morales MD Responsible Provider:     Anesthesia Type: general ASA Status: 3          Anesthesia Type: No value filed.     Maire Phase I:      Marie Phase II:        Anesthesia Post Evaluation    Patient location during evaluation: ICU  Level of consciousness: sedated and ventilated  Respiratory status: ventilator

## 2023-08-08 NOTE — PLAN OF CARE
have been tried or would not be effective before applying the restraint   Evaluate the patient's condition at the time of restraint application   Inform patient/family regarding the reason for restraint   Every 2 hours: Monitor safety, psychosocial status, comfort, nutrition and hydration

## 2023-08-08 NOTE — PLAN OF CARE
Problem: Discharge Planning  Goal: Discharge to home or other facility with appropriate resources  Outcome: Progressing     Problem: Pain  Goal: Verbalizes/displays adequate comfort level or baseline comfort level  Outcome: Progressing     Problem: Skin/Tissue Integrity  Goal: Absence of new skin breakdown  Description: 1. Monitor for areas of redness and/or skin breakdown  2. Assess vascular access sites hourly  3. Every 4-6 hours minimum:  Change oxygen saturation probe site  4. Every 4-6 hours:  If on nasal continuous positive airway pressure, respiratory therapy assess nares and determine need for appliance change or resting period.   8/8/2023 0948 by Nancy Spain RN  Outcome: Progressing  8/7/2023 2029 by Lisbet Johnson RN  Outcome: Progressing     Problem: Safety - Adult  Goal: Free from fall injury  8/8/2023 0948 by Nancy Spain RN  Outcome: Progressing  8/7/2023 2029 by Lisbet Johnson RN  Outcome: Progressing  Flowsheets (Taken 8/7/2023 2000)  Free From Fall Injury: Instruct family/caregiver on patient safety     Problem: ABCDS Injury Assessment  Goal: Absence of physical injury  8/8/2023 0948 by Nancy Spain RN  Outcome: Progressing  8/7/2023 2029 by Lisbet Johnson RN  Outcome: Progressing  Flowsheets (Taken 8/7/2023 2000)  Absence of Physical Injury: Implement safety measures based on patient assessment     Problem: Neurosensory - Adult  Goal: Achieves stable or improved neurological status  Recent Flowsheet Documentation  Taken 8/8/2023 0800 by Nancy Spain RN  Achieves stable or improved neurological status:   Assess for and report changes in neurological status   Initiate measures to prevent increased intracranial pressure     Problem: Respiratory - Adult  Goal: Achieves optimal ventilation and oxygenation  Outcome: Progressing  Flowsheets (Taken 8/8/2023 0800)  Achieves optimal ventilation and oxygenation:   Assess for changes in respiratory status   Assess for changes in mentation and

## 2023-08-08 NOTE — FLOWSHEET NOTE
When unrestrained for ROM pt attempts to pull at lines and tubes. Pt not redirectable at this time. Soft bilateral wrist restraints continued for pt safety.

## 2023-08-08 NOTE — OP NOTE
Operative Note      Patient: Leena Kemp  YOB: 1965  MRN: [id number]    Date of Procedure: 8/8/2023    Pre-Op Diagnosis Codes:     * Open fracture of left side of mandible, unspecified mandibular site, initial encounter ([address]) [S02.609B]    Post-Op Diagnosis: Left para symphyseal compound mandible fracture       Procedure(s):  LEFT PARASYNTHESIS MANDIBLE OPEN REDUCTION INTERNAL FIXATION WITH PLATES AND SCREWS MAXILLOMANDIBULAR FIXATION AND GINGIVOBUCCAL FLAP CLOSURE OF DEFECT, extraction of tooth,2cm x 1cm gingivobuccal flap for closure of defect. Surgeon(s):  Britany Fortune MD    Assistant:   Physician Assistant: CHELSIE Wyatt  Resident: Kat Mathews DO    Anesthesia: General    Estimated Blood Loss (mL): 25    Complications: None    Specimens:   ID Type Source Tests Collected by Time Destination   A : TOOTH FRAGMENT Tissue Tissue SURGICAL PATHOLOGY Britany Fortune MD 8/8/2023 1411        Implants:  Implant Name Type Inv.  Item Serial No.  Lot No. LRB No. Used Action   PLATE BONE 11 H BRENDEN RECON W TEMPLT - [id number]  PLATE BONE 11 H BRENDEN RECON W TEMPLT  KAMINI JENNA-WD  Left 1 Implanted   SCREW BNE L10MM DIA2MM XAVIER CRANIOMAXILLOFACIAL G TI ST - [id number]  SCREW BNE L10MM DIA2MM XAVIER CRANIOMAXILLOFACIAL G TI ST  KAMINI CRANIOMAXILLOFACIAL-WD  Left 2 Implanted   SCREW BNE L12MM DIA2MM XAVIER CRANIOMAXILLOFACIAL G TI ST - [id number]  SCREW BNE L12MM DIA2MM XAVIER CRANIOMAXILLOFACIAL G TI ST  KAMINI CRANIOMAXILLOFACIAL-WD  Left 1 Implanted   SCREW BNE L14MM DIA2MM XAVIER CRANIOMAXILLOFACIAL G TI ST - [id number]  SCREW BNE L14MM DIA2MM XAVIER CRANIOMAXILLOFACIAL G TI ST  KAMINI CRANIOMAXILLOFACIAL-WD  Left 1 Implanted   SCREW BNE L4MM DIA2MM KEKE CRANIOMAXILLOFACIAL SHERIF MARY ANNE CRSS - [id number]  SCREW BNE L4MM DIA2MM KEKE CRANIOMAXILLOFACIAL SHERIF MARY ANNE CRSS  KAMINI CRANIOMAXILLOFACIAL-WD  Left 1 Implanted   SCREW BNE L10MM DIA2MM KEKE CRANIOMAXILLOFACIAL SHERIF MARY ANNE - [id number]  SCREW BNE 1400   Action Taken Other (comment) 08/02/23 0000   Residual Volume (ml) 0 ml 08/01/23 2200       [REMOVED] Urinary Catheter 07/30/23 [place]pherson (Removed)   $ Urethral catheter insertion $ Not inserted for procedure 07/30/23 2234   Catheter Indications Need for fluid volume management of the critically ill patient in a critical care setting 08/03/23 0800   Site Assessment No urethral drainage 08/03/23 0800   Urine Color Yellow 08/03/23 0800   Urine Appearance Clear 08/03/23 0800   Collection Container Standard 08/03/23 0800   Securement Method Securing device (Describe) 08/03/23 0800   Catheter Care  Soap and water 08/02/23 1500   Catheter Best Practices  Drainage tube clipped to bed;Catheter secured to thigh; Tamper seal intact; Bag below bladder;Bag not on floor; Lack of dependent loop in tubing;Drainage bag less than half full 08/03/23 0800   Status Draining;Patent 08/03/23 0800   Output (mL) 50 mL 08/03/23 1600       [REMOVED] External Urinary Catheter (Removed)   Site Assessment Other (Comment) 08/03/23 2200   Placement Replaced 08/03/23 2200   Securement Method Securing device (Describe) 08/04/23 0000   Catheter Care Catheter/Wick replaced 08/03/23 2200   Perineal Care Yes 08/04/23 0400   Suction N/A 08/03/23 2200   Urine Color Monique 08/04/23 0400   Urine Appearance Clear 08/03/23 2200   Urine Odor Malodorous 08/03/23 2200   Output (mL) 150 mL 08/04/23 0400       [REMOVED] External Urinary Catheter (Removed)   Site Assessment Clean,dry & intact 08/06/23 0800   Placement Initiated 08/06/23 0600   Securement Method Securing device (Describe) 08/06/23 0600   Catheter Care Catheter/Wick replaced 08/06/23 0600   Perineal Care Yes 08/05/23 2000   Suction 40 mmgHg continuous 08/06/23 0600   Urine Color Monique 08/06/23 0600   Urine Appearance Hazy 08/06/23 0600   Urine Odor Malodorous 08/06/23 0600   Output (mL) 0 mL 08/05/23 2200       Findings: Left parasymphyseal compound mandible fracture, free floating left first
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
## 2023-08-09 ENCOUNTER — APPOINTMENT (OUTPATIENT)
Dept: GENERAL RADIOLOGY | Age: 58
DRG: 004 | End: 2023-08-09
Payer: COMMERCIAL

## 2023-08-09 LAB
AADO2: 298.5 MMHG
ALBUMIN SERPL-MCNC: 2.6 G/DL (ref 3.5–5.2)
ALP SERPL-CCNC: 78 U/L (ref 40–129)
ALT SERPL-CCNC: 11 U/L (ref 0–40)
ANION GAP SERPL CALCULATED.3IONS-SCNC: 8 MMOL/L (ref 7–16)
AST SERPL-CCNC: 14 U/L (ref 0–39)
B.E.: 2.4 MMOL/L (ref -3–3)
BASOPHILS # BLD: 0.05 K/UL (ref 0–0.2)
BASOPHILS NFR BLD: 0 % (ref 0–2)
BILIRUB SERPL-MCNC: 0.2 MG/DL (ref 0–1.2)
BUN SERPL-MCNC: 28 MG/DL (ref 6–20)
CA-I BLD-SCNC: 1.16 MMOL/L (ref 1.15–1.33)
CALCIUM SERPL-MCNC: 8.9 MG/DL (ref 8.6–10.2)
CHLORIDE SERPL-SCNC: 101 MMOL/L (ref 98–107)
CO2 SERPL-SCNC: 27 MMOL/L (ref 22–29)
COHB: 0.3 % (ref 0–1.5)
CREAT SERPL-MCNC: 0.8 MG/DL (ref 0.7–1.2)
CRITICAL: ABNORMAL
DATE ANALYZED: ABNORMAL
DATE OF COLLECTION: ABNORMAL
EOSINOPHIL # BLD: 0 K/UL (ref 0.05–0.5)
EOSINOPHILS RELATIVE PERCENT: 0 % (ref 0–6)
ERYTHROCYTE [DISTWIDTH] IN BLOOD BY AUTOMATED COUNT: 14 % (ref 11.5–15)
FIO2: 60 %
GFR SERPL CREATININE-BSD FRML MDRD: >60 ML/MIN/1.73M2
GLUCOSE SERPL-MCNC: 296 MG/DL (ref 74–99)
HCO3: 26.2 MMOL/L (ref 22–26)
HCT VFR BLD AUTO: 29 % (ref 37–54)
HGB BLD-MCNC: 9.3 G/DL (ref 12.5–16.5)
HHB: 5.1 % (ref 0–5)
IMM GRANULOCYTES # BLD AUTO: 0.41 K/UL (ref 0–0.58)
IMM GRANULOCYTES NFR BLD: 3 % (ref 0–5)
LAB: ABNORMAL
LYMPHOCYTES NFR BLD: 1.19 K/UL (ref 1.5–4)
LYMPHOCYTES RELATIVE PERCENT: 9 % (ref 20–42)
Lab: ABNORMAL
MAGNESIUM SERPL-MCNC: 2.3 MG/DL (ref 1.6–2.6)
MCH RBC QN AUTO: 29.1 PG (ref 26–35)
MCHC RBC AUTO-ENTMCNC: 32.1 G/DL (ref 32–34.5)
MCV RBC AUTO: 90.6 FL (ref 80–99.9)
METER GLUCOSE: 111 MG/DL (ref 74–99)
METER GLUCOSE: 120 MG/DL (ref 74–99)
METER GLUCOSE: 165 MG/DL (ref 74–99)
METER GLUCOSE: 245 MG/DL (ref 74–99)
METER GLUCOSE: 289 MG/DL (ref 74–99)
METER GLUCOSE: 344 MG/DL (ref 74–99)
METHB: 0.3 % (ref 0–1.5)
MODE: AC
MONOCYTES NFR BLD: 0.89 K/UL (ref 0.1–0.95)
MONOCYTES NFR BLD: 6 % (ref 2–12)
NEUTROPHILS NFR BLD: 82 % (ref 43–80)
NEUTS SEG NFR BLD: 11.35 K/UL (ref 1.8–7.3)
O2 CONTENT: 14 ML/DL
O2 SATURATION: 94.9 % (ref 92–98.5)
O2HB: 94.3 % (ref 94–97)
OPERATOR ID: 1893
PATIENT TEMP: 37 C
PCO2: 37.6 MMHG (ref 35–45)
PEEP/CPAP: 10 CMH2O
PFO2: 1.22 MMHG/%
PH BLOOD GAS: 7.46 (ref 7.35–7.45)
PHOSPHATE SERPL-MCNC: 1.7 MG/DL (ref 2.5–4.5)
PLATELET # BLD AUTO: 484 K/UL (ref 130–450)
PMV BLD AUTO: 9.5 FL (ref 7–12)
PO2: 72.9 MMHG (ref 75–100)
POTASSIUM SERPL-SCNC: 4.3 MMOL/L (ref 3.5–5)
PROT SERPL-MCNC: 6 G/DL (ref 6.4–8.3)
RBC # BLD AUTO: 3.2 M/UL (ref 3.8–5.8)
RI(T): 4.1
RR MECHANICAL: 14 B/MIN
SODIUM SERPL-SCNC: 136 MMOL/L (ref 132–146)
SOURCE, BLOOD GAS: ABNORMAL
THB: 10.5 G/DL (ref 11.5–16.5)
TIME ANALYZED: 520
VT MECHANICAL: 460 ML
WBC OTHER # BLD: 13.9 K/UL (ref 4.5–11.5)

## 2023-08-09 PROCEDURE — 2580000003 HC RX 258

## 2023-08-09 PROCEDURE — 6360000002 HC RX W HCPCS: Performed by: SURGERY

## 2023-08-09 PROCEDURE — 82962 GLUCOSE BLOOD TEST: CPT

## 2023-08-09 PROCEDURE — 51701 INSERT BLADDER CATHETER: CPT

## 2023-08-09 PROCEDURE — S5553 INSULIN LONG ACTING 5 U: HCPCS

## 2023-08-09 PROCEDURE — 6370000000 HC RX 637 (ALT 250 FOR IP): Performed by: STUDENT IN AN ORGANIZED HEALTH CARE EDUCATION/TRAINING PROGRAM

## 2023-08-09 PROCEDURE — 94003 VENT MGMT INPAT SUBQ DAY: CPT

## 2023-08-09 PROCEDURE — 99291 CRITICAL CARE FIRST HOUR: CPT | Performed by: SURGERY

## 2023-08-09 PROCEDURE — 2580000003 HC RX 258: Performed by: SURGERY

## 2023-08-09 PROCEDURE — 6370000000 HC RX 637 (ALT 250 FOR IP)

## 2023-08-09 PROCEDURE — 2580000003 HC RX 258: Performed by: STUDENT IN AN ORGANIZED HEALTH CARE EDUCATION/TRAINING PROGRAM

## 2023-08-09 PROCEDURE — 6360000002 HC RX W HCPCS

## 2023-08-09 PROCEDURE — 2500000003 HC RX 250 WO HCPCS: Performed by: SURGERY

## 2023-08-09 PROCEDURE — 2500000003 HC RX 250 WO HCPCS

## 2023-08-09 PROCEDURE — 82330 ASSAY OF CALCIUM: CPT

## 2023-08-09 PROCEDURE — 80053 COMPREHEN METABOLIC PANEL: CPT

## 2023-08-09 PROCEDURE — 85025 COMPLETE CBC W/AUTO DIFF WBC: CPT

## 2023-08-09 PROCEDURE — 71045 X-RAY EXAM CHEST 1 VIEW: CPT

## 2023-08-09 PROCEDURE — 6370000000 HC RX 637 (ALT 250 FOR IP): Performed by: SURGERY

## 2023-08-09 PROCEDURE — 2000000000 HC ICU R&B

## 2023-08-09 PROCEDURE — 6360000002 HC RX W HCPCS: Performed by: STUDENT IN AN ORGANIZED HEALTH CARE EDUCATION/TRAINING PROGRAM

## 2023-08-09 PROCEDURE — 83735 ASSAY OF MAGNESIUM: CPT

## 2023-08-09 PROCEDURE — 84100 ASSAY OF PHOSPHORUS: CPT

## 2023-08-09 PROCEDURE — 82805 BLOOD GASES W/O2 SATURATION: CPT

## 2023-08-09 PROCEDURE — 74018 RADEX ABDOMEN 1 VIEW: CPT

## 2023-08-09 RX ORDER — INSULIN GLARGINE-YFGN 100 [IU]/ML
10 INJECTION, SOLUTION SUBCUTANEOUS NIGHTLY
Status: DISCONTINUED | OUTPATIENT
Start: 2023-08-09 | End: 2023-08-09

## 2023-08-09 RX ORDER — LORAZEPAM 0.5 MG/1
0.5 TABLET ORAL EVERY 4 HOURS
Status: DISCONTINUED | OUTPATIENT
Start: 2023-08-09 | End: 2023-08-10

## 2023-08-09 RX ORDER — FUROSEMIDE 10 MG/ML
20 INJECTION INTRAMUSCULAR; INTRAVENOUS ONCE
Status: COMPLETED | OUTPATIENT
Start: 2023-08-09 | End: 2023-08-09

## 2023-08-09 RX ORDER — INSULIN GLARGINE-YFGN 100 [IU]/ML
15 INJECTION, SOLUTION SUBCUTANEOUS ONCE
Status: COMPLETED | OUTPATIENT
Start: 2023-08-09 | End: 2023-08-09

## 2023-08-09 RX ORDER — METOCLOPRAMIDE HYDROCHLORIDE 5 MG/ML
10 INJECTION INTRAMUSCULAR; INTRAVENOUS EVERY 6 HOURS
Status: DISCONTINUED | OUTPATIENT
Start: 2023-08-09 | End: 2023-08-12

## 2023-08-09 RX ADMIN — POLYVINYL ALCOHOL 1 DROP: 14 SOLUTION/ DROPS OPHTHALMIC at 12:30

## 2023-08-09 RX ADMIN — LORAZEPAM 1 MG: 1 TABLET ORAL at 08:10

## 2023-08-09 RX ADMIN — BUPROPION HYDROCHLORIDE 75 MG: 75 TABLET, FILM COATED ORAL at 19:38

## 2023-08-09 RX ADMIN — POLYETHYLENE GLYCOL 3350 17 G: 17 POWDER, FOR SOLUTION ORAL at 08:11

## 2023-08-09 RX ADMIN — PETROLATUM: 420 OINTMENT TOPICAL at 19:39

## 2023-08-09 RX ADMIN — OXYCODONE HYDROCHLORIDE 10 MG: 5 SOLUTION ORAL at 19:38

## 2023-08-09 RX ADMIN — PETROLATUM: 420 OINTMENT TOPICAL at 10:18

## 2023-08-09 RX ADMIN — POLYVINYL ALCOHOL 1 DROP: 14 SOLUTION/ DROPS OPHTHALMIC at 03:34

## 2023-08-09 RX ADMIN — CHLORHEXIDINE GLUCONATE 15 ML: 1.2 RINSE ORAL at 12:30

## 2023-08-09 RX ADMIN — OXYCODONE HYDROCHLORIDE 10 MG: 5 SOLUTION ORAL at 00:08

## 2023-08-09 RX ADMIN — FENTANYL CITRATE 100 MCG: 50 INJECTION INTRAMUSCULAR; INTRAVENOUS at 20:38

## 2023-08-09 RX ADMIN — CEFEPIME 2000 MG: 2 INJECTION, POWDER, FOR SOLUTION INTRAVENOUS at 05:07

## 2023-08-09 RX ADMIN — FENTANYL CITRATE 100 MCG: 50 INJECTION INTRAMUSCULAR; INTRAVENOUS at 15:05

## 2023-08-09 RX ADMIN — PETROLATUM: 420 OINTMENT TOPICAL at 08:41

## 2023-08-09 RX ADMIN — OXYCODONE HYDROCHLORIDE 10 MG: 5 SOLUTION ORAL at 10:41

## 2023-08-09 RX ADMIN — FUROSEMIDE 20 MG: 10 INJECTION, SOLUTION INTRAMUSCULAR; INTRAVENOUS at 12:17

## 2023-08-09 RX ADMIN — FLUOXETINE 20 MG: 10 TABLET, FILM COATED ORAL at 08:12

## 2023-08-09 RX ADMIN — POLYVINYL ALCOHOL 1 DROP: 14 SOLUTION/ DROPS OPHTHALMIC at 00:09

## 2023-08-09 RX ADMIN — Medication 500 MG: at 19:38

## 2023-08-09 RX ADMIN — INSULIN GLARGINE-YFGN 15 UNITS: 100 INJECTION, SOLUTION SUBCUTANEOUS at 08:32

## 2023-08-09 RX ADMIN — Medication 500 MG: at 12:43

## 2023-08-09 RX ADMIN — FENTANYL CITRATE 100 MCG: 50 INJECTION INTRAMUSCULAR; INTRAVENOUS at 12:14

## 2023-08-09 RX ADMIN — METOCLOPRAMIDE HYDROCHLORIDE 10 MG: 5 INJECTION INTRAMUSCULAR; INTRAVENOUS at 22:11

## 2023-08-09 RX ADMIN — LORAZEPAM 1 MG: 1 TABLET ORAL at 00:08

## 2023-08-09 RX ADMIN — SODIUM PHOSPHATE, MONOBASIC, MONOHYDRATE AND SODIUM PHOSPHATE, DIBASIC, ANHYDROUS 10 MMOL: 276; 142 INJECTION, SOLUTION INTRAVENOUS at 12:53

## 2023-08-09 RX ADMIN — POLYVINYL ALCOHOL 1 DROP: 14 SOLUTION/ DROPS OPHTHALMIC at 16:18

## 2023-08-09 RX ADMIN — OXYCODONE HYDROCHLORIDE 10 MG: 5 SOLUTION ORAL at 15:27

## 2023-08-09 RX ADMIN — LORAZEPAM 1 MG: 1 TABLET ORAL at 03:38

## 2023-08-09 RX ADMIN — CHLORHEXIDINE GLUCONATE 15 ML: 1.2 RINSE ORAL at 19:38

## 2023-08-09 RX ADMIN — LORAZEPAM 0.5 MG: 0.5 TABLET ORAL at 19:38

## 2023-08-09 RX ADMIN — ACETAMINOPHEN 650 MG: 650 SOLUTION ORAL at 02:48

## 2023-08-09 RX ADMIN — CHLORHEXIDINE GLUCONATE 15 ML: 1.2 RINSE ORAL at 00:04

## 2023-08-09 RX ADMIN — POLYVINYL ALCOHOL 1 DROP: 14 SOLUTION/ DROPS OPHTHALMIC at 08:10

## 2023-08-09 RX ADMIN — BUPROPION HYDROCHLORIDE 75 MG: 75 TABLET, FILM COATED ORAL at 08:12

## 2023-08-09 RX ADMIN — Medication 500 MG: at 16:18

## 2023-08-09 RX ADMIN — PETROLATUM: 420 OINTMENT TOPICAL at 21:00

## 2023-08-09 RX ADMIN — DEXMEDETOMIDINE 1.5 MCG/KG/HR: 100 INJECTION, SOLUTION INTRAVENOUS at 19:42

## 2023-08-09 RX ADMIN — POLYVINYL ALCOHOL 1 DROP: 14 SOLUTION/ DROPS OPHTHALMIC at 19:39

## 2023-08-09 RX ADMIN — POLYETHYLENE GLYCOL 3350 17 G: 17 POWDER, FOR SOLUTION ORAL at 19:38

## 2023-08-09 RX ADMIN — OXYCODONE HYDROCHLORIDE 10 MG: 5 SOLUTION ORAL at 08:07

## 2023-08-09 RX ADMIN — CHLORHEXIDINE GLUCONATE 15 ML: 1.2 RINSE ORAL at 16:18

## 2023-08-09 RX ADMIN — CEFEPIME 2000 MG: 2 INJECTION, POWDER, FOR SOLUTION INTRAVENOUS at 13:05

## 2023-08-09 RX ADMIN — CEFEPIME 2000 MG: 2 INJECTION, POWDER, FOR SOLUTION INTRAVENOUS at 21:00

## 2023-08-09 RX ADMIN — INSULIN LISPRO 12 UNITS: 100 INJECTION, SOLUTION INTRAVENOUS; SUBCUTANEOUS at 03:38

## 2023-08-09 RX ADMIN — Medication 250 MG: at 08:32

## 2023-08-09 RX ADMIN — INSULIN LISPRO 8 UNITS: 100 INJECTION, SOLUTION INTRAVENOUS; SUBCUTANEOUS at 08:40

## 2023-08-09 RX ADMIN — SENNOSIDES AND DOCUSATE SODIUM 2 TABLET: 50; 8.6 TABLET ORAL at 08:10

## 2023-08-09 RX ADMIN — DEXMEDETOMIDINE 1 MCG/KG/HR: 100 INJECTION, SOLUTION INTRAVENOUS at 02:45

## 2023-08-09 RX ADMIN — SODIUM CHLORIDE, PRESERVATIVE FREE 10 ML: 5 INJECTION INTRAVENOUS at 19:39

## 2023-08-09 RX ADMIN — LORAZEPAM 0.5 MG: 0.5 TABLET ORAL at 15:28

## 2023-08-09 RX ADMIN — INSULIN LISPRO 4 UNITS: 100 INJECTION, SOLUTION INTRAVENOUS; SUBCUTANEOUS at 19:54

## 2023-08-09 RX ADMIN — CHLORHEXIDINE GLUCONATE 15 ML: 1.2 RINSE ORAL at 03:34

## 2023-08-09 RX ADMIN — LABETALOL HYDROCHLORIDE 10 MG: 5 INJECTION INTRAVENOUS at 16:13

## 2023-08-09 RX ADMIN — QUETIAPINE FUMARATE 100 MG: 100 TABLET ORAL at 08:10

## 2023-08-09 RX ADMIN — SENNOSIDES AND DOCUSATE SODIUM 2 TABLET: 50; 8.6 TABLET ORAL at 19:45

## 2023-08-09 RX ADMIN — FENTANYL CITRATE 100 MCG: 50 INJECTION INTRAMUSCULAR; INTRAVENOUS at 23:05

## 2023-08-09 RX ADMIN — FENTANYL CITRATE 100 MCG: 50 INJECTION INTRAMUSCULAR; INTRAVENOUS at 10:14

## 2023-08-09 RX ADMIN — OXYCODONE HYDROCHLORIDE 10 MG: 5 SOLUTION ORAL at 03:38

## 2023-08-09 RX ADMIN — LANSOPRAZOLE 30 MG: 30 TABLET, ORALLY DISINTEGRATING, DELAYED RELEASE ORAL at 05:07

## 2023-08-09 RX ADMIN — INSULIN LISPRO 8 UNITS: 100 INJECTION, SOLUTION INTRAVENOUS; SUBCUTANEOUS at 00:07

## 2023-08-09 RX ADMIN — PETROLATUM: 420 OINTMENT TOPICAL at 23:01

## 2023-08-09 RX ADMIN — METOCLOPRAMIDE HYDROCHLORIDE 10 MG: 5 INJECTION INTRAMUSCULAR; INTRAVENOUS at 16:16

## 2023-08-09 RX ADMIN — LORAZEPAM 1 MG: 1 TABLET ORAL at 10:42

## 2023-08-09 RX ADMIN — CHLORHEXIDINE GLUCONATE 15 ML: 1.2 RINSE ORAL at 08:10

## 2023-08-09 RX ADMIN — SODIUM CHLORIDE, PRESERVATIVE FREE 10 ML: 5 INJECTION INTRAVENOUS at 08:11

## 2023-08-09 RX ADMIN — QUETIAPINE FUMARATE 100 MG: 100 TABLET ORAL at 19:38

## 2023-08-09 RX ADMIN — ENOXAPARIN SODIUM 40 MG: 100 INJECTION SUBCUTANEOUS at 19:38

## 2023-08-09 ASSESSMENT — PAIN DESCRIPTION - ORIENTATION
ORIENTATION: LEFT
ORIENTATION: LEFT

## 2023-08-09 ASSESSMENT — PULMONARY FUNCTION TESTS
PIF_VALUE: 24
PIF_VALUE: 23
PIF_VALUE: 21
PIF_VALUE: 27
PIF_VALUE: 34
PIF_VALUE: 24
PIF_VALUE: 23
PIF_VALUE: 27
PIF_VALUE: 33
PIF_VALUE: 25
PIF_VALUE: 33
PIF_VALUE: 22
PIF_VALUE: 23
PIF_VALUE: 22
PIF_VALUE: 22
PIF_VALUE: 20
PIF_VALUE: 36
PIF_VALUE: 23
PIF_VALUE: 29
PIF_VALUE: 27
PIF_VALUE: 29
PIF_VALUE: 26
PIF_VALUE: 24

## 2023-08-09 ASSESSMENT — PAIN DESCRIPTION - DESCRIPTORS
DESCRIPTORS: ACHING;DISCOMFORT

## 2023-08-09 ASSESSMENT — PAIN DESCRIPTION - LOCATION
LOCATION: JAW
LOCATION: GENERALIZED
LOCATION: JAW
LOCATION: GENERALIZED

## 2023-08-09 ASSESSMENT — PAIN SCALES - GENERAL
PAINLEVEL_OUTOF10: 8
PAINLEVEL_OUTOF10: 2
PAINLEVEL_OUTOF10: 0
PAINLEVEL_OUTOF10: 0
PAINLEVEL_OUTOF10: 5
PAINLEVEL_OUTOF10: 0
PAINLEVEL_OUTOF10: 8
PAINLEVEL_OUTOF10: 0

## 2023-08-09 ASSESSMENT — PAIN - FUNCTIONAL ASSESSMENT: PAIN_FUNCTIONAL_ASSESSMENT: ACTIVITIES ARE NOT PREVENTED

## 2023-08-09 NOTE — PROGRESS NOTES
Unable to find bladder scan. Washed hands with soap and water prior to straight cath. Straight cath patient using sterile technique. First time 800cc of clear karl urine. The second time for 150cc clear karl urine.

## 2023-08-09 NOTE — PROGRESS NOTES
Patient agitated and moving back and forth in bed. Coughing frequently. Fentanyl 100mcg given to patient at this time. Dr. Benny Johnson and Dr. Annamarie Savage made aware. Dr. Benny Johnson to room and assessed patient. No new orders noted.

## 2023-08-09 NOTE — PROGRESS NOTES
Patient agitated and kicking legs over bed. Attempting to get out of bed and trying to pull gown off and tele leads. 1:1 ineffective. Bilateral soft wrist restraints applied. Patient  pulled up in bed prior to soft wrist restraints applied.

## 2023-08-09 NOTE — PROGRESS NOTES
Ripping gown off and trying to get out of bed despite 1:1 with several attempts to redirect patient. Patient confused and talking over trach. Dr. Randa Mendoza aware of behavior. Bilateral soft wrist restraints applied at this time.
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
## 2023-08-09 NOTE — PLAN OF CARE
Problem: Respiratory - Adult  Goal: Achieves optimal ventilation and oxygenation  8/8/2023 2027 by Bozena Barker RCP  Outcome: Progressing

## 2023-08-09 NOTE — PROGRESS NOTES
Patient agitated again and moving back and for the in bed talking over trach. Heart rate in 150's. Dr. Casey Mo and Dr. Calabrese Abhi aware. Fentanyl 100mcg IV given at this time. Patient complaining of stomach pain. Dr. Casey Mo wants staff to calm patient down. Several attempts made to calm patient. Medical resident came in room and was able to calm patient down. Nurse put music on for patient. No new orders noted per Dr. Casey Mo.

## 2023-08-09 NOTE — PLAN OF CARE
Problem: Safety - Medical Restraint  Goal: Remains free of injury from restraints (Restraint for Interference with Medical Device)  Description: INTERVENTIONS:  1. Determine that other, less restrictive measures have been tried or would not be effective before applying the restraint  2. Evaluate the patient's condition at the time of restraint application  3. Inform patient/family regarding the reason for restraint  4.  Q2H: Monitor safety, psychosocial status, comfort, nutrition and hydration  Outcome: Progressing  Flowsheets (Taken 8/9/2023 1517)  Remains free of injury from restraints (restraint for interference with medical device): Every 2 hours: Monitor safety, psychosocial status, comfort, nutrition and hydration
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
## 2023-08-10 ENCOUNTER — APPOINTMENT (OUTPATIENT)
Dept: GENERAL RADIOLOGY | Age: 58
DRG: 004 | End: 2023-08-10
Payer: COMMERCIAL

## 2023-08-10 LAB
AADO2: 303.2 MMHG
ALBUMIN SERPL-MCNC: 2.8 G/DL (ref 3.5–5.2)
ALP SERPL-CCNC: 60 U/L (ref 40–129)
ALT SERPL-CCNC: 11 U/L (ref 0–40)
ANION GAP SERPL CALCULATED.3IONS-SCNC: 16 MMOL/L (ref 7–16)
AST SERPL-CCNC: 23 U/L (ref 0–39)
B.E.: 5.7 MMOL/L (ref -3–3)
BASOPHILS # BLD: 0.04 K/UL (ref 0–0.2)
BASOPHILS NFR BLD: 0 % (ref 0–2)
BILIRUB SERPL-MCNC: 0.3 MG/DL (ref 0–1.2)
BUN SERPL-MCNC: 25 MG/DL (ref 6–20)
CA-I BLD-SCNC: 1.15 MMOL/L (ref 1.15–1.33)
CALCIUM SERPL-MCNC: 8.3 MG/DL (ref 8.6–10.2)
CHLORIDE SERPL-SCNC: 107 MMOL/L (ref 98–107)
CO2 SERPL-SCNC: 25 MMOL/L (ref 22–29)
COHB: 0.3 % (ref 0–1.5)
CREAT SERPL-MCNC: 0.8 MG/DL (ref 0.7–1.2)
CRITICAL: ABNORMAL
DATE ANALYZED: ABNORMAL
DATE OF COLLECTION: ABNORMAL
EOSINOPHIL # BLD: 0.24 K/UL (ref 0.05–0.5)
EOSINOPHILS RELATIVE PERCENT: 2 % (ref 0–6)
ERYTHROCYTE [DISTWIDTH] IN BLOOD BY AUTOMATED COUNT: 14.2 % (ref 11.5–15)
FIO2: 60 %
GFR SERPL CREATININE-BSD FRML MDRD: >60 ML/MIN/1.73M2
GLUCOSE BLD-MCNC: 164 MG/DL (ref 74–99)
GLUCOSE BLD-MCNC: 190 MG/DL (ref 74–99)
GLUCOSE BLD-MCNC: 251 MG/DL (ref 74–99)
GLUCOSE SERPL-MCNC: 150 MG/DL (ref 74–99)
HCO3: 28 MMOL/L (ref 22–26)
HCT VFR BLD AUTO: 26.7 % (ref 37–54)
HGB BLD-MCNC: 8.5 G/DL (ref 12.5–16.5)
HHB: 4.4 % (ref 0–5)
IMM GRANULOCYTES # BLD AUTO: 0.18 K/UL (ref 0–0.58)
IMM GRANULOCYTES NFR BLD: 1 % (ref 0–5)
LAB: ABNORMAL
LYMPHOCYTES NFR BLD: 3.3 K/UL (ref 1.5–4)
LYMPHOCYTES RELATIVE PERCENT: 26 % (ref 20–42)
Lab: ABNORMAL
MAGNESIUM SERPL-MCNC: 1.9 MG/DL (ref 1.6–2.6)
MCH RBC QN AUTO: 29.3 PG (ref 26–35)
MCHC RBC AUTO-ENTMCNC: 31.8 G/DL (ref 32–34.5)
MCV RBC AUTO: 92.1 FL (ref 80–99.9)
METER GLUCOSE: 145 MG/DL (ref 74–99)
METER GLUCOSE: 158 MG/DL (ref 74–99)
METER GLUCOSE: 164 MG/DL (ref 74–99)
METHB: 0.3 % (ref 0–1.5)
MODE: AC
MONOCYTES NFR BLD: 0.95 K/UL (ref 0.1–0.95)
MONOCYTES NFR BLD: 8 % (ref 2–12)
NEUTROPHILS NFR BLD: 63 % (ref 43–80)
NEUTS SEG NFR BLD: 8.03 K/UL (ref 1.8–7.3)
O2 CONTENT: 13.6 ML/DL
O2 SATURATION: 95.6 % (ref 92–98.5)
O2HB: 95 % (ref 94–97)
OPERATOR ID: ABNORMAL
PATIENT TEMP: 37 C
PCO2: 32.4 MMHG (ref 35–45)
PEEP/CPAP: 10 CMH2O
PFO2: 1.23 MMHG/%
PH BLOOD GAS: 7.55 (ref 7.35–7.45)
PHOSPHATE SERPL-MCNC: 2 MG/DL (ref 2.5–4.5)
PLATELET # BLD AUTO: 500 K/UL (ref 130–450)
PMV BLD AUTO: 9.9 FL (ref 7–12)
PO2: 74 MMHG (ref 75–100)
POTASSIUM SERPL-SCNC: 3.4 MMOL/L (ref 3.5–5)
PROT SERPL-MCNC: 5.8 G/DL (ref 6.4–8.3)
RBC # BLD AUTO: 2.9 M/UL (ref 3.8–5.8)
RI(T): 4.1
RR MECHANICAL: 14 B/MIN
SODIUM SERPL-SCNC: 148 MMOL/L (ref 132–146)
SOURCE, BLOOD GAS: ABNORMAL
THB: 10.1 G/DL (ref 11.5–16.5)
TIME ANALYZED: 631
VT MECHANICAL: 480 ML
WBC OTHER # BLD: 12.7 K/UL (ref 4.5–11.5)

## 2023-08-10 PROCEDURE — 2580000003 HC RX 258: Performed by: STUDENT IN AN ORGANIZED HEALTH CARE EDUCATION/TRAINING PROGRAM

## 2023-08-10 PROCEDURE — 6370000000 HC RX 637 (ALT 250 FOR IP): Performed by: STUDENT IN AN ORGANIZED HEALTH CARE EDUCATION/TRAINING PROGRAM

## 2023-08-10 PROCEDURE — 80053 COMPREHEN METABOLIC PANEL: CPT

## 2023-08-10 PROCEDURE — 6370000000 HC RX 637 (ALT 250 FOR IP)

## 2023-08-10 PROCEDURE — 2500000003 HC RX 250 WO HCPCS: Performed by: SURGERY

## 2023-08-10 PROCEDURE — 6360000002 HC RX W HCPCS: Performed by: SURGERY

## 2023-08-10 PROCEDURE — 2580000003 HC RX 258: Performed by: SURGERY

## 2023-08-10 PROCEDURE — 82330 ASSAY OF CALCIUM: CPT

## 2023-08-10 PROCEDURE — 6360000002 HC RX W HCPCS

## 2023-08-10 PROCEDURE — 82805 BLOOD GASES W/O2 SATURATION: CPT

## 2023-08-10 PROCEDURE — 84100 ASSAY OF PHOSPHORUS: CPT

## 2023-08-10 PROCEDURE — 6370000000 HC RX 637 (ALT 250 FOR IP): Performed by: SURGERY

## 2023-08-10 PROCEDURE — 83735 ASSAY OF MAGNESIUM: CPT

## 2023-08-10 PROCEDURE — 6360000002 HC RX W HCPCS: Performed by: STUDENT IN AN ORGANIZED HEALTH CARE EDUCATION/TRAINING PROGRAM

## 2023-08-10 PROCEDURE — 94640 AIRWAY INHALATION TREATMENT: CPT

## 2023-08-10 PROCEDURE — 85025 COMPLETE CBC W/AUTO DIFF WBC: CPT

## 2023-08-10 PROCEDURE — 71045 X-RAY EXAM CHEST 1 VIEW: CPT

## 2023-08-10 PROCEDURE — 99291 CRITICAL CARE FIRST HOUR: CPT | Performed by: SURGERY

## 2023-08-10 PROCEDURE — 82962 GLUCOSE BLOOD TEST: CPT

## 2023-08-10 PROCEDURE — 2000000000 HC ICU R&B

## 2023-08-10 PROCEDURE — 94003 VENT MGMT INPAT SUBQ DAY: CPT

## 2023-08-10 RX ORDER — LACTULOSE 10 G/15ML
20 SOLUTION ORAL 3 TIMES DAILY
Status: DISCONTINUED | OUTPATIENT
Start: 2023-08-10 | End: 2023-08-12

## 2023-08-10 RX ORDER — LORAZEPAM 1 MG/1
1 TABLET ORAL EVERY 4 HOURS
Status: DISCONTINUED | OUTPATIENT
Start: 2023-08-10 | End: 2023-08-13

## 2023-08-10 RX ORDER — MIDAZOLAM HYDROCHLORIDE 2 MG/2ML
2 INJECTION, SOLUTION INTRAMUSCULAR; INTRAVENOUS ONCE
Status: COMPLETED | OUTPATIENT
Start: 2023-08-10 | End: 2023-08-10

## 2023-08-10 RX ORDER — MIDAZOLAM HYDROCHLORIDE 1 MG/ML
INJECTION INTRAMUSCULAR; INTRAVENOUS
Status: COMPLETED
Start: 2023-08-10 | End: 2023-08-10

## 2023-08-10 RX ORDER — IPRATROPIUM BROMIDE AND ALBUTEROL SULFATE 2.5; .5 MG/3ML; MG/3ML
1 SOLUTION RESPIRATORY (INHALATION)
Status: DISCONTINUED | OUTPATIENT
Start: 2023-08-10 | End: 2023-08-25 | Stop reason: HOSPADM

## 2023-08-10 RX ORDER — BISACODYL 10 MG
10 SUPPOSITORY, RECTAL RECTAL EVERY 12 HOURS
Status: DISCONTINUED | OUTPATIENT
Start: 2023-08-10 | End: 2023-08-22

## 2023-08-10 RX ORDER — QUETIAPINE FUMARATE 25 MG/1
50 TABLET, FILM COATED ORAL ONCE
Status: COMPLETED | OUTPATIENT
Start: 2023-08-10 | End: 2023-08-10

## 2023-08-10 RX ORDER — ACETYLCYSTEINE 100 MG/ML
4 SOLUTION ORAL; RESPIRATORY (INHALATION) EVERY 4 HOURS
Status: DISCONTINUED | OUTPATIENT
Start: 2023-08-10 | End: 2023-08-14

## 2023-08-10 RX ORDER — MECOBALAMIN 5000 MCG
10 TABLET,DISINTEGRATING ORAL NIGHTLY
Status: DISCONTINUED | OUTPATIENT
Start: 2023-08-10 | End: 2023-08-25 | Stop reason: HOSPADM

## 2023-08-10 RX ORDER — LORAZEPAM 2 MG/ML
2 INJECTION INTRAMUSCULAR ONCE
Status: COMPLETED | OUTPATIENT
Start: 2023-08-10 | End: 2023-08-10

## 2023-08-10 RX ORDER — FUROSEMIDE 10 MG/ML
20 INJECTION INTRAMUSCULAR; INTRAVENOUS ONCE
Status: COMPLETED | OUTPATIENT
Start: 2023-08-10 | End: 2023-08-10

## 2023-08-10 RX ORDER — LORAZEPAM 2 MG/ML
INJECTION INTRAMUSCULAR
Status: COMPLETED
Start: 2023-08-10 | End: 2023-08-10

## 2023-08-10 RX ADMIN — PETROLATUM: 420 OINTMENT TOPICAL at 21:43

## 2023-08-10 RX ADMIN — PETROLATUM: 420 OINTMENT TOPICAL at 21:44

## 2023-08-10 RX ADMIN — FENTANYL CITRATE 100 MCG: 50 INJECTION INTRAMUSCULAR; INTRAVENOUS at 16:37

## 2023-08-10 RX ADMIN — Medication 500 MG: at 16:24

## 2023-08-10 RX ADMIN — QUETIAPINE FUMARATE 50 MG: 25 TABLET ORAL at 11:07

## 2023-08-10 RX ADMIN — CHLORHEXIDINE GLUCONATE 15 ML: 1.2 RINSE ORAL at 04:27

## 2023-08-10 RX ADMIN — CHLORHEXIDINE GLUCONATE 15 ML: 1.2 RINSE ORAL at 12:21

## 2023-08-10 RX ADMIN — FENTANYL CITRATE 100 MCG: 50 INJECTION INTRAMUSCULAR; INTRAVENOUS at 23:33

## 2023-08-10 RX ADMIN — OXYCODONE HYDROCHLORIDE 10 MG: 5 SOLUTION ORAL at 08:00

## 2023-08-10 RX ADMIN — METOCLOPRAMIDE HYDROCHLORIDE 10 MG: 5 INJECTION INTRAMUSCULAR; INTRAVENOUS at 11:08

## 2023-08-10 RX ADMIN — POLYVINYL ALCOHOL 1 DROP: 14 SOLUTION/ DROPS OPHTHALMIC at 20:00

## 2023-08-10 RX ADMIN — LORAZEPAM 2 MG: 2 INJECTION INTRAMUSCULAR at 07:19

## 2023-08-10 RX ADMIN — Medication 10 ML: at 22:13

## 2023-08-10 RX ADMIN — ACETYLCYSTEINE 400 MG: 100 INHALANT RESPIRATORY (INHALATION) at 11:43

## 2023-08-10 RX ADMIN — CEFEPIME 2000 MG: 2 INJECTION, POWDER, FOR SOLUTION INTRAVENOUS at 13:41

## 2023-08-10 RX ADMIN — LACTULOSE 20 G: 20 SOLUTION ORAL at 20:57

## 2023-08-10 RX ADMIN — QUETIAPINE FUMARATE 150 MG: 100 TABLET ORAL at 20:57

## 2023-08-10 RX ADMIN — POLYETHYLENE GLYCOL 3350 17 G: 17 POWDER, FOR SOLUTION ORAL at 20:58

## 2023-08-10 RX ADMIN — LANSOPRAZOLE 30 MG: 30 TABLET, ORALLY DISINTEGRATING, DELAYED RELEASE ORAL at 06:15

## 2023-08-10 RX ADMIN — CHLORHEXIDINE GLUCONATE 15 ML: 1.2 RINSE ORAL at 08:00

## 2023-08-10 RX ADMIN — Medication 10 ML: at 23:34

## 2023-08-10 RX ADMIN — IPRATROPIUM BROMIDE AND ALBUTEROL SULFATE 1 DOSE: .5; 2.5 SOLUTION RESPIRATORY (INHALATION) at 16:12

## 2023-08-10 RX ADMIN — CEFEPIME 2000 MG: 2 INJECTION, POWDER, FOR SOLUTION INTRAVENOUS at 21:04

## 2023-08-10 RX ADMIN — LORAZEPAM 0.5 MG: 0.5 TABLET ORAL at 00:20

## 2023-08-10 RX ADMIN — LORAZEPAM 0.5 MG: 0.5 TABLET ORAL at 08:00

## 2023-08-10 RX ADMIN — FUROSEMIDE 20 MG: 10 INJECTION, SOLUTION INTRAMUSCULAR; INTRAVENOUS at 11:07

## 2023-08-10 RX ADMIN — LORAZEPAM 1 MG: 1 TABLET ORAL at 12:18

## 2023-08-10 RX ADMIN — INSULIN LISPRO 4 UNITS: 100 INJECTION, SOLUTION INTRAVENOUS; SUBCUTANEOUS at 00:27

## 2023-08-10 RX ADMIN — ACETYLCYSTEINE 400 MG: 100 INHALANT RESPIRATORY (INHALATION) at 16:12

## 2023-08-10 RX ADMIN — BISACODYL 10 MG: 10 SUPPOSITORY RECTAL at 09:30

## 2023-08-10 RX ADMIN — IPRATROPIUM BROMIDE AND ALBUTEROL SULFATE 1 DOSE: .5; 2.5 SOLUTION RESPIRATORY (INHALATION) at 11:43

## 2023-08-10 RX ADMIN — POLYETHYLENE GLYCOL 3350 17 G: 17 POWDER, FOR SOLUTION ORAL at 08:01

## 2023-08-10 RX ADMIN — LORAZEPAM 0.5 MG: 0.5 TABLET ORAL at 04:23

## 2023-08-10 RX ADMIN — SODIUM CHLORIDE, PRESERVATIVE FREE 10 ML: 5 INJECTION INTRAVENOUS at 20:58

## 2023-08-10 RX ADMIN — DEXMEDETOMIDINE 1.3 MCG/KG/HR: 100 INJECTION, SOLUTION INTRAVENOUS at 15:13

## 2023-08-10 RX ADMIN — POTASSIUM PHOSPHATE, MONOBASIC AND POTASSIUM PHOSPHATE, DIBASIC 30 MMOL: 224; 236 INJECTION, SOLUTION, CONCENTRATE INTRAVENOUS at 11:09

## 2023-08-10 RX ADMIN — MIDAZOLAM HYDROCHLORIDE 2 MG: 1 INJECTION, SOLUTION INTRAMUSCULAR; INTRAVENOUS at 04:22

## 2023-08-10 RX ADMIN — INSULIN LISPRO 4 UNITS: 100 INJECTION, SOLUTION INTRAVENOUS; SUBCUTANEOUS at 04:47

## 2023-08-10 RX ADMIN — INSULIN LISPRO 4 UNITS: 100 INJECTION, SOLUTION INTRAVENOUS; SUBCUTANEOUS at 16:28

## 2023-08-10 RX ADMIN — LORAZEPAM 1 MG: 1 TABLET ORAL at 16:23

## 2023-08-10 RX ADMIN — CHLORHEXIDINE GLUCONATE 15 ML: 1.2 RINSE ORAL at 00:21

## 2023-08-10 RX ADMIN — BISACODYL 10 MG: 10 SUPPOSITORY RECTAL at 20:57

## 2023-08-10 RX ADMIN — INSULIN LISPRO 12 UNITS: 100 INJECTION, SOLUTION INTRAVENOUS; SUBCUTANEOUS at 13:38

## 2023-08-10 RX ADMIN — POLYVINYL ALCOHOL 1 DROP: 14 SOLUTION/ DROPS OPHTHALMIC at 04:27

## 2023-08-10 RX ADMIN — PETROLATUM: 420 OINTMENT TOPICAL at 08:17

## 2023-08-10 RX ADMIN — ENOXAPARIN SODIUM 40 MG: 100 INJECTION SUBCUTANEOUS at 08:00

## 2023-08-10 RX ADMIN — POLYVINYL ALCOHOL 1 DROP: 14 SOLUTION/ DROPS OPHTHALMIC at 00:21

## 2023-08-10 RX ADMIN — BUPROPION HYDROCHLORIDE 75 MG: 75 TABLET, FILM COATED ORAL at 20:57

## 2023-08-10 RX ADMIN — IPRATROPIUM BROMIDE AND ALBUTEROL SULFATE 1 DOSE: .5; 2.5 SOLUTION RESPIRATORY (INHALATION) at 19:30

## 2023-08-10 RX ADMIN — DEXMEDETOMIDINE 1.5 MCG/KG/HR: 100 INJECTION, SOLUTION INTRAVENOUS at 09:04

## 2023-08-10 RX ADMIN — Medication 10 MG: at 23:33

## 2023-08-10 RX ADMIN — SODIUM CHLORIDE, PRESERVATIVE FREE 10 ML: 5 INJECTION INTRAVENOUS at 08:01

## 2023-08-10 RX ADMIN — METOCLOPRAMIDE HYDROCHLORIDE 10 MG: 5 INJECTION INTRAMUSCULAR; INTRAVENOUS at 16:24

## 2023-08-10 RX ADMIN — DEXMEDETOMIDINE 1.5 MCG/KG/HR: 100 INJECTION, SOLUTION INTRAVENOUS at 01:55

## 2023-08-10 RX ADMIN — OXYCODONE HYDROCHLORIDE 10 MG: 5 SOLUTION ORAL at 12:18

## 2023-08-10 RX ADMIN — Medication 500 MG: at 12:18

## 2023-08-10 RX ADMIN — OXYCODONE HYDROCHLORIDE 10 MG: 5 SOLUTION ORAL at 20:58

## 2023-08-10 RX ADMIN — POLYVINYL ALCOHOL 1 DROP: 14 SOLUTION/ DROPS OPHTHALMIC at 08:00

## 2023-08-10 RX ADMIN — Medication 10 ML: at 22:40

## 2023-08-10 RX ADMIN — POTASSIUM BICARBONATE 20 MEQ: 782 TABLET, EFFERVESCENT ORAL at 11:07

## 2023-08-10 RX ADMIN — POLYVINYL ALCOHOL 1 DROP: 14 SOLUTION/ DROPS OPHTHALMIC at 12:21

## 2023-08-10 RX ADMIN — FENTANYL CITRATE 100 MCG: 50 INJECTION INTRAMUSCULAR; INTRAVENOUS at 06:33

## 2023-08-10 RX ADMIN — ACETYLCYSTEINE 400 MG: 100 INHALANT RESPIRATORY (INHALATION) at 19:30

## 2023-08-10 RX ADMIN — LORAZEPAM 1 MG: 1 TABLET ORAL at 20:58

## 2023-08-10 RX ADMIN — FENTANYL CITRATE 100 MCG: 50 INJECTION INTRAMUSCULAR; INTRAVENOUS at 19:00

## 2023-08-10 RX ADMIN — OXYCODONE HYDROCHLORIDE 10 MG: 5 SOLUTION ORAL at 04:23

## 2023-08-10 RX ADMIN — MIDAZOLAM HYDROCHLORIDE 2 MG: 1 INJECTION, SOLUTION INTRAMUSCULAR; INTRAVENOUS at 22:40

## 2023-08-10 RX ADMIN — POTASSIUM BICARBONATE 20 MEQ: 782 TABLET, EFFERVESCENT ORAL at 20:58

## 2023-08-10 RX ADMIN — Medication 500 MG: at 20:58

## 2023-08-10 RX ADMIN — LORAZEPAM 2 MG: 2 INJECTION INTRAMUSCULAR; INTRAVENOUS at 07:19

## 2023-08-10 RX ADMIN — POLYVINYL ALCOHOL 1 DROP: 14 SOLUTION/ DROPS OPHTHALMIC at 16:25

## 2023-08-10 RX ADMIN — ACETYLCYSTEINE 400 MG: 100 INHALANT RESPIRATORY (INHALATION) at 08:07

## 2023-08-10 RX ADMIN — IPRATROPIUM BROMIDE AND ALBUTEROL SULFATE 1 DOSE: .5; 2.5 SOLUTION RESPIRATORY (INHALATION) at 08:07

## 2023-08-10 RX ADMIN — MIDAZOLAM HYDROCHLORIDE 2 MG: 2 INJECTION, SOLUTION INTRAMUSCULAR; INTRAVENOUS at 04:22

## 2023-08-10 RX ADMIN — CHLORHEXIDINE GLUCONATE 15 ML: 1.2 RINSE ORAL at 16:24

## 2023-08-10 RX ADMIN — FENTANYL CITRATE 100 MCG: 50 INJECTION INTRAMUSCULAR; INTRAVENOUS at 03:18

## 2023-08-10 RX ADMIN — QUETIAPINE FUMARATE 100 MG: 100 TABLET ORAL at 08:00

## 2023-08-10 RX ADMIN — Medication 10 ML: at 21:34

## 2023-08-10 RX ADMIN — INSULIN LISPRO 4 UNITS: 100 INJECTION, SOLUTION INTRAVENOUS; SUBCUTANEOUS at 20:59

## 2023-08-10 RX ADMIN — CHLORHEXIDINE GLUCONATE 15 ML: 1.2 RINSE ORAL at 20:00

## 2023-08-10 RX ADMIN — FLUOXETINE 20 MG: 10 TABLET, FILM COATED ORAL at 08:10

## 2023-08-10 RX ADMIN — SENNOSIDES AND DOCUSATE SODIUM 2 TABLET: 50; 8.6 TABLET ORAL at 08:00

## 2023-08-10 RX ADMIN — FENTANYL CITRATE 100 MCG: 50 INJECTION INTRAMUSCULAR; INTRAVENOUS at 21:33

## 2023-08-10 RX ADMIN — BUPROPION HYDROCHLORIDE 75 MG: 75 TABLET, FILM COATED ORAL at 08:10

## 2023-08-10 RX ADMIN — DEXMEDETOMIDINE 1.5 MCG/KG/HR: 100 INJECTION, SOLUTION INTRAVENOUS at 22:06

## 2023-08-10 RX ADMIN — METOCLOPRAMIDE HYDROCHLORIDE 10 MG: 5 INJECTION INTRAMUSCULAR; INTRAVENOUS at 04:23

## 2023-08-10 RX ADMIN — ENOXAPARIN SODIUM 40 MG: 100 INJECTION SUBCUTANEOUS at 21:00

## 2023-08-10 RX ADMIN — LACTULOSE 20 G: 20 SOLUTION ORAL at 13:39

## 2023-08-10 RX ADMIN — CEFEPIME 2000 MG: 2 INJECTION, POWDER, FOR SOLUTION INTRAVENOUS at 05:39

## 2023-08-10 RX ADMIN — ONDANSETRON 4 MG: 2 INJECTION INTRAMUSCULAR; INTRAVENOUS at 22:13

## 2023-08-10 RX ADMIN — SENNOSIDES AND DOCUSATE SODIUM 2 TABLET: 50; 8.6 TABLET ORAL at 20:58

## 2023-08-10 RX ADMIN — OXYCODONE HYDROCHLORIDE 10 MG: 5 SOLUTION ORAL at 16:24

## 2023-08-10 RX ADMIN — METOCLOPRAMIDE HYDROCHLORIDE 10 MG: 5 INJECTION INTRAMUSCULAR; INTRAVENOUS at 20:59

## 2023-08-10 RX ADMIN — OXYCODONE HYDROCHLORIDE 10 MG: 5 SOLUTION ORAL at 00:20

## 2023-08-10 ASSESSMENT — PAIN SCALES - GENERAL
PAINLEVEL_OUTOF10: 0
PAINLEVEL_OUTOF10: 8
PAINLEVEL_OUTOF10: 3
PAINLEVEL_OUTOF10: 8
PAINLEVEL_OUTOF10: 4
PAINLEVEL_OUTOF10: 8
PAINLEVEL_OUTOF10: 8
PAINLEVEL_OUTOF10: 2
PAINLEVEL_OUTOF10: 0
PAINLEVEL_OUTOF10: 8
PAINLEVEL_OUTOF10: 3

## 2023-08-10 ASSESSMENT — PULMONARY FUNCTION TESTS
PIF_VALUE: 26
PIF_VALUE: 32
PIF_VALUE: 25
PIF_VALUE: 21
PIF_VALUE: 25
PIF_VALUE: 24
PIF_VALUE: 23
PIF_VALUE: 22
PIF_VALUE: 28
PIF_VALUE: 30
PIF_VALUE: 24
PIF_VALUE: 23
PIF_VALUE: 37
PIF_VALUE: 30
PIF_VALUE: 22
PIF_VALUE: 23
PIF_VALUE: 25
PIF_VALUE: 25
PIF_VALUE: 24
PIF_VALUE: 31
PIF_VALUE: 23
PIF_VALUE: 25
PIF_VALUE: 24
PIF_VALUE: 27
PIF_VALUE: 23
PIF_VALUE: 22

## 2023-08-10 ASSESSMENT — PAIN - FUNCTIONAL ASSESSMENT
PAIN_FUNCTIONAL_ASSESSMENT: PREVENTS OR INTERFERES WITH ALL ACTIVE AND SOME PASSIVE ACTIVITIES
PAIN_FUNCTIONAL_ASSESSMENT: PREVENTS OR INTERFERES SOME ACTIVE ACTIVITIES AND ADLS
PAIN_FUNCTIONAL_ASSESSMENT: PREVENTS OR INTERFERES SOME ACTIVE ACTIVITIES AND ADLS

## 2023-08-10 ASSESSMENT — PAIN SCALES - WONG BAKER: WONGBAKER_NUMERICALRESPONSE: 0

## 2023-08-10 NOTE — PLAN OF CARE
Problem: Safety - Medical Restraint  Goal: Remains free of injury from restraints (Restraint for Interference with Medical Device)  Description: INTERVENTIONS:  1. Determine that other, less restrictive measures have been tried or would not be effective before applying the restraint  2. Evaluate the patient's condition at the time of restraint application  3. Inform patient/family regarding the reason for restraint  4.  Q2H: Monitor safety, psychosocial status, comfort, nutrition and hydration  8/10/2023 1135 by Anali Paniagua RN  Outcome: Progressing

## 2023-08-10 NOTE — DISCHARGE INSTR - COC
Continuity of Care Form    Patient Name: Amna Stewart   :  1965  MRN:  [id number]    Admit date:  2023  Discharge date:  2023    Code Status Order: Full Code   Advance Directives:     Admitting Physician:  Thom Quintana MD  PCP: No primary care provider on file. Discharging Nurse: NFrannie [address] Unit/Room#: 3806/3806-A  Discharging Unit Phone Number: [phone]    Emergency Contact:   Extended Emergency Contact Information  Primary Emergency Contact: aravind harvey  Mobile Phone: [phone]  Relation: Brother/Sister  Preferred language: English   needed? No  Secondary Emergency Contact: Berenice Harvey  Mobile Phone: [phone]  Relation: Child  Preferred language: English   needed? No    Past Surgical History:  Past Surgical History:   Procedure Laterality Date    MANDIBLE SURGERY Left 2023    LEFT PARASYNTHESIS MANDIBLE OPEN REDUCTION INTERNAL FIXATION WITH PLATES AND SCREWS MAXILLOMANDIBULAR FIXATION AND GINGIVOBUCCAL FLAP CLOSURE OF DEFECT performed by Francisco Farley MD at [facility] N/A 2023    TRACHEOTOMY PERCUTANEOUS BRONCHOSCOPY AT BEDSIDE performed by Miri Acharya MD at [address] [place] [address] N/A 2023    EGD ESOPHAGOGASTRODUODENOSCOPY PEG TUBE INSERTION BEDSIDE performed by Miri Acharya MD at [address] [facility]       Immunization History: There is no immunization history on file for this patient.     Active Problems:  Patient Active Problem List   Diagnosis Code    Trauma T14.90XA    Open fracture of left ramus of mandible ([address]) S02.642B    Acute respiratory failure (HCC) J96.00    Hypoalbuminemia E88.09    Electrolyte imbalance E87.8    Hypocalcemia E83.51    Hypomagnesemia E83.42    Open fracture of body of mandible ([facility]) S02.600B    Acute blood loss anemia D62    Delirium R41.0    Acute pain R52    Recurrent major depressive disorder ([facility]) F33.9    Hypophosphatemia E83.39 desaturation, increased seroquel and given additional versed. 8/6: CTA chest and CT head negative. Lasix x1 8/7: Home Seroquel increased. Ordered viral resp panel. Decreased bowel regimen secondary to 6 BM. Oxy increased and Pepcid changed to PPI. 8/8: OR today with Dr. Keila García for Mandible fx. 8/9: Discontinued Precedex. Agitation has improved. Spontaneous breathing trials. 8/10: Spontaneous breathing trials, wean Precedex. 8/11: Worsening CXR, febrile and desaturation overnight, Bronchoscopy with only small amount of lower lung secretions/mucus plugging. KUB for abd distention. CTA pulm and CT abd/pelvis ordered secondary to KUB findings. PEG placed to gravity. 8/12: Pt did have 8 beats of Vtach this morning as well as bright red bloody secretions from ET tube suctioning. EKG unchanged from 8/4/23; troponin of 40, repeating. ECHO ordered, normal. 8/13: Patient remains intermittently agitated. Increased ativan and seroquel. TF at goal with multiple bowel movements. 8/14: Repeat resp cx. Discontinued Precedex. Awaiting placement. 8/15: Patient continues to have increased Fi02 requirements and is febrile. Do not feel he is appropriate for placement at this time. Bronchoscopy last night with dark brown secretions. Resp cx pending. Keep PEG to gravity until bowel movements increase and he has decreased abdominal distention. 8/16:Patient remains febrile, continued on Cefepime. Rsp cx grew serratia and staph - started Vancomycin. PEG remains to gravity, awaiting return of bowel function. Tube feeds held. Abdominal distention improved. IV protonix started yesterday. Continues to get agitated, currently on 1.5 Precedex and required all PRNs for agitation overnight. 8/17: still with intermittent fevers. Some agitation overnight. 8/20: no acute events. Fever curve improving. Tolerating TF 8/21: ENT to see today for left buccal hemorrhage.  8/22: OR for left buccal exploration under anesthesia 8/23: Patient required 1uRBC this
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
## 2023-08-10 NOTE — FLOWSHEET NOTE
Pt agitated and restless, pulling at lines/tubes, kicking legs out of bed, failed to redirect, bilateral soft wrist restraints continued for patient safety
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
## 2023-08-10 NOTE — CARE COORDINATION
8/10 Care Coordination: Pt remains in SICU. Cont on vent, Trach/Peg. Went to OR 8/8 with Dr. Obinna Cummings repair of mandible fx. Cont wean Precedex, had to restart for increased agitation. Plan is [facility], precert was started. Spoke with his daughter Bunny Mejia who agreed on [facility]. CM/SW will continue to follow for discharge planning.    Dheeraj SIMSN,RN-CV-BC  [phone]

## 2023-08-10 NOTE — PLAN OF CARE
Problem: Skin/Tissue Integrity  Goal: Absence of new skin breakdown  Description: 1. Monitor for areas of redness and/or skin breakdown  2. Assess vascular access sites hourly  3. Every 4-6 hours minimum:  Change oxygen saturation probe site  4. Every 4-6 hours:  If on nasal continuous positive airway pressure, respiratory therapy assess nares and determine need for appliance change or resting period. Outcome: Progressing     Problem: Safety - Adult  Goal: Free from fall injury  Outcome: Progressing     Problem: ABCDS Injury Assessment  Goal: Absence of physical injury  Outcome: Progressing     Problem: Respiratory - Adult  Goal: Achieves optimal ventilation and oxygenation  8/10/2023 1806 by Catrina Aburto RN  Outcome: Progressing     Problem: Gastrointestinal - Adult  Goal: Minimal or absence of nausea and vomiting  Outcome: Progressing     Problem: Genitourinary - Adult  Goal: Absence of urinary retention  Outcome: Progressing     Problem: Nutrition Deficit:  Goal: Optimize nutritional status  Outcome: Progressing     Problem: Safety - Medical Restraint  Goal: Remains free of injury from restraints (Restraint for Interference with Medical Device)  Description: INTERVENTIONS:  1. Determine that other, less restrictive measures have been tried or would not be effective before applying the restraint  2. Evaluate the patient's condition at the time of restraint application  3. Inform patient/family regarding the reason for restraint  4.  Q2H: Monitor safety, psychosocial status, comfort, nutrition and hydration  8/10/2023 1806 by Catrina Aburto RN  Outcome: Progressing  Flowsheets (Taken 8/10/2023 1600)  Remains free of injury from restraints (restraint for interference with medical device): Every 2 hours: Monitor safety, psychosocial status, comfort, nutrition and hydration

## 2023-08-10 NOTE — PLAN OF CARE
Problem: Discharge Planning  Goal: Discharge to home or other facility with appropriate resources  Outcome: Progressing     Problem: Pain  Goal: Verbalizes/displays adequate comfort level or baseline comfort level  Outcome: Progressing     Problem: Skin/Tissue Integrity  Goal: Absence of new skin breakdown  Description: 1. Monitor for areas of redness and/or skin breakdown  2. Assess vascular access sites hourly  3. Every 4-6 hours minimum:  Change oxygen saturation probe site  4. Every 4-6 hours:  If on nasal continuous positive airway pressure, respiratory therapy assess nares and determine need for appliance change or resting period.   Outcome: Progressing     Problem: Safety - Adult  Goal: Free from fall injury  Outcome: Progressing     Problem: ABCDS Injury Assessment  Goal: Absence of physical injury  Outcome: Progressing     Problem: Neurosensory - Adult  Goal: Achieves stable or improved neurological status  Outcome: Progressing  Goal: Achieves maximal functionality and self care  Outcome: Progressing     Problem: Respiratory - Adult  Goal: Achieves optimal ventilation and oxygenation  Outcome: Progressing     Problem: Cardiovascular - Adult  Goal: Maintains optimal cardiac output and hemodynamic stability  Outcome: Progressing  Goal: Absence of cardiac dysrhythmias or at baseline  Outcome: Progressing     Problem: Skin/Tissue Integrity - Adult  Goal: Skin integrity remains intact  Outcome: Progressing  Flowsheets (Taken 8/10/2023 0211)  Skin Integrity Remains Intact:   Monitor for areas of redness and/or skin breakdown   Assess vascular access sites hourly   Every 4-6 hours minimum: Change oxygen saturation probe site   Every 4-6 hours: If on nasal continuous positive airway pressure, respiratory therapy assesses nares and determine need for appliance change or resting period  Goal: Incisions, wounds, or drain sites healing without S/S of infection  Outcome: Progressing  Flowsheets (Taken 8/10/2023 Administer medication as ordered  2. Teach and rehearse alternative coping skills  3. Provide emotional support with 1:1 interaction with staff  Outcome: Progressing     Problem: Coping  Goal: Pt/Family able to verbalize concerns and demonstrate effective coping strategies  Description: INTERVENTIONS:  1. Assist patient/family to identify coping skills, available support systems and cultural and spiritual values  2. Provide emotional support, including active listening and acknowledgement of concerns of patient and caregivers  3. Reduce environmental stimuli, as able  4. Instruct patient/family in relaxation techniques, as appropriate  5. Assess for spiritual pain/suffering and initiate Spiritual Care, Psychosocial Clinical Specialist consults as needed  Outcome: Progressing     Problem: Decision Making  Goal: Pt/Family able to effectively weigh alternatives and participate in decision making related to treatment and care  Description: INTERVENTIONS:  1. Determine when there are differences between patient's view, family's view, and healthcare provider's view of condition  2. Facilitate patient and family articulation of goals for care  3. Help patient and family identify pros/cons of alternative solutions  4. Provide information as requested by patient/family  5. Respect patient/family right to receive or not to receive information  6. Serve as a liaison between patient and family and health care team  7. Initiate Consults from Ethics, Palliative Care or initiate [phone] as is appropriate  Outcome: Progressing     Problem: Nutrition Deficit:  Goal: Optimize nutritional status  Outcome: Progressing     Problem: Safety - Medical Restraint  Goal: Remains free of injury from restraints (Restraint for Interference with Medical Device)  Description: INTERVENTIONS:  1. Determine that other, less restrictive measures have been tried or would not be effective before applying the restraint  2.  Evaluate the

## 2023-08-10 NOTE — FLOWSHEET NOTE
Patient continues to reach for critical lines/tubes when released from restraints, despite verbal redirection and education. Patient remains in bilateral soft wrist restraints for safety.

## 2023-08-10 NOTE — PLAN OF CARE
Problem: Respiratory - Adult  Goal: Achieves optimal ventilation and oxygenation  8/10/2023 0905 by Allie Colvin RCP  Outcome: Progressing  8/10/2023 0212 by Meli Hoang RN  Outcome: Progressing

## 2023-08-10 NOTE — PROGRESS NOTES
08/10/23 0807   Patient Observation   Pulse 73   Respirations 18   SpO2 95 %   Breath Sounds   Right Upper Lobe Crackles   Right Middle Lobe Crackles   Right Lower Lobe Crackles   Left Upper Lobe Crackles   Left Lower Lobe Crackles   Vent Information   Ventilator ID [id number]   Vent Mode AC/PRVC   $Ventilation $Subsequent Day   Ventilator Settings   FiO2  60 %   Insp Time (sec) 0.8 sec   Vt (Set, mL) 460 mL   Resp Rate (Set) 14 bmp   PEEP/CPAP (cmH2O) 10   Pressure Support (cm H2O) 0 cm H2O   Vent Patient Data (Readings)   Vt (Measured) 384 mL   Peak Inspiratory Pressure (cmH2O) 25 cmH2O   Rate Measured 14 br/min   Minute Volume (L/min) 6.27 Liters   Mean Airway Pressure (cmH2O) 13 cmH20   Plateau Pressure (cm H2O) 23 cm H2O   Driving Pressure 13   Inspiratory Time 0.8 sec   I:E Ratio 1:4.40   Flow Sensitivity 3 L/min   PEEP Intrinsic (cm H2O) 0 cm H2O   Static Compliance (L/cm H2O) 40   I Time/ I Time % 0.8 s   Backup Apnea On   Backup Rate 14 Breaths Per Minute   Backup Vt 460   Vent Alarm Settings   Low Pressure (cmH2O) 13.5 cmH2O   High Pressure (cmH2O) 50 cmH2O   Low Minute Volume (lpm) 5 L/min   High Minute Volume (lpm) 20 L/min   Low Exhaled Vt (ml) 360 mL   High Exhaled Vt (ml) 900 mL   RR High (bpm) 35 br/min   Apnea (secs) 20 secs   Additional Respiratoray Assessments   Humidification Source Heated wire   Humidification Temp 37.6   Ambu Bag With Mask At Bedside Yes   Backup Trachs Available (Size) 6.0   Surgical Airway (Trach)   No placement date or time found.    Present on Admission/Arrival: No  Surgical Airway Type: Tracheostomy   Status Secured

## 2023-08-10 NOTE — PROGRESS NOTES
Pt thrashing around in bed, attempting to sit up, while repositioning, this RN and ICU staff at bedside, pt swatting and attempting to grab staff members, unable to redirect, precedex increased per order, [facility] made aware, orders received

## 2023-08-11 ENCOUNTER — APPOINTMENT (OUTPATIENT)
Dept: GENERAL RADIOLOGY | Age: 58
DRG: 004 | End: 2023-08-11
Payer: COMMERCIAL

## 2023-08-11 ENCOUNTER — APPOINTMENT (OUTPATIENT)
Dept: CT IMAGING | Age: 58
DRG: 004 | End: 2023-08-11
Payer: COMMERCIAL

## 2023-08-11 LAB
AADO2: 171.3 MMHG
AADO2: 223.5 MMHG
ALBUMIN SERPL-MCNC: 2.6 G/DL (ref 3.5–5.2)
ALP SERPL-CCNC: 62 U/L (ref 40–129)
ALT SERPL-CCNC: 11 U/L (ref 0–40)
ANION GAP SERPL CALCULATED.3IONS-SCNC: 11 MMOL/L (ref 7–16)
AST SERPL-CCNC: 19 U/L (ref 0–39)
B.E.: 4.5 MMOL/L (ref -3–3)
B.E.: 5.7 MMOL/L (ref -3–3)
BASOPHILS # BLD: 0.05 K/UL (ref 0–0.2)
BASOPHILS NFR BLD: 0 % (ref 0–2)
BILIRUB SERPL-MCNC: 0.3 MG/DL (ref 0–1.2)
BUN SERPL-MCNC: 23 MG/DL (ref 6–20)
CALCIUM SERPL-MCNC: 8.3 MG/DL (ref 8.6–10.2)
CHLORIDE SERPL-SCNC: 104 MMOL/L (ref 98–107)
CO2 SERPL-SCNC: 29 MMOL/L (ref 22–29)
COHB: 0.3 % (ref 0–1.5)
COHB: 0.3 % (ref 0–1.5)
CREAT SERPL-MCNC: 0.8 MG/DL (ref 0.7–1.2)
CRITICAL: ABNORMAL
CRITICAL: ABNORMAL
DATE ANALYZED: ABNORMAL
DATE ANALYZED: ABNORMAL
DATE OF COLLECTION: ABNORMAL
DATE OF COLLECTION: ABNORMAL
EOSINOPHIL # BLD: 0.55 K/UL (ref 0.05–0.5)
EOSINOPHILS RELATIVE PERCENT: 4 % (ref 0–6)
ERYTHROCYTE [DISTWIDTH] IN BLOOD BY AUTOMATED COUNT: 14.3 % (ref 11.5–15)
FIO2: 40 %
FIO2: 50 %
GFR SERPL CREATININE-BSD FRML MDRD: >60 ML/MIN/1.73M2
GLUCOSE BLD-MCNC: 160 MG/DL (ref 74–99)
GLUCOSE BLD-MCNC: 170 MG/DL (ref 74–99)
GLUCOSE BLD-MCNC: 175 MG/DL (ref 74–99)
GLUCOSE BLD-MCNC: 193 MG/DL (ref 74–99)
GLUCOSE BLD-MCNC: 206 MG/DL (ref 74–99)
GLUCOSE BLD-MCNC: 235 MG/DL (ref 74–99)
GLUCOSE BLD-MCNC: 236 MG/DL (ref 74–99)
GLUCOSE SERPL-MCNC: 161 MG/DL (ref 74–99)
HCO3: 28.6 MMOL/L (ref 22–26)
HCO3: 29.8 MMOL/L (ref 22–26)
HCT VFR BLD AUTO: 25.6 % (ref 37–54)
HGB BLD-MCNC: 8.3 G/DL (ref 12.5–16.5)
HHB: 10.5 % (ref 0–5)
HHB: 5.6 % (ref 0–5)
IMM GRANULOCYTES # BLD AUTO: 0.19 K/UL (ref 0–0.58)
IMM GRANULOCYTES NFR BLD: 1 % (ref 0–5)
LAB: ABNORMAL
LAB: ABNORMAL
LYMPHOCYTES NFR BLD: 3 K/UL (ref 1.5–4)
LYMPHOCYTES RELATIVE PERCENT: 23 % (ref 20–42)
Lab: ABNORMAL
Lab: ABNORMAL
MCH RBC QN AUTO: 29.9 PG (ref 26–35)
MCHC RBC AUTO-ENTMCNC: 32.4 G/DL (ref 32–34.5)
MCV RBC AUTO: 92.1 FL (ref 80–99.9)
METHB: 0.1 % (ref 0–1.5)
METHB: 0.2 % (ref 0–1.5)
MODE: AC
MODE: AC
MONOCYTES NFR BLD: 0.96 K/UL (ref 0.1–0.95)
MONOCYTES NFR BLD: 7 % (ref 2–12)
NEUTROPHILS NFR BLD: 64 % (ref 43–80)
NEUTS SEG NFR BLD: 8.49 K/UL (ref 1.8–7.3)
O2 CONTENT: 11.9 ML/DL
O2 CONTENT: 13 ML/DL
O2 SATURATION: 89.5 % (ref 92–98.5)
O2 SATURATION: 94.4 % (ref 92–98.5)
O2HB: 89.1 % (ref 94–97)
O2HB: 93.9 % (ref 94–97)
OPERATOR ID: 2962
OPERATOR ID: 2962
PATIENT TEMP: 37 C
PATIENT TEMP: 37 C
PCO2: 41.1 MMHG (ref 35–45)
PCO2: 41.2 MMHG (ref 35–45)
PEEP/CPAP: 10 CMH2O
PEEP/CPAP: 10 CMH2O
PFO2: 1.41 MMHG/%
PFO2: 1.49 MMHG/%
PH BLOOD GAS: 7.46 (ref 7.35–7.45)
PH BLOOD GAS: 7.48 (ref 7.35–7.45)
PHOSPHATE SERPL-MCNC: 2.9 MG/DL (ref 2.5–4.5)
PLATELET # BLD AUTO: 470 K/UL (ref 130–450)
PMV BLD AUTO: 9.7 FL (ref 7–12)
PO2: 56.5 MMHG (ref 75–100)
PO2: 74.3 MMHG (ref 75–100)
POTASSIUM SERPL-SCNC: 3.9 MMOL/L (ref 3.5–5)
PROT SERPL-MCNC: 5.5 G/DL (ref 6.4–8.3)
RBC # BLD AUTO: 2.78 M/UL (ref 3.8–5.8)
RI(T): 3.01
RI(T): 3.03
RR MECHANICAL: 14 B/MIN
RR MECHANICAL: 14 B/MIN
SODIUM SERPL-SCNC: 144 MMOL/L (ref 132–146)
SOURCE, BLOOD GAS: ABNORMAL
SOURCE, BLOOD GAS: ABNORMAL
THB: 9.5 G/DL (ref 11.5–16.5)
THB: 9.8 G/DL (ref 11.5–16.5)
TIME ANALYZED: 314
TIME ANALYZED: 613
VT MECHANICAL: 460 ML
VT MECHANICAL: 460 ML
WBC OTHER # BLD: 13.2 K/UL (ref 4.5–11.5)

## 2023-08-11 PROCEDURE — 94003 VENT MGMT INPAT SUBQ DAY: CPT

## 2023-08-11 PROCEDURE — 6360000002 HC RX W HCPCS: Performed by: SURGERY

## 2023-08-11 PROCEDURE — 71275 CT ANGIOGRAPHY CHEST: CPT

## 2023-08-11 PROCEDURE — 2500000003 HC RX 250 WO HCPCS: Performed by: SURGERY

## 2023-08-11 PROCEDURE — 31646 BRNCHSC W/THER ASPIR SBSQ: CPT | Performed by: SURGERY

## 2023-08-11 PROCEDURE — 2580000003 HC RX 258: Performed by: STUDENT IN AN ORGANIZED HEALTH CARE EDUCATION/TRAINING PROGRAM

## 2023-08-11 PROCEDURE — 74177 CT ABD & PELVIS W/CONTRAST: CPT

## 2023-08-11 PROCEDURE — 74018 RADEX ABDOMEN 1 VIEW: CPT

## 2023-08-11 PROCEDURE — 6370000000 HC RX 637 (ALT 250 FOR IP)

## 2023-08-11 PROCEDURE — 84100 ASSAY OF PHOSPHORUS: CPT

## 2023-08-11 PROCEDURE — 6360000002 HC RX W HCPCS

## 2023-08-11 PROCEDURE — 71045 X-RAY EXAM CHEST 1 VIEW: CPT

## 2023-08-11 PROCEDURE — 2580000003 HC RX 258

## 2023-08-11 PROCEDURE — 6370000000 HC RX 637 (ALT 250 FOR IP): Performed by: SURGERY

## 2023-08-11 PROCEDURE — 0B938ZZ DRAINAGE OF RIGHT MAIN BRONCHUS, VIA NATURAL OR ARTIFICIAL OPENING ENDOSCOPIC: ICD-10-PCS | Performed by: SURGERY

## 2023-08-11 PROCEDURE — 82805 BLOOD GASES W/O2 SATURATION: CPT

## 2023-08-11 PROCEDURE — 2580000003 HC RX 258: Performed by: SURGERY

## 2023-08-11 PROCEDURE — 80053 COMPREHEN METABOLIC PANEL: CPT

## 2023-08-11 PROCEDURE — 6370000000 HC RX 637 (ALT 250 FOR IP): Performed by: STUDENT IN AN ORGANIZED HEALTH CARE EDUCATION/TRAINING PROGRAM

## 2023-08-11 PROCEDURE — 94640 AIRWAY INHALATION TREATMENT: CPT

## 2023-08-11 PROCEDURE — 2000000000 HC ICU R&B

## 2023-08-11 PROCEDURE — 2500000003 HC RX 250 WO HCPCS

## 2023-08-11 PROCEDURE — 99291 CRITICAL CARE FIRST HOUR: CPT | Performed by: SURGERY

## 2023-08-11 PROCEDURE — 82962 GLUCOSE BLOOD TEST: CPT

## 2023-08-11 PROCEDURE — 85025 COMPLETE CBC W/AUTO DIFF WBC: CPT

## 2023-08-11 PROCEDURE — 0B978ZZ DRAINAGE OF LEFT MAIN BRONCHUS, VIA NATURAL OR ARTIFICIAL OPENING ENDOSCOPIC: ICD-10-PCS | Performed by: SURGERY

## 2023-08-11 PROCEDURE — 6360000004 HC RX CONTRAST MEDICATION: Performed by: RADIOLOGY

## 2023-08-11 RX ORDER — VECURONIUM BROMIDE 1 MG/ML
10 INJECTION, POWDER, LYOPHILIZED, FOR SOLUTION INTRAVENOUS ONCE
Status: COMPLETED | OUTPATIENT
Start: 2023-08-11 | End: 2023-08-11

## 2023-08-11 RX ORDER — MIDAZOLAM HYDROCHLORIDE 2 MG/2ML
4 INJECTION, SOLUTION INTRAMUSCULAR; INTRAVENOUS
Status: COMPLETED | OUTPATIENT
Start: 2023-08-11 | End: 2023-08-11

## 2023-08-11 RX ORDER — WATER 1000 ML/1000ML
INJECTION, SOLUTION INTRAVENOUS
Status: COMPLETED
Start: 2023-08-11 | End: 2023-08-11

## 2023-08-11 RX ORDER — OXYCODONE HCL 5 MG/5 ML
15 SOLUTION, ORAL ORAL EVERY 4 HOURS
Status: DISCONTINUED | OUTPATIENT
Start: 2023-08-11 | End: 2023-08-25 | Stop reason: HOSPADM

## 2023-08-11 RX ORDER — FUROSEMIDE 10 MG/ML
20 INJECTION INTRAMUSCULAR; INTRAVENOUS ONCE
Status: COMPLETED | OUTPATIENT
Start: 2023-08-11 | End: 2023-08-11

## 2023-08-11 RX ORDER — FENTANYL CITRATE 50 UG/ML
200 INJECTION, SOLUTION INTRAMUSCULAR; INTRAVENOUS
Status: COMPLETED | OUTPATIENT
Start: 2023-08-11 | End: 2023-08-11

## 2023-08-11 RX ORDER — MIDAZOLAM HYDROCHLORIDE 2 MG/2ML
2 INJECTION, SOLUTION INTRAMUSCULAR; INTRAVENOUS ONCE
Status: COMPLETED | OUTPATIENT
Start: 2023-08-11 | End: 2023-08-11

## 2023-08-11 RX ADMIN — SODIUM CHLORIDE 25 ML: 9 INJECTION, SOLUTION INTRAVENOUS at 01:28

## 2023-08-11 RX ADMIN — CHLORHEXIDINE GLUCONATE 15 ML: 1.2 RINSE ORAL at 11:39

## 2023-08-11 RX ADMIN — Medication 250 MG: at 20:16

## 2023-08-11 RX ADMIN — LANSOPRAZOLE 30 MG: 30 TABLET, ORALLY DISINTEGRATING, DELAYED RELEASE ORAL at 08:21

## 2023-08-11 RX ADMIN — LORAZEPAM 1 MG: 1 TABLET ORAL at 00:03

## 2023-08-11 RX ADMIN — METOCLOPRAMIDE HYDROCHLORIDE 10 MG: 5 INJECTION INTRAMUSCULAR; INTRAVENOUS at 16:17

## 2023-08-11 RX ADMIN — NALOXEGOL OXALATE 12.5 MG: 12.5 TABLET, FILM COATED ORAL at 08:59

## 2023-08-11 RX ADMIN — Medication 250 MG: at 11:38

## 2023-08-11 RX ADMIN — CHLORHEXIDINE GLUCONATE 15 ML: 1.2 RINSE ORAL at 08:17

## 2023-08-11 RX ADMIN — ACETYLCYSTEINE 400 MG: 100 INHALANT RESPIRATORY (INHALATION) at 11:53

## 2023-08-11 RX ADMIN — CHLORHEXIDINE GLUCONATE 15 ML: 1.2 RINSE ORAL at 00:59

## 2023-08-11 RX ADMIN — NYSTATIN 500000 UNITS: 100000 SUSPENSION ORAL at 20:15

## 2023-08-11 RX ADMIN — BUPROPION HYDROCHLORIDE 75 MG: 75 TABLET, FILM COATED ORAL at 08:24

## 2023-08-11 RX ADMIN — ACETYLCYSTEINE 400 MG: 100 INHALANT RESPIRATORY (INHALATION) at 16:52

## 2023-08-11 RX ADMIN — OXYCODONE HYDROCHLORIDE 15 MG: 5 SOLUTION ORAL at 15:45

## 2023-08-11 RX ADMIN — WATER: 1 INJECTION INTRAMUSCULAR; INTRAVENOUS; SUBCUTANEOUS at 13:15

## 2023-08-11 RX ADMIN — INSULIN LISPRO 4 UNITS: 100 INJECTION, SOLUTION INTRAVENOUS; SUBCUTANEOUS at 03:51

## 2023-08-11 RX ADMIN — IPRATROPIUM BROMIDE AND ALBUTEROL SULFATE 1 DOSE: .5; 2.5 SOLUTION RESPIRATORY (INHALATION) at 03:10

## 2023-08-11 RX ADMIN — Medication 250 MG: at 16:20

## 2023-08-11 RX ADMIN — FUROSEMIDE 20 MG: 10 INJECTION, SOLUTION INTRAMUSCULAR; INTRAVENOUS at 08:58

## 2023-08-11 RX ADMIN — PETROLATUM: 420 OINTMENT TOPICAL at 20:17

## 2023-08-11 RX ADMIN — LORAZEPAM 1 MG: 1 TABLET ORAL at 23:38

## 2023-08-11 RX ADMIN — MIDAZOLAM HYDROCHLORIDE 2 MG: 1 INJECTION, SOLUTION INTRAMUSCULAR; INTRAVENOUS at 13:15

## 2023-08-11 RX ADMIN — POLYVINYL ALCOHOL 1 DROP: 14 SOLUTION/ DROPS OPHTHALMIC at 01:00

## 2023-08-11 RX ADMIN — INSULIN LISPRO 8 UNITS: 100 INJECTION, SOLUTION INTRAVENOUS; SUBCUTANEOUS at 16:33

## 2023-08-11 RX ADMIN — INSULIN LISPRO 4 UNITS: 100 INJECTION, SOLUTION INTRAVENOUS; SUBCUTANEOUS at 20:23

## 2023-08-11 RX ADMIN — LORAZEPAM 1 MG: 1 TABLET ORAL at 11:39

## 2023-08-11 RX ADMIN — OXYCODONE HYDROCHLORIDE 15 MG: 5 SOLUTION ORAL at 23:38

## 2023-08-11 RX ADMIN — LACTULOSE 20 G: 20 SOLUTION ORAL at 15:45

## 2023-08-11 RX ADMIN — POLYETHYLENE GLYCOL 3350 17 G: 17 POWDER, FOR SOLUTION ORAL at 20:16

## 2023-08-11 RX ADMIN — VECURONIUM BROMIDE 10 MG: 1 INJECTION, POWDER, LYOPHILIZED, FOR SOLUTION INTRAVENOUS at 13:12

## 2023-08-11 RX ADMIN — Medication 10 MG: at 20:15

## 2023-08-11 RX ADMIN — POLYVINYL ALCOHOL 1 DROP: 14 SOLUTION/ DROPS OPHTHALMIC at 08:18

## 2023-08-11 RX ADMIN — BUPROPION HYDROCHLORIDE 75 MG: 75 TABLET, FILM COATED ORAL at 20:15

## 2023-08-11 RX ADMIN — ENOXAPARIN SODIUM 40 MG: 100 INJECTION SUBCUTANEOUS at 08:39

## 2023-08-11 RX ADMIN — SENNOSIDES AND DOCUSATE SODIUM 2 TABLET: 50; 8.6 TABLET ORAL at 20:16

## 2023-08-11 RX ADMIN — POLYETHYLENE GLYCOL 3350 17 G: 17 POWDER, FOR SOLUTION ORAL at 08:18

## 2023-08-11 RX ADMIN — DEXMEDETOMIDINE 1.5 MCG/KG/HR: 100 INJECTION, SOLUTION INTRAVENOUS at 18:10

## 2023-08-11 RX ADMIN — POLYVINYL ALCOHOL 1 DROP: 14 SOLUTION/ DROPS OPHTHALMIC at 20:15

## 2023-08-11 RX ADMIN — MIDAZOLAM HYDROCHLORIDE 4 MG: 1 INJECTION, SOLUTION INTRAMUSCULAR; INTRAVENOUS at 16:19

## 2023-08-11 RX ADMIN — Medication 10 ML: at 01:30

## 2023-08-11 RX ADMIN — BISACODYL 10 MG: 10 SUPPOSITORY RECTAL at 20:15

## 2023-08-11 RX ADMIN — INSULIN LISPRO 8 UNITS: 100 INJECTION, SOLUTION INTRAVENOUS; SUBCUTANEOUS at 08:54

## 2023-08-11 RX ADMIN — LORAZEPAM 1 MG: 1 TABLET ORAL at 20:15

## 2023-08-11 RX ADMIN — IPRATROPIUM BROMIDE AND ALBUTEROL SULFATE 1 DOSE: .5; 2.5 SOLUTION RESPIRATORY (INHALATION) at 20:19

## 2023-08-11 RX ADMIN — CHLORHEXIDINE GLUCONATE 15 ML: 1.2 RINSE ORAL at 20:15

## 2023-08-11 RX ADMIN — Medication 250 MG: at 14:48

## 2023-08-11 RX ADMIN — ENOXAPARIN SODIUM 40 MG: 100 INJECTION SUBCUTANEOUS at 20:55

## 2023-08-11 RX ADMIN — OXYCODONE HYDROCHLORIDE 10 MG: 5 SOLUTION ORAL at 00:03

## 2023-08-11 RX ADMIN — LACTULOSE 20 G: 20 SOLUTION ORAL at 20:56

## 2023-08-11 RX ADMIN — INSULIN LISPRO 8 UNITS: 100 INJECTION, SOLUTION INTRAVENOUS; SUBCUTANEOUS at 23:37

## 2023-08-11 RX ADMIN — OXYCODONE HYDROCHLORIDE 15 MG: 5 SOLUTION ORAL at 20:15

## 2023-08-11 RX ADMIN — LACTULOSE 20 G: 20 SOLUTION ORAL at 08:17

## 2023-08-11 RX ADMIN — QUETIAPINE FUMARATE 150 MG: 100 TABLET ORAL at 08:17

## 2023-08-11 RX ADMIN — POTASSIUM BICARBONATE 20 MEQ: 782 TABLET, EFFERVESCENT ORAL at 12:00

## 2023-08-11 RX ADMIN — IPRATROPIUM BROMIDE AND ALBUTEROL SULFATE 1 DOSE: .5; 2.5 SOLUTION RESPIRATORY (INHALATION) at 11:53

## 2023-08-11 RX ADMIN — NYSTATIN 500000 UNITS: 100000 SUSPENSION ORAL at 17:34

## 2023-08-11 RX ADMIN — FENTANYL CITRATE 100 MCG: 50 INJECTION INTRAMUSCULAR; INTRAVENOUS at 01:28

## 2023-08-11 RX ADMIN — ACETYLCYSTEINE 400 MG: 100 INHALANT RESPIRATORY (INHALATION) at 08:53

## 2023-08-11 RX ADMIN — POLYVINYL ALCOHOL 1 DROP: 14 SOLUTION/ DROPS OPHTHALMIC at 11:40

## 2023-08-11 RX ADMIN — METOCLOPRAMIDE HYDROCHLORIDE 10 MG: 5 INJECTION INTRAMUSCULAR; INTRAVENOUS at 09:05

## 2023-08-11 RX ADMIN — INSULIN LISPRO 4 UNITS: 100 INJECTION, SOLUTION INTRAVENOUS; SUBCUTANEOUS at 00:59

## 2023-08-11 RX ADMIN — PETROLATUM: 420 OINTMENT TOPICAL at 20:15

## 2023-08-11 RX ADMIN — DEXMEDETOMIDINE 1.5 MCG/KG/HR: 100 INJECTION, SOLUTION INTRAVENOUS at 05:17

## 2023-08-11 RX ADMIN — SODIUM CHLORIDE, PRESERVATIVE FREE 10 ML: 5 INJECTION INTRAVENOUS at 09:05

## 2023-08-11 RX ADMIN — LORAZEPAM 1 MG: 1 TABLET ORAL at 03:40

## 2023-08-11 RX ADMIN — CHLORHEXIDINE GLUCONATE 15 ML: 1.2 RINSE ORAL at 23:38

## 2023-08-11 RX ADMIN — POLYVINYL ALCOHOL 1 DROP: 14 SOLUTION/ DROPS OPHTHALMIC at 03:50

## 2023-08-11 RX ADMIN — SENNOSIDES AND DOCUSATE SODIUM 2 TABLET: 50; 8.6 TABLET ORAL at 08:18

## 2023-08-11 RX ADMIN — METOCLOPRAMIDE HYDROCHLORIDE 10 MG: 5 INJECTION INTRAMUSCULAR; INTRAVENOUS at 03:41

## 2023-08-11 RX ADMIN — POLYVINYL ALCOHOL 1 DROP: 14 SOLUTION/ DROPS OPHTHALMIC at 16:19

## 2023-08-11 RX ADMIN — POLYVINYL ALCOHOL 1 DROP: 14 SOLUTION/ DROPS OPHTHALMIC at 23:38

## 2023-08-11 RX ADMIN — LORAZEPAM 1 MG: 1 TABLET ORAL at 16:17

## 2023-08-11 RX ADMIN — FLUOXETINE 20 MG: 10 TABLET, FILM COATED ORAL at 08:24

## 2023-08-11 RX ADMIN — BISACODYL 10 MG: 10 SUPPOSITORY RECTAL at 08:17

## 2023-08-11 RX ADMIN — IOPAMIDOL 75 ML: 755 INJECTION, SOLUTION INTRAVENOUS at 13:44

## 2023-08-11 RX ADMIN — IPRATROPIUM BROMIDE AND ALBUTEROL SULFATE 1 DOSE: .5; 2.5 SOLUTION RESPIRATORY (INHALATION) at 08:53

## 2023-08-11 RX ADMIN — PETROLATUM: 420 OINTMENT TOPICAL at 08:21

## 2023-08-11 RX ADMIN — CHLORHEXIDINE GLUCONATE 15 ML: 1.2 RINSE ORAL at 16:21

## 2023-08-11 RX ADMIN — LORAZEPAM 1 MG: 1 TABLET ORAL at 08:17

## 2023-08-11 RX ADMIN — ACETYLCYSTEINE 400 MG: 100 INHALANT RESPIRATORY (INHALATION) at 20:19

## 2023-08-11 RX ADMIN — SODIUM CHLORIDE, PRESERVATIVE FREE 10 ML: 5 INJECTION INTRAVENOUS at 20:16

## 2023-08-11 RX ADMIN — POTASSIUM BICARBONATE 20 MEQ: 782 TABLET, EFFERVESCENT ORAL at 20:16

## 2023-08-11 RX ADMIN — CEFEPIME 2000 MG: 2 INJECTION, POWDER, FOR SOLUTION INTRAVENOUS at 05:21

## 2023-08-11 RX ADMIN — OXYCODONE HYDROCHLORIDE 10 MG: 5 SOLUTION ORAL at 03:40

## 2023-08-11 RX ADMIN — OXYCODONE HYDROCHLORIDE 15 MG: 5 SOLUTION ORAL at 08:58

## 2023-08-11 RX ADMIN — IPRATROPIUM BROMIDE AND ALBUTEROL SULFATE 1 DOSE: .5; 2.5 SOLUTION RESPIRATORY (INHALATION) at 16:52

## 2023-08-11 RX ADMIN — Medication 10 ML: at 03:41

## 2023-08-11 RX ADMIN — Medication 10 ML: at 13:44

## 2023-08-11 RX ADMIN — METOCLOPRAMIDE HYDROCHLORIDE 10 MG: 5 INJECTION INTRAMUSCULAR; INTRAVENOUS at 20:16

## 2023-08-11 RX ADMIN — CHLORHEXIDINE GLUCONATE 15 ML: 1.2 RINSE ORAL at 03:50

## 2023-08-11 RX ADMIN — INSULIN LISPRO 4 UNITS: 100 INJECTION, SOLUTION INTRAVENOUS; SUBCUTANEOUS at 12:29

## 2023-08-11 RX ADMIN — FENTANYL CITRATE 100 MCG: 50 INJECTION, SOLUTION INTRAMUSCULAR; INTRAVENOUS at 16:25

## 2023-08-11 RX ADMIN — DEXMEDETOMIDINE 1.5 MCG/KG/HR: 100 INJECTION, SOLUTION INTRAVENOUS at 12:10

## 2023-08-11 RX ADMIN — IOPAMIDOL 18 ML: 755 INJECTION, SOLUTION INTRAVENOUS at 13:43

## 2023-08-11 RX ADMIN — QUETIAPINE FUMARATE 150 MG: 100 TABLET ORAL at 20:16

## 2023-08-11 RX ADMIN — ACETYLCYSTEINE 400 MG: 100 INHALANT RESPIRATORY (INHALATION) at 03:10

## 2023-08-11 ASSESSMENT — PULMONARY FUNCTION TESTS
PIF_VALUE: 26
PIF_VALUE: 25
PIF_VALUE: 24
PIF_VALUE: 25
PIF_VALUE: 25
PIF_VALUE: 26
PIF_VALUE: 24
PIF_VALUE: 21
PIF_VALUE: 26
PIF_VALUE: 24
PIF_VALUE: 23
PIF_VALUE: 23
PIF_VALUE: 22
PIF_VALUE: 27
PIF_VALUE: 24
PIF_VALUE: 24
PIF_VALUE: 23
PIF_VALUE: 25
PIF_VALUE: 26
PIF_VALUE: 24
PIF_VALUE: 25
PIF_VALUE: 24
PIF_VALUE: 49
PIF_VALUE: 24
PIF_VALUE: 25
PIF_VALUE: 25
PIF_VALUE: 26
PIF_VALUE: 43
PIF_VALUE: 32

## 2023-08-11 ASSESSMENT — PAIN SCALES - GENERAL
PAINLEVEL_OUTOF10: 8
PAINLEVEL_OUTOF10: 3
PAINLEVEL_OUTOF10: 4
PAINLEVEL_OUTOF10: 4

## 2023-08-11 ASSESSMENT — PAIN - FUNCTIONAL ASSESSMENT: PAIN_FUNCTIONAL_ASSESSMENT: ACTIVITIES ARE NOT PREVENTED

## 2023-08-11 NOTE — FLOWSHEET NOTE
Pt coughing against ventilator with sputum into ventilator tubing. Pt suctioned, increased bloody, tan secretions with blood clots noted.

## 2023-08-11 NOTE — PROCEDURES
The correct patient was identified and the procedure verified as Flexible Fiberoptic Bronchoscopy. A Time Out was held and the above information confirmed. Within the SICU, the patient was sedated with versed as well fentanyk. The bronchoscope was inserted through the patients ETT. The bronchoscope was passed through the ETT, which was noted to be in good position above the aden. First the left main stem brochus was viewed. There were a small amount of clear secretions amount. Irrigation with saline was undertaken to thin out secretions. The scope was slowly withdrawn and passed into the reight mainstem bronchus. There were minimal secretions. The bronchoscope was completely withdrawn. Due to the friability of the  bronchi there was some small amount of bleeding. The patient tolerated the procedure well with no issues with saturations. Plan: Wean FiO2 as tolerated     Dr. Lesa Napier was present and supervised the procedure.      Gino Chen MD     Electronically signed by Gino Chen MD on 8/11/2023 at 4:35 PM
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
## 2023-08-11 NOTE — FLOWSHEET NOTE
Pt with nausea and gagging and moving himself down in the bed until [facility] is <15. Tube feedings stopped, PEG line flushed, PRN for nausea given - see MAR.

## 2023-08-11 NOTE — FLOWSHEET NOTE
Pt agitated, attempts to pull at trach and lines. Unable to redirect at this time. Bilateral restraints remain in place at this time for patient safety. Will continue to monitor.

## 2023-08-11 NOTE — PLAN OF CARE
Problem: Neurosensory - Adult  Goal: Achieves stable or improved neurological status  Outcome: Not Progressing  Pt agitated and confused - will only intermittently follow commands. Flowsheets (Taken 8/10/2023 2000)  Achieves stable or improved neurological status:   Assess for and report changes in neurological status   Initiate measures to prevent increased intracranial pressure   Maintain blood pressure and fluid volume within ordered parameters to optimize cerebral perfusion and minimize risk of hemorrhage   Monitor temperature, glucose, and sodium. Initiate appropriate interventions as ordered  Goal: Achieves maximal functionality and self care  Outcome: Not Progressing  Flowsheets (Taken 8/10/2023 2000)  Achieves maximal functionality and self care:   Monitor swallowing and airway patency with patient fatigue and changes in neurological status   Encourage and assist patient to increase activity and self care with guidance from physical therapy/occupational therapy   Encourage visually impaired, hearing impaired and aphasic patients to use assistive/communication devices     Problem: Respiratory - Adult  Goal: Achieves optimal ventilation and oxygenation  8/11/2023 0650 by Nasrin Gonzalez RN  Outcome: Not Progressing  Pt with increased secretions and frequent desaturation through night shift.    Flowsheets (Taken 8/10/2023 2000)  Achieves optimal ventilation and oxygenation:   Assess for changes in respiratory status   Assess for changes in mentation and behavior   Position to facilitate oxygenation and minimize respiratory effort   Oxygen supplementation based on oxygen saturation or arterial blood gases   Initiate smoking cessation protocol as indicated   Encourage broncho-pulmonary hygiene including cough, deep breathe, incentive spirometry   Assess the need for suctioning and aspirate as needed   Assess and instruct to report shortness of breath or any respiratory difficulty   Respiratory therapy support as indicated  8/10/2023 1806 by Ania Mcintyre RN  Outcome: Progressing     Problem: Musculoskeletal - Adult  Goal: Return mobility to safest level of function  Outcome: Not Progressing  Goal: Return ADL status to a safe level of function  Outcome: Not Progressing     Problem: Gastrointestinal - Adult  Goal: Minimal or absence of nausea and vomiting  8/11/2023 0650 by Christiana Panchal RN  Outcome: Not Progressing  8/10/2023 1806 by Ania Mcintyre RN  Outcome: Progressing     Problem: Anxiety  Goal: Will report anxiety at manageable levels  Description: INTERVENTIONS:  1. Administer medication as ordered  2. Teach and rehearse alternative coping skills  3.  Provide emotional support with 1:1 interaction with staff  Outcome: Not Progressing  Pt with extreme agitation and anxiety with all nursing interventions and spontaneously despite scheduled and PRN medications  Flowsheets (Taken 8/10/2023 2000)  Will report anxiety at manageable levels:   Administer medication as ordered   Teach and rehearse alternative coping skills   Provide emotional support with 1:1 interaction with staff

## 2023-08-11 NOTE — FLOWSHEET NOTE
Pt with very frequent periods of extreme agitation. Pt with violent aggression towards staff, attempting to pinch/hit staff w BUE and attempting to kick staff with legs. Pt attempting to get out of bed and throwing legs over siderails. Pt tries to reach tubes and lines. Unable to redirect pt. Sedation at maximum. Less restrictive measures - frequent redirection, calming techniques, reassurance failed. Pt continues to meet restraint use criteria. Soft restraints bilateral wrists continue for patient safety.
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
## 2023-08-11 NOTE — PLAN OF CARE
2000)  Incisions, Wounds, or Drain Sites Healing Without Sign and Symptoms of Infection:   ADMISSION and DAILY: Assess and document risk factors for pressure ulcer development   TWICE DAILY: Assess and document skin integrity   TWICE DAILY: Assess and document dressing/incision, wound bed, drain sites and surrounding tissue   Implement wound care per orders   Initiate pressure ulcer prevention bundle as indicated   Initiate isolation precautions as appropriate     Problem: Skin/Tissue Integrity - Adult  Goal: Oral mucous membranes remain intact  8/11/2023 1444 by Princess Ibarra RN  Outcome: Progressing  8/11/2023 0650 by Corey Martinez RN  Outcome: Progressing  Flowsheets (Taken 8/10/2023 2000)  Oral Mucous Membranes Remain Intact:   Assess oral mucosa and hygiene practices   Implement preventative oral hygiene regimen   Implement oral medicated treatments as ordered     Problem: Safety - Medical Restraint  Goal: Remains free of injury from restraints (Restraint for Interference with Medical Device)  Description: INTERVENTIONS:  1. Determine that other, less restrictive measures have been tried or would not be effective before applying the restraint  2. Evaluate the patient's condition at the time of restraint application  3. Inform patient/family regarding the reason for restraint  4.  Q2H: Monitor safety, psychosocial status, comfort, nutrition and hydration  8/11/2023 1444 by Princess Ibarra RN  Outcome: Progressing  Flowsheets (Taken 8/11/2023 8557)  Remains free of injury from restraints (restraint for interference with medical device):   Determine that other, less restrictive measures have been tried or would not be effective before applying the restraint   Evaluate the patient's condition at the time of restraint application   Inform patient/family regarding the reason for restraint   Every 2 hours: Monitor safety, psychosocial status, comfort, nutrition and hydration  8/11/2023 0650 by Gautam Alves Problem: Respiratory - Adult  Goal: Achieves optimal ventilation and oxygenation  8/11/2023 0650 by Elisa Paris RN  Outcome: Not Progressing  Flowsheets (Taken 8/10/2023 2000)  Achieves optimal ventilation and oxygenation:   Assess for changes in respiratory status   Assess for changes in mentation and behavior   Position to facilitate oxygenation and minimize respiratory effort   Oxygen supplementation based on oxygen saturation or arterial blood gases   Initiate smoking cessation protocol as indicated   Encourage broncho-pulmonary hygiene including cough, deep breathe, incentive spirometry   Assess the need for suctioning and aspirate as needed   Assess and instruct to report shortness of breath or any respiratory difficulty   Respiratory therapy support as indicated     Problem: Musculoskeletal - Adult  Goal: Return mobility to safest level of function  8/11/2023 0650 by Elisa Paris RN  Outcome: Not Progressing  Flowsheets (Taken 8/10/2023 2000)  Return Mobility to Safest Level of Function:   Assess patient stability and activity tolerance for standing, transferring and ambulating with or without assistive devices   Assist with transfers and ambulation using safe patient handling equipment as needed   Ensure adequate protection for wounds/incisions during mobilization   Obtain physical therapy/occupational therapy consults as needed   Apply continuous passive motion per provider or physical therapy orders to increase flexion toward goal   Instruct patient/family in ordered activity level  Goal: Return ADL status to a safe level of function  8/11/2023 0650 by Elisa Paris RN  Outcome: Not Progressing  Flowsheets (Taken 8/10/2023 2000)  Return ADL Status to a Safe Level of Function:   Administer medication as ordered   Assess activities of daily living deficits and provide assistive devices as needed   Obtain physical therapy/occupational therapy consults as needed   Assist

## 2023-08-11 NOTE — PLAN OF CARE
Problem: Respiratory - Adult  Goal: Achieves optimal ventilation and oxygenation  8/11/2023 1851 by Xiomara Mcguire RCP  Outcome: Progressing

## 2023-08-11 NOTE — FLOWSHEET NOTE
MD at bedside. Pt O2 sat continues to be decreased in low 90s, slightly lower than O2 sats during the day shift. Collect ABG now to assess oxygenation. 0318 - ABG shows PO2 in upper 50s. FiO2 increased to 50%.

## 2023-08-11 NOTE — PROGRESS NOTES
CT abd/pelvis shows large stool burden in rectum. Attempted a manual disimpaction with some stool balls and loose stool evacuated. Per nursing, patient had a very large bowel movement immediately after arriving from CT.

## 2023-08-12 ENCOUNTER — APPOINTMENT (OUTPATIENT)
Dept: GENERAL RADIOLOGY | Age: 58
DRG: 004 | End: 2023-08-12
Payer: COMMERCIAL

## 2023-08-12 LAB
AADO2: 198.5 MMHG
ABO + RH BLD: NORMAL
ALBUMIN SERPL-MCNC: 2.6 G/DL (ref 3.5–5.2)
ALP SERPL-CCNC: 72 U/L (ref 40–129)
ALT SERPL-CCNC: 15 U/L (ref 0–40)
ANION GAP SERPL CALCULATED.3IONS-SCNC: 9 MMOL/L (ref 7–16)
ARM BAND NUMBER: NORMAL
AST SERPL-CCNC: 25 U/L (ref 0–39)
B.E.: 5.3 MMOL/L (ref -3–3)
BASOPHILS # BLD: 0.04 K/UL (ref 0–0.2)
BASOPHILS NFR BLD: 0 % (ref 0–2)
BILIRUB SERPL-MCNC: 0.3 MG/DL (ref 0–1.2)
BLOOD BANK SAMPLE EXPIRATION: NORMAL
BLOOD GROUP ANTIBODIES SERPL: NEGATIVE
BUN SERPL-MCNC: 20 MG/DL (ref 6–20)
CALCIUM SERPL-MCNC: 8.4 MG/DL (ref 8.6–10.2)
CHLORIDE SERPL-SCNC: 100 MMOL/L (ref 98–107)
CO2 SERPL-SCNC: 30 MMOL/L (ref 22–29)
COHB: 0.3 % (ref 0–1.5)
CREAT SERPL-MCNC: 0.7 MG/DL (ref 0.7–1.2)
CRITICAL: ABNORMAL
DATE ANALYZED: ABNORMAL
DATE OF COLLECTION: ABNORMAL
EOSINOPHIL # BLD: 0.35 K/UL (ref 0.05–0.5)
EOSINOPHILS RELATIVE PERCENT: 3 % (ref 0–6)
ERYTHROCYTE [DISTWIDTH] IN BLOOD BY AUTOMATED COUNT: 14.2 % (ref 11.5–15)
FIO2: 50 %
GFR SERPL CREATININE-BSD FRML MDRD: >60 ML/MIN/1.73M2
GLUCOSE BLD-MCNC: 184 MG/DL (ref 74–99)
GLUCOSE BLD-MCNC: 202 MG/DL (ref 74–99)
GLUCOSE BLD-MCNC: 204 MG/DL (ref 74–99)
GLUCOSE BLD-MCNC: 221 MG/DL (ref 74–99)
GLUCOSE BLD-MCNC: 247 MG/DL (ref 74–99)
GLUCOSE BLD-MCNC: 94 MG/DL (ref 74–99)
GLUCOSE SERPL-MCNC: 186 MG/DL (ref 74–99)
HCO3: 29.6 MMOL/L (ref 22–26)
HCT VFR BLD AUTO: 26.2 % (ref 37–54)
HGB BLD-MCNC: 8.3 G/DL (ref 12.5–16.5)
HHB: 3.1 % (ref 0–5)
IMM GRANULOCYTES # BLD AUTO: 0.15 K/UL (ref 0–0.58)
IMM GRANULOCYTES NFR BLD: 1 % (ref 0–5)
LAB: ABNORMAL
LV EF: 67 %
LVEF MODALITY: NORMAL
LYMPHOCYTES NFR BLD: 2.46 K/UL (ref 1.5–4)
LYMPHOCYTES RELATIVE PERCENT: 21 % (ref 20–42)
Lab: ABNORMAL
MAGNESIUM SERPL-MCNC: 1.9 MG/DL (ref 1.6–2.6)
MCH RBC QN AUTO: 29 PG (ref 26–35)
MCHC RBC AUTO-ENTMCNC: 31.7 G/DL (ref 32–34.5)
MCV RBC AUTO: 91.6 FL (ref 80–99.9)
METHB: 0 % (ref 0–1.5)
MODE: ABNORMAL
MONOCYTES NFR BLD: 0.78 K/UL (ref 0.1–0.95)
MONOCYTES NFR BLD: 7 % (ref 2–12)
NEUTROPHILS NFR BLD: 68 % (ref 43–80)
NEUTS SEG NFR BLD: 8.04 K/UL (ref 1.8–7.3)
O2 CONTENT: 13.7 ML/DL
O2 SATURATION: 96.9 % (ref 92–98.5)
O2HB: 96.6 % (ref 94–97)
OPERATOR ID: 2577
PATIENT TEMP: 37 C
PCO2: 42.4 MMHG (ref 35–45)
PEEP/CPAP: 10 CMH2O
PFO2: 1.96 MMHG/%
PH BLOOD GAS: 7.46 (ref 7.35–7.45)
PHOSPHATE SERPL-MCNC: 2.6 MG/DL (ref 2.5–4.5)
PLATELET # BLD AUTO: 494 K/UL (ref 130–450)
PMV BLD AUTO: 9.9 FL (ref 7–12)
PO2: 97.8 MMHG (ref 75–100)
POTASSIUM SERPL-SCNC: 3.8 MMOL/L (ref 3.5–5)
PROT SERPL-MCNC: 5.7 G/DL (ref 6.4–8.3)
RBC # BLD AUTO: 2.86 M/UL (ref 3.8–5.8)
RI(T): 2.03
RR MECHANICAL: 14 B/MIN
SODIUM SERPL-SCNC: 139 MMOL/L (ref 132–146)
SOURCE, BLOOD GAS: ABNORMAL
THB: 10 G/DL (ref 11.5–16.5)
TIME ANALYZED: 459
TROPONIN I SERPL HS-MCNC: 40 NG/L (ref 0–11)
TROPONIN I SERPL HS-MCNC: 43 NG/L (ref 0–11)
VT MECHANICAL: 460 ML
WBC OTHER # BLD: 11.8 K/UL (ref 4.5–11.5)

## 2023-08-12 PROCEDURE — 6360000002 HC RX W HCPCS: Performed by: STUDENT IN AN ORGANIZED HEALTH CARE EDUCATION/TRAINING PROGRAM

## 2023-08-12 PROCEDURE — 6370000000 HC RX 637 (ALT 250 FOR IP)

## 2023-08-12 PROCEDURE — 6360000002 HC RX W HCPCS

## 2023-08-12 PROCEDURE — 6370000000 HC RX 637 (ALT 250 FOR IP): Performed by: STUDENT IN AN ORGANIZED HEALTH CARE EDUCATION/TRAINING PROGRAM

## 2023-08-12 PROCEDURE — 2580000003 HC RX 258: Performed by: STUDENT IN AN ORGANIZED HEALTH CARE EDUCATION/TRAINING PROGRAM

## 2023-08-12 PROCEDURE — 93005 ELECTROCARDIOGRAM TRACING: CPT

## 2023-08-12 PROCEDURE — 87899 AGENT NOS ASSAY W/OPTIC: CPT

## 2023-08-12 PROCEDURE — 86850 RBC ANTIBODY SCREEN: CPT

## 2023-08-12 PROCEDURE — 87449 NOS EACH ORGANISM AG IA: CPT

## 2023-08-12 PROCEDURE — 99291 CRITICAL CARE FIRST HOUR: CPT | Performed by: SURGERY

## 2023-08-12 PROCEDURE — 86900 BLOOD TYPING SEROLOGIC ABO: CPT

## 2023-08-12 PROCEDURE — 6360000002 HC RX W HCPCS: Performed by: SURGERY

## 2023-08-12 PROCEDURE — 85025 COMPLETE CBC W/AUTO DIFF WBC: CPT

## 2023-08-12 PROCEDURE — 86901 BLOOD TYPING SEROLOGIC RH(D): CPT

## 2023-08-12 PROCEDURE — 82805 BLOOD GASES W/O2 SATURATION: CPT

## 2023-08-12 PROCEDURE — 2500000003 HC RX 250 WO HCPCS: Performed by: SURGERY

## 2023-08-12 PROCEDURE — 94640 AIRWAY INHALATION TREATMENT: CPT

## 2023-08-12 PROCEDURE — 80053 COMPREHEN METABOLIC PANEL: CPT

## 2023-08-12 PROCEDURE — 83735 ASSAY OF MAGNESIUM: CPT

## 2023-08-12 PROCEDURE — 71045 X-RAY EXAM CHEST 1 VIEW: CPT

## 2023-08-12 PROCEDURE — 84484 ASSAY OF TROPONIN QUANT: CPT

## 2023-08-12 PROCEDURE — 6370000000 HC RX 637 (ALT 250 FOR IP): Performed by: SURGERY

## 2023-08-12 PROCEDURE — 82962 GLUCOSE BLOOD TEST: CPT

## 2023-08-12 PROCEDURE — 36415 COLL VENOUS BLD VENIPUNCTURE: CPT

## 2023-08-12 PROCEDURE — 93308 TTE F-UP OR LMTD: CPT

## 2023-08-12 PROCEDURE — 2580000003 HC RX 258: Performed by: SURGERY

## 2023-08-12 PROCEDURE — 2000000000 HC ICU R&B

## 2023-08-12 PROCEDURE — 94003 VENT MGMT INPAT SUBQ DAY: CPT

## 2023-08-12 PROCEDURE — 87389 HIV-1 AG W/HIV-1&-2 AB AG IA: CPT

## 2023-08-12 PROCEDURE — 84100 ASSAY OF PHOSPHORUS: CPT

## 2023-08-12 RX ORDER — PHENOBARBITAL 32.4 MG/1
129.6 TABLET ORAL EVERY 6 HOURS SCHEDULED
Status: DISCONTINUED | OUTPATIENT
Start: 2023-08-12 | End: 2023-08-14

## 2023-08-12 RX ORDER — MIDAZOLAM HYDROCHLORIDE 2 MG/2ML
2 INJECTION, SOLUTION INTRAMUSCULAR; INTRAVENOUS
Status: COMPLETED | OUTPATIENT
Start: 2023-08-12 | End: 2023-08-12

## 2023-08-12 RX ORDER — METOCLOPRAMIDE HYDROCHLORIDE 5 MG/ML
5 INJECTION INTRAMUSCULAR; INTRAVENOUS EVERY 6 HOURS
Status: DISCONTINUED | OUTPATIENT
Start: 2023-08-12 | End: 2023-08-25 | Stop reason: HOSPADM

## 2023-08-12 RX ORDER — FUROSEMIDE 10 MG/ML
20 INJECTION INTRAMUSCULAR; INTRAVENOUS ONCE
Status: COMPLETED | OUTPATIENT
Start: 2023-08-12 | End: 2023-08-12

## 2023-08-12 RX ORDER — MAGNESIUM SULFATE IN WATER 40 MG/ML
2000 INJECTION, SOLUTION INTRAVENOUS ONCE
Status: COMPLETED | OUTPATIENT
Start: 2023-08-12 | End: 2023-08-12

## 2023-08-12 RX ADMIN — INSULIN LISPRO 8 UNITS: 100 INJECTION, SOLUTION INTRAVENOUS; SUBCUTANEOUS at 04:17

## 2023-08-12 RX ADMIN — FENTANYL CITRATE 100 MCG: 50 INJECTION INTRAMUSCULAR; INTRAVENOUS at 06:09

## 2023-08-12 RX ADMIN — PHENOBARBITAL 129.6 MG: 32.4 TABLET ORAL at 23:27

## 2023-08-12 RX ADMIN — ENOXAPARIN SODIUM 40 MG: 100 INJECTION SUBCUTANEOUS at 21:00

## 2023-08-12 RX ADMIN — IPRATROPIUM BROMIDE AND ALBUTEROL SULFATE 1 DOSE: .5; 2.5 SOLUTION RESPIRATORY (INHALATION) at 06:47

## 2023-08-12 RX ADMIN — LORAZEPAM 1 MG: 1 TABLET ORAL at 12:07

## 2023-08-12 RX ADMIN — OXYCODONE HYDROCHLORIDE 15 MG: 5 SOLUTION ORAL at 04:17

## 2023-08-12 RX ADMIN — LORAZEPAM 1 MG: 1 TABLET ORAL at 20:59

## 2023-08-12 RX ADMIN — PHENOBARBITAL 129.6 MG: 32.4 TABLET ORAL at 17:11

## 2023-08-12 RX ADMIN — BUPROPION HYDROCHLORIDE 75 MG: 75 TABLET, FILM COATED ORAL at 20:52

## 2023-08-12 RX ADMIN — IPRATROPIUM BROMIDE AND ALBUTEROL SULFATE 1 DOSE: .5; 2.5 SOLUTION RESPIRATORY (INHALATION) at 20:01

## 2023-08-12 RX ADMIN — DEXMEDETOMIDINE 1.5 MCG/KG/HR: 100 INJECTION, SOLUTION INTRAVENOUS at 19:49

## 2023-08-12 RX ADMIN — Medication 250 MG: at 12:08

## 2023-08-12 RX ADMIN — FUROSEMIDE 20 MG: 10 INJECTION, SOLUTION INTRAMUSCULAR; INTRAVENOUS at 09:45

## 2023-08-12 RX ADMIN — DEXMEDETOMIDINE 1.5 MCG/KG/HR: 100 INJECTION, SOLUTION INTRAVENOUS at 00:25

## 2023-08-12 RX ADMIN — ACETYLCYSTEINE 400 MG: 100 INHALANT RESPIRATORY (INHALATION) at 20:01

## 2023-08-12 RX ADMIN — MIDAZOLAM HYDROCHLORIDE 2 MG: 1 INJECTION, SOLUTION INTRAMUSCULAR; INTRAVENOUS at 06:09

## 2023-08-12 RX ADMIN — NALOXEGOL OXALATE 12.5 MG: 12.5 TABLET, FILM COATED ORAL at 06:08

## 2023-08-12 RX ADMIN — QUETIAPINE FUMARATE 150 MG: 100 TABLET ORAL at 08:35

## 2023-08-12 RX ADMIN — NYSTATIN 500000 UNITS: 100000 SUSPENSION ORAL at 17:46

## 2023-08-12 RX ADMIN — LORAZEPAM 1 MG: 1 TABLET ORAL at 23:27

## 2023-08-12 RX ADMIN — CHLORHEXIDINE GLUCONATE 15 ML: 1.2 RINSE ORAL at 20:15

## 2023-08-12 RX ADMIN — CHLORHEXIDINE GLUCONATE 15 ML: 1.2 RINSE ORAL at 08:28

## 2023-08-12 RX ADMIN — Medication 250 MG: at 17:11

## 2023-08-12 RX ADMIN — FENTANYL CITRATE 100 MCG: 50 INJECTION INTRAMUSCULAR; INTRAVENOUS at 04:15

## 2023-08-12 RX ADMIN — PHENOBARBITAL 129.6 MG: 32.4 TABLET ORAL at 12:07

## 2023-08-12 RX ADMIN — METOCLOPRAMIDE HYDROCHLORIDE 5 MG: 5 INJECTION INTRAMUSCULAR; INTRAVENOUS at 21:07

## 2023-08-12 RX ADMIN — SODIUM CHLORIDE, PRESERVATIVE FREE 10 ML: 5 INJECTION INTRAVENOUS at 20:51

## 2023-08-12 RX ADMIN — INSULIN LISPRO 8 UNITS: 100 INJECTION, SOLUTION INTRAVENOUS; SUBCUTANEOUS at 21:13

## 2023-08-12 RX ADMIN — OXYCODONE HYDROCHLORIDE 15 MG: 5 SOLUTION ORAL at 23:27

## 2023-08-12 RX ADMIN — FENTANYL CITRATE 100 MCG: 50 INJECTION INTRAMUSCULAR; INTRAVENOUS at 00:48

## 2023-08-12 RX ADMIN — ACETYLCYSTEINE 400 MG: 100 INHALANT RESPIRATORY (INHALATION) at 15:53

## 2023-08-12 RX ADMIN — OXYCODONE HYDROCHLORIDE 15 MG: 5 SOLUTION ORAL at 20:58

## 2023-08-12 RX ADMIN — OXYCODONE HYDROCHLORIDE 15 MG: 5 SOLUTION ORAL at 08:36

## 2023-08-12 RX ADMIN — INSULIN LISPRO 4 UNITS: 100 INJECTION, SOLUTION INTRAVENOUS; SUBCUTANEOUS at 15:29

## 2023-08-12 RX ADMIN — ENOXAPARIN SODIUM 40 MG: 100 INJECTION SUBCUTANEOUS at 08:35

## 2023-08-12 RX ADMIN — DEXMEDETOMIDINE 1.5 MCG/KG/HR: 100 INJECTION, SOLUTION INTRAVENOUS at 12:55

## 2023-08-12 RX ADMIN — METOCLOPRAMIDE HYDROCHLORIDE 10 MG: 5 INJECTION INTRAMUSCULAR; INTRAVENOUS at 04:16

## 2023-08-12 RX ADMIN — FENTANYL CITRATE 100 MCG: 50 INJECTION INTRAMUSCULAR; INTRAVENOUS at 02:27

## 2023-08-12 RX ADMIN — Medication 250 MG: at 08:37

## 2023-08-12 RX ADMIN — LORAZEPAM 1 MG: 1 TABLET ORAL at 08:35

## 2023-08-12 RX ADMIN — LANSOPRAZOLE 30 MG: 30 TABLET, ORALLY DISINTEGRATING, DELAYED RELEASE ORAL at 06:08

## 2023-08-12 RX ADMIN — METOCLOPRAMIDE HYDROCHLORIDE 5 MG: 5 INJECTION INTRAMUSCULAR; INTRAVENOUS at 11:40

## 2023-08-12 RX ADMIN — POTASSIUM BICARBONATE 20 MEQ: 782 TABLET, EFFERVESCENT ORAL at 08:37

## 2023-08-12 RX ADMIN — OXYCODONE HYDROCHLORIDE 15 MG: 5 SOLUTION ORAL at 15:30

## 2023-08-12 RX ADMIN — OXYCODONE HYDROCHLORIDE 15 MG: 5 SOLUTION ORAL at 12:08

## 2023-08-12 RX ADMIN — FLUOXETINE 20 MG: 10 TABLET, FILM COATED ORAL at 08:36

## 2023-08-12 RX ADMIN — MIDAZOLAM HYDROCHLORIDE 2 MG: 1 INJECTION, SOLUTION INTRAMUSCULAR; INTRAVENOUS at 02:54

## 2023-08-12 RX ADMIN — CHLORHEXIDINE GLUCONATE 15 ML: 1.2 RINSE ORAL at 04:18

## 2023-08-12 RX ADMIN — DEXMEDETOMIDINE 1.5 MCG/KG/HR: 100 INJECTION, SOLUTION INTRAVENOUS at 06:24

## 2023-08-12 RX ADMIN — CHLORHEXIDINE GLUCONATE 15 ML: 1.2 RINSE ORAL at 11:57

## 2023-08-12 RX ADMIN — Medication 10 ML: at 06:10

## 2023-08-12 RX ADMIN — NYSTATIN 500000 UNITS: 100000 SUSPENSION ORAL at 08:35

## 2023-08-12 RX ADMIN — ACETYLCYSTEINE 400 MG: 100 INHALANT RESPIRATORY (INHALATION) at 06:47

## 2023-08-12 RX ADMIN — CHLORHEXIDINE GLUCONATE 15 ML: 1.2 RINSE ORAL at 23:27

## 2023-08-12 RX ADMIN — Medication 10 ML: at 02:27

## 2023-08-12 RX ADMIN — MAGNESIUM SULFATE HEPTAHYDRATE 2000 MG: 40 INJECTION, SOLUTION INTRAVENOUS at 16:04

## 2023-08-12 RX ADMIN — Medication 250 MG: at 20:59

## 2023-08-12 RX ADMIN — POTASSIUM BICARBONATE 20 MEQ: 782 TABLET, EFFERVESCENT ORAL at 20:59

## 2023-08-12 RX ADMIN — ACETYLCYSTEINE 400 MG: 100 INHALANT RESPIRATORY (INHALATION) at 13:24

## 2023-08-12 RX ADMIN — METOCLOPRAMIDE HYDROCHLORIDE 5 MG: 5 INJECTION INTRAMUSCULAR; INTRAVENOUS at 15:30

## 2023-08-12 RX ADMIN — Medication 10 ML: at 02:54

## 2023-08-12 RX ADMIN — SODIUM CHLORIDE, PRESERVATIVE FREE 10 ML: 5 INJECTION INTRAVENOUS at 08:36

## 2023-08-12 RX ADMIN — BUPROPION HYDROCHLORIDE 75 MG: 75 TABLET, FILM COATED ORAL at 08:35

## 2023-08-12 RX ADMIN — IPRATROPIUM BROMIDE AND ALBUTEROL SULFATE 1 DOSE: .5; 2.5 SOLUTION RESPIRATORY (INHALATION) at 01:01

## 2023-08-12 RX ADMIN — INSULIN LISPRO 8 UNITS: 100 INJECTION, SOLUTION INTRAVENOUS; SUBCUTANEOUS at 12:07

## 2023-08-12 RX ADMIN — LORAZEPAM 1 MG: 1 TABLET ORAL at 15:30

## 2023-08-12 RX ADMIN — POLYVINYL ALCOHOL 1 DROP: 14 SOLUTION/ DROPS OPHTHALMIC at 04:18

## 2023-08-12 RX ADMIN — PETROLATUM: 420 OINTMENT TOPICAL at 20:54

## 2023-08-12 RX ADMIN — QUETIAPINE FUMARATE 150 MG: 100 TABLET ORAL at 20:59

## 2023-08-12 RX ADMIN — Medication 10 ML: at 00:49

## 2023-08-12 RX ADMIN — IPRATROPIUM BROMIDE AND ALBUTEROL SULFATE 1 DOSE: .5; 2.5 SOLUTION RESPIRATORY (INHALATION) at 13:27

## 2023-08-12 RX ADMIN — CHLORHEXIDINE GLUCONATE 15 ML: 1.2 RINSE ORAL at 15:23

## 2023-08-12 RX ADMIN — POLYVINYL ALCOHOL 1 DROP: 14 SOLUTION/ DROPS OPHTHALMIC at 20:20

## 2023-08-12 RX ADMIN — LORAZEPAM 1 MG: 1 TABLET ORAL at 04:17

## 2023-08-12 RX ADMIN — NYSTATIN 500000 UNITS: 100000 SUSPENSION ORAL at 20:52

## 2023-08-12 RX ADMIN — IPRATROPIUM BROMIDE AND ALBUTEROL SULFATE 1 DOSE: .5; 2.5 SOLUTION RESPIRATORY (INHALATION) at 15:53

## 2023-08-12 RX ADMIN — POLYVINYL ALCOHOL 1 DROP: 14 SOLUTION/ DROPS OPHTHALMIC at 15:23

## 2023-08-12 RX ADMIN — ACETYLCYSTEINE 400 MG: 100 INHALANT RESPIRATORY (INHALATION) at 01:00

## 2023-08-12 RX ADMIN — POLYVINYL ALCOHOL 1 DROP: 14 SOLUTION/ DROPS OPHTHALMIC at 23:28

## 2023-08-12 RX ADMIN — POLYVINYL ALCOHOL 1 DROP: 14 SOLUTION/ DROPS OPHTHALMIC at 11:57

## 2023-08-12 RX ADMIN — Medication 10 MG: at 20:59

## 2023-08-12 RX ADMIN — POLYVINYL ALCOHOL 1 DROP: 14 SOLUTION/ DROPS OPHTHALMIC at 08:28

## 2023-08-12 RX ADMIN — NYSTATIN 500000 UNITS: 100000 SUSPENSION ORAL at 12:08

## 2023-08-12 RX ADMIN — PETROLATUM: 420 OINTMENT TOPICAL at 08:37

## 2023-08-12 ASSESSMENT — PULMONARY FUNCTION TESTS
PIF_VALUE: 24
PIF_VALUE: 24
PIF_VALUE: 23
PIF_VALUE: 22
PIF_VALUE: 22
PIF_VALUE: 24
PIF_VALUE: 23
PIF_VALUE: 20
PIF_VALUE: 23
PIF_VALUE: 23
PIF_VALUE: 24
PIF_VALUE: 24
PIF_VALUE: 23
PIF_VALUE: 24
PIF_VALUE: 45
PIF_VALUE: 22
PIF_VALUE: 30
PIF_VALUE: 30
PIF_VALUE: 33
PIF_VALUE: 25
PIF_VALUE: 25
PIF_VALUE: 29
PIF_VALUE: 22
PIF_VALUE: 24
PIF_VALUE: 26
PIF_VALUE: 36
PIF_VALUE: 23
PIF_VALUE: 29
PIF_VALUE: 26
PIF_VALUE: 30
PIF_VALUE: 26
PIF_VALUE: 22
PIF_VALUE: 33
PIF_VALUE: 23
PIF_VALUE: 25
PIF_VALUE: 24
PIF_VALUE: 26
PIF_VALUE: 45

## 2023-08-12 ASSESSMENT — PAIN SCALES - GENERAL
PAINLEVEL_OUTOF10: 0
PAINLEVEL_OUTOF10: 8
PAINLEVEL_OUTOF10: 5
PAINLEVEL_OUTOF10: 8
PAINLEVEL_OUTOF10: 4
PAINLEVEL_OUTOF10: 8
PAINLEVEL_OUTOF10: 5
PAINLEVEL_OUTOF10: 1

## 2023-08-12 ASSESSMENT — PAIN - FUNCTIONAL ASSESSMENT
PAIN_FUNCTIONAL_ASSESSMENT: PREVENTS OR INTERFERES WITH ALL ACTIVE AND SOME PASSIVE ACTIVITIES
PAIN_FUNCTIONAL_ASSESSMENT: INTOLERABLE, UNABLE TO DO ANY ACTIVE OR PASSIVE ACTIVITIES

## 2023-08-12 NOTE — PROGRESS NOTES
DAILY VENTILATOR WEANING ASSESSMENT PERFORMED    P/FIO2 Ratio = 196          (<100= do not Wean)                  Cs =      30                    (<32= Instability)  Plat. Pressure = 23  MV = 8.15  RSBI = n/a    Instabilities:       Cardiovascular =       CNS =       Respiratory = Cs low       Metabolic =    Parameters    no    Wean per protocol  no    Ask Physician for a weaning plan yes      Performed by Sarah Anthony RCP RRT      Reference Table:    Cardiovascular     CNS      1. Mean BP less than or equal to 75   1. Neuromuscular blockade  2. Heart Rate greater than 130   2. RASS of -3, -4, -5  3. Myocardial Ischemia    3. RASS of +3, +4  4. Mechanical Assist Device    4. ICP greater than 15 or             Intracranial Hypertension         Respiratory      Metabolic  1. PEEP equal to or greater than 10cm/H20  1. Temp. (8hrs) less than 95 or > 103  2. Respiratory Rate greater than 35   2. WBC < 5000 or > 21787  3. Minute Volume greater than 15L  4. pH less than 7.30  5.  Deteriorating chest X-ray

## 2023-08-12 NOTE — FLOWSHEET NOTE
Pt with coughing fit. Pt coughing up bright red blood with large clots from tracheostomy. Dr. Geri Dockery called to bedside. Additional Bright red blood suctioned from trach. Scant tan/creamy, thick secretions suctioned from mouth oropharnyx. No new interventions at this times - continue to monitor per Dr. Geri Dockery.

## 2023-08-12 NOTE — PROGRESS NOTES
DAILY VENTILATOR WEANING ASSESSMENT PERFORMED    P/FIO2 Ratio =196          (<100= do not Wean)                  Cs = 31                         (<32= Instability)  Plat. Pressure = 27  MV =18  RSBI =    Instabilities:       Cardiovascular =       CNS =       Respiratory =R3       Metabolic =    Parameters    no    Wean per protocol  Yes    Ask Physician for a weaning plan yes    Additional Comments:     Performed by Kaylin May RCP RRT      Reference Table:    Cardiovascular     CNS      1. Mean BP less than or equal to 75   1. Neuromuscular blockade  2. Heart Rate greater than 130   2. RASS of -3, -4, -5  3. Myocardial Ischemia    3. RASS of +3, +4  4. Mechanical Assist Device    4. ICP greater than 15 or             Intracranial Hypertension         Respiratory      Metabolic  1. PEEP equal to or greater than 10cm/H20  1. Temp. (8hrs) less than 95 or > 103  2. Respiratory Rate greater than 35   2. WBC < 5000 or > 10357  3. Minute Volume greater than 15L  4. pH less than 7.30  5.  Deteriorating chest X-ray
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
## 2023-08-12 NOTE — FLOWSHEET NOTE
Pt with decreasing O2 saturations into 88-91% and that pt continues to cough up bloody secretions into trach. Dr. Sola Castaneda  Notified. No orders, continue to monitor.

## 2023-08-12 NOTE — PLAN OF CARE
Problem: Discharge Planning  Goal: Discharge to home or other facility with appropriate resources  Outcome: Not Progressing     Problem: Neurosensory - Adult  Goal: Achieves maximal functionality and self care  Outcome: Not Progressing     Problem: Musculoskeletal - Adult  Goal: Return mobility to safest level of function  Outcome: Not Progressing  Goal: Return ADL status to a safe level of function  Outcome: Not Progressing     Problem: Discharge Planning  Goal: Discharge to home or other facility with appropriate resources  Outcome: Not Progressing     Problem: Neurosensory - Adult  Goal: Achieves maximal functionality and self care  Outcome: Not Progressing     Problem: Musculoskeletal - Adult  Goal: Return mobility to safest level of function  Outcome: Not Progressing  Goal: Return ADL status to a safe level of function  Outcome: Not Progressing

## 2023-08-12 NOTE — FLOWSHEET NOTE
Trach to ventilator. Anxious, becomes agitated with stimulation. Reaches for ventilator tubing and trach when wrist restraints removed for repositioning. Unable to consistently follow verbal direction. Ankle restraints removed. Patient agrees to keep legs in bed.  Will continue to monitor

## 2023-08-12 NOTE — FLOWSHEET NOTE
Pt continues to become very agitated -pulling at lines, grabbing at staff,moving body to pull out trach/ventilator. Unable to redirect patient, patient with no understanding of safety precautions. Bilateral soft wrist restraints continues for patient safety.

## 2023-08-12 NOTE — PLAN OF CARE
Problem: Respiratory - Adult  Goal: Achieves optimal ventilation and oxygenation  8/12/2023 0902 by Herberth Novoa RCP  Outcome: Progressing
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
## 2023-08-12 NOTE — FLOWSHEET NOTE
Pt with desaturation to 88% with copious tan/bloody secretions from tracheostomy and minimal tan secretions from oropharengeal/nasopharengeal suctioning. After suctioning pt O2 sat increased to 92%. Dr. Marylene Hampshire notified face-to-face. Plan to continue to monitor, give respiratory treatment; if O2 sats remain decreased will plan to get ABG.

## 2023-08-12 NOTE — PLAN OF CARE
Problem: Respiratory - Adult  Goal: Achieves optimal ventilation and oxygenation  8/12/2023 0114 by Opal Rod RCP  Outcome: Progressing

## 2023-08-13 ENCOUNTER — APPOINTMENT (OUTPATIENT)
Dept: GENERAL RADIOLOGY | Age: 58
DRG: 004 | End: 2023-08-13
Payer: COMMERCIAL

## 2023-08-13 LAB
AADO2: 213.3 MMHG
ALBUMIN SERPL-MCNC: 2.3 G/DL (ref 3.5–5.2)
ALP SERPL-CCNC: 70 U/L (ref 40–129)
ALT SERPL-CCNC: 15 U/L (ref 0–40)
ANION GAP SERPL CALCULATED.3IONS-SCNC: 6 MMOL/L (ref 7–16)
AST SERPL-CCNC: 23 U/L (ref 0–39)
B.E.: 5.4 MMOL/L (ref -3–3)
BASOPHILS # BLD: 0.07 K/UL (ref 0–0.2)
BASOPHILS NFR BLD: 1 % (ref 0–2)
BILIRUB SERPL-MCNC: 0.2 MG/DL (ref 0–1.2)
BUN SERPL-MCNC: 18 MG/DL (ref 6–20)
CALCIUM SERPL-MCNC: 8.3 MG/DL (ref 8.6–10.2)
CHLORIDE SERPL-SCNC: 99 MMOL/L (ref 98–107)
CO2 SERPL-SCNC: 31 MMOL/L (ref 22–29)
COHB: 0.3 % (ref 0–1.5)
CREAT SERPL-MCNC: 0.7 MG/DL (ref 0.7–1.2)
CRITICAL: ABNORMAL
DATE ANALYZED: ABNORMAL
DATE OF COLLECTION: ABNORMAL
EOSINOPHIL # BLD: 0.31 K/UL (ref 0.05–0.5)
EOSINOPHILS RELATIVE PERCENT: 3 % (ref 0–6)
ERYTHROCYTE [DISTWIDTH] IN BLOOD BY AUTOMATED COUNT: 14.4 % (ref 11.5–15)
FIO2: 50 %
GFR SERPL CREATININE-BSD FRML MDRD: >60 ML/MIN/1.73M2
GLUCOSE BLD-MCNC: 147 MG/DL (ref 74–99)
GLUCOSE BLD-MCNC: 207 MG/DL (ref 74–99)
GLUCOSE BLD-MCNC: 227 MG/DL (ref 74–99)
GLUCOSE BLD-MCNC: 257 MG/DL (ref 74–99)
GLUCOSE BLD-MCNC: 273 MG/DL (ref 74–99)
GLUCOSE BLD-MCNC: 90 MG/DL (ref 74–99)
GLUCOSE SERPL-MCNC: 249 MG/DL (ref 74–99)
HCO3: 29.9 MMOL/L (ref 22–26)
HCT VFR BLD AUTO: 27.9 % (ref 37–54)
HGB BLD-MCNC: 8.6 G/DL (ref 12.5–16.5)
HHB: 4.7 % (ref 0–5)
IMM GRANULOCYTES # BLD AUTO: 0.11 K/UL (ref 0–0.58)
IMM GRANULOCYTES NFR BLD: 1 % (ref 0–5)
L PNEUMO1 AG UR QL IA.RAPID: NEGATIVE
LAB: ABNORMAL
LYMPHOCYTES NFR BLD: 2.6 K/UL (ref 1.5–4)
LYMPHOCYTES RELATIVE PERCENT: 23 % (ref 20–42)
Lab: ABNORMAL
MCH RBC QN AUTO: 29.4 PG (ref 26–35)
MCHC RBC AUTO-ENTMCNC: 30.8 G/DL (ref 32–34.5)
MCV RBC AUTO: 95.2 FL (ref 80–99.9)
METHB: 0.3 % (ref 0–1.5)
MODE: AC
MONOCYTES NFR BLD: 0.91 K/UL (ref 0.1–0.95)
MONOCYTES NFR BLD: 8 % (ref 2–12)
NEUTROPHILS NFR BLD: 65 % (ref 43–80)
NEUTS SEG NFR BLD: 7.48 K/UL (ref 1.8–7.3)
O2 CONTENT: 12.9 ML/DL
O2 SATURATION: 95.3 % (ref 92–98.5)
O2HB: 94.7 % (ref 94–97)
OPERATOR ID: 914
PATIENT TEMP: 37 C
PCO2: 43.5 MMHG (ref 35–45)
PEEP/CPAP: 10 CMH2O
PFO2: 1.64 MMHG/%
PH BLOOD GAS: 7.46 (ref 7.35–7.45)
PHOSPHATE SERPL-MCNC: 3.3 MG/DL (ref 2.5–4.5)
PLATELET # BLD AUTO: 596 K/UL (ref 130–450)
PMV BLD AUTO: 10.1 FL (ref 7–12)
PO2: 81.8 MMHG (ref 75–100)
POTASSIUM SERPL-SCNC: 4.7 MMOL/L (ref 3.5–5)
PROT SERPL-MCNC: 5.6 G/DL (ref 6.4–8.3)
RBC # BLD AUTO: 2.93 M/UL (ref 3.8–5.8)
RI(T): 2.61
RR MECHANICAL: 14 B/MIN
S PNEUM AG SPEC QL: NEGATIVE
SODIUM SERPL-SCNC: 136 MMOL/L (ref 132–146)
SOURCE, BLOOD GAS: ABNORMAL
SPECIMEN SOURCE: NORMAL
THB: 9.6 G/DL (ref 11.5–16.5)
TIME ANALYZED: 437
VT MECHANICAL: 460 ML
WBC OTHER # BLD: 11.5 K/UL (ref 4.5–11.5)

## 2023-08-13 PROCEDURE — 82805 BLOOD GASES W/O2 SATURATION: CPT

## 2023-08-13 PROCEDURE — 2580000003 HC RX 258: Performed by: STUDENT IN AN ORGANIZED HEALTH CARE EDUCATION/TRAINING PROGRAM

## 2023-08-13 PROCEDURE — 82962 GLUCOSE BLOOD TEST: CPT

## 2023-08-13 PROCEDURE — 94003 VENT MGMT INPAT SUBQ DAY: CPT

## 2023-08-13 PROCEDURE — 6370000000 HC RX 637 (ALT 250 FOR IP)

## 2023-08-13 PROCEDURE — 85025 COMPLETE CBC W/AUTO DIFF WBC: CPT

## 2023-08-13 PROCEDURE — 84100 ASSAY OF PHOSPHORUS: CPT

## 2023-08-13 PROCEDURE — 6370000000 HC RX 637 (ALT 250 FOR IP): Performed by: STUDENT IN AN ORGANIZED HEALTH CARE EDUCATION/TRAINING PROGRAM

## 2023-08-13 PROCEDURE — 2580000003 HC RX 258: Performed by: SURGERY

## 2023-08-13 PROCEDURE — 2500000003 HC RX 250 WO HCPCS: Performed by: SURGERY

## 2023-08-13 PROCEDURE — 6360000002 HC RX W HCPCS

## 2023-08-13 PROCEDURE — 94640 AIRWAY INHALATION TREATMENT: CPT

## 2023-08-13 PROCEDURE — 71045 X-RAY EXAM CHEST 1 VIEW: CPT

## 2023-08-13 PROCEDURE — 6370000000 HC RX 637 (ALT 250 FOR IP): Performed by: SURGERY

## 2023-08-13 PROCEDURE — 2000000000 HC ICU R&B

## 2023-08-13 PROCEDURE — 80053 COMPREHEN METABOLIC PANEL: CPT

## 2023-08-13 PROCEDURE — 6360000002 HC RX W HCPCS: Performed by: SURGERY

## 2023-08-13 PROCEDURE — 99291 CRITICAL CARE FIRST HOUR: CPT | Performed by: SURGERY

## 2023-08-13 RX ORDER — QUETIAPINE FUMARATE 100 MG/1
200 TABLET, FILM COATED ORAL 2 TIMES DAILY
Status: DISCONTINUED | OUTPATIENT
Start: 2023-08-13 | End: 2023-08-25 | Stop reason: HOSPADM

## 2023-08-13 RX ORDER — LORAZEPAM 1 MG/1
2 TABLET ORAL EVERY 4 HOURS
Status: DISCONTINUED | OUTPATIENT
Start: 2023-08-13 | End: 2023-08-14

## 2023-08-13 RX ADMIN — NYSTATIN 500000 UNITS: 100000 SUSPENSION ORAL at 11:52

## 2023-08-13 RX ADMIN — METOCLOPRAMIDE HYDROCHLORIDE 5 MG: 5 INJECTION INTRAMUSCULAR; INTRAVENOUS at 10:31

## 2023-08-13 RX ADMIN — IPRATROPIUM BROMIDE AND ALBUTEROL SULFATE 1 DOSE: .5; 2.5 SOLUTION RESPIRATORY (INHALATION) at 16:14

## 2023-08-13 RX ADMIN — ACETYLCYSTEINE 400 MG: 100 INHALANT RESPIRATORY (INHALATION) at 16:14

## 2023-08-13 RX ADMIN — DEXMEDETOMIDINE 1.5 MCG/KG/HR: 100 INJECTION, SOLUTION INTRAVENOUS at 21:08

## 2023-08-13 RX ADMIN — LORAZEPAM 2 MG: 1 TABLET ORAL at 15:42

## 2023-08-13 RX ADMIN — ENOXAPARIN SODIUM 40 MG: 100 INJECTION SUBCUTANEOUS at 20:25

## 2023-08-13 RX ADMIN — SODIUM CHLORIDE, PRESERVATIVE FREE 10 ML: 5 INJECTION INTRAVENOUS at 09:17

## 2023-08-13 RX ADMIN — FENTANYL CITRATE 100 MCG: 50 INJECTION INTRAMUSCULAR; INTRAVENOUS at 17:01

## 2023-08-13 RX ADMIN — FENTANYL CITRATE 100 MCG: 50 INJECTION INTRAMUSCULAR; INTRAVENOUS at 00:39

## 2023-08-13 RX ADMIN — BUPROPION HYDROCHLORIDE 75 MG: 75 TABLET, FILM COATED ORAL at 08:33

## 2023-08-13 RX ADMIN — LANSOPRAZOLE 30 MG: 30 TABLET, ORALLY DISINTEGRATING, DELAYED RELEASE ORAL at 06:42

## 2023-08-13 RX ADMIN — ACETYLCYSTEINE 400 MG: 100 INHALANT RESPIRATORY (INHALATION) at 20:19

## 2023-08-13 RX ADMIN — INSULIN LISPRO 8 UNITS: 100 INJECTION, SOLUTION INTRAVENOUS; SUBCUTANEOUS at 08:13

## 2023-08-13 RX ADMIN — INSULIN LISPRO 12 UNITS: 100 INJECTION, SOLUTION INTRAVENOUS; SUBCUTANEOUS at 23:31

## 2023-08-13 RX ADMIN — ACETYLCYSTEINE 400 MG: 100 INHALANT RESPIRATORY (INHALATION) at 00:14

## 2023-08-13 RX ADMIN — PETROLATUM: 420 OINTMENT TOPICAL at 08:13

## 2023-08-13 RX ADMIN — LORAZEPAM 2 MG: 1 TABLET ORAL at 23:31

## 2023-08-13 RX ADMIN — ENOXAPARIN SODIUM 40 MG: 100 INJECTION SUBCUTANEOUS at 08:34

## 2023-08-13 RX ADMIN — LORAZEPAM 1 MG: 1 TABLET ORAL at 04:38

## 2023-08-13 RX ADMIN — METOCLOPRAMIDE HYDROCHLORIDE 5 MG: 5 INJECTION INTRAMUSCULAR; INTRAVENOUS at 20:26

## 2023-08-13 RX ADMIN — IPRATROPIUM BROMIDE AND ALBUTEROL SULFATE 1 DOSE: .5; 2.5 SOLUTION RESPIRATORY (INHALATION) at 08:22

## 2023-08-13 RX ADMIN — METOCLOPRAMIDE HYDROCHLORIDE 5 MG: 5 INJECTION INTRAMUSCULAR; INTRAVENOUS at 15:42

## 2023-08-13 RX ADMIN — CHLORHEXIDINE GLUCONATE 15 ML: 1.2 RINSE ORAL at 11:32

## 2023-08-13 RX ADMIN — INSULIN LISPRO 8 UNITS: 100 INJECTION, SOLUTION INTRAVENOUS; SUBCUTANEOUS at 00:27

## 2023-08-13 RX ADMIN — PHENOBARBITAL 129.6 MG: 32.4 TABLET ORAL at 06:41

## 2023-08-13 RX ADMIN — INSULIN LISPRO 8 UNITS: 100 INJECTION, SOLUTION INTRAVENOUS; SUBCUTANEOUS at 20:39

## 2023-08-13 RX ADMIN — IPRATROPIUM BROMIDE AND ALBUTEROL SULFATE 1 DOSE: .5; 2.5 SOLUTION RESPIRATORY (INHALATION) at 20:19

## 2023-08-13 RX ADMIN — POLYVINYL ALCOHOL 1 DROP: 14 SOLUTION/ DROPS OPHTHALMIC at 04:39

## 2023-08-13 RX ADMIN — Medication 250 MG: at 08:34

## 2023-08-13 RX ADMIN — METOCLOPRAMIDE HYDROCHLORIDE 5 MG: 5 INJECTION INTRAMUSCULAR; INTRAVENOUS at 04:38

## 2023-08-13 RX ADMIN — Medication 250 MG: at 20:25

## 2023-08-13 RX ADMIN — FENTANYL CITRATE 100 MCG: 50 INJECTION INTRAMUSCULAR; INTRAVENOUS at 21:15

## 2023-08-13 RX ADMIN — Medication 250 MG: at 15:42

## 2023-08-13 RX ADMIN — QUETIAPINE FUMARATE 200 MG: 100 TABLET ORAL at 20:27

## 2023-08-13 RX ADMIN — OXYCODONE HYDROCHLORIDE 15 MG: 5 SOLUTION ORAL at 08:34

## 2023-08-13 RX ADMIN — INSULIN LISPRO 12 UNITS: 100 INJECTION, SOLUTION INTRAVENOUS; SUBCUTANEOUS at 04:38

## 2023-08-13 RX ADMIN — BISACODYL 10 MG: 10 SUPPOSITORY RECTAL at 20:25

## 2023-08-13 RX ADMIN — DEXMEDETOMIDINE 1.5 MCG/KG/HR: 100 INJECTION, SOLUTION INTRAVENOUS at 14:10

## 2023-08-13 RX ADMIN — OXYCODONE HYDROCHLORIDE 15 MG: 5 SOLUTION ORAL at 15:42

## 2023-08-13 RX ADMIN — BUPROPION HYDROCHLORIDE 75 MG: 75 TABLET, FILM COATED ORAL at 20:28

## 2023-08-13 RX ADMIN — PETROLATUM: 420 OINTMENT TOPICAL at 20:28

## 2023-08-13 RX ADMIN — OXYCODONE HYDROCHLORIDE 15 MG: 5 SOLUTION ORAL at 04:37

## 2023-08-13 RX ADMIN — POTASSIUM BICARBONATE 20 MEQ: 782 TABLET, EFFERVESCENT ORAL at 20:25

## 2023-08-13 RX ADMIN — ACETYLCYSTEINE 400 MG: 100 INHALANT RESPIRATORY (INHALATION) at 04:33

## 2023-08-13 RX ADMIN — PHENOBARBITAL 129.6 MG: 32.4 TABLET ORAL at 23:31

## 2023-08-13 RX ADMIN — FLUOXETINE 20 MG: 10 TABLET, FILM COATED ORAL at 08:35

## 2023-08-13 RX ADMIN — POTASSIUM BICARBONATE 20 MEQ: 782 TABLET, EFFERVESCENT ORAL at 08:34

## 2023-08-13 RX ADMIN — CHLORHEXIDINE GLUCONATE 15 ML: 1.2 RINSE ORAL at 15:43

## 2023-08-13 RX ADMIN — CHLORHEXIDINE GLUCONATE 15 ML: 1.2 RINSE ORAL at 08:13

## 2023-08-13 RX ADMIN — POLYVINYL ALCOHOL 1 DROP: 14 SOLUTION/ DROPS OPHTHALMIC at 08:13

## 2023-08-13 RX ADMIN — Medication 250 MG: at 11:42

## 2023-08-13 RX ADMIN — QUETIAPINE FUMARATE 150 MG: 100 TABLET ORAL at 08:33

## 2023-08-13 RX ADMIN — IPRATROPIUM BROMIDE AND ALBUTEROL SULFATE 1 DOSE: .5; 2.5 SOLUTION RESPIRATORY (INHALATION) at 12:06

## 2023-08-13 RX ADMIN — CHLORHEXIDINE GLUCONATE 15 ML: 1.2 RINSE ORAL at 20:05

## 2023-08-13 RX ADMIN — DEXMEDETOMIDINE 1.5 MCG/KG/HR: 100 INJECTION, SOLUTION INTRAVENOUS at 08:05

## 2023-08-13 RX ADMIN — POLYVINYL ALCOHOL 1 DROP: 14 SOLUTION/ DROPS OPHTHALMIC at 23:32

## 2023-08-13 RX ADMIN — PHENOBARBITAL 129.6 MG: 32.4 TABLET ORAL at 11:42

## 2023-08-13 RX ADMIN — Medication 10 MG: at 20:27

## 2023-08-13 RX ADMIN — FENTANYL CITRATE 100 MCG: 50 INJECTION INTRAMUSCULAR; INTRAVENOUS at 03:57

## 2023-08-13 RX ADMIN — OXYCODONE HYDROCHLORIDE 15 MG: 5 SOLUTION ORAL at 23:31

## 2023-08-13 RX ADMIN — POLYVINYL ALCOHOL 1 DROP: 14 SOLUTION/ DROPS OPHTHALMIC at 15:48

## 2023-08-13 RX ADMIN — NYSTATIN 500000 UNITS: 100000 SUSPENSION ORAL at 08:34

## 2023-08-13 RX ADMIN — OXYCODONE HYDROCHLORIDE 15 MG: 5 SOLUTION ORAL at 11:41

## 2023-08-13 RX ADMIN — NYSTATIN 500000 UNITS: 100000 SUSPENSION ORAL at 20:26

## 2023-08-13 RX ADMIN — LORAZEPAM 1 MG: 1 TABLET ORAL at 08:33

## 2023-08-13 RX ADMIN — LORAZEPAM 2 MG: 1 TABLET ORAL at 11:41

## 2023-08-13 RX ADMIN — OXYCODONE HYDROCHLORIDE 15 MG: 5 SOLUTION ORAL at 20:26

## 2023-08-13 RX ADMIN — NYSTATIN 500000 UNITS: 100000 SUSPENSION ORAL at 15:43

## 2023-08-13 RX ADMIN — CHLORHEXIDINE GLUCONATE 15 ML: 1.2 RINSE ORAL at 04:37

## 2023-08-13 RX ADMIN — LORAZEPAM 2 MG: 1 TABLET ORAL at 20:27

## 2023-08-13 RX ADMIN — DEXMEDETOMIDINE 1.5 MCG/KG/HR: 100 INJECTION, SOLUTION INTRAVENOUS at 01:56

## 2023-08-13 RX ADMIN — NALOXEGOL OXALATE 12.5 MG: 12.5 TABLET, FILM COATED ORAL at 06:48

## 2023-08-13 RX ADMIN — POLYVINYL ALCOHOL 1 DROP: 14 SOLUTION/ DROPS OPHTHALMIC at 11:32

## 2023-08-13 RX ADMIN — PHENOBARBITAL 129.6 MG: 32.4 TABLET ORAL at 17:02

## 2023-08-13 RX ADMIN — Medication 10 ML: at 15:46

## 2023-08-13 RX ADMIN — ACETYLCYSTEINE 400 MG: 100 INHALANT RESPIRATORY (INHALATION) at 12:06

## 2023-08-13 RX ADMIN — POLYVINYL ALCOHOL 1 DROP: 14 SOLUTION/ DROPS OPHTHALMIC at 20:38

## 2023-08-13 RX ADMIN — SODIUM CHLORIDE, PRESERVATIVE FREE 10 ML: 5 INJECTION INTRAVENOUS at 20:28

## 2023-08-13 RX ADMIN — CHLORHEXIDINE GLUCONATE 15 ML: 1.2 RINSE ORAL at 23:31

## 2023-08-13 RX ADMIN — ACETYLCYSTEINE 400 MG: 100 INHALANT RESPIRATORY (INHALATION) at 08:22

## 2023-08-13 ASSESSMENT — PULMONARY FUNCTION TESTS
PIF_VALUE: 27
PIF_VALUE: 31
PIF_VALUE: 23
PIF_VALUE: 24
PIF_VALUE: 22
PIF_VALUE: 50
PIF_VALUE: 50
PIF_VALUE: 23
PIF_VALUE: 24
PIF_VALUE: 21
PIF_VALUE: 50
PIF_VALUE: 23
PIF_VALUE: 22
PIF_VALUE: 27
PIF_VALUE: 22
PIF_VALUE: 22
PIF_VALUE: 24
PIF_VALUE: 25
PIF_VALUE: 31
PIF_VALUE: 24
PIF_VALUE: 26
PIF_VALUE: 22
PIF_VALUE: 24
PIF_VALUE: 50
PIF_VALUE: 26
PIF_VALUE: 24
PIF_VALUE: 23
PIF_VALUE: 22
PIF_VALUE: 29
PIF_VALUE: 25
PIF_VALUE: 24
PIF_VALUE: 25

## 2023-08-13 ASSESSMENT — PAIN SCALES - GENERAL
PAINLEVEL_OUTOF10: 6
PAINLEVEL_OUTOF10: 7
PAINLEVEL_OUTOF10: 9
PAINLEVEL_OUTOF10: 4
PAINLEVEL_OUTOF10: 1
PAINLEVEL_OUTOF10: 3
PAINLEVEL_OUTOF10: 0
PAINLEVEL_OUTOF10: 4
PAINLEVEL_OUTOF10: 2
PAINLEVEL_OUTOF10: 4

## 2023-08-13 ASSESSMENT — PAIN - FUNCTIONAL ASSESSMENT: PAIN_FUNCTIONAL_ASSESSMENT: PREVENTS OR INTERFERES WITH MANY ACTIVE NOT PASSIVE ACTIVITIES
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
## 2023-08-13 NOTE — PLAN OF CARE
Problem: Discharge Planning  Goal: Discharge to home or other facility with appropriate resources  Outcome: Not Progressing     Problem: Neurosensory - Adult  Goal: Achieves maximal functionality and self care  Outcome: Not Progressing     Problem: Respiratory - Adult  Goal: Achieves optimal ventilation and oxygenation  Outcome: Not Progressing  Flowsheets (Taken 8/13/2023 0515 by José Miguel Simeon RN)  Achieves optimal ventilation and oxygenation: Assess for changes in mentation and behavior     Problem: Musculoskeletal - Adult  Goal: Return mobility to safest level of function  Outcome: Not Progressing  Goal: Return ADL status to a safe level of function  Outcome: Not Progressing     Problem: Infection - Adult  Goal: Absence of infection at discharge  Outcome: Not Progressing  Goal: Absence of infection during hospitalization  Outcome: Not Progressing     Problem: Discharge Planning  Goal: Discharge to home or other facility with appropriate resources  Outcome: Not Progressing     Problem: Neurosensory - Adult  Goal: Achieves maximal functionality and self care  Outcome: Not Progressing     Problem: Respiratory - Adult  Goal: Achieves optimal ventilation and oxygenation  Outcome: Not Progressing  Flowsheets (Taken 8/13/2023 0515 by José Miguel Simeon RN)  Achieves optimal ventilation and oxygenation: Assess for changes in mentation and behavior     Problem: Musculoskeletal - Adult  Goal: Return mobility to safest level of function  Outcome: Not Progressing  Goal: Return ADL status to a safe level of function  Outcome: Not Progressing     Problem: Infection - Adult  Goal: Absence of infection at discharge  Outcome: Not Progressing  Goal: Absence of infection during hospitalization  Outcome: Not Progressing

## 2023-08-13 NOTE — FLOWSHEET NOTE
Anxious and agitated with stimulation. Reaches for trach and feeding tube when restraints removed for repositioning. Unable to consistently follow verbal direction.

## 2023-08-13 NOTE — FLOWSHEET NOTE
Patient agitated and restless, constantly moving all over bed and pulling at lines/tubes critical to care. Patient pulls at vent tubing as well as lines/tubes when restraints released for ROM and repositioning. Multiple attempts made to redirect patient without success. Bilateral soft wrist restraints remain for patient safety.

## 2023-08-13 NOTE — PLAN OF CARE
Problem: Safety - Adult  Goal: Free from fall injury  8/13/2023 1649 by Adis Rose RN  Outcome: Progressing     Problem: ABCDS Injury Assessment  Goal: Absence of physical injury  8/13/2023 1649 by Adis Rose RN  Outcome: Progressing     Problem: Safety - Medical Restraint  Goal: Remains free of injury from restraints (Restraint for Interference with Medical Device)  Description: INTERVENTIONS:  1. Determine that other, less restrictive measures have been tried or would not be effective before applying the restraint  2. Evaluate the patient's condition at the time of restraint application  3. Inform patient/family regarding the reason for restraint  4.  Q2H: Monitor safety, psychosocial status, comfort, nutrition and hydration  8/13/2023 1649 by Adis Rose RN  Outcome: Progressing  Flowsheets (Taken 8/13/2023 1600)  Remains free of injury from restraints (restraint for interference with medical device): Every 2 hours: Monitor safety, psychosocial status, comfort, nutrition and hydration     Problem: Discharge Planning  Goal: Discharge to home or other facility with appropriate resources  8/13/2023 0906 by Dalia Odell RN  Outcome: Not Progressing     Problem: Neurosensory - Adult  Goal: Achieves maximal functionality and self care  8/13/2023 0906 by Dalia Odell RN  Outcome: Not Progressing     Problem: Respiratory - Adult  Goal: Achieves optimal ventilation and oxygenation  8/13/2023 0906 by Dalia Odell RN  Outcome: Not Progressing  Flowsheets (Taken 8/13/2023 0515 by Yash Tucker RN)  Achieves optimal ventilation and oxygenation: Assess for changes in mentation and behavior     Problem: Musculoskeletal - Adult  Goal: Return mobility to safest level of function  8/13/2023 0906 by Dalia Odell RN  Outcome: Not Progressing  Goal: Return ADL status to a safe level of function  8/13/2023 0906 by Dalia Odell RN  Outcome: Not Progressing     Problem: Infection - Adult  Goal: Absence of

## 2023-08-13 NOTE — FLOWSHEET NOTE
Patient continues to reach for medical devices. Attempts to verbally reorient/redirect or educate patient on importance towards continued care goals are unsuccessful at this time. 2pt bilateral soft wrist restraints maintained for patient safety. Family Aware of indication and continuation. Will continue to attempt to reorient/redirect and assess for earliest possible safe removal from restraints.

## 2023-08-13 NOTE — PROGRESS NOTES
DAILY VENTILATOR WEANING ASSESSMENT PERFORMED    P/FIO2 Ratio =  164        (<100= do not Wean)                  Cs =  38                        (<32= Instability)  Plat. Pressure = 22  MV = 7.0  RSBI =    Instabilities:       Cardiovascular =       CNS =       Respiratory = r5       Metabolic =    Parameters    no    Wean per protocol  no    Ask Physician for a weaning plan yes    Additional Comments:     Performed by Taylor Phillips RCP RRT      Reference Table:    Cardiovascular     CNS      1. Mean BP less than or equal to 75   1. Neuromuscular blockade  2. Heart Rate greater than 130   2. RASS of -3, -4, -5  3. Myocardial Ischemia    3. RASS of +3, +4  4. Mechanical Assist Device    4. ICP greater than 15 or             Intracranial Hypertension         Respiratory      Metabolic  1. PEEP equal to or greater than 10cm/H20  1. Temp. (8hrs) less than 95 or > 103  2. Respiratory Rate greater than 35   2. WBC < 5000 or > 75068  3. Minute Volume greater than 15L  4. pH less than 7.30  5.  Deteriorating chest X-ray

## 2023-08-14 ENCOUNTER — APPOINTMENT (OUTPATIENT)
Dept: GENERAL RADIOLOGY | Age: 58
DRG: 004 | End: 2023-08-14
Payer: COMMERCIAL

## 2023-08-14 LAB
AADO2: 239.4 MMHG
ALBUMIN SERPL-MCNC: 2.9 G/DL (ref 3.5–5.2)
ALP SERPL-CCNC: 76 U/L (ref 40–129)
ALT SERPL-CCNC: 14 U/L (ref 0–40)
ANION GAP SERPL CALCULATED.3IONS-SCNC: 11 MMOL/L (ref 7–16)
AST SERPL-CCNC: 20 U/L (ref 0–39)
B.E.: 4.6 MMOL/L (ref -3–3)
BASOPHILS # BLD: 0.06 K/UL (ref 0–0.2)
BASOPHILS NFR BLD: 1 % (ref 0–2)
BILIRUB SERPL-MCNC: 0.3 MG/DL (ref 0–1.2)
BUN SERPL-MCNC: 15 MG/DL (ref 6–20)
CA-I BLD-SCNC: 1.13 MMOL/L (ref 1.15–1.33)
CALCIUM SERPL-MCNC: 8.7 MG/DL (ref 8.6–10.2)
CHLORIDE SERPL-SCNC: 96 MMOL/L (ref 98–107)
CO2 SERPL-SCNC: 29 MMOL/L (ref 22–29)
COHB: 0.1 % (ref 0–1.5)
CREAT SERPL-MCNC: 0.7 MG/DL (ref 0.7–1.2)
CRITICAL: ABNORMAL
DATE ANALYZED: ABNORMAL
DATE OF COLLECTION: ABNORMAL
EKG ATRIAL RATE: 127 BPM
EKG ATRIAL RATE: 86 BPM
EKG P AXIS: 31 DEGREES
EKG P AXIS: 61 DEGREES
EKG P-R INTERVAL: 162 MS
EKG P-R INTERVAL: 166 MS
EKG Q-T INTERVAL: 296 MS
EKG Q-T INTERVAL: 392 MS
EKG QRS DURATION: 80 MS
EKG QRS DURATION: 82 MS
EKG QTC CALCULATION (BAZETT): 430 MS
EKG QTC CALCULATION (BAZETT): 469 MS
EKG R AXIS: 4 DEGREES
EKG R AXIS: 7 DEGREES
EKG T AXIS: 62 DEGREES
EKG T AXIS: 88 DEGREES
EKG VENTRICULAR RATE: 127 BPM
EKG VENTRICULAR RATE: 86 BPM
EOSINOPHIL # BLD: 0.37 K/UL (ref 0.05–0.5)
EOSINOPHILS RELATIVE PERCENT: 3 % (ref 0–6)
ERYTHROCYTE [DISTWIDTH] IN BLOOD BY AUTOMATED COUNT: 14.5 % (ref 11.5–15)
FIO2: 50 %
GFR SERPL CREATININE-BSD FRML MDRD: >60 ML/MIN/1.73M2
GLUCOSE BLD-MCNC: 159 MG/DL (ref 74–99)
GLUCOSE BLD-MCNC: 166 MG/DL (ref 74–99)
GLUCOSE BLD-MCNC: 217 MG/DL (ref 74–99)
GLUCOSE BLD-MCNC: 249 MG/DL (ref 74–99)
GLUCOSE BLD-MCNC: 98 MG/DL (ref 74–99)
GLUCOSE SERPL-MCNC: 215 MG/DL (ref 74–99)
HCO3: 28.2 MMOL/L (ref 22–26)
HCT VFR BLD AUTO: 28.9 % (ref 37–54)
HGB BLD-MCNC: 8.8 G/DL (ref 12.5–16.5)
HHB: 8.1 % (ref 0–5)
HIV 1+2 AB+HIV1 P24 AG SERPL QL IA: NONREACTIVE
IMM GRANULOCYTES # BLD AUTO: 0.14 K/UL (ref 0–0.58)
IMM GRANULOCYTES NFR BLD: 1 % (ref 0–5)
LAB: ABNORMAL
LYMPHOCYTES NFR BLD: 2.94 K/UL (ref 1.5–4)
LYMPHOCYTES RELATIVE PERCENT: 24 % (ref 20–42)
Lab: ABNORMAL
MCH RBC QN AUTO: 28.6 PG (ref 26–35)
MCHC RBC AUTO-ENTMCNC: 30.4 G/DL (ref 32–34.5)
MCV RBC AUTO: 93.8 FL (ref 80–99.9)
METHB: 0.1 % (ref 0–1.5)
MODE: AC
MONOCYTES NFR BLD: 1.43 K/UL (ref 0.1–0.95)
MONOCYTES NFR BLD: 12 % (ref 2–12)
NEUTROPHILS NFR BLD: 60 % (ref 43–80)
NEUTS SEG NFR BLD: 7.52 K/UL (ref 1.8–7.3)
O2 CONTENT: 13.6 ML/DL
O2 SATURATION: 91.9 % (ref 92–98.5)
O2HB: 91.7 % (ref 94–97)
OPERATOR ID: 2577
PATIENT TEMP: 37 C
PCO2: 38.1 MMHG (ref 35–45)
PEEP/CPAP: 10 CMH2O
PFO2: 1.23 MMHG/%
PH BLOOD GAS: 7.49 (ref 7.35–7.45)
PHOSPHATE SERPL-MCNC: 3.8 MG/DL (ref 2.5–4.5)
PLATELET # BLD AUTO: 737 K/UL (ref 130–450)
PMV BLD AUTO: 10 FL (ref 7–12)
PO2: 61.7 MMHG (ref 75–100)
POTASSIUM SERPL-SCNC: 5.1 MMOL/L (ref 3.5–5)
PROT SERPL-MCNC: 6.2 G/DL (ref 6.4–8.3)
RBC # BLD AUTO: 3.08 M/UL (ref 3.8–5.8)
RI(T): 3.88
RR MECHANICAL: 14 B/MIN
SODIUM SERPL-SCNC: 136 MMOL/L (ref 132–146)
SOURCE, BLOOD GAS: ABNORMAL
SURGICAL PATHOLOGY REPORT: NORMAL
THB: 10.5 G/DL (ref 11.5–16.5)
TIME ANALYZED: 527
VT MECHANICAL: 460 ML
WBC OTHER # BLD: 12.5 K/UL (ref 4.5–11.5)

## 2023-08-14 PROCEDURE — 6360000002 HC RX W HCPCS: Performed by: SURGERY

## 2023-08-14 PROCEDURE — 93010 ELECTROCARDIOGRAM REPORT: CPT | Performed by: INTERNAL MEDICINE

## 2023-08-14 PROCEDURE — 94640 AIRWAY INHALATION TREATMENT: CPT

## 2023-08-14 PROCEDURE — 2000000000 HC ICU R&B

## 2023-08-14 PROCEDURE — 6370000000 HC RX 637 (ALT 250 FOR IP): Performed by: STUDENT IN AN ORGANIZED HEALTH CARE EDUCATION/TRAINING PROGRAM

## 2023-08-14 PROCEDURE — 6370000000 HC RX 637 (ALT 250 FOR IP)

## 2023-08-14 PROCEDURE — 0BC38ZZ EXTIRPATION OF MATTER FROM RIGHT MAIN BRONCHUS, VIA NATURAL OR ARTIFICIAL OPENING ENDOSCOPIC: ICD-10-PCS | Performed by: SURGERY

## 2023-08-14 PROCEDURE — 85025 COMPLETE CBC W/AUTO DIFF WBC: CPT

## 2023-08-14 PROCEDURE — 2580000003 HC RX 258: Performed by: SURGERY

## 2023-08-14 PROCEDURE — S5553 INSULIN LONG ACTING 5 U: HCPCS

## 2023-08-14 PROCEDURE — 87205 SMEAR GRAM STAIN: CPT

## 2023-08-14 PROCEDURE — 31624 DX BRONCHOSCOPE/LAVAGE: CPT

## 2023-08-14 PROCEDURE — 82962 GLUCOSE BLOOD TEST: CPT

## 2023-08-14 PROCEDURE — 71045 X-RAY EXAM CHEST 1 VIEW: CPT

## 2023-08-14 PROCEDURE — 2580000003 HC RX 258: Performed by: STUDENT IN AN ORGANIZED HEALTH CARE EDUCATION/TRAINING PROGRAM

## 2023-08-14 PROCEDURE — 99291 CRITICAL CARE FIRST HOUR: CPT | Performed by: SURGERY

## 2023-08-14 PROCEDURE — 6360000002 HC RX W HCPCS: Performed by: STUDENT IN AN ORGANIZED HEALTH CARE EDUCATION/TRAINING PROGRAM

## 2023-08-14 PROCEDURE — 6360000002 HC RX W HCPCS

## 2023-08-14 PROCEDURE — 87077 CULTURE AEROBIC IDENTIFY: CPT

## 2023-08-14 PROCEDURE — 84100 ASSAY OF PHOSPHORUS: CPT

## 2023-08-14 PROCEDURE — 0BC78ZZ EXTIRPATION OF MATTER FROM LEFT MAIN BRONCHUS, VIA NATURAL OR ARTIFICIAL OPENING ENDOSCOPIC: ICD-10-PCS | Performed by: SURGERY

## 2023-08-14 PROCEDURE — 80053 COMPREHEN METABOLIC PANEL: CPT

## 2023-08-14 PROCEDURE — 2580000003 HC RX 258

## 2023-08-14 PROCEDURE — 6370000000 HC RX 637 (ALT 250 FOR IP): Performed by: SURGERY

## 2023-08-14 PROCEDURE — 86403 PARTICLE AGGLUT ANTBDY SCRN: CPT

## 2023-08-14 PROCEDURE — 94003 VENT MGMT INPAT SUBQ DAY: CPT

## 2023-08-14 PROCEDURE — 93005 ELECTROCARDIOGRAM TRACING: CPT

## 2023-08-14 PROCEDURE — 2500000003 HC RX 250 WO HCPCS: Performed by: SURGERY

## 2023-08-14 PROCEDURE — 87070 CULTURE OTHR SPECIMN AEROBIC: CPT

## 2023-08-14 PROCEDURE — 82330 ASSAY OF CALCIUM: CPT

## 2023-08-14 PROCEDURE — 82805 BLOOD GASES W/O2 SATURATION: CPT

## 2023-08-14 RX ORDER — ACETAMINOPHEN 650 MG/1
650 SUPPOSITORY RECTAL EVERY 4 HOURS PRN
Status: DISCONTINUED | OUTPATIENT
Start: 2023-08-14 | End: 2023-08-25

## 2023-08-14 RX ORDER — LORAZEPAM 2 MG/ML
2 INJECTION INTRAMUSCULAR EVERY 4 HOURS
Status: DISCONTINUED | OUTPATIENT
Start: 2023-08-14 | End: 2023-08-25 | Stop reason: HOSPADM

## 2023-08-14 RX ORDER — MIDAZOLAM HYDROCHLORIDE 2 MG/2ML
2 INJECTION, SOLUTION INTRAMUSCULAR; INTRAVENOUS
Status: DISCONTINUED | OUTPATIENT
Start: 2023-08-14 | End: 2023-08-25 | Stop reason: HOSPADM

## 2023-08-14 RX ORDER — MIDAZOLAM HYDROCHLORIDE 2 MG/2ML
4 INJECTION, SOLUTION INTRAMUSCULAR; INTRAVENOUS ONCE
Status: COMPLETED | OUTPATIENT
Start: 2023-08-14 | End: 2023-08-14

## 2023-08-14 RX ORDER — FUROSEMIDE 10 MG/ML
40 INJECTION INTRAMUSCULAR; INTRAVENOUS ONCE
Status: COMPLETED | OUTPATIENT
Start: 2023-08-14 | End: 2023-08-14

## 2023-08-14 RX ORDER — FENTANYL CITRATE 50 UG/ML
200 INJECTION, SOLUTION INTRAMUSCULAR; INTRAVENOUS ONCE
Status: COMPLETED | OUTPATIENT
Start: 2023-08-14 | End: 2023-08-14

## 2023-08-14 RX ORDER — MIDAZOLAM HYDROCHLORIDE 2 MG/2ML
2 INJECTION, SOLUTION INTRAMUSCULAR; INTRAVENOUS ONCE
Status: COMPLETED | OUTPATIENT
Start: 2023-08-14 | End: 2023-08-14

## 2023-08-14 RX ORDER — CALCIUM GLUCONATE 10 MG/ML
1000 INJECTION, SOLUTION INTRAVENOUS ONCE
Status: COMPLETED | OUTPATIENT
Start: 2023-08-14 | End: 2023-08-14

## 2023-08-14 RX ORDER — INSULIN GLARGINE-YFGN 100 [IU]/ML
10 INJECTION, SOLUTION SUBCUTANEOUS NIGHTLY
Status: DISCONTINUED | OUTPATIENT
Start: 2023-08-14 | End: 2023-08-25 | Stop reason: HOSPADM

## 2023-08-14 RX ORDER — ACETYLCYSTEINE 100 MG/ML
4 SOLUTION ORAL; RESPIRATORY (INHALATION)
Status: DISCONTINUED | OUTPATIENT
Start: 2023-08-14 | End: 2023-08-14

## 2023-08-14 RX ORDER — WATER 1000 ML/1000ML
INJECTION, SOLUTION INTRAVENOUS
Status: DISPENSED
Start: 2023-08-14 | End: 2023-08-15

## 2023-08-14 RX ORDER — PHENOBARBITAL SODIUM 65 MG/ML
130 INJECTION INTRAMUSCULAR EVERY 6 HOURS
Status: DISCONTINUED | OUTPATIENT
Start: 2023-08-14 | End: 2023-08-19

## 2023-08-14 RX ORDER — VECURONIUM BROMIDE 1 MG/ML
10 INJECTION, POWDER, LYOPHILIZED, FOR SOLUTION INTRAVENOUS ONCE
Status: DISCONTINUED | OUTPATIENT
Start: 2023-08-14 | End: 2023-08-15

## 2023-08-14 RX ADMIN — CEFEPIME 2000 MG: 2 INJECTION, POWDER, FOR SOLUTION INTRAVENOUS at 20:38

## 2023-08-14 RX ADMIN — MIDAZOLAM 4 MG: 1 INJECTION INTRAMUSCULAR; INTRAVENOUS at 21:44

## 2023-08-14 RX ADMIN — POLYVINYL ALCOHOL 1 DROP: 14 SOLUTION/ DROPS OPHTHALMIC at 08:30

## 2023-08-14 RX ADMIN — IPRATROPIUM BROMIDE AND ALBUTEROL SULFATE 1 DOSE: .5; 2.5 SOLUTION RESPIRATORY (INHALATION) at 18:42

## 2023-08-14 RX ADMIN — QUETIAPINE FUMARATE 200 MG: 100 TABLET ORAL at 08:04

## 2023-08-14 RX ADMIN — POLYVINYL ALCOHOL 1 DROP: 14 SOLUTION/ DROPS OPHTHALMIC at 05:10

## 2023-08-14 RX ADMIN — NYSTATIN 500000 UNITS: 100000 SUSPENSION ORAL at 20:26

## 2023-08-14 RX ADMIN — BISACODYL 10 MG: 10 SUPPOSITORY RECTAL at 08:05

## 2023-08-14 RX ADMIN — CHLORHEXIDINE GLUCONATE 15 ML: 1.2 RINSE ORAL at 08:06

## 2023-08-14 RX ADMIN — INSULIN LISPRO 4 UNITS: 100 INJECTION, SOLUTION INTRAVENOUS; SUBCUTANEOUS at 17:10

## 2023-08-14 RX ADMIN — PHENOBARBITAL 129.6 MG: 32.4 TABLET ORAL at 05:09

## 2023-08-14 RX ADMIN — PHENOBARBITAL SODIUM 130 MG: 65 INJECTION INTRAMUSCULAR at 19:28

## 2023-08-14 RX ADMIN — FENTANYL CITRATE 100 MCG: 50 INJECTION INTRAMUSCULAR; INTRAVENOUS at 09:25

## 2023-08-14 RX ADMIN — LORAZEPAM 2 MG: 1 TABLET ORAL at 08:05

## 2023-08-14 RX ADMIN — INSULIN LISPRO 8 UNITS: 100 INJECTION, SOLUTION INTRAVENOUS; SUBCUTANEOUS at 05:07

## 2023-08-14 RX ADMIN — MIDAZOLAM 2 MG: 1 INJECTION INTRAMUSCULAR; INTRAVENOUS at 20:59

## 2023-08-14 RX ADMIN — IPRATROPIUM BROMIDE AND ALBUTEROL SULFATE 1 DOSE: .5; 2.5 SOLUTION RESPIRATORY (INHALATION) at 11:51

## 2023-08-14 RX ADMIN — FENTANYL CITRATE 100 MCG: 50 INJECTION INTRAMUSCULAR; INTRAVENOUS at 17:52

## 2023-08-14 RX ADMIN — Medication 250 MG: at 08:04

## 2023-08-14 RX ADMIN — NYSTATIN 500000 UNITS: 100000 SUSPENSION ORAL at 08:05

## 2023-08-14 RX ADMIN — POTASSIUM BICARBONATE 20 MEQ: 782 TABLET, EFFERVESCENT ORAL at 08:04

## 2023-08-14 RX ADMIN — MIDAZOLAM 2 MG: 1 INJECTION INTRAMUSCULAR; INTRAVENOUS at 18:36

## 2023-08-14 RX ADMIN — SODIUM CHLORIDE, PRESERVATIVE FREE 10 ML: 5 INJECTION INTRAVENOUS at 20:27

## 2023-08-14 RX ADMIN — ACETAMINOPHEN 650 MG: 650 SUPPOSITORY RECTAL at 22:54

## 2023-08-14 RX ADMIN — LORAZEPAM 2 MG: 1 TABLET ORAL at 11:15

## 2023-08-14 RX ADMIN — FUROSEMIDE 40 MG: 10 INJECTION, SOLUTION INTRAMUSCULAR; INTRAVENOUS at 09:28

## 2023-08-14 RX ADMIN — METOCLOPRAMIDE HYDROCHLORIDE 5 MG: 5 INJECTION INTRAMUSCULAR; INTRAVENOUS at 05:09

## 2023-08-14 RX ADMIN — METOCLOPRAMIDE HYDROCHLORIDE 5 MG: 5 INJECTION INTRAMUSCULAR; INTRAVENOUS at 16:38

## 2023-08-14 RX ADMIN — LANSOPRAZOLE 30 MG: 30 TABLET, ORALLY DISINTEGRATING, DELAYED RELEASE ORAL at 06:40

## 2023-08-14 RX ADMIN — MIDAZOLAM 2 MG: 1 INJECTION INTRAMUSCULAR; INTRAVENOUS at 11:29

## 2023-08-14 RX ADMIN — ACETYLCYSTEINE 400 MG: 100 SOLUTION ORAL; RESPIRATORY (INHALATION) at 08:47

## 2023-08-14 RX ADMIN — POLYVINYL ALCOHOL 1 DROP: 14 SOLUTION/ DROPS OPHTHALMIC at 20:28

## 2023-08-14 RX ADMIN — BISACODYL 10 MG: 10 SUPPOSITORY RECTAL at 20:26

## 2023-08-14 RX ADMIN — INSULIN LISPRO 4 UNITS: 100 INJECTION, SOLUTION INTRAVENOUS; SUBCUTANEOUS at 20:33

## 2023-08-14 RX ADMIN — FENTANYL CITRATE 100 MCG: 50 INJECTION, SOLUTION INTRAMUSCULAR; INTRAVENOUS at 21:45

## 2023-08-14 RX ADMIN — LORAZEPAM 2 MG: 1 TABLET ORAL at 05:09

## 2023-08-14 RX ADMIN — PETROLATUM: 420 OINTMENT TOPICAL at 20:33

## 2023-08-14 RX ADMIN — NALOXEGOL OXALATE 12.5 MG: 12.5 TABLET, FILM COATED ORAL at 06:40

## 2023-08-14 RX ADMIN — BUPROPION HYDROCHLORIDE 75 MG: 75 TABLET, FILM COATED ORAL at 08:04

## 2023-08-14 RX ADMIN — SODIUM CHLORIDE, PRESERVATIVE FREE 10 ML: 5 INJECTION INTRAVENOUS at 08:05

## 2023-08-14 RX ADMIN — IPRATROPIUM BROMIDE AND ALBUTEROL SULFATE 1 DOSE: .5; 2.5 SOLUTION RESPIRATORY (INHALATION) at 15:55

## 2023-08-14 RX ADMIN — MIDAZOLAM 2 MG: 1 INJECTION INTRAMUSCULAR; INTRAVENOUS at 19:43

## 2023-08-14 RX ADMIN — LABETALOL HYDROCHLORIDE 10 MG: 5 INJECTION INTRAVENOUS at 21:05

## 2023-08-14 RX ADMIN — CALCIUM GLUCONATE 1000 MG: 10 INJECTION, SOLUTION INTRAVENOUS at 09:34

## 2023-08-14 RX ADMIN — Medication 250 MG: at 11:16

## 2023-08-14 RX ADMIN — LORAZEPAM 2 MG: 2 INJECTION INTRAMUSCULAR; INTRAVENOUS at 22:47

## 2023-08-14 RX ADMIN — PHENOBARBITAL 129.6 MG: 32.4 TABLET ORAL at 11:15

## 2023-08-14 RX ADMIN — METOCLOPRAMIDE HYDROCHLORIDE 5 MG: 5 INJECTION INTRAMUSCULAR; INTRAVENOUS at 20:26

## 2023-08-14 RX ADMIN — CHLORHEXIDINE GLUCONATE 15 ML: 1.2 RINSE ORAL at 16:38

## 2023-08-14 RX ADMIN — PETROLATUM: 420 OINTMENT TOPICAL at 08:38

## 2023-08-14 RX ADMIN — INSULIN LISPRO 8 UNITS: 100 INJECTION, SOLUTION INTRAVENOUS; SUBCUTANEOUS at 08:04

## 2023-08-14 RX ADMIN — CHLORHEXIDINE GLUCONATE 15 ML: 1.2 RINSE ORAL at 05:10

## 2023-08-14 RX ADMIN — ENOXAPARIN SODIUM 40 MG: 100 INJECTION SUBCUTANEOUS at 08:05

## 2023-08-14 RX ADMIN — POLYVINYL ALCOHOL 1 DROP: 14 SOLUTION/ DROPS OPHTHALMIC at 23:54

## 2023-08-14 RX ADMIN — LORAZEPAM 2 MG: 2 INJECTION INTRAMUSCULAR; INTRAVENOUS at 18:58

## 2023-08-14 RX ADMIN — CHLORHEXIDINE GLUCONATE 15 ML: 1.2 RINSE ORAL at 20:26

## 2023-08-14 RX ADMIN — OXYCODONE HYDROCHLORIDE 15 MG: 5 SOLUTION ORAL at 08:04

## 2023-08-14 RX ADMIN — DEXMEDETOMIDINE 1.4 MCG/KG/HR: 100 INJECTION, SOLUTION INTRAVENOUS at 17:35

## 2023-08-14 RX ADMIN — METOCLOPRAMIDE HYDROCHLORIDE 5 MG: 5 INJECTION INTRAMUSCULAR; INTRAVENOUS at 08:37

## 2023-08-14 RX ADMIN — FENTANYL CITRATE 100 MCG: 50 INJECTION INTRAMUSCULAR; INTRAVENOUS at 20:59

## 2023-08-14 RX ADMIN — POLYVINYL ALCOHOL 1 DROP: 14 SOLUTION/ DROPS OPHTHALMIC at 11:15

## 2023-08-14 RX ADMIN — MIDAZOLAM 2 MG: 1 INJECTION INTRAMUSCULAR; INTRAVENOUS at 23:59

## 2023-08-14 RX ADMIN — OXYCODONE HYDROCHLORIDE 15 MG: 5 SOLUTION ORAL at 11:15

## 2023-08-14 RX ADMIN — FLUOXETINE 20 MG: 10 TABLET, FILM COATED ORAL at 08:06

## 2023-08-14 RX ADMIN — CEFEPIME 2000 MG: 2 INJECTION, POWDER, FOR SOLUTION INTRAVENOUS at 13:35

## 2023-08-14 RX ADMIN — PETROLATUM: 420 OINTMENT TOPICAL at 20:34

## 2023-08-14 RX ADMIN — CHLORHEXIDINE GLUCONATE 15 ML: 1.2 RINSE ORAL at 11:15

## 2023-08-14 RX ADMIN — OXYCODONE HYDROCHLORIDE 15 MG: 5 SOLUTION ORAL at 05:08

## 2023-08-14 RX ADMIN — CHLORHEXIDINE GLUCONATE 15 ML: 1.2 RINSE ORAL at 23:54

## 2023-08-14 RX ADMIN — NYSTATIN 500000 UNITS: 100000 SUSPENSION ORAL at 11:15

## 2023-08-14 RX ADMIN — ONDANSETRON 4 MG: 2 INJECTION INTRAMUSCULAR; INTRAVENOUS at 19:43

## 2023-08-14 RX ADMIN — IPRATROPIUM BROMIDE AND ALBUTEROL SULFATE 1 DOSE: .5; 2.5 SOLUTION RESPIRATORY (INHALATION) at 08:46

## 2023-08-14 RX ADMIN — FENTANYL CITRATE 100 MCG: 50 INJECTION INTRAMUSCULAR; INTRAVENOUS at 00:34

## 2023-08-14 RX ADMIN — ACETAMINOPHEN 650 MG: 650 SOLUTION ORAL at 13:31

## 2023-08-14 RX ADMIN — ENOXAPARIN SODIUM 40 MG: 100 INJECTION SUBCUTANEOUS at 20:26

## 2023-08-14 RX ADMIN — POLYVINYL ALCOHOL 1 DROP: 14 SOLUTION/ DROPS OPHTHALMIC at 16:30

## 2023-08-14 RX ADMIN — INSULIN GLARGINE-YFGN 10 UNITS: 100 INJECTION, SOLUTION SUBCUTANEOUS at 20:27

## 2023-08-14 RX ADMIN — NYSTATIN 500000 UNITS: 100000 SUSPENSION ORAL at 16:37

## 2023-08-14 ASSESSMENT — PULMONARY FUNCTION TESTS
PIF_VALUE: 23
PIF_VALUE: 42
PIF_VALUE: 45
PIF_VALUE: 25
PIF_VALUE: 32
PIF_VALUE: 23
PIF_VALUE: 29
PIF_VALUE: 43
PIF_VALUE: 45
PIF_VALUE: 28
PIF_VALUE: 44
PIF_VALUE: 24
PIF_VALUE: 23
PIF_VALUE: 30
PIF_VALUE: 23
PIF_VALUE: 27
PIF_VALUE: 24
PIF_VALUE: 35
PIF_VALUE: 36
PIF_VALUE: 41
PIF_VALUE: 42
PIF_VALUE: 22
PIF_VALUE: 27
PIF_VALUE: 45
PIF_VALUE: 22
PIF_VALUE: 30
PIF_VALUE: 22

## 2023-08-14 NOTE — PLAN OF CARE
Problem: Respiratory - Adult  Goal: Achieves optimal ventilation and oxygenation  8/14/2023 0903 by Lyndsey Mast.  ROXANE Andrews  Outcome: Progressing  Flowsheets (Taken 8/14/2023 0903)  Achieves optimal ventilation and oxygenation:   Assess for changes in respiratory status   Position to facilitate oxygenation and minimize respiratory effort   Assess the need for suctioning and aspirate as needed   Respiratory therapy support as indicated   Oxygen supplementation based on oxygen saturation or arterial blood gases
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
## 2023-08-14 NOTE — PLAN OF CARE
Problem: Discharge Planning  Goal: Discharge to home or other facility with appropriate resources  Outcome: Progressing     Problem: Pain  Goal: Verbalizes/displays adequate comfort level or baseline comfort level  8/14/2023 0959 by Vinh Contreras RN  Outcome: Progressing     Problem: Skin/Tissue Integrity  Goal: Absence of new skin breakdown  Description: 1. Monitor for areas of redness and/or skin breakdown  2. Assess vascular access sites hourly  3. Every 4-6 hours minimum:  Change oxygen saturation probe site  4. Every 4-6 hours:  If on nasal continuous positive airway pressure, respiratory therapy assess nares and determine need for appliance change or resting period. 8/14/2023 0959 by Vinh Contreras RN  Outcome: Progressing     Problem: Safety - Adult  Goal: Free from fall injury  8/14/2023 0959 by Vinh Contreras RN  Outcome: Progressing     Problem: ABCDS Injury Assessment  Goal: Absence of physical injury  8/14/2023 0959 by Vinh Contreras RN  Outcome: Progressing     Problem: Safety - Medical Restraint  Goal: Remains free of injury from restraints (Restraint for Interference with Medical Device)  Description: INTERVENTIONS:  1. Determine that other, less restrictive measures have been tried or would not be effective before applying the restraint  2. Evaluate the patient's condition at the time of restraint application  3. Inform patient/family regarding the reason for restraint  4.  Q2H: Monitor safety, psychosocial status, comfort, nutrition and hydration  8/14/2023 0959 by Vinh Contreras RN  Outcome: Progressing  Flowsheets  Taken 8/14/2023 5630  Remains free of injury from restraints (restraint for interference with medical device): Every 2 hours: Monitor safety, psychosocial status, comfort, nutrition and hydration  Taken 8/14/2023 0800  Remains free of injury from restraints (restraint for interference with medical device): Every 2 hours: Monitor safety, psychosocial status, comfort, nutrition and

## 2023-08-14 NOTE — CARE COORDINATION
8/14 Care Coordination: Pt remains in SICU. Remains on vent, Trach/Peg. Precedex drip stopped this AM, will monitor off drip, start IV PRN sedation. Continue fentanyl 100 mics every 2 hours as needed. Ativan 2 mg every 4 and oxycodone 15 mg every 4 as well as phenobarbital every 6 hours. Plan is [facility]. Have Auth. If remains stable off Precedex will discharge tomorrow to [facility]. CM/SW will continue to follow for discharge planning.    Ursula SIMSN,RN-CV-BC  [phone]

## 2023-08-14 NOTE — PROGRESS NOTES
interpretation is bilateral fluffy infiltrates present    ASSESSMENT:  Principal Problem:    Trauma  Active Problems:    Open fracture of left ramus of mandible (HCC)    Acute respiratory failure (HCC)    Hypoalbuminemia    Electrolyte imbalance    Hypocalcemia    Hypomagnesemia    Open fracture of body of mandible (HCC)    Acute blood loss anemia    Delirium    Acute pain    Recurrent major depressive disorder (HCC)    Hypophosphatemia    Ileus (HCC)    Pneumonia of both lungs due to Klebsiella pneumoniae ([address])  Resolved Problems:    * No resolved hospital problems. *       PLAN:  Sedation/ Pain:   -Acute Pain--I am managing the acute pain and prescription drug changes with multimodality pain control. Continue fentanyl 100 mics every 2 hours as needed  Agitation-currently on Precedex drip. We will wean off. Continue home Wellbutrin and Prozac. Continue Seroquel 200 mg twice daily, Ativan 2 mg every 4 and oxycodone 15 mg every 4 as well as phenobarbital every 6 hours    Open mandible fracture status post ORIF  CV: Monitor hemodynamics    Pulmonary: Acute respiratory failure-currently on full vent support  Treated for Serratia. Repeat respiratory cultures today    GI: Slow return of bowel function-improved status post PEG tube on 8/2. Continue PEG tube feeds. Continue Reglan    FEN:   Monitor electrolytes  Volume overloaded. We will administer Lasix 40 mg IV x1    ID: Respiratory cultures pending    Heme:   Monitor CBC    Endo: Monitor Blood Sugars. Target blood glucose less than 180 in the ICU.   Add Lantus 10 units nightly    DVT prophylaxis--SCDS, Lovenox  GI Prophylaxis--Protonix  Lines--peripheral IVs  CODE: FULL     DISPOSITION-Continue ICU Care    Critical care time exclusive of teaching and procedures = 36 min     I provided critical care to a patient with respiratory failure, agitation, ileus requiring frequent and emergent imaging, lab studies, intensive monitoring, data review, and adjusting the clinical plan as well as urgent coordination with multiple specialists. Pt needs continuous ICU monitoring because the patient is at risk for deterioration from a metabolic standpoint. Thomas Can MD, FACS  8/14/2023  8:51 AM    NOTE: This report was transcribed using voice recognition software. Every effort was made to ensure accuracy; however, inadvertent computerized transcription errors may be present.

## 2023-08-14 NOTE — PROGRESS NOTES
DAILY VENTILATOR WEANING ASSESSMENT PERFORMED    P/FIO2 Ratio = 123         (<100= do not Wean)                  Cs =   57                       (<32= Instability)  Plat. Pressure = 21  MV =15  RSBI =    Instabilities:       Cardiovascular =       CNS =       Respiratory = R1 R3       Metabolic =    Parameters    no    Wean per protocol  no    Ask Physician for a weaning plan no    Additional Comments: Patient is very bloody    Performed by Rick Colindres RCP RRT      Reference Table:    Cardiovascular     CNS      1. Mean BP less than or equal to 75   1. Neuromuscular blockade  2. Heart Rate greater than 130   2. RASS of -3, -4, -5  3. Myocardial Ischemia    3. RASS of +3, +4  4. Mechanical Assist Device    4. ICP greater than 15 or             Intracranial Hypertension         Respiratory      Metabolic  1. PEEP equal to or greater than 10cm/H20  1. Temp. (8hrs) less than 95 or > 103  2. Respiratory Rate greater than 35   2. WBC < 5000 or > 13008  3. Minute Volume greater than 15L  4. pH less than 7.30  5.  Deteriorating chest X-ray

## 2023-08-14 NOTE — PLAN OF CARE
Problem: Pain  Goal: Verbalizes/displays adequate comfort level or baseline comfort level  8/13/2023 2105 by Lorenzo Haley RN  Outcome: Progressing     Problem: Skin/Tissue Integrity  Goal: Absence of new skin breakdown  Description: 1. Monitor for areas of redness and/or skin breakdown  2. Assess vascular access sites hourly  3. Every 4-6 hours minimum:  Change oxygen saturation probe site  4. Every 4-6 hours:  If on nasal continuous positive airway pressure, respiratory therapy assess nares and determine need for appliance change or resting period. 8/13/2023 2105 by Lorenzo Haley RN  Outcome: Progressing     Problem: Safety - Adult  Goal: Free from fall injury  8/13/2023 2105 by Lorenzo Haley RN  Outcome: Progressing     Problem: ABCDS Injury Assessment  Goal: Absence of physical injury  8/13/2023 2105 by Lorenzo Haley RN  Outcome: Progressing     Problem: Neurosensory - Adult  Goal: Achieves stable or improved neurological status  8/13/2023 2105 by Lorenzo Haley RN  Outcome: Progressing     Problem: Safety - Medical Restraint  Goal: Remains free of injury from restraints (Restraint for Interference with Medical Device)  Description: INTERVENTIONS:  1. Determine that other, less restrictive measures have been tried or would not be effective before applying the restraint  2. Evaluate the patient's condition at the time of restraint application  3. Inform patient/family regarding the reason for restraint  4.  Q2H: Monitor safety, psychosocial status, comfort, nutrition and hydration  8/13/2023 2105 by Lorenzo Haley RN  Outcome: Progressing
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
## 2023-08-14 NOTE — PROGRESS NOTES
08/14/23 0100   Patient Observation   Pulse 81   SpO2 95 %   Breath Sounds   Breath Sounds Bilateral Diminished;Rhonchi   Right Upper Lobe Diminished;Rhonchi   Right Middle Lobe Diminished;Rhonchi   Right Lower Lobe Diminished;Rhonchi   Left Upper Lobe Diminished;Rhonchi   Left Lower Lobe Diminished;Rhonchi   Vent Information   Ventilator Day(s) 15   Ventilator -27   Equipment Changed (S)  Expiratory Filter;Humidification;Suction catheter;Vent Circuit; Other (comment)  (water bag and heater wire changed as well)   Vent Mode AC/PRVC   $Ventilation $Subsequent Day   Ventilator Settings   FiO2  50 %   Vt (Set, mL) 460 mL   Resp Rate (Set) 14 bmp   PEEP/CPAP (cmH2O) 10   Pressure Support (cm H2O) 0 cm H2O   Vent Patient Data (Readings)   Vt (Measured) 644 mL   Peak Inspiratory Pressure (cmH2O) 22 cmH2O   Rate Measured 14 br/min   Minute Volume (L/min) 7.43 Liters   Mean Airway Pressure (cmH2O) 13 cmH20   Plateau Pressure (cm H2O) 21 cm H2O   Driving Pressure 11   I:E Ratio 1:4.40   Static Compliance (L/cm H2O) 57   I Time/ I Time % 0 s   Backup Apnea On   Vent Alarm Settings   High Pressure (cmH2O) 45 cmH2O   Low Minute Volume (lpm) (S)  5 L/min   High Minute Volume (lpm) (S)  16 L/min   Low Exhaled Vt (ml) (S)  360 mL   High Exhaled Vt (ml) (S)  1000 mL   RR High (bpm) (S)  30 br/min   Apnea (secs) 20 secs   Additional Respiratoray Assessments   Humidification Source Heated wire   Humidification Temp 37   Circuit Condensation Drained   Ambu Bag With Mask At Bedside Yes   Backup Trachs Available (Size) 8.0   Airway Clearance   Suction Trach   Suction Device   (red rubber)   Suction Catheter Size (S)    (red rubber)   Sputum Method Obtained Tracheal   Sputum Amount Copious   Sputum Color/Odor (S)  Bloody   Sputum Consistency Tenacious; Thick   Surgical Airway (Trach)   No placement date or time found.    Present on Admission/Arrival: No  Surgical Airway Type: Tracheostomy   Status Secured   Site Assessment Oozing

## 2023-08-15 ENCOUNTER — APPOINTMENT (OUTPATIENT)
Dept: GENERAL RADIOLOGY | Age: 58
DRG: 004 | End: 2023-08-15
Payer: COMMERCIAL

## 2023-08-15 LAB
AADO2: 463.6 MMHG
ALBUMIN SERPL-MCNC: 2.6 G/DL (ref 3.5–5.2)
ALP SERPL-CCNC: 59 U/L (ref 40–129)
ALT SERPL-CCNC: 15 U/L (ref 0–40)
ANION GAP SERPL CALCULATED.3IONS-SCNC: 9 MMOL/L (ref 7–16)
ANION GAP SERPL CALCULATED.3IONS-SCNC: 9 MMOL/L (ref 7–16)
AST SERPL-CCNC: 29 U/L (ref 0–39)
B.E.: 5.9 MMOL/L (ref -3–3)
BASOPHILS # BLD: 0.06 K/UL (ref 0–0.2)
BASOPHILS NFR BLD: 0 % (ref 0–2)
BILIRUB SERPL-MCNC: 0.3 MG/DL (ref 0–1.2)
BUN SERPL-MCNC: 28 MG/DL (ref 6–20)
BUN SERPL-MCNC: 30 MG/DL (ref 6–20)
CA-I BLD-SCNC: 1.11 MMOL/L (ref 1.15–1.33)
CALCIUM SERPL-MCNC: 8.4 MG/DL (ref 8.6–10.2)
CALCIUM SERPL-MCNC: 9 MG/DL (ref 8.6–10.2)
CHLORIDE SERPL-SCNC: 95 MMOL/L (ref 98–107)
CHLORIDE SERPL-SCNC: 97 MMOL/L (ref 98–107)
CO2 SERPL-SCNC: 30 MMOL/L (ref 22–29)
CO2 SERPL-SCNC: 31 MMOL/L (ref 22–29)
COHB: 0.3 % (ref 0–1.5)
CREAT SERPL-MCNC: 1 MG/DL (ref 0.7–1.2)
CREAT SERPL-MCNC: 1 MG/DL (ref 0.7–1.2)
CRITICAL: ABNORMAL
DATE ANALYZED: ABNORMAL
DATE OF COLLECTION: ABNORMAL
EKG ATRIAL RATE: 92 BPM
EKG P AXIS: 54 DEGREES
EKG P-R INTERVAL: 174 MS
EKG Q-T INTERVAL: 356 MS
EKG QRS DURATION: 82 MS
EKG QTC CALCULATION (BAZETT): 440 MS
EKG R AXIS: 8 DEGREES
EKG T AXIS: 97 DEGREES
EKG VENTRICULAR RATE: 92 BPM
EOSINOPHIL # BLD: 0 K/UL (ref 0.05–0.5)
EOSINOPHILS RELATIVE PERCENT: 0 % (ref 0–6)
ERYTHROCYTE [DISTWIDTH] IN BLOOD BY AUTOMATED COUNT: 14.4 % (ref 11.5–15)
FIO2: 100 %
GFR SERPL CREATININE-BSD FRML MDRD: >60 ML/MIN/1.73M2
GFR SERPL CREATININE-BSD FRML MDRD: >60 ML/MIN/1.73M2
GLUCOSE BLD-MCNC: 118 MG/DL (ref 74–99)
GLUCOSE BLD-MCNC: 151 MG/DL (ref 74–99)
GLUCOSE BLD-MCNC: 165 MG/DL (ref 74–99)
GLUCOSE BLD-MCNC: 210 MG/DL (ref 74–99)
GLUCOSE BLD-MCNC: 219 MG/DL (ref 74–99)
GLUCOSE BLD-MCNC: 244 MG/DL (ref 74–99)
GLUCOSE SERPL-MCNC: 171 MG/DL (ref 74–99)
GLUCOSE SERPL-MCNC: 227 MG/DL (ref 74–99)
HCO3: 30.1 MMOL/L (ref 22–26)
HCT VFR BLD AUTO: 22.6 % (ref 37–54)
HGB BLD-MCNC: 7.2 G/DL (ref 12.5–16.5)
HHB: 1.4 % (ref 0–5)
IMM GRANULOCYTES # BLD AUTO: 0.08 K/UL (ref 0–0.58)
IMM GRANULOCYTES NFR BLD: 1 % (ref 0–5)
LAB: ABNORMAL
LYMPHOCYTES NFR BLD: 1.55 K/UL (ref 1.5–4)
LYMPHOCYTES RELATIVE PERCENT: 11 % (ref 20–42)
Lab: ABNORMAL
MAGNESIUM SERPL-MCNC: 2 MG/DL (ref 1.6–2.6)
MCH RBC QN AUTO: 29.4 PG (ref 26–35)
MCHC RBC AUTO-ENTMCNC: 31.9 G/DL (ref 32–34.5)
MCV RBC AUTO: 92.2 FL (ref 80–99.9)
METHB: 0.1 % (ref 0–1.5)
MODE: AC
MONOCYTES NFR BLD: 0.75 K/UL (ref 0.1–0.95)
MONOCYTES NFR BLD: 5 % (ref 2–12)
NEUTROPHILS NFR BLD: 83 % (ref 43–80)
NEUTS SEG NFR BLD: 11.92 K/UL (ref 1.8–7.3)
O2 CONTENT: 11.6 ML/DL
O2 SATURATION: 98.6 % (ref 92–98.5)
O2HB: 98.2 % (ref 94–97)
OPERATOR ID: 7221
PATIENT TEMP: 37 C
PCO2: 42.3 MMHG (ref 35–45)
PEEP/CPAP: 10 CMH2O
PFO2: 1.82 MMHG/%
PH BLOOD GAS: 7.47 (ref 7.35–7.45)
PHENOBARBITAL DATE LAST DOSE: NORMAL
PHENOBARBITAL DOSE AMOUNT: NORMAL
PHENOBARBITAL TIME LAST DOSE: NORMAL
PHENOBARBITAL: 16.9 UG/ML (ref 15–40)
PHOSPHATE SERPL-MCNC: 4.3 MG/DL (ref 2.5–4.5)
PLATELET # BLD AUTO: 632 K/UL (ref 130–450)
PMV BLD AUTO: 9.9 FL (ref 7–12)
PO2: 182.1 MMHG (ref 75–100)
POTASSIUM SERPL-SCNC: 4.4 MMOL/L (ref 3.5–5)
POTASSIUM SERPL-SCNC: 5.6 MMOL/L (ref 3.5–5)
PROT SERPL-MCNC: 5.6 G/DL (ref 6.4–8.3)
RBC # BLD AUTO: 2.45 M/UL (ref 3.8–5.8)
RBC # BLD: ABNORMAL 10*6/UL
RI(T): 2.55
RR MECHANICAL: 16 B/MIN
SODIUM SERPL-SCNC: 134 MMOL/L (ref 132–146)
SODIUM SERPL-SCNC: 137 MMOL/L (ref 132–146)
SOURCE, BLOOD GAS: ABNORMAL
THB: 8.1 G/DL (ref 11.5–16.5)
TIME ANALYZED: 531
VT MECHANICAL: 460 ML
WBC OTHER # BLD: 14.4 K/UL (ref 4.5–11.5)

## 2023-08-15 PROCEDURE — 71045 X-RAY EXAM CHEST 1 VIEW: CPT

## 2023-08-15 PROCEDURE — 6360000002 HC RX W HCPCS: Performed by: STUDENT IN AN ORGANIZED HEALTH CARE EDUCATION/TRAINING PROGRAM

## 2023-08-15 PROCEDURE — C9113 INJ PANTOPRAZOLE SODIUM, VIA: HCPCS

## 2023-08-15 PROCEDURE — 6370000000 HC RX 637 (ALT 250 FOR IP)

## 2023-08-15 PROCEDURE — 2580000003 HC RX 258

## 2023-08-15 PROCEDURE — 2500000003 HC RX 250 WO HCPCS

## 2023-08-15 PROCEDURE — S5553 INSULIN LONG ACTING 5 U: HCPCS

## 2023-08-15 PROCEDURE — 82805 BLOOD GASES W/O2 SATURATION: CPT

## 2023-08-15 PROCEDURE — 6360000002 HC RX W HCPCS: Performed by: SURGERY

## 2023-08-15 PROCEDURE — 94640 AIRWAY INHALATION TREATMENT: CPT

## 2023-08-15 PROCEDURE — A4216 STERILE WATER/SALINE, 10 ML: HCPCS

## 2023-08-15 PROCEDURE — 99291 CRITICAL CARE FIRST HOUR: CPT | Performed by: SURGERY

## 2023-08-15 PROCEDURE — 82330 ASSAY OF CALCIUM: CPT

## 2023-08-15 PROCEDURE — 6360000002 HC RX W HCPCS

## 2023-08-15 PROCEDURE — 6370000000 HC RX 637 (ALT 250 FOR IP): Performed by: SURGERY

## 2023-08-15 PROCEDURE — 21462 OPTX MNDBLR FX W/NTRDNTL: CPT | Performed by: PHYSICIAN ASSISTANT

## 2023-08-15 PROCEDURE — 84100 ASSAY OF PHOSPHORUS: CPT

## 2023-08-15 PROCEDURE — 82962 GLUCOSE BLOOD TEST: CPT

## 2023-08-15 PROCEDURE — 94003 VENT MGMT INPAT SUBQ DAY: CPT

## 2023-08-15 PROCEDURE — 2580000003 HC RX 258: Performed by: SURGERY

## 2023-08-15 PROCEDURE — 2000000000 HC ICU R&B

## 2023-08-15 PROCEDURE — 83735 ASSAY OF MAGNESIUM: CPT

## 2023-08-15 PROCEDURE — 80048 BASIC METABOLIC PNL TOTAL CA: CPT

## 2023-08-15 PROCEDURE — 2580000003 HC RX 258: Performed by: STUDENT IN AN ORGANIZED HEALTH CARE EDUCATION/TRAINING PROGRAM

## 2023-08-15 PROCEDURE — 85025 COMPLETE CBC W/AUTO DIFF WBC: CPT

## 2023-08-15 PROCEDURE — 93010 ELECTROCARDIOGRAM REPORT: CPT | Performed by: INTERNAL MEDICINE

## 2023-08-15 PROCEDURE — 2500000003 HC RX 250 WO HCPCS: Performed by: SURGERY

## 2023-08-15 PROCEDURE — 93005 ELECTROCARDIOGRAM TRACING: CPT

## 2023-08-15 PROCEDURE — 6370000000 HC RX 637 (ALT 250 FOR IP): Performed by: STUDENT IN AN ORGANIZED HEALTH CARE EDUCATION/TRAINING PROGRAM

## 2023-08-15 PROCEDURE — 80053 COMPREHEN METABOLIC PANEL: CPT

## 2023-08-15 PROCEDURE — 80184 ASSAY OF PHENOBARBITAL: CPT

## 2023-08-15 RX ORDER — SODIUM CHLORIDE 9 MG/ML
INJECTION, SOLUTION INTRAVENOUS CONTINUOUS
Status: DISCONTINUED | OUTPATIENT
Start: 2023-08-15 | End: 2023-08-21

## 2023-08-15 RX ORDER — DEXTROSE MONOHYDRATE 25 G/50ML
25 INJECTION, SOLUTION INTRAVENOUS PRN
Status: DISCONTINUED | OUTPATIENT
Start: 2023-08-15 | End: 2023-08-21 | Stop reason: ALTCHOICE

## 2023-08-15 RX ORDER — FUROSEMIDE 10 MG/ML
40 INJECTION INTRAMUSCULAR; INTRAVENOUS ONCE
Status: COMPLETED | OUTPATIENT
Start: 2023-08-15 | End: 2023-08-15

## 2023-08-15 RX ORDER — DEXTROSE MONOHYDRATE 25 G/50ML
25 INJECTION, SOLUTION INTRAVENOUS ONCE
Status: COMPLETED | OUTPATIENT
Start: 2023-08-15 | End: 2023-08-15

## 2023-08-15 RX ADMIN — MIDAZOLAM 2 MG: 1 INJECTION INTRAMUSCULAR; INTRAVENOUS at 02:23

## 2023-08-15 RX ADMIN — INSULIN HUMAN 5 UNITS: 100 INJECTION, SOLUTION PARENTERAL at 08:45

## 2023-08-15 RX ADMIN — INSULIN GLARGINE-YFGN 10 UNITS: 100 INJECTION, SOLUTION SUBCUTANEOUS at 20:16

## 2023-08-15 RX ADMIN — LORAZEPAM 2 MG: 2 INJECTION INTRAMUSCULAR; INTRAVENOUS at 14:49

## 2023-08-15 RX ADMIN — SODIUM ZIRCONIUM CYCLOSILICATE 10 G: 10 POWDER, FOR SUSPENSION ORAL at 09:18

## 2023-08-15 RX ADMIN — PHENOBARBITAL SODIUM 130 MG: 65 INJECTION INTRAMUSCULAR at 14:49

## 2023-08-15 RX ADMIN — NYSTATIN 500000 UNITS: 100000 SUSPENSION ORAL at 16:18

## 2023-08-15 RX ADMIN — BISACODYL 10 MG: 10 SUPPOSITORY RECTAL at 09:19

## 2023-08-15 RX ADMIN — DEXMEDETOMIDINE 1.4 MCG/KG/HR: 100 INJECTION, SOLUTION INTRAVENOUS at 08:31

## 2023-08-15 RX ADMIN — PANTOPRAZOLE SODIUM 40 MG: 40 INJECTION, POWDER, FOR SOLUTION INTRAVENOUS at 20:05

## 2023-08-15 RX ADMIN — ENOXAPARIN SODIUM 40 MG: 100 INJECTION SUBCUTANEOUS at 20:30

## 2023-08-15 RX ADMIN — ACETAMINOPHEN 650 MG: 650 SUPPOSITORY RECTAL at 20:18

## 2023-08-15 RX ADMIN — ENOXAPARIN SODIUM 40 MG: 100 INJECTION SUBCUTANEOUS at 08:40

## 2023-08-15 RX ADMIN — CHLORHEXIDINE GLUCONATE 15 ML: 1.2 RINSE ORAL at 04:17

## 2023-08-15 RX ADMIN — CHLORHEXIDINE GLUCONATE 15 ML: 1.2 RINSE ORAL at 08:40

## 2023-08-15 RX ADMIN — CHLORHEXIDINE GLUCONATE 15 ML: 1.2 RINSE ORAL at 12:11

## 2023-08-15 RX ADMIN — SODIUM CHLORIDE, PRESERVATIVE FREE 10 ML: 5 INJECTION INTRAVENOUS at 19:50

## 2023-08-15 RX ADMIN — PETROLATUM: 420 OINTMENT TOPICAL at 20:01

## 2023-08-15 RX ADMIN — CEFEPIME 2000 MG: 2 INJECTION, POWDER, FOR SOLUTION INTRAVENOUS at 05:08

## 2023-08-15 RX ADMIN — CEFEPIME 2000 MG: 2 INJECTION, POWDER, FOR SOLUTION INTRAVENOUS at 20:30

## 2023-08-15 RX ADMIN — DEXMEDETOMIDINE 1.4 MCG/KG/HR: 100 INJECTION, SOLUTION INTRAVENOUS at 21:14

## 2023-08-15 RX ADMIN — DEXTROSE MONOHYDRATE 25 G: 25 INJECTION, SOLUTION INTRAVENOUS at 08:45

## 2023-08-15 RX ADMIN — SODIUM CHLORIDE: 9 INJECTION, SOLUTION INTRAVENOUS at 12:11

## 2023-08-15 RX ADMIN — MIDAZOLAM 2 MG: 1 INJECTION INTRAMUSCULAR; INTRAVENOUS at 21:04

## 2023-08-15 RX ADMIN — POLYVINYL ALCOHOL 1 DROP: 14 SOLUTION/ DROPS OPHTHALMIC at 19:58

## 2023-08-15 RX ADMIN — LORAZEPAM 2 MG: 2 INJECTION INTRAMUSCULAR; INTRAVENOUS at 03:22

## 2023-08-15 RX ADMIN — INSULIN LISPRO 4 UNITS: 100 INJECTION, SOLUTION INTRAVENOUS; SUBCUTANEOUS at 20:16

## 2023-08-15 RX ADMIN — Medication 10 ML: at 22:03

## 2023-08-15 RX ADMIN — FENTANYL CITRATE 100 MCG: 50 INJECTION INTRAMUSCULAR; INTRAVENOUS at 18:40

## 2023-08-15 RX ADMIN — CEFEPIME 2000 MG: 2 INJECTION, POWDER, FOR SOLUTION INTRAVENOUS at 13:35

## 2023-08-15 RX ADMIN — NYSTATIN 500000 UNITS: 100000 SUSPENSION ORAL at 12:11

## 2023-08-15 RX ADMIN — IPRATROPIUM BROMIDE AND ALBUTEROL SULFATE 1 DOSE: .5; 2.5 SOLUTION RESPIRATORY (INHALATION) at 20:26

## 2023-08-15 RX ADMIN — METOCLOPRAMIDE HYDROCHLORIDE 5 MG: 5 INJECTION INTRAMUSCULAR; INTRAVENOUS at 09:16

## 2023-08-15 RX ADMIN — IPRATROPIUM BROMIDE AND ALBUTEROL SULFATE 1 DOSE: .5; 2.5 SOLUTION RESPIRATORY (INHALATION) at 09:08

## 2023-08-15 RX ADMIN — Medication 10 ML: at 21:03

## 2023-08-15 RX ADMIN — LORAZEPAM 2 MG: 2 INJECTION INTRAMUSCULAR; INTRAVENOUS at 06:57

## 2023-08-15 RX ADMIN — PHENOBARBITAL SODIUM 130 MG: 65 INJECTION INTRAMUSCULAR at 18:40

## 2023-08-15 RX ADMIN — DEXMEDETOMIDINE 1.4 MCG/KG/HR: 100 INJECTION, SOLUTION INTRAVENOUS at 00:11

## 2023-08-15 RX ADMIN — IPRATROPIUM BROMIDE AND ALBUTEROL SULFATE 1 DOSE: .5; 2.5 SOLUTION RESPIRATORY (INHALATION) at 16:50

## 2023-08-15 RX ADMIN — Medication 10 ML: at 21:17

## 2023-08-15 RX ADMIN — PHENOBARBITAL SODIUM 130 MG: 65 INJECTION INTRAMUSCULAR at 01:02

## 2023-08-15 RX ADMIN — IPRATROPIUM BROMIDE AND ALBUTEROL SULFATE 1 DOSE: .5; 2.5 SOLUTION RESPIRATORY (INHALATION) at 12:38

## 2023-08-15 RX ADMIN — POLYVINYL ALCOHOL 1 DROP: 14 SOLUTION/ DROPS OPHTHALMIC at 12:12

## 2023-08-15 RX ADMIN — NYSTATIN 500000 UNITS: 100000 SUSPENSION ORAL at 20:00

## 2023-08-15 RX ADMIN — LORAZEPAM 2 MG: 2 INJECTION INTRAMUSCULAR; INTRAVENOUS at 22:42

## 2023-08-15 RX ADMIN — Medication 10 ML: at 23:31

## 2023-08-15 RX ADMIN — PETROLATUM: 420 OINTMENT TOPICAL at 08:39

## 2023-08-15 RX ADMIN — CHLORHEXIDINE GLUCONATE 15 ML: 1.2 RINSE ORAL at 16:18

## 2023-08-15 RX ADMIN — INSULIN LISPRO 8 UNITS: 100 INJECTION, SOLUTION INTRAVENOUS; SUBCUTANEOUS at 09:09

## 2023-08-15 RX ADMIN — INSULIN LISPRO 8 UNITS: 100 INJECTION, SOLUTION INTRAVENOUS; SUBCUTANEOUS at 12:10

## 2023-08-15 RX ADMIN — NYSTATIN 500000 UNITS: 100000 SUSPENSION ORAL at 08:41

## 2023-08-15 RX ADMIN — FENTANYL CITRATE 100 MCG: 50 INJECTION INTRAMUSCULAR; INTRAVENOUS at 23:30

## 2023-08-15 RX ADMIN — FENTANYL CITRATE 100 MCG: 50 INJECTION INTRAMUSCULAR; INTRAVENOUS at 02:23

## 2023-08-15 RX ADMIN — BISACODYL 10 MG: 10 SUPPOSITORY RECTAL at 20:18

## 2023-08-15 RX ADMIN — SODIUM CHLORIDE, PRESERVATIVE FREE 10 ML: 5 INJECTION INTRAVENOUS at 08:40

## 2023-08-15 RX ADMIN — LORAZEPAM 2 MG: 2 INJECTION INTRAMUSCULAR; INTRAVENOUS at 11:23

## 2023-08-15 RX ADMIN — PHENOBARBITAL SODIUM 130 MG: 65 INJECTION INTRAMUSCULAR at 06:57

## 2023-08-15 RX ADMIN — FENTANYL CITRATE 100 MCG: 50 INJECTION INTRAMUSCULAR; INTRAVENOUS at 00:00

## 2023-08-15 RX ADMIN — MIDAZOLAM 2 MG: 1 INJECTION INTRAMUSCULAR; INTRAVENOUS at 19:50

## 2023-08-15 RX ADMIN — CALCIUM GLUCONATE 2000 MG: 98 INJECTION, SOLUTION INTRAVENOUS at 08:47

## 2023-08-15 RX ADMIN — METOCLOPRAMIDE HYDROCHLORIDE 5 MG: 5 INJECTION INTRAMUSCULAR; INTRAVENOUS at 21:03

## 2023-08-15 RX ADMIN — POLYVINYL ALCOHOL 1 DROP: 14 SOLUTION/ DROPS OPHTHALMIC at 16:18

## 2023-08-15 RX ADMIN — POLYVINYL ALCOHOL 1 DROP: 14 SOLUTION/ DROPS OPHTHALMIC at 04:18

## 2023-08-15 RX ADMIN — LORAZEPAM 2 MG: 2 INJECTION INTRAMUSCULAR; INTRAVENOUS at 18:40

## 2023-08-15 RX ADMIN — POLYVINYL ALCOHOL 1 DROP: 14 SOLUTION/ DROPS OPHTHALMIC at 08:40

## 2023-08-15 RX ADMIN — INSULIN LISPRO 8 UNITS: 100 INJECTION, SOLUTION INTRAVENOUS; SUBCUTANEOUS at 04:18

## 2023-08-15 RX ADMIN — METOCLOPRAMIDE HYDROCHLORIDE 5 MG: 5 INJECTION INTRAMUSCULAR; INTRAVENOUS at 04:17

## 2023-08-15 RX ADMIN — MIDAZOLAM 2 MG: 1 INJECTION INTRAMUSCULAR; INTRAVENOUS at 22:04

## 2023-08-15 RX ADMIN — PETROLATUM: 420 OINTMENT TOPICAL at 09:18

## 2023-08-15 RX ADMIN — CHLORHEXIDINE GLUCONATE 15 ML: 1.2 RINSE ORAL at 20:00

## 2023-08-15 RX ADMIN — METOCLOPRAMIDE HYDROCHLORIDE 5 MG: 5 INJECTION INTRAMUSCULAR; INTRAVENOUS at 14:49

## 2023-08-15 RX ADMIN — PETROLATUM: 420 OINTMENT TOPICAL at 19:59

## 2023-08-15 RX ADMIN — FUROSEMIDE 40 MG: 10 INJECTION, SOLUTION INTRAMUSCULAR; INTRAVENOUS at 09:16

## 2023-08-15 RX ADMIN — MIDAZOLAM 2 MG: 1 INJECTION INTRAMUSCULAR; INTRAVENOUS at 04:18

## 2023-08-15 RX ADMIN — Medication 10 ML: at 22:43

## 2023-08-15 RX ADMIN — FENTANYL CITRATE 100 MCG: 50 INJECTION INTRAMUSCULAR; INTRAVENOUS at 21:18

## 2023-08-15 ASSESSMENT — PAIN SCALES - GENERAL
PAINLEVEL_OUTOF10: 4
PAINLEVEL_OUTOF10: 4
PAINLEVEL_OUTOF10: 5
PAINLEVEL_OUTOF10: 4
PAINLEVEL_OUTOF10: 5
PAINLEVEL_OUTOF10: 4
PAINLEVEL_OUTOF10: 4

## 2023-08-15 ASSESSMENT — PULMONARY FUNCTION TESTS
PIF_VALUE: 24
PIF_VALUE: 27
PIF_VALUE: 23
PIF_VALUE: 30
PIF_VALUE: 25
PIF_VALUE: 22
PIF_VALUE: 27
PIF_VALUE: 28
PIF_VALUE: 28
PIF_VALUE: 23
PIF_VALUE: 25
PIF_VALUE: 28
PIF_VALUE: 30
PIF_VALUE: 32
PIF_VALUE: 35
PIF_VALUE: 30
PIF_VALUE: 22
PIF_VALUE: 30
PIF_VALUE: 27
PIF_VALUE: 25
PIF_VALUE: 31
PIF_VALUE: 50
PIF_VALUE: 37
PIF_VALUE: 23
PIF_VALUE: 26
PIF_VALUE: 45
PIF_VALUE: 28
PIF_VALUE: 28
PIF_VALUE: 21
PIF_VALUE: 23

## 2023-08-15 NOTE — PROGRESS NOTES
Patient alert, confused and agitated at times and attempting to reach for lines and tubes for critical care despite attempts of redirection. Will continue use of bilateral soft wrist restraints.  Will continue to assess for earliest removal.

## 2023-08-15 NOTE — PLAN OF CARE
Problem: Discharge Planning  Goal: Discharge to home or other facility with appropriate resources  Outcome: Progressing     Problem: Pain  Goal: Verbalizes/displays adequate comfort level or baseline comfort level  Outcome: Progressing     Problem: Skin/Tissue Integrity  Goal: Absence of new skin breakdown  Description: 1. Monitor for areas of redness and/or skin breakdown  2. Assess vascular access sites hourly  3. Every 4-6 hours minimum:  Change oxygen saturation probe site  4. Every 4-6 hours:  If on nasal continuous positive airway pressure, respiratory therapy assess nares and determine need for appliance change or resting period.   Outcome: Progressing     Problem: Safety - Adult  Goal: Free from fall injury  Outcome: Progressing  Flowsheets (Taken 8/15/2023 1240)  Free From Fall Injury: Instruct family/caregiver on patient safety     Problem: ABCDS Injury Assessment  Goal: Absence of physical injury  Outcome: Progressing  Flowsheets (Taken 8/15/2023 1240)  Absence of Physical Injury: Implement safety measures based on patient assessment     Problem: Neurosensory - Adult  Goal: Achieves stable or improved neurological status  Outcome: Progressing  Goal: Achieves maximal functionality and self care  Outcome: Progressing     Problem: Respiratory - Adult  Goal: Achieves optimal ventilation and oxygenation  Outcome: Progressing     Problem: Cardiovascular - Adult  Goal: Maintains optimal cardiac output and hemodynamic stability  Outcome: Progressing  Goal: Absence of cardiac dysrhythmias or at baseline  Outcome: Progressing     Problem: Skin/Tissue Integrity - Adult  Goal: Skin integrity remains intact  Outcome: Progressing  Flowsheets (Taken 8/15/2023 1240)  Skin Integrity Remains Intact: Monitor for areas of redness and/or skin breakdown  Goal: Incisions, wounds, or drain sites healing without S/S of infection  Outcome: Progressing  Flowsheets (Taken 8/15/2023 1240)  Incisions, Wounds, or Drain Sites

## 2023-08-15 NOTE — PROGRESS NOTES
critical care to a patient with respiratory failure, agitation, ileus requiring frequent and emergent imaging, lab studies, intensive monitoring, data review, and adjusting the clinical plan as well as urgent coordination with multiple specialists. Pt needs continuous ICU monitoring because the patient is at risk for deterioration from a metabolic standpoint. Trevor Ngo MD, FACS  8/15/2023  10:02 AM    NOTE: This report was transcribed using voice recognition software. Every effort was made to ensure accuracy; however, inadvertent computerized transcription errors may be present.

## 2023-08-15 NOTE — CARE COORDINATION
8/15 Care Coordination:Remains in SICU. Remains on vent. Fio2 70%. Had a Bronch last pm with dark brown secretions. Resp cx pending. PEG to gravity until bowel movements increase. Pt still not ready for LTAC level of Care. CM/SW will continue to follow for discharge planning.    Wenceslao SIMSN,RN--BC  [phone]
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
## 2023-08-15 NOTE — PLAN OF CARE
Problem: Safety - Medical Restraint  Goal: Remains free of injury from restraints (Restraint for Interference with Medical Device)  Description: INTERVENTIONS:  1. Determine that other, less restrictive measures have been tried or would not be effective before applying the restraint  2. Evaluate the patient's condition at the time of restraint application  3. Inform patient/family regarding the reason for restraint  4.  Q2H: Monitor safety, psychosocial status, comfort, nutrition and hydration  8/14/2023 2119 by Elisabet Hill RN  Outcome: Progressing

## 2023-08-16 ENCOUNTER — APPOINTMENT (OUTPATIENT)
Dept: GENERAL RADIOLOGY | Age: 58
DRG: 004 | End: 2023-08-16
Payer: COMMERCIAL

## 2023-08-16 LAB
AADO2: 260.3 MMHG
ALBUMIN SERPL-MCNC: 2.4 G/DL (ref 3.5–5.2)
ALP SERPL-CCNC: 56 U/L (ref 40–129)
ALT SERPL-CCNC: 19 U/L (ref 0–40)
ANION GAP SERPL CALCULATED.3IONS-SCNC: 14 MMOL/L (ref 7–16)
AST SERPL-CCNC: 36 U/L (ref 0–39)
B.E.: 4.9 MMOL/L (ref -3–3)
BASOPHILS # BLD: 0.06 K/UL (ref 0–0.2)
BASOPHILS NFR BLD: 1 % (ref 0–2)
BILIRUB SERPL-MCNC: 0.3 MG/DL (ref 0–1.2)
BUN SERPL-MCNC: 25 MG/DL (ref 6–20)
CALCIUM SERPL-MCNC: 8.7 MG/DL (ref 8.6–10.2)
CHLORIDE SERPL-SCNC: 98 MMOL/L (ref 98–107)
CO2 SERPL-SCNC: 27 MMOL/L (ref 22–29)
COHB: 0.3 % (ref 0–1.5)
CREAT SERPL-MCNC: 0.9 MG/DL (ref 0.7–1.2)
CRITICAL: ABNORMAL
DATE ANALYZED: ABNORMAL
DATE OF COLLECTION: ABNORMAL
EOSINOPHIL # BLD: 0.05 K/UL (ref 0.05–0.5)
EOSINOPHILS RELATIVE PERCENT: 1 % (ref 0–6)
ERYTHROCYTE [DISTWIDTH] IN BLOOD BY AUTOMATED COUNT: 14.4 % (ref 11.5–15)
FIO2: 55 %
GFR SERPL CREATININE-BSD FRML MDRD: >60 ML/MIN/1.73M2
GLUCOSE BLD-MCNC: 121 MG/DL (ref 74–99)
GLUCOSE BLD-MCNC: 149 MG/DL (ref 74–99)
GLUCOSE BLD-MCNC: 170 MG/DL (ref 74–99)
GLUCOSE BLD-MCNC: 84 MG/DL (ref 74–99)
GLUCOSE BLD-MCNC: 86 MG/DL (ref 74–99)
GLUCOSE BLD-MCNC: 87 MG/DL (ref 74–99)
GLUCOSE SERPL-MCNC: 92 MG/DL (ref 74–99)
HCO3: 28.4 MMOL/L (ref 22–26)
HCT VFR BLD AUTO: 21.3 % (ref 37–54)
HCT VFR BLD AUTO: 22.5 % (ref 37–54)
HGB BLD-MCNC: 6.8 G/DL (ref 12.5–16.5)
HGB BLD-MCNC: 7 G/DL (ref 12.5–16.5)
HHB: 5.6 % (ref 0–5)
IMM GRANULOCYTES # BLD AUTO: 0.05 K/UL (ref 0–0.58)
IMM GRANULOCYTES NFR BLD: 1 % (ref 0–5)
LAB: ABNORMAL
LYMPHOCYTES NFR BLD: 1.36 K/UL (ref 1.5–4)
LYMPHOCYTES RELATIVE PERCENT: 16 % (ref 20–42)
Lab: ABNORMAL
MAGNESIUM SERPL-MCNC: 1.9 MG/DL (ref 1.6–2.6)
MCH RBC QN AUTO: 29.2 PG (ref 26–35)
MCHC RBC AUTO-ENTMCNC: 31.9 G/DL (ref 32–34.5)
MCV RBC AUTO: 91.4 FL (ref 80–99.9)
METHB: 0.1 % (ref 0–1.5)
MODE: AC
MONOCYTES NFR BLD: 0.46 K/UL (ref 0.1–0.95)
MONOCYTES NFR BLD: 5 % (ref 2–12)
NEUTROPHILS NFR BLD: 77 % (ref 43–80)
NEUTS SEG NFR BLD: 6.79 K/UL (ref 1.8–7.3)
O2 CONTENT: 10.3 ML/DL
O2 SATURATION: 94.4 % (ref 92–98.5)
O2HB: 94 % (ref 94–97)
OPERATOR ID: 1893
PATIENT TEMP: 37 C
PCO2: 37.2 MMHG (ref 35–45)
PEEP/CPAP: 10 CMH2O
PFO2: 1.39 MMHG/%
PH BLOOD GAS: 7.5 (ref 7.35–7.45)
PHOSPHATE SERPL-MCNC: 2.8 MG/DL (ref 2.5–4.5)
PLATELET # BLD AUTO: 696 K/UL (ref 130–450)
PMV BLD AUTO: 9.9 FL (ref 7–12)
PO2: 76.7 MMHG (ref 75–100)
POTASSIUM SERPL-SCNC: 4 MMOL/L (ref 3.5–5)
PROT SERPL-MCNC: 5.7 G/DL (ref 6.4–8.3)
RBC # BLD AUTO: 2.33 M/UL (ref 3.8–5.8)
RI(T): 3.39
RR MECHANICAL: 14 B/MIN
SODIUM SERPL-SCNC: 139 MMOL/L (ref 132–146)
SOURCE, BLOOD GAS: ABNORMAL
THB: 7.7 G/DL (ref 11.5–16.5)
TIME ANALYZED: 427
VT MECHANICAL: 460 ML
WBC OTHER # BLD: 8.8 K/UL (ref 4.5–11.5)

## 2023-08-16 PROCEDURE — 84100 ASSAY OF PHOSPHORUS: CPT

## 2023-08-16 PROCEDURE — 86901 BLOOD TYPING SEROLOGIC RH(D): CPT

## 2023-08-16 PROCEDURE — C9113 INJ PANTOPRAZOLE SODIUM, VIA: HCPCS

## 2023-08-16 PROCEDURE — 6370000000 HC RX 637 (ALT 250 FOR IP)

## 2023-08-16 PROCEDURE — 71045 X-RAY EXAM CHEST 1 VIEW: CPT

## 2023-08-16 PROCEDURE — 6360000002 HC RX W HCPCS

## 2023-08-16 PROCEDURE — 86900 BLOOD TYPING SEROLOGIC ABO: CPT

## 2023-08-16 PROCEDURE — 2500000003 HC RX 250 WO HCPCS

## 2023-08-16 PROCEDURE — 80053 COMPREHEN METABOLIC PANEL: CPT

## 2023-08-16 PROCEDURE — 94640 AIRWAY INHALATION TREATMENT: CPT

## 2023-08-16 PROCEDURE — P9016 RBC LEUKOCYTES REDUCED: HCPCS

## 2023-08-16 PROCEDURE — 86850 RBC ANTIBODY SCREEN: CPT

## 2023-08-16 PROCEDURE — 2580000003 HC RX 258

## 2023-08-16 PROCEDURE — 85025 COMPLETE CBC W/AUTO DIFF WBC: CPT

## 2023-08-16 PROCEDURE — 36415 COLL VENOUS BLD VENIPUNCTURE: CPT

## 2023-08-16 PROCEDURE — 2500000003 HC RX 250 WO HCPCS: Performed by: SURGERY

## 2023-08-16 PROCEDURE — 6360000002 HC RX W HCPCS: Performed by: STUDENT IN AN ORGANIZED HEALTH CARE EDUCATION/TRAINING PROGRAM

## 2023-08-16 PROCEDURE — 2580000003 HC RX 258: Performed by: SURGERY

## 2023-08-16 PROCEDURE — 6370000000 HC RX 637 (ALT 250 FOR IP): Performed by: SURGERY

## 2023-08-16 PROCEDURE — 36430 TRANSFUSION BLD/BLD COMPNT: CPT

## 2023-08-16 PROCEDURE — 99291 CRITICAL CARE FIRST HOUR: CPT | Performed by: SURGERY

## 2023-08-16 PROCEDURE — 2580000003 HC RX 258: Performed by: STUDENT IN AN ORGANIZED HEALTH CARE EDUCATION/TRAINING PROGRAM

## 2023-08-16 PROCEDURE — 86923 COMPATIBILITY TEST ELECTRIC: CPT

## 2023-08-16 PROCEDURE — 83735 ASSAY OF MAGNESIUM: CPT

## 2023-08-16 PROCEDURE — 94003 VENT MGMT INPAT SUBQ DAY: CPT

## 2023-08-16 PROCEDURE — 82962 GLUCOSE BLOOD TEST: CPT

## 2023-08-16 PROCEDURE — 85018 HEMOGLOBIN: CPT

## 2023-08-16 PROCEDURE — 6360000002 HC RX W HCPCS: Performed by: SURGERY

## 2023-08-16 PROCEDURE — A4216 STERILE WATER/SALINE, 10 ML: HCPCS

## 2023-08-16 PROCEDURE — 2000000000 HC ICU R&B

## 2023-08-16 PROCEDURE — 85014 HEMATOCRIT: CPT

## 2023-08-16 PROCEDURE — 82805 BLOOD GASES W/O2 SATURATION: CPT

## 2023-08-16 RX ORDER — SODIUM CHLORIDE 9 MG/ML
INJECTION, SOLUTION INTRAVENOUS PRN
Status: DISCONTINUED | OUTPATIENT
Start: 2023-08-16 | End: 2023-08-24

## 2023-08-16 RX ORDER — MAGNESIUM SULFATE IN WATER 40 MG/ML
2000 INJECTION, SOLUTION INTRAVENOUS ONCE
Status: COMPLETED | OUTPATIENT
Start: 2023-08-16 | End: 2023-08-16

## 2023-08-16 RX ADMIN — FENTANYL CITRATE 100 MCG: 50 INJECTION INTRAMUSCULAR; INTRAVENOUS at 04:38

## 2023-08-16 RX ADMIN — ENOXAPARIN SODIUM 40 MG: 100 INJECTION SUBCUTANEOUS at 20:49

## 2023-08-16 RX ADMIN — IPRATROPIUM BROMIDE AND ALBUTEROL SULFATE 1 DOSE: .5; 2.5 SOLUTION RESPIRATORY (INHALATION) at 11:11

## 2023-08-16 RX ADMIN — LORAZEPAM 2 MG: 2 INJECTION INTRAMUSCULAR; INTRAVENOUS at 06:52

## 2023-08-16 RX ADMIN — POLYVINYL ALCOHOL 1 DROP: 14 SOLUTION/ DROPS OPHTHALMIC at 04:30

## 2023-08-16 RX ADMIN — SODIUM CHLORIDE, PRESERVATIVE FREE 10 ML: 5 INJECTION INTRAVENOUS at 08:15

## 2023-08-16 RX ADMIN — PANTOPRAZOLE SODIUM 40 MG: 40 INJECTION, POWDER, FOR SOLUTION INTRAVENOUS at 20:51

## 2023-08-16 RX ADMIN — BISACODYL 10 MG: 10 SUPPOSITORY RECTAL at 08:16

## 2023-08-16 RX ADMIN — PHENOBARBITAL SODIUM 130 MG: 65 INJECTION INTRAMUSCULAR at 01:17

## 2023-08-16 RX ADMIN — VANCOMYCIN HYDROCHLORIDE 2000 MG: 10 INJECTION, POWDER, LYOPHILIZED, FOR SOLUTION INTRAVENOUS at 13:51

## 2023-08-16 RX ADMIN — CHLORHEXIDINE GLUCONATE 15 ML: 1.2 RINSE ORAL at 11:27

## 2023-08-16 RX ADMIN — CEFEPIME 2000 MG: 2 INJECTION, POWDER, FOR SOLUTION INTRAVENOUS at 21:02

## 2023-08-16 RX ADMIN — LORAZEPAM 2 MG: 2 INJECTION INTRAMUSCULAR; INTRAVENOUS at 18:57

## 2023-08-16 RX ADMIN — METOCLOPRAMIDE HYDROCHLORIDE 5 MG: 5 INJECTION INTRAMUSCULAR; INTRAVENOUS at 04:38

## 2023-08-16 RX ADMIN — MIDAZOLAM 2 MG: 1 INJECTION INTRAMUSCULAR; INTRAVENOUS at 20:51

## 2023-08-16 RX ADMIN — Medication 10 ML: at 22:44

## 2023-08-16 RX ADMIN — LORAZEPAM 2 MG: 2 INJECTION INTRAMUSCULAR; INTRAVENOUS at 22:44

## 2023-08-16 RX ADMIN — CEFEPIME 2000 MG: 2 INJECTION, POWDER, FOR SOLUTION INTRAVENOUS at 14:02

## 2023-08-16 RX ADMIN — CHLORHEXIDINE GLUCONATE 15 ML: 1.2 RINSE ORAL at 20:30

## 2023-08-16 RX ADMIN — MAGNESIUM SULFATE HEPTAHYDRATE 2000 MG: 40 INJECTION, SOLUTION INTRAVENOUS at 11:34

## 2023-08-16 RX ADMIN — LORAZEPAM 2 MG: 2 INJECTION INTRAMUSCULAR; INTRAVENOUS at 11:30

## 2023-08-16 RX ADMIN — CHLORHEXIDINE GLUCONATE 15 ML: 1.2 RINSE ORAL at 08:12

## 2023-08-16 RX ADMIN — POLYVINYL ALCOHOL 1 DROP: 14 SOLUTION/ DROPS OPHTHALMIC at 00:25

## 2023-08-16 RX ADMIN — POLYVINYL ALCOHOL 1 DROP: 14 SOLUTION/ DROPS OPHTHALMIC at 08:12

## 2023-08-16 RX ADMIN — METOCLOPRAMIDE HYDROCHLORIDE 5 MG: 5 INJECTION INTRAMUSCULAR; INTRAVENOUS at 16:42

## 2023-08-16 RX ADMIN — NYSTATIN 500000 UNITS: 100000 SUSPENSION ORAL at 20:46

## 2023-08-16 RX ADMIN — CHLORHEXIDINE GLUCONATE 15 ML: 1.2 RINSE ORAL at 04:28

## 2023-08-16 RX ADMIN — MIDAZOLAM 2 MG: 1 INJECTION INTRAMUSCULAR; INTRAVENOUS at 03:43

## 2023-08-16 RX ADMIN — CHLORHEXIDINE GLUCONATE 15 ML: 1.2 RINSE ORAL at 00:24

## 2023-08-16 RX ADMIN — DEXMEDETOMIDINE 1.5 MCG/KG/HR: 100 INJECTION, SOLUTION INTRAVENOUS at 10:42

## 2023-08-16 RX ADMIN — NYSTATIN 500000 UNITS: 100000 SUSPENSION ORAL at 14:02

## 2023-08-16 RX ADMIN — FENTANYL CITRATE 100 MCG: 50 INJECTION INTRAMUSCULAR; INTRAVENOUS at 21:48

## 2023-08-16 RX ADMIN — POLYVINYL ALCOHOL 1 DROP: 14 SOLUTION/ DROPS OPHTHALMIC at 11:34

## 2023-08-16 RX ADMIN — PETROLATUM: 420 OINTMENT TOPICAL at 08:16

## 2023-08-16 RX ADMIN — CHLORHEXIDINE GLUCONATE 15 ML: 1.2 RINSE ORAL at 16:43

## 2023-08-16 RX ADMIN — PETROLATUM: 420 OINTMENT TOPICAL at 20:58

## 2023-08-16 RX ADMIN — DEXMEDETOMIDINE 1.5 MCG/KG/HR: 100 INJECTION, SOLUTION INTRAVENOUS at 03:45

## 2023-08-16 RX ADMIN — LORAZEPAM 2 MG: 2 INJECTION INTRAMUSCULAR; INTRAVENOUS at 02:35

## 2023-08-16 RX ADMIN — SODIUM CHLORIDE, PRESERVATIVE FREE 10 ML: 5 INJECTION INTRAVENOUS at 20:59

## 2023-08-16 RX ADMIN — NYSTATIN 500000 UNITS: 100000 SUSPENSION ORAL at 08:14

## 2023-08-16 RX ADMIN — DEXMEDETOMIDINE 1.3 MCG/KG/HR: 100 INJECTION, SOLUTION INTRAVENOUS at 17:51

## 2023-08-16 RX ADMIN — Medication 10 ML: at 03:42

## 2023-08-16 RX ADMIN — MIDAZOLAM 2 MG: 1 INJECTION INTRAMUSCULAR; INTRAVENOUS at 00:26

## 2023-08-16 RX ADMIN — Medication 10 ML: at 01:17

## 2023-08-16 RX ADMIN — Medication 10 ML: at 04:37

## 2023-08-16 RX ADMIN — POLYVINYL ALCOHOL 1 DROP: 14 SOLUTION/ DROPS OPHTHALMIC at 16:30

## 2023-08-16 RX ADMIN — IPRATROPIUM BROMIDE AND ALBUTEROL SULFATE 1 DOSE: .5; 2.5 SOLUTION RESPIRATORY (INHALATION) at 17:06

## 2023-08-16 RX ADMIN — PANTOPRAZOLE SODIUM 40 MG: 40 INJECTION, POWDER, FOR SOLUTION INTRAVENOUS at 08:14

## 2023-08-16 RX ADMIN — Medication 10 ML: at 01:41

## 2023-08-16 RX ADMIN — BISACODYL 10 MG: 10 SUPPOSITORY RECTAL at 20:58

## 2023-08-16 RX ADMIN — Medication 10 ML: at 00:25

## 2023-08-16 RX ADMIN — METOCLOPRAMIDE HYDROCHLORIDE 5 MG: 5 INJECTION INTRAMUSCULAR; INTRAVENOUS at 20:51

## 2023-08-16 RX ADMIN — SODIUM PHOSPHATE, MONOBASIC, MONOHYDRATE AND SODIUM PHOSPHATE, DIBASIC, ANHYDROUS 10 MMOL: 276; 142 INJECTION, SOLUTION INTRAVENOUS at 10:28

## 2023-08-16 RX ADMIN — Medication 10 ML: at 06:52

## 2023-08-16 RX ADMIN — NYSTATIN 500000 UNITS: 100000 SUSPENSION ORAL at 16:42

## 2023-08-16 RX ADMIN — SODIUM CHLORIDE: 9 INJECTION, SOLUTION INTRAVENOUS at 00:33

## 2023-08-16 RX ADMIN — Medication 10 ML: at 02:35

## 2023-08-16 RX ADMIN — METOCLOPRAMIDE HYDROCHLORIDE 5 MG: 5 INJECTION INTRAMUSCULAR; INTRAVENOUS at 11:30

## 2023-08-16 RX ADMIN — POLYVINYL ALCOHOL 1 DROP: 14 SOLUTION/ DROPS OPHTHALMIC at 20:30

## 2023-08-16 RX ADMIN — Medication 10 ML: at 21:48

## 2023-08-16 RX ADMIN — PHENOBARBITAL SODIUM 130 MG: 65 INJECTION INTRAMUSCULAR at 14:03

## 2023-08-16 RX ADMIN — PHENOBARBITAL SODIUM 130 MG: 65 INJECTION INTRAMUSCULAR at 18:57

## 2023-08-16 RX ADMIN — IPRATROPIUM BROMIDE AND ALBUTEROL SULFATE 1 DOSE: .5; 2.5 SOLUTION RESPIRATORY (INHALATION) at 19:56

## 2023-08-16 RX ADMIN — ENOXAPARIN SODIUM 40 MG: 100 INJECTION SUBCUTANEOUS at 09:30

## 2023-08-16 RX ADMIN — PHENOBARBITAL SODIUM 130 MG: 65 INJECTION INTRAMUSCULAR at 06:52

## 2023-08-16 RX ADMIN — IPRATROPIUM BROMIDE AND ALBUTEROL SULFATE 1 DOSE: .5; 2.5 SOLUTION RESPIRATORY (INHALATION) at 08:11

## 2023-08-16 RX ADMIN — FENTANYL CITRATE 100 MCG: 50 INJECTION INTRAMUSCULAR; INTRAVENOUS at 01:41

## 2023-08-16 RX ADMIN — SODIUM CHLORIDE: 9 INJECTION, SOLUTION INTRAVENOUS at 14:24

## 2023-08-16 RX ADMIN — CEFEPIME 2000 MG: 2 INJECTION, POWDER, FOR SOLUTION INTRAVENOUS at 04:50

## 2023-08-16 ASSESSMENT — PULMONARY FUNCTION TESTS
PIF_VALUE: 25
PIF_VALUE: 22
PIF_VALUE: 24
PIF_VALUE: 26
PIF_VALUE: 24
PIF_VALUE: 33
PIF_VALUE: 30
PIF_VALUE: 23
PIF_VALUE: 36
PIF_VALUE: 21
PIF_VALUE: 22
PIF_VALUE: 14
PIF_VALUE: 21
PIF_VALUE: 19
PIF_VALUE: 24
PIF_VALUE: 28
PIF_VALUE: 24
PIF_VALUE: 25
PIF_VALUE: 23
PIF_VALUE: 17
PIF_VALUE: 23
PIF_VALUE: 29
PIF_VALUE: 29
PIF_VALUE: 23
PIF_VALUE: 35
PIF_VALUE: 21
PIF_VALUE: 22
PIF_VALUE: 23
PIF_VALUE: 27
PIF_VALUE: 22
PIF_VALUE: 22
PIF_VALUE: 23
PIF_VALUE: 42
PIF_VALUE: 25
PIF_VALUE: 50
PIF_VALUE: 23

## 2023-08-16 ASSESSMENT — PAIN SCALES - GENERAL
PAINLEVEL_OUTOF10: 5
PAINLEVEL_OUTOF10: 4
PAINLEVEL_OUTOF10: 5
PAINLEVEL_OUTOF10: 2
PAINLEVEL_OUTOF10: 5
PAINLEVEL_OUTOF10: 4
PAINLEVEL_OUTOF10: 0
PAINLEVEL_OUTOF10: 4
PAINLEVEL_OUTOF10: 5
PAINLEVEL_OUTOF10: 4
PAINLEVEL_OUTOF10: 5
PAINLEVEL_OUTOF10: 0
PAINLEVEL_OUTOF10: 4
PAINLEVEL_OUTOF10: 5

## 2023-08-16 NOTE — PLAN OF CARE
Problem: Pain  Goal: Verbalizes/displays adequate comfort level or baseline comfort level  8/15/2023 2219 by Dorota Simons RN  Outcome: Not Progressing  8/15/2023 1245 by Naomi Tsai RN  Outcome: Progressing     Problem: Safety - Adult  Goal: Free from fall injury  8/15/2023 2219 by Dorota Simons RN  Outcome: Progressing  8/15/2023 1245 by Naomi Tsai RN  Outcome: Progressing  Flowsheets (Taken 8/15/2023 1240)  Free From Fall Injury: Jamil Gilliland family/caregiver on patient safety     Problem: ABCDS Injury Assessment  Goal: Absence of physical injury  8/15/2023 2219 by Dorota Simons RN  Outcome: Progressing  8/15/2023 1245 by Naomi Tsai RN  Outcome: Progressing  Flowsheets (Taken 8/15/2023 1240)  Absence of Physical Injury: Implement safety measures based on patient assessment     Problem: Neurosensory - Adult  Goal: Achieves stable or improved neurological status  8/15/2023 2219 by Dorota Simons RN  Outcome: Not Progressing  8/15/2023 1245 by Naomi Tsai RN  Outcome: Progressing     Problem: Neurosensory - Adult  Goal: Achieves maximal functionality and self care  8/15/2023 2219 by Dorota Simons RN  Outcome: Not Progressing  8/15/2023 1245 by Naomi Tsai RN  Outcome: Progressing     Problem: Respiratory - Adult  Goal: Achieves optimal ventilation and oxygenation  8/15/2023 2219 by Dorota Simons RN  Outcome: Not Progressing  8/15/2023 1245 by Naomi Tsai RN  Outcome: Progressing     Problem: Cardiovascular - Adult  Goal: Maintains optimal cardiac output and hemodynamic stability  8/15/2023 2219 by Dorota Simons RN  Outcome: Not Progressing  8/15/2023 1245 by Naomi Tsai RN  Outcome: Progressing     Problem: Gastrointestinal - Adult  Goal: Maintains or returns to baseline bowel function  8/15/2023 2219 by Dorota Simons RN  Outcome: Not Progressing  8/15/2023 1245 by Naomi Tsai RN  Outcome: Progressing     Problem: Gastrointestinal - Adult  Goal: Maintains adequate nutritional intake  8/15/2023 2219 by Arnoldo Flood RN  Outcome: Not Progressing  8/15/2023 1245 by Nia Willson RN  Outcome: Progressing     Problem: Genitourinary - Adult  Goal: Absence of urinary retention  8/15/2023 2219 by Arnoldo Flood RN  Outcome: Progressing  8/15/2023 1245 by Nia Willson RN  Outcome: Progressing     Problem: Metabolic/Fluid and Electrolytes - Adult  Goal: Glucose maintained within prescribed range  8/15/2023 2219 by Arnoldo Flood RN  Outcome: Not Progressing  8/15/2023 1245 by Nia Willson RN  Outcome: Progressing     Problem: Anxiety  Goal: Will report anxiety at manageable levels  Description: INTERVENTIONS:  1. Administer medication as ordered  2. Teach and rehearse alternative coping skills  3. Provide emotional support with 1:1 interaction with staff  8/15/2023 2219 by Arnoldo Flood RN  Outcome: Not Progressing  8/15/2023 1245 by Nia Willson RN  Outcome: Progressing     Problem: Nutrition Deficit:  Goal: Optimize nutritional status  8/15/2023 2219 by Arnoldo Flood RN  Outcome: Not Progressing  8/15/2023 1245 by Nia Willson RN  Outcome: Progressing     Problem: Safety - Medical Restraint  Goal: Remains free of injury from restraints (Restraint for Interference with Medical Device)  Description: INTERVENTIONS:  1. Determine that other, less restrictive measures have been tried or would not be effective before applying the restraint  2. Evaluate the patient's condition at the time of restraint application  3. Inform patient/family regarding the reason for restraint  4.  Q2H: Monitor safety, psychosocial status, comfort, nutrition and hydration  8/15/2023 2219 by Arnoldo Flood RN  Outcome: Progressing  8/15/2023 1245 by Nia Willson RN  Outcome: Progressing     Problem: Pain  Goal: Verbalizes/displays adequate comfort level or baseline comfort level  8/15/2023 2219 by Arnoldo Flood RN  Outcome: Not Progressing  8/15/2023 1245

## 2023-08-16 NOTE — FLOWSHEET NOTE
Patient agitated, attempts to reach for lines/tubes. No evidence of learning present, bilateral soft wrist restraints continued for safety and line/tube protection.

## 2023-08-16 NOTE — FLOWSHEET NOTE
Patient continuously agitated, multiple attempts of reaching for lines/tubes. Bilateral soft wrist restraints continued for safety and line/tube protection.

## 2023-08-16 NOTE — PLAN OF CARE
Problem: Safety - Adult  Goal: Free from fall injury  8/16/2023 1150 by Liana Knott RN  Outcome: Progressing  8/16/2023 0048 by Treva Corona RN  Outcome: Progressing  8/15/2023 2219 by Treva Corona RN  Outcome: Progressing     Problem: Safety - Medical Restraint  Goal: Remains free of injury from restraints (Restraint for Interference with Medical Device)  Description: INTERVENTIONS:  1. Determine that other, less restrictive measures have been tried or would not be effective before applying the restraint  2. Evaluate the patient's condition at the time of restraint application  3. Inform patient/family regarding the reason for restraint  4.  Q2H: Monitor safety, psychosocial status, comfort, nutrition and hydration  8/16/2023 1150 by Liana Knott RN  Outcome: Progressing  Flowsheets  Taken 8/16/2023 0829 by Liana Knott RN  Remains free of injury from restraints (restraint for interference with medical device): Every 2 hours: Monitor safety, psychosocial status, comfort, nutrition and hydration  Taken 8/16/2023 0600 by Treva Corona RN  Remains free of injury from restraints (restraint for interference with medical device): Every 2 hours: Monitor safety, psychosocial status, comfort, nutrition and hydration  Taken 8/16/2023 0400 by Treva Corona RN  Remains free of injury from restraints (restraint for interference with medical device): Every 2 hours: Monitor safety, psychosocial status, comfort, nutrition and hydration  Taken 8/16/2023 0200 by Treva Corona RN  Remains free of injury from restraints (restraint for interference with medical device): Every 2 hours: Monitor safety, psychosocial status, comfort, nutrition and hydration  8/16/2023 0048 by Treva Corona RN  Outcome: Progressing  Flowsheets (Taken 8/16/2023 0000)  Remains free of injury from restraints (restraint for interference with medical device): Every 2 hours: Monitor safety, psychosocial status, comfort, nutrition and Administer analgesics based on type and severity of pain and evaluate response   Implement non-pharmacological measures as appropriate and evaluate response  Taken 8/16/2023 0300 by Blanca Cain RN  Verbalizes/displays adequate comfort level or baseline comfort level:   Assess pain using appropriate pain scale   Administer analgesics based on type and severity of pain and evaluate response   Implement non-pharmacological measures as appropriate and evaluate response  Taken 8/16/2023 0200 by Blanca Cain RN  Verbalizes/displays adequate comfort level or baseline comfort level:   Assess pain using appropriate pain scale   Administer analgesics based on type and severity of pain and evaluate response   Implement non-pharmacological measures as appropriate and evaluate response  Taken 8/16/2023 0100 by Blanca Cain RN  Verbalizes/displays adequate comfort level or baseline comfort level:   Assess pain using appropriate pain scale   Administer analgesics based on type and severity of pain and evaluate response   Implement non-pharmacological measures as appropriate and evaluate response  8/16/2023 0048 by Blanca Cain RN  Outcome: Not Progressing  Flowsheets  Taken 8/16/2023 0000  Verbalizes/displays adequate comfort level or baseline comfort level:   Assess pain using appropriate pain scale   Administer analgesics based on type and severity of pain and evaluate response   Implement non-pharmacological measures as appropriate and evaluate response  Taken 8/15/2023 2300  Verbalizes/displays adequate comfort level or baseline comfort level:   Assess pain using appropriate pain scale   Administer analgesics based on type and severity of pain and evaluate response   Implement non-pharmacological measures as appropriate and evaluate response  8/15/2023 2219 by Blanca Cain RN  Outcome: Not Progressing  Flowsheets  Taken 8/15/2023 2200  Verbalizes/displays adequate comfort level or baseline comfort level:

## 2023-08-16 NOTE — PLAN OF CARE
Problem: Pain  Goal: Verbalizes/displays adequate comfort level or baseline comfort level  8/16/2023 0048 by Wil Mckeon RN  Outcome: Not Progressing  Flowsheets (Taken 8/15/2023 2300)  Verbalizes/displays adequate comfort level or baseline comfort level:   Assess pain using appropriate pain scale   Administer analgesics based on type and severity of pain and evaluate response   Implement non-pharmacological measures as appropriate and evaluate response  8/15/2023 2219 by Wil Mckeon RN  Outcome: Not Progressing  Flowsheets  Taken 8/15/2023 2200  Verbalizes/displays adequate comfort level or baseline comfort level:   Assess pain using appropriate pain scale   Administer analgesics based on type and severity of pain and evaluate response   Implement non-pharmacological measures as appropriate and evaluate response  Taken 8/15/2023 2100  Verbalizes/displays adequate comfort level or baseline comfort level:   Assess pain using appropriate pain scale   Administer analgesics based on type and severity of pain and evaluate response   Implement non-pharmacological measures as appropriate and evaluate response  Taken 8/15/2023 2000  Verbalizes/displays adequate comfort level or baseline comfort level:   Assess pain using appropriate pain scale   Administer analgesics based on type and severity of pain and evaluate response   Implement non-pharmacological measures as appropriate and evaluate response  8/15/2023 1245 by Cortney Russo RN  Outcome: Progressing     Problem: Pain  Goal: Verbalizes/displays adequate comfort level or baseline comfort level  8/16/2023 0048 by Wil Mckeno RN  Outcome: Not Progressing  Flowsheets (Taken 8/15/2023 2300)  Verbalizes/displays adequate comfort level or baseline comfort level:   Assess pain using appropriate pain scale   Administer analgesics based on type and severity of pain and evaluate response   Implement non-pharmacological measures as appropriate and evaluate Administer medication as ordered  2. Teach and rehearse alternative coping skills  3.  Provide emotional support with 1:1 interaction with staff  8/16/2023 0048 by Mimi Max RN  Outcome: Not Progressing  8/15/2023 2219 by Mimi Max RN  Outcome: Not Progressing  Flowsheets (Taken 8/15/2023 2000)  Will report anxiety at manageable levels: Administer medication as ordered  8/15/2023 1245 by Hamlet Randall RN  Outcome: Progressing     Problem: Nutrition Deficit:  Goal: Optimize nutritional status  8/16/2023 0048 by Mimi Max RN  Outcome: Not Progressing  8/15/2023 2219 by Mimi Max RN  Outcome: Not Progressing  8/15/2023 1245 by Hamlet Randall RN  Outcome: Progressing

## 2023-08-16 NOTE — PLAN OF CARE
Problem: Respiratory - Adult  Goal: Achieves optimal ventilation and oxygenation  8/16/2023 1134 by Renee Padron RCP  Outcome: Progressing

## 2023-08-16 NOTE — FLOWSHEET NOTE
Pt appears to reach for Trach, pulls at lines and tubes when unrestrained. Agitated, unable to be redirected at this time, Bilateral wrist restraints remain for patient safety. Will continue to monitor.
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
## 2023-08-16 NOTE — PROGRESS NOTES
Pharmacy Consultation Note  (Antibiotic Dosing and Monitoring)    Initial consult date: 8/16/23  Consulting physician/provider: Dr. Ariel Luther  Drug: Vancomycin  Indication: Pneumonia    Age/  Gender Height Weight IBW  Allergy Information   58 y.o./male 5' 11\" (180.3 cm) 220 lb 7.4 oz (100 kg)     Ideal body weight: 75.3 kg (166 lb 0.1 oz)  Adjusted ideal body weight: 90.7 kg (200 lb 0.1 oz)   Adhesive tape, Cymbalta [duloxetine hcl], Lidocaine, Lyrica [pregabalin], Strattera [atomoxetine], and Tramadol      Renal Function:  Recent Labs     08/15/23  0445 08/15/23  1400 08/16/23  0445   BUN 28* 30* 25*   CREATININE 1.0 1.0 0.9       Intake/Output Summary (Last 24 hours) at 8/16/2023 1206  Last data filed at 8/16/2023 0700  Gross per 24 hour   Intake 2985.33 ml   Output 2625 ml   Net 360.33 ml       Vancomycin Monitoring:  Trough:  No results for input(s): VANCOTROUGH in the last 72 hours. Random:  No results for input(s): VANCORANDOM in the last 72 hours. No results for input(s): Rock Rausch in the last 72 hours. Historical Cultures:  No results found for: ORG  No results for input(s): BC in the last 72 hours. Vancomycin Administration Times:  Recent vancomycin administrations        No vancomycin IV orders with administrations found. Orders not given:            vancomycin (VANCOCIN) 1,750 mg in sodium chloride 0.9 % 500 mL IVPB                    Assessment:  Patient is a 60 y.o. male who has been initiated on vancomycin  Estimated Creatinine Clearance: 113 mL/min (based on SCr of 0.9 mg/dL). Plan:   Will initiate vancomycin 2000 mg IV every 12 hours  Will check vancomycin levels when appropriate  Will continue to monitor renal function   Pharmacy to follow      Aidee Goodwin [address] [place] [address] 8/16/2023 12:06 PM

## 2023-08-17 ENCOUNTER — APPOINTMENT (OUTPATIENT)
Dept: GENERAL RADIOLOGY | Age: 58
DRG: 004 | End: 2023-08-17
Payer: COMMERCIAL

## 2023-08-17 LAB
AADO2: 290.7 MMHG
ABO/RH: NORMAL
ALBUMIN SERPL-MCNC: 2.5 G/DL (ref 3.5–5.2)
ALP SERPL-CCNC: 56 U/L (ref 40–129)
ALT SERPL-CCNC: 16 U/L (ref 0–40)
ANION GAP SERPL CALCULATED.3IONS-SCNC: 14 MMOL/L (ref 7–16)
ANTIBODY SCREEN: NEGATIVE
ARM BAND NUMBER: NORMAL
AST SERPL-CCNC: 31 U/L (ref 0–39)
B.E.: -0.8 MMOL/L (ref -3–3)
BASOPHILS # BLD: 0.05 K/UL (ref 0–0.2)
BASOPHILS NFR BLD: 1 % (ref 0–2)
BILIRUB SERPL-MCNC: 0.3 MG/DL (ref 0–1.2)
BLOOD BANK BLOOD PRODUCT EXPIRATION DATE: NORMAL
BLOOD BANK BLOOD PRODUCT EXPIRATION DATE: NORMAL
BLOOD BANK DISPENSE STATUS: NORMAL
BLOOD BANK DISPENSE STATUS: NORMAL
BLOOD BANK ISBT PRODUCT BLOOD TYPE: 6200
BLOOD BANK ISBT PRODUCT BLOOD TYPE: 6200
BLOOD BANK PRODUCT CODE: NORMAL
BLOOD BANK PRODUCT CODE: NORMAL
BLOOD BANK SAMPLE EXPIRATION: NORMAL
BLOOD BANK UNIT TYPE AND RH: NORMAL
BLOOD BANK UNIT TYPE AND RH: NORMAL
BPU ID: NORMAL
BPU ID: NORMAL
BUN SERPL-MCNC: 21 MG/DL (ref 6–20)
CALCIUM SERPL-MCNC: 7.7 MG/DL (ref 8.6–10.2)
CHLORIDE SERPL-SCNC: 101 MMOL/L (ref 98–107)
CO2 SERPL-SCNC: 24 MMOL/L (ref 22–29)
COHB: 0.3 % (ref 0–1.5)
COMPONENT: NORMAL
COMPONENT: NORMAL
CREAT SERPL-MCNC: 0.8 MG/DL (ref 0.7–1.2)
CRITICAL: ABNORMAL
CROSSMATCH RESULT: NORMAL
CROSSMATCH RESULT: NORMAL
DATE ANALYZED: ABNORMAL
DATE OF COLLECTION: ABNORMAL
EKG ATRIAL RATE: 91 BPM
EKG P AXIS: 46 DEGREES
EKG P-R INTERVAL: 154 MS
EKG Q-T INTERVAL: 522 MS
EKG QRS DURATION: 56 MS
EKG QTC CALCULATION (BAZETT): 642 MS
EKG R AXIS: 11 DEGREES
EKG T AXIS: -133 DEGREES
EKG VENTRICULAR RATE: 91 BPM
EOSINOPHIL # BLD: 0.14 K/UL (ref 0.05–0.5)
EOSINOPHILS RELATIVE PERCENT: 2 % (ref 0–6)
ERYTHROCYTE [DISTWIDTH] IN BLOOD BY AUTOMATED COUNT: 14.8 % (ref 11.5–15)
FIO2: 60 %
GFR SERPL CREATININE-BSD FRML MDRD: >60 ML/MIN/1.73M2
GLUCOSE BLD-MCNC: 121 MG/DL (ref 74–99)
GLUCOSE BLD-MCNC: 125 MG/DL (ref 74–99)
GLUCOSE BLD-MCNC: 146 MG/DL (ref 74–99)
GLUCOSE BLD-MCNC: 156 MG/DL (ref 74–99)
GLUCOSE BLD-MCNC: 158 MG/DL (ref 74–99)
GLUCOSE BLD-MCNC: 160 MG/DL (ref 74–99)
GLUCOSE BLD-MCNC: 81 MG/DL (ref 74–99)
GLUCOSE SERPL-MCNC: 142 MG/DL (ref 74–99)
HCO3: 22.4 MMOL/L (ref 22–26)
HCT VFR BLD AUTO: 26.2 % (ref 37–54)
HCT VFR BLD AUTO: 26.9 % (ref 37–54)
HGB BLD-MCNC: 8.2 G/DL (ref 12.5–16.5)
HGB BLD-MCNC: 8.4 G/DL (ref 12.5–16.5)
HHB: 3.8 % (ref 0–5)
IMM GRANULOCYTES # BLD AUTO: 0.07 K/UL (ref 0–0.58)
IMM GRANULOCYTES NFR BLD: 1 % (ref 0–5)
LAB: ABNORMAL
LYMPHOCYTES NFR BLD: 1.24 K/UL (ref 1.5–4)
LYMPHOCYTES RELATIVE PERCENT: 15 % (ref 20–42)
Lab: ABNORMAL
MAGNESIUM SERPL-MCNC: 2.3 MG/DL (ref 1.6–2.6)
MCH RBC QN AUTO: 28.4 PG (ref 26–35)
MCHC RBC AUTO-ENTMCNC: 31.3 G/DL (ref 32–34.5)
MCV RBC AUTO: 90.7 FL (ref 80–99.9)
METHB: 0.1 % (ref 0–1.5)
MICROORGANISM SPEC CULT: ABNORMAL
MICROORGANISM SPEC CULT: ABNORMAL
MICROORGANISM/AGENT SPEC: ABNORMAL
MODE: AC
MONOCYTES NFR BLD: 0.65 K/UL (ref 0.1–0.95)
MONOCYTES NFR BLD: 8 % (ref 2–12)
NEUTROPHILS NFR BLD: 75 % (ref 43–80)
NEUTS SEG NFR BLD: 6.32 K/UL (ref 1.8–7.3)
O2 CONTENT: 12.9 ML/DL
O2 SATURATION: 96.2 % (ref 92–98.5)
O2HB: 95.8 % (ref 94–97)
OPERATOR ID: ABNORMAL
PATIENT TEMP: 37 C
PCO2: 31.5 MMHG (ref 35–45)
PEEP/CPAP: 10 CMH2O
PFO2: 1.46 MMHG/%
PH BLOOD GAS: 7.47 (ref 7.35–7.45)
PHOSPHATE SERPL-MCNC: 2.3 MG/DL (ref 2.5–4.5)
PLATELET # BLD AUTO: 683 K/UL (ref 130–450)
PMV BLD AUTO: 9.6 FL (ref 7–12)
PO2: 87.5 MMHG (ref 75–100)
POTASSIUM SERPL-SCNC: 4.1 MMOL/L (ref 3.5–5)
PROT SERPL-MCNC: 5.8 G/DL (ref 6.4–8.3)
RBC # BLD AUTO: 2.89 M/UL (ref 3.8–5.8)
RI(T): 3.32
RR MECHANICAL: 14 B/MIN
SODIUM SERPL-SCNC: 139 MMOL/L (ref 132–146)
SOURCE, BLOOD GAS: ABNORMAL
SPECIMEN DESCRIPTION: ABNORMAL
THB: 9.5 G/DL (ref 11.5–16.5)
TIME ANALYZED: 623
TRANSFUSION STATUS: NORMAL
TRANSFUSION STATUS: NORMAL
UNIT DIVISION: 0
UNIT DIVISION: 0
UNIT ISSUE DATE/TIME: NORMAL
UNIT ISSUE DATE/TIME: NORMAL
VT MECHANICAL: 460 ML
WBC OTHER # BLD: 8.5 K/UL (ref 4.5–11.5)

## 2023-08-17 PROCEDURE — 2500000003 HC RX 250 WO HCPCS: Performed by: SURGERY

## 2023-08-17 PROCEDURE — 85025 COMPLETE CBC W/AUTO DIFF WBC: CPT

## 2023-08-17 PROCEDURE — 6360000002 HC RX W HCPCS

## 2023-08-17 PROCEDURE — A4216 STERILE WATER/SALINE, 10 ML: HCPCS

## 2023-08-17 PROCEDURE — 85014 HEMATOCRIT: CPT

## 2023-08-17 PROCEDURE — 6360000002 HC RX W HCPCS: Performed by: STUDENT IN AN ORGANIZED HEALTH CARE EDUCATION/TRAINING PROGRAM

## 2023-08-17 PROCEDURE — 2580000003 HC RX 258: Performed by: STUDENT IN AN ORGANIZED HEALTH CARE EDUCATION/TRAINING PROGRAM

## 2023-08-17 PROCEDURE — 71045 X-RAY EXAM CHEST 1 VIEW: CPT

## 2023-08-17 PROCEDURE — 93010 ELECTROCARDIOGRAM REPORT: CPT | Performed by: INTERNAL MEDICINE

## 2023-08-17 PROCEDURE — 82805 BLOOD GASES W/O2 SATURATION: CPT

## 2023-08-17 PROCEDURE — 85018 HEMOGLOBIN: CPT

## 2023-08-17 PROCEDURE — C9113 INJ PANTOPRAZOLE SODIUM, VIA: HCPCS

## 2023-08-17 PROCEDURE — 83735 ASSAY OF MAGNESIUM: CPT

## 2023-08-17 PROCEDURE — 6370000000 HC RX 637 (ALT 250 FOR IP)

## 2023-08-17 PROCEDURE — 94003 VENT MGMT INPAT SUBQ DAY: CPT

## 2023-08-17 PROCEDURE — 84100 ASSAY OF PHOSPHORUS: CPT

## 2023-08-17 PROCEDURE — 2580000003 HC RX 258: Performed by: SURGERY

## 2023-08-17 PROCEDURE — 6360000002 HC RX W HCPCS: Performed by: SURGERY

## 2023-08-17 PROCEDURE — 80053 COMPREHEN METABOLIC PANEL: CPT

## 2023-08-17 PROCEDURE — 51798 US URINE CAPACITY MEASURE: CPT

## 2023-08-17 PROCEDURE — 6370000000 HC RX 637 (ALT 250 FOR IP): Performed by: SURGERY

## 2023-08-17 PROCEDURE — 93005 ELECTROCARDIOGRAM TRACING: CPT | Performed by: SURGERY

## 2023-08-17 PROCEDURE — 82962 GLUCOSE BLOOD TEST: CPT

## 2023-08-17 PROCEDURE — 2580000003 HC RX 258

## 2023-08-17 PROCEDURE — 94640 AIRWAY INHALATION TREATMENT: CPT

## 2023-08-17 PROCEDURE — 99291 CRITICAL CARE FIRST HOUR: CPT | Performed by: SURGERY

## 2023-08-17 PROCEDURE — 2000000000 HC ICU R&B

## 2023-08-17 RX ADMIN — LORAZEPAM 2 MG: 2 INJECTION INTRAMUSCULAR; INTRAVENOUS at 10:38

## 2023-08-17 RX ADMIN — PHENOBARBITAL SODIUM 130 MG: 65 INJECTION INTRAMUSCULAR at 06:06

## 2023-08-17 RX ADMIN — LORAZEPAM 2 MG: 2 INJECTION INTRAMUSCULAR; INTRAVENOUS at 15:27

## 2023-08-17 RX ADMIN — CHLORHEXIDINE GLUCONATE 15 ML: 1.2 RINSE ORAL at 11:45

## 2023-08-17 RX ADMIN — POLYVINYL ALCOHOL 1 DROP: 14 SOLUTION/ DROPS OPHTHALMIC at 04:05

## 2023-08-17 RX ADMIN — PETROLATUM: 420 OINTMENT TOPICAL at 21:09

## 2023-08-17 RX ADMIN — LORAZEPAM 2 MG: 2 INJECTION INTRAMUSCULAR; INTRAVENOUS at 21:03

## 2023-08-17 RX ADMIN — Medication 10 ML: at 01:12

## 2023-08-17 RX ADMIN — METOCLOPRAMIDE HYDROCHLORIDE 5 MG: 5 INJECTION INTRAMUSCULAR; INTRAVENOUS at 21:07

## 2023-08-17 RX ADMIN — Medication 10 ML: at 03:36

## 2023-08-17 RX ADMIN — CEFEPIME 2000 MG: 2 INJECTION, POWDER, FOR SOLUTION INTRAVENOUS at 12:56

## 2023-08-17 RX ADMIN — CEFEPIME 2000 MG: 2 INJECTION, POWDER, FOR SOLUTION INTRAVENOUS at 21:00

## 2023-08-17 RX ADMIN — PHENOBARBITAL SODIUM 130 MG: 65 INJECTION INTRAMUSCULAR at 01:12

## 2023-08-17 RX ADMIN — PANTOPRAZOLE SODIUM 40 MG: 40 INJECTION, POWDER, FOR SOLUTION INTRAVENOUS at 08:07

## 2023-08-17 RX ADMIN — POLYVINYL ALCOHOL 1 DROP: 14 SOLUTION/ DROPS OPHTHALMIC at 23:44

## 2023-08-17 RX ADMIN — FENTANYL CITRATE 100 MCG: 50 INJECTION INTRAMUSCULAR; INTRAVENOUS at 11:42

## 2023-08-17 RX ADMIN — PHENOBARBITAL SODIUM 130 MG: 65 INJECTION INTRAMUSCULAR at 18:58

## 2023-08-17 RX ADMIN — POLYVINYL ALCOHOL 1 DROP: 14 SOLUTION/ DROPS OPHTHALMIC at 00:10

## 2023-08-17 RX ADMIN — METOCLOPRAMIDE HYDROCHLORIDE 5 MG: 5 INJECTION INTRAMUSCULAR; INTRAVENOUS at 10:38

## 2023-08-17 RX ADMIN — MIDAZOLAM 2 MG: 1 INJECTION INTRAMUSCULAR; INTRAVENOUS at 11:42

## 2023-08-17 RX ADMIN — Medication 10 ML: at 06:05

## 2023-08-17 RX ADMIN — BISACODYL 10 MG: 10 SUPPOSITORY RECTAL at 21:08

## 2023-08-17 RX ADMIN — ENOXAPARIN SODIUM 40 MG: 100 INJECTION SUBCUTANEOUS at 08:08

## 2023-08-17 RX ADMIN — POLYVINYL ALCOHOL 1 DROP: 14 SOLUTION/ DROPS OPHTHALMIC at 11:45

## 2023-08-17 RX ADMIN — VANCOMYCIN HYDROCHLORIDE 2000 MG: 10 INJECTION, POWDER, LYOPHILIZED, FOR SOLUTION INTRAVENOUS at 23:41

## 2023-08-17 RX ADMIN — LORAZEPAM 2 MG: 2 INJECTION INTRAMUSCULAR; INTRAVENOUS at 02:07

## 2023-08-17 RX ADMIN — IPRATROPIUM BROMIDE AND ALBUTEROL SULFATE 1 DOSE: .5; 2.5 SOLUTION RESPIRATORY (INHALATION) at 19:51

## 2023-08-17 RX ADMIN — HYDRALAZINE HYDROCHLORIDE 10 MG: 20 INJECTION, SOLUTION INTRAMUSCULAR; INTRAVENOUS at 08:34

## 2023-08-17 RX ADMIN — PETROLATUM: 420 OINTMENT TOPICAL at 08:28

## 2023-08-17 RX ADMIN — NYSTATIN 500000 UNITS: 100000 SUSPENSION ORAL at 15:58

## 2023-08-17 RX ADMIN — CHLORHEXIDINE GLUCONATE 15 ML: 1.2 RINSE ORAL at 00:10

## 2023-08-17 RX ADMIN — MIDAZOLAM 2 MG: 1 INJECTION INTRAMUSCULAR; INTRAVENOUS at 03:36

## 2023-08-17 RX ADMIN — METOCLOPRAMIDE HYDROCHLORIDE 5 MG: 5 INJECTION INTRAMUSCULAR; INTRAVENOUS at 03:25

## 2023-08-17 RX ADMIN — CHLORHEXIDINE GLUCONATE 15 ML: 1.2 RINSE ORAL at 15:57

## 2023-08-17 RX ADMIN — CHLORHEXIDINE GLUCONATE 15 ML: 1.2 RINSE ORAL at 08:07

## 2023-08-17 RX ADMIN — CHLORHEXIDINE GLUCONATE 15 ML: 1.2 RINSE ORAL at 04:05

## 2023-08-17 RX ADMIN — PETROLATUM: 420 OINTMENT TOPICAL at 08:29

## 2023-08-17 RX ADMIN — LORAZEPAM 2 MG: 2 INJECTION INTRAMUSCULAR; INTRAVENOUS at 06:05

## 2023-08-17 RX ADMIN — DEXMEDETOMIDINE 1.3 MCG/KG/HR: 100 INJECTION, SOLUTION INTRAVENOUS at 01:48

## 2023-08-17 RX ADMIN — MIDAZOLAM 2 MG: 1 INJECTION INTRAMUSCULAR; INTRAVENOUS at 01:24

## 2023-08-17 RX ADMIN — PHENOBARBITAL SODIUM 130 MG: 65 INJECTION INTRAMUSCULAR at 12:49

## 2023-08-17 RX ADMIN — CHLORHEXIDINE GLUCONATE 15 ML: 1.2 RINSE ORAL at 23:44

## 2023-08-17 RX ADMIN — LABETALOL HYDROCHLORIDE 10 MG: 5 INJECTION INTRAVENOUS at 04:06

## 2023-08-17 RX ADMIN — MIDAZOLAM 2 MG: 1 INJECTION INTRAMUSCULAR; INTRAVENOUS at 04:39

## 2023-08-17 RX ADMIN — IPRATROPIUM BROMIDE AND ALBUTEROL SULFATE 1 DOSE: .5; 2.5 SOLUTION RESPIRATORY (INHALATION) at 16:12

## 2023-08-17 RX ADMIN — PANTOPRAZOLE SODIUM 40 MG: 40 INJECTION, POWDER, FOR SOLUTION INTRAVENOUS at 21:07

## 2023-08-17 RX ADMIN — DEXMEDETOMIDINE 1.5 MCG/KG/HR: 100 INJECTION, SOLUTION INTRAVENOUS at 15:29

## 2023-08-17 RX ADMIN — Medication 10 ML: at 03:24

## 2023-08-17 RX ADMIN — NYSTATIN 500000 UNITS: 100000 SUSPENSION ORAL at 21:07

## 2023-08-17 RX ADMIN — PHENOBARBITAL SODIUM 130 MG: 65 INJECTION INTRAMUSCULAR at 23:42

## 2023-08-17 RX ADMIN — VANCOMYCIN HYDROCHLORIDE 2000 MG: 10 INJECTION, POWDER, LYOPHILIZED, FOR SOLUTION INTRAVENOUS at 01:29

## 2023-08-17 RX ADMIN — BISACODYL 10 MG: 10 SUPPOSITORY RECTAL at 08:07

## 2023-08-17 RX ADMIN — DEXMEDETOMIDINE 1.5 MCG/KG/HR: 100 INJECTION, SOLUTION INTRAVENOUS at 08:22

## 2023-08-17 RX ADMIN — LABETALOL HYDROCHLORIDE 10 MG: 5 INJECTION INTRAVENOUS at 10:51

## 2023-08-17 RX ADMIN — INSULIN LISPRO 4 UNITS: 100 INJECTION, SOLUTION INTRAVENOUS; SUBCUTANEOUS at 08:17

## 2023-08-17 RX ADMIN — INSULIN LISPRO 4 UNITS: 100 INJECTION, SOLUTION INTRAVENOUS; SUBCUTANEOUS at 21:13

## 2023-08-17 RX ADMIN — IPRATROPIUM BROMIDE AND ALBUTEROL SULFATE 1 DOSE: .5; 2.5 SOLUTION RESPIRATORY (INHALATION) at 11:50

## 2023-08-17 RX ADMIN — NYSTATIN 500000 UNITS: 100000 SUSPENSION ORAL at 12:49

## 2023-08-17 RX ADMIN — FENTANYL CITRATE 100 MCG: 50 INJECTION INTRAMUSCULAR; INTRAVENOUS at 00:31

## 2023-08-17 RX ADMIN — LORAZEPAM 2 MG: 2 INJECTION INTRAMUSCULAR; INTRAVENOUS at 23:42

## 2023-08-17 RX ADMIN — POLYVINYL ALCOHOL 1 DROP: 14 SOLUTION/ DROPS OPHTHALMIC at 08:00

## 2023-08-17 RX ADMIN — MIDAZOLAM 2 MG: 1 INJECTION INTRAMUSCULAR; INTRAVENOUS at 10:38

## 2023-08-17 RX ADMIN — SODIUM CHLORIDE: 9 INJECTION, SOLUTION INTRAVENOUS at 03:32

## 2023-08-17 RX ADMIN — CEFEPIME 2000 MG: 2 INJECTION, POWDER, FOR SOLUTION INTRAVENOUS at 05:15

## 2023-08-17 RX ADMIN — SODIUM CHLORIDE: 9 INJECTION, SOLUTION INTRAVENOUS at 16:54

## 2023-08-17 RX ADMIN — METOCLOPRAMIDE HYDROCHLORIDE 5 MG: 5 INJECTION INTRAMUSCULAR; INTRAVENOUS at 17:39

## 2023-08-17 RX ADMIN — IPRATROPIUM BROMIDE AND ALBUTEROL SULFATE 1 DOSE: .5; 2.5 SOLUTION RESPIRATORY (INHALATION) at 09:19

## 2023-08-17 RX ADMIN — CHLORHEXIDINE GLUCONATE 15 ML: 1.2 RINSE ORAL at 21:10

## 2023-08-17 RX ADMIN — ENOXAPARIN SODIUM 40 MG: 100 INJECTION SUBCUTANEOUS at 21:06

## 2023-08-17 RX ADMIN — VANCOMYCIN HYDROCHLORIDE 2000 MG: 10 INJECTION, POWDER, LYOPHILIZED, FOR SOLUTION INTRAVENOUS at 12:07

## 2023-08-17 RX ADMIN — FENTANYL CITRATE 100 MCG: 50 INJECTION INTRAMUSCULAR; INTRAVENOUS at 03:25

## 2023-08-17 RX ADMIN — POLYVINYL ALCOHOL 1 DROP: 14 SOLUTION/ DROPS OPHTHALMIC at 15:58

## 2023-08-17 RX ADMIN — POLYVINYL ALCOHOL 1 DROP: 14 SOLUTION/ DROPS OPHTHALMIC at 21:10

## 2023-08-17 RX ADMIN — NYSTATIN 500000 UNITS: 100000 SUSPENSION ORAL at 08:07

## 2023-08-17 RX ADMIN — FENTANYL CITRATE 100 MCG: 50 INJECTION INTRAMUSCULAR; INTRAVENOUS at 21:24

## 2023-08-17 RX ADMIN — SODIUM CHLORIDE, PRESERVATIVE FREE 10 ML: 5 INJECTION INTRAVENOUS at 08:27

## 2023-08-17 ASSESSMENT — PAIN SCALES - GENERAL
PAINLEVEL_OUTOF10: 6
PAINLEVEL_OUTOF10: 5
PAINLEVEL_OUTOF10: 6
PAINLEVEL_OUTOF10: 5
PAINLEVEL_OUTOF10: 5
PAINLEVEL_OUTOF10: 6
PAINLEVEL_OUTOF10: 5
PAINLEVEL_OUTOF10: 4
PAINLEVEL_OUTOF10: 7
PAINLEVEL_OUTOF10: 4
PAINLEVEL_OUTOF10: 6
PAINLEVEL_OUTOF10: 4
PAINLEVEL_OUTOF10: 4

## 2023-08-17 ASSESSMENT — PULMONARY FUNCTION TESTS
PIF_VALUE: 24
PIF_VALUE: 24
PIF_VALUE: 25
PIF_VALUE: 25
PIF_VALUE: 23
PIF_VALUE: 33
PIF_VALUE: 23
PIF_VALUE: 25
PIF_VALUE: 29
PIF_VALUE: 23
PIF_VALUE: 32
PIF_VALUE: 37
PIF_VALUE: 34
PIF_VALUE: 36
PIF_VALUE: 24
PIF_VALUE: 26
PIF_VALUE: 23
PIF_VALUE: 18
PIF_VALUE: 23
PIF_VALUE: 28
PIF_VALUE: 25
PIF_VALUE: 24
PIF_VALUE: 32
PIF_VALUE: 23
PIF_VALUE: 23
PIF_VALUE: 20

## 2023-08-17 ASSESSMENT — PAIN SCALES - WONG BAKER: WONGBAKER_NUMERICALRESPONSE: 6
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
## 2023-08-17 NOTE — PROGRESS NOTES
Patient intermittently, attempts to reach for trach and other lines/tubes, attempts to remove gown and other medical equipment. No evidence of learning at this time, restraints continued for airway protection, safety and protection of other lines/tubes.

## 2023-08-17 NOTE — FLOWSHEET NOTE
Patient agitated, continued attempts to reach for lines/tubes. Bilateral soft wrist restraints continued for safety and line/tube protection.

## 2023-08-17 NOTE — PLAN OF CARE
EKG performed by SICU staff with no epic order at [phone]. Order placed in care path at 6755.  Spoke to Key that no repeat needed with this order at this time

## 2023-08-17 NOTE — FLOWSHEET NOTE
Patient intermittently, attempts to reach for trach and other lines/tubes, attempts to remove gown and other medical equipment. No evidence of learning at this time, bilateral soft wrist restraints continued for airway protection, safety and protection of other lines/tubes.

## 2023-08-17 NOTE — PLAN OF CARE
Problem: Pain  Goal: Verbalizes/displays adequate comfort level or baseline comfort level  8/17/2023 1931 by Cyndi Gonzales RN  Outcome: Progressing     Problem: Skin/Tissue Integrity  Goal: Absence of new skin breakdown  Description: 1. Monitor for areas of redness and/or skin breakdown  2. Assess vascular access sites hourly  3. Every 4-6 hours minimum:  Change oxygen saturation probe site  4. Every 4-6 hours:  If on nasal continuous positive airway pressure, respiratory therapy assess nares and determine need for appliance change or resting period. 8/17/2023 1931 by Cyndi Gonzales RN  Outcome: Progressing     Problem: Safety - Adult  Goal: Free from fall injury  8/17/2023 1931 by Cyndi Gonzales RN  Outcome: Progressing     Problem: ABCDS Injury Assessment  Goal: Absence of physical injury  8/17/2023 1931 by Cyndi Gonzales RN  Outcome: Progressing     Problem: Neurosensory - Adult  Goal: Achieves stable or improved neurological status  8/17/2023 1931 by Cyndi Gonzales RN  Outcome: Progressing     Problem: Respiratory - Adult  Goal: Achieves optimal ventilation and oxygenation  8/17/2023 1931 by Cyndi Gonzales RN  Outcome: Progressing     Problem: Cardiovascular - Adult  Goal: Maintains optimal cardiac output and hemodynamic stability  8/17/2023 1931 by Cyndi Gonzales RN  Outcome: Progressing     Problem: Skin/Tissue Integrity - Adult  Goal: Skin integrity remains intact  8/17/2023 1931 by Cyndi Gonzales RN  Outcome: Progressing     Problem: Musculoskeletal - Adult  Goal: Return mobility to safest level of function  8/17/2023 1931 by Cyndi Gonzales RN  Outcome: Progressing     Problem: Safety - Medical Restraint  Goal: Remains free of injury from restraints (Restraint for Interference with Medical Device)  Description: INTERVENTIONS:  1. Determine that other, less restrictive measures have been tried or would not be effective before applying the restraint  2.  Evaluate the patient's

## 2023-08-17 NOTE — PROGRESS NOTES
Pharmacy Consultation Note  (Antibiotic Dosing and Monitoring)    Initial consult date: 8/16/23  Consulting physician/provider: Dr. Emily Velez  Drug: Vancomycin  Indication: Pneumonia    Age/  Gender Height Weight IBW  Allergy Information   58 y.o./male 5' 11\" (180.3 cm) 220 lb 7.4 oz (100 kg)     Ideal body weight: 75.3 kg (166 lb 0.1 oz)  Adjusted ideal body weight: 93.1 kg (205 lb 2.6 oz)   Adhesive tape, Cymbalta [duloxetine hcl], Lidocaine, Lyrica [pregabalin], Strattera [atomoxetine], and Tramadol      Renal Function:  Recent Labs     08/15/23  1400 08/16/23  0445 08/17/23  0445   BUN 30* 25* 21*   CREATININE 1.0 0.9 0.8         Intake/Output Summary (Last 24 hours) at 8/17/2023 0947  Last data filed at 8/17/2023 0700  Gross per 24 hour   Intake 5052.58 ml   Output 2450 ml   Net 2602.58 ml         Vancomycin Monitoring:  Trough:  No results for input(s): VANCOTROUGH in the last 72 hours. Random:  No results for input(s): VANCORANDOM in the last 72 hours. No results for input(s): Suzie Chew in the last 72 hours. Historical Cultures:  No results found for: ORG  No results for input(s): BC in the last 72 hours. Vancomycin Administration Times:  Recent vancomycin administrations                     vancomycin (VANCOCIN) 2,000 mg in sodium chloride 0.9 % 500 mL IVPB (mg) 2,000 mg New Bag 08/17/23 0129     2,000 mg New Bag 08/16/23 1351                  Assessment:  Patient is a 60 y.o. male who has been initiated on vancomycin  Estimated Creatinine Clearance: 131 mL/min (based on SCr of 0.8 mg/dL).       Plan:  Continue vancomycin 2000 mg IV every 12 hours  Will check vancomycin levels when appropriate  Will continue to monitor renal function   Pharmacy to follow    Brent Collet, PharmD, [facility] [place] [facility] 8/17/2023 9:48 AM

## 2023-08-17 NOTE — PLAN OF CARE
Problem: Pain  Goal: Verbalizes/displays adequate comfort level or baseline comfort level  Outcome: Not Progressing  Flowsheets (Taken 8/16/2023 0700)  Verbalizes/displays adequate comfort level or baseline comfort level:   Encourage patient to monitor pain and request assistance   Assess pain using appropriate pain scale   Administer analgesics based on type and severity of pain and evaluate response   Implement non-pharmacological measures as appropriate and evaluate response     Problem: Safety - Adult  Goal: Free from fall injury  8/16/2023 2027 by Sol Dinero RN  Outcome: Progressing  8/16/2023 1150 by Angel Cooper RN  Outcome: Progressing     Problem: ABCDS Injury Assessment  Goal: Absence of physical injury  Outcome: Progressing     Problem: Neurosensory - Adult  Goal: Achieves stable or improved neurological status  Outcome: Not Progressing     Problem: Neurosensory - Adult  Goal: Achieves stable or improved neurological status  Outcome: Not Progressing     Problem: Neurosensory - Adult  Goal: Achieves maximal functionality and self care  Outcome: Not Progressing     Problem: Respiratory - Adult  Goal: Achieves optimal ventilation and oxygenation  8/16/2023 2027 by Sol Dinero RN  Outcome: Not Progressing  8/16/2023 1134 by Mohini Crawford RCP  Outcome: Progressing  8/16/2023 0907 by Mohini Crawford RCP  Outcome: Progressing     Problem: Cardiovascular - Adult  Goal: Maintains optimal cardiac output and hemodynamic stability  Outcome: Not Progressing     Problem: Gastrointestinal - Adult  Goal: Maintains or returns to baseline bowel function  Outcome: Not Progressing     Problem: Gastrointestinal - Adult  Goal: Maintains adequate nutritional intake  Outcome: Not Progressing     Problem: Genitourinary - Adult  Goal: Absence of urinary retention  Outcome: Progressing     Problem: Infection - Adult  Goal: Absence of infection during hospitalization  Outcome: Not Progressing     Problem: Metabolic/Fluid

## 2023-08-17 NOTE — PLAN OF CARE
non-pharmacological measures as appropriate and evaluate response  Taken 8/17/2023 0200  Verbalizes/displays adequate comfort level or baseline comfort level:   Assess pain using appropriate pain scale   Administer analgesics based on type and severity of pain and evaluate response   Implement non-pharmacological measures as appropriate and evaluate response  Taken 8/17/2023 0100  Verbalizes/displays adequate comfort level or baseline comfort level:   Assess pain using appropriate pain scale   Administer analgesics based on type and severity of pain and evaluate response   Implement non-pharmacological measures as appropriate and evaluate response  Taken 8/17/2023 0000  Verbalizes/displays adequate comfort level or baseline comfort level:   Assess pain using appropriate pain scale   Implement non-pharmacological measures as appropriate and evaluate response   Administer analgesics based on type and severity of pain and evaluate response  Taken 8/16/2023 2330  Verbalizes/displays adequate comfort level or baseline comfort level:   Assess pain using appropriate pain scale   Administer analgesics based on type and severity of pain and evaluate response   Implement non-pharmacological measures as appropriate and evaluate response  Taken 8/16/2023 2300  Verbalizes/displays adequate comfort level or baseline comfort level:   Assess pain using appropriate pain scale   Administer analgesics based on type and severity of pain and evaluate response   Implement non-pharmacological measures as appropriate and evaluate response  Taken 8/16/2023 2200  Verbalizes/displays adequate comfort level or baseline comfort level:   Assess pain using appropriate pain scale   Administer analgesics based on type and severity of pain and evaluate response   Implement non-pharmacological measures as appropriate and evaluate response  Taken 8/16/2023 2142  Verbalizes/displays adequate comfort level or baseline comfort level:   Assess pain using optimal ventilation and oxygenation  8/17/2023 1129 by Garry Grimm RN  Outcome: Progressing  8/17/2023 0837 by Alexus Barton RN  Outcome: Not Progressing  Flowsheets  Taken 8/17/2023 0800 by Garry Grimm RN  Achieves optimal ventilation and oxygenation:   Assess for changes in mentation and behavior   Assess for changes in respiratory status  Taken 8/17/2023 0000 by Alexus Barton RN  Achieves optimal ventilation and oxygenation:   Assess for changes in respiratory status   Assess for changes in mentation and behavior   Position to facilitate oxygenation and minimize respiratory effort   Oxygen supplementation based on oxygen saturation or arterial blood gases   Assess the need for suctioning and aspirate as needed   Respiratory therapy support as indicated     Problem: Cardiovascular - Adult  Goal: Maintains optimal cardiac output and hemodynamic stability  8/17/2023 1129 by Garry Grimm RN  Outcome: Progressing  8/17/2023 0837 by Alexus Barton RN  Outcome: Not Progressing  Flowsheets  Taken 8/17/2023 0800 by Garry Grimm RN  Maintains optimal cardiac output and hemodynamic stability: Monitor blood pressure and heart rate  Taken 8/17/2023 0000 by Alexus Barton RN  Maintains optimal cardiac output and hemodynamic stability: Monitor blood pressure and heart rate     Problem: Gastrointestinal - Adult  Goal: Maintains or returns to baseline bowel function  8/17/2023 1129 by Garry Grimm RN  Outcome: Progressing  8/17/2023 0837 by Alexus Barton RN  Outcome: Not Progressing  Flowsheets  Taken 8/17/2023 0800 by Garry Grimm RN  Maintains or returns to baseline bowel function: Assess bowel function  Taken 8/17/2023 0000 by Alexus Barton RN  Maintains or returns to baseline bowel function: Assess bowel function  Goal: Maintains adequate nutritional intake  8/17/2023 1129 by Garry Grimm RN  Outcome: Progressing  8/17/2023 0837 by Alexus Barton RN  Outcome: Not

## 2023-08-17 NOTE — PLAN OF CARE
Problem: Respiratory - Adult  Goal: Achieves optimal ventilation and oxygenation  8/16/2023 2102 by Jones Restrepo RCP  Outcome: Progressing     Problem: Pain  Goal: Verbalizes/displays adequate comfort level or baseline comfort level  8/16/2023 2027 by Hari Reyes RN  Outcome: Not Progressing  Flowsheets (Taken 8/16/2023 0700)  Verbalizes/displays adequate comfort level or baseline comfort level:   Encourage patient to monitor pain and request assistance   Assess pain using appropriate pain scale   Administer analgesics based on type and severity of pain and evaluate response   Implement non-pharmacological measures as appropriate and evaluate response     Problem: Neurosensory - Adult  Goal: Achieves stable or improved neurological status  8/16/2023 2027 by Hari Reyes RN  Outcome: Not Progressing  Goal: Achieves maximal functionality and self care  8/16/2023 2027 by Hari Reyes RN  Outcome: Not Progressing     Problem: Respiratory - Adult  Goal: Achieves optimal ventilation and oxygenation  8/16/2023 2102 by Jones Restrepo RCP  Outcome: Progressing  8/16/2023 2027 by Hari Reyes RN  Outcome: Not Progressing  8/16/2023 1134 by Herberth Novoa RCP  Outcome: Progressing  8/16/2023 0907 by Herberth Novoa RCP  Outcome: Progressing     Problem: Cardiovascular - Adult  Goal: Maintains optimal cardiac output and hemodynamic stability  8/16/2023 2027 by Hari Reyes RN  Outcome: Not Progressing     Problem: Gastrointestinal - Adult  Goal: Maintains or returns to baseline bowel function  8/16/2023 2027 by Hari Reyes RN  Outcome: Not Progressing  Goal: Maintains adequate nutritional intake  8/16/2023 2027 by Hari Reyes RN  Outcome: Not Progressing     Problem: Infection - Adult  Goal: Absence of infection during hospitalization  8/16/2023 2027 by Hari Reyes RN  Outcome: Not Progressing     Problem: Metabolic/Fluid and Electrolytes - Adult  Goal: Electrolytes maintained within normal

## 2023-08-17 NOTE — FLOWSHEET NOTE
Patient increasingly agitated, kicking legs over side of bed, attempting to pull at lines/tubes with legs. Bilateral soft ankle restraints started for safety and line/tube protection.

## 2023-08-17 NOTE — CARE COORDINATION
8/17 Care Coordination: Remains in SICU. Remains on vent,Trach,Peg in place. Pt respiratory cultures came back with heavy growth of staph and Serratia. Cont IV ATB, Cefepime and Vanco.  Pt with  Ileus-continue PEG tube to gravity. Continue Reglan,  resume tube feeds once ileus resolves. NPO since 8/14. Cont IV Precedex drip. Discharge plan is [facility] once stable for [facility] level of care. CM/SW will continue to follow for discharge planning.    Derik SIMSN,RN--BC  [phone]

## 2023-08-17 NOTE — PLAN OF CARE
Problem: Pain  Goal: Verbalizes/displays adequate comfort level or baseline comfort level  8/17/2023 0837 by Kingsley Matute RN  Outcome: Not Progressing  Flowsheets  Taken 8/16/2023 2330  Verbalizes/displays adequate comfort level or baseline comfort level:   Assess pain using appropriate pain scale   Administer analgesics based on type and severity of pain and evaluate response   Implement non-pharmacological measures as appropriate and evaluate response  Taken 8/16/2023 2300  Verbalizes/displays adequate comfort level or baseline comfort level:   Assess pain using appropriate pain scale   Administer analgesics based on type and severity of pain and evaluate response   Implement non-pharmacological measures as appropriate and evaluate response  Taken 8/16/2023 2200  Verbalizes/displays adequate comfort level or baseline comfort level:   Assess pain using appropriate pain scale   Administer analgesics based on type and severity of pain and evaluate response   Implement non-pharmacological measures as appropriate and evaluate response  Taken 8/16/2023 2142  Verbalizes/displays adequate comfort level or baseline comfort level:   Assess pain using appropriate pain scale   Administer analgesics based on type and severity of pain and evaluate response   Implement non-pharmacological measures as appropriate and evaluate response  Taken 8/16/2023 2127  Verbalizes/displays adequate comfort level or baseline comfort level:   Assess pain using appropriate pain scale   Administer analgesics based on type and severity of pain and evaluate response   Implement non-pharmacological measures as appropriate and evaluate response  Taken 8/16/2023 2100  Verbalizes/displays adequate comfort level or baseline comfort level:   Assess pain using appropriate pain scale   Administer analgesics based on type and severity of pain and evaluate response   Implement non-pharmacological measures as appropriate and evaluate response  8/16/2023 RN  Outcome: Progressing  Flowsheets (Taken 8/16/2023 2000)  Remains free of injury from restraints (restraint for interference with medical device): Every 2 hours: Monitor safety, psychosocial status, comfort, nutrition and hydration     Problem: Pain  Goal: Verbalizes/displays adequate comfort level or baseline comfort level  8/17/2023 0837 by Juan Elena, RN  Outcome: Not Progressing  Flowsheets  Taken 8/16/2023 2330  Verbalizes/displays adequate comfort level or baseline comfort level:   Assess pain using appropriate pain scale   Administer analgesics based on type and severity of pain and evaluate response   Implement non-pharmacological measures as appropriate and evaluate response  Taken 8/16/2023 2300  Verbalizes/displays adequate comfort level or baseline comfort level:   Assess pain using appropriate pain scale   Administer analgesics based on type and severity of pain and evaluate response   Implement non-pharmacological measures as appropriate and evaluate response  Taken 8/16/2023 2200  Verbalizes/displays adequate comfort level or baseline comfort level:   Assess pain using appropriate pain scale   Administer analgesics based on type and severity of pain and evaluate response   Implement non-pharmacological measures as appropriate and evaluate response  Taken 8/16/2023 2142  Verbalizes/displays adequate comfort level or baseline comfort level:   Assess pain using appropriate pain scale   Administer analgesics based on type and severity of pain and evaluate response   Implement non-pharmacological measures as appropriate and evaluate response  Taken 8/16/2023 2127  Verbalizes/displays adequate comfort level or baseline comfort level:   Assess pain using appropriate pain scale   Administer analgesics based on type and severity of pain and evaluate response   Implement non-pharmacological measures as appropriate and evaluate response  Taken 8/16/2023 2100  Verbalizes/displays adequate comfort level or
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
## 2023-08-18 ENCOUNTER — APPOINTMENT (OUTPATIENT)
Dept: GENERAL RADIOLOGY | Age: 58
DRG: 004 | End: 2023-08-18
Payer: COMMERCIAL

## 2023-08-18 LAB
AADO2: 201.9 MMHG
AADO2: 220.2 MMHG
ALBUMIN SERPL-MCNC: 2.5 G/DL (ref 3.5–5.2)
ALP SERPL-CCNC: 47 U/L (ref 40–129)
ALT SERPL-CCNC: 15 U/L (ref 0–40)
ANION GAP SERPL CALCULATED.3IONS-SCNC: 17 MMOL/L (ref 7–16)
AST SERPL-CCNC: 26 U/L (ref 0–39)
B.E.: -1.9 MMOL/L (ref -3–3)
B.E.: -5.4 MMOL/L (ref -3–3)
BASOPHILS # BLD: 0.05 K/UL (ref 0–0.2)
BASOPHILS NFR BLD: 1 % (ref 0–2)
BILIRUB SERPL-MCNC: 0.3 MG/DL (ref 0–1.2)
BUN SERPL-MCNC: 18 MG/DL (ref 6–20)
CA-I BLD-SCNC: 1.2 MMOL/L (ref 1.15–1.33)
CALCIUM SERPL-MCNC: 8.1 MG/DL (ref 8.6–10.2)
CHLORIDE SERPL-SCNC: 104 MMOL/L (ref 98–107)
CO2 SERPL-SCNC: 22 MMOL/L (ref 22–29)
COHB: 0.3 % (ref 0–1.5)
COHB: 0.3 % (ref 0–1.5)
CREAT SERPL-MCNC: 0.7 MG/DL (ref 0.7–1.2)
CRITICAL: ABNORMAL
CRITICAL: ABNORMAL
DATE ANALYZED: ABNORMAL
DATE ANALYZED: ABNORMAL
DATE OF COLLECTION: ABNORMAL
DATE OF COLLECTION: ABNORMAL
EOSINOPHIL # BLD: 0.18 K/UL (ref 0.05–0.5)
EOSINOPHILS RELATIVE PERCENT: 2 % (ref 0–6)
ERYTHROCYTE [DISTWIDTH] IN BLOOD BY AUTOMATED COUNT: 14.9 % (ref 11.5–15)
FIO2: 50 %
FIO2: 50 %
GFR SERPL CREATININE-BSD FRML MDRD: >60 ML/MIN/1.73M2
GLUCOSE BLD-MCNC: 125 MG/DL (ref 74–99)
GLUCOSE BLD-MCNC: 145 MG/DL (ref 74–99)
GLUCOSE BLD-MCNC: 150 MG/DL (ref 74–99)
GLUCOSE BLD-MCNC: 152 MG/DL (ref 74–99)
GLUCOSE BLD-MCNC: 166 MG/DL (ref 74–99)
GLUCOSE BLD-MCNC: 171 MG/DL (ref 74–99)
GLUCOSE SERPL-MCNC: 128 MG/DL (ref 74–99)
HCO3: 19.1 MMOL/L (ref 22–26)
HCO3: 21.5 MMOL/L (ref 22–26)
HCT VFR BLD AUTO: 25.9 % (ref 37–54)
HGB BLD-MCNC: 8.3 G/DL (ref 12.5–16.5)
HHB: 3 % (ref 0–5)
HHB: 3.8 % (ref 0–5)
IMM GRANULOCYTES # BLD AUTO: 0.03 K/UL (ref 0–0.58)
IMM GRANULOCYTES NFR BLD: 0 % (ref 0–5)
LAB: ABNORMAL
LAB: ABNORMAL
LYMPHOCYTES NFR BLD: 1.67 K/UL (ref 1.5–4)
LYMPHOCYTES RELATIVE PERCENT: 22 % (ref 20–42)
Lab: ABNORMAL
Lab: ABNORMAL
MAGNESIUM SERPL-MCNC: 2.2 MG/DL (ref 1.6–2.6)
MCH RBC QN AUTO: 29.3 PG (ref 26–35)
MCHC RBC AUTO-ENTMCNC: 32 G/DL (ref 32–34.5)
MCV RBC AUTO: 91.5 FL (ref 80–99.9)
METHB: 0.1 % (ref 0–1.5)
METHB: 0.1 % (ref 0–1.5)
MICROORGANISM SPEC CULT: ABNORMAL
MICROORGANISM/AGENT SPEC: ABNORMAL
MODE: AC
MODE: AC
MONOCYTES NFR BLD: 0.77 K/UL (ref 0.1–0.95)
MONOCYTES NFR BLD: 10 % (ref 2–12)
NEUTROPHILS NFR BLD: 65 % (ref 43–80)
NEUTS SEG NFR BLD: 4.96 K/UL (ref 1.8–7.3)
O2 CONTENT: 12.4 ML/DL
O2 CONTENT: 12.4 ML/DL
O2 SATURATION: 96.2 % (ref 92–98.5)
O2 SATURATION: 97 % (ref 92–98.5)
O2HB: 95.8 % (ref 94–97)
O2HB: 96.6 % (ref 94–97)
OPERATOR ID: 2593
OPERATOR ID: 5323
PATIENT TEMP: 37 C
PATIENT TEMP: 37 C
PCO2: 31.4 MMHG (ref 35–45)
PCO2: 33.4 MMHG (ref 35–45)
PEEP/CPAP: 10 CMH2O
PEEP/CPAP: 10 CMH2O
PFO2: 1.77 MMHG/%
PFO2: 2.09 MMHG/%
PH BLOOD GAS: 7.38 (ref 7.35–7.45)
PH BLOOD GAS: 7.45 (ref 7.35–7.45)
PHOSPHATE SERPL-MCNC: 2.1 MG/DL (ref 2.5–4.5)
PLATELET # BLD AUTO: 713 K/UL (ref 130–450)
PMV BLD AUTO: 9.6 FL (ref 7–12)
PO2: 104.5 MMHG (ref 75–100)
PO2: 88.5 MMHG (ref 75–100)
POTASSIUM SERPL-SCNC: 3.5 MMOL/L (ref 3.5–5)
PROT SERPL-MCNC: 5.7 G/DL (ref 6.4–8.3)
RBC # BLD AUTO: 2.83 M/UL (ref 3.8–5.8)
RI(T): 1.93
RI(T): 2.49
RR MECHANICAL: 14 B/MIN
RR MECHANICAL: 14 B/MIN
SODIUM SERPL-SCNC: 143 MMOL/L (ref 132–146)
SOURCE, BLOOD GAS: ABNORMAL
SOURCE, BLOOD GAS: ABNORMAL
SPECIMEN DESCRIPTION: ABNORMAL
THB: 9 G/DL (ref 11.5–16.5)
THB: 9.1 G/DL (ref 11.5–16.5)
TIME ANALYZED: 1148
TIME ANALYZED: 512
VANCOMYCIN TROUGH SERPL-MCNC: 16.6 UG/ML (ref 5–16)
VT MECHANICAL: 460 ML
VT MECHANICAL: 460 ML
WBC OTHER # BLD: 7.7 K/UL (ref 4.5–11.5)

## 2023-08-18 PROCEDURE — 2500000003 HC RX 250 WO HCPCS: Performed by: SURGERY

## 2023-08-18 PROCEDURE — 99152 MOD SED SAME PHYS/QHP 5/>YRS: CPT | Performed by: SURGERY

## 2023-08-18 PROCEDURE — 6370000000 HC RX 637 (ALT 250 FOR IP)

## 2023-08-18 PROCEDURE — 85025 COMPLETE CBC W/AUTO DIFF WBC: CPT

## 2023-08-18 PROCEDURE — S5553 INSULIN LONG ACTING 5 U: HCPCS

## 2023-08-18 PROCEDURE — C9113 INJ PANTOPRAZOLE SODIUM, VIA: HCPCS

## 2023-08-18 PROCEDURE — 71045 X-RAY EXAM CHEST 1 VIEW: CPT

## 2023-08-18 PROCEDURE — 83735 ASSAY OF MAGNESIUM: CPT

## 2023-08-18 PROCEDURE — 2580000003 HC RX 258

## 2023-08-18 PROCEDURE — 80053 COMPREHEN METABOLIC PANEL: CPT

## 2023-08-18 PROCEDURE — 82962 GLUCOSE BLOOD TEST: CPT

## 2023-08-18 PROCEDURE — 82330 ASSAY OF CALCIUM: CPT

## 2023-08-18 PROCEDURE — 6360000002 HC RX W HCPCS

## 2023-08-18 PROCEDURE — 99291 CRITICAL CARE FIRST HOUR: CPT | Performed by: SURGERY

## 2023-08-18 PROCEDURE — A4216 STERILE WATER/SALINE, 10 ML: HCPCS

## 2023-08-18 PROCEDURE — 94003 VENT MGMT INPAT SUBQ DAY: CPT

## 2023-08-18 PROCEDURE — 0B938ZZ DRAINAGE OF RIGHT MAIN BRONCHUS, VIA NATURAL OR ARTIFICIAL OPENING ENDOSCOPIC: ICD-10-PCS | Performed by: SURGERY

## 2023-08-18 PROCEDURE — 6370000000 HC RX 637 (ALT 250 FOR IP): Performed by: STUDENT IN AN ORGANIZED HEALTH CARE EDUCATION/TRAINING PROGRAM

## 2023-08-18 PROCEDURE — 6370000000 HC RX 637 (ALT 250 FOR IP): Performed by: SURGERY

## 2023-08-18 PROCEDURE — 94640 AIRWAY INHALATION TREATMENT: CPT

## 2023-08-18 PROCEDURE — 0B978ZZ DRAINAGE OF LEFT MAIN BRONCHUS, VIA NATURAL OR ARTIFICIAL OPENING ENDOSCOPIC: ICD-10-PCS | Performed by: SURGERY

## 2023-08-18 PROCEDURE — 6360000002 HC RX W HCPCS: Performed by: STUDENT IN AN ORGANIZED HEALTH CARE EDUCATION/TRAINING PROGRAM

## 2023-08-18 PROCEDURE — 3609027000 HC BRONCHOSCOPY: Performed by: SURGERY

## 2023-08-18 PROCEDURE — 51701 INSERT BLADDER CATHETER: CPT

## 2023-08-18 PROCEDURE — 31645 BRNCHSC W/THER ASPIR 1ST: CPT | Performed by: SURGERY

## 2023-08-18 PROCEDURE — 74018 RADEX ABDOMEN 1 VIEW: CPT

## 2023-08-18 PROCEDURE — 84100 ASSAY OF PHOSPHORUS: CPT

## 2023-08-18 PROCEDURE — 6360000002 HC RX W HCPCS: Performed by: SURGERY

## 2023-08-18 PROCEDURE — 80202 ASSAY OF VANCOMYCIN: CPT

## 2023-08-18 PROCEDURE — 51798 US URINE CAPACITY MEASURE: CPT

## 2023-08-18 PROCEDURE — 2500000003 HC RX 250 WO HCPCS

## 2023-08-18 PROCEDURE — 2580000003 HC RX 258: Performed by: SURGERY

## 2023-08-18 PROCEDURE — 2580000003 HC RX 258: Performed by: STUDENT IN AN ORGANIZED HEALTH CARE EDUCATION/TRAINING PROGRAM

## 2023-08-18 PROCEDURE — 82805 BLOOD GASES W/O2 SATURATION: CPT

## 2023-08-18 PROCEDURE — 2000000000 HC ICU R&B

## 2023-08-18 PROCEDURE — 2709999900 HC NON-CHARGEABLE SUPPLY: Performed by: SURGERY

## 2023-08-18 RX ORDER — FENTANYL CITRATE 50 UG/ML
200 INJECTION, SOLUTION INTRAMUSCULAR; INTRAVENOUS
Status: COMPLETED | OUTPATIENT
Start: 2023-08-18 | End: 2023-08-18

## 2023-08-18 RX ORDER — POTASSIUM CHLORIDE 7.45 MG/ML
10 INJECTION INTRAVENOUS
Status: COMPLETED | OUTPATIENT
Start: 2023-08-18 | End: 2023-08-18

## 2023-08-18 RX ADMIN — IPRATROPIUM BROMIDE AND ALBUTEROL SULFATE 1 DOSE: .5; 2.5 SOLUTION RESPIRATORY (INHALATION) at 20:07

## 2023-08-18 RX ADMIN — PHENOBARBITAL SODIUM 130 MG: 65 INJECTION INTRAMUSCULAR at 23:49

## 2023-08-18 RX ADMIN — POTASSIUM CHLORIDE 10 MEQ: 7.46 INJECTION, SOLUTION INTRAVENOUS at 09:59

## 2023-08-18 RX ADMIN — POLYVINYL ALCOHOL 1 DROP: 14 SOLUTION/ DROPS OPHTHALMIC at 20:43

## 2023-08-18 RX ADMIN — METOCLOPRAMIDE HYDROCHLORIDE 5 MG: 5 INJECTION INTRAMUSCULAR; INTRAVENOUS at 17:26

## 2023-08-18 RX ADMIN — POLYVINYL ALCOHOL 1 DROP: 14 SOLUTION/ DROPS OPHTHALMIC at 14:01

## 2023-08-18 RX ADMIN — LORAZEPAM 2 MG: 2 INJECTION INTRAMUSCULAR; INTRAVENOUS at 09:55

## 2023-08-18 RX ADMIN — CHLORHEXIDINE GLUCONATE 15 ML: 1.2 RINSE ORAL at 17:33

## 2023-08-18 RX ADMIN — CEFEPIME 2000 MG: 2 INJECTION, POWDER, FOR SOLUTION INTRAVENOUS at 04:12

## 2023-08-18 RX ADMIN — Medication 10 MG: at 20:41

## 2023-08-18 RX ADMIN — METOCLOPRAMIDE HYDROCHLORIDE 5 MG: 5 INJECTION INTRAMUSCULAR; INTRAVENOUS at 04:17

## 2023-08-18 RX ADMIN — PHENOBARBITAL SODIUM 130 MG: 65 INJECTION INTRAMUSCULAR at 14:00

## 2023-08-18 RX ADMIN — FENTANYL CITRATE 200 MCG: 50 INJECTION, SOLUTION INTRAMUSCULAR; INTRAVENOUS at 09:19

## 2023-08-18 RX ADMIN — METOCLOPRAMIDE HYDROCHLORIDE 5 MG: 5 INJECTION INTRAMUSCULAR; INTRAVENOUS at 20:41

## 2023-08-18 RX ADMIN — SODIUM PHOSPHATE, MONOBASIC, MONOHYDRATE AND SODIUM PHOSPHATE, DIBASIC, ANHYDROUS 30 MMOL: 276; 142 INJECTION, SOLUTION INTRAVENOUS at 09:58

## 2023-08-18 RX ADMIN — VANCOMYCIN HYDROCHLORIDE 2000 MG: 10 INJECTION, POWDER, LYOPHILIZED, FOR SOLUTION INTRAVENOUS at 14:05

## 2023-08-18 RX ADMIN — NYSTATIN 500000 UNITS: 100000 SUSPENSION ORAL at 17:27

## 2023-08-18 RX ADMIN — FENTANYL CITRATE 100 MCG: 50 INJECTION INTRAMUSCULAR; INTRAVENOUS at 01:23

## 2023-08-18 RX ADMIN — POTASSIUM CHLORIDE 10 MEQ: 7.46 INJECTION, SOLUTION INTRAVENOUS at 17:19

## 2023-08-18 RX ADMIN — LORAZEPAM 2 MG: 2 INJECTION INTRAMUSCULAR; INTRAVENOUS at 04:15

## 2023-08-18 RX ADMIN — QUETIAPINE FUMARATE 200 MG: 100 TABLET ORAL at 20:42

## 2023-08-18 RX ADMIN — BISACODYL 10 MG: 10 SUPPOSITORY RECTAL at 20:41

## 2023-08-18 RX ADMIN — PROPOFOL 50 MG: 10 INJECTION, EMULSION INTRAVENOUS at 09:25

## 2023-08-18 RX ADMIN — NYSTATIN 500000 UNITS: 100000 SUSPENSION ORAL at 14:00

## 2023-08-18 RX ADMIN — INSULIN LISPRO 4 UNITS: 100 INJECTION, SOLUTION INTRAVENOUS; SUBCUTANEOUS at 17:27

## 2023-08-18 RX ADMIN — SODIUM CHLORIDE, PRESERVATIVE FREE 10 ML: 5 INJECTION INTRAVENOUS at 09:55

## 2023-08-18 RX ADMIN — INSULIN LISPRO 4 UNITS: 100 INJECTION, SOLUTION INTRAVENOUS; SUBCUTANEOUS at 23:50

## 2023-08-18 RX ADMIN — IPRATROPIUM BROMIDE AND ALBUTEROL SULFATE 1 DOSE: .5; 2.5 SOLUTION RESPIRATORY (INHALATION) at 08:55

## 2023-08-18 RX ADMIN — PHENOBARBITAL SODIUM 130 MG: 65 INJECTION INTRAMUSCULAR at 06:47

## 2023-08-18 RX ADMIN — POTASSIUM CHLORIDE 10 MEQ: 7.46 INJECTION, SOLUTION INTRAVENOUS at 18:22

## 2023-08-18 RX ADMIN — OXYCODONE HYDROCHLORIDE 15 MG: 5 SOLUTION ORAL at 17:26

## 2023-08-18 RX ADMIN — PETROLATUM: 420 OINTMENT TOPICAL at 09:57

## 2023-08-18 RX ADMIN — PETROLATUM: 420 OINTMENT TOPICAL at 20:44

## 2023-08-18 RX ADMIN — NYSTATIN 500000 UNITS: 100000 SUSPENSION ORAL at 09:55

## 2023-08-18 RX ADMIN — OXYCODONE HYDROCHLORIDE 15 MG: 5 SOLUTION ORAL at 20:40

## 2023-08-18 RX ADMIN — POTASSIUM CHLORIDE 10 MEQ: 7.46 INJECTION, SOLUTION INTRAVENOUS at 15:52

## 2023-08-18 RX ADMIN — CHLORHEXIDINE GLUCONATE 15 ML: 1.2 RINSE ORAL at 04:17

## 2023-08-18 RX ADMIN — PANTOPRAZOLE SODIUM 40 MG: 40 INJECTION, POWDER, FOR SOLUTION INTRAVENOUS at 20:41

## 2023-08-18 RX ADMIN — SENNOSIDES AND DOCUSATE SODIUM 2 TABLET: 50; 8.6 TABLET ORAL at 20:41

## 2023-08-18 RX ADMIN — LORAZEPAM 2 MG: 2 INJECTION INTRAMUSCULAR; INTRAVENOUS at 06:47

## 2023-08-18 RX ADMIN — FENTANYL CITRATE 100 MCG: 50 INJECTION INTRAMUSCULAR; INTRAVENOUS at 07:02

## 2023-08-18 RX ADMIN — POLYVINYL ALCOHOL 1 DROP: 14 SOLUTION/ DROPS OPHTHALMIC at 04:18

## 2023-08-18 RX ADMIN — CHLORHEXIDINE GLUCONATE 15 ML: 1.2 RINSE ORAL at 14:01

## 2023-08-18 RX ADMIN — LORAZEPAM 2 MG: 2 INJECTION INTRAMUSCULAR; INTRAVENOUS at 14:00

## 2023-08-18 RX ADMIN — BISACODYL 10 MG: 10 SUPPOSITORY RECTAL at 09:55

## 2023-08-18 RX ADMIN — IPRATROPIUM BROMIDE AND ALBUTEROL SULFATE 1 DOSE: .5; 2.5 SOLUTION RESPIRATORY (INHALATION) at 16:05

## 2023-08-18 RX ADMIN — METOCLOPRAMIDE HYDROCHLORIDE 5 MG: 5 INJECTION INTRAMUSCULAR; INTRAVENOUS at 09:54

## 2023-08-18 RX ADMIN — CEFEPIME 2000 MG: 2 INJECTION, POWDER, FOR SOLUTION INTRAVENOUS at 14:02

## 2023-08-18 RX ADMIN — SODIUM CHLORIDE: 9 INJECTION, SOLUTION INTRAVENOUS at 15:55

## 2023-08-18 RX ADMIN — DEXMEDETOMIDINE 1.5 MCG/KG/HR: 100 INJECTION, SOLUTION INTRAVENOUS at 15:45

## 2023-08-18 RX ADMIN — CHLORHEXIDINE GLUCONATE 15 ML: 1.2 RINSE ORAL at 09:55

## 2023-08-18 RX ADMIN — POLYVINYL ALCOHOL 1 DROP: 14 SOLUTION/ DROPS OPHTHALMIC at 09:55

## 2023-08-18 RX ADMIN — POLYETHYLENE GLYCOL 3350 17 G: 17 POWDER, FOR SOLUTION ORAL at 09:57

## 2023-08-18 RX ADMIN — OXYCODONE HYDROCHLORIDE 15 MG: 5 SOLUTION ORAL at 23:49

## 2023-08-18 RX ADMIN — POLYETHYLENE GLYCOL 3350 17 G: 17 POWDER, FOR SOLUTION ORAL at 20:41

## 2023-08-18 RX ADMIN — CHLORHEXIDINE GLUCONATE 15 ML: 1.2 RINSE ORAL at 23:49

## 2023-08-18 RX ADMIN — POTASSIUM CHLORIDE 10 MEQ: 7.46 INJECTION, SOLUTION INTRAVENOUS at 10:51

## 2023-08-18 RX ADMIN — LORAZEPAM 2 MG: 2 INJECTION INTRAMUSCULAR; INTRAVENOUS at 17:26

## 2023-08-18 RX ADMIN — PHENOBARBITAL SODIUM 130 MG: 65 INJECTION INTRAMUSCULAR at 17:26

## 2023-08-18 RX ADMIN — PETROLATUM: 420 OINTMENT TOPICAL at 20:43

## 2023-08-18 RX ADMIN — NYSTATIN 500000 UNITS: 100000 SUSPENSION ORAL at 20:41

## 2023-08-18 RX ADMIN — QUETIAPINE FUMARATE 200 MG: 100 TABLET ORAL at 09:53

## 2023-08-18 RX ADMIN — INSULIN GLARGINE-YFGN 10 UNITS: 100 INJECTION, SOLUTION SUBCUTANEOUS at 20:44

## 2023-08-18 RX ADMIN — BUPROPION HYDROCHLORIDE 75 MG: 75 TABLET, FILM COATED ORAL at 09:53

## 2023-08-18 RX ADMIN — OXYCODONE HYDROCHLORIDE 15 MG: 5 SOLUTION ORAL at 14:00

## 2023-08-18 RX ADMIN — BUPROPION HYDROCHLORIDE 75 MG: 75 TABLET, FILM COATED ORAL at 20:41

## 2023-08-18 RX ADMIN — FLUOXETINE 20 MG: 10 TABLET, FILM COATED ORAL at 09:53

## 2023-08-18 RX ADMIN — CEFEPIME 2000 MG: 2 INJECTION, POWDER, FOR SOLUTION INTRAVENOUS at 20:48

## 2023-08-18 RX ADMIN — LORAZEPAM 2 MG: 2 INJECTION INTRAMUSCULAR; INTRAVENOUS at 23:49

## 2023-08-18 RX ADMIN — DEXMEDETOMIDINE 1.5 MCG/KG/HR: 100 INJECTION, SOLUTION INTRAVENOUS at 09:50

## 2023-08-18 RX ADMIN — ENOXAPARIN SODIUM 40 MG: 100 INJECTION SUBCUTANEOUS at 20:41

## 2023-08-18 RX ADMIN — POLYVINYL ALCOHOL 1 DROP: 14 SOLUTION/ DROPS OPHTHALMIC at 17:33

## 2023-08-18 RX ADMIN — IPRATROPIUM BROMIDE AND ALBUTEROL SULFATE 1 DOSE: .5; 2.5 SOLUTION RESPIRATORY (INHALATION) at 12:10

## 2023-08-18 RX ADMIN — ENOXAPARIN SODIUM 40 MG: 100 INJECTION SUBCUTANEOUS at 09:54

## 2023-08-18 RX ADMIN — CHLORHEXIDINE GLUCONATE 15 ML: 1.2 RINSE ORAL at 19:48

## 2023-08-18 RX ADMIN — FENTANYL CITRATE 100 MCG: 50 INJECTION INTRAMUSCULAR; INTRAVENOUS at 04:14

## 2023-08-18 RX ADMIN — VANCOMYCIN HYDROCHLORIDE 2000 MG: 10 INJECTION, POWDER, LYOPHILIZED, FOR SOLUTION INTRAVENOUS at 23:52

## 2023-08-18 RX ADMIN — POLYVINYL ALCOHOL 1 DROP: 14 SOLUTION/ DROPS OPHTHALMIC at 23:49

## 2023-08-18 RX ADMIN — PANTOPRAZOLE SODIUM 40 MG: 40 INJECTION, POWDER, FOR SOLUTION INTRAVENOUS at 09:54

## 2023-08-18 RX ADMIN — PROPOFOL 100 MG: 10 INJECTION, EMULSION INTRAVENOUS at 09:19

## 2023-08-18 RX ADMIN — SENNOSIDES AND DOCUSATE SODIUM 2 TABLET: 50; 8.6 TABLET ORAL at 09:53

## 2023-08-18 RX ADMIN — DEXMEDETOMIDINE 1.5 MCG/KG/HR: 100 INJECTION, SOLUTION INTRAVENOUS at 04:11

## 2023-08-18 ASSESSMENT — PULMONARY FUNCTION TESTS
PIF_VALUE: 25
PIF_VALUE: 28
PIF_VALUE: 21
PIF_VALUE: 22
PIF_VALUE: 23
PIF_VALUE: 23
PIF_VALUE: 26
PIF_VALUE: 24
PIF_VALUE: 22
PIF_VALUE: 25
PIF_VALUE: 30
PIF_VALUE: 22
PIF_VALUE: 31
PIF_VALUE: 100
PIF_VALUE: 26
PIF_VALUE: 25
PIF_VALUE: 24
PIF_VALUE: 22
PIF_VALUE: 25
PIF_VALUE: 25
PIF_VALUE: 22
PIF_VALUE: 24
PIF_VALUE: 36
PIF_VALUE: 76
PIF_VALUE: 19
PIF_VALUE: 23
PIF_VALUE: 21
PIF_VALUE: 25
PIF_VALUE: 22
PIF_VALUE: 33
PIF_VALUE: 29
PIF_VALUE: 29
PIF_VALUE: 34
PIF_VALUE: 25
PIF_VALUE: 23
PIF_VALUE: 100
PIF_VALUE: 31
PIF_VALUE: 50
PIF_VALUE: 26
PIF_VALUE: 24
PIF_VALUE: 41
PIF_VALUE: 23
PIF_VALUE: 23
PIF_VALUE: 25
PIF_VALUE: 16
PIF_VALUE: 24
PIF_VALUE: 26

## 2023-08-18 ASSESSMENT — PAIN SCALES - GENERAL
PAINLEVEL_OUTOF10: 0
PAINLEVEL_OUTOF10: 6
PAINLEVEL_OUTOF10: 0
PAINLEVEL_OUTOF10: 4
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
## 2023-08-18 NOTE — PROCEDURES
The correct patient was identified and the procedure verified as Flexible Fiberoptic Bronchoscopy. A Time Out was held and the above information confirmed. Within the SICU, the patient was sedated with propofol and fentanyl. The bronchoscope was inserted through the patients ETT. The bronchoscope was passed through the ETT, which was noted to be in good position above the aden. First the right main stem brochus was viewed. There were a large amount of thick, blood tinged, white secretions which were suctioned until clear. The scope was slowly withdrawn and passed into the left mainstem bronchus. There were large amount of thick, blood tinged secretions. Tissue was friable bilaterally. The bronchoscope was completely withdrawn. The patient tolerated the procedure well with no issues with saturations.      Plan: Wean FiO2 as tolerated     Dr. Raleigh Hampton was present and supervised the entire procedure     Electronically signed by Jennifer Adams MD on 8/18/2023 at 9:30 AM

## 2023-08-18 NOTE — FLOWSHEET NOTE
When unrestrained patient pulls at lines and tubes. Verbal redirection attempted multiple times, but has been unsuccessful. Bilateral soft wrist restraints continued to maintain the safety of the patient.

## 2023-08-18 NOTE — PROGRESS NOTES
Pharmacy Consultation Note  (Antibiotic Dosing and Monitoring)    Initial consult date: 8/16/23  Consulting physician/provider: Dr. Ramón Prescott  Drug: Vancomycin  Indication: Pneumonia    Age/  Gender Height Weight IBW  Allergy Information   58 y.o./male 5' 11\" (180.3 cm) 220 lb 7.4 oz (100 kg)     Ideal body weight: 75.3 kg (166 lb 0.1 oz)  Adjusted ideal body weight: 93.1 kg (205 lb 3.3 oz)   Adhesive tape, Cymbalta [duloxetine hcl], Lidocaine, Lyrica [pregabalin], Strattera [atomoxetine], and Tramadol      Renal Function:  Recent Labs     08/16/23  0445 08/17/23  0445 08/18/23  0420   BUN 25* 21* 18   CREATININE 0.9 0.8 0.7         Intake/Output Summary (Last 24 hours) at 8/18/2023 1228  Last data filed at 8/18/2023 0800  Gross per 24 hour   Intake 4035.78 ml   Output 1100 ml   Net 2935.78 ml         Vancomycin Monitoring:  Trough:    Recent Labs     08/18/23  1130   VANCOTROUGH 16.6*     Random:  No results for input(s): VANCORANDOM in the last 72 hours. No results for input(s): Floresita Emms in the last 72 hours. Historical Cultures:  No results found for: ORG  No results for input(s): BC in the last 72 hours. Vancomycin Administration Times:  Recent vancomycin administrations                     vancomycin (VANCOCIN) 2,000 mg in sodium chloride 0.9 % 500 mL IVPB (mg) 2,000 mg New Bag 08/17/23 2341     2,000 mg New Bag  1207     2,000 mg New Bag  0129     2,000 mg New Bag 08/16/23 1351                  Assessment:  Patient is a 60 y.o. male who has been initiated on vancomycin  Estimated Creatinine Clearance: 150 mL/min (based on SCr of 0.7 mg/dL).   Trough today = 16.6 mcg/mL      Plan:  Continue vancomycin 2000 mg IV every 12 hours  Will check vancomycin levels when appropriate  Will continue to monitor renal function   Pharmacy to follow    Yousif Bowman, FranciaD, BCPS, BCCCP 8/18/2023 12:28 PM

## 2023-08-18 NOTE — PLAN OF CARE
Problem: Respiratory - Adult  Goal: Achieves optimal ventilation and oxygenation  8/18/2023 0854 by Renee Padron RCP  Outcome: Not Progressing
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
## 2023-08-19 ENCOUNTER — APPOINTMENT (OUTPATIENT)
Dept: GENERAL RADIOLOGY | Age: 58
DRG: 004 | End: 2023-08-19
Payer: COMMERCIAL

## 2023-08-19 LAB
ALBUMIN SERPL-MCNC: 2.4 G/DL (ref 3.5–5.2)
ALP SERPL-CCNC: 64 U/L (ref 40–129)
ALT SERPL-CCNC: 19 U/L (ref 0–40)
ANION GAP SERPL CALCULATED.3IONS-SCNC: 11 MMOL/L (ref 7–16)
ANION GAP SERPL CALCULATED.3IONS-SCNC: 9 MMOL/L (ref 7–16)
AST SERPL-CCNC: 30 U/L (ref 0–39)
BASOPHILS # BLD: 0.05 K/UL (ref 0–0.2)
BASOPHILS NFR BLD: 1 % (ref 0–2)
BILIRUB SERPL-MCNC: 0.2 MG/DL (ref 0–1.2)
BUN SERPL-MCNC: 13 MG/DL (ref 6–20)
BUN SERPL-MCNC: 15 MG/DL (ref 6–20)
CA-I BLD-SCNC: 1.16 MMOL/L (ref 1.15–1.33)
CALCIUM SERPL-MCNC: 7.7 MG/DL (ref 8.6–10.2)
CALCIUM SERPL-MCNC: 7.8 MG/DL (ref 8.6–10.2)
CHLORIDE SERPL-SCNC: 106 MMOL/L (ref 98–107)
CHLORIDE SERPL-SCNC: 109 MMOL/L (ref 98–107)
CO2 SERPL-SCNC: 24 MMOL/L (ref 22–29)
CO2 SERPL-SCNC: 24 MMOL/L (ref 22–29)
CREAT SERPL-MCNC: 0.6 MG/DL (ref 0.7–1.2)
CREAT SERPL-MCNC: 0.7 MG/DL (ref 0.7–1.2)
EOSINOPHIL # BLD: 0.28 K/UL (ref 0.05–0.5)
EOSINOPHILS RELATIVE PERCENT: 3 % (ref 0–6)
ERYTHROCYTE [DISTWIDTH] IN BLOOD BY AUTOMATED COUNT: 14.9 % (ref 11.5–15)
FIO2: 50
GFR SERPL CREATININE-BSD FRML MDRD: >60 ML/MIN/1.73M2
GFR SERPL CREATININE-BSD FRML MDRD: >60 ML/MIN/1.73M2
GLUCOSE BLD-MCNC: 108 MG/DL (ref 74–99)
GLUCOSE BLD-MCNC: 111 MG/DL (ref 74–99)
GLUCOSE BLD-MCNC: 137 MG/DL (ref 74–99)
GLUCOSE BLD-MCNC: 158 MG/DL (ref 74–99)
GLUCOSE BLD-MCNC: 166 MG/DL (ref 74–99)
GLUCOSE BLD-MCNC: 171 MG/DL (ref 74–99)
GLUCOSE SERPL-MCNC: 105 MG/DL (ref 74–99)
GLUCOSE SERPL-MCNC: 135 MG/DL (ref 74–99)
HCT VFR BLD AUTO: 25 % (ref 37–54)
HCT VFR BLD AUTO: 25.7 % (ref 37–54)
HGB BLD-MCNC: 8.3 G/DL (ref 12.5–16.5)
IMM GRANULOCYTES # BLD AUTO: 0.06 K/UL (ref 0–0.58)
IMM GRANULOCYTES NFR BLD: 1 % (ref 0–5)
LYMPHOCYTES NFR BLD: 2.01 K/UL (ref 1.5–4)
LYMPHOCYTES RELATIVE PERCENT: 21 % (ref 20–42)
MAGNESIUM SERPL-MCNC: 2.1 MG/DL (ref 1.6–2.6)
MCH RBC QN AUTO: 29 PG (ref 26–35)
MCHC RBC AUTO-ENTMCNC: 32.3 G/DL (ref 32–34.5)
MCV RBC AUTO: 89.9 FL (ref 80–99.9)
MODE: AC
MONOCYTES NFR BLD: 0.8 K/UL (ref 0.1–0.95)
MONOCYTES NFR BLD: 8 % (ref 2–12)
NEUTROPHILS NFR BLD: 67 % (ref 43–80)
NEUTS SEG NFR BLD: 6.33 K/UL (ref 1.8–7.3)
O2 DELIVERY DEVICE: ABNORMAL
PEEP: 10
PHOSPHATE SERPL-MCNC: 1.5 MG/DL (ref 2.5–4.5)
PHOSPHATE SERPL-MCNC: 2.5 MG/DL (ref 2.5–4.5)
PLATELET # BLD AUTO: 709 K/UL (ref 130–450)
PMV BLD AUTO: 9.5 FL (ref 7–12)
POC HCO3: 24.6 MMOL/L (ref 22–26)
POC HEMOGLOBIN (CALC): 8.5 G/DL (ref 12.5–15.5)
POC O2 SATURATION: 99.7 % (ref 92–98.5)
POC PCO2: 31.5 MM HG (ref 35–45)
POC PH: 7.5 (ref 7.35–7.45)
POC PO2: 178.9 MM HG (ref 80–100)
POSITIVE BASE EXCESS, ART: 1.6 MMOL/L (ref 0–3)
POTASSIUM SERPL-SCNC: 3.2 MMOL/L (ref 3.5–5)
POTASSIUM SERPL-SCNC: 3.5 MMOL/L (ref 3.5–5)
PROT SERPL-MCNC: 5.4 G/DL (ref 6.4–8.3)
RBC # BLD AUTO: 2.86 M/UL (ref 3.8–5.8)
SET RATE, POC: 14
SODIUM SERPL-SCNC: 141 MMOL/L (ref 132–146)
SODIUM SERPL-SCNC: 142 MMOL/L (ref 132–146)
TIDAL VOLUME: 460
WBC OTHER # BLD: 9.5 K/UL (ref 4.5–11.5)

## 2023-08-19 PROCEDURE — 6370000000 HC RX 637 (ALT 250 FOR IP): Performed by: SURGERY

## 2023-08-19 PROCEDURE — 6360000002 HC RX W HCPCS

## 2023-08-19 PROCEDURE — A4216 STERILE WATER/SALINE, 10 ML: HCPCS

## 2023-08-19 PROCEDURE — 85025 COMPLETE CBC W/AUTO DIFF WBC: CPT

## 2023-08-19 PROCEDURE — 6370000000 HC RX 637 (ALT 250 FOR IP)

## 2023-08-19 PROCEDURE — 71045 X-RAY EXAM CHEST 1 VIEW: CPT

## 2023-08-19 PROCEDURE — 82330 ASSAY OF CALCIUM: CPT

## 2023-08-19 PROCEDURE — 80048 BASIC METABOLIC PNL TOTAL CA: CPT

## 2023-08-19 PROCEDURE — 2580000003 HC RX 258

## 2023-08-19 PROCEDURE — 6360000002 HC RX W HCPCS: Performed by: STUDENT IN AN ORGANIZED HEALTH CARE EDUCATION/TRAINING PROGRAM

## 2023-08-19 PROCEDURE — 51798 US URINE CAPACITY MEASURE: CPT

## 2023-08-19 PROCEDURE — 94003 VENT MGMT INPAT SUBQ DAY: CPT

## 2023-08-19 PROCEDURE — 84100 ASSAY OF PHOSPHORUS: CPT

## 2023-08-19 PROCEDURE — 85014 HEMATOCRIT: CPT

## 2023-08-19 PROCEDURE — 94640 AIRWAY INHALATION TREATMENT: CPT

## 2023-08-19 PROCEDURE — 2000000000 HC ICU R&B

## 2023-08-19 PROCEDURE — 80053 COMPREHEN METABOLIC PANEL: CPT

## 2023-08-19 PROCEDURE — 6370000000 HC RX 637 (ALT 250 FOR IP): Performed by: STUDENT IN AN ORGANIZED HEALTH CARE EDUCATION/TRAINING PROGRAM

## 2023-08-19 PROCEDURE — 2500000003 HC RX 250 WO HCPCS: Performed by: SURGERY

## 2023-08-19 PROCEDURE — C9113 INJ PANTOPRAZOLE SODIUM, VIA: HCPCS

## 2023-08-19 PROCEDURE — 83735 ASSAY OF MAGNESIUM: CPT

## 2023-08-19 PROCEDURE — 2500000003 HC RX 250 WO HCPCS

## 2023-08-19 PROCEDURE — 82803 BLOOD GASES ANY COMBINATION: CPT

## 2023-08-19 PROCEDURE — 99291 CRITICAL CARE FIRST HOUR: CPT | Performed by: SURGERY

## 2023-08-19 PROCEDURE — 2580000003 HC RX 258: Performed by: SURGERY

## 2023-08-19 PROCEDURE — 2580000003 HC RX 258: Performed by: STUDENT IN AN ORGANIZED HEALTH CARE EDUCATION/TRAINING PROGRAM

## 2023-08-19 PROCEDURE — S5553 INSULIN LONG ACTING 5 U: HCPCS

## 2023-08-19 PROCEDURE — 6360000002 HC RX W HCPCS: Performed by: SURGERY

## 2023-08-19 PROCEDURE — 51702 INSERT TEMP BLADDER CATH: CPT

## 2023-08-19 PROCEDURE — 82962 GLUCOSE BLOOD TEST: CPT

## 2023-08-19 RX ADMIN — LORAZEPAM 2 MG: 2 INJECTION INTRAMUSCULAR; INTRAVENOUS at 14:54

## 2023-08-19 RX ADMIN — MIDAZOLAM 2 MG: 1 INJECTION INTRAMUSCULAR; INTRAVENOUS at 13:58

## 2023-08-19 RX ADMIN — LORAZEPAM 2 MG: 2 INJECTION INTRAMUSCULAR; INTRAVENOUS at 17:34

## 2023-08-19 RX ADMIN — ACETAMINOPHEN 650 MG: 650 SOLUTION ORAL at 02:49

## 2023-08-19 RX ADMIN — NYSTATIN 500000 UNITS: 100000 SUSPENSION ORAL at 20:14

## 2023-08-19 RX ADMIN — SODIUM CHLORIDE, PRESERVATIVE FREE 10 ML: 5 INJECTION INTRAVENOUS at 08:06

## 2023-08-19 RX ADMIN — POLYETHYLENE GLYCOL 3350 17 G: 17 POWDER, FOR SOLUTION ORAL at 08:06

## 2023-08-19 RX ADMIN — METOCLOPRAMIDE HYDROCHLORIDE 5 MG: 5 INJECTION INTRAMUSCULAR; INTRAVENOUS at 20:22

## 2023-08-19 RX ADMIN — POLYVINYL ALCOHOL 1 DROP: 14 SOLUTION/ DROPS OPHTHALMIC at 08:09

## 2023-08-19 RX ADMIN — BISACODYL 10 MG: 10 SUPPOSITORY RECTAL at 08:06

## 2023-08-19 RX ADMIN — VANCOMYCIN HYDROCHLORIDE 2000 MG: 10 INJECTION, POWDER, LYOPHILIZED, FOR SOLUTION INTRAVENOUS at 12:12

## 2023-08-19 RX ADMIN — IPRATROPIUM BROMIDE AND ALBUTEROL SULFATE 1 DOSE: .5; 2.5 SOLUTION RESPIRATORY (INHALATION) at 19:56

## 2023-08-19 RX ADMIN — CEFEPIME 2000 MG: 2 INJECTION, POWDER, FOR SOLUTION INTRAVENOUS at 04:12

## 2023-08-19 RX ADMIN — CEFEPIME 2000 MG: 2 INJECTION, POWDER, FOR SOLUTION INTRAVENOUS at 20:34

## 2023-08-19 RX ADMIN — ENOXAPARIN SODIUM 40 MG: 100 INJECTION SUBCUTANEOUS at 08:07

## 2023-08-19 RX ADMIN — OXYCODONE HYDROCHLORIDE 15 MG: 5 SOLUTION ORAL at 20:13

## 2023-08-19 RX ADMIN — INSULIN GLARGINE-YFGN 10 UNITS: 100 INJECTION, SOLUTION SUBCUTANEOUS at 20:39

## 2023-08-19 RX ADMIN — PANTOPRAZOLE SODIUM 40 MG: 40 INJECTION, POWDER, FOR SOLUTION INTRAVENOUS at 20:14

## 2023-08-19 RX ADMIN — LORAZEPAM 2 MG: 2 INJECTION INTRAMUSCULAR; INTRAVENOUS at 06:42

## 2023-08-19 RX ADMIN — LABETALOL HYDROCHLORIDE 10 MG: 5 INJECTION INTRAVENOUS at 22:45

## 2023-08-19 RX ADMIN — CHLORHEXIDINE GLUCONATE 15 ML: 1.2 RINSE ORAL at 08:07

## 2023-08-19 RX ADMIN — DEXMEDETOMIDINE 1 MCG/KG/HR: 100 INJECTION, SOLUTION INTRAVENOUS at 09:45

## 2023-08-19 RX ADMIN — POLYETHYLENE GLYCOL 3350 17 G: 17 POWDER, FOR SOLUTION ORAL at 20:15

## 2023-08-19 RX ADMIN — SENNOSIDES AND DOCUSATE SODIUM 2 TABLET: 50; 8.6 TABLET ORAL at 20:16

## 2023-08-19 RX ADMIN — LABETALOL HYDROCHLORIDE 10 MG: 5 INJECTION INTRAVENOUS at 22:35

## 2023-08-19 RX ADMIN — CEFEPIME 2000 MG: 2 INJECTION, POWDER, FOR SOLUTION INTRAVENOUS at 13:21

## 2023-08-19 RX ADMIN — OXYCODONE HYDROCHLORIDE 15 MG: 5 SOLUTION ORAL at 15:12

## 2023-08-19 RX ADMIN — IPRATROPIUM BROMIDE AND ALBUTEROL SULFATE 1 DOSE: .5; 2.5 SOLUTION RESPIRATORY (INHALATION) at 11:37

## 2023-08-19 RX ADMIN — LORAZEPAM 2 MG: 2 INJECTION INTRAMUSCULAR; INTRAVENOUS at 22:45

## 2023-08-19 RX ADMIN — CHLORHEXIDINE GLUCONATE 15 ML: 1.2 RINSE ORAL at 12:13

## 2023-08-19 RX ADMIN — SENNOSIDES AND DOCUSATE SODIUM 2 TABLET: 50; 8.6 TABLET ORAL at 08:07

## 2023-08-19 RX ADMIN — LABETALOL HYDROCHLORIDE 10 MG: 5 INJECTION INTRAVENOUS at 23:13

## 2023-08-19 RX ADMIN — METOCLOPRAMIDE HYDROCHLORIDE 5 MG: 5 INJECTION INTRAMUSCULAR; INTRAVENOUS at 09:39

## 2023-08-19 RX ADMIN — PHENOBARBITAL SODIUM 130 MG: 65 INJECTION INTRAMUSCULAR at 06:42

## 2023-08-19 RX ADMIN — LABETALOL HYDROCHLORIDE 10 MG: 5 INJECTION INTRAVENOUS at 19:04

## 2023-08-19 RX ADMIN — INSULIN LISPRO 4 UNITS: 100 INJECTION, SOLUTION INTRAVENOUS; SUBCUTANEOUS at 15:12

## 2023-08-19 RX ADMIN — LORAZEPAM 2 MG: 2 INJECTION INTRAMUSCULAR; INTRAVENOUS at 09:39

## 2023-08-19 RX ADMIN — NYSTATIN 500000 UNITS: 100000 SUSPENSION ORAL at 08:07

## 2023-08-19 RX ADMIN — INSULIN LISPRO 4 UNITS: 100 INJECTION, SOLUTION INTRAVENOUS; SUBCUTANEOUS at 12:13

## 2023-08-19 RX ADMIN — POLYVINYL ALCOHOL 1 DROP: 14 SOLUTION/ DROPS OPHTHALMIC at 20:13

## 2023-08-19 RX ADMIN — OXYCODONE HYDROCHLORIDE 15 MG: 5 SOLUTION ORAL at 08:06

## 2023-08-19 RX ADMIN — OXYCODONE HYDROCHLORIDE 15 MG: 5 SOLUTION ORAL at 23:45

## 2023-08-19 RX ADMIN — POLYVINYL ALCOHOL 1 DROP: 14 SOLUTION/ DROPS OPHTHALMIC at 12:13

## 2023-08-19 RX ADMIN — QUETIAPINE FUMARATE 200 MG: 100 TABLET ORAL at 08:06

## 2023-08-19 RX ADMIN — LABETALOL HYDROCHLORIDE 10 MG: 5 INJECTION INTRAVENOUS at 07:03

## 2023-08-19 RX ADMIN — LABETALOL HYDROCHLORIDE 10 MG: 5 INJECTION INTRAVENOUS at 18:08

## 2023-08-19 RX ADMIN — NYSTATIN 500000 UNITS: 100000 SUSPENSION ORAL at 15:12

## 2023-08-19 RX ADMIN — POTASSIUM BICARBONATE 40 MEQ: 782 TABLET, EFFERVESCENT ORAL at 09:39

## 2023-08-19 RX ADMIN — IPRATROPIUM BROMIDE AND ALBUTEROL SULFATE 1 DOSE: .5; 2.5 SOLUTION RESPIRATORY (INHALATION) at 07:50

## 2023-08-19 RX ADMIN — CHLORHEXIDINE GLUCONATE 15 ML: 1.2 RINSE ORAL at 15:00

## 2023-08-19 RX ADMIN — CHLORHEXIDINE GLUCONATE 15 ML: 1.2 RINSE ORAL at 20:12

## 2023-08-19 RX ADMIN — LABETALOL HYDROCHLORIDE 10 MG: 5 INJECTION INTRAVENOUS at 05:06

## 2023-08-19 RX ADMIN — BISACODYL 10 MG: 10 SUPPOSITORY RECTAL at 20:17

## 2023-08-19 RX ADMIN — METOCLOPRAMIDE HYDROCHLORIDE 5 MG: 5 INJECTION INTRAMUSCULAR; INTRAVENOUS at 04:10

## 2023-08-19 RX ADMIN — BUPROPION HYDROCHLORIDE 75 MG: 75 TABLET, FILM COATED ORAL at 20:19

## 2023-08-19 RX ADMIN — FENTANYL CITRATE 100 MCG: 50 INJECTION INTRAMUSCULAR; INTRAVENOUS at 03:55

## 2023-08-19 RX ADMIN — CHLORHEXIDINE GLUCONATE 15 ML: 1.2 RINSE ORAL at 04:11

## 2023-08-19 RX ADMIN — MIDAZOLAM 2 MG: 1 INJECTION INTRAMUSCULAR; INTRAVENOUS at 12:12

## 2023-08-19 RX ADMIN — PANTOPRAZOLE SODIUM 40 MG: 40 INJECTION, POWDER, FOR SOLUTION INTRAVENOUS at 08:07

## 2023-08-19 RX ADMIN — LORAZEPAM 2 MG: 2 INJECTION INTRAMUSCULAR; INTRAVENOUS at 02:58

## 2023-08-19 RX ADMIN — INSULIN LISPRO 4 UNITS: 100 INJECTION, SOLUTION INTRAVENOUS; SUBCUTANEOUS at 08:05

## 2023-08-19 RX ADMIN — OXYCODONE HYDROCHLORIDE 15 MG: 5 SOLUTION ORAL at 04:09

## 2023-08-19 RX ADMIN — POLYVINYL ALCOHOL 1 DROP: 14 SOLUTION/ DROPS OPHTHALMIC at 04:11

## 2023-08-19 RX ADMIN — LABETALOL HYDROCHLORIDE 10 MG: 5 INJECTION INTRAVENOUS at 07:37

## 2023-08-19 RX ADMIN — ENOXAPARIN SODIUM 40 MG: 100 INJECTION SUBCUTANEOUS at 20:17

## 2023-08-19 RX ADMIN — NYSTATIN 500000 UNITS: 100000 SUSPENSION ORAL at 12:13

## 2023-08-19 RX ADMIN — VANCOMYCIN HYDROCHLORIDE 2000 MG: 10 INJECTION, POWDER, LYOPHILIZED, FOR SOLUTION INTRAVENOUS at 23:41

## 2023-08-19 RX ADMIN — PETROLATUM: 420 OINTMENT TOPICAL at 08:09

## 2023-08-19 RX ADMIN — Medication 10 MG: at 20:14

## 2023-08-19 RX ADMIN — IPRATROPIUM BROMIDE AND ALBUTEROL SULFATE 1 DOSE: .5; 2.5 SOLUTION RESPIRATORY (INHALATION) at 16:09

## 2023-08-19 RX ADMIN — FLUOXETINE 20 MG: 10 TABLET, FILM COATED ORAL at 08:08

## 2023-08-19 RX ADMIN — BUPROPION HYDROCHLORIDE 75 MG: 75 TABLET, FILM COATED ORAL at 08:08

## 2023-08-19 RX ADMIN — POLYVINYL ALCOHOL 1 DROP: 14 SOLUTION/ DROPS OPHTHALMIC at 15:00

## 2023-08-19 RX ADMIN — CHLORHEXIDINE GLUCONATE 15 ML: 1.2 RINSE ORAL at 23:43

## 2023-08-19 RX ADMIN — ACETAMINOPHEN 650 MG: 650 SOLUTION ORAL at 20:50

## 2023-08-19 RX ADMIN — METOCLOPRAMIDE HYDROCHLORIDE 5 MG: 5 INJECTION INTRAMUSCULAR; INTRAVENOUS at 15:12

## 2023-08-19 RX ADMIN — QUETIAPINE FUMARATE 200 MG: 100 TABLET ORAL at 20:15

## 2023-08-19 RX ADMIN — OXYCODONE HYDROCHLORIDE 15 MG: 5 SOLUTION ORAL at 12:12

## 2023-08-19 RX ADMIN — SODIUM PHOSPHATE, MONOBASIC, MONOHYDRATE AND SODIUM PHOSPHATE, DIBASIC, ANHYDROUS 30 MMOL: 276; 142 INJECTION, SOLUTION INTRAVENOUS at 09:44

## 2023-08-19 RX ADMIN — POLYVINYL ALCOHOL 1 DROP: 14 SOLUTION/ DROPS OPHTHALMIC at 23:43

## 2023-08-19 RX ADMIN — MIDAZOLAM 2 MG: 1 INJECTION INTRAMUSCULAR; INTRAVENOUS at 18:12

## 2023-08-19 RX ADMIN — SODIUM CHLORIDE, PRESERVATIVE FREE 10 ML: 5 INJECTION INTRAVENOUS at 20:21

## 2023-08-19 RX ADMIN — FENTANYL CITRATE 100 MCG: 50 INJECTION INTRAMUSCULAR; INTRAVENOUS at 07:02

## 2023-08-19 RX ADMIN — PETROLATUM: 420 OINTMENT TOPICAL at 20:26

## 2023-08-19 ASSESSMENT — PULMONARY FUNCTION TESTS
PIF_VALUE: 22
PIF_VALUE: 24
PIF_VALUE: 26
PIF_VALUE: 27
PIF_VALUE: 25
PIF_VALUE: 27
PIF_VALUE: 29
PIF_VALUE: 29
PIF_VALUE: 23
PIF_VALUE: 19
PIF_VALUE: 32
PIF_VALUE: 24
PIF_VALUE: 32
PIF_VALUE: 22
PIF_VALUE: 23
PIF_VALUE: 25
PIF_VALUE: 26
PIF_VALUE: 24
PIF_VALUE: 23
PIF_VALUE: 23
PIF_VALUE: 25
PIF_VALUE: 23
PIF_VALUE: 22
PIF_VALUE: 49
PIF_VALUE: 26
PIF_VALUE: 27
PIF_VALUE: 26
PIF_VALUE: 29
PIF_VALUE: 21
PIF_VALUE: 25
PIF_VALUE: 27

## 2023-08-19 ASSESSMENT — PAIN SCALES - GENERAL
PAINLEVEL_OUTOF10: 0
PAINLEVEL_OUTOF10: 7

## 2023-08-19 ASSESSMENT — PAIN SCALES - WONG BAKER: WONGBAKER_NUMERICALRESPONSE: 0
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
## 2023-08-19 NOTE — PLAN OF CARE
Problem: Respiratory - Adult  Goal: Achieves optimal ventilation and oxygenation  Outcome: Not Progressing

## 2023-08-19 NOTE — PLAN OF CARE
Problem: Discharge Planning  Goal: Discharge to home or other facility with appropriate resources  8/19/2023 0958 by Jennifer Mendoza RN  Outcome: Progressing     Problem: Pain  Goal: Verbalizes/displays adequate comfort level or baseline comfort level  8/19/2023 0958 by Jennifer Mendoza RN  Outcome: Progressing     Problem: Skin/Tissue Integrity  Goal: Absence of new skin breakdown  Description: 1. Monitor for areas of redness and/or skin breakdown  2. Assess vascular access sites hourly  3. Every 4-6 hours minimum:  Change oxygen saturation probe site  4. Every 4-6 hours:  If on nasal continuous positive airway pressure, respiratory therapy assess nares and determine need for appliance change or resting period. 8/19/2023 0958 by Jennifer Mendoza RN  Outcome: Progressing     Problem: Safety - Adult  Goal: Free from fall injury  8/19/2023 0958 by Jennifer Mendoza RN  Outcome: Progressing     Problem: ABCDS Injury Assessment  Goal: Absence of physical injury  Outcome: Progressing     Problem: Safety - Medical Restraint  Goal: Remains free of injury from restraints (Restraint for Interference with Medical Device)  Description: INTERVENTIONS:  1. Determine that other, less restrictive measures have been tried or would not be effective before applying the restraint  2. Evaluate the patient's condition at the time of restraint application  3. Inform patient/family regarding the reason for restraint  4.  Q2H: Monitor safety, psychosocial status, comfort, nutrition and hydration  Outcome: Progressing  Flowsheets  Taken 8/19/2023 0825  Remains free of injury from restraints (restraint for interference with medical device): Every 2 hours: Monitor safety, psychosocial status, comfort, nutrition and hydration  Taken 8/19/2023 0800  Remains free of injury from restraints (restraint for interference with medical device): Every 2 hours: Monitor safety, psychosocial status, comfort, nutrition and hydration

## 2023-08-19 NOTE — PROGRESS NOTES
Mcpherson catheter inserted using sterile technique per physician's order. Mcpherson Catheter Indication: Urinary Retention Upon insertion, 700   mL of urine returned. Securing device applied. Mcpherson bag is hanging below the level of the bladder, safety clip attached to the bed sheet, tamper seal is intact, drainage bag is not on the floor, lack of dependent loop in tubing, and the drainage bag is less than half full.

## 2023-08-19 NOTE — PROGRESS NOTES
Pharmacy Consultation Note  (Antibiotic Dosing and Monitoring)    Initial consult date: 8/16/23  Consulting physician/provider: Dr. Hudson Berg  Drug: Vancomycin  Indication: Pneumonia    Age/  Gender Height Weight IBW  Allergy Information   58 y.o./male 5' 11\" (180.3 cm) 220 lb 7.4 oz (100 kg)     Ideal body weight: 75.3 kg (166 lb 0.1 oz)  Adjusted ideal body weight: 93.3 kg (205 lb 9.7 oz)   Adhesive tape, Cymbalta [duloxetine hcl], Lidocaine, Lyrica [pregabalin], Strattera [atomoxetine], and Tramadol      Renal Function:  Recent Labs     08/17/23  0445 08/18/23  0420   BUN 21* 18   CREATININE 0.8 0.7         Intake/Output Summary (Last 24 hours) at 8/19/2023 0539  Last data filed at 8/19/2023 0500  Gross per 24 hour   Intake 3956.13 ml   Output 1370 ml   Net 2586.13 ml         Vancomycin Monitoring:  Trough:    Recent Labs     08/18/23  1130   VANCOTROUGH 16.6*       Random:  No results for input(s): VANCORANDOM in the last 72 hours. No results for input(s): Alicia Booty in the last 72 hours. Historical Cultures:  No results found for: ORG  No results for input(s): BC in the last 72 hours. Vancomycin Administration Times:  Recent vancomycin administrations                     vancomycin (VANCOCIN) 2,000 mg in sodium chloride 0.9 % 500 mL IVPB (mg) 2,000 mg New Bag 08/17/23 2341     2,000 mg New Bag  1207     2,000 mg New Bag  0129     2,000 mg New Bag 08/16/23 1351                  Assessment:  Patient is a 60 y.o. male who has been initiated on vancomycin  Estimated Creatinine Clearance: 150 mL/min (based on SCr of 0.7 mg/dL).       Plan:  Continue vancomycin 2000 mg IV every 12 hours  Will check vancomycin levels when appropriate  Will continue to monitor renal function   Pharmacy to follow    Francia HammondsD, [facility] 8/19/2023 6:39 AM

## 2023-08-19 NOTE — PROGRESS NOTES
08/19/23 1609   Patient Observation   Pulse 82   Respirations 26   SpO2 97 %   Vent Information   Ventilator ID [id number]   Vent Mode AC/PRVC   Ventilator Settings   FiO2  50 %   Insp Time (sec) 0.8 sec   Vt (Set, mL) 460 mL   Resp Rate (Set) 14 bmp   PEEP/CPAP (cmH2O) 10   Pressure Support (cm H2O) 0 cm H2O   Vent Patient Data (Readings)   Vt (Measured) 570 mL   Peak Inspiratory Pressure (cmH2O) 27 cmH2O   Rate Measured 22 br/min   Minute Volume (L/min) 10.9 Liters   Mean Airway Pressure (cmH2O) 15 cmH20   Plateau Pressure (cm H2O) 18 cm H2O   Driving Pressure 8   Inspiratory Time 0.8 sec   I:E Ratio 1:1.90   Flow Sensitivity 3 L/min   PEEP Intrinsic (cm H2O) 1 cm H2O   Static Compliance (L/cm H2O) 58   I Time/ I Time % 0.8 s   Backup Apnea On   Backup Rate 14 Breaths Per Minute   Backup Vt 460   Vent Alarm Settings   Low Pressure (cmH2O) 13.5 cmH2O   High Pressure (cmH2O) 50 cmH2O   Low Minute Volume (lpm) 7 L/min   High Minute Volume (lpm) 20 L/min   Low Exhaled Vt (ml) 360 mL   High Exhaled Vt (ml) 1000 mL   RR High (bpm) 40 br/min   Apnea (secs) 20 secs   Additional Respiratoray Assessments   Humidification Source Heated wire   Humidification Temp 37   Ambu Bag With Mask At Bedside Yes   Backup Trachs Available (Size) 8.0   Surgical Airway (Trach)   No placement date or time found.    Present on Admission/Arrival: No  Surgical Airway Type: Tracheostomy   Status Secured

## 2023-08-20 ENCOUNTER — APPOINTMENT (OUTPATIENT)
Dept: GENERAL RADIOLOGY | Age: 58
DRG: 004 | End: 2023-08-20
Payer: COMMERCIAL

## 2023-08-20 ENCOUNTER — APPOINTMENT (OUTPATIENT)
Dept: CT IMAGING | Age: 58
DRG: 004 | End: 2023-08-20
Payer: COMMERCIAL

## 2023-08-20 LAB
AADO2: 215.6 MMHG
ALBUMIN SERPL-MCNC: 2.2 G/DL (ref 3.5–5.2)
ALP SERPL-CCNC: 64 U/L (ref 40–129)
ALT SERPL-CCNC: 14 U/L (ref 0–40)
ANION GAP SERPL CALCULATED.3IONS-SCNC: 11 MMOL/L (ref 7–16)
AST SERPL-CCNC: 17 U/L (ref 0–39)
B.E.: 0 MMOL/L (ref -3–3)
BASOPHILS # BLD: 0.15 K/UL (ref 0–0.2)
BASOPHILS NFR BLD: 2 % (ref 0–2)
BILIRUB SERPL-MCNC: 0.2 MG/DL (ref 0–1.2)
BUN SERPL-MCNC: 12 MG/DL (ref 6–20)
CA-I BLD-SCNC: 1.14 MMOL/L (ref 1.15–1.33)
CALCIUM SERPL-MCNC: 7.6 MG/DL (ref 8.6–10.2)
CHLORIDE SERPL-SCNC: 108 MMOL/L (ref 98–107)
CO2 SERPL-SCNC: 22 MMOL/L (ref 22–29)
COHB: 0 % (ref 0–1.5)
CREAT SERPL-MCNC: 0.7 MG/DL (ref 0.7–1.2)
CRITICAL: ABNORMAL
DATE ANALYZED: ABNORMAL
DATE OF COLLECTION: ABNORMAL
EOSINOPHIL # BLD: 0.07 K/UL (ref 0.05–0.5)
EOSINOPHILS RELATIVE PERCENT: 1 % (ref 0–6)
ERYTHROCYTE [DISTWIDTH] IN BLOOD BY AUTOMATED COUNT: 15 % (ref 11.5–15)
ERYTHROCYTE [DISTWIDTH] IN BLOOD BY AUTOMATED COUNT: 15 % (ref 11.5–15)
FIO2: 50 %
GFR SERPL CREATININE-BSD FRML MDRD: >60 ML/MIN/1.73M2
GLUCOSE BLD-MCNC: 100 MG/DL (ref 74–99)
GLUCOSE BLD-MCNC: 117 MG/DL (ref 74–99)
GLUCOSE BLD-MCNC: 145 MG/DL (ref 74–99)
GLUCOSE BLD-MCNC: 152 MG/DL (ref 74–99)
GLUCOSE BLD-MCNC: 169 MG/DL (ref 74–99)
GLUCOSE SERPL-MCNC: 133 MG/DL (ref 74–99)
HCO3: 23.9 MMOL/L (ref 22–26)
HCT VFR BLD AUTO: 23.8 % (ref 37–54)
HCT VFR BLD AUTO: 24.2 % (ref 37–54)
HGB BLD-MCNC: 7.5 G/DL (ref 12.5–16.5)
HGB BLD-MCNC: 7.7 G/DL (ref 12.5–16.5)
HHB: 3.8 % (ref 0–5)
LAB: ABNORMAL
LYMPHOCYTES NFR BLD: 1.31 K/UL (ref 1.5–4)
LYMPHOCYTES RELATIVE PERCENT: 16 % (ref 20–42)
Lab: ABNORMAL
MAGNESIUM SERPL-MCNC: 2.1 MG/DL (ref 1.6–2.6)
MCH RBC QN AUTO: 28.3 PG (ref 26–35)
MCH RBC QN AUTO: 28.6 PG (ref 26–35)
MCHC RBC AUTO-ENTMCNC: 31.5 G/DL (ref 32–34.5)
MCHC RBC AUTO-ENTMCNC: 31.8 G/DL (ref 32–34.5)
MCV RBC AUTO: 89.8 FL (ref 80–99.9)
MCV RBC AUTO: 90 FL (ref 80–99.9)
METHB: 0.3 % (ref 0–1.5)
MODE: AC
MONOCYTES NFR BLD: 0.88 K/UL (ref 0.1–0.95)
MONOCYTES NFR BLD: 10 % (ref 2–12)
NEUTROPHILS NFR BLD: 71 % (ref 43–80)
NEUTS SEG NFR BLD: 5.99 K/UL (ref 1.8–7.3)
O2 CONTENT: 11.7 ML/DL
O2 SATURATION: 96.2 % (ref 92–98.5)
O2HB: 95.9 % (ref 94–97)
OPERATOR ID: 7296
PATIENT TEMP: 37 C
PCO2: 35.5 MMHG (ref 35–45)
PEEP/CPAP: 10 CMH2O
PFO2: 1.77 MMHG/%
PH BLOOD GAS: 7.45 (ref 7.35–7.45)
PHOSPHATE SERPL-MCNC: 2.1 MG/DL (ref 2.5–4.5)
PLATELET # BLD AUTO: 601 K/UL (ref 130–450)
PLATELET # BLD AUTO: 603 K/UL (ref 130–450)
PMV BLD AUTO: 9.3 FL (ref 7–12)
PMV BLD AUTO: 9.4 FL (ref 7–12)
PO2: 88.5 MMHG (ref 75–100)
POTASSIUM SERPL-SCNC: 3.1 MMOL/L (ref 3.5–5)
PROT SERPL-MCNC: 5.4 G/DL (ref 6.4–8.3)
RBC # BLD AUTO: 2.65 M/UL (ref 3.8–5.8)
RBC # BLD AUTO: 2.69 M/UL (ref 3.8–5.8)
RBC # BLD: ABNORMAL 10*6/UL
RI(T): 2.44
RR MECHANICAL: 14 B/MIN
SODIUM SERPL-SCNC: 141 MMOL/L (ref 132–146)
SOURCE, BLOOD GAS: ABNORMAL
THB: 8.6 G/DL (ref 11.5–16.5)
TIME ANALYZED: 511
VT MECHANICAL: 460 ML
WBC OTHER # BLD: 8.4 K/UL (ref 4.5–11.5)
WBC OTHER # BLD: 9.2 K/UL (ref 4.5–11.5)

## 2023-08-20 PROCEDURE — 6370000000 HC RX 637 (ALT 250 FOR IP)

## 2023-08-20 PROCEDURE — 94640 AIRWAY INHALATION TREATMENT: CPT

## 2023-08-20 PROCEDURE — 70498 CT ANGIOGRAPHY NECK: CPT

## 2023-08-20 PROCEDURE — 6370000000 HC RX 637 (ALT 250 FOR IP): Performed by: SURGERY

## 2023-08-20 PROCEDURE — 85025 COMPLETE CBC W/AUTO DIFF WBC: CPT

## 2023-08-20 PROCEDURE — 84100 ASSAY OF PHOSPHORUS: CPT

## 2023-08-20 PROCEDURE — 6370000000 HC RX 637 (ALT 250 FOR IP): Performed by: STUDENT IN AN ORGANIZED HEALTH CARE EDUCATION/TRAINING PROGRAM

## 2023-08-20 PROCEDURE — 6360000002 HC RX W HCPCS

## 2023-08-20 PROCEDURE — 6360000002 HC RX W HCPCS: Performed by: SURGERY

## 2023-08-20 PROCEDURE — A4216 STERILE WATER/SALINE, 10 ML: HCPCS

## 2023-08-20 PROCEDURE — 82962 GLUCOSE BLOOD TEST: CPT

## 2023-08-20 PROCEDURE — 2000000000 HC ICU R&B

## 2023-08-20 PROCEDURE — C9113 INJ PANTOPRAZOLE SODIUM, VIA: HCPCS

## 2023-08-20 PROCEDURE — 71045 X-RAY EXAM CHEST 1 VIEW: CPT

## 2023-08-20 PROCEDURE — 94003 VENT MGMT INPAT SUBQ DAY: CPT

## 2023-08-20 PROCEDURE — 80053 COMPREHEN METABOLIC PANEL: CPT

## 2023-08-20 PROCEDURE — 82805 BLOOD GASES W/O2 SATURATION: CPT

## 2023-08-20 PROCEDURE — 99291 CRITICAL CARE FIRST HOUR: CPT | Performed by: SURGERY

## 2023-08-20 PROCEDURE — S5553 INSULIN LONG ACTING 5 U: HCPCS

## 2023-08-20 PROCEDURE — 93005 ELECTROCARDIOGRAM TRACING: CPT | Performed by: STUDENT IN AN ORGANIZED HEALTH CARE EDUCATION/TRAINING PROGRAM

## 2023-08-20 PROCEDURE — 83735 ASSAY OF MAGNESIUM: CPT

## 2023-08-20 PROCEDURE — 85027 COMPLETE CBC AUTOMATED: CPT

## 2023-08-20 PROCEDURE — 2500000003 HC RX 250 WO HCPCS: Performed by: SURGERY

## 2023-08-20 PROCEDURE — 36415 COLL VENOUS BLD VENIPUNCTURE: CPT

## 2023-08-20 PROCEDURE — 2580000003 HC RX 258: Performed by: STUDENT IN AN ORGANIZED HEALTH CARE EDUCATION/TRAINING PROGRAM

## 2023-08-20 PROCEDURE — 2580000003 HC RX 258: Performed by: SURGERY

## 2023-08-20 PROCEDURE — 82330 ASSAY OF CALCIUM: CPT

## 2023-08-20 PROCEDURE — 2580000003 HC RX 258

## 2023-08-20 PROCEDURE — 6360000002 HC RX W HCPCS: Performed by: STUDENT IN AN ORGANIZED HEALTH CARE EDUCATION/TRAINING PROGRAM

## 2023-08-20 PROCEDURE — 6360000004 HC RX CONTRAST MEDICATION: Performed by: RADIOLOGY

## 2023-08-20 RX ORDER — PROPOFOL 10 MG/ML
INJECTION, EMULSION INTRAVENOUS
Status: COMPLETED
Start: 2023-08-20 | End: 2023-08-20

## 2023-08-20 RX ORDER — PROPOFOL 10 MG/ML
5-50 INJECTION, EMULSION INTRAVENOUS CONTINUOUS
Status: DISCONTINUED | OUTPATIENT
Start: 2023-08-20 | End: 2023-08-22

## 2023-08-20 RX ORDER — CALCIUM GLUCONATE 10 MG/ML
1000 INJECTION, SOLUTION INTRAVENOUS ONCE
Status: COMPLETED | OUTPATIENT
Start: 2023-08-20 | End: 2023-08-20

## 2023-08-20 RX ADMIN — IOPAMIDOL 70 ML: 755 INJECTION, SOLUTION INTRAVENOUS at 18:32

## 2023-08-20 RX ADMIN — QUETIAPINE FUMARATE 200 MG: 100 TABLET ORAL at 08:45

## 2023-08-20 RX ADMIN — OXYCODONE HYDROCHLORIDE 15 MG: 5 SOLUTION ORAL at 08:46

## 2023-08-20 RX ADMIN — POTASSIUM BICARBONATE 40 MEQ: 782 TABLET, EFFERVESCENT ORAL at 20:43

## 2023-08-20 RX ADMIN — LORAZEPAM 2 MG: 2 INJECTION INTRAMUSCULAR; INTRAVENOUS at 14:50

## 2023-08-20 RX ADMIN — FENTANYL CITRATE 100 MCG: 50 INJECTION INTRAMUSCULAR; INTRAVENOUS at 18:20

## 2023-08-20 RX ADMIN — OXYCODONE HYDROCHLORIDE 15 MG: 5 SOLUTION ORAL at 20:43

## 2023-08-20 RX ADMIN — NYSTATIN 500000 UNITS: 100000 SUSPENSION ORAL at 12:50

## 2023-08-20 RX ADMIN — INSULIN GLARGINE-YFGN 10 UNITS: 100 INJECTION, SOLUTION SUBCUTANEOUS at 20:44

## 2023-08-20 RX ADMIN — IPRATROPIUM BROMIDE AND ALBUTEROL SULFATE 1 DOSE: .5; 2.5 SOLUTION RESPIRATORY (INHALATION) at 16:44

## 2023-08-20 RX ADMIN — ENOXAPARIN SODIUM 40 MG: 100 INJECTION SUBCUTANEOUS at 08:46

## 2023-08-20 RX ADMIN — SODIUM CHLORIDE, PRESERVATIVE FREE 10 ML: 5 INJECTION INTRAVENOUS at 08:47

## 2023-08-20 RX ADMIN — SODIUM CHLORIDE, PRESERVATIVE FREE 10 ML: 5 INJECTION INTRAVENOUS at 21:32

## 2023-08-20 RX ADMIN — CEFEPIME 2000 MG: 2 INJECTION, POWDER, FOR SOLUTION INTRAVENOUS at 14:35

## 2023-08-20 RX ADMIN — POTASSIUM BICARBONATE 40 MEQ: 782 TABLET, EFFERVESCENT ORAL at 08:56

## 2023-08-20 RX ADMIN — ZIPRASIDONE MESYLATE 10 MG: 20 INJECTION, POWDER, LYOPHILIZED, FOR SOLUTION INTRAMUSCULAR at 23:01

## 2023-08-20 RX ADMIN — OXYCODONE HYDROCHLORIDE 15 MG: 5 SOLUTION ORAL at 03:36

## 2023-08-20 RX ADMIN — POLYVINYL ALCOHOL 1 DROP: 14 SOLUTION/ DROPS OPHTHALMIC at 20:44

## 2023-08-20 RX ADMIN — METOCLOPRAMIDE HYDROCHLORIDE 5 MG: 5 INJECTION INTRAMUSCULAR; INTRAVENOUS at 03:36

## 2023-08-20 RX ADMIN — PETROLATUM: 420 OINTMENT TOPICAL at 21:33

## 2023-08-20 RX ADMIN — ACETAMINOPHEN 650 MG: 650 SOLUTION ORAL at 08:55

## 2023-08-20 RX ADMIN — OXYCODONE HYDROCHLORIDE 15 MG: 5 SOLUTION ORAL at 13:04

## 2023-08-20 RX ADMIN — CEFEPIME 2000 MG: 2 INJECTION, POWDER, FOR SOLUTION INTRAVENOUS at 05:14

## 2023-08-20 RX ADMIN — LORAZEPAM 2 MG: 2 INJECTION INTRAMUSCULAR; INTRAVENOUS at 06:28

## 2023-08-20 RX ADMIN — CHLORHEXIDINE GLUCONATE 15 ML: 1.2 RINSE ORAL at 20:43

## 2023-08-20 RX ADMIN — NYSTATIN 500000 UNITS: 100000 SUSPENSION ORAL at 08:46

## 2023-08-20 RX ADMIN — VANCOMYCIN HYDROCHLORIDE 2000 MG: 10 INJECTION, POWDER, LYOPHILIZED, FOR SOLUTION INTRAVENOUS at 12:48

## 2023-08-20 RX ADMIN — LABETALOL HYDROCHLORIDE 10 MG: 5 INJECTION INTRAVENOUS at 02:02

## 2023-08-20 RX ADMIN — FENTANYL CITRATE 100 MCG: 50 INJECTION INTRAMUSCULAR; INTRAVENOUS at 17:18

## 2023-08-20 RX ADMIN — POLYVINYL ALCOHOL 1 DROP: 14 SOLUTION/ DROPS OPHTHALMIC at 16:12

## 2023-08-20 RX ADMIN — PETROLATUM: 420 OINTMENT TOPICAL at 08:48

## 2023-08-20 RX ADMIN — LORAZEPAM 2 MG: 2 INJECTION INTRAMUSCULAR; INTRAVENOUS at 22:41

## 2023-08-20 RX ADMIN — FLUOXETINE 20 MG: 10 TABLET, FILM COATED ORAL at 08:45

## 2023-08-20 RX ADMIN — Medication 250 MG: at 12:50

## 2023-08-20 RX ADMIN — Medication 250 MG: at 16:11

## 2023-08-20 RX ADMIN — OXYCODONE HYDROCHLORIDE 15 MG: 5 SOLUTION ORAL at 23:57

## 2023-08-20 RX ADMIN — METOCLOPRAMIDE HYDROCHLORIDE 5 MG: 5 INJECTION INTRAMUSCULAR; INTRAVENOUS at 16:11

## 2023-08-20 RX ADMIN — PROPOFOL 30 MCG/KG/MIN: 10 INJECTION, EMULSION INTRAVENOUS at 22:31

## 2023-08-20 RX ADMIN — Medication 250 MG: at 20:43

## 2023-08-20 RX ADMIN — IPRATROPIUM BROMIDE AND ALBUTEROL SULFATE 1 DOSE: .5; 2.5 SOLUTION RESPIRATORY (INHALATION) at 08:14

## 2023-08-20 RX ADMIN — IPRATROPIUM BROMIDE AND ALBUTEROL SULFATE 1 DOSE: .5; 2.5 SOLUTION RESPIRATORY (INHALATION) at 11:11

## 2023-08-20 RX ADMIN — DEXMEDETOMIDINE 0.7 MCG/KG/HR: 100 INJECTION, SOLUTION INTRAVENOUS at 03:58

## 2023-08-20 RX ADMIN — IPRATROPIUM BROMIDE AND ALBUTEROL SULFATE 1 DOSE: .5; 2.5 SOLUTION RESPIRATORY (INHALATION) at 20:11

## 2023-08-20 RX ADMIN — MIDAZOLAM 2 MG: 1 INJECTION INTRAMUSCULAR; INTRAVENOUS at 18:20

## 2023-08-20 RX ADMIN — POLYETHYLENE GLYCOL 3350 17 G: 17 POWDER, FOR SOLUTION ORAL at 08:46

## 2023-08-20 RX ADMIN — PANTOPRAZOLE SODIUM 40 MG: 40 INJECTION, POWDER, FOR SOLUTION INTRAVENOUS at 21:23

## 2023-08-20 RX ADMIN — INSULIN LISPRO 4 UNITS: 100 INJECTION, SOLUTION INTRAVENOUS; SUBCUTANEOUS at 03:49

## 2023-08-20 RX ADMIN — PANTOPRAZOLE SODIUM 40 MG: 40 INJECTION, POWDER, FOR SOLUTION INTRAVENOUS at 08:47

## 2023-08-20 RX ADMIN — LORAZEPAM 2 MG: 2 INJECTION INTRAMUSCULAR; INTRAVENOUS at 18:49

## 2023-08-20 RX ADMIN — BUPROPION HYDROCHLORIDE 75 MG: 75 TABLET, FILM COATED ORAL at 08:45

## 2023-08-20 RX ADMIN — METOCLOPRAMIDE HYDROCHLORIDE 5 MG: 5 INJECTION INTRAMUSCULAR; INTRAVENOUS at 11:08

## 2023-08-20 RX ADMIN — BISACODYL 10 MG: 10 SUPPOSITORY RECTAL at 21:32

## 2023-08-20 RX ADMIN — NYSTATIN 500000 UNITS: 100000 SUSPENSION ORAL at 20:44

## 2023-08-20 RX ADMIN — SENNOSIDES AND DOCUSATE SODIUM 2 TABLET: 50; 8.6 TABLET ORAL at 20:44

## 2023-08-20 RX ADMIN — POLYVINYL ALCOHOL 1 DROP: 14 SOLUTION/ DROPS OPHTHALMIC at 08:46

## 2023-08-20 RX ADMIN — MIDAZOLAM 2 MG: 1 INJECTION INTRAMUSCULAR; INTRAVENOUS at 17:18

## 2023-08-20 RX ADMIN — POLYETHYLENE GLYCOL 3350 17 G: 17 POWDER, FOR SOLUTION ORAL at 20:43

## 2023-08-20 RX ADMIN — QUETIAPINE FUMARATE 200 MG: 100 TABLET ORAL at 20:43

## 2023-08-20 RX ADMIN — LORAZEPAM 2 MG: 2 INJECTION INTRAMUSCULAR; INTRAVENOUS at 03:36

## 2023-08-20 RX ADMIN — BISACODYL 10 MG: 10 SUPPOSITORY RECTAL at 08:48

## 2023-08-20 RX ADMIN — Medication 250 MG: at 08:56

## 2023-08-20 RX ADMIN — LORAZEPAM 2 MG: 2 INJECTION INTRAMUSCULAR; INTRAVENOUS at 11:07

## 2023-08-20 RX ADMIN — LABETALOL HYDROCHLORIDE 10 MG: 5 INJECTION INTRAVENOUS at 19:09

## 2023-08-20 RX ADMIN — MIDAZOLAM 2 MG: 1 INJECTION INTRAMUSCULAR; INTRAVENOUS at 08:56

## 2023-08-20 RX ADMIN — CHLORHEXIDINE GLUCONATE 15 ML: 1.2 RINSE ORAL at 08:45

## 2023-08-20 RX ADMIN — SENNOSIDES AND DOCUSATE SODIUM 2 TABLET: 50; 8.6 TABLET ORAL at 08:45

## 2023-08-20 RX ADMIN — POLYVINYL ALCOHOL 1 DROP: 14 SOLUTION/ DROPS OPHTHALMIC at 03:49

## 2023-08-20 RX ADMIN — OXYCODONE HYDROCHLORIDE 15 MG: 5 SOLUTION ORAL at 16:11

## 2023-08-20 RX ADMIN — ACETAMINOPHEN 650 MG: 650 SOLUTION ORAL at 21:26

## 2023-08-20 RX ADMIN — ACETAMINOPHEN 650 MG: 650 SOLUTION ORAL at 01:37

## 2023-08-20 RX ADMIN — CHLORHEXIDINE GLUCONATE 15 ML: 1.2 RINSE ORAL at 16:12

## 2023-08-20 RX ADMIN — MIDAZOLAM 2 MG: 1 INJECTION INTRAMUSCULAR; INTRAVENOUS at 16:11

## 2023-08-20 RX ADMIN — CHLORHEXIDINE GLUCONATE 15 ML: 1.2 RINSE ORAL at 12:30

## 2023-08-20 RX ADMIN — FENTANYL CITRATE 100 MCG: 50 INJECTION INTRAMUSCULAR; INTRAVENOUS at 02:00

## 2023-08-20 RX ADMIN — BUPROPION HYDROCHLORIDE 75 MG: 75 TABLET, FILM COATED ORAL at 21:32

## 2023-08-20 RX ADMIN — POLYVINYL ALCOHOL 1 DROP: 14 SOLUTION/ DROPS OPHTHALMIC at 12:30

## 2023-08-20 RX ADMIN — CHLORHEXIDINE GLUCONATE 15 ML: 1.2 RINSE ORAL at 03:36

## 2023-08-20 RX ADMIN — CALCIUM GLUCONATE 1000 MG: 10 INJECTION, SOLUTION INTRAVENOUS at 09:22

## 2023-08-20 RX ADMIN — METOCLOPRAMIDE HYDROCHLORIDE 5 MG: 5 INJECTION INTRAMUSCULAR; INTRAVENOUS at 21:26

## 2023-08-20 RX ADMIN — CEFEPIME 2000 MG: 2 INJECTION, POWDER, FOR SOLUTION INTRAVENOUS at 20:41

## 2023-08-20 RX ADMIN — POLYVINYL ALCOHOL 1 DROP: 14 SOLUTION/ DROPS OPHTHALMIC at 23:05

## 2023-08-20 RX ADMIN — DEXMEDETOMIDINE 0.5 MCG/KG/HR: 100 INJECTION, SOLUTION INTRAVENOUS at 21:25

## 2023-08-20 RX ADMIN — NYSTATIN 500000 UNITS: 100000 SUSPENSION ORAL at 16:11

## 2023-08-20 RX ADMIN — Medication 10 MG: at 20:43

## 2023-08-20 ASSESSMENT — PULMONARY FUNCTION TESTS
PIF_VALUE: 24
PIF_VALUE: 25
PIF_VALUE: 26
PIF_VALUE: 27
PIF_VALUE: 22
PIF_VALUE: 21
PIF_VALUE: 23
PIF_VALUE: 31
PIF_VALUE: 23
PIF_VALUE: 38
PIF_VALUE: 19
PIF_VALUE: 25
PIF_VALUE: 36
PIF_VALUE: 24
PIF_VALUE: 28
PIF_VALUE: 23
PIF_VALUE: 29
PIF_VALUE: 24
PIF_VALUE: 26
PIF_VALUE: 24
PIF_VALUE: 21
PIF_VALUE: 22
PIF_VALUE: 26
PIF_VALUE: 24
PIF_VALUE: 25
PIF_VALUE: 23
PIF_VALUE: 26
PIF_VALUE: 30

## 2023-08-20 ASSESSMENT — PAIN DESCRIPTION - LOCATION: LOCATION: JAW

## 2023-08-20 ASSESSMENT — PAIN DESCRIPTION - DESCRIPTORS: DESCRIPTORS: ACHING;DISCOMFORT

## 2023-08-20 ASSESSMENT — PAIN SCALES - GENERAL
PAINLEVEL_OUTOF10: 0
PAINLEVEL_OUTOF10: 5
PAINLEVEL_OUTOF10: 0

## 2023-08-20 ASSESSMENT — PAIN SCALES - WONG BAKER
WONGBAKER_NUMERICALRESPONSE: 0
WONGBAKER_NUMERICALRESPONSE: 0

## 2023-08-20 NOTE — FLOWSHEET NOTE
Patient reaches for critical lines/tubes when released from restraints, despite verbal redirection and education. Patient remains in bilateral soft wrist restraints for safety.

## 2023-08-20 NOTE — PLAN OF CARE
Clinical Specialist consults as needed  Outcome: Progressing  Flowsheets (Taken 8/17/2023 0800 by Anabella Cabrera, RN)  Patient/family able to verbalize anxieties, fears, and concerns, and demonstrate effective coping: Assist patient/family to identify coping skills, available support systems and cultural and spiritual values     Problem: Decision Making  Goal: Pt/Family able to effectively weigh alternatives and participate in decision making related to treatment and care  Description: INTERVENTIONS:  1. Determine when there are differences between patient's view, family's view, and healthcare provider's view of condition  2. Facilitate patient and family articulation of goals for care  3. Help patient and family identify pros/cons of alternative solutions  4. Provide information as requested by patient/family  5. Respect patient/family right to receive or not to receive information  6. Serve as a liaison between patient and family and health care team  7. Initiate Consults from Ethics, Palliative Care or initiate [id number] as is appropriate  Outcome: Progressing  Flowsheets (Taken 8/17/2023 0800 by Anabella Cabrera, RN)  Patient/family able to effectively weigh alternatives and participate in decision making related to treatment and care: Determine when there are differences between patient's view, family's view, and healthcare provider's view of condition     Problem: Nutrition Deficit:  Goal: Optimize nutritional status  Outcome: Progressing  Flowsheets (Taken 8/14/2023 1328 by Sandra Morrison, JOHN, LD)  Nutrient intake appropriate for improving, restoring, or maintaining nutritional needs: Recommend, monitor, and adjust tube feedings and TPN/PPN based on assessed needs     Problem: Safety - Medical Restraint  Goal: Remains free of injury from restraints (Restraint for Interference with Medical Device)  Description: INTERVENTIONS:  1.  Determine that other, less restrictive measures have been tried or RN  Outcome: Progressing  Flowsheets  Taken 8/19/2023 0825  Remains free of injury from restraints (restraint for interference with medical device): Every 2 hours: Monitor safety, psychosocial status, comfort, nutrition and hydration  Taken 8/19/2023 0800  Remains free of injury from restraints (restraint for interference with medical device): Every 2 hours: Monitor safety, psychosocial status, comfort, nutrition and hydration     Problem: Respiratory - Adult  Goal: Achieves optimal ventilation and oxygenation  8/19/2023 2327 by Leighton Almodovar RN  Outcome: Progressing  Flowsheets (Taken 8/19/2023 2000)  Achieves optimal ventilation and oxygenation:   Assess for changes in respiratory status   Assess for changes in mentation and behavior   Position to facilitate oxygenation and minimize respiratory effort   Oxygen supplementation based on oxygen saturation or arterial blood gases   Encourage broncho-pulmonary hygiene including cough, deep breathe, incentive spirometry   Assess the need for suctioning and aspirate as needed   Assess and instruct to report shortness of breath or any respiratory difficulty   Respiratory therapy support as indicated  8/19/2023 1651 by Claude Saunders, RCP  Outcome: Not Progressing
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
## 2023-08-20 NOTE — PLAN OF CARE
Problem: Skin/Tissue Integrity  Goal: Absence of new skin breakdown  Description: 1. Monitor for areas of redness and/or skin breakdown  2. Assess vascular access sites hourly  3. Every 4-6 hours minimum:  Change oxygen saturation probe site  4. Every 4-6 hours:  If on nasal continuous positive airway pressure, respiratory therapy assess nares and determine need for appliance change or resting period. Outcome: Progressing     Problem: Safety - Adult  Goal: Free from fall injury  Outcome: Progressing     Problem: Neurosensory - Adult  Goal: Achieves stable or improved neurological status  Outcome: Progressing     Problem: Respiratory - Adult  Goal: Achieves optimal ventilation and oxygenation  Outcome: Progressing     Problem: Safety - Medical Restraint  Goal: Remains free of injury from restraints (Restraint for Interference with Medical Device)  Description: INTERVENTIONS:  1. Determine that other, less restrictive measures have been tried or would not be effective before applying the restraint  2. Evaluate the patient's condition at the time of restraint application  3. Inform patient/family regarding the reason for restraint  4.  Q2H: Monitor safety, psychosocial status, comfort, nutrition and hydration  Outcome: Progressing  Flowsheets (Taken 8/20/2023 1018)  Remains free of injury from restraints (restraint for interference with medical device): Every 2 hours: Monitor safety, psychosocial status, comfort, nutrition and hydration

## 2023-08-20 NOTE — PROGRESS NOTES
Pharmacy Consultation Note  (Antibiotic Dosing and Monitoring)    Initial consult date: 8/16/23  Consulting physician/provider: Dr. Amanda Wilde  Drug: Vancomycin  Indication: Pneumonia    Age/  Gender Height Weight IBW  Allergy Information   58 y.o./male 5' 11\" (180.3 cm) 220 lb 7.4 oz (100 kg)     Ideal body weight: 75.3 kg (166 lb 0.1 oz)  Adjusted ideal body weight: 93.3 kg (205 lb 9.7 oz)   Adhesive tape, Cymbalta [duloxetine hcl], Lidocaine, Lyrica [pregabalin], Strattera [atomoxetine], and Tramadol      Renal Function:  Recent Labs     08/18/23  0420 08/19/23  0410 08/19/23  1600   BUN 18 15 13   CREATININE 0.7 0.7 0.6*         Intake/Output Summary (Last 24 hours) at 8/20/2023 0610  Last data filed at 8/20/2023 0100  Gross per 24 hour   Intake 3714.09 ml   Output 975 ml   Net 2739.09 ml         Vancomycin Monitoring:  Trough:    Recent Labs     08/18/23  1130   VANCOTROUGH 16.6*       Random:  No results for input(s): VANCORANDOM in the last 72 hours. No results for input(s): Leon Lies in the last 72 hours. Historical Cultures:  No results found for: ORG  No results for input(s): BC in the last 72 hours. Vancomycin Administration Times:  Recent vancomycin administrations                     vancomycin (VANCOCIN) 2,000 mg in sodium chloride 0.9 % 500 mL IVPB (mg) 2,000 mg New Bag 08/19/23 2341     2,000 mg New Bag  1212     2,000 mg New Bag 08/18/23 2352     2,000 mg New Bag  1405     2,000 mg New Bag 08/17/23 2341     2,000 mg New Bag  1207                  Assessment:  Patient is a 60 y.o. male who has been initiated on vancomycin  Estimated Creatinine Clearance: 175 mL/min (A) (based on SCr of 0.6 mg/dL (L)).       Plan:  Continue vancomycin 2000 mg IV every 12 hours  Will check vancomycin levels when appropriate  Will continue to monitor renal function   Pharmacy to follow    Anitha Trimble, PharmD, BCPS, BCCCP 8/20/2023 6:10 AM

## 2023-08-21 ENCOUNTER — APPOINTMENT (OUTPATIENT)
Dept: GENERAL RADIOLOGY | Age: 58
DRG: 004 | End: 2023-08-21
Payer: COMMERCIAL

## 2023-08-21 LAB
AADO2: 108.8 MMHG
ALBUMIN SERPL-MCNC: 2 G/DL (ref 3.5–5.2)
ALP SERPL-CCNC: 59 U/L (ref 40–129)
ALT SERPL-CCNC: 12 U/L (ref 0–40)
ANION GAP SERPL CALCULATED.3IONS-SCNC: 11 MMOL/L (ref 7–16)
ANION GAP SERPL CALCULATED.3IONS-SCNC: 6 MMOL/L (ref 7–16)
AST SERPL-CCNC: 14 U/L (ref 0–39)
B.E.: 0.7 MMOL/L (ref -3–3)
BASOPHILS # BLD: 0.21 K/UL (ref 0–0.2)
BASOPHILS NFR BLD: 3 % (ref 0–2)
BILIRUB SERPL-MCNC: 0.2 MG/DL (ref 0–1.2)
BUN SERPL-MCNC: 10 MG/DL (ref 6–20)
BUN SERPL-MCNC: 11 MG/DL (ref 6–20)
CA-I BLD-SCNC: 1.13 MMOL/L (ref 1.15–1.33)
CALCIUM SERPL-MCNC: 7.8 MG/DL (ref 8.6–10.2)
CALCIUM SERPL-MCNC: 8.1 MG/DL (ref 8.6–10.2)
CHLORIDE SERPL-SCNC: 107 MMOL/L (ref 98–107)
CHLORIDE SERPL-SCNC: 108 MMOL/L (ref 98–107)
CO2 SERPL-SCNC: 24 MMOL/L (ref 22–29)
CO2 SERPL-SCNC: 29 MMOL/L (ref 22–29)
COHB: 0.3 % (ref 0–1.5)
CREAT SERPL-MCNC: 0.7 MG/DL (ref 0.7–1.2)
CREAT SERPL-MCNC: 0.7 MG/DL (ref 0.7–1.2)
CRITICAL: ABNORMAL
DATE ANALYZED: ABNORMAL
DATE LAST DOSE: ABNORMAL
DATE OF COLLECTION: ABNORMAL
EKG ATRIAL RATE: 99 BPM
EKG P AXIS: 45 DEGREES
EKG P-R INTERVAL: 156 MS
EKG Q-T INTERVAL: 346 MS
EKG QRS DURATION: 82 MS
EKG QTC CALCULATION (BAZETT): 444 MS
EKG R AXIS: 11 DEGREES
EKG T AXIS: 101 DEGREES
EKG VENTRICULAR RATE: 99 BPM
EOSINOPHIL # BLD: 0.21 K/UL (ref 0.05–0.5)
EOSINOPHILS RELATIVE PERCENT: 3 % (ref 0–6)
ERYTHROCYTE [DISTWIDTH] IN BLOOD BY AUTOMATED COUNT: 14.8 % (ref 11.5–15)
FIO2: 40 %
GFR SERPL CREATININE-BSD FRML MDRD: >60 ML/MIN/1.73M2
GFR SERPL CREATININE-BSD FRML MDRD: >60 ML/MIN/1.73M2
GLUCOSE BLD-MCNC: 102 MG/DL (ref 74–99)
GLUCOSE BLD-MCNC: 103 MG/DL (ref 74–99)
GLUCOSE BLD-MCNC: 116 MG/DL (ref 74–99)
GLUCOSE BLD-MCNC: 129 MG/DL (ref 74–99)
GLUCOSE BLD-MCNC: 134 MG/DL (ref 74–99)
GLUCOSE SERPL-MCNC: 111 MG/DL (ref 74–99)
GLUCOSE SERPL-MCNC: 115 MG/DL (ref 74–99)
HCO3: 24.1 MMOL/L (ref 22–26)
HCT VFR BLD AUTO: 23.3 % (ref 37–54)
HGB BLD-MCNC: 7.4 G/DL (ref 12.5–16.5)
HHB: 2.1 % (ref 0–5)
LAB: ABNORMAL
LYMPHOCYTES NFR BLD: 1.28 K/UL (ref 1.5–4)
LYMPHOCYTES RELATIVE PERCENT: 16 % (ref 20–42)
Lab: ABNORMAL
MAGNESIUM SERPL-MCNC: 1.8 MG/DL (ref 1.6–2.6)
MCH RBC QN AUTO: 28.7 PG (ref 26–35)
MCHC RBC AUTO-ENTMCNC: 31.8 G/DL (ref 32–34.5)
MCV RBC AUTO: 90.3 FL (ref 80–99.9)
METAMYELOCYTES ABSOLUTE COUNT: 0.07 K/UL (ref 0–0.12)
METAMYELOCYTES: 1 % (ref 0–1)
METHB: 0.2 % (ref 0–1.5)
MODE: AC
MONOCYTES NFR BLD: 0.99 K/UL (ref 0.1–0.95)
MONOCYTES NFR BLD: 12 % (ref 2–12)
NEUTROPHILS NFR BLD: 66 % (ref 43–80)
NEUTS SEG NFR BLD: 5.33 K/UL (ref 1.8–7.3)
O2 CONTENT: 12.7 ML/DL
O2 SATURATION: 97.9 % (ref 92–98.5)
O2HB: 97.4 % (ref 94–97)
OPERATOR ID: 2962
PATIENT TEMP: 37 C
PCO2: 33.7 MMHG (ref 35–45)
PEEP/CPAP: 10 CMH2O
PFO2: 3.19 MMHG/%
PH BLOOD GAS: 7.47 (ref 7.35–7.45)
PHOSPHATE SERPL-MCNC: 2.3 MG/DL (ref 2.5–4.5)
PLATELET # BLD AUTO: 606 K/UL (ref 130–450)
PMV BLD AUTO: 9.4 FL (ref 7–12)
PO2: 127.6 MMHG (ref 75–100)
POTASSIUM SERPL-SCNC: 3.3 MMOL/L (ref 3.5–5)
POTASSIUM SERPL-SCNC: 3.9 MMOL/L (ref 3.5–5)
PROT SERPL-MCNC: 5.1 G/DL (ref 6.4–8.3)
RBC # BLD AUTO: 2.58 M/UL (ref 3.8–5.8)
RBC # BLD: ABNORMAL 10*6/UL
RI(T): 0.85
RR MECHANICAL: 14 B/MIN
SODIUM SERPL-SCNC: 142 MMOL/L (ref 132–146)
SODIUM SERPL-SCNC: 143 MMOL/L (ref 132–146)
SOURCE, BLOOD GAS: ABNORMAL
THB: 9.1 G/DL (ref 11.5–16.5)
TIME ANALYZED: 528
TME LAST DOSE: ABNORMAL H
VANCOMYCIN DOSE: ABNORMAL MG
VANCOMYCIN TROUGH SERPL-MCNC: 27.8 UG/ML (ref 5–16)
VT MECHANICAL: 460 ML
WBC OTHER # BLD: 8.1 K/UL (ref 4.5–11.5)

## 2023-08-21 PROCEDURE — 84100 ASSAY OF PHOSPHORUS: CPT

## 2023-08-21 PROCEDURE — 6370000000 HC RX 637 (ALT 250 FOR IP)

## 2023-08-21 PROCEDURE — 6370000000 HC RX 637 (ALT 250 FOR IP): Performed by: SURGERY

## 2023-08-21 PROCEDURE — 82330 ASSAY OF CALCIUM: CPT

## 2023-08-21 PROCEDURE — 94640 AIRWAY INHALATION TREATMENT: CPT

## 2023-08-21 PROCEDURE — 80048 BASIC METABOLIC PNL TOTAL CA: CPT

## 2023-08-21 PROCEDURE — 93010 ELECTROCARDIOGRAM REPORT: CPT | Performed by: INTERNAL MEDICINE

## 2023-08-21 PROCEDURE — 2580000003 HC RX 258

## 2023-08-21 PROCEDURE — 83735 ASSAY OF MAGNESIUM: CPT

## 2023-08-21 PROCEDURE — 71045 X-RAY EXAM CHEST 1 VIEW: CPT

## 2023-08-21 PROCEDURE — 6370000000 HC RX 637 (ALT 250 FOR IP): Performed by: STUDENT IN AN ORGANIZED HEALTH CARE EDUCATION/TRAINING PROGRAM

## 2023-08-21 PROCEDURE — 80202 ASSAY OF VANCOMYCIN: CPT

## 2023-08-21 PROCEDURE — 82962 GLUCOSE BLOOD TEST: CPT

## 2023-08-21 PROCEDURE — 94003 VENT MGMT INPAT SUBQ DAY: CPT

## 2023-08-21 PROCEDURE — 80053 COMPREHEN METABOLIC PANEL: CPT

## 2023-08-21 PROCEDURE — C9113 INJ PANTOPRAZOLE SODIUM, VIA: HCPCS

## 2023-08-21 PROCEDURE — 85025 COMPLETE CBC W/AUTO DIFF WBC: CPT

## 2023-08-21 PROCEDURE — 82805 BLOOD GASES W/O2 SATURATION: CPT

## 2023-08-21 PROCEDURE — 6360000002 HC RX W HCPCS: Performed by: STUDENT IN AN ORGANIZED HEALTH CARE EDUCATION/TRAINING PROGRAM

## 2023-08-21 PROCEDURE — 99291 CRITICAL CARE FIRST HOUR: CPT | Performed by: SURGERY

## 2023-08-21 PROCEDURE — 2580000003 HC RX 258: Performed by: STUDENT IN AN ORGANIZED HEALTH CARE EDUCATION/TRAINING PROGRAM

## 2023-08-21 PROCEDURE — 2000000000 HC ICU R&B

## 2023-08-21 PROCEDURE — 6360000002 HC RX W HCPCS

## 2023-08-21 PROCEDURE — A4216 STERILE WATER/SALINE, 10 ML: HCPCS

## 2023-08-21 PROCEDURE — 6360000002 HC RX W HCPCS: Performed by: SURGERY

## 2023-08-21 PROCEDURE — S5553 INSULIN LONG ACTING 5 U: HCPCS

## 2023-08-21 RX ORDER — OXYMETAZOLINE HYDROCHLORIDE 0.05 G/100ML
2 SPRAY NASAL 2 TIMES DAILY PRN
Status: DISPENSED | OUTPATIENT
Start: 2023-08-21 | End: 2023-08-24

## 2023-08-21 RX ORDER — FUROSEMIDE 10 MG/ML
40 INJECTION INTRAMUSCULAR; INTRAVENOUS ONCE
Status: COMPLETED | OUTPATIENT
Start: 2023-08-21 | End: 2023-08-21

## 2023-08-21 RX ADMIN — SODIUM CHLORIDE, PRESERVATIVE FREE 10 ML: 5 INJECTION INTRAVENOUS at 08:48

## 2023-08-21 RX ADMIN — METOCLOPRAMIDE HYDROCHLORIDE 5 MG: 5 INJECTION INTRAMUSCULAR; INTRAVENOUS at 21:34

## 2023-08-21 RX ADMIN — PANTOPRAZOLE SODIUM 40 MG: 40 INJECTION, POWDER, FOR SOLUTION INTRAVENOUS at 19:57

## 2023-08-21 RX ADMIN — IPRATROPIUM BROMIDE AND ALBUTEROL SULFATE 1 DOSE: .5; 2.5 SOLUTION RESPIRATORY (INHALATION) at 07:47

## 2023-08-21 RX ADMIN — OXYCODONE HYDROCHLORIDE 15 MG: 5 SOLUTION ORAL at 03:18

## 2023-08-21 RX ADMIN — POLYVINYL ALCOHOL 1 DROP: 14 SOLUTION/ DROPS OPHTHALMIC at 08:48

## 2023-08-21 RX ADMIN — SODIUM CHLORIDE, PRESERVATIVE FREE 10 ML: 5 INJECTION INTRAVENOUS at 19:41

## 2023-08-21 RX ADMIN — PROPOFOL 50 MCG/KG/MIN: 10 INJECTION, EMULSION INTRAVENOUS at 00:39

## 2023-08-21 RX ADMIN — BISACODYL 10 MG: 10 SUPPOSITORY RECTAL at 19:50

## 2023-08-21 RX ADMIN — CEFEPIME 2000 MG: 2 INJECTION, POWDER, FOR SOLUTION INTRAVENOUS at 13:29

## 2023-08-21 RX ADMIN — POLYVINYL ALCOHOL 1 DROP: 14 SOLUTION/ DROPS OPHTHALMIC at 11:25

## 2023-08-21 RX ADMIN — Medication 10 ML: at 23:11

## 2023-08-21 RX ADMIN — FLUOXETINE 20 MG: 10 TABLET, FILM COATED ORAL at 08:46

## 2023-08-21 RX ADMIN — PROPOFOL 50 MCG/KG/MIN: 10 INJECTION, EMULSION INTRAVENOUS at 18:59

## 2023-08-21 RX ADMIN — BISACODYL 10 MG: 10 SUPPOSITORY RECTAL at 08:48

## 2023-08-21 RX ADMIN — FUROSEMIDE 40 MG: 10 INJECTION, SOLUTION INTRAMUSCULAR; INTRAVENOUS at 11:25

## 2023-08-21 RX ADMIN — PROPOFOL 50 MCG/KG/MIN: 10 INJECTION, EMULSION INTRAVENOUS at 06:00

## 2023-08-21 RX ADMIN — Medication 500 MG: at 11:28

## 2023-08-21 RX ADMIN — PETROLATUM: 420 OINTMENT TOPICAL at 08:45

## 2023-08-21 RX ADMIN — ENOXAPARIN SODIUM 40 MG: 100 INJECTION SUBCUTANEOUS at 08:44

## 2023-08-21 RX ADMIN — METOCLOPRAMIDE HYDROCHLORIDE 5 MG: 5 INJECTION INTRAMUSCULAR; INTRAVENOUS at 03:25

## 2023-08-21 RX ADMIN — LORAZEPAM 2 MG: 2 INJECTION INTRAMUSCULAR; INTRAVENOUS at 16:59

## 2023-08-21 RX ADMIN — QUETIAPINE FUMARATE 200 MG: 100 TABLET ORAL at 19:58

## 2023-08-21 RX ADMIN — CHLORHEXIDINE GLUCONATE 15 ML: 1.2 RINSE ORAL at 11:26

## 2023-08-21 RX ADMIN — PANTOPRAZOLE SODIUM 40 MG: 40 INJECTION, POWDER, FOR SOLUTION INTRAVENOUS at 08:44

## 2023-08-21 RX ADMIN — PROPOFOL 50 MCG/KG/MIN: 10 INJECTION, EMULSION INTRAVENOUS at 11:24

## 2023-08-21 RX ADMIN — SENNOSIDES AND DOCUSATE SODIUM 2 TABLET: 50; 8.6 TABLET ORAL at 08:44

## 2023-08-21 RX ADMIN — POLYVINYL ALCOHOL 1 DROP: 14 SOLUTION/ DROPS OPHTHALMIC at 19:44

## 2023-08-21 RX ADMIN — Medication 500 MG: at 19:58

## 2023-08-21 RX ADMIN — OXYCODONE HYDROCHLORIDE 15 MG: 5 SOLUTION ORAL at 11:25

## 2023-08-21 RX ADMIN — POTASSIUM BICARBONATE 40 MEQ: 782 TABLET, EFFERVESCENT ORAL at 11:25

## 2023-08-21 RX ADMIN — SENNOSIDES AND DOCUSATE SODIUM 2 TABLET: 50; 8.6 TABLET ORAL at 19:49

## 2023-08-21 RX ADMIN — IPRATROPIUM BROMIDE AND ALBUTEROL SULFATE 1 DOSE: .5; 2.5 SOLUTION RESPIRATORY (INHALATION) at 11:53

## 2023-08-21 RX ADMIN — POLYETHYLENE GLYCOL 3350 17 G: 17 POWDER, FOR SOLUTION ORAL at 19:44

## 2023-08-21 RX ADMIN — PETROLATUM: 420 OINTMENT TOPICAL at 19:45

## 2023-08-21 RX ADMIN — Medication 10 ML: at 21:34

## 2023-08-21 RX ADMIN — ENOXAPARIN SODIUM 40 MG: 100 INJECTION SUBCUTANEOUS at 20:30

## 2023-08-21 RX ADMIN — Medication 500 MG: at 16:59

## 2023-08-21 RX ADMIN — LORAZEPAM 2 MG: 2 INJECTION INTRAMUSCULAR; INTRAVENOUS at 23:11

## 2023-08-21 RX ADMIN — METOCLOPRAMIDE HYDROCHLORIDE 5 MG: 5 INJECTION INTRAMUSCULAR; INTRAVENOUS at 08:44

## 2023-08-21 RX ADMIN — VANCOMYCIN HYDROCHLORIDE 2000 MG: 10 INJECTION, POWDER, LYOPHILIZED, FOR SOLUTION INTRAVENOUS at 00:48

## 2023-08-21 RX ADMIN — LABETALOL HYDROCHLORIDE 10 MG: 5 INJECTION INTRAVENOUS at 06:40

## 2023-08-21 RX ADMIN — Medication 10 MG: at 19:58

## 2023-08-21 RX ADMIN — OXYCODONE HYDROCHLORIDE 15 MG: 5 SOLUTION ORAL at 19:57

## 2023-08-21 RX ADMIN — CALCIUM GLUCONATE 2000 MG: 98 INJECTION, SOLUTION INTRAVENOUS at 11:23

## 2023-08-21 RX ADMIN — CHLORHEXIDINE GLUCONATE 15 ML: 1.2 RINSE ORAL at 08:45

## 2023-08-21 RX ADMIN — PROPOFOL 50 MCG/KG/MIN: 10 INJECTION, EMULSION INTRAVENOUS at 16:39

## 2023-08-21 RX ADMIN — CEFEPIME 2000 MG: 2 INJECTION, POWDER, FOR SOLUTION INTRAVENOUS at 05:13

## 2023-08-21 RX ADMIN — OXYCODONE HYDROCHLORIDE 15 MG: 5 SOLUTION ORAL at 16:59

## 2023-08-21 RX ADMIN — OXYCODONE HYDROCHLORIDE 15 MG: 5 SOLUTION ORAL at 08:44

## 2023-08-21 RX ADMIN — INSULIN GLARGINE-YFGN 10 UNITS: 100 INJECTION, SOLUTION SUBCUTANEOUS at 19:59

## 2023-08-21 RX ADMIN — PROPOFOL 50 MCG/KG/MIN: 10 INJECTION, EMULSION INTRAVENOUS at 13:47

## 2023-08-21 RX ADMIN — LORAZEPAM 2 MG: 2 INJECTION INTRAMUSCULAR; INTRAVENOUS at 03:17

## 2023-08-21 RX ADMIN — LORAZEPAM 2 MG: 2 INJECTION INTRAMUSCULAR; INTRAVENOUS at 11:25

## 2023-08-21 RX ADMIN — PROPOFOL 50 MCG/KG/MIN: 10 INJECTION, EMULSION INTRAVENOUS at 21:34

## 2023-08-21 RX ADMIN — CHLORHEXIDINE GLUCONATE 15 ML: 1.2 RINSE ORAL at 17:00

## 2023-08-21 RX ADMIN — IPRATROPIUM BROMIDE AND ALBUTEROL SULFATE 1 DOSE: .5; 2.5 SOLUTION RESPIRATORY (INHALATION) at 15:35

## 2023-08-21 RX ADMIN — LORAZEPAM 2 MG: 2 INJECTION INTRAMUSCULAR; INTRAVENOUS at 08:44

## 2023-08-21 RX ADMIN — POTASSIUM BICARBONATE 40 MEQ: 782 TABLET, EFFERVESCENT ORAL at 19:58

## 2023-08-21 RX ADMIN — LORAZEPAM 2 MG: 2 INJECTION INTRAMUSCULAR; INTRAVENOUS at 19:41

## 2023-08-21 RX ADMIN — POLYVINYL ALCOHOL 1 DROP: 14 SOLUTION/ DROPS OPHTHALMIC at 17:00

## 2023-08-21 RX ADMIN — POLYETHYLENE GLYCOL 3350 17 G: 17 POWDER, FOR SOLUTION ORAL at 08:48

## 2023-08-21 RX ADMIN — PROPOFOL 50 MCG/KG/MIN: 10 INJECTION, EMULSION INTRAVENOUS at 03:24

## 2023-08-21 RX ADMIN — IPRATROPIUM BROMIDE AND ALBUTEROL SULFATE 1 DOSE: .5; 2.5 SOLUTION RESPIRATORY (INHALATION) at 19:16

## 2023-08-21 RX ADMIN — CEFEPIME 2000 MG: 2 INJECTION, POWDER, FOR SOLUTION INTRAVENOUS at 21:15

## 2023-08-21 RX ADMIN — POLYVINYL ALCOHOL 1 DROP: 14 SOLUTION/ DROPS OPHTHALMIC at 03:25

## 2023-08-21 RX ADMIN — QUETIAPINE FUMARATE 200 MG: 100 TABLET ORAL at 08:44

## 2023-08-21 RX ADMIN — BUPROPION HYDROCHLORIDE 75 MG: 75 TABLET, FILM COATED ORAL at 08:46

## 2023-08-21 RX ADMIN — POTASSIUM BICARBONATE 40 MEQ: 782 TABLET, EFFERVESCENT ORAL at 08:43

## 2023-08-21 RX ADMIN — BUPROPION HYDROCHLORIDE 75 MG: 75 TABLET, FILM COATED ORAL at 19:50

## 2023-08-21 RX ADMIN — METOCLOPRAMIDE HYDROCHLORIDE 5 MG: 5 INJECTION INTRAMUSCULAR; INTRAVENOUS at 16:59

## 2023-08-21 RX ADMIN — PROPOFOL 50 MCG/KG/MIN: 10 INJECTION, EMULSION INTRAVENOUS at 09:03

## 2023-08-21 ASSESSMENT — PULMONARY FUNCTION TESTS
PIF_VALUE: 21
PIF_VALUE: 29
PIF_VALUE: 24
PIF_VALUE: 20
PIF_VALUE: 22
PIF_VALUE: 22
PIF_VALUE: 21
PIF_VALUE: 21
PIF_VALUE: 22
PIF_VALUE: 23
PIF_VALUE: 22
PIF_VALUE: 26
PIF_VALUE: 22
PIF_VALUE: 22
PIF_VALUE: 23
PIF_VALUE: 21
PIF_VALUE: 27
PIF_VALUE: 21
PIF_VALUE: 22
PIF_VALUE: 21
PIF_VALUE: 23
PIF_VALUE: 22
PIF_VALUE: 20
PIF_VALUE: 23
PIF_VALUE: 23

## 2023-08-21 ASSESSMENT — PAIN SCALES - GENERAL
PAINLEVEL_OUTOF10: 0
PAINLEVEL_OUTOF10: 2
PAINLEVEL_OUTOF10: 0

## 2023-08-21 NOTE — PROGRESS NOTES
08/21/23 0747   Patient Observation   Respirations 14   Vent Information   Ventilator ID [id number]   Vent Mode AC/PRVC   Ventilator Settings   Insp Time (sec) 0.8 sec   Vent Patient Data (Readings)   Inspiratory Time 0.8 sec   Flow Sensitivity 3 L/min   Static Compliance (L/cm H2O) 49   I Time/ I Time % 0.8 s   Backup Apnea On   Backup Rate 14 Breaths Per Minute   Backup Vt 460   Vent Alarm Settings   Low Minute Volume (lpm) 5 L/min   High Minute Volume (lpm) 20 L/min   Low Exhaled Vt (ml) 360 mL   High Exhaled Vt (ml) 1000 mL   RR High (bpm) 35 br/min   Apnea (secs) 20 secs   Additional Respiratoray Assessments   Humidification Source Heated wire   Humidification Temp 37.8   Ambu Bag With Mask At Bedside Yes   Backup Trachs Available (Size) 8.0   Surgical Airway (Trach)   No placement date or time found.    Present on Admission/Arrival: No  Surgical Airway Type: Tracheostomy   Status Secured

## 2023-08-21 NOTE — PROGRESS NOTES
ordered. Restraints to bilateral wrist continued secondary to attempting to pull trach and lines. 8/7: Home Seroquel increased. Ordered viral resp panel. Decreased bowel regimen secondary to 6 BM. Oxy increased and Pepcid changed to PPI.   8/8: OR today with Dr. Ramana Thompson for Mandible fx.   8/9: Discontinued Precedex. Agitation has improved. Spontaneous breathing trials. 8/10: Spontaneous breathing trials, wean Precedex. 8/11: Worsening CXR, febrile and desaturation overnight, will attempt for bronch. KUB for abd distention. Started Movantik. 8/12: Remains on Precedex 1.5. Cardiac workup, lasix. Investigated atypical sources of infection. ECHO. 8/13: Increased Ativan and Seroquel secondary to agitation. 8/14: Repeat resp cx. Discontinued Precedex. Awaiting placement. 8/15: Patient continues to have increased Fi02 requirements and is febrile. Do not feel he is appropriate for placement at this time. Bronchoscopy last night with dark brown secretions. Resp cx pending. Keep PEG to gravity until bowel movements increase and he has decreased abdominal distention. 8/16:Patient remains febrile, continued on Cefepime. Rsp cx grew serratia and staph - started Vancomycin. PEG remains to gravity, awaiting return of bowel function. Tube feeds held. Abdominal distention improved. IV protonix started yesterday. Continues to get agitated, currently on 1.5 Precedex and required all PRNs for agitation overnight. 8/17: Fever curve improving. Remains distended and tympanic, continuing to hold tube feeds. 8/18: KUB ordered; improved from previous. TF restarted at 25 continuous - not to advance. Bronchoscopy today. Remains on Vanc and Cefepime. Restarted psych meds through PEG.   8/19: Doing well on TF, bowel movements. 8/20: Continuing to treat VAP; Tmax 38.1. Mcpherson placed for strict I/Os. 8/21: ENT to see today for left buccal hemorrhage.      Problem List:   Patient Active Problem List   Diagnosis    Trauma    Open Failure: Tracheostomy 8/2, Neb PRN thick secretions  VAP + Serratia/Klebsiella/Staph: IV Vanc/Cefepime  Patient has required multiple therapeutic bronchoscopies  ABG - respiratory alkalosis, breathing over vent  Pleural Effusions - Lasix 40  GI:  Ileus - Improved, Dysphagia s/p PEG 8/2.    Tube feeds peptide based high protein via PEG @ 25 - do not advance, continue bowel regimen  GIB - resolved:  stabilizing, continue protonix BID  FEN (fluids, electrolytes, nutrition):  Hypokalemia - replaced  Hypophosphatemia - replaced  Hypocalcemia - replaced  Renal: No acute issues  Continue to monitor UOP - decreased at 0.39  Endocrine: Hyperglycemia - improved    - glucose 103 - 145  Humalog q4   Glargine 10 u nightly  Heme: Acute blood loss anemia - stable; likely 2/2 GIB  Continue to trend  ID: Febrile , VAP Serratia and Klebsiella, now with staph- Cefepime x7 days completed 8/11, restarted 8/14  Tylenol suppository 650 PRN  Continue Cefepime (day 7) and vanc (day 6) - Tx duration for 14 days  No longer febrile  MSK: no acute  issues   DVT prophylaxis:  Lovenox 40mg   GI prophylaxis:  IV Protonix BID    Dispo: SICU      Electronically signed by Brandon Raygoza DO 8/21/2023  8:57 AM

## 2023-08-21 NOTE — PLAN OF CARE
Problem: Discharge Planning  Goal: Discharge to home or other facility with appropriate resources  Outcome: Progressing     Problem: Pain  Goal: Verbalizes/displays adequate comfort level or baseline comfort level  Outcome: Progressing     Problem: Skin/Tissue Integrity  Goal: Absence of new skin breakdown  Description: 1. Monitor for areas of redness and/or skin breakdown  2. Assess vascular access sites hourly  3. Every 4-6 hours minimum:  Change oxygen saturation probe site  4. Every 4-6 hours:  If on nasal continuous positive airway pressure, respiratory therapy assess nares and determine need for appliance change or resting period. 8/20/2023 2323 by Taisha Garces RN  Outcome: Progressing     Problem: Safety - Adult  Goal: Free from fall injury  8/20/2023 2323 by Taisha Garces RN  Outcome: Progressing     Problem: ABCDS Injury Assessment  Goal: Absence of physical injury  Outcome: Progressing     Problem: Neurosensory - Adult  Goal: Achieves stable or improved neurological status  8/20/2023 2323 by Taisha Garces RN  Outcome: Progressing     Problem: Hematologic - Adult  Goal: Maintains hematologic stability  Outcome: Progressing     Problem: Safety - Medical Restraint  Goal: Remains free of injury from restraints (Restraint for Interference with Medical Device)  Description: INTERVENTIONS:  1. Determine that other, less restrictive measures have been tried or would not be effective before applying the restraint  2. Evaluate the patient's condition at the time of restraint application  3. Inform patient/family regarding the reason for restraint  4.  Q2H: Monitor safety, psychosocial status, comfort, nutrition and hydration  8/20/2023 2323 by Taisha Garces RN  Outcome: Progressing

## 2023-08-21 NOTE — PROGRESS NOTES
Restraint type: Soft restraint:  right wrist and left wrist  Reason for restraints: Pulling out devices:  Unable to follow directions/instructions  Duration: 24 hours    Restraints must be removed when an alternative is available and effective and/or patient no longer meets criteria. Orders must be renewed every calendar day or when discontinued.     The MD must conduct a face to face assessment within 1 calendar day of initiation when initial restraint order is verbal.
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
## 2023-08-21 NOTE — CARE COORDINATION
8/21 Care Coordination: Pt remains in SICU, on vent,Trach. Continuing to treat VAP. Remains on Vanc and Cefepime. 8/18 Restarted psych meds through PEG. ENT to see today for left buccal hemorrhage. Discharge plan remains [facility]. CM/SW will continue to follow for discharge planning.    Lashell VERDUGO,RN-CV-BC  [phone]

## 2023-08-21 NOTE — PROGRESS NOTES
Pharmacy Consultation Note  (Antibiotic Dosing and Monitoring)    Initial consult date: 8/16/23  Consulting physician/provider: Dr. Debbie Hartman  Drug: Vancomycin  Indication: Pneumonia    Age/  Gender Height Weight IBW  Allergy Information   58 y.o./male 5' 11\" (180.3 cm) 220 lb 7.4 oz (100 kg)     Ideal body weight: 75.3 kg (166 lb 0.1 oz)  Adjusted ideal body weight: 95.9 kg (211 lb 5.3 oz)   Adhesive tape, Cymbalta [duloxetine hcl], Lidocaine, Lyrica [pregabalin], Strattera [atomoxetine], and Tramadol      Renal Function:  Recent Labs     08/19/23  1600 08/20/23  0600 08/21/23  0445   BUN 13 12 11   CREATININE 0.6* 0.7 0.7         Intake/Output Summary (Last 24 hours) at 8/21/2023 1204  Last data filed at 8/21/2023 1100  Gross per 24 hour   Intake 2827.08 ml   Output 1675 ml   Net 1152.08 ml         Vancomycin Monitoring:  Trough:    Recent Labs     08/21/23  1130   VANCOTROUGH 27.8*     Random:  No results for input(s): VANCORANDOM in the last 72 hours. No results for input(s): Buzzy Rue in the last 72 hours. Historical Cultures:  No results found for: ORG  No results for input(s): BC in the last 72 hours. Vancomycin Administration Times:  Recent vancomycin administrations                     vancomycin (VANCOCIN) 2,000 mg in sodium chloride 0.9 % 500 mL IVPB (mg) 2,000 mg New Bag 08/21/23 0048     2,000 mg New Bag 08/20/23 1248     2,000 mg New Bag 08/19/23 2341     2,000 mg New Bag  1212     2,000 mg New Bag 08/18/23 2352     2,000 mg New Bag  1405                  Assessment:  Patient is a 60 y.o. male who has been initiated on vancomycin  Estimated Creatinine Clearance: 154 mL/min (based on SCr of 0.7 mg/dL).   Vancomycin trough today = 27.8 mcg/mL    Plan:  Hold vancomycin  Random level tomorrow AM  Will continue to monitor renal function   Pharmacy to follow    Erica Finney, FranciaD, BCPS, BCCCP 8/21/2023 12:04 PM

## 2023-08-21 NOTE — PLAN OF CARE
Problem: Respiratory - Adult  Goal: Achieves optimal ventilation and oxygenation  8/21/2023 0902 by Ismael Leiva RCP  Outcome: Progressing  8/20/2023 1918 by Floresita Gerard RN  Outcome: Progressing

## 2023-08-22 ENCOUNTER — APPOINTMENT (OUTPATIENT)
Dept: GENERAL RADIOLOGY | Age: 58
DRG: 004 | End: 2023-08-22
Payer: COMMERCIAL

## 2023-08-22 ENCOUNTER — ANESTHESIA EVENT (OUTPATIENT)
Dept: OPERATING ROOM | Age: 58
DRG: 004 | End: 2023-08-22
Payer: COMMERCIAL

## 2023-08-22 ENCOUNTER — ANESTHESIA (OUTPATIENT)
Dept: OPERATING ROOM | Age: 58
DRG: 004 | End: 2023-08-22
Payer: COMMERCIAL

## 2023-08-22 LAB
AADO2: 160.7 MMHG
ALBUMIN SERPL-MCNC: 1.9 G/DL (ref 3.5–5.2)
ALP SERPL-CCNC: 56 U/L (ref 40–129)
ALT SERPL-CCNC: 9 U/L (ref 0–40)
ANION GAP SERPL CALCULATED.3IONS-SCNC: 9 MMOL/L (ref 7–16)
AST SERPL-CCNC: 12 U/L (ref 0–39)
B.E.: 4.4 MMOL/L (ref -3–3)
BASOPHILS # BLD: 0.05 K/UL (ref 0–0.2)
BASOPHILS # BLD: 0.08 K/UL (ref 0–0.2)
BASOPHILS NFR BLD: 1 % (ref 0–2)
BASOPHILS NFR BLD: 1 % (ref 0–2)
BILIRUB SERPL-MCNC: <0.2 MG/DL (ref 0–1.2)
BUN SERPL-MCNC: 10 MG/DL (ref 6–20)
CA-I BLD-SCNC: 1.12 MMOL/L (ref 1.15–1.33)
CALCIUM SERPL-MCNC: 7.9 MG/DL (ref 8.6–10.2)
CHLORIDE SERPL-SCNC: 105 MMOL/L (ref 98–107)
CLOT ANGLE.KAOLIN INDUCED BLD RES TEG: 80.7 DEG (ref 53–70)
CO2 SERPL-SCNC: 26 MMOL/L (ref 22–29)
COHB: 0.3 % (ref 0–1.5)
CREAT SERPL-MCNC: 0.7 MG/DL (ref 0.7–1.2)
CRITICAL: ABNORMAL
DATE ANALYZED: ABNORMAL
DATE OF COLLECTION: ABNORMAL
EOSINOPHIL # BLD: 0.32 K/UL (ref 0.05–0.5)
EOSINOPHIL # BLD: 0.32 K/UL (ref 0.05–0.5)
EOSINOPHILS RELATIVE PERCENT: 4 % (ref 0–6)
EOSINOPHILS RELATIVE PERCENT: 4 % (ref 0–6)
EPL-TEG: 0 % (ref 0–15)
ERYTHROCYTE [DISTWIDTH] IN BLOOD BY AUTOMATED COUNT: 14.8 % (ref 11.5–15)
ERYTHROCYTE [DISTWIDTH] IN BLOOD BY AUTOMATED COUNT: 15 % (ref 11.5–15)
FIO2: 40 %
G-TEG: 20.5 KDYN/CM2 (ref 4.5–11)
GFR SERPL CREATININE-BSD FRML MDRD: >60 ML/MIN/1.73M2
GLUCOSE BLD-MCNC: 102 MG/DL (ref 74–99)
GLUCOSE BLD-MCNC: 119 MG/DL (ref 74–99)
GLUCOSE BLD-MCNC: 123 MG/DL (ref 74–99)
GLUCOSE BLD-MCNC: 130 MG/DL (ref 74–99)
GLUCOSE BLD-MCNC: 135 MG/DL (ref 74–99)
GLUCOSE BLD-MCNC: 164 MG/DL (ref 74–99)
GLUCOSE SERPL-MCNC: 97 MG/DL (ref 74–99)
HCO3: 27.2 MMOL/L (ref 22–26)
HCT VFR BLD AUTO: 21.9 % (ref 37–54)
HCT VFR BLD AUTO: 23.4 % (ref 37–54)
HGB BLD-MCNC: 7 G/DL (ref 12.5–16.5)
HGB BLD-MCNC: 7.5 G/DL (ref 12.5–16.5)
HHB: 4.7 % (ref 0–5)
IMM GRANULOCYTES # BLD AUTO: 0.13 K/UL (ref 0–0.58)
IMM GRANULOCYTES # BLD AUTO: 0.16 K/UL (ref 0–0.58)
IMM GRANULOCYTES NFR BLD: 2 % (ref 0–5)
IMM GRANULOCYTES NFR BLD: 2 % (ref 0–5)
KINETICS TEG: 0.8 MIN (ref 1–3)
LAB: ABNORMAL
LY30 (LYSIS) TEG: 0 % (ref 0–8)
LYMPHOCYTES NFR BLD: 2.13 K/UL (ref 1.5–4)
LYMPHOCYTES NFR BLD: 2.32 K/UL (ref 1.5–4)
LYMPHOCYTES RELATIVE PERCENT: 27 % (ref 20–42)
LYMPHOCYTES RELATIVE PERCENT: 29 % (ref 20–42)
Lab: ABNORMAL
MA (MAX CLOT) TEG: 80.4 MM (ref 50–70)
MAGNESIUM SERPL-MCNC: 1.8 MG/DL (ref 1.6–2.6)
MCH RBC QN AUTO: 28.8 PG (ref 26–35)
MCH RBC QN AUTO: 28.8 PG (ref 26–35)
MCHC RBC AUTO-ENTMCNC: 32 G/DL (ref 32–34.5)
MCHC RBC AUTO-ENTMCNC: 32.1 G/DL (ref 32–34.5)
MCV RBC AUTO: 90 FL (ref 80–99.9)
MCV RBC AUTO: 90.1 FL (ref 80–99.9)
METHB: 0 % (ref 0–1.5)
MODE: AC
MONOCYTES NFR BLD: 0.8 K/UL (ref 0.1–0.95)
MONOCYTES NFR BLD: 0.87 K/UL (ref 0.1–0.95)
MONOCYTES NFR BLD: 10 % (ref 2–12)
MONOCYTES NFR BLD: 11 % (ref 2–12)
NEUTROPHILS NFR BLD: 54 % (ref 43–80)
NEUTROPHILS NFR BLD: 56 % (ref 43–80)
NEUTS SEG NFR BLD: 4.36 K/UL (ref 1.8–7.3)
NEUTS SEG NFR BLD: 4.39 K/UL (ref 1.8–7.3)
O2 CONTENT: 11.9 ML/DL
O2 SATURATION: 95.3 % (ref 92–98.5)
O2HB: 95 % (ref 94–97)
OPERATOR ID: 2593
PATIENT TEMP: 37 C
PCO2: 33.2 MMHG (ref 35–45)
PEEP/CPAP: 10 CMH2O
PFO2: 1.91 MMHG/%
PH BLOOD GAS: 7.53 (ref 7.35–7.45)
PHOSPHATE SERPL-MCNC: 3.3 MG/DL (ref 2.5–4.5)
PLATELET # BLD AUTO: 578 K/UL (ref 130–450)
PLATELET # BLD AUTO: 615 K/UL (ref 130–450)
PMV BLD AUTO: 9.1 FL (ref 7–12)
PMV BLD AUTO: 9.4 FL (ref 7–12)
PO2: 76.3 MMHG (ref 75–100)
POTASSIUM SERPL-SCNC: 4.1 MMOL/L (ref 3.5–5)
PROT SERPL-MCNC: 5.1 G/DL (ref 6.4–8.3)
RBC # BLD AUTO: 2.43 M/UL (ref 3.8–5.8)
RBC # BLD AUTO: 2.6 M/UL (ref 3.8–5.8)
REACTION TIME TEG: 5 MIN (ref 5–10)
RI(T): 2.11
RR MECHANICAL: 14 B/MIN
SODIUM SERPL-SCNC: 140 MMOL/L (ref 132–146)
SOURCE, BLOOD GAS: ABNORMAL
THB: 8.8 G/DL (ref 11.5–16.5)
TIME ANALYZED: 434
VANCOMYCIN SERPL-MCNC: 13.6 UG/ML (ref 5–40)
VT MECHANICAL: 460 ML
WBC OTHER # BLD: 7.9 K/UL (ref 4.5–11.5)
WBC OTHER # BLD: 8 K/UL (ref 4.5–11.5)

## 2023-08-22 PROCEDURE — 6370000000 HC RX 637 (ALT 250 FOR IP): Performed by: STUDENT IN AN ORGANIZED HEALTH CARE EDUCATION/TRAINING PROGRAM

## 2023-08-22 PROCEDURE — 2709999900 HC NON-CHARGEABLE SUPPLY: Performed by: PLASTIC SURGERY

## 2023-08-22 PROCEDURE — 94640 AIRWAY INHALATION TREATMENT: CPT

## 2023-08-22 PROCEDURE — 3600000012 HC SURGERY LEVEL 2 ADDTL 15MIN: Performed by: PLASTIC SURGERY

## 2023-08-22 PROCEDURE — 99291 CRITICAL CARE FIRST HOUR: CPT | Performed by: SURGERY

## 2023-08-22 PROCEDURE — 2000000000 HC ICU R&B

## 2023-08-22 PROCEDURE — 6370000000 HC RX 637 (ALT 250 FOR IP)

## 2023-08-22 PROCEDURE — 71045 X-RAY EXAM CHEST 1 VIEW: CPT

## 2023-08-22 PROCEDURE — 13132 CMPLX RPR F/C/C/M/N/AX/G/H/F: CPT | Performed by: PLASTIC SURGERY

## 2023-08-22 PROCEDURE — 83735 ASSAY OF MAGNESIUM: CPT

## 2023-08-22 PROCEDURE — 6360000002 HC RX W HCPCS: Performed by: STUDENT IN AN ORGANIZED HEALTH CARE EDUCATION/TRAINING PROGRAM

## 2023-08-22 PROCEDURE — 82330 ASSAY OF CALCIUM: CPT

## 2023-08-22 PROCEDURE — 94003 VENT MGMT INPAT SUBQ DAY: CPT

## 2023-08-22 PROCEDURE — 36591 DRAW BLOOD OFF VENOUS DEVICE: CPT

## 2023-08-22 PROCEDURE — 85576 BLOOD PLATELET AGGREGATION: CPT

## 2023-08-22 PROCEDURE — 85384 FIBRINOGEN ACTIVITY: CPT

## 2023-08-22 PROCEDURE — 2580000003 HC RX 258

## 2023-08-22 PROCEDURE — 3700000001 HC ADD 15 MINUTES (ANESTHESIA): Performed by: PLASTIC SURGERY

## 2023-08-22 PROCEDURE — 2580000003 HC RX 258: Performed by: ANESTHESIOLOGIST ASSISTANT

## 2023-08-22 PROCEDURE — 7100000001 HC PACU RECOVERY - ADDTL 15 MIN

## 2023-08-22 PROCEDURE — 2500000003 HC RX 250 WO HCPCS: Performed by: ANESTHESIOLOGIST ASSISTANT

## 2023-08-22 PROCEDURE — 82805 BLOOD GASES W/O2 SATURATION: CPT

## 2023-08-22 PROCEDURE — 6370000000 HC RX 637 (ALT 250 FOR IP): Performed by: SURGERY

## 2023-08-22 PROCEDURE — 86900 BLOOD TYPING SEROLOGIC ABO: CPT

## 2023-08-22 PROCEDURE — 2720000010 HC SURG SUPPLY STERILE: Performed by: PLASTIC SURGERY

## 2023-08-22 PROCEDURE — 85390 FIBRINOLYSINS SCREEN I&R: CPT

## 2023-08-22 PROCEDURE — 86901 BLOOD TYPING SEROLOGIC RH(D): CPT

## 2023-08-22 PROCEDURE — 0W320ZZ CONTROL BLEEDING IN FACE, OPEN APPROACH: ICD-10-PCS | Performed by: PLASTIC SURGERY

## 2023-08-22 PROCEDURE — 86850 RBC ANTIBODY SCREEN: CPT

## 2023-08-22 PROCEDURE — NBSRV NON-BILLABLE SERVICE: Performed by: PHYSICIAN ASSISTANT

## 2023-08-22 PROCEDURE — 86923 COMPATIBILITY TEST ELECTRIC: CPT

## 2023-08-22 PROCEDURE — 82962 GLUCOSE BLOOD TEST: CPT

## 2023-08-22 PROCEDURE — 80202 ASSAY OF VANCOMYCIN: CPT

## 2023-08-22 PROCEDURE — 6360000002 HC RX W HCPCS: Performed by: SURGERY

## 2023-08-22 PROCEDURE — 80053 COMPREHEN METABOLIC PANEL: CPT

## 2023-08-22 PROCEDURE — S5553 INSULIN LONG ACTING 5 U: HCPCS

## 2023-08-22 PROCEDURE — 2580000003 HC RX 258: Performed by: STUDENT IN AN ORGANIZED HEALTH CARE EDUCATION/TRAINING PROGRAM

## 2023-08-22 PROCEDURE — 84100 ASSAY OF PHOSPHORUS: CPT

## 2023-08-22 PROCEDURE — 3600000002 HC SURGERY LEVEL 2 BASE: Performed by: PLASTIC SURGERY

## 2023-08-22 PROCEDURE — 85347 COAGULATION TIME ACTIVATED: CPT

## 2023-08-22 PROCEDURE — 6360000002 HC RX W HCPCS

## 2023-08-22 PROCEDURE — 7100000000 HC PACU RECOVERY - FIRST 15 MIN

## 2023-08-22 PROCEDURE — C9113 INJ PANTOPRAZOLE SODIUM, VIA: HCPCS

## 2023-08-22 PROCEDURE — 85025 COMPLETE CBC W/AUTO DIFF WBC: CPT

## 2023-08-22 PROCEDURE — A4216 STERILE WATER/SALINE, 10 ML: HCPCS

## 2023-08-22 PROCEDURE — 3700000000 HC ANESTHESIA ATTENDED CARE: Performed by: PLASTIC SURGERY

## 2023-08-22 RX ORDER — FUROSEMIDE 10 MG/ML
20 INJECTION INTRAMUSCULAR; INTRAVENOUS ONCE
Status: COMPLETED | OUTPATIENT
Start: 2023-08-22 | End: 2023-08-22

## 2023-08-22 RX ORDER — ROCURONIUM BROMIDE 10 MG/ML
INJECTION, SOLUTION INTRAVENOUS PRN
Status: DISCONTINUED | OUTPATIENT
Start: 2023-08-22 | End: 2023-08-22 | Stop reason: SDUPTHER

## 2023-08-22 RX ORDER — SODIUM CHLORIDE 9 MG/ML
INJECTION, SOLUTION INTRAVENOUS PRN
Status: DISCONTINUED | OUTPATIENT
Start: 2023-08-22 | End: 2023-08-24

## 2023-08-22 RX ORDER — SODIUM CHLORIDE 9 MG/ML
INJECTION, SOLUTION INTRAVENOUS CONTINUOUS PRN
Status: DISCONTINUED | OUTPATIENT
Start: 2023-08-22 | End: 2023-08-22 | Stop reason: SDUPTHER

## 2023-08-22 RX ORDER — FUROSEMIDE 10 MG/ML
20 INJECTION INTRAMUSCULAR; INTRAVENOUS ONCE
Status: DISCONTINUED | OUTPATIENT
Start: 2023-08-22 | End: 2023-08-22

## 2023-08-22 RX ADMIN — CHLORHEXIDINE GLUCONATE 15 ML: 1.2 RINSE ORAL at 20:01

## 2023-08-22 RX ADMIN — POLYVINYL ALCOHOL 1 DROP: 14 SOLUTION/ DROPS OPHTHALMIC at 15:48

## 2023-08-22 RX ADMIN — ENOXAPARIN SODIUM 40 MG: 100 INJECTION SUBCUTANEOUS at 20:30

## 2023-08-22 RX ADMIN — CEFEPIME 2000 MG: 2 INJECTION, POWDER, FOR SOLUTION INTRAVENOUS at 05:30

## 2023-08-22 RX ADMIN — Medication 500 MG: at 11:17

## 2023-08-22 RX ADMIN — METOCLOPRAMIDE HYDROCHLORIDE 5 MG: 5 INJECTION INTRAMUSCULAR; INTRAVENOUS at 22:04

## 2023-08-22 RX ADMIN — VANCOMYCIN HYDROCHLORIDE 1500 MG: 10 INJECTION, POWDER, LYOPHILIZED, FOR SOLUTION INTRAVENOUS at 14:39

## 2023-08-22 RX ADMIN — Medication 10 MG: at 20:01

## 2023-08-22 RX ADMIN — PROPOFOL 50 MCG/KG/MIN: 10 INJECTION, EMULSION INTRAVENOUS at 07:04

## 2023-08-22 RX ADMIN — PETROLATUM: 420 OINTMENT TOPICAL at 11:06

## 2023-08-22 RX ADMIN — NYSTATIN 500000 UNITS: 100000 SUSPENSION ORAL at 20:01

## 2023-08-22 RX ADMIN — POLYVINYL ALCOHOL 1 DROP: 14 SOLUTION/ DROPS OPHTHALMIC at 11:15

## 2023-08-22 RX ADMIN — QUETIAPINE FUMARATE 200 MG: 100 TABLET ORAL at 11:16

## 2023-08-22 RX ADMIN — LORAZEPAM 2 MG: 2 INJECTION INTRAMUSCULAR; INTRAVENOUS at 14:57

## 2023-08-22 RX ADMIN — IPRATROPIUM BROMIDE AND ALBUTEROL SULFATE 1 DOSE: .5; 2.5 SOLUTION RESPIRATORY (INHALATION) at 12:44

## 2023-08-22 RX ADMIN — CEFEPIME 2000 MG: 2 INJECTION, POWDER, FOR SOLUTION INTRAVENOUS at 21:30

## 2023-08-22 RX ADMIN — INSULIN GLARGINE-YFGN 10 UNITS: 100 INJECTION, SOLUTION SUBCUTANEOUS at 20:08

## 2023-08-22 RX ADMIN — Medication 10 ML: at 23:01

## 2023-08-22 RX ADMIN — Medication 10 ML: at 22:04

## 2023-08-22 RX ADMIN — ROCURONIUM BROMIDE 50 MG: 10 INJECTION, SOLUTION INTRAVENOUS at 08:48

## 2023-08-22 RX ADMIN — METOCLOPRAMIDE HYDROCHLORIDE 5 MG: 5 INJECTION INTRAMUSCULAR; INTRAVENOUS at 04:23

## 2023-08-22 RX ADMIN — ENOXAPARIN SODIUM 40 MG: 100 INJECTION SUBCUTANEOUS at 11:29

## 2023-08-22 RX ADMIN — OXYCODONE HYDROCHLORIDE 15 MG: 5 SOLUTION ORAL at 00:40

## 2023-08-22 RX ADMIN — CALCIUM GLUCONATE 2000 MG: 98 INJECTION, SOLUTION INTRAVENOUS at 14:46

## 2023-08-22 RX ADMIN — OXYCODONE HYDROCHLORIDE 15 MG: 5 SOLUTION ORAL at 14:57

## 2023-08-22 RX ADMIN — METOCLOPRAMIDE HYDROCHLORIDE 5 MG: 5 INJECTION INTRAMUSCULAR; INTRAVENOUS at 17:03

## 2023-08-22 RX ADMIN — NYSTATIN 500000 UNITS: 100000 SUSPENSION ORAL at 11:18

## 2023-08-22 RX ADMIN — SODIUM CHLORIDE: 9 INJECTION, SOLUTION INTRAVENOUS at 08:35

## 2023-08-22 RX ADMIN — LORAZEPAM 2 MG: 2 INJECTION INTRAMUSCULAR; INTRAVENOUS at 11:19

## 2023-08-22 RX ADMIN — METOCLOPRAMIDE HYDROCHLORIDE 5 MG: 5 INJECTION INTRAMUSCULAR; INTRAVENOUS at 11:18

## 2023-08-22 RX ADMIN — MIDAZOLAM 2 MG: 1 INJECTION INTRAMUSCULAR; INTRAVENOUS at 19:56

## 2023-08-22 RX ADMIN — Medication 10 ML: at 04:22

## 2023-08-22 RX ADMIN — LORAZEPAM 2 MG: 2 INJECTION INTRAMUSCULAR; INTRAVENOUS at 23:02

## 2023-08-22 RX ADMIN — FENTANYL CITRATE 100 MCG: 50 INJECTION INTRAMUSCULAR; INTRAVENOUS at 22:05

## 2023-08-22 RX ADMIN — POLYVINYL ALCOHOL 1 DROP: 14 SOLUTION/ DROPS OPHTHALMIC at 04:23

## 2023-08-22 RX ADMIN — OXYCODONE HYDROCHLORIDE 15 MG: 5 SOLUTION ORAL at 11:15

## 2023-08-22 RX ADMIN — SODIUM CHLORIDE, PRESERVATIVE FREE 10 ML: 5 INJECTION INTRAVENOUS at 19:56

## 2023-08-22 RX ADMIN — OXYCODONE HYDROCHLORIDE 15 MG: 5 SOLUTION ORAL at 16:03

## 2023-08-22 RX ADMIN — LORAZEPAM 2 MG: 2 INJECTION INTRAMUSCULAR; INTRAVENOUS at 18:43

## 2023-08-22 RX ADMIN — LABETALOL HYDROCHLORIDE 10 MG: 5 INJECTION INTRAVENOUS at 15:04

## 2023-08-22 RX ADMIN — LABETALOL HYDROCHLORIDE 10 MG: 5 INJECTION INTRAVENOUS at 11:37

## 2023-08-22 RX ADMIN — PROPOFOL 45 MCG/KG/MIN: 10 INJECTION, EMULSION INTRAVENOUS at 12:28

## 2023-08-22 RX ADMIN — POLYVINYL ALCOHOL 1 DROP: 14 SOLUTION/ DROPS OPHTHALMIC at 20:03

## 2023-08-22 RX ADMIN — PROPOFOL 50 MCG/KG/MIN: 10 INJECTION, EMULSION INTRAVENOUS at 02:28

## 2023-08-22 RX ADMIN — FLUOXETINE 20 MG: 10 TABLET, FILM COATED ORAL at 11:28

## 2023-08-22 RX ADMIN — LORAZEPAM 2 MG: 2 INJECTION INTRAMUSCULAR; INTRAVENOUS at 02:59

## 2023-08-22 RX ADMIN — POLYVINYL ALCOHOL 1 DROP: 14 SOLUTION/ DROPS OPHTHALMIC at 00:10

## 2023-08-22 RX ADMIN — LABETALOL HYDROCHLORIDE 10 MG: 5 INJECTION INTRAVENOUS at 19:08

## 2023-08-22 RX ADMIN — CHLORHEXIDINE GLUCONATE 15 ML: 1.2 RINSE ORAL at 14:29

## 2023-08-22 RX ADMIN — NYSTATIN 500000 UNITS: 100000 SUSPENSION ORAL at 14:30

## 2023-08-22 RX ADMIN — INSULIN LISPRO 4 UNITS: 100 INJECTION, SOLUTION INTRAVENOUS; SUBCUTANEOUS at 20:07

## 2023-08-22 RX ADMIN — OXYCODONE HYDROCHLORIDE 15 MG: 5 SOLUTION ORAL at 04:22

## 2023-08-22 RX ADMIN — LORAZEPAM 2 MG: 2 INJECTION INTRAMUSCULAR; INTRAVENOUS at 06:50

## 2023-08-22 RX ADMIN — QUETIAPINE FUMARATE 200 MG: 100 TABLET ORAL at 20:01

## 2023-08-22 RX ADMIN — BUPROPION HYDROCHLORIDE 75 MG: 75 TABLET, FILM COATED ORAL at 20:03

## 2023-08-22 RX ADMIN — IPRATROPIUM BROMIDE AND ALBUTEROL SULFATE 1 DOSE: .5; 2.5 SOLUTION RESPIRATORY (INHALATION) at 21:04

## 2023-08-22 RX ADMIN — PANTOPRAZOLE SODIUM 40 MG: 40 INJECTION, POWDER, FOR SOLUTION INTRAVENOUS at 11:29

## 2023-08-22 RX ADMIN — POTASSIUM BICARBONATE 40 MEQ: 782 TABLET, EFFERVESCENT ORAL at 11:18

## 2023-08-22 RX ADMIN — PROPOFOL 50 MCG/KG/MIN: 10 INJECTION, EMULSION INTRAVENOUS at 00:11

## 2023-08-22 RX ADMIN — NYSTATIN 500000 UNITS: 100000 SUSPENSION ORAL at 16:59

## 2023-08-22 RX ADMIN — Medication 4 ML: at 21:04

## 2023-08-22 RX ADMIN — SODIUM CHLORIDE, PRESERVATIVE FREE 10 ML: 5 INJECTION INTRAVENOUS at 11:05

## 2023-08-22 RX ADMIN — PROPOFOL 50 MCG/KG/MIN: 10 INJECTION, EMULSION INTRAVENOUS at 10:00

## 2023-08-22 RX ADMIN — POLYETHYLENE GLYCOL 3350 17 G: 17 POWDER, FOR SOLUTION ORAL at 11:17

## 2023-08-22 RX ADMIN — Medication 10 ML: at 02:59

## 2023-08-22 RX ADMIN — BUPROPION HYDROCHLORIDE 75 MG: 75 TABLET, FILM COATED ORAL at 11:28

## 2023-08-22 RX ADMIN — CEFEPIME 2000 MG: 2 INJECTION, POWDER, FOR SOLUTION INTRAVENOUS at 16:36

## 2023-08-22 RX ADMIN — CHLORHEXIDINE GLUCONATE 15 ML: 1.2 RINSE ORAL at 15:48

## 2023-08-22 RX ADMIN — PROPOFOL 35 MCG/KG/MIN: 10 INJECTION, EMULSION INTRAVENOUS at 14:56

## 2023-08-22 RX ADMIN — POLYVINYL ALCOHOL 1 DROP: 14 SOLUTION/ DROPS OPHTHALMIC at 14:29

## 2023-08-22 RX ADMIN — PETROLATUM: 420 OINTMENT TOPICAL at 20:04

## 2023-08-22 RX ADMIN — PANTOPRAZOLE SODIUM 40 MG: 40 INJECTION, POWDER, FOR SOLUTION INTRAVENOUS at 19:56

## 2023-08-22 RX ADMIN — OXYCODONE HYDROCHLORIDE 15 MG: 5 SOLUTION ORAL at 20:00

## 2023-08-22 RX ADMIN — PROPOFOL 50 MCG/KG/MIN: 10 INJECTION, EMULSION INTRAVENOUS at 04:35

## 2023-08-22 RX ADMIN — CHLORHEXIDINE GLUCONATE 15 ML: 1.2 RINSE ORAL at 11:15

## 2023-08-22 RX ADMIN — FUROSEMIDE 20 MG: 10 INJECTION, SOLUTION INTRAMUSCULAR; INTRAVENOUS at 17:43

## 2023-08-22 RX ADMIN — IPRATROPIUM BROMIDE AND ALBUTEROL SULFATE 1 DOSE: .5; 2.5 SOLUTION RESPIRATORY (INHALATION) at 15:47

## 2023-08-22 ASSESSMENT — PULMONARY FUNCTION TESTS
PIF_VALUE: 36
PIF_VALUE: 22
PIF_VALUE: 24
PIF_VALUE: 26
PIF_VALUE: 24
PIF_VALUE: 25
PIF_VALUE: 29
PIF_VALUE: 17
PIF_VALUE: 24
PIF_VALUE: 24
PIF_VALUE: 23
PIF_VALUE: 22
PIF_VALUE: 22
PIF_VALUE: 18
PIF_VALUE: 22
PIF_VALUE: 24
PIF_VALUE: 28
PIF_VALUE: 22
PIF_VALUE: 22
PIF_VALUE: 23
PIF_VALUE: 23
PIF_VALUE: 20
PIF_VALUE: 23
PIF_VALUE: 22

## 2023-08-22 ASSESSMENT — PAIN SCALES - GENERAL
PAINLEVEL_OUTOF10: 0
PAINLEVEL_OUTOF10: 4
PAINLEVEL_OUTOF10: 0
PAINLEVEL_OUTOF10: 0
PAINLEVEL_OUTOF10: 1
PAINLEVEL_OUTOF10: 0
PAINLEVEL_OUTOF10: 4
PAINLEVEL_OUTOF10: 0
PAINLEVEL_OUTOF10: 4
PAINLEVEL_OUTOF10: 0
PAINLEVEL_OUTOF10: 4
PAINLEVEL_OUTOF10: 0
PAINLEVEL_OUTOF10: 4
PAINLEVEL_OUTOF10: 2
PAINLEVEL_OUTOF10: 0

## 2023-08-22 ASSESSMENT — LIFESTYLE VARIABLES: SMOKING_STATUS: 1

## 2023-08-22 NOTE — FLOWSHEET NOTE
Patient continues to have intermittent agitation with attempts to reach for lines/tubes. Bilateral soft wrist restraints continued for protection of airway/lines and tubes.

## 2023-08-22 NOTE — PLAN OF CARE
Problem: Pain  Goal: Verbalizes/displays adequate comfort level or baseline comfort level  8/21/2023 2041 by Juli Petersen RN  Outcome: Not Progressing  8/21/2023 1000 by Sherrie Angulo  Outcome: Progressing     Problem: Safety - Adult  Goal: Free from fall injury  8/21/2023 2041 by Juli Petersen RN  Outcome: Progressing  8/21/2023 1000 by Sherrie Angulo  Outcome: Progressing     Problem: ABCDS Injury Assessment  Goal: Absence of physical injury  8/21/2023 2041 by Juli Petersen RN  Outcome: Progressing  8/21/2023 1000 by Sherrie Angulo  Outcome: Progressing     Problem: Neurosensory - Adult  Goal: Achieves stable or improved neurological status  8/21/2023 2041 by Juli Petersen RN  Outcome: Not Progressing  8/21/2023 1000 by Sherrie Angulo  Outcome: Progressing     Problem: Neurosensory - Adult  Goal: Achieves maximal functionality and self care  8/21/2023 2041 by Juli Petersen RN  Outcome: Not Progressing  8/21/2023 1000 by Sherrie Angulo  Outcome: Progressing     Problem: Respiratory - Adult  Goal: Achieves optimal ventilation and oxygenation  8/21/2023 2041 by Juli Petersen RN  Outcome: Not Progressing  8/21/2023 1000 by Sherrie Angulo  Outcome: Progressing  8/21/2023 0902 by Edwige Felder RCP  Outcome: Progressing     Problem: Skin/Tissue Integrity - Adult  Goal: Oral mucous membranes remain intact  8/21/2023 2041 by Juli Petersen RN  Outcome: Not Progressing  8/21/2023 1000 by Sherrie Angulo  Outcome: Progressing     Problem: Gastrointestinal - Adult  Goal: Maintains or returns to baseline bowel function  8/21/2023 2041 by Juli Petersen RN  Outcome: Not Progressing  8/21/2023 1000 by Sherrie Angulo  Outcome: Progressing     Problem: Gastrointestinal - Adult  Goal: Maintains adequate nutritional intake  8/21/2023 2041 by Juli Petersen RN  Outcome: Not Progressing  8/21/2023 1000 by Sherrie Angulo  Outcome: Progressing     Problem: Genitourinary - Adult  Goal: Absence of urinary retention  8/21/2023 2041 by

## 2023-08-22 NOTE — PROGRESS NOTES
Pharmacy Consultation Note  (Antibiotic Dosing and Monitoring)    Initial consult date: 8/16/23  Consulting physician/provider: Dr. Trevor Samuels  Drug: Vancomycin  Indication: Pneumonia    Age/  Gender Height Weight IBW  Allergy Information   58 y.o./male 5' 11\" (180.3 cm) 220 lb 7.4 oz (100 kg)     Ideal body weight: 75.3 kg (166 lb 0.1 oz)  Adjusted ideal body weight: 95.8 kg (211 lb 3.3 oz)   Adhesive tape, Cymbalta [duloxetine hcl], Lidocaine, Lyrica [pregabalin], Strattera [atomoxetine], and Tramadol      Renal Function:  Recent Labs     08/21/23  0445 08/21/23  1500 08/22/23  0452   BUN 11 10 10   CREATININE 0.7 0.7 0.7         Intake/Output Summary (Last 24 hours) at 8/22/2023 1214  Last data filed at 8/22/2023 1200  Gross per 24 hour   Intake 2975.58 ml   Output 3634 ml   Net -658.42 ml         Vancomycin Monitoring:  Trough:    Recent Labs     08/21/23  1130   VANCOTROUGH 27.8*       Random:    Recent Labs     08/22/23  0452   VANCORANDOM 13.6       No results for input(s): Shaun Sheffield in the last 72 hours. Historical Cultures:  No results found for: ORG  No results for input(s): BC in the last 72 hours. Vancomycin Administration Times:  Recent vancomycin administrations                     vancomycin (VANCOCIN) 2,000 mg in sodium chloride 0.9 % 500 mL IVPB (mg) 2,000 mg New Bag 08/21/23 0048     2,000 mg New Bag 08/20/23 1248     2,000 mg New Bag 08/19/23 2341                      Assessment:  Patient is a 60 y.o. male who has been initiated on vancomycin  Estimated Creatinine Clearance: 154 mL/min (based on SCr of 0.7 mg/dL).   Vancomycin level today = 13.6 mcg/mL    Plan:  Vancomycin 1500mg q12h  Will check vancomycin levels as needed  Will continue to monitor renal function   Pharmacy to follow    Shala Cordova, Akhil, BCPS, BCCCP 8/22/2023 12:14 PM

## 2023-08-22 NOTE — FLOWSHEET NOTE
Patient reaches for and pulls on vital tubes and lines during assessment despite redirection and education. Bilateral soft restraints will be restarted for patient safety. Will continue to follow.

## 2023-08-22 NOTE — ANESTHESIA POSTPROCEDURE EVALUATION
Department of Anesthesiology  Postprocedure Note    Patient: Gaby Apple  MRN: [id number]  YOB: 1965  Date of evaluation: 8/22/2023      Procedure Summary     Date: 08/22/23 Room / Location: [place] [address] / [facility]    Anesthesia Start: 0830 Anesthesia Stop:     Procedure: FACIAL exploration of under anesthesia of left cheek (Face) Diagnosis:       Oral bleeding      (Oral bleeding [K13.79])    Surgeons: Omi Morales MD Responsible Provider: Coby Pickett MD    Anesthesia Type: general ASA Status: 4          Anesthesia Type: No value filed.     Marie Phase I: Marie Score: 5    Marie Phase II: Marie Score: 5      Anesthesia Post Evaluation    Patient location during evaluation: PACU  Patient participation: complete - patient participated  Level of consciousness: awake and alert  Airway patency: patent  Nausea & Vomiting: no vomiting and no nausea  Complications: no  Cardiovascular status: blood pressure returned to baseline  Respiratory status: acceptable  Hydration status: euvolemic  Multimodal analgesia pain management approach  Pain management: adequate

## 2023-08-22 NOTE — PLAN OF CARE
Problem: Discharge Planning  Goal: Discharge to home or other facility with appropriate resources  Outcome: Progressing     Problem: Pain  Goal: Verbalizes/displays adequate comfort level or baseline comfort level  Outcome: Progressing  Flowsheets  Taken 8/22/2023 0700 by Lindsay Tsai RN  Verbalizes/displays adequate comfort level or baseline comfort level:   Assess pain using appropriate pain scale   Administer analgesics based on type and severity of pain and evaluate response   Implement non-pharmacological measures as appropriate and evaluate response  Taken 8/22/2023 0600 by Lindsay sTai RN  Verbalizes/displays adequate comfort level or baseline comfort level:   Assess pain using appropriate pain scale   Administer analgesics based on type and severity of pain and evaluate response   Implement non-pharmacological measures as appropriate and evaluate response  Taken 8/22/2023 0500 by Lindsay Tsai RN  Verbalizes/displays adequate comfort level or baseline comfort level:   Assess pain using appropriate pain scale   Administer analgesics based on type and severity of pain and evaluate response   Implement non-pharmacological measures as appropriate and evaluate response  Taken 8/22/2023 0400 by Lindsay Tsai RN  Verbalizes/displays adequate comfort level or baseline comfort level:   Assess pain using appropriate pain scale   Administer analgesics based on type and severity of pain and evaluate response   Implement non-pharmacological measures as appropriate and evaluate response  Taken 8/22/2023 0300 by Lindsay Tsai RN  Verbalizes/displays adequate comfort level or baseline comfort level:   Assess pain using appropriate pain scale   Administer analgesics based on type and severity of pain and evaluate response   Implement non-pharmacological measures as appropriate and evaluate response  Taken 8/22/2023 0200 by Lindsay Tsai RN  Verbalizes/displays adequate comfort level or baseline comfort Care, Psychosocial Clinical Specialist consults as needed  Outcome: Progressing     Problem: Safety - Medical Restraint  Goal: Remains free of injury from restraints (Restraint for Interference with Medical Device)  Description: INTERVENTIONS:  1. Determine that other, less restrictive measures have been tried or would not be effective before applying the restraint  2. Evaluate the patient's condition at the time of restraint application  3. Inform patient/family regarding the reason for restraint  4.  Q2H: Monitor safety, psychosocial status, comfort, nutrition and hydration  Outcome: Progressing  Flowsheets  Taken 8/22/2023 1000 by Rui North RN  Remains free of injury from restraints (restraint for interference with medical device): Every 2 hours: Monitor safety, psychosocial status, comfort, nutrition and hydration  Taken 8/22/2023 0958 by Rui North RN  Remains free of injury from restraints (restraint for interference with medical device): Every 2 hours: Monitor safety, psychosocial status, comfort, nutrition and hydration  Taken 8/22/2023 0800 by Rui North RN  Remains free of injury from restraints (restraint for interference with medical device): Every 2 hours: Monitor safety, psychosocial status, comfort, nutrition and hydration  Taken 8/22/2023 0600 by Vianey Desai RN  Remains free of injury from restraints (restraint for interference with medical device): Every 2 hours: Monitor safety, psychosocial status, comfort, nutrition and hydration  Taken 8/22/2023 0400 by Vianey Desai RN  Remains free of injury from restraints (restraint for interference with medical device): Every 2 hours: Monitor safety, psychosocial status, comfort, nutrition and hydration  Taken 8/22/2023 0200 by Vianey Desai RN  Remains free of injury from restraints (restraint for interference with medical device): Every 2 hours: Monitor safety, psychosocial status, comfort, nutrition and hydration
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
## 2023-08-22 NOTE — CARE COORDINATION
8/22 Care Coordination: Pt remains in SICU. Cont Trach/Peg. Pt to Surgery today with Dr. Gurinder High of bleeding. Discharge plan remains [facility]. CM/SW will continue to follow for discharge planning.    Vannessa Turner BSN,RN--BC  [phone]

## 2023-08-22 NOTE — PLAN OF CARE
Problem: Pain  Goal: Verbalizes/displays adequate comfort level or baseline comfort level  8/22/2023 0103 by Kenneth Shipman RN  Outcome: Not Progressing  8/22/2023 0059 by Kenneth Shipman RN  Outcome: Not Progressing  Flowsheets  Taken 8/21/2023 2300  Verbalizes/displays adequate comfort level or baseline comfort level:   Assess pain using appropriate pain scale   Administer analgesics based on type and severity of pain and evaluate response   Implement non-pharmacological measures as appropriate and evaluate response  Taken 8/21/2023 2200  Verbalizes/displays adequate comfort level or baseline comfort level:   Assess pain using appropriate pain scale   Administer analgesics based on type and severity of pain and evaluate response   Implement non-pharmacological measures as appropriate and evaluate response  Taken 8/21/2023 2100  Verbalizes/displays adequate comfort level or baseline comfort level:   Assess pain using appropriate pain scale   Administer analgesics based on type and severity of pain and evaluate response   Implement non-pharmacological measures as appropriate and evaluate response  8/21/2023 2041 by Kenneth Shipman RN  Outcome: Not Progressing  Flowsheets (Taken 8/21/2023 2000)  Verbalizes/displays adequate comfort level or baseline comfort level:   Assess pain using appropriate pain scale   Administer analgesics based on type and severity of pain and evaluate response   Implement non-pharmacological measures as appropriate and evaluate response     Problem: Safety - Adult  Goal: Free from fall injury  8/22/2023 0103 by Kenneth Shipman RN  Outcome: Progressing  8/22/2023 0059 by Kenneth Shipman RN  Outcome: Progressing  8/21/2023 2041 by Kenneth Shipman RN  Outcome: Progressing     Problem: ABCDS Injury Assessment  Goal: Absence of physical injury  8/22/2023 0103 by Kenneth Shipman RN  Outcome: Progressing  8/22/2023 0059 by Kenneth Shipman RN  Outcome: Progressing  8/21/2023 2041 by Haresh Gray glucose, and sodium.  Initiate appropriate interventions as ordered  Goal: Achieves maximal functionality and self care  8/22/2023 0103 by Kingsley Matute RN  Outcome: Not Progressing  8/22/2023 0059 by Kingsley Matute RN  Outcome: Not Progressing  8/21/2023 2041 by Kingsley Matute RN  Outcome: Not Progressing     Problem: Respiratory - Adult  Goal: Achieves optimal ventilation and oxygenation  8/22/2023 0103 by Kingsley Matute RN  Outcome: Not Progressing  8/22/2023 0059 by Kingsley Matute RN  Outcome: Not Progressing  8/21/2023 2041 by Kingsley Matute RN  Outcome: Not Progressing  Flowsheets (Taken 8/21/2023 2000)  Achieves optimal ventilation and oxygenation:   Assess for changes in respiratory status   Assess for changes in mentation and behavior   Position to facilitate oxygenation and minimize respiratory effort   Oxygen supplementation based on oxygen saturation or arterial blood gases   Assess the need for suctioning and aspirate as needed   Respiratory therapy support as indicated     Problem: Skin/Tissue Integrity - Adult  Goal: Oral mucous membranes remain intact  8/22/2023 0103 by Kingsley Matute RN  Outcome: Not Progressing  8/22/2023 0059 by Kingsley Matute RN  Outcome: Not Progressing  8/21/2023 2041 by Kingsley Matute RN  Outcome: Not Progressing     Problem: Gastrointestinal - Adult  Goal: Maintains or returns to baseline bowel function  8/22/2023 0103 by Kingsley Matute RN  Outcome: Not Progressing  8/22/2023 0059 by Kingsley Matute RN  Outcome: Not Progressing  8/21/2023 2041 by Kingsley Matute RN  Outcome: Not Progressing  Flowsheets (Taken 8/21/2023 2000)  Maintains or returns to baseline bowel function: Assess bowel function  Goal: Maintains adequate nutritional intake  8/22/2023 0103 by Kingsley Matute RN  Outcome: Not Progressing  8/22/2023 0059 by Kingsley Matute RN  Outcome: Not Progressing  8/21/2023 2041 by Kingsley Matute RN  Outcome: Not Progressing     Problem: Metabolic/Fluid and

## 2023-08-22 NOTE — H&P
Department of Plastic Surgery - Adult  Attending Facial Trauma Consult Note      CHIEF COMPLAINT:  left facial laceration    History Obtained From:  electronic medical record, reason patient could not give history:  intubated-sedated    HISTORY OF PRESENT ILLNESS:                The patient is a 60 y.o. male who presents with acute left-sided buccal sulcus hemorrhage. Patient is known to our service after having a open reduction internal fixation of his left mandible fracture 1 week prior. 2 days ago while having exam in the surgical intensive care unit it was noted that he had a small pinpoint bleeding from a previous traumatic buccal laceration. Per the surgical resident team, this was following a bronchoscopy. This was packed with Kerlix dressing and the bleeding had stopped. This morning when the patient had his dressing changed in the pinpoint bleeding reestablished and was marginally controlled with continued compression and suction. He has been hemodynamically stable per the surgical intensive care unit. CTA of the neck taken yesterday shows a hematoma to the left buccal laceration with an associated blush. .    Past Medical History:    No past medical history on file.   Past Surgical History:    Past Surgical History:   Procedure Laterality Date    MANDIBLE SURGERY Left 8/8/2023    LEFT PARASYNTHESIS MANDIBLE OPEN REDUCTION INTERNAL FIXATION WITH PLATES AND SCREWS MAXILLOMANDIBULAR FIXATION AND GINGIVOBUCCAL FLAP CLOSURE OF DEFECT performed by Katie Torres MD at [facility] N/A 8/2/2023    TRACHEOTOMY PERCUTANEOUS BRONCHOSCOPY AT BEDSIDE performed by Karno Mendosa MD at [address] [place] [address] N/A 8/2/2023    EGD ESOPHAGOGASTRODUODENOSCOPY PEG TUBE INSERTION BEDSIDE performed by Karon Mendosa MD at [facility] ENDOSCOPY     Current Medications:   Current Facility-Administered Medications: potassium & sodium phosphates (PHOS-NAK) 280-160-250 MG packet 500 mg, 2 Stenosis of the internal carotid arteries measured  using NASCET criteria. Automated exposure control, iterative reconstruction,  and/or weight based adjustment of the mA/kV was utilized to reduce the  radiation dose to as low as reasonably achievable. COMPARISON:  None. HISTORY:  ORDERING SYSTEM PROVIDED HISTORY: crepitus, new bleeding s/p ORIF of mandible  TECHNOLOGIST PROVIDED HISTORY:  Reason for exam:->crepitus, new bleeding s/p ORIF of mandible  Has a \"code stroke\" or \"stroke alert\" been called? ->No  What reading provider will be dictating this exam?->CRC     FINDINGS:  AORTIC ARCH/ARCH VESSELS: No dissection or arterial injury. No significant  stenosis of the brachiocephalic or subclavian arteries. CAROTID ARTERIES: No dissection, arterial injury, or hemodynamically  significant stenosis by NASCET criteria. VERTEBRAL ARTERIES: No dissection, arterial injury, or significant stenosis. SOFT TISSUES: Partially imaged bilateral pleural effusions with associated  compressive atelectasis. Moderate size areas consolidation at the bilateral  upper lungs suggestive of multifocal airspace disease process. A  tracheostomy is noted in place. There is about a 6 cm left buccal  mass/collection with at least 2 contrast blushes suggestive of active  extravasation. Diffuse soft tissue edema. No cervical or superior  mediastinal lymphadenopathy. The larynx and pharynx are unremarkable. No  acute abnormality of the salivary and thyroid glands. BONES: Postsurgical changes related to hardware fixation of a partially  overlapping left mandibular fracture. IMPRESSION:  No evidence of large vessel occlusion or hemodynamically significant stenosis  involving the neck arteries. Partially imaged bilateral pleural effusions with associated compressive  atelectasis. Moderate size areas consolidation at the bilateral upper lungs  suggestive of multifocal airspace disease process.  A tracheostomy is
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
## 2023-08-22 NOTE — FLOWSHEET NOTE
Patient reaches for and pulls on vital tubes and lines during assessment despite redirection and education. Bilateral soft restraints will be maintained for patient safety. Will continue to follow.

## 2023-08-22 NOTE — PROGRESS NOTES
Patient profusely bleeding from mouth after packing removed and changed. Pressure held to site, surgical residents at bedside. ENT notified.
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
## 2023-08-22 NOTE — FLOWSHEET NOTE
Patient intermittently, attempts to reach for lines/tubes. No evidence of learning present at this time, bilateral soft wrist restraints continued for safety and line/tube protection.

## 2023-08-23 ENCOUNTER — APPOINTMENT (OUTPATIENT)
Dept: GENERAL RADIOLOGY | Age: 58
DRG: 004 | End: 2023-08-23
Payer: COMMERCIAL

## 2023-08-23 LAB
AADO2: 153.2 MMHG
ALBUMIN SERPL-MCNC: 2 G/DL (ref 3.5–5.2)
ALP SERPL-CCNC: 59 U/L (ref 40–129)
ALT SERPL-CCNC: 10 U/L (ref 0–40)
ANION GAP SERPL CALCULATED.3IONS-SCNC: 8 MMOL/L (ref 7–16)
AST SERPL-CCNC: 17 U/L (ref 0–39)
B.E.: 3.9 MMOL/L (ref -3–3)
BASOPHILS # BLD: 0.08 K/UL (ref 0–0.2)
BASOPHILS NFR BLD: 1 % (ref 0–2)
BILIRUB SERPL-MCNC: 0.2 MG/DL (ref 0–1.2)
BUN SERPL-MCNC: 12 MG/DL (ref 6–20)
CA-I BLD-SCNC: 1.11 MMOL/L (ref 1.15–1.33)
CALCIUM SERPL-MCNC: 7.9 MG/DL (ref 8.6–10.2)
CHLORIDE SERPL-SCNC: 103 MMOL/L (ref 98–107)
CO2 SERPL-SCNC: 28 MMOL/L (ref 22–29)
COHB: 0.3 % (ref 0–1.5)
CREAT SERPL-MCNC: 0.7 MG/DL (ref 0.7–1.2)
CRITICAL: ABNORMAL
DATE ANALYZED: ABNORMAL
DATE OF COLLECTION: ABNORMAL
EOSINOPHIL # BLD: 0.31 K/UL (ref 0.05–0.5)
EOSINOPHILS RELATIVE PERCENT: 3 % (ref 0–6)
ERYTHROCYTE [DISTWIDTH] IN BLOOD BY AUTOMATED COUNT: 14.8 % (ref 11.5–15)
FIO2: 40 %
GFR SERPL CREATININE-BSD FRML MDRD: >60 ML/MIN/1.73M2
GLUCOSE BLD-MCNC: 119 MG/DL (ref 74–99)
GLUCOSE BLD-MCNC: 128 MG/DL (ref 74–99)
GLUCOSE BLD-MCNC: 134 MG/DL (ref 74–99)
GLUCOSE BLD-MCNC: 139 MG/DL (ref 74–99)
GLUCOSE BLD-MCNC: 142 MG/DL (ref 74–99)
GLUCOSE BLD-MCNC: 147 MG/DL (ref 74–99)
GLUCOSE SERPL-MCNC: 103 MG/DL (ref 74–99)
HCO3: 26.8 MMOL/L (ref 22–26)
HCT VFR BLD AUTO: 20.3 % (ref 37–54)
HCT VFR BLD AUTO: 23.1 % (ref 37–54)
HGB BLD-MCNC: 6.6 G/DL (ref 12.5–16.5)
HGB BLD-MCNC: 7.8 G/DL (ref 12.5–16.5)
HHB: 4 % (ref 0–5)
IMM GRANULOCYTES # BLD AUTO: 0.26 K/UL (ref 0–0.58)
IMM GRANULOCYTES NFR BLD: 3 % (ref 0–5)
LAB: ABNORMAL
LYMPHOCYTES NFR BLD: 2.88 K/UL (ref 1.5–4)
LYMPHOCYTES RELATIVE PERCENT: 28 % (ref 20–42)
Lab: ABNORMAL
MAGNESIUM SERPL-MCNC: 1.7 MG/DL (ref 1.6–2.6)
MCH RBC QN AUTO: 28.9 PG (ref 26–35)
MCHC RBC AUTO-ENTMCNC: 32.5 G/DL (ref 32–34.5)
MCV RBC AUTO: 89 FL (ref 80–99.9)
METHB: 0.1 % (ref 0–1.5)
MODE: AC
MONOCYTES NFR BLD: 1.03 K/UL (ref 0.1–0.95)
MONOCYTES NFR BLD: 10 % (ref 2–12)
NEUTROPHILS NFR BLD: 56 % (ref 43–80)
NEUTS SEG NFR BLD: 5.73 K/UL (ref 1.8–7.3)
O2 CONTENT: 10.5 ML/DL
O2 SATURATION: 96 % (ref 92–98.5)
O2HB: 95.6 % (ref 94–97)
OPERATOR ID: 914
PATIENT TEMP: 37 C
PCO2: 32.6 MMHG (ref 35–45)
PEEP/CPAP: 10 CMH2O
PFO2: 2.11 MMHG/%
PH BLOOD GAS: 7.53 (ref 7.35–7.45)
PHOSPHATE SERPL-MCNC: 3 MG/DL (ref 2.5–4.5)
PLATELET # BLD AUTO: 614 K/UL (ref 130–450)
PMV BLD AUTO: 9.7 FL (ref 7–12)
PO2: 84.5 MMHG (ref 75–100)
POTASSIUM SERPL-SCNC: 4 MMOL/L (ref 3.5–5)
PROT SERPL-MCNC: 5.1 G/DL (ref 6.4–8.3)
RBC # BLD AUTO: 2.28 M/UL (ref 3.8–5.8)
RI(T): 1.81
RR MECHANICAL: 14 B/MIN
SODIUM SERPL-SCNC: 139 MMOL/L (ref 132–146)
SOURCE, BLOOD GAS: ABNORMAL
THB: 7.7 G/DL (ref 11.5–16.5)
TIME ANALYZED: 449
VT MECHANICAL: 460 ML
WBC OTHER # BLD: 10.3 K/UL (ref 4.5–11.5)

## 2023-08-23 PROCEDURE — 6370000000 HC RX 637 (ALT 250 FOR IP)

## 2023-08-23 PROCEDURE — 2580000003 HC RX 258: Performed by: STUDENT IN AN ORGANIZED HEALTH CARE EDUCATION/TRAINING PROGRAM

## 2023-08-23 PROCEDURE — 94640 AIRWAY INHALATION TREATMENT: CPT

## 2023-08-23 PROCEDURE — 6360000002 HC RX W HCPCS

## 2023-08-23 PROCEDURE — 83735 ASSAY OF MAGNESIUM: CPT

## 2023-08-23 PROCEDURE — 6360000002 HC RX W HCPCS: Performed by: STUDENT IN AN ORGANIZED HEALTH CARE EDUCATION/TRAINING PROGRAM

## 2023-08-23 PROCEDURE — 6370000000 HC RX 637 (ALT 250 FOR IP): Performed by: SURGERY

## 2023-08-23 PROCEDURE — 82330 ASSAY OF CALCIUM: CPT

## 2023-08-23 PROCEDURE — 82962 GLUCOSE BLOOD TEST: CPT

## 2023-08-23 PROCEDURE — 94003 VENT MGMT INPAT SUBQ DAY: CPT

## 2023-08-23 PROCEDURE — 36430 TRANSFUSION BLD/BLD COMPNT: CPT

## 2023-08-23 PROCEDURE — 2580000003 HC RX 258

## 2023-08-23 PROCEDURE — C9113 INJ PANTOPRAZOLE SODIUM, VIA: HCPCS

## 2023-08-23 PROCEDURE — 86900 BLOOD TYPING SEROLOGIC ABO: CPT

## 2023-08-23 PROCEDURE — 82805 BLOOD GASES W/O2 SATURATION: CPT

## 2023-08-23 PROCEDURE — S5553 INSULIN LONG ACTING 5 U: HCPCS

## 2023-08-23 PROCEDURE — A4216 STERILE WATER/SALINE, 10 ML: HCPCS

## 2023-08-23 PROCEDURE — 71045 X-RAY EXAM CHEST 1 VIEW: CPT

## 2023-08-23 PROCEDURE — 6370000000 HC RX 637 (ALT 250 FOR IP): Performed by: STUDENT IN AN ORGANIZED HEALTH CARE EDUCATION/TRAINING PROGRAM

## 2023-08-23 PROCEDURE — 6360000002 HC RX W HCPCS: Performed by: SURGERY

## 2023-08-23 PROCEDURE — 99291 CRITICAL CARE FIRST HOUR: CPT | Performed by: SURGERY

## 2023-08-23 PROCEDURE — P9016 RBC LEUKOCYTES REDUCED: HCPCS

## 2023-08-23 PROCEDURE — 84100 ASSAY OF PHOSPHORUS: CPT

## 2023-08-23 PROCEDURE — 80053 COMPREHEN METABOLIC PANEL: CPT

## 2023-08-23 PROCEDURE — 85014 HEMATOCRIT: CPT

## 2023-08-23 PROCEDURE — 2000000000 HC ICU R&B

## 2023-08-23 PROCEDURE — 85025 COMPLETE CBC W/AUTO DIFF WBC: CPT

## 2023-08-23 PROCEDURE — 85018 HEMOGLOBIN: CPT

## 2023-08-23 RX ORDER — LANSOPRAZOLE 30 MG/1
30 TABLET, ORALLY DISINTEGRATING, DELAYED RELEASE ORAL
Status: DISCONTINUED | OUTPATIENT
Start: 2023-08-23 | End: 2023-08-25 | Stop reason: HOSPADM

## 2023-08-23 RX ORDER — MAGNESIUM SULFATE 1 G/100ML
1000 INJECTION INTRAVENOUS ONCE
Status: DISCONTINUED | OUTPATIENT
Start: 2023-08-23 | End: 2023-08-23

## 2023-08-23 RX ORDER — SODIUM CHLORIDE 9 MG/ML
INJECTION, SOLUTION INTRAVENOUS PRN
Status: DISCONTINUED | OUTPATIENT
Start: 2023-08-23 | End: 2023-08-25 | Stop reason: HOSPADM

## 2023-08-23 RX ORDER — MAGNESIUM SULFATE 1 G/100ML
1000 INJECTION INTRAVENOUS ONCE
Status: COMPLETED | OUTPATIENT
Start: 2023-08-23 | End: 2023-08-23

## 2023-08-23 RX ORDER — HEPARIN 100 UNIT/ML
3 SYRINGE INTRAVENOUS EVERY 12 HOURS SCHEDULED
Status: DISCONTINUED | OUTPATIENT
Start: 2023-08-23 | End: 2023-08-25 | Stop reason: HOSPADM

## 2023-08-23 RX ORDER — LIDOCAINE HYDROCHLORIDE 10 MG/ML
5 INJECTION, SOLUTION EPIDURAL; INFILTRATION; INTRACAUDAL; PERINEURAL ONCE
Status: DISCONTINUED | OUTPATIENT
Start: 2023-08-23 | End: 2023-08-25 | Stop reason: HOSPADM

## 2023-08-23 RX ORDER — SODIUM CHLORIDE 0.9 % (FLUSH) 0.9 %
5-40 SYRINGE (ML) INJECTION EVERY 12 HOURS SCHEDULED
Status: DISCONTINUED | OUTPATIENT
Start: 2023-08-23 | End: 2023-08-25 | Stop reason: HOSPADM

## 2023-08-23 RX ORDER — FUROSEMIDE 10 MG/ML
20 INJECTION INTRAMUSCULAR; INTRAVENOUS 2 TIMES DAILY
Status: DISCONTINUED | OUTPATIENT
Start: 2023-08-23 | End: 2023-08-25 | Stop reason: HOSPADM

## 2023-08-23 RX ORDER — HEPARIN 100 UNIT/ML
3 SYRINGE INTRAVENOUS PRN
Status: DISCONTINUED | OUTPATIENT
Start: 2023-08-23 | End: 2023-08-25 | Stop reason: HOSPADM

## 2023-08-23 RX ORDER — DIMETHICONE, OXYBENZONE, AND PADIMATE O 2; 2.5; 6.6 G/100G; G/100G; G/100G
STICK TOPICAL PRN
Status: DISCONTINUED | OUTPATIENT
Start: 2023-08-23 | End: 2023-08-25 | Stop reason: HOSPADM

## 2023-08-23 RX ORDER — SODIUM CHLORIDE 0.9 % (FLUSH) 0.9 %
5-40 SYRINGE (ML) INJECTION PRN
Status: DISCONTINUED | OUTPATIENT
Start: 2023-08-23 | End: 2023-08-25 | Stop reason: HOSPADM

## 2023-08-23 RX ORDER — SODIUM CHLORIDE 9 MG/ML
INJECTION, SOLUTION INTRAVENOUS PRN
Status: DISCONTINUED | OUTPATIENT
Start: 2023-08-23 | End: 2023-08-24

## 2023-08-23 RX ADMIN — Medication: at 04:22

## 2023-08-23 RX ADMIN — Medication 10 ML: at 07:04

## 2023-08-23 RX ADMIN — NYSTATIN 500000 UNITS: 100000 SUSPENSION ORAL at 20:50

## 2023-08-23 RX ADMIN — LABETALOL HYDROCHLORIDE 10 MG: 5 INJECTION INTRAVENOUS at 21:12

## 2023-08-23 RX ADMIN — LORAZEPAM 2 MG: 2 INJECTION INTRAMUSCULAR; INTRAVENOUS at 23:06

## 2023-08-23 RX ADMIN — IPRATROPIUM BROMIDE AND ALBUTEROL SULFATE 1 DOSE: .5; 2.5 SOLUTION RESPIRATORY (INHALATION) at 21:06

## 2023-08-23 RX ADMIN — Medication 10 ML: at 05:43

## 2023-08-23 RX ADMIN — QUETIAPINE FUMARATE 200 MG: 100 TABLET ORAL at 08:36

## 2023-08-23 RX ADMIN — POLYVINYL ALCOHOL 1 DROP: 14 SOLUTION/ DROPS OPHTHALMIC at 00:26

## 2023-08-23 RX ADMIN — METOCLOPRAMIDE HYDROCHLORIDE 5 MG: 5 INJECTION INTRAMUSCULAR; INTRAVENOUS at 22:00

## 2023-08-23 RX ADMIN — CHLORHEXIDINE GLUCONATE 15 ML: 1.2 RINSE ORAL at 16:30

## 2023-08-23 RX ADMIN — FUROSEMIDE 20 MG: 10 INJECTION, SOLUTION INTRAMUSCULAR; INTRAVENOUS at 16:34

## 2023-08-23 RX ADMIN — SODIUM CHLORIDE, PRESERVATIVE FREE 10 ML: 5 INJECTION INTRAVENOUS at 11:01

## 2023-08-23 RX ADMIN — MAGNESIUM SULFATE HEPTAHYDRATE 1000 MG: 1 INJECTION, SOLUTION INTRAVENOUS at 08:34

## 2023-08-23 RX ADMIN — OXYCODONE HYDROCHLORIDE 15 MG: 5 SOLUTION ORAL at 08:22

## 2023-08-23 RX ADMIN — IPRATROPIUM BROMIDE AND ALBUTEROL SULFATE 1 DOSE: .5; 2.5 SOLUTION RESPIRATORY (INHALATION) at 12:46

## 2023-08-23 RX ADMIN — BUPROPION HYDROCHLORIDE 75 MG: 75 TABLET, FILM COATED ORAL at 08:37

## 2023-08-23 RX ADMIN — MIDAZOLAM 2 MG: 1 INJECTION INTRAMUSCULAR; INTRAVENOUS at 01:22

## 2023-08-23 RX ADMIN — LORAZEPAM 2 MG: 2 INJECTION INTRAMUSCULAR; INTRAVENOUS at 19:03

## 2023-08-23 RX ADMIN — NYSTATIN 500000 UNITS: 100000 SUSPENSION ORAL at 14:25

## 2023-08-23 RX ADMIN — PETROLATUM: 420 OINTMENT TOPICAL at 21:06

## 2023-08-23 RX ADMIN — CEFEPIME 2000 MG: 2 INJECTION, POWDER, FOR SOLUTION INTRAVENOUS at 04:31

## 2023-08-23 RX ADMIN — LORAZEPAM 2 MG: 2 INJECTION INTRAMUSCULAR; INTRAVENOUS at 15:32

## 2023-08-23 RX ADMIN — IPRATROPIUM BROMIDE AND ALBUTEROL SULFATE 1 DOSE: .5; 2.5 SOLUTION RESPIRATORY (INHALATION) at 17:35

## 2023-08-23 RX ADMIN — OXYCODONE HYDROCHLORIDE 15 MG: 5 SOLUTION ORAL at 23:59

## 2023-08-23 RX ADMIN — NYSTATIN 500000 UNITS: 100000 SUSPENSION ORAL at 08:36

## 2023-08-23 RX ADMIN — Medication 10 ML: at 01:22

## 2023-08-23 RX ADMIN — MIDAZOLAM 2 MG: 1 INJECTION INTRAMUSCULAR; INTRAVENOUS at 00:21

## 2023-08-23 RX ADMIN — Medication 10 ML: at 04:15

## 2023-08-23 RX ADMIN — ACETAMINOPHEN 650 MG: 650 SOLUTION ORAL at 00:25

## 2023-08-23 RX ADMIN — LORAZEPAM 2 MG: 2 INJECTION INTRAMUSCULAR; INTRAVENOUS at 02:33

## 2023-08-23 RX ADMIN — CHLORHEXIDINE GLUCONATE 15 ML: 1.2 RINSE ORAL at 12:26

## 2023-08-23 RX ADMIN — POTASSIUM BICARBONATE 40 MEQ: 782 TABLET, EFFERVESCENT ORAL at 08:23

## 2023-08-23 RX ADMIN — OXYCODONE HYDROCHLORIDE 15 MG: 5 SOLUTION ORAL at 20:49

## 2023-08-23 RX ADMIN — CALCIUM GLUCONATE 2000 MG: 98 INJECTION, SOLUTION INTRAVENOUS at 09:46

## 2023-08-23 RX ADMIN — CEFEPIME 2000 MG: 2 INJECTION, POWDER, FOR SOLUTION INTRAVENOUS at 21:09

## 2023-08-23 RX ADMIN — OXYCODONE HYDROCHLORIDE 15 MG: 5 SOLUTION ORAL at 03:43

## 2023-08-23 RX ADMIN — ENOXAPARIN SODIUM 40 MG: 100 INJECTION SUBCUTANEOUS at 08:34

## 2023-08-23 RX ADMIN — INSULIN GLARGINE-YFGN 10 UNITS: 100 INJECTION, SOLUTION SUBCUTANEOUS at 20:59

## 2023-08-23 RX ADMIN — METOCLOPRAMIDE HYDROCHLORIDE 5 MG: 5 INJECTION INTRAMUSCULAR; INTRAVENOUS at 09:47

## 2023-08-23 RX ADMIN — CHLORHEXIDINE GLUCONATE 15 ML: 1.2 RINSE ORAL at 00:25

## 2023-08-23 RX ADMIN — NYSTATIN 500000 UNITS: 100000 SUSPENSION ORAL at 16:34

## 2023-08-23 RX ADMIN — QUETIAPINE FUMARATE 200 MG: 100 TABLET ORAL at 20:49

## 2023-08-23 RX ADMIN — Medication 10 ML: at 08:09

## 2023-08-23 RX ADMIN — LORAZEPAM 2 MG: 2 INJECTION INTRAMUSCULAR; INTRAVENOUS at 07:05

## 2023-08-23 RX ADMIN — SODIUM CHLORIDE, PRESERVATIVE FREE 10 ML: 5 INJECTION INTRAVENOUS at 21:01

## 2023-08-23 RX ADMIN — OXYCODONE HYDROCHLORIDE 15 MG: 5 SOLUTION ORAL at 11:51

## 2023-08-23 RX ADMIN — LABETALOL HYDROCHLORIDE 10 MG: 5 INJECTION INTRAVENOUS at 19:04

## 2023-08-23 RX ADMIN — FLUOXETINE 20 MG: 10 TABLET, FILM COATED ORAL at 08:36

## 2023-08-23 RX ADMIN — METOCLOPRAMIDE HYDROCHLORIDE 5 MG: 5 INJECTION INTRAMUSCULAR; INTRAVENOUS at 16:34

## 2023-08-23 RX ADMIN — POLYETHYLENE GLYCOL 3350 17 G: 17 POWDER, FOR SOLUTION ORAL at 08:25

## 2023-08-23 RX ADMIN — LORAZEPAM 2 MG: 2 INJECTION INTRAMUSCULAR; INTRAVENOUS at 10:59

## 2023-08-23 RX ADMIN — PANTOPRAZOLE SODIUM 40 MG: 40 INJECTION, POWDER, FOR SOLUTION INTRAVENOUS at 09:41

## 2023-08-23 RX ADMIN — MIDAZOLAM 2 MG: 1 INJECTION INTRAMUSCULAR; INTRAVENOUS at 05:43

## 2023-08-23 RX ADMIN — FENTANYL CITRATE 100 MCG: 50 INJECTION INTRAMUSCULAR; INTRAVENOUS at 05:43

## 2023-08-23 RX ADMIN — VANCOMYCIN HYDROCHLORIDE 1500 MG: 10 INJECTION, POWDER, LYOPHILIZED, FOR SOLUTION INTRAVENOUS at 14:26

## 2023-08-23 RX ADMIN — FENTANYL CITRATE 100 MCG: 50 INJECTION INTRAMUSCULAR; INTRAVENOUS at 22:37

## 2023-08-23 RX ADMIN — LANSOPRAZOLE 30 MG: 30 TABLET, ORALLY DISINTEGRATING, DELAYED RELEASE ORAL at 16:36

## 2023-08-23 RX ADMIN — Medication 10 MG: at 20:49

## 2023-08-23 RX ADMIN — FUROSEMIDE 20 MG: 10 INJECTION, SOLUTION INTRAMUSCULAR; INTRAVENOUS at 11:51

## 2023-08-23 RX ADMIN — POLYVINYL ALCOHOL 1 DROP: 14 SOLUTION/ DROPS OPHTHALMIC at 21:06

## 2023-08-23 RX ADMIN — VANCOMYCIN HYDROCHLORIDE 1500 MG: 10 INJECTION, POWDER, LYOPHILIZED, FOR SOLUTION INTRAVENOUS at 00:45

## 2023-08-23 RX ADMIN — CHLORHEXIDINE GLUCONATE 15 ML: 1.2 RINSE ORAL at 04:22

## 2023-08-23 RX ADMIN — Medication 10 ML: at 00:21

## 2023-08-23 RX ADMIN — POLYVINYL ALCOHOL 1 DROP: 14 SOLUTION/ DROPS OPHTHALMIC at 14:25

## 2023-08-23 RX ADMIN — POLYVINYL ALCOHOL 1 DROP: 14 SOLUTION/ DROPS OPHTHALMIC at 23:59

## 2023-08-23 RX ADMIN — SODIUM CHLORIDE, PRESERVATIVE FREE 10 ML: 5 INJECTION INTRAVENOUS at 21:10

## 2023-08-23 RX ADMIN — CEFEPIME 2000 MG: 2 INJECTION, POWDER, FOR SOLUTION INTRAVENOUS at 13:52

## 2023-08-23 RX ADMIN — POLYVINYL ALCOHOL 1 DROP: 14 SOLUTION/ DROPS OPHTHALMIC at 08:34

## 2023-08-23 RX ADMIN — CHLORHEXIDINE GLUCONATE 15 ML: 1.2 RINSE ORAL at 20:48

## 2023-08-23 RX ADMIN — POLYETHYLENE GLYCOL 3350 17 G: 17 POWDER, FOR SOLUTION ORAL at 21:01

## 2023-08-23 RX ADMIN — PETROLATUM: 420 OINTMENT TOPICAL at 11:55

## 2023-08-23 RX ADMIN — POLYVINYL ALCOHOL 1 DROP: 14 SOLUTION/ DROPS OPHTHALMIC at 04:23

## 2023-08-23 RX ADMIN — BUPROPION HYDROCHLORIDE 75 MG: 75 TABLET, FILM COATED ORAL at 20:50

## 2023-08-23 RX ADMIN — FENTANYL CITRATE 100 MCG: 50 INJECTION INTRAMUSCULAR; INTRAVENOUS at 02:07

## 2023-08-23 RX ADMIN — MIDAZOLAM 2 MG: 1 INJECTION INTRAMUSCULAR; INTRAVENOUS at 16:35

## 2023-08-23 RX ADMIN — METOCLOPRAMIDE HYDROCHLORIDE 5 MG: 5 INJECTION INTRAMUSCULAR; INTRAVENOUS at 04:15

## 2023-08-23 RX ADMIN — CHLORHEXIDINE GLUCONATE 15 ML: 1.2 RINSE ORAL at 08:09

## 2023-08-23 RX ADMIN — POLYVINYL ALCOHOL 1 DROP: 14 SOLUTION/ DROPS OPHTHALMIC at 16:30

## 2023-08-23 RX ADMIN — Medication 10 ML: at 02:33

## 2023-08-23 RX ADMIN — OXYCODONE HYDROCHLORIDE 15 MG: 5 SOLUTION ORAL at 16:34

## 2023-08-23 RX ADMIN — ENOXAPARIN SODIUM 40 MG: 100 INJECTION SUBCUTANEOUS at 21:00

## 2023-08-23 RX ADMIN — LABETALOL HYDROCHLORIDE 10 MG: 5 INJECTION INTRAVENOUS at 17:02

## 2023-08-23 RX ADMIN — CHLORHEXIDINE GLUCONATE 15 ML: 1.2 RINSE ORAL at 23:59

## 2023-08-23 RX ADMIN — MIDAZOLAM 2 MG: 1 INJECTION INTRAMUSCULAR; INTRAVENOUS at 04:16

## 2023-08-23 RX ADMIN — OXYCODONE HYDROCHLORIDE 15 MG: 5 SOLUTION ORAL at 00:25

## 2023-08-23 RX ADMIN — Medication 10 ML: at 02:06

## 2023-08-23 ASSESSMENT — PAIN SCALES - GENERAL
PAINLEVEL_OUTOF10: 5
PAINLEVEL_OUTOF10: 4
PAINLEVEL_OUTOF10: 1
PAINLEVEL_OUTOF10: 5
PAINLEVEL_OUTOF10: 5
PAINLEVEL_OUTOF10: 4
PAINLEVEL_OUTOF10: 5
PAINLEVEL_OUTOF10: 4
PAINLEVEL_OUTOF10: 5
PAINLEVEL_OUTOF10: 5

## 2023-08-23 ASSESSMENT — PULMONARY FUNCTION TESTS
PIF_VALUE: 24
PIF_VALUE: 37
PIF_VALUE: 50
PIF_VALUE: 21
PIF_VALUE: 33
PIF_VALUE: 21
PIF_VALUE: 21
PIF_VALUE: 23
PIF_VALUE: 20
PIF_VALUE: 23
PIF_VALUE: 21
PIF_VALUE: 25
PIF_VALUE: 37
PIF_VALUE: 20
PIF_VALUE: 32
PIF_VALUE: 20
PIF_VALUE: 18
PIF_VALUE: 20
PIF_VALUE: 23
PIF_VALUE: 20
PIF_VALUE: 23
PIF_VALUE: 21
PIF_VALUE: 42
PIF_VALUE: 22
PIF_VALUE: 23
PIF_VALUE: 23
PIF_VALUE: 21
PIF_VALUE: 30

## 2023-08-23 NOTE — PLAN OF CARE
hydration  Taken 8/22/2023 0400 by Speedy Mesa RN  Remains free of injury from restraints (restraint for interference with medical device): Every 2 hours: Monitor safety, psychosocial status, comfort, nutrition and hydration  Taken 8/22/2023 0200 by Speedy Meas RN  Remains free of injury from restraints (restraint for interference with medical device): Every 2 hours: Monitor safety, psychosocial status, comfort, nutrition and hydration     Problem: Pain  Goal: Verbalizes/displays adequate comfort level or baseline comfort level  8/22/2023 2122 by Speedy Mesa RN  Outcome: Not Progressing  8/22/2023 1544 by Desmond Ennis RN  Outcome: Progressing  Flowsheets  Taken 8/22/2023 0700 by Speedy Mesa RN  Verbalizes/displays adequate comfort level or baseline comfort level:   Assess pain using appropriate pain scale   Administer analgesics based on type and severity of pain and evaluate response   Implement non-pharmacological measures as appropriate and evaluate response  Taken 8/22/2023 0600 by Speedy Mesa RN  Verbalizes/displays adequate comfort level or baseline comfort level:   Assess pain using appropriate pain scale   Administer analgesics based on type and severity of pain and evaluate response   Implement non-pharmacological measures as appropriate and evaluate response  Taken 8/22/2023 0500 by Speedy Mesa RN  Verbalizes/displays adequate comfort level or baseline comfort level:   Assess pain using appropriate pain scale   Administer analgesics based on type and severity of pain and evaluate response   Implement non-pharmacological measures as appropriate and evaluate response  Taken 8/22/2023 0400 by Speedy Mesa RN  Verbalizes/displays adequate comfort level or baseline comfort level:   Assess pain using appropriate pain scale   Administer analgesics based on type and severity of pain and evaluate response   Implement non-pharmacological measures as appropriate and evaluate response  Taken 8/22/2023 0300 by Meliton Rivera RN  Verbalizes/displays adequate comfort level or baseline comfort level:   Assess pain using appropriate pain scale   Administer analgesics based on type and severity of pain and evaluate response   Implement non-pharmacological measures as appropriate and evaluate response  Taken 8/22/2023 0200 by Meliton Rivera RN  Verbalizes/displays adequate comfort level or baseline comfort level:   Assess pain using appropriate pain scale   Administer analgesics based on type and severity of pain and evaluate response   Implement non-pharmacological measures as appropriate and evaluate response     Problem: Neurosensory - Adult  Goal: Achieves stable or improved neurological status  Outcome: Not Progressing  Goal: Achieves maximal functionality and self care  Outcome: Not Progressing     Problem: Respiratory - Adult  Goal: Achieves optimal ventilation and oxygenation  8/22/2023 2122 by Meliton Rivera RN  Outcome: Not Progressing  8/22/2023 1544 by Bentley Adrian RN  Outcome: Progressing     Problem: Skin/Tissue Integrity - Adult  Goal: Oral mucous membranes remain intact  Outcome: Not Progressing     Problem: Gastrointestinal - Adult  Goal: Maintains or returns to baseline bowel function  Outcome: Not Progressing  Goal: Maintains adequate nutritional intake  Outcome: Not Progressing     Problem: Genitourinary - Adult  Goal: Absence of urinary retention  8/22/2023 2122 by Meliton Rivera RN  Outcome: Not Progressing  8/22/2023 1544 by Bentley Adrian RN  Outcome: Progressing     Problem: Infection - Adult  Goal: Absence of infection during hospitalization  Outcome: Not Progressing     Problem: Metabolic/Fluid and Electrolytes - Adult  Goal: Electrolytes maintained within normal limits  Outcome: Not Progressing     Problem: Anxiety  Goal: Will report anxiety at manageable levels  Description: INTERVENTIONS:  1. Administer medication as ordered  2. Teach and rehearse alternative coping skills  3.

## 2023-08-23 NOTE — FLOWSHEET NOTE
Patient awakens from sleep and attempts to pull at lines in reach, barajas catheter, IV line unable to re-direct or re-orient at present time. 2 point soft wrist restraints continued for patient safety.

## 2023-08-23 NOTE — PLAN OF CARE
Problem: Discharge Planning  Goal: Discharge to home or other facility with appropriate resources  Outcome: Progressing     Problem: Pain  Goal: Verbalizes/displays adequate comfort level or baseline comfort level  8/23/2023 1203 by Shena Hamilton RN  Outcome: Progressing  Flowsheets  Taken 8/23/2023 0700 by Edwige Delong RN  Verbalizes/displays adequate comfort level or baseline comfort level:   Assess pain using appropriate pain scale   Administer analgesics based on type and severity of pain and evaluate response   Implement non-pharmacological measures as appropriate and evaluate response  Taken 8/23/2023 0600 by Edwige Delong RN  Verbalizes/displays adequate comfort level or baseline comfort level:   Assess pain using appropriate pain scale   Administer analgesics based on type and severity of pain and evaluate response   Implement non-pharmacological measures as appropriate and evaluate response  Taken 8/23/2023 0500 by Edwige Deolng RN  Verbalizes/displays adequate comfort level or baseline comfort level:   Assess pain using appropriate pain scale   Administer analgesics based on type and severity of pain and evaluate response   Implement non-pharmacological measures as appropriate and evaluate response  Taken 8/23/2023 0400 by Edwige Delong RN  Verbalizes/displays adequate comfort level or baseline comfort level:   Assess pain using appropriate pain scale   Administer analgesics based on type and severity of pain and evaluate response   Implement non-pharmacological measures as appropriate and evaluate response  Taken 8/23/2023 0300 by Edwige Delong RN  Verbalizes/displays adequate comfort level or baseline comfort level:   Assess pain using appropriate pain scale   Administer analgesics based on type and severity of pain and evaluate response   Implement non-pharmacological measures as appropriate and evaluate response  Taken 8/23/2023 0200 by Edwige Delong RN  Verbalizes/displays adequate

## 2023-08-23 NOTE — PROGRESS NOTES
Pharmacy Consultation Note  (Antibiotic Dosing and Monitoring)    Initial consult date: 8/16/23  Consulting physician/provider: Dr. Zina Coleman  Drug: Vancomycin  Indication: Pneumonia    Age/  Gender Height Weight IBW  Allergy Information   58 y.o./male 5' 11\" (180.3 cm) 220 lb 7.4 oz (100 kg)     Ideal body weight: 75.3 kg (166 lb 0.1 oz)  Adjusted ideal body weight: 95.8 kg (211 lb 3.3 oz)   Adhesive tape, Cymbalta [duloxetine hcl], Lidocaine, Lyrica [pregabalin], Strattera [atomoxetine], and Tramadol      Renal Function:  Recent Labs     08/21/23  1500 08/22/23  0452 08/23/23  0411   BUN 10 10 12   CREATININE 0.7 0.7 0.7         Intake/Output Summary (Last 24 hours) at 8/23/2023 1143  Last data filed at 8/23/2023 1100  Gross per 24 hour   Intake 1803.26 ml   Output 2026 ml   Net -222.74 ml         Vancomycin Monitoring:  Trough:    Recent Labs     08/21/23  1130   VANCOTROUGH 27.8*       Random:    Recent Labs     08/22/23  0452   VANCORANDOM 13.6         No results for input(s): [place] in the last 72 hours. Historical Cultures:  No results found for: ORG  No results for input(s): BC in the last 72 hours. Vancomycin Administration Times:  Recent vancomycin administrations                     vancomycin (VANCOCIN) 1500 mg in sodium chloride 0.9 % 250 mL IVPB (mg) 1,500 mg New Bag 08/23/23 0045     1,500 mg New Bag 08/22/23 1439    vancomycin (VANCOCIN) 2,000 mg in sodium chloride 0.9 % 500 mL IVPB (mg) 2,000 mg New Bag 08/21/23 0048     2,000 mg New Bag 08/20/23 1248                  Assessment:  Patient is a 60 y.o. male who has been initiated on vancomycin  Estimated Creatinine Clearance: 154 mL/min (based on SCr of 0.7 mg/dL).   Plan:  Continue vancomycin 1500mg q12h  Will check vancomycin levels as needed  Will continue to monitor renal function   Pharmacy to follow    Amado Brown, PharmD, BCPS, BCCCP 8/23/2023 11:43 AM
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
## 2023-08-23 NOTE — FLOWSHEET NOTE
Patient agitated, attempting to reach for lines/tubes, no evidence of learning present at this time. Bilateral soft wrist restraints continued for safety and protection of lines/tubes.

## 2023-08-23 NOTE — PLAN OF CARE
Problem: Pain  Goal: Verbalizes/displays adequate comfort level or baseline comfort level  8/23/2023 0109 by Vianey Desai RN  Outcome: Not Progressing  Flowsheets  Taken 8/22/2023 2300  Verbalizes/displays adequate comfort level or baseline comfort level:   Assess pain using appropriate pain scale   Administer analgesics based on type and severity of pain and evaluate response   Implement non-pharmacological measures as appropriate and evaluate response  Taken 8/22/2023 2200  Verbalizes/displays adequate comfort level or baseline comfort level:   Assess pain using appropriate pain scale   Administer analgesics based on type and severity of pain and evaluate response   Implement non-pharmacological measures as appropriate and evaluate response  8/22/2023 2122 by Vianey Desai RN  Outcome: Not Progressing  Flowsheets  Taken 8/22/2023 2100  Verbalizes/displays adequate comfort level or baseline comfort level:   Assess pain using appropriate pain scale   Implement non-pharmacological measures as appropriate and evaluate response   Administer analgesics based on type and severity of pain and evaluate response  Taken 8/22/2023 2000  Verbalizes/displays adequate comfort level or baseline comfort level:   Assess pain using appropriate pain scale   Administer analgesics based on type and severity of pain and evaluate response   Implement non-pharmacological measures as appropriate and evaluate response  8/22/2023 1544 by Rui North RN  Outcome: Progressing  Flowsheets  Taken 8/22/2023 0700 by Vianey Desai RN  Verbalizes/displays adequate comfort level or baseline comfort level:   Assess pain using appropriate pain scale   Administer analgesics based on type and severity of pain and evaluate response   Implement non-pharmacological measures as appropriate and evaluate response  Taken 8/22/2023 0600 by Vianey Desai RN  Verbalizes/displays adequate comfort level or baseline comfort level:   Assess pain using Progressing     Problem: Gastrointestinal - Adult  Goal: Maintains or returns to baseline bowel function  8/23/2023 0109 by Wil Mckeon RN  Outcome: Not Progressing  8/22/2023 2122 by Wil Mckeon RN  Outcome: Not Progressing  Flowsheets (Taken 8/22/2023 2000)  Maintains or returns to baseline bowel function: Assess bowel function  Goal: Maintains adequate nutritional intake  8/23/2023 0109 by Wil Mckeon RN  Outcome: Not Progressing  8/22/2023 2122 by Wil Mckeon RN  Outcome: Not Progressing     Problem: Genitourinary - Adult  Goal: Absence of urinary retention  8/23/2023 0109 by Wil Mckeon RN  Outcome: Progressing  8/22/2023 2122 by Wil Mckeon RN  Outcome: Not Progressing  8/22/2023 1544 by Bon Ortega RN  Outcome: Progressing     Problem: Infection - Adult  Goal: Absence of infection during hospitalization  8/23/2023 0109 by Wil Mckeon RN  Outcome: Not Progressing  8/22/2023 2122 by Wil Mckeon RN  Outcome: Not Progressing     Problem: Metabolic/Fluid and Electrolytes - Adult  Goal: Electrolytes maintained within normal limits  8/23/2023 0109 by Wil Mckeon RN  Outcome: Not Progressing  8/22/2023 2122 by Wil Mckeon RN  Outcome: Not Progressing     Problem: Anxiety  Goal: Will report anxiety at manageable levels  Description: INTERVENTIONS:  1. Administer medication as ordered  2. Teach and rehearse alternative coping skills  3.  Provide emotional support with 1:1 interaction with staff  8/23/2023 0109 by Wil Mckeon RN  Outcome: Not Progressing  8/22/2023 2122 by Wil Mckeon RN  Outcome: Not Progressing  Flowsheets (Taken 8/22/2023 2000)  Will report anxiety at manageable levels: Administer medication as ordered  8/22/2023 1544 by Bon Ortega RN  Outcome: Progressing     Problem: Nutrition Deficit:  Goal: Optimize nutritional status  8/23/2023 0109 by Wil Mckeon RN  Outcome: Not Progressing  8/22/2023 2122 by Wil Mckeon RN  Outcome: Not Progressing

## 2023-08-23 NOTE — FLOWSHEET NOTE
Patient continues to have agitation with attempts to reach for lines/tubes. No evidence of learning at this time, bilateral soft wrist restraints continued for safety and line/tube protection.

## 2023-08-24 ENCOUNTER — TELEPHONE (OUTPATIENT)
Dept: SURGERY | Age: 58
End: 2023-08-24

## 2023-08-24 ENCOUNTER — APPOINTMENT (OUTPATIENT)
Dept: GENERAL RADIOLOGY | Age: 58
DRG: 004 | End: 2023-08-24
Payer: COMMERCIAL

## 2023-08-24 LAB
AADO2: 122.5 MMHG
ALBUMIN SERPL-MCNC: 2 G/DL (ref 3.5–5.2)
ALP SERPL-CCNC: 66 U/L (ref 40–129)
ALT SERPL-CCNC: 13 U/L (ref 0–40)
ANION GAP SERPL CALCULATED.3IONS-SCNC: 11 MMOL/L (ref 7–16)
AST SERPL-CCNC: 21 U/L (ref 0–39)
B.E.: 4.9 MMOL/L (ref -3–3)
BACTERIA URNS QL MICRO: ABNORMAL
BASOPHILS # BLD: 0.06 K/UL (ref 0–0.2)
BASOPHILS NFR BLD: 1 % (ref 0–2)
BILIRUB SERPL-MCNC: 0.2 MG/DL (ref 0–1.2)
BILIRUB UR QL STRIP: NEGATIVE
BUN SERPL-MCNC: 15 MG/DL (ref 6–20)
CA-I BLD-SCNC: 1.1 MMOL/L (ref 1.15–1.33)
CALCIUM SERPL-MCNC: 7.9 MG/DL (ref 8.6–10.2)
CHLORIDE SERPL-SCNC: 101 MMOL/L (ref 98–107)
CLARITY UR: CLEAR
CO2 SERPL-SCNC: 25 MMOL/L (ref 22–29)
COHB: 0.3 % (ref 0–1.5)
COLOR UR: YELLOW
CREAT SERPL-MCNC: 0.7 MG/DL (ref 0.7–1.2)
CRITICAL: ABNORMAL
DATE ANALYZED: ABNORMAL
DATE LAST DOSE: ABNORMAL
DATE OF COLLECTION: ABNORMAL
EOSINOPHIL # BLD: 0.31 K/UL (ref 0.05–0.5)
EOSINOPHILS RELATIVE PERCENT: 3 % (ref 0–6)
ERYTHROCYTE [DISTWIDTH] IN BLOOD BY AUTOMATED COUNT: 15.3 % (ref 11.5–15)
FIO2: 40 %
GFR SERPL CREATININE-BSD FRML MDRD: >60 ML/MIN/1.73M2
GLUCOSE BLD-MCNC: 123 MG/DL (ref 74–99)
GLUCOSE BLD-MCNC: 125 MG/DL (ref 74–99)
GLUCOSE BLD-MCNC: 130 MG/DL (ref 74–99)
GLUCOSE BLD-MCNC: 148 MG/DL (ref 74–99)
GLUCOSE BLD-MCNC: 151 MG/DL (ref 74–99)
GLUCOSE BLD-MCNC: 161 MG/DL (ref 74–99)
GLUCOSE BLD-MCNC: 95 MG/DL (ref 74–99)
GLUCOSE SERPL-MCNC: 158 MG/DL (ref 74–99)
GLUCOSE UR STRIP-MCNC: NEGATIVE MG/DL
HCO3: 28.4 MMOL/L (ref 22–26)
HCT VFR BLD AUTO: 20.5 % (ref 37–54)
HCT VFR BLD AUTO: 23.7 % (ref 37–54)
HGB BLD-MCNC: 6.7 G/DL (ref 12.5–16.5)
HGB BLD-MCNC: 7.7 G/DL (ref 12.5–16.5)
HGB UR QL STRIP.AUTO: ABNORMAL
HHB: 2.6 % (ref 0–5)
IMM GRANULOCYTES # BLD AUTO: 0.25 K/UL (ref 0–0.58)
IMM GRANULOCYTES NFR BLD: 2 % (ref 0–5)
KETONES UR STRIP-MCNC: NEGATIVE MG/DL
LAB: ABNORMAL
LEUKOCYTE ESTERASE UR QL STRIP: NEGATIVE
LYMPHOCYTES NFR BLD: 2.81 K/UL (ref 1.5–4)
LYMPHOCYTES RELATIVE PERCENT: 26 % (ref 20–42)
Lab: ABNORMAL
MAGNESIUM SERPL-MCNC: 1.9 MG/DL (ref 1.6–2.6)
MCH RBC QN AUTO: 28.9 PG (ref 26–35)
MCHC RBC AUTO-ENTMCNC: 32.7 G/DL (ref 32–34.5)
MCV RBC AUTO: 88.4 FL (ref 80–99.9)
METHB: 0.2 % (ref 0–1.5)
MODE: AC
MONOCYTES NFR BLD: 1.3 K/UL (ref 0.1–0.95)
MONOCYTES NFR BLD: 12 % (ref 2–12)
NEUTROPHILS NFR BLD: 57 % (ref 43–80)
NEUTS SEG NFR BLD: 6.29 K/UL (ref 1.8–7.3)
NITRITE UR QL STRIP: NEGATIVE
O2 CONTENT: 11.4 ML/DL
O2 SATURATION: 97.4 % (ref 92–98.5)
O2HB: 96.9 % (ref 94–97)
OPERATOR ID: 2593
PATIENT TEMP: 37 C
PCO2: 37.3 MMHG (ref 35–45)
PEEP/CPAP: 10 CMH2O
PFO2: 2.75 MMHG/%
PH BLOOD GAS: 7.5 (ref 7.35–7.45)
PH UR STRIP: 6 [PH] (ref 5–9)
PHOSPHATE SERPL-MCNC: 2.2 MG/DL (ref 2.5–4.5)
PLATELET # BLD AUTO: 509 K/UL (ref 130–450)
PMV BLD AUTO: 9.3 FL (ref 7–12)
PO2: 109.8 MMHG (ref 75–100)
POTASSIUM SERPL-SCNC: 3.8 MMOL/L (ref 3.5–5)
PROT SERPL-MCNC: 5.1 G/DL (ref 6.4–8.3)
PROT UR STRIP-MCNC: 100 MG/DL
RBC # BLD AUTO: 2.32 M/UL (ref 3.8–5.8)
RBC #/AREA URNS HPF: ABNORMAL /HPF
RI(T): 1.12
RR MECHANICAL: 14 B/MIN
SODIUM SERPL-SCNC: 137 MMOL/L (ref 132–146)
SOURCE, BLOOD GAS: ABNORMAL
SP GR UR STRIP: >1.03 (ref 1–1.03)
THB: 8.2 G/DL (ref 11.5–16.5)
TIME ANALYZED: 507
TME LAST DOSE: ABNORMAL H
UROBILINOGEN UR STRIP-ACNC: 1 EU/DL (ref 0–1)
VANCOMYCIN DOSE: ABNORMAL MG
VANCOMYCIN TROUGH SERPL-MCNC: 21.6 UG/ML (ref 5–16)
VT MECHANICAL: 460 ML
WBC #/AREA URNS HPF: ABNORMAL /HPF
WBC OTHER # BLD: 11 K/UL (ref 4.5–11.5)

## 2023-08-24 PROCEDURE — 85025 COMPLETE CBC W/AUTO DIFF WBC: CPT

## 2023-08-24 PROCEDURE — 6360000002 HC RX W HCPCS: Performed by: STUDENT IN AN ORGANIZED HEALTH CARE EDUCATION/TRAINING PROGRAM

## 2023-08-24 PROCEDURE — 6360000002 HC RX W HCPCS: Performed by: SURGERY

## 2023-08-24 PROCEDURE — 6370000000 HC RX 637 (ALT 250 FOR IP)

## 2023-08-24 PROCEDURE — 71045 X-RAY EXAM CHEST 1 VIEW: CPT

## 2023-08-24 PROCEDURE — 81001 URINALYSIS AUTO W/SCOPE: CPT

## 2023-08-24 PROCEDURE — 6370000000 HC RX 637 (ALT 250 FOR IP): Performed by: STUDENT IN AN ORGANIZED HEALTH CARE EDUCATION/TRAINING PROGRAM

## 2023-08-24 PROCEDURE — P9016 RBC LEUKOCYTES REDUCED: HCPCS

## 2023-08-24 PROCEDURE — 85018 HEMOGLOBIN: CPT

## 2023-08-24 PROCEDURE — S5553 INSULIN LONG ACTING 5 U: HCPCS

## 2023-08-24 PROCEDURE — 36430 TRANSFUSION BLD/BLD COMPNT: CPT

## 2023-08-24 PROCEDURE — 86900 BLOOD TYPING SEROLOGIC ABO: CPT

## 2023-08-24 PROCEDURE — 2580000003 HC RX 258

## 2023-08-24 PROCEDURE — 94640 AIRWAY INHALATION TREATMENT: CPT

## 2023-08-24 PROCEDURE — 36592 COLLECT BLOOD FROM PICC: CPT

## 2023-08-24 PROCEDURE — 99291 CRITICAL CARE FIRST HOUR: CPT | Performed by: SURGERY

## 2023-08-24 PROCEDURE — 82805 BLOOD GASES W/O2 SATURATION: CPT

## 2023-08-24 PROCEDURE — 84100 ASSAY OF PHOSPHORUS: CPT

## 2023-08-24 PROCEDURE — 83735 ASSAY OF MAGNESIUM: CPT

## 2023-08-24 PROCEDURE — 02HV33Z INSERTION OF INFUSION DEVICE INTO SUPERIOR VENA CAVA, PERCUTANEOUS APPROACH: ICD-10-PCS | Performed by: SURGERY

## 2023-08-24 PROCEDURE — 6360000002 HC RX W HCPCS

## 2023-08-24 PROCEDURE — 85014 HEMATOCRIT: CPT

## 2023-08-24 PROCEDURE — 6370000000 HC RX 637 (ALT 250 FOR IP): Performed by: SURGERY

## 2023-08-24 PROCEDURE — 80202 ASSAY OF VANCOMYCIN: CPT

## 2023-08-24 PROCEDURE — 82962 GLUCOSE BLOOD TEST: CPT

## 2023-08-24 PROCEDURE — 2000000000 HC ICU R&B

## 2023-08-24 PROCEDURE — 2580000003 HC RX 258: Performed by: STUDENT IN AN ORGANIZED HEALTH CARE EDUCATION/TRAINING PROGRAM

## 2023-08-24 PROCEDURE — 80053 COMPREHEN METABOLIC PANEL: CPT

## 2023-08-24 PROCEDURE — 82330 ASSAY OF CALCIUM: CPT

## 2023-08-24 PROCEDURE — 94003 VENT MGMT INPAT SUBQ DAY: CPT

## 2023-08-24 RX ORDER — SODIUM CHLORIDE 9 MG/ML
INJECTION, SOLUTION INTRAVENOUS PRN
Status: DISCONTINUED | OUTPATIENT
Start: 2023-08-24 | End: 2023-08-25 | Stop reason: HOSPADM

## 2023-08-24 RX ADMIN — CEFEPIME 2000 MG: 2 INJECTION, POWDER, FOR SOLUTION INTRAVENOUS at 13:58

## 2023-08-24 RX ADMIN — PETROLATUM: 420 OINTMENT TOPICAL at 09:01

## 2023-08-24 RX ADMIN — FENTANYL CITRATE 100 MCG: 50 INJECTION INTRAMUSCULAR; INTRAVENOUS at 21:09

## 2023-08-24 RX ADMIN — LORAZEPAM 2 MG: 2 INJECTION INTRAMUSCULAR; INTRAVENOUS at 20:03

## 2023-08-24 RX ADMIN — OXYCODONE HYDROCHLORIDE 15 MG: 5 SOLUTION ORAL at 08:52

## 2023-08-24 RX ADMIN — NYSTATIN 500000 UNITS: 100000 SUSPENSION ORAL at 13:59

## 2023-08-24 RX ADMIN — CHLORHEXIDINE GLUCONATE 15 ML: 1.2 RINSE ORAL at 08:38

## 2023-08-24 RX ADMIN — MIDAZOLAM 2 MG: 1 INJECTION INTRAMUSCULAR; INTRAVENOUS at 00:54

## 2023-08-24 RX ADMIN — ENOXAPARIN SODIUM 40 MG: 100 INJECTION SUBCUTANEOUS at 08:44

## 2023-08-24 RX ADMIN — LABETALOL HYDROCHLORIDE 10 MG: 5 INJECTION INTRAVENOUS at 00:07

## 2023-08-24 RX ADMIN — SODIUM CHLORIDE, PRESERVATIVE FREE 10 ML: 5 INJECTION INTRAVENOUS at 20:29

## 2023-08-24 RX ADMIN — LABETALOL HYDROCHLORIDE 10 MG: 5 INJECTION INTRAVENOUS at 02:14

## 2023-08-24 RX ADMIN — CHLORHEXIDINE GLUCONATE 15 ML: 1.2 RINSE ORAL at 16:17

## 2023-08-24 RX ADMIN — IPRATROPIUM BROMIDE AND ALBUTEROL SULFATE 1 DOSE: .5; 2.5 SOLUTION RESPIRATORY (INHALATION) at 12:46

## 2023-08-24 RX ADMIN — LABETALOL HYDROCHLORIDE 10 MG: 5 INJECTION INTRAVENOUS at 17:17

## 2023-08-24 RX ADMIN — LANSOPRAZOLE 30 MG: 30 TABLET, ORALLY DISINTEGRATING, DELAYED RELEASE ORAL at 06:40

## 2023-08-24 RX ADMIN — IPRATROPIUM BROMIDE AND ALBUTEROL SULFATE 1 DOSE: .5; 2.5 SOLUTION RESPIRATORY (INHALATION) at 08:34

## 2023-08-24 RX ADMIN — BUPROPION HYDROCHLORIDE 75 MG: 75 TABLET, FILM COATED ORAL at 20:34

## 2023-08-24 RX ADMIN — POLYETHYLENE GLYCOL 3350 17 G: 17 POWDER, FOR SOLUTION ORAL at 20:34

## 2023-08-24 RX ADMIN — PETROLATUM: 420 OINTMENT TOPICAL at 20:45

## 2023-08-24 RX ADMIN — QUETIAPINE FUMARATE 200 MG: 100 TABLET ORAL at 20:27

## 2023-08-24 RX ADMIN — LORAZEPAM 2 MG: 2 INJECTION INTRAMUSCULAR; INTRAVENOUS at 11:26

## 2023-08-24 RX ADMIN — LORAZEPAM 2 MG: 2 INJECTION INTRAMUSCULAR; INTRAVENOUS at 22:37

## 2023-08-24 RX ADMIN — CHLORHEXIDINE GLUCONATE 15 ML: 1.2 RINSE ORAL at 04:26

## 2023-08-24 RX ADMIN — NYSTATIN 500000 UNITS: 100000 SUSPENSION ORAL at 08:49

## 2023-08-24 RX ADMIN — POLYVINYL ALCOHOL 1 DROP: 14 SOLUTION/ DROPS OPHTHALMIC at 04:26

## 2023-08-24 RX ADMIN — CEFEPIME 2000 MG: 2 INJECTION, POWDER, FOR SOLUTION INTRAVENOUS at 04:40

## 2023-08-24 RX ADMIN — SODIUM CHLORIDE, PRESERVATIVE FREE 10 ML: 5 INJECTION INTRAVENOUS at 08:57

## 2023-08-24 RX ADMIN — POLYVINYL ALCOHOL 1 DROP: 14 SOLUTION/ DROPS OPHTHALMIC at 23:37

## 2023-08-24 RX ADMIN — POTASSIUM BICARBONATE 40 MEQ: 782 TABLET, EFFERVESCENT ORAL at 08:40

## 2023-08-24 RX ADMIN — FUROSEMIDE 20 MG: 10 INJECTION, SOLUTION INTRAMUSCULAR; INTRAVENOUS at 08:54

## 2023-08-24 RX ADMIN — FLUOXETINE 20 MG: 10 TABLET, FILM COATED ORAL at 08:42

## 2023-08-24 RX ADMIN — LORAZEPAM 2 MG: 2 INJECTION INTRAMUSCULAR; INTRAVENOUS at 02:42

## 2023-08-24 RX ADMIN — INSULIN LISPRO 4 UNITS: 100 INJECTION, SOLUTION INTRAVENOUS; SUBCUTANEOUS at 20:28

## 2023-08-24 RX ADMIN — OXYCODONE HYDROCHLORIDE 15 MG: 5 SOLUTION ORAL at 23:37

## 2023-08-24 RX ADMIN — POLYVINYL ALCOHOL 1 DROP: 14 SOLUTION/ DROPS OPHTHALMIC at 20:35

## 2023-08-24 RX ADMIN — VANCOMYCIN HYDROCHLORIDE 1500 MG: 10 INJECTION, POWDER, LYOPHILIZED, FOR SOLUTION INTRAVENOUS at 00:01

## 2023-08-24 RX ADMIN — OXYCODONE HYDROCHLORIDE 15 MG: 5 SOLUTION ORAL at 04:25

## 2023-08-24 RX ADMIN — Medication 10 MG: at 20:28

## 2023-08-24 RX ADMIN — ENOXAPARIN SODIUM 40 MG: 100 INJECTION SUBCUTANEOUS at 20:28

## 2023-08-24 RX ADMIN — INSULIN LISPRO 4 UNITS: 100 INJECTION, SOLUTION INTRAVENOUS; SUBCUTANEOUS at 04:35

## 2023-08-24 RX ADMIN — METOCLOPRAMIDE HYDROCHLORIDE 5 MG: 5 INJECTION INTRAMUSCULAR; INTRAVENOUS at 16:00

## 2023-08-24 RX ADMIN — POLYETHYLENE GLYCOL 3350 17 G: 17 POWDER, FOR SOLUTION ORAL at 08:49

## 2023-08-24 RX ADMIN — METOCLOPRAMIDE HYDROCHLORIDE 5 MG: 5 INJECTION INTRAMUSCULAR; INTRAVENOUS at 04:25

## 2023-08-24 RX ADMIN — CALCIUM GLUCONATE 2000 MG: 98 INJECTION, SOLUTION INTRAVENOUS at 08:27

## 2023-08-24 RX ADMIN — CEFEPIME 2000 MG: 2 INJECTION, POWDER, FOR SOLUTION INTRAVENOUS at 20:45

## 2023-08-24 RX ADMIN — NYSTATIN 500000 UNITS: 100000 SUSPENSION ORAL at 20:34

## 2023-08-24 RX ADMIN — OXYCODONE HYDROCHLORIDE 15 MG: 5 SOLUTION ORAL at 12:44

## 2023-08-24 RX ADMIN — INSULIN GLARGINE-YFGN 10 UNITS: 100 INJECTION, SOLUTION SUBCUTANEOUS at 20:28

## 2023-08-24 RX ADMIN — CHLORHEXIDINE GLUCONATE 15 ML: 1.2 RINSE ORAL at 23:37

## 2023-08-24 RX ADMIN — IPRATROPIUM BROMIDE AND ALBUTEROL SULFATE 1 DOSE: .5; 2.5 SOLUTION RESPIRATORY (INHALATION) at 20:54

## 2023-08-24 RX ADMIN — LORAZEPAM 2 MG: 2 INJECTION INTRAMUSCULAR; INTRAVENOUS at 15:54

## 2023-08-24 RX ADMIN — FENTANYL CITRATE 100 MCG: 50 INJECTION INTRAMUSCULAR; INTRAVENOUS at 05:41

## 2023-08-24 RX ADMIN — METOCLOPRAMIDE HYDROCHLORIDE 5 MG: 5 INJECTION INTRAMUSCULAR; INTRAVENOUS at 20:34

## 2023-08-24 RX ADMIN — ACETAMINOPHEN 650 MG: 650 SOLUTION ORAL at 02:42

## 2023-08-24 RX ADMIN — SODIUM CHLORIDE, PRESERVATIVE FREE 10 ML: 5 INJECTION INTRAVENOUS at 20:36

## 2023-08-24 RX ADMIN — HEPARIN 300 UNITS: 100 SYRINGE at 20:35

## 2023-08-24 RX ADMIN — OXYCODONE HYDROCHLORIDE 15 MG: 5 SOLUTION ORAL at 16:18

## 2023-08-24 RX ADMIN — CHLORHEXIDINE GLUCONATE 15 ML: 1.2 RINSE ORAL at 20:28

## 2023-08-24 RX ADMIN — POLYVINYL ALCOHOL 1 DROP: 14 SOLUTION/ DROPS OPHTHALMIC at 08:38

## 2023-08-24 RX ADMIN — LANSOPRAZOLE 30 MG: 30 TABLET, ORALLY DISINTEGRATING, DELAYED RELEASE ORAL at 16:02

## 2023-08-24 RX ADMIN — OXYCODONE HYDROCHLORIDE 15 MG: 5 SOLUTION ORAL at 20:27

## 2023-08-24 RX ADMIN — NYSTATIN 500000 UNITS: 100000 SUSPENSION ORAL at 19:14

## 2023-08-24 RX ADMIN — BUPROPION HYDROCHLORIDE 75 MG: 75 TABLET, FILM COATED ORAL at 08:42

## 2023-08-24 RX ADMIN — IPRATROPIUM BROMIDE AND ALBUTEROL SULFATE 1 DOSE: .5; 2.5 SOLUTION RESPIRATORY (INHALATION) at 15:33

## 2023-08-24 RX ADMIN — POLYVINYL ALCOHOL 1 DROP: 14 SOLUTION/ DROPS OPHTHALMIC at 16:04

## 2023-08-24 RX ADMIN — CHLORHEXIDINE GLUCONATE 15 ML: 1.2 RINSE ORAL at 12:42

## 2023-08-24 RX ADMIN — POLYVINYL ALCOHOL 1 DROP: 14 SOLUTION/ DROPS OPHTHALMIC at 12:43

## 2023-08-24 RX ADMIN — LABETALOL HYDROCHLORIDE 10 MG: 5 INJECTION INTRAVENOUS at 15:53

## 2023-08-24 RX ADMIN — FUROSEMIDE 20 MG: 10 INJECTION, SOLUTION INTRAMUSCULAR; INTRAVENOUS at 19:14

## 2023-08-24 RX ADMIN — VANCOMYCIN HYDROCHLORIDE 1250 MG: 10 INJECTION, POWDER, LYOPHILIZED, FOR SOLUTION INTRAVENOUS at 19:20

## 2023-08-24 RX ADMIN — LABETALOL HYDROCHLORIDE 10 MG: 5 INJECTION INTRAVENOUS at 06:39

## 2023-08-24 RX ADMIN — QUETIAPINE FUMARATE 200 MG: 100 TABLET ORAL at 08:40

## 2023-08-24 RX ADMIN — METOCLOPRAMIDE HYDROCHLORIDE 5 MG: 5 INJECTION INTRAMUSCULAR; INTRAVENOUS at 10:55

## 2023-08-24 RX ADMIN — LORAZEPAM 2 MG: 2 INJECTION INTRAMUSCULAR; INTRAVENOUS at 06:50

## 2023-08-24 ASSESSMENT — PULMONARY FUNCTION TESTS
PIF_VALUE: 22
PIF_VALUE: 21
PIF_VALUE: 28
PIF_VALUE: 20
PIF_VALUE: 21
PIF_VALUE: 22
PIF_VALUE: 20
PIF_VALUE: 21
PIF_VALUE: 23
PIF_VALUE: 21
PIF_VALUE: 23
PIF_VALUE: 24
PIF_VALUE: 20
PIF_VALUE: 21
PIF_VALUE: 30
PIF_VALUE: 24
PIF_VALUE: 20
PIF_VALUE: 26
PIF_VALUE: 22
PIF_VALUE: 21
PIF_VALUE: 30
PIF_VALUE: 21
PIF_VALUE: 22

## 2023-08-24 ASSESSMENT — PAIN SCALES - GENERAL
PAINLEVEL_OUTOF10: 2
PAINLEVEL_OUTOF10: 4
PAINLEVEL_OUTOF10: 6
PAINLEVEL_OUTOF10: 0
PAINLEVEL_OUTOF10: 0
PAINLEVEL_OUTOF10: 4
PAINLEVEL_OUTOF10: 0
PAINLEVEL_OUTOF10: 3
PAINLEVEL_OUTOF10: 0
PAINLEVEL_OUTOF10: 4

## 2023-08-24 NOTE — PROGRESS NOTES
Vascular Access Procedure Note    Procedure Date:   8/24/2023    Pre-procedure Verification/Time-Out:  The proposed risks versus benefits of this procedure were discussed in detail by the physician with  daughter Neyda Hernández . telephone consent was obtained from daughterNeyda Relevant documentation was reviewed prior to procedure including signed consent form and medications. All necessary equipment for procedure is available at time of procedure yes. An audible time out was done at 0950AM by team members, correctly identifying patients name, medical record number, correct side, correct site, and correct procedure to be performed with registered nurse members of the procedure team all in agreement.         Indication for Procedure:   Reason for Insertion: other multi meds    ASA Assessment (Required for Moderate & Deep Sedation):  ASA 3 - Patient with moderate systemic disease with functional limitations    Procedure:   Reason for Consultation: power PICC line    Clinician Performing Procedure:   Louis Wong RN    Assistant:  none    Sedation:   Analgesia Used: lidocaine 1%    Procedure Details/Findings:  Catheter Montserratian Size: 5.5  Lot Number: [id number]  Product #: [id number]  Expiration Date: 06/30/2024  Maximum Barrier Precautions: cap, eye shield, full body drape, gloves, gown, handwashing, and mask  Skin Prep: chlorhexidine  Technique: modified seldinger and ultrasound guided with VPS  Attempts: 1  Exposed (cm): 1  Total (cm): 43  Placement Site: right brachial vein  Vessel Size: 0.75  Dressing: securement device, transparent dressing, and biopatch  Blood Return: Yes   Ultrasound Guidance: Yes   Arm Circumference Mid-Bicep (cm): 36  Chest X-Ray Ordered:vps tracking/ alphonso obtained  End Placement: svc/caj    Complications:   none     Post-operative Condition:  stable  Patient Tolerated Procedure: well     Comments/Post-operative Education:   Post Procedure Interventions: no blood pressure sign

## 2023-08-24 NOTE — PLAN OF CARE
Problem: Respiratory - Adult  Goal: Achieves optimal ventilation and oxygenation  8/24/2023 0847 by Opal Rod RCP  Outcome: Progressing

## 2023-08-24 NOTE — PROGRESS NOTES
Pharmacy Consultation Note  (Antibiotic Dosing and Monitoring)    Initial consult date: 8/16/23  Consulting physician/provider: Dr. Liz Bo  Drug: Vancomycin  Indication: Pneumonia    Age/  Gender Height Weight IBW  Allergy Information   58 y.o./male 5' 11\" (180.3 cm) 220 lb 7.4 oz (100 kg)     Ideal body weight: 75.3 kg (166 lb 0.1 oz)  Adjusted ideal body weight: 94.9 kg (209 lb 4.9 oz)   Adhesive tape, Cymbalta [duloxetine hcl], Lidocaine, Lyrica [pregabalin], Strattera [atomoxetine], and Tramadol      Renal Function:  Recent Labs     08/22/23  0452 08/23/23  0411 08/24/23  0500   BUN 10 12 15   CREATININE 0.7 0.7 0.7         Intake/Output Summary (Last 24 hours) at 8/24/2023 1401  Last data filed at 8/24/2023 1200  Gross per 24 hour   Intake 2684.71 ml   Output 3681 ml   Net -996.29 ml         Vancomycin Monitoring:  Trough:    Recent Labs     08/24/23  1145   VANCOTROUGH 21.6*     Random:    Recent Labs     08/22/23  0452   VANCORANDOM 13.6         No results for input(s): Kaelyn  in the last 72 hours. Historical Cultures:  No results found for: ORG  No results for input(s): BC in the last 72 hours. Vancomycin Administration Times:  Recent vancomycin administrations                     vancomycin (VANCOCIN) 1500 mg in sodium chloride 0.9 % 250 mL IVPB (mg) 1,500 mg New Bag 08/24/23 0001     1,500 mg New Bag 08/23/23 1426     1,500 mg New Bag  0045     1,500 mg New Bag 08/22/23 1439                  Assessment:  Patient is a 60 y.o. male who has been initiated on vancomycin  Estimated Creatinine Clearance: 153 mL/min (based on SCr of 0.7 mg/dL).   Trough today = 21.6 mcg/mL  Plan:  Adjust to vancomycin 1250 mg q12h   Will check vancomycin levels as needed  Will continue to monitor renal function   Pharmacy to follow    Glynn Castillo, PharmD, BCPS, BCCCP 8/24/2023 2:01 PM

## 2023-08-24 NOTE — PLAN OF CARE
Problem: Discharge Planning  Goal: Discharge to home or other facility with appropriate resources  8/23/2023 2353 by Darling Amaya RN  Outcome: Progressing  8/23/2023 1203 by Justine Daly RN  Outcome: Progressing     Problem: Pain  Goal: Verbalizes/displays adequate comfort level or baseline comfort level  8/23/2023 2353 by Darling Amaya RN  Outcome: Progressing  8/23/2023 1203 by Justine Daly RN  Outcome: Progressing  Flowsheets  Taken 8/23/2023 0700 by Speedy Mesa RN  Verbalizes/displays adequate comfort level or baseline comfort level:   Assess pain using appropriate pain scale   Administer analgesics based on type and severity of pain and evaluate response   Implement non-pharmacological measures as appropriate and evaluate response  Taken 8/23/2023 0600 by Speedy Mesa RN  Verbalizes/displays adequate comfort level or baseline comfort level:   Assess pain using appropriate pain scale   Administer analgesics based on type and severity of pain and evaluate response   Implement non-pharmacological measures as appropriate and evaluate response  Taken 8/23/2023 0500 by Speedy Mesa RN  Verbalizes/displays adequate comfort level or baseline comfort level:   Assess pain using appropriate pain scale   Administer analgesics based on type and severity of pain and evaluate response   Implement non-pharmacological measures as appropriate and evaluate response  Taken 8/23/2023 0400 by Speedy Mesa RN  Verbalizes/displays adequate comfort level or baseline comfort level:   Assess pain using appropriate pain scale   Administer analgesics based on type and severity of pain and evaluate response   Implement non-pharmacological measures as appropriate and evaluate response  Taken 8/23/2023 0300 by Speedy Mesa RN  Verbalizes/displays adequate comfort level or baseline comfort level:   Assess pain using appropriate pain scale   Administer analgesics based on type and severity of pain and evaluate 1203 by Ronaldo Ritchie RN  Outcome: Progressing     Problem: Safety - Medical Restraint  Goal: Remains free of injury from restraints (Restraint for Interference with Medical Device)  Description: INTERVENTIONS:  1. Determine that other, less restrictive measures have been tried or would not be effective before applying the restraint  2. Evaluate the patient's condition at the time of restraint application  3. Inform patient/family regarding the reason for restraint  4.  Q2H: Monitor safety, psychosocial status, comfort, nutrition and hydration  8/23/2023 2353 by Miller Glaser RN  Outcome: Progressing  Flowsheets  Taken 8/23/2023 2000 by Miller Glaser RN  Remains free of injury from restraints (restraint for interference with medical device):   Determine that other, less restrictive measures have been tried or would not be effective before applying the restraint   Evaluate the patient's condition at the time of restraint application   Inform patient/family regarding the reason for restraint   Every 2 hours: Monitor safety, psychosocial status, comfort, nutrition and hydration  Taken 8/23/2023 1600 by Floresita Gerard RN  Remains free of injury from restraints (restraint for interference with medical device): Every 2 hours: Monitor safety, psychosocial status, comfort, nutrition and hydration  8/23/2023 1203 by Ronaldo Ritchie RN  Outcome: Progressing  Flowsheets  Taken 8/23/2023 0800 by Ronaldo Ritchie RN  Remains free of injury from restraints (restraint for interference with medical device):   Determine that other, less restrictive measures have been tried or would not be effective before applying the restraint   Evaluate the patient's condition at the time of restraint application   Every 2 hours: Monitor safety, psychosocial status, comfort, nutrition and hydration  Taken 8/23/2023 0600 by Jay Jay Boyer RN  Remains free of injury from restraints (restraint for interference with medical device): Every 2

## 2023-08-24 NOTE — PROGRESS NOTES
08/24/23 1545   Patient Observation   Pulse (!) 102   SpO2 99 %   Breath Sounds   Breath Sounds Bilateral Diminished   Right Upper Lobe Diminished   Right Middle Lobe Diminished   Right Lower Lobe Diminished   Left Upper Lobe Diminished   Left Lower Lobe Diminished   Vent Information   Ventilator Day(s) 25   Ventilator -27   Equipment Changed (S)  Vent Circuit; Suction catheter; Other (comment)   Vent Mode AC/PRVC   Ventilator Settings   FiO2  40 %   PEEP/CPAP (cmH2O) 8   Pressure Support (cm H2O) 12 cm H2O   Vent Patient Data (Readings)   Vt (Measured) 779 mL   Peak Inspiratory Pressure (cmH2O) 21 cmH2O   Rate Measured 12 br/min   Minute Volume (L/min) 9.49 Liters   Mean Airway Pressure (cmH2O) 12 cmH20   Plateau Pressure (cm H2O) 21 cm H2O   Driving Pressure 13   I:E Ratio 1:2.00   Static Compliance (L/cm H2O) 39   Vent Alarm Settings   High Pressure (cmH2O) 50 cmH2O   Low Exhaled Vt (ml) 0 mL     Changed patient circuit and filter no issues

## 2023-08-24 NOTE — FLOWSHEET NOTE
Patient becomes restless and attempts to pull at lines in his reach, PEG Tube,barajas catheter. Re-directs for short period then repeats behavior, unable to re-orient at present time. 2 point soft wrist restraints continued for patient safety.

## 2023-08-24 NOTE — PROGRESS NOTES
DAILY VENTILATOR WEANING ASSESSMENT PERFORMED    P/FIO2 Ratio =275         (<100= do not Wean)                  Cs = 57                         (<32= Instability)  Plat. Pressure = 19  MV =9  RSBI =    Instabilities:       Cardiovascular =       CNS =       Respiratory = R1       Metabolic =    Parameters    no    Wean per protocol  yes    Ask Physician for a weaning plan yes    Additional Comments:     Performed by Tc Salcido RCP RRT      Reference Table:    Cardiovascular     CNS      1. Mean BP less than or equal to 75   1. Neuromuscular blockade  2. Heart Rate greater than 130   2. RASS of -3, -4, -5  3. Myocardial Ischemia    3. RASS of +3, +4  4. Mechanical Assist Device    4. ICP greater than 15 or             Intracranial Hypertension         Respiratory      Metabolic  1. PEEP equal to or greater than 10cm/H20  1. Temp. (8hrs) less than 95 or > 103  2. Respiratory Rate greater than 35   2. WBC < 5000 or > 42414  3. Minute Volume greater than 15L  4. pH less than 7.30  5.  Deteriorating chest X-ray

## 2023-08-24 NOTE — PLAN OF CARE
Problem: Discharge Planning  Goal: Discharge to home or other facility with appropriate resources  8/24/2023 0955 by Alphonso Rojas RN  Outcome: Progressing     Problem: Pain  Goal: Verbalizes/displays adequate comfort level or baseline comfort level  8/24/2023 0955 by Alphonso Rojas RN  Outcome: Progressing     Problem: Skin/Tissue Integrity  Goal: Absence of new skin breakdown  Description: 1. Monitor for areas of redness and/or skin breakdown  2. Assess vascular access sites hourly  3. Every 4-6 hours minimum:  Change oxygen saturation probe site  4. Every 4-6 hours:  If on nasal continuous positive airway pressure, respiratory therapy assess nares and determine need for appliance change or resting period.   8/24/2023 0955 by Alphonso Rojas RN  Outcome: Progressing     Problem: Safety - Adult  Goal: Free from fall injury  8/24/2023 0955 by Alphonso Rojas RN  Outcome: Progressing     Problem: ABCDS Injury Assessment  Goal: Absence of physical injury  8/24/2023 0955 by Alphonso Rojas RN  Outcome: Progressing     Problem: Neurosensory - Adult  Goal: Achieves stable or improved neurological status  8/24/2023 0955 by Alphonso Rojas RN  Outcome: Progressing     Problem: Neurosensory - Adult  Goal: Achieves maximal functionality and self care  8/24/2023 0955 by Alphonso Rojas RN  Outcome: Progressing     Problem: Respiratory - Adult  Goal: Achieves optimal ventilation and oxygenation  8/24/2023 0955 by Alphonso Rojas RN  Outcome: Progressing     Problem: Cardiovascular - Adult  Goal: Maintains optimal cardiac output and hemodynamic stability  8/24/2023 0955 by Alphonso Rojas RN  Outcome: Progressing     Problem: Skin/Tissue Integrity - Adult  Goal: Skin integrity remains intact  8/24/2023 0955 by Alphonso Rojas RN  Outcome: Progressing     Problem: Skin/Tissue Integrity - Adult  Goal: Incisions, wounds, or drain sites healing without S/S of infection  8/24/2023 0955 by Alphonso Rojas RN  Outcome: Progressing
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
## 2023-08-24 NOTE — FLOWSHEET NOTE
Pt continually agitated/restless, reaching for lines/tubes when unrestrained, soft restraints continued to bilateral wrists for patient safety, family aware, will reassess for earliest removal capability
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
## 2023-08-25 ENCOUNTER — APPOINTMENT (OUTPATIENT)
Dept: GENERAL RADIOLOGY | Age: 58
DRG: 004 | End: 2023-08-25
Payer: COMMERCIAL

## 2023-08-25 VITALS
DIASTOLIC BLOOD PRESSURE: 81 MMHG | RESPIRATION RATE: 16 BRPM | OXYGEN SATURATION: 99 % | HEART RATE: 97 BPM | WEIGHT: 264.33 LBS | HEIGHT: 71 IN | SYSTOLIC BLOOD PRESSURE: 165 MMHG | BODY MASS INDEX: 37.01 KG/M2 | TEMPERATURE: 99.3 F

## 2023-08-25 LAB
AADO2: 119.6 MMHG
ALBUMIN SERPL-MCNC: 2.4 G/DL (ref 3.5–5.2)
ALP SERPL-CCNC: 72 U/L (ref 40–129)
ALT SERPL-CCNC: 12 U/L (ref 0–40)
ANION GAP SERPL CALCULATED.3IONS-SCNC: 4 MMOL/L (ref 7–16)
AST SERPL-CCNC: 19 U/L (ref 0–39)
B.E.: 5.1 MMOL/L (ref -3–3)
BASOPHILS # BLD: 0.06 K/UL (ref 0–0.2)
BASOPHILS NFR BLD: 1 % (ref 0–2)
BILIRUB SERPL-MCNC: 0.2 MG/DL (ref 0–1.2)
BUN SERPL-MCNC: 17 MG/DL (ref 6–20)
CA-I BLD-SCNC: 1.12 MMOL/L (ref 1.15–1.33)
CALCIUM SERPL-MCNC: 8.3 MG/DL (ref 8.6–10.2)
CHLORIDE SERPL-SCNC: 96 MMOL/L (ref 98–107)
CO2 SERPL-SCNC: 33 MMOL/L (ref 22–29)
COHB: 0.3 % (ref 0–1.5)
CREAT SERPL-MCNC: 0.7 MG/DL (ref 0.7–1.2)
CRITICAL: ABNORMAL
DATE ANALYZED: ABNORMAL
DATE OF COLLECTION: ABNORMAL
EOSINOPHIL # BLD: 0.35 K/UL (ref 0.05–0.5)
EOSINOPHILS RELATIVE PERCENT: 3 % (ref 0–6)
ERYTHROCYTE [DISTWIDTH] IN BLOOD BY AUTOMATED COUNT: 14.9 % (ref 11.5–15)
FIO2: 40 %
GFR SERPL CREATININE-BSD FRML MDRD: >60 ML/MIN/1.73M2
GLUCOSE BLD-MCNC: 113 MG/DL (ref 74–99)
GLUCOSE BLD-MCNC: 135 MG/DL (ref 74–99)
GLUCOSE SERPL-MCNC: 113 MG/DL (ref 74–99)
HCO3: 28.5 MMOL/L (ref 22–26)
HCT VFR BLD AUTO: 24 % (ref 37–54)
HGB BLD-MCNC: 7.7 G/DL (ref 12.5–16.5)
HHB: 2.5 % (ref 0–5)
IMM GRANULOCYTES # BLD AUTO: 0.12 K/UL (ref 0–0.58)
IMM GRANULOCYTES NFR BLD: 1 % (ref 0–5)
LAB: ABNORMAL
LYMPHOCYTES NFR BLD: 2.65 K/UL (ref 1.5–4)
LYMPHOCYTES RELATIVE PERCENT: 24 % (ref 20–42)
Lab: ABNORMAL
MAGNESIUM SERPL-MCNC: 2 MG/DL (ref 1.6–2.6)
MCH RBC QN AUTO: 28.7 PG (ref 26–35)
MCHC RBC AUTO-ENTMCNC: 32.1 G/DL (ref 32–34.5)
MCV RBC AUTO: 89.6 FL (ref 80–99.9)
METHB: 0.2 % (ref 0–1.5)
MODE: ABNORMAL
MONOCYTES NFR BLD: 1.45 K/UL (ref 0.1–0.95)
MONOCYTES NFR BLD: 13 % (ref 2–12)
NEUTROPHILS NFR BLD: 59 % (ref 43–80)
NEUTS SEG NFR BLD: 6.5 K/UL (ref 1.8–7.3)
O2 CONTENT: 14.8 ML/DL
O2 SATURATION: 97.5 % (ref 92–98.5)
O2HB: 97 % (ref 94–97)
OPERATOR ID: 3342
PATIENT TEMP: 37 C
PCO2: 37.6 MMHG (ref 35–45)
PEEP/CPAP: 8 CMH2O
PFO2: 2.81 MMHG/%
PH BLOOD GAS: 7.5 (ref 7.35–7.45)
PHOSPHATE SERPL-MCNC: 2.3 MG/DL (ref 2.5–4.5)
PLATELET # BLD AUTO: 556 K/UL (ref 130–450)
PMV BLD AUTO: 9.4 FL (ref 7–12)
PO2: 112.4 MMHG (ref 75–100)
POTASSIUM SERPL-SCNC: 3.9 MMOL/L (ref 3.5–5)
PROT SERPL-MCNC: 5.8 G/DL (ref 6.4–8.3)
PS: 12 CMH20
RBC # BLD AUTO: 2.68 M/UL (ref 3.8–5.8)
RI(T): 1.06
SODIUM SERPL-SCNC: 133 MMOL/L (ref 132–146)
SOURCE, BLOOD GAS: ABNORMAL
THB: 10.7 G/DL (ref 11.5–16.5)
TIME ANALYZED: 529
WBC OTHER # BLD: 11.1 K/UL (ref 4.5–11.5)

## 2023-08-25 PROCEDURE — 6370000000 HC RX 637 (ALT 250 FOR IP)

## 2023-08-25 PROCEDURE — 84100 ASSAY OF PHOSPHORUS: CPT

## 2023-08-25 PROCEDURE — 2580000003 HC RX 258

## 2023-08-25 PROCEDURE — 99291 CRITICAL CARE FIRST HOUR: CPT | Performed by: SURGERY

## 2023-08-25 PROCEDURE — 85025 COMPLETE CBC W/AUTO DIFF WBC: CPT

## 2023-08-25 PROCEDURE — 6360000002 HC RX W HCPCS: Performed by: STUDENT IN AN ORGANIZED HEALTH CARE EDUCATION/TRAINING PROGRAM

## 2023-08-25 PROCEDURE — 6360000002 HC RX W HCPCS

## 2023-08-25 PROCEDURE — 6360000002 HC RX W HCPCS: Performed by: SURGERY

## 2023-08-25 PROCEDURE — 6370000000 HC RX 637 (ALT 250 FOR IP): Performed by: STUDENT IN AN ORGANIZED HEALTH CARE EDUCATION/TRAINING PROGRAM

## 2023-08-25 PROCEDURE — 94003 VENT MGMT INPAT SUBQ DAY: CPT

## 2023-08-25 PROCEDURE — 82962 GLUCOSE BLOOD TEST: CPT

## 2023-08-25 PROCEDURE — 83735 ASSAY OF MAGNESIUM: CPT

## 2023-08-25 PROCEDURE — 94640 AIRWAY INHALATION TREATMENT: CPT

## 2023-08-25 PROCEDURE — 80053 COMPREHEN METABOLIC PANEL: CPT

## 2023-08-25 PROCEDURE — 82330 ASSAY OF CALCIUM: CPT

## 2023-08-25 PROCEDURE — 82805 BLOOD GASES W/O2 SATURATION: CPT

## 2023-08-25 PROCEDURE — 6370000000 HC RX 637 (ALT 250 FOR IP): Performed by: SURGERY

## 2023-08-25 PROCEDURE — 71045 X-RAY EXAM CHEST 1 VIEW: CPT

## 2023-08-25 RX ORDER — ENOXAPARIN SODIUM 100 MG/ML
40 INJECTION SUBCUTANEOUS 2 TIMES DAILY
DISCHARGE
Start: 2023-08-25

## 2023-08-25 RX ORDER — CHLORHEXIDINE GLUCONATE 0.12 MG/ML
15 RINSE ORAL EVERY 4 HOURS
Qty: 1260 ML | Refills: 0 | DISCHARGE
Start: 2023-08-25 | End: 2023-09-08

## 2023-08-25 RX ORDER — OXYCODONE HCL 5 MG/5 ML
15 SOLUTION, ORAL ORAL EVERY 4 HOURS
Refills: 0 | Status: SHIPPED | DISCHARGE
Start: 2023-08-25 | End: 2023-08-28

## 2023-08-25 RX ORDER — BUPROPION HYDROCHLORIDE 75 MG/1
75 TABLET ORAL 2 TIMES DAILY
Qty: 60 TABLET | Refills: 3 | DISCHARGE
Start: 2023-08-25

## 2023-08-25 RX ORDER — QUETIAPINE FUMARATE 200 MG/1
200 TABLET, FILM COATED ORAL 2 TIMES DAILY
Qty: 60 TABLET | Refills: 3 | DISCHARGE
Start: 2023-08-25

## 2023-08-25 RX ORDER — FUROSEMIDE 10 MG/ML
20 INJECTION INTRAMUSCULAR; INTRAVENOUS 2 TIMES DAILY
DISCHARGE
Start: 2023-08-25

## 2023-08-25 RX ORDER — POLYETHYLENE GLYCOL 3350 17 G/17G
17 POWDER, FOR SOLUTION ORAL 2 TIMES DAILY
Qty: 527 G | Refills: 1 | DISCHARGE
Start: 2023-08-25 | End: 2023-09-24

## 2023-08-25 RX ORDER — METOCLOPRAMIDE 5 MG/1
5 TABLET ORAL 3 TIMES DAILY
Qty: 120 TABLET | Refills: 3 | DISCHARGE
Start: 2023-08-25

## 2023-08-25 RX ORDER — SODIUM CHLORIDE FOR INHALATION 3 %
4 VIAL, NEBULIZER (ML) INHALATION PRN
DISCHARGE
Start: 2023-08-25

## 2023-08-25 RX ORDER — IPRATROPIUM BROMIDE AND ALBUTEROL SULFATE 2.5; .5 MG/3ML; MG/3ML
3 SOLUTION RESPIRATORY (INHALATION)
Qty: 360 ML | DISCHARGE
Start: 2023-08-25

## 2023-08-25 RX ORDER — MECOBALAMIN 5000 MCG
10 TABLET,DISINTEGRATING ORAL NIGHTLY
DISCHARGE
Start: 2023-08-25

## 2023-08-25 RX ORDER — LORAZEPAM 2 MG/ML
2 INJECTION INTRAMUSCULAR EVERY 4 HOURS
Status: SHIPPED | DISCHARGE
Start: 2023-08-25 | End: 2023-08-30

## 2023-08-25 RX ORDER — ONDANSETRON 4 MG/1
4 TABLET, ORALLY DISINTEGRATING ORAL EVERY 8 HOURS PRN
DISCHARGE
Start: 2023-08-25

## 2023-08-25 RX ORDER — POLYVINYL ALCOHOL 14 MG/ML
1 SOLUTION/ DROPS OPHTHALMIC EVERY 4 HOURS
Refills: 4 | DISCHARGE
Start: 2023-08-25 | End: 2023-09-24

## 2023-08-25 RX ORDER — LANSOPRAZOLE 30 MG/1
30 TABLET, ORALLY DISINTEGRATING, DELAYED RELEASE ORAL
Qty: 30 TABLET | DISCHARGE
Start: 2023-08-25

## 2023-08-25 RX ORDER — FLUOXETINE 20 MG/1
20 TABLET, FILM COATED ORAL DAILY
Qty: 30 TABLET | Refills: 3 | DISCHARGE
Start: 2023-08-26

## 2023-08-25 RX ADMIN — HEPARIN 300 UNITS: 100 SYRINGE at 08:15

## 2023-08-25 RX ADMIN — FENTANYL CITRATE 100 MCG: 50 INJECTION INTRAMUSCULAR; INTRAVENOUS at 00:42

## 2023-08-25 RX ADMIN — IPRATROPIUM BROMIDE AND ALBUTEROL SULFATE 1 DOSE: .5; 2.5 SOLUTION RESPIRATORY (INHALATION) at 12:48

## 2023-08-25 RX ADMIN — FLUOXETINE 20 MG: 10 TABLET, FILM COATED ORAL at 08:20

## 2023-08-25 RX ADMIN — NYSTATIN 500000 UNITS: 100000 SUSPENSION ORAL at 13:03

## 2023-08-25 RX ADMIN — LANSOPRAZOLE 30 MG: 30 TABLET, ORALLY DISINTEGRATING, DELAYED RELEASE ORAL at 06:28

## 2023-08-25 RX ADMIN — LORAZEPAM 2 MG: 2 INJECTION INTRAMUSCULAR; INTRAVENOUS at 06:28

## 2023-08-25 RX ADMIN — POLYETHYLENE GLYCOL 3350 17 G: 17 POWDER, FOR SOLUTION ORAL at 08:20

## 2023-08-25 RX ADMIN — OXYCODONE HYDROCHLORIDE 15 MG: 5 SOLUTION ORAL at 04:29

## 2023-08-25 RX ADMIN — NYSTATIN 500000 UNITS: 100000 SUSPENSION ORAL at 08:15

## 2023-08-25 RX ADMIN — POTASSIUM BICARBONATE 40 MEQ: 782 TABLET, EFFERVESCENT ORAL at 08:16

## 2023-08-25 RX ADMIN — CEFEPIME 2000 MG: 2 INJECTION, POWDER, FOR SOLUTION INTRAVENOUS at 04:35

## 2023-08-25 RX ADMIN — POLYVINYL ALCOHOL 1 DROP: 14 SOLUTION/ DROPS OPHTHALMIC at 12:36

## 2023-08-25 RX ADMIN — POLYVINYL ALCOHOL 1 DROP: 14 SOLUTION/ DROPS OPHTHALMIC at 03:35

## 2023-08-25 RX ADMIN — QUETIAPINE FUMARATE 200 MG: 100 TABLET ORAL at 08:30

## 2023-08-25 RX ADMIN — LORAZEPAM 2 MG: 2 INJECTION INTRAMUSCULAR; INTRAVENOUS at 12:36

## 2023-08-25 RX ADMIN — VANCOMYCIN HYDROCHLORIDE 1250 MG: 10 INJECTION, POWDER, LYOPHILIZED, FOR SOLUTION INTRAVENOUS at 06:30

## 2023-08-25 RX ADMIN — LORAZEPAM 2 MG: 2 INJECTION INTRAMUSCULAR; INTRAVENOUS at 03:27

## 2023-08-25 RX ADMIN — METOCLOPRAMIDE HYDROCHLORIDE 5 MG: 5 INJECTION INTRAMUSCULAR; INTRAVENOUS at 04:29

## 2023-08-25 RX ADMIN — MIDAZOLAM 2 MG: 1 INJECTION INTRAMUSCULAR; INTRAVENOUS at 07:14

## 2023-08-25 RX ADMIN — OXYCODONE HYDROCHLORIDE 15 MG: 5 SOLUTION ORAL at 13:02

## 2023-08-25 RX ADMIN — SODIUM CHLORIDE, PRESERVATIVE FREE 10 ML: 5 INJECTION INTRAVENOUS at 08:14

## 2023-08-25 RX ADMIN — IPRATROPIUM BROMIDE AND ALBUTEROL SULFATE 1 DOSE: .5; 2.5 SOLUTION RESPIRATORY (INHALATION) at 14:50

## 2023-08-25 RX ADMIN — CEFEPIME 2000 MG: 2 INJECTION, POWDER, FOR SOLUTION INTRAVENOUS at 12:59

## 2023-08-25 RX ADMIN — IPRATROPIUM BROMIDE AND ALBUTEROL SULFATE 1 DOSE: .5; 2.5 SOLUTION RESPIRATORY (INHALATION) at 08:31

## 2023-08-25 RX ADMIN — CHLORHEXIDINE GLUCONATE 15 ML: 1.2 RINSE ORAL at 03:35

## 2023-08-25 RX ADMIN — FENTANYL CITRATE 100 MCG: 50 INJECTION INTRAMUSCULAR; INTRAVENOUS at 02:27

## 2023-08-25 RX ADMIN — PETROLATUM: 420 OINTMENT TOPICAL at 11:24

## 2023-08-25 RX ADMIN — ENOXAPARIN SODIUM 40 MG: 100 INJECTION SUBCUTANEOUS at 08:31

## 2023-08-25 RX ADMIN — FUROSEMIDE 20 MG: 10 INJECTION, SOLUTION INTRAMUSCULAR; INTRAVENOUS at 08:31

## 2023-08-25 RX ADMIN — METOCLOPRAMIDE HYDROCHLORIDE 5 MG: 5 INJECTION INTRAMUSCULAR; INTRAVENOUS at 12:04

## 2023-08-25 RX ADMIN — OXYCODONE HYDROCHLORIDE 15 MG: 5 SOLUTION ORAL at 08:15

## 2023-08-25 RX ADMIN — POLYVINYL ALCOHOL 1 DROP: 14 SOLUTION/ DROPS OPHTHALMIC at 08:02

## 2023-08-25 RX ADMIN — BUPROPION HYDROCHLORIDE 75 MG: 75 TABLET, FILM COATED ORAL at 08:20

## 2023-08-25 RX ADMIN — CHLORHEXIDINE GLUCONATE 15 ML: 1.2 RINSE ORAL at 08:30

## 2023-08-25 RX ADMIN — LABETALOL HYDROCHLORIDE 10 MG: 5 INJECTION INTRAVENOUS at 06:36

## 2023-08-25 RX ADMIN — CHLORHEXIDINE GLUCONATE 15 ML: 1.2 RINSE ORAL at 12:36

## 2023-08-25 ASSESSMENT — PULMONARY FUNCTION TESTS
PIF_VALUE: 21
PIF_VALUE: 22
PIF_VALUE: 22
PIF_VALUE: 20
PIF_VALUE: 20
PIF_VALUE: 21
PIF_VALUE: 20
PIF_VALUE: 21
PIF_VALUE: 20
PIF_VALUE: 21
PIF_VALUE: 21
PIF_VALUE: 22
PIF_VALUE: 20
PIF_VALUE: 22
PIF_VALUE: 21
PIF_VALUE: 20
PIF_VALUE: 21

## 2023-08-25 ASSESSMENT — PAIN SCALES - GENERAL
PAINLEVEL_OUTOF10: 1
PAINLEVEL_OUTOF10: 4
PAINLEVEL_OUTOF10: 1
PAINLEVEL_OUTOF10: 1
PAINLEVEL_OUTOF10: 6
PAINLEVEL_OUTOF10: 2
PAINLEVEL_OUTOF10: 2

## 2023-08-25 NOTE — FLOWSHEET NOTE
Patient continues to reach for medical devices and tubes. Attempts to verbally reorient/redirect or educate patient on importance towards continued care goals are unsuccessful at this time. 2pt bilateral soft wrist restraints maintained for patient safety. Family aware of indication and continuation. Will continue to attempt to reorient/redirect and assess for earliest possible safe removal from restraints.

## 2023-08-25 NOTE — PLAN OF CARE
Problem: Discharge Planning  Goal: Discharge to home or other facility with appropriate resources  Outcome: Completed     Problem: Pain  Goal: Verbalizes/displays adequate comfort level or baseline comfort level  Outcome: Completed     Problem: Skin/Tissue Integrity  Goal: Absence of new skin breakdown  Description: 1. Monitor for areas of redness and/or skin breakdown  2. Assess vascular access sites hourly  3. Every 4-6 hours minimum:  Change oxygen saturation probe site  4. Every 4-6 hours:  If on nasal continuous positive airway pressure, respiratory therapy assess nares and determine need for appliance change or resting period.   Outcome: Completed     Problem: Safety - Adult  Goal: Free from fall injury  Outcome: Completed     Problem: ABCDS Injury Assessment  Goal: Absence of physical injury  Outcome: Completed     Problem: Neurosensory - Adult  Goal: Achieves stable or improved neurological status  Outcome: Completed     Problem: Neurosensory - Adult  Goal: Achieves maximal functionality and self care  Outcome: Completed     Problem: Respiratory - Adult  Goal: Achieves optimal ventilation and oxygenation  8/25/2023 1553 by Elmira Boles RN  Outcome: Completed     Problem: Cardiovascular - Adult  Goal: Maintains optimal cardiac output and hemodynamic stability  Outcome: Completed     Problem: Cardiovascular - Adult  Goal: Absence of cardiac dysrhythmias or at baseline  Outcome: Completed     Problem: Skin/Tissue Integrity - Adult  Goal: Skin integrity remains intact  Outcome: Completed     Problem: Skin/Tissue Integrity - Adult  Goal: Incisions, wounds, or drain sites healing without S/S of infection  Outcome: Completed     Problem: Skin/Tissue Integrity - Adult  Goal: Oral mucous membranes remain intact  Outcome: Completed     Problem: Gastrointestinal - Adult  Goal: Minimal or absence of nausea and vomiting  Outcome: Completed     Problem: Gastrointestinal - Adult  Goal: Maintains or returns to

## 2023-08-25 NOTE — PLAN OF CARE
Problem: Respiratory - Adult  Goal: Achieves optimal ventilation and oxygenation  8/25/2023 0858 by Todd Storm RCP  Outcome: Progressing

## 2023-08-25 NOTE — PROGRESS NOTES
08/25/23 0841   Patient Observation   Pulse 89   SpO2 99 %   Breath Sounds   Breath Sounds Bilateral Clear;Diminished   Right Upper Lobe Clear;Diminished   Right Middle Lobe Clear;Diminished   Right Lower Lobe Clear;Diminished   Left Upper Lobe Clear;Diminished   Left Lower Lobe Clear;Diminished   Vent Information   Ventilator Day(s) 26   Ventilator -27   Vent Mode CPAP/PS   Ventilator Settings   FiO2  40 %   PEEP/CPAP (cmH2O) 8   Pressure Support (cm H2O) 12 cm H2O   Vent Patient Data (Readings)   Vt (Measured) 713 mL   Peak Inspiratory Pressure (cmH2O) 20 cmH2O   Rate Measured 19 br/min   Minute Volume (L/min) 10 Liters   Mean Airway Pressure (cmH2O) 13 cmH20   Plateau Pressure (cm H2O) 17 cm H2O   Driving Pressure 9   I:E Ratio 1:3.30   Static Compliance (L/cm H2O) 41   Vent Alarm Settings   High Pressure (cmH2O) 50 cmH2O   Low Exhaled Vt (ml) 0 mL   Weaning Parameters   Spontaneous Breathing Trial Complete No (comment)   Ve (S)  10   VT (S)  570   RSBI (S)  19   NIF (S)  -42   PaO2/FiO2 Ratio (S)  281     Patient didn't really follow commands just got numbers off psv 12 40 8

## 2023-08-25 NOTE — PROGRESS NOTES
Pharmacy Consultation Note  (Antibiotic Dosing and Monitoring)    Initial consult date: 8/16/23  Consulting physician/provider: Dr. Erin Carty  Drug: Vancomycin  Indication: Pneumonia    Age/  Gender Height Weight IBW  Allergy Information   58 y.o./male 5' 11\" (180.3 cm) 220 lb 7.4 oz (100 kg)     Ideal body weight: 75.3 kg (166 lb 0.1 oz)  Adjusted ideal body weight: 93.1 kg (205 lb 5.4 oz)   Adhesive tape, Cymbalta [duloxetine hcl], Lidocaine, Lyrica [pregabalin], Strattera [atomoxetine], and Tramadol      Renal Function:  Recent Labs     08/23/23  0411 08/24/23  0500 08/25/23  0600   BUN 12 15 17   CREATININE 0.7 0.7 0.7         Intake/Output Summary (Last 24 hours) at 8/25/2023 1051  Last data filed at 8/25/2023 0954  Gross per 24 hour   Intake 1942.43 ml   Output 3940 ml   Net -1997.57 ml         Vancomycin Monitoring:  Trough:    Recent Labs     08/24/23  1145   [address] 21.6*       Random:  No results for input(s): VANCORANDOM in the last 72 hours. No results for input(s): Mosie Goodwin in the last 72 hours. Historical Cultures:  No results found for: ORG  No results for input(s): BC in the last 72 hours. Vancomycin Administration Times:  Recent vancomycin administrations                     vancomycin (VANCOCIN) 1,250 mg in sodium chloride 0.9 % 250 mL IVPB (mg) 1,250 mg New Bag 08/25/23 0630     1,250 mg New Bag 08/24/23 1920    vancomycin (VANCOCIN) 1500 mg in sodium chloride 0.9 % 250 mL IVPB (mg) 1,500 mg New Bag 08/24/23 0001     1,500 mg New Bag 08/23/23 1426     1,500 mg New Bag  0045     1,500 mg New Bag 08/22/23 1439                  Assessment:  Patient is a 60 y.o. male who has been initiated on vancomycin  Estimated Creatinine Clearance: 150 mL/min (based on SCr of 0.7 mg/dL).     Plan:  Continue vancomycin 1250 mg q12h   Will check vancomycin levels as needed  Will continue to monitor renal function   Pharmacy to follow    Kristina Sprague PharmD, BCPS, BCCCP 8/25/2023 10:51 AM

## 2023-08-25 NOTE — PROGRESS NOTES
DAILY VENTILATOR WEANING ASSESSMENT PERFORMED    P/FIO2 Ratio =281          (<100= do not Wean)                  Cs =41                          (<32= Instability)  Plat. Pressure = 17  MV =10  RSBI =20    Instabilities:       Cardiovascular =       CNS =       Respiratory =       Metabolic =    Parameters    no    Wean per protocol  yes    Ask Physician for a weaning plan yes    Additional Comments: RSBI on these settings 14 Patient doesn't really follow commands these were done while on current settings. Patient supposed to transfer to [facility] at 2     Performed by Augustine Alvarez RCP RRT      Reference Table:    Cardiovascular     CNS      1. Mean BP less than or equal to 75   1. Neuromuscular blockade  2. Heart Rate greater than 130   2. RASS of -3, -4, -5  3. Myocardial Ischemia    3. RASS of +3, +4  4. Mechanical Assist Device    4. ICP greater than 15 or             Intracranial Hypertension         Respiratory      Metabolic  1. PEEP equal to or greater than 10cm/H20  1. Temp. (8hrs) less than 95 or > 103  2. Respiratory Rate greater than 35   2. WBC < 5000 or > 24176  3. Minute Volume greater than 15L  4. pH less than 7.30  5.  Deteriorating chest X-ray

## 2023-08-25 NOTE — FLOWSHEET NOTE
Report called to  [facility] . Transport Requested. The following patient belongings:  None were gathered and placed with patient on transfer upon transfer to SICU. Family notified by phone, updated on new unit and room number [phone].  Patient transfered with barajas catheter intact, d/t urinary retension

## 2023-08-25 NOTE — FLOWSHEET NOTE
Patient awakenes and attempts to pull at lines in reach, trach,barajas catheter,iv line unable to re-orient or re-direct at present time 2 point soft wrist restraints continued for patient safety.
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
## 2023-08-25 NOTE — DISCHARGE INSTRUCTIONS
TRAUMA SERVICES DISCHARGE INSTRUCTIONS    Call [phone], option 2, for any questions/concerns and for follow-up appointment in 2 week(s). Please follow the instructions checked below:    During the course of your workup, we identified an incidental finding of:    Please follow-up with your primary care provider. ACTIVITY INSTRUCTIONS  Increase activity as tolerated  No heavy lifting or strenuous activity  Take your incentive spirometer home and use 4-6 times/day   [x]  No driving     WOUND/DRESSING INSTRUCTIONS:  You may shower. No sitting in bath tub, hot tub or swimming until cleared by physician. Ice to areas of pain for first 24 hours. Heat to areas of pain after that. Wash areas of lacerations/abrasions with soap & water. Rinse well. Pat dry with clean towel. Apply thin layer of Bacitracin, Neosporin, or triple antibiotic cream to affected area 2-3 times per day. Keep wounds clean and dry. MEDICATION INSTRUCTIONS  Take medication as prescribed. When taking pain medications, you may experience dizziness or drowsiness. Do not drink alcohol or drive when taking these medications. You may experience constipation while taking pain medication. You may take over the counter stool softeners such as docusate (Colace), sennosides S (Senokot-S), or Miralax.   []  You may take Ibuprofen (over the counter) as directed for mild pain. --You may take up to 800mg every 8 hours for pain, please take with food or milk.   []  You may take acetaminophen (Tylenol) products. Do NOT take more than 4000mg of Tylenol in 24h. []  Do not take any other acetaminophen (Tylenol) products if you are taking Percocet or Norco, as these contain Tylenol. --Do NOT take more than 4000mg of Tylenol in 24h. OPIOID MEDICATION INSTRUCTIONS  Read the medication guide that is included with your prescription. Take your medication exactly as prescribed. Store medication away from children and in a safe place.   Do

## 2023-08-26 LAB
ABO/RH: NORMAL
ANTIBODY SCREEN: NEGATIVE
ARM BAND NUMBER: NORMAL
BLOOD BANK BLOOD PRODUCT EXPIRATION DATE: NORMAL
BLOOD BANK BLOOD PRODUCT EXPIRATION DATE: NORMAL
BLOOD BANK DISPENSE STATUS: NORMAL
BLOOD BANK ISBT PRODUCT BLOOD TYPE: 6200
BLOOD BANK ISBT PRODUCT BLOOD TYPE: 6200
BLOOD BANK PRODUCT CODE: NORMAL
BLOOD BANK PRODUCT CODE: NORMAL
BLOOD BANK SAMPLE EXPIRATION: NORMAL
BLOOD BANK UNIT TYPE AND RH: NORMAL
BLOOD BANK UNIT TYPE AND RH: NORMAL
BPU ID: NORMAL
COMPONENT: NORMAL
CROSSMATCH RESULT: NORMAL
TRANSFUSION STATUS: NORMAL
UNIT DIVISION: 0
UNIT ISSUE DATE/TIME: NORMAL
UNIT ISSUE DATE/TIME: NORMAL

## 2023-08-31 ENCOUNTER — HOSPITAL ENCOUNTER (OUTPATIENT)
Dept: NEUROLOGY | Age: 58
Discharge: HOME | End: 2023-08-31

## 2023-08-31 PROCEDURE — 95819 EEG AWAKE AND ASLEEP: CPT

## 2023-10-02 ENCOUNTER — OFFICE VISIT (OUTPATIENT)
Dept: SURGERY | Age: 58
End: 2023-10-02

## 2023-10-02 VITALS — OXYGEN SATURATION: 96 % | HEART RATE: 88 BPM | TEMPERATURE: 97.9 F

## 2023-10-02 DIAGNOSIS — S02.602G: Primary | ICD-10-CM

## 2023-10-20 ENCOUNTER — TELEPHONE (OUTPATIENT)
Dept: PRIMARY CARE CLINIC | Age: 58
End: 2023-10-20

## 2023-10-20 NOTE — TELEPHONE ENCOUNTER
Patient calling to make follow up from hospital, He was discharged from the Long term care facility last week and needs an apt. Unsure if its okay with you for the patient to wait until the 2nd to be seen. Patient is okay with waiting but wanted to run this by you first and see if you would like to see him Monday or Tuesday.

## 2023-10-23 NOTE — TELEPHONE ENCOUNTER
Patient did not answer.  LVM informing him that is it okay to keep his appointment 11/2/2023 as long as he's doing fine

## 2023-10-31 DIAGNOSIS — E11.65 UNCONTROLLED TYPE 2 DIABETES MELLITUS WITH HYPERGLYCEMIA (HCC): ICD-10-CM

## 2023-10-31 RX ORDER — SODIUM CHLORIDE FOR INHALATION 3 %
4 VIAL, NEBULIZER (ML) INHALATION PRN
Qty: 15 ML | Refills: 5 | Status: SHIPPED | OUTPATIENT
Start: 2023-10-31

## 2023-10-31 NOTE — TELEPHONE ENCOUNTER
Carlie  with [facility] called with results   Ph [phone]    Orthostatics bp line down 146/82    Hrt rate 84    Sitting bp  132/82    Hrt rate 83    Standing up bp 100/62  - reported dizziness     Hrt rate 85        Refills needed      sodium chloride, 1gram oral  3 times a day     Also needs test strips     [id number] [employer], [place] - [address] [facility]

## 2023-11-02 ENCOUNTER — OFFICE VISIT (OUTPATIENT)
Dept: PRIMARY CARE CLINIC | Age: 58
End: 2023-11-02
Payer: COMMERCIAL

## 2023-11-02 VITALS
WEIGHT: 200 LBS | OXYGEN SATURATION: 96 % | DIASTOLIC BLOOD PRESSURE: 60 MMHG | HEIGHT: 71 IN | TEMPERATURE: 96.8 F | SYSTOLIC BLOOD PRESSURE: 80 MMHG | HEART RATE: 96 BPM | BODY MASS INDEX: 28 KG/M2 | RESPIRATION RATE: 16 BRPM

## 2023-11-02 DIAGNOSIS — E11.65 UNCONTROLLED TYPE 2 DIABETES MELLITUS WITH HYPERGLYCEMIA (HCC): Primary | ICD-10-CM

## 2023-11-02 DIAGNOSIS — G89.29 CHRONIC BILATERAL LOW BACK PAIN WITHOUT SCIATICA: ICD-10-CM

## 2023-11-02 DIAGNOSIS — R06.03 ACUTE RESPIRATORY DISTRESS: ICD-10-CM

## 2023-11-02 DIAGNOSIS — S02.609S: ICD-10-CM

## 2023-11-02 DIAGNOSIS — G89.29 CHRONIC NECK PAIN: ICD-10-CM

## 2023-11-02 DIAGNOSIS — R79.89 ELEVATED FERRITIN: ICD-10-CM

## 2023-11-02 DIAGNOSIS — I95.1 ORTHOSTATIC HYPOTENSION: ICD-10-CM

## 2023-11-02 DIAGNOSIS — K21.9 GASTROESOPHAGEAL REFLUX DISEASE, UNSPECIFIED WHETHER ESOPHAGITIS PRESENT: ICD-10-CM

## 2023-11-02 DIAGNOSIS — M54.2 CHRONIC NECK PAIN: ICD-10-CM

## 2023-11-02 DIAGNOSIS — E11.65 UNCONTROLLED TYPE 2 DIABETES MELLITUS WITH HYPERGLYCEMIA (HCC): ICD-10-CM

## 2023-11-02 DIAGNOSIS — M54.50 CHRONIC BILATERAL LOW BACK PAIN WITHOUT SCIATICA: ICD-10-CM

## 2023-11-02 DIAGNOSIS — J69.0 ASPIRATION PNEUMONIA, UNSPECIFIED ASPIRATION PNEUMONIA TYPE, UNSPECIFIED LATERALITY, UNSPECIFIED PART OF LUNG (HCC): ICD-10-CM

## 2023-11-02 LAB — HBA1C MFR BLD: 6.4 %

## 2023-11-02 PROCEDURE — G8427 DOCREV CUR MEDS BY ELIG CLIN: HCPCS | Performed by: STUDENT IN AN ORGANIZED HEALTH CARE EDUCATION/TRAINING PROGRAM

## 2023-11-02 PROCEDURE — 4004F PT TOBACCO SCREEN RCVD TLK: CPT | Performed by: STUDENT IN AN ORGANIZED HEALTH CARE EDUCATION/TRAINING PROGRAM

## 2023-11-02 PROCEDURE — G8417 CALC BMI ABV UP PARAM F/U: HCPCS | Performed by: STUDENT IN AN ORGANIZED HEALTH CARE EDUCATION/TRAINING PROGRAM

## 2023-11-02 PROCEDURE — 2022F DILAT RTA XM EVC RTNOPTHY: CPT | Performed by: STUDENT IN AN ORGANIZED HEALTH CARE EDUCATION/TRAINING PROGRAM

## 2023-11-02 PROCEDURE — 99215 OFFICE O/P EST HI 40 MIN: CPT | Performed by: STUDENT IN AN ORGANIZED HEALTH CARE EDUCATION/TRAINING PROGRAM

## 2023-11-02 PROCEDURE — 3017F COLORECTAL CA SCREEN DOC REV: CPT | Performed by: STUDENT IN AN ORGANIZED HEALTH CARE EDUCATION/TRAINING PROGRAM

## 2023-11-02 PROCEDURE — 3044F HG A1C LEVEL LT 7.0%: CPT | Performed by: STUDENT IN AN ORGANIZED HEALTH CARE EDUCATION/TRAINING PROGRAM

## 2023-11-02 PROCEDURE — 83036 HEMOGLOBIN GLYCOSYLATED A1C: CPT | Performed by: STUDENT IN AN ORGANIZED HEALTH CARE EDUCATION/TRAINING PROGRAM

## 2023-11-02 PROCEDURE — G8484 FLU IMMUNIZE NO ADMIN: HCPCS | Performed by: STUDENT IN AN ORGANIZED HEALTH CARE EDUCATION/TRAINING PROGRAM

## 2023-11-02 RX ORDER — OMEPRAZOLE 40 MG/1
40 CAPSULE, DELAYED RELEASE ORAL
Qty: 30 CAPSULE | Refills: 0 | Status: SHIPPED | OUTPATIENT
Start: 2023-11-02

## 2023-11-02 RX ORDER — SODIUM BICARBONATE 650 MG/1
650 TABLET ORAL 2 TIMES DAILY
Qty: 60 TABLET | Refills: 11 | Status: SHIPPED | OUTPATIENT
Start: 2023-11-02 | End: 2024-11-01

## 2023-11-02 SDOH — ECONOMIC STABILITY: HOUSING INSECURITY
IN THE LAST 12 MONTHS, WAS THERE A TIME WHEN YOU DID NOT HAVE A STEADY PLACE TO SLEEP OR SLEPT IN A SHELTER (INCLUDING NOW)?: NO

## 2023-11-02 SDOH — ECONOMIC STABILITY: FOOD INSECURITY: WITHIN THE PAST 12 MONTHS, THE FOOD YOU BOUGHT JUST DIDN'T LAST AND YOU DIDN'T HAVE MONEY TO GET MORE.: OFTEN TRUE

## 2023-11-02 SDOH — ECONOMIC STABILITY: FOOD INSECURITY: WITHIN THE PAST 12 MONTHS, YOU WORRIED THAT YOUR FOOD WOULD RUN OUT BEFORE YOU GOT MONEY TO BUY MORE.: OFTEN TRUE

## 2023-11-02 SDOH — ECONOMIC STABILITY: INCOME INSECURITY: HOW HARD IS IT FOR YOU TO PAY FOR THE VERY BASICS LIKE FOOD, HOUSING, MEDICAL CARE, AND HEATING?: VERY HARD

## 2023-11-02 ASSESSMENT — COLUMBIA-SUICIDE SEVERITY RATING SCALE - C-SSRS
5. HAVE YOU STARTED TO WORK OUT OR WORKED OUT THE DETAILS OF HOW TO KILL YOURSELF? DO YOU INTEND TO CARRY OUT THIS PLAN?: NO
7. DID THIS OCCUR IN THE LAST THREE MONTHS: NO
1. WITHIN THE PAST MONTH, HAVE YOU WISHED YOU WERE DEAD OR WISHED YOU COULD GO TO SLEEP AND NOT WAKE UP?: NO
BASED ON RESPONSES TO C-SSRS QS 1-6, WHAT IS THE PATIENT'S OVERALL RISK RATING FOR SUICIDE: NO RISK
2. HAVE YOU ACTUALLY HAD ANY THOUGHTS OF KILLING YOURSELF?: NO
3. HAVE YOU BEEN THINKING ABOUT HOW YOU MIGHT KILL YOURSELF?: NO
6. HAVE YOU EVER DONE ANYTHING, STARTED TO DO ANYTHING, OR PREPARED TO DO ANYTHING TO END YOUR LIFE?: NO
4. HAVE YOU HAD THESE THOUGHTS AND HAD SOME INTENTION OF ACTING ON THEM?: NO

## 2023-11-02 ASSESSMENT — PATIENT HEALTH QUESTIONNAIRE - PHQ9
SUM OF ALL RESPONSES TO PHQ QUESTIONS 1-9: 19
8. MOVING OR SPEAKING SO SLOWLY THAT OTHER PEOPLE COULD HAVE NOTICED. OR THE OPPOSITE, BEING SO FIGETY OR RESTLESS THAT YOU HAVE BEEN MOVING AROUND A LOT MORE THAN USUAL: 3
7. TROUBLE CONCENTRATING ON THINGS, SUCH AS READING THE NEWSPAPER OR WATCHING TELEVISION: 2
SUM OF ALL RESPONSES TO PHQ9 QUESTIONS 1 & 2: 4
6. FEELING BAD ABOUT YOURSELF - OR THAT YOU ARE A FAILURE OR HAVE LET YOURSELF OR YOUR FAMILY DOWN: 2
10. IF YOU CHECKED OFF ANY PROBLEMS, HOW DIFFICULT HAVE THESE PROBLEMS MADE IT FOR YOU TO DO YOUR WORK, TAKE CARE OF THINGS AT HOME, OR GET ALONG WITH OTHER PEOPLE: 3
2. FEELING DOWN, DEPRESSED OR HOPELESS: 2
SUM OF ALL RESPONSES TO PHQ QUESTIONS 1-9: 20
1. LITTLE INTEREST OR PLEASURE IN DOING THINGS: 2
3. TROUBLE FALLING OR STAYING ASLEEP: 3
9. THOUGHTS THAT YOU WOULD BE BETTER OFF DEAD, OR OF HURTING YOURSELF: 1
SUM OF ALL RESPONSES TO PHQ QUESTIONS 1-9: 20
SUM OF ALL RESPONSES TO PHQ QUESTIONS 1-9: 20
4. FEELING TIRED OR HAVING LITTLE ENERGY: 3
5. POOR APPETITE OR OVEREATING: 2

## 2023-11-02 NOTE — PROGRESS NOTES
fully treated and he was also stabilized on mood stabilizers for his psychiatric disorders and then transferred to [facility] where he continued to improve and now is eating meals  -Currently, oxygen level stable, vitals stable, a1c very well controlled, did note very elevated transferrin, thus will get repeat and iron studies and also get baseline labs as below, really only has f/u with psych and sleep medicine, will need to have close f/u with him, wondering if he may need to see general surgery vs other however seems stable from this standpoint, GERD is not controlled and feels better on prilosec, thus switched to this, having issues with pain thus will refer to pain management, also orthostatic positive, likely from being bed ridden for such a long time, will start salt tablets and advised he stay well hydrated with close f/u  - [facility] (Primary Care Only)  - Ferritin; Future  - Comprehensive Metabolic Panel; Future  - CBC with Auto Differential; Future    2. Aspiration pneumonia, unspecified aspiration pneumonia type, unspecified laterality, unspecified part of lung ([address])  As above    3. Uncontrolled type 2 diabetes mellitus with hyperglycemia (HCC)  As above  - POCT glycosylated hemoglobin (Hb A1C)  - [facility] Referral to Social Work (Primary Care Only)  - Ferritin; Future  - Comprehensive Metabolic Panel; Future  - CBC with Auto Differential; Future    4. Elevated ferritin  As above  - [facility] Referral to Social Work (Primary Care Only)  - Ferritin; Future  - Comprehensive Metabolic Panel; Future  - CBC with Auto Differential; Future  - Iron and TIBC; Future    5. Gastroesophageal reflux disease, unspecified whether esophagitis present  As above  - omeprazole (PRILOSEC) 40 MG delayed release capsule; Take 1 capsule by mouth every morning (before breakfast)  Dispense: 30 capsule; Refill: 0    6. Orthostatic hypotension  As above  - sodium bicarbonate 650 MG tablet;  Take 1 tablet by mouth 2 times daily

## 2023-11-03 LAB
ABSOLUTE IMMATURE GRANULOCYTE: <0.03 K/UL (ref 0–0.58)
ALBUMIN SERPL-MCNC: 4.1 G/DL (ref 3.5–5.2)
ALP BLD-CCNC: 91 U/L (ref 40–129)
ALT SERPL-CCNC: 16 U/L (ref 0–40)
ANION GAP SERPL CALCULATED.3IONS-SCNC: 15 MMOL/L (ref 7–16)
AST SERPL-CCNC: 17 U/L (ref 0–39)
BASOPHILS ABSOLUTE: 0.12 K/UL (ref 0–0.2)
BASOPHILS RELATIVE PERCENT: 2 % (ref 0–2)
BILIRUB SERPL-MCNC: <0.2 MG/DL (ref 0–1.2)
BUN BLDV-MCNC: 24 MG/DL (ref 6–20)
CALCIUM SERPL-MCNC: 9.5 MG/DL (ref 8.6–10.2)
CHLORIDE BLD-SCNC: 102 MMOL/L (ref 98–107)
CO2: 21 MMOL/L (ref 22–29)
CREAT SERPL-MCNC: 0.9 MG/DL (ref 0.7–1.2)
EOSINOPHILS ABSOLUTE: 0.23 K/UL (ref 0.05–0.5)
EOSINOPHILS RELATIVE PERCENT: 3 % (ref 0–6)
FERRITIN: 1468 NG/ML
GFR SERPL CREATININE-BSD FRML MDRD: >60 ML/MIN/1.73M2
GLUCOSE BLD-MCNC: 242 MG/DL (ref 74–99)
HCT VFR BLD CALC: 39.7 % (ref 37–54)
HEMOGLOBIN: 12.1 G/DL (ref 12.5–16.5)
IMMATURE GRANULOCYTES: 0 % (ref 0–5)
IRON SATURATION: 26 % (ref 20–55)
IRON: 69 UG/DL (ref 59–158)
LYMPHOCYTES ABSOLUTE: 2.28 K/UL (ref 1.5–4)
LYMPHOCYTES RELATIVE PERCENT: 32 % (ref 20–42)
MCH RBC QN AUTO: 28.7 PG (ref 26–35)
MCHC RBC AUTO-ENTMCNC: 30.5 G/DL (ref 32–34.5)
MCV RBC AUTO: 94.1 FL (ref 80–99.9)
MONOCYTES ABSOLUTE: 0.67 K/UL (ref 0.1–0.95)
MONOCYTES RELATIVE PERCENT: 10 % (ref 2–12)
NEUTROPHILS ABSOLUTE: 3.71 K/UL (ref 1.8–7.3)
NEUTROPHILS RELATIVE PERCENT: 53 % (ref 43–80)
PDW BLD-RTO: 16.9 % (ref 11.5–15)
PLATELET # BLD: 478 K/UL (ref 130–450)
PMV BLD AUTO: 9.1 FL (ref 7–12)
POTASSIUM SERPL-SCNC: 4.8 MMOL/L (ref 3.5–5)
RBC # BLD: 4.22 M/UL (ref 3.8–5.8)
SODIUM BLD-SCNC: 138 MMOL/L (ref 132–146)
TOTAL IRON BINDING CAPACITY: 262 UG/DL (ref 250–450)
TOTAL PROTEIN: 7 G/DL (ref 6.4–8.3)
WBC # BLD: 7 K/UL (ref 4.5–11.5)

## 2023-11-07 ENCOUNTER — TELEPHONE (OUTPATIENT)
Dept: PRIMARY CARE CLINIC | Age: 58
End: 2023-11-07

## 2023-11-07 DIAGNOSIS — G89.29 CHRONIC BILATERAL LOW BACK PAIN WITHOUT SCIATICA: Primary | ICD-10-CM

## 2023-11-07 DIAGNOSIS — M54.2 CHRONIC NECK PAIN: ICD-10-CM

## 2023-11-07 DIAGNOSIS — G89.29 CHRONIC NECK PAIN: ICD-10-CM

## 2023-11-07 DIAGNOSIS — M54.50 CHRONIC BILATERAL LOW BACK PAIN WITHOUT SCIATICA: Primary | ICD-10-CM

## 2023-11-07 NOTE — TELEPHONE ENCOUNTER
Patient needs a different referral for another pain management. They are not able to see the patient. Please send another referral somewhere else.

## 2023-11-13 ENCOUNTER — TELEPHONE (OUTPATIENT)
Dept: FAMILY MEDICINE CLINIC | Age: 58
End: 2023-11-13

## 2023-11-13 NOTE — TELEPHONE ENCOUNTER
LSW made phone call to patient regarding social work referral.  Patient resides in own home with his 3 children, receives SSDI, Cash and food assistance, has PIP for gas but [employer] doesn't accept any of the budget programs.   Discussed upcoming [facility] transportation and rewards program.

## 2023-11-16 ENCOUNTER — OFFICE VISIT (OUTPATIENT)
Dept: PRIMARY CARE CLINIC | Age: 58
End: 2023-11-16
Payer: COMMERCIAL

## 2023-11-16 VITALS
RESPIRATION RATE: 18 BRPM | WEIGHT: 205 LBS | TEMPERATURE: 97.3 F | BODY MASS INDEX: 28.7 KG/M2 | HEIGHT: 71 IN | OXYGEN SATURATION: 95 % | HEART RATE: 87 BPM | DIASTOLIC BLOOD PRESSURE: 80 MMHG | SYSTOLIC BLOOD PRESSURE: 120 MMHG

## 2023-11-16 DIAGNOSIS — J01.90 ACUTE BACTERIAL SINUSITIS: Primary | ICD-10-CM

## 2023-11-16 DIAGNOSIS — M54.50 CHRONIC BILATERAL LOW BACK PAIN WITHOUT SCIATICA: ICD-10-CM

## 2023-11-16 DIAGNOSIS — E78.5 HYPERLIPIDEMIA, UNSPECIFIED HYPERLIPIDEMIA TYPE: ICD-10-CM

## 2023-11-16 DIAGNOSIS — G89.29 CHRONIC PAIN OF RIGHT KNEE: ICD-10-CM

## 2023-11-16 DIAGNOSIS — B96.89 ACUTE BACTERIAL SINUSITIS: Primary | ICD-10-CM

## 2023-11-16 DIAGNOSIS — M54.2 CHRONIC NECK PAIN: ICD-10-CM

## 2023-11-16 DIAGNOSIS — G89.29 CHRONIC NECK PAIN: ICD-10-CM

## 2023-11-16 DIAGNOSIS — S02.609S: ICD-10-CM

## 2023-11-16 DIAGNOSIS — R79.89 ELEVATED FERRITIN: ICD-10-CM

## 2023-11-16 DIAGNOSIS — G89.29 CHRONIC BILATERAL LOW BACK PAIN WITHOUT SCIATICA: ICD-10-CM

## 2023-11-16 DIAGNOSIS — M25.561 CHRONIC PAIN OF RIGHT KNEE: ICD-10-CM

## 2023-11-16 PROCEDURE — G8427 DOCREV CUR MEDS BY ELIG CLIN: HCPCS | Performed by: STUDENT IN AN ORGANIZED HEALTH CARE EDUCATION/TRAINING PROGRAM

## 2023-11-16 PROCEDURE — G8417 CALC BMI ABV UP PARAM F/U: HCPCS | Performed by: STUDENT IN AN ORGANIZED HEALTH CARE EDUCATION/TRAINING PROGRAM

## 2023-11-16 PROCEDURE — 4004F PT TOBACCO SCREEN RCVD TLK: CPT | Performed by: STUDENT IN AN ORGANIZED HEALTH CARE EDUCATION/TRAINING PROGRAM

## 2023-11-16 PROCEDURE — 99214 OFFICE O/P EST MOD 30 MIN: CPT | Performed by: STUDENT IN AN ORGANIZED HEALTH CARE EDUCATION/TRAINING PROGRAM

## 2023-11-16 PROCEDURE — G8484 FLU IMMUNIZE NO ADMIN: HCPCS | Performed by: STUDENT IN AN ORGANIZED HEALTH CARE EDUCATION/TRAINING PROGRAM

## 2023-11-16 PROCEDURE — 3017F COLORECTAL CA SCREEN DOC REV: CPT | Performed by: STUDENT IN AN ORGANIZED HEALTH CARE EDUCATION/TRAINING PROGRAM

## 2023-11-16 RX ORDER — ROSUVASTATIN CALCIUM 20 MG/1
20 TABLET, COATED ORAL NIGHTLY
Qty: 30 TABLET | Refills: 3 | Status: SHIPPED | OUTPATIENT
Start: 2023-11-16

## 2023-11-16 RX ORDER — AMOXICILLIN AND CLAVULANATE POTASSIUM 875; 125 MG/1; MG/1
1 TABLET, FILM COATED ORAL 2 TIMES DAILY
Qty: 14 TABLET | Refills: 0 | Status: SHIPPED | OUTPATIENT
Start: 2023-11-16 | End: 2023-11-23

## 2023-11-16 RX ORDER — NEBULIZER ACCESSORIES
1 KIT MISCELLANEOUS DAILY PRN
Qty: 1 KIT | Refills: 0 | Status: SHIPPED | OUTPATIENT
Start: 2023-11-16
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
## 2023-11-17 ENCOUNTER — TELEPHONE (OUTPATIENT)
Dept: PRIMARY CARE CLINIC | Age: 58
End: 2023-11-17

## 2023-11-17 DIAGNOSIS — R79.89 ELEVATED FERRITIN: Primary | ICD-10-CM

## 2023-11-17 LAB — FERRITIN: 1015 NG/ML

## 2023-11-17 NOTE — TELEPHONE ENCOUNTER
Patient states he is not seeing a blood DrFrannie cameron. Please advise what you would like him to do.

## 2023-11-22 ENCOUNTER — TELEPHONE (OUTPATIENT)
Dept: PRIMARY CARE CLINIC | Age: 58
End: 2023-11-22

## 2023-11-22 DIAGNOSIS — S02.609S: Primary | ICD-10-CM

## 2023-12-11 ENCOUNTER — OFFICE VISIT (OUTPATIENT)
Dept: SLEEP MEDICINE | Age: 58
End: 2023-12-11
Payer: COMMERCIAL

## 2023-12-11 VITALS
SYSTOLIC BLOOD PRESSURE: 106 MMHG | RESPIRATION RATE: 18 BRPM | WEIGHT: 216.49 LBS | HEIGHT: 71 IN | OXYGEN SATURATION: 94 % | BODY MASS INDEX: 30.31 KG/M2 | DIASTOLIC BLOOD PRESSURE: 71 MMHG | HEART RATE: 88 BPM

## 2023-12-11 DIAGNOSIS — G47.31 CENTRAL SLEEP APNEA: Primary | ICD-10-CM

## 2023-12-11 DIAGNOSIS — F51.04 CHRONIC INSOMNIA: ICD-10-CM

## 2023-12-11 PROCEDURE — G8484 FLU IMMUNIZE NO ADMIN: HCPCS | Performed by: INTERNAL MEDICINE

## 2023-12-11 PROCEDURE — 3017F COLORECTAL CA SCREEN DOC REV: CPT | Performed by: INTERNAL MEDICINE

## 2023-12-11 PROCEDURE — 1036F TOBACCO NON-USER: CPT | Performed by: INTERNAL MEDICINE

## 2023-12-11 PROCEDURE — G8417 CALC BMI ABV UP PARAM F/U: HCPCS | Performed by: INTERNAL MEDICINE

## 2023-12-11 PROCEDURE — 99214 OFFICE O/P EST MOD 30 MIN: CPT | Performed by: INTERNAL MEDICINE

## 2023-12-11 PROCEDURE — G8428 CUR MEDS NOT DOCUMENT: HCPCS | Performed by: INTERNAL MEDICINE

## 2023-12-11 RX ORDER — CLONAZEPAM 0.5 MG/1
0.5 TABLET ORAL DAILY PRN
COMMUNITY
Start: 2023-11-13

## 2023-12-11 ASSESSMENT — ENCOUNTER SYMPTOMS
EYES NEGATIVE: 1
ALLERGIC/IMMUNOLOGIC NEGATIVE: 1
RESPIRATORY NEGATIVE: 1
GASTROINTESTINAL NEGATIVE: 1

## 2023-12-11 ASSESSMENT — SLEEP AND FATIGUE QUESTIONNAIRES
HOW LIKELY ARE YOU TO NOD OFF OR FALL ASLEEP WHEN YOU ARE A PASSENGER IN A CAR FOR AN HOUR WITHOUT A BREAK: 1
HOW LIKELY ARE YOU TO NOD OFF OR FALL ASLEEP WHILE WATCHING TV: 1
HOW LIKELY ARE YOU TO NOD OFF OR FALL ASLEEP WHILE SITTING AND TALKING TO SOMEONE: 3
HOW LIKELY ARE YOU TO NOD OFF OR FALL ASLEEP WHILE SITTING INACTIVE IN A PUBLIC PLACE: 1
HOW LIKELY ARE YOU TO NOD OFF OR FALL ASLEEP WHILE SITTING AND READING: 1
HOW LIKELY ARE YOU TO NOD OFF OR FALL ASLEEP WHILE LYING DOWN TO REST IN THE AFTERNOON WHEN CIRCUMSTANCES PERMIT: 0
HOW LIKELY ARE YOU TO NOD OFF OR FALL ASLEEP WHILE SITTING QUIETLY AFTER LUNCH WITHOUT ALCOHOL: 2
HOW LIKELY ARE YOU TO NOD OFF OR FALL ASLEEP IN A CAR, WHILE STOPPED FOR A FEW MINUTES IN TRAFFIC: 0
ESS TOTAL SCORE: 9

## 2023-12-11 NOTE — PROGRESS NOTES
solution Take 4 mLs by nebulization as needed for Cough or Other (secretions) 15 mL 5    buPROPion (WELLBUTRIN) 75 MG tablet Take 1 tablet by mouth 2 times daily 60 tablet 3    FLUoxetine (PROZAC) 20 MG tablet Take 1 tablet by mouth daily 30 tablet 3    ondansetron (ZOFRAN-ODT) 4 MG disintegrating tablet Take 1 tablet by mouth every 8 hours as needed for Nausea or Vomiting      QUEtiapine (SEROQUEL) 200 MG tablet 1 tablet by PEG Tube route 2 times daily 60 tablet 3    melatonin 5 MG TBDP disintegrating tablet Take 2 tablets by mouth nightly      Respiratory Therapy Supplies (FULL KIT NEBULIZER SET) MISC Use as directed with nebulized medication. 1 each 0    buPROPion (WELLBUTRIN XL) 150 MG extended release tablet Take 1 tablet by mouth daily      insulin lispro (HUMALOG) 100 UNIT/ML SOLN injection vial Use via insulin pump.  Max 100 units per day 30 mL 5    FLUoxetine (PROZAC) 20 MG capsule Take 1 capsule by mouth daily      nicotine polacrilex (NICORETTE) 2 MG gum Take 1 each by mouth as needed for Smoking cessation (Patient taking differently: Take 1 each by mouth as needed for Smoking cessation Does  not use often) 110 each 3    nicotine (NICODERM CQ) 14 MG/24HR Place 1 patch onto the skin daily (Patient taking differently: Place 1 patch onto the skin daily Does not useconsistantly) 14 patch 5    Dulaglutide 4.5 MG/0.5ML SOPN Inject 4.5 mg into the skin once a week 4 Adjustable Dose Pre-filled Pen Syringe 1    QUEtiapine (SEROQUEL) 25 MG tablet Take 1 tablet by mouth daily (Patient taking differently: Take 1 tablet by mouth 2 times daily) 60 tablet 3    insulin glargine (LANTUS SOLOSTAR) 100 UNIT/ML injection pen 24 units at night 5 Adjustable Dose Pre-filled Pen Syringe 5    ibuprofen (ADVIL;MOTRIN) 600 MG tablet take 1 tablet by mouth every 6 to 8 hours if needed      senna (SENOKOT) 8.6 MG tablet Take 1 tablet by mouth daily 30 tablet 5    aspirin EC 81 MG EC tablet Take 1 tablet by mouth daily 90 tablet 1

## 2023-12-12 ENCOUNTER — TELEPHONE (OUTPATIENT)
Dept: SLEEP CENTER | Age: 58
End: 2023-12-12

## 2023-12-12 DIAGNOSIS — G47.39 TREATMENT-EMERGENT CENTRAL SLEEP APNEA: Primary | ICD-10-CM

## 2024-01-05 ENCOUNTER — TELEPHONE (OUTPATIENT)
Dept: SLEEP MEDICINE | Age: 59
End: 2024-01-05

## 2024-01-05 NOTE — TELEPHONE ENCOUNTER
Call to patient, YARELI that we were trying to reach him because we received notice from the sleep lab that they had attempted numerous times to reach with no success to schedule patient and he needed to do an order from Dr. Calero first before the sleep study. They were putting his order in the inactive file. Left patient our call back number.

## 2024-01-09 ENCOUNTER — OFFICE VISIT (OUTPATIENT)
Dept: ONCOLOGY | Age: 59
End: 2024-01-09
Payer: COMMERCIAL

## 2024-01-09 ENCOUNTER — HOSPITAL ENCOUNTER (OUTPATIENT)
Dept: INFUSION THERAPY | Age: 59
Discharge: HOME | End: 2024-01-09
Payer: COMMERCIAL

## 2024-01-09 VITALS
BODY MASS INDEX: 29.4 KG/M2 | OXYGEN SATURATION: 95 % | TEMPERATURE: 98 F | WEIGHT: 210 LBS | DIASTOLIC BLOOD PRESSURE: 64 MMHG | HEIGHT: 71 IN | HEART RATE: 96 BPM | SYSTOLIC BLOOD PRESSURE: 106 MMHG

## 2024-01-09 DIAGNOSIS — D64.9 ANEMIA, UNSPECIFIED TYPE: ICD-10-CM

## 2024-01-09 DIAGNOSIS — D75.839 THROMBOCYTOSIS: ICD-10-CM

## 2024-01-09 DIAGNOSIS — D64.9 ANEMIA, UNSPECIFIED TYPE: Primary | ICD-10-CM

## 2024-01-09 DIAGNOSIS — R79.89 HIGH SERUM FERRITIN: ICD-10-CM

## 2024-01-09 LAB
BASOPHILS # BLD: 0.09 K/UL (ref 0–0.2)
BASOPHILS NFR BLD: 1 % (ref 0–2)
EOSINOPHIL # BLD: 0.24 K/UL (ref 0.05–0.5)
EOSINOPHILS RELATIVE PERCENT: 3 % (ref 0–6)
ERYTHROCYTE [DISTWIDTH] IN BLOOD BY AUTOMATED COUNT: 13.2 % (ref 11.5–15)
FERRITIN SERPL-MCNC: 650 NG/ML
FOLATE SERPL-MCNC: 13.1 NG/ML (ref 4.8–24.2)
HCT VFR BLD AUTO: 41.9 % (ref 37–54)
HGB BLD-MCNC: 13.9 G/DL (ref 12.5–16.5)
IMM GRANULOCYTES # BLD AUTO: 0.04 K/UL (ref 0–0.58)
IMM GRANULOCYTES NFR BLD: 1 % (ref 0–5)
IMM RETICS NFR: 6.6 % (ref 2.3–13.4)
LYMPHOCYTES NFR BLD: 3.42 K/UL (ref 1.5–4)
LYMPHOCYTES RELATIVE PERCENT: 47 % (ref 20–42)
MCH RBC QN AUTO: 28.5 PG (ref 26–35)
MCHC RBC AUTO-ENTMCNC: 33.2 G/DL (ref 32–34.5)
MCV RBC AUTO: 86 FL (ref 80–99.9)
MONOCYTES NFR BLD: 0.67 K/UL (ref 0.1–0.95)
MONOCYTES NFR BLD: 9 % (ref 2–12)
NEUTROPHILS NFR BLD: 39 % (ref 43–80)
NEUTS SEG NFR BLD: 2.81 K/UL (ref 1.8–7.3)
PATH REV BLD -IMP: NORMAL
PLATELET, FLUORESCENCE: 464 K/UL (ref 130–450)
PMV BLD AUTO: 9 FL (ref 7–12)
RBC # BLD AUTO: 4.87 M/UL (ref 3.8–5.8)
RETIC HEMOGLOBIN: 32.8 PG (ref 28.2–36.6)
RETICS # AUTO: 0.08 M/UL
RETICS/RBC NFR AUTO: 1.6 % (ref 0.4–1.9)
SEND OUT REPORT: NORMAL
SEND OUT REPORT: NORMAL
TEST NAME: NORMAL
TEST NAME: NORMAL
VIT B12 SERPL-MCNC: 559 PG/ML (ref 211–946)
WBC OTHER # BLD: 7.3 K/UL (ref 4.5–11.5)

## 2024-01-09 PROCEDURE — 85045 AUTOMATED RETICULOCYTE COUNT: CPT

## 2024-01-09 PROCEDURE — 99205 OFFICE O/P NEW HI 60 MIN: CPT | Performed by: INTERNAL MEDICINE

## 2024-01-09 PROCEDURE — G8427 DOCREV CUR MEDS BY ELIG CLIN: HCPCS | Performed by: INTERNAL MEDICINE

## 2024-01-09 PROCEDURE — 82607 VITAMIN B-12: CPT

## 2024-01-09 PROCEDURE — 99214 OFFICE O/P EST MOD 30 MIN: CPT

## 2024-01-09 PROCEDURE — G8417 CALC BMI ABV UP PARAM F/U: HCPCS | Performed by: INTERNAL MEDICINE

## 2024-01-09 PROCEDURE — 84165 PROTEIN E-PHORESIS SERUM: CPT

## 2024-01-09 PROCEDURE — 82746 ASSAY OF FOLIC ACID SERUM: CPT

## 2024-01-09 PROCEDURE — 3017F COLORECTAL CA SCREEN DOC REV: CPT | Performed by: INTERNAL MEDICINE

## 2024-01-09 PROCEDURE — G8484 FLU IMMUNIZE NO ADMIN: HCPCS | Performed by: INTERNAL MEDICINE

## 2024-01-09 PROCEDURE — 82728 ASSAY OF FERRITIN: CPT

## 2024-01-09 PROCEDURE — 83540 ASSAY OF IRON: CPT

## 2024-01-09 PROCEDURE — 84630 ASSAY OF ZINC: CPT

## 2024-01-09 PROCEDURE — 1036F TOBACCO NON-USER: CPT | Performed by: INTERNAL MEDICINE

## 2024-01-09 PROCEDURE — 36415 COLL VENOUS BLD VENIPUNCTURE: CPT

## 2024-01-09 PROCEDURE — 83550 IRON BINDING TEST: CPT

## 2024-01-09 PROCEDURE — 82525 ASSAY OF COPPER: CPT

## 2024-01-09 PROCEDURE — 81256 HFE GENE: CPT

## 2024-01-09 PROCEDURE — 85025 COMPLETE CBC W/AUTO DIFF WBC: CPT

## 2024-01-09 PROCEDURE — 84155 ASSAY OF PROTEIN SERUM: CPT
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
## 2024-01-10 LAB
ALBUMIN SERPL-MCNC: 3.5 G/DL (ref 3.5–4.7)
ALPHA1 GLOB SERPL ELPH-MCNC: 0.2 G/DL (ref 0.2–0.4)
ALPHA2 GLOB SERPL ELPH-MCNC: 1.1 G/DL (ref 0.5–1)
B-GLOBULIN SERPL ELPH-MCNC: 1.2 G/DL (ref 0.8–1.3)
GAMMA GLOB SERPL ELPH-MCNC: 0.9 G/DL (ref 0.7–1.6)
IRON SATN MFR SERPL: 29 % (ref 20–55)
IRON SERPL-MCNC: 85 UG/DL (ref 59–158)
PATHOLOGIST: ABNORMAL
PROT PATTERN SERPL ELPH-IMP: ABNORMAL
PROT SERPL-MCNC: 6.9 G/DL (ref 6.4–8.3)
TIBC SERPL-MCNC: 289 UG/DL (ref 250–450)

## 2024-01-12 LAB
COPPER SERPL-MCNC: 151.2 UG/DL (ref 70–140)
ZINC SERPL-MCNC: 75.3 UG/DL (ref 60–120)

## 2024-01-14 LAB
C282Y HEMOCHROMATOSIS MUT: NEGATIVE
H63D HEMOCHROMATOSIS MUT: NEGATIVE
HEMOCHROMATOSIS MUTATION INT: NORMAL
HEMOCHROMATOSIS SPECIMEN: NORMAL
S65C HEMOCHROMATOSIS MUT: NEGATIVE

## 2024-01-15 LAB
SEND OUT REPORT: NORMAL
TEST NAME: NORMAL

## 2024-01-17 LAB
SEND OUT REPORT: NORMAL
TEST NAME: NORMAL

## 2024-02-01 ENCOUNTER — APPOINTMENT (OUTPATIENT)
Dept: CT IMAGING | Age: 59
End: 2024-02-01
Payer: COMMERCIAL

## 2024-02-01 ENCOUNTER — HOSPITAL ENCOUNTER (EMERGENCY)
Age: 59
Discharge: HOME | End: 2024-02-01
Payer: COMMERCIAL

## 2024-02-01 VITALS
HEIGHT: 71 IN | OXYGEN SATURATION: 94 % | SYSTOLIC BLOOD PRESSURE: 109 MMHG | BODY MASS INDEX: 29.4 KG/M2 | WEIGHT: 210 LBS | HEART RATE: 95 BPM | TEMPERATURE: 97 F | DIASTOLIC BLOOD PRESSURE: 80 MMHG | RESPIRATION RATE: 20 BRPM

## 2024-02-01 DIAGNOSIS — M54.12 CERVICAL RADICULOPATHY: Primary | ICD-10-CM

## 2024-02-01 DIAGNOSIS — M48.02 SPINAL STENOSIS IN CERVICAL REGION: ICD-10-CM

## 2024-02-01 DIAGNOSIS — M50.30 DEGENERATIVE DISC DISEASE, CERVICAL: ICD-10-CM

## 2024-02-01 PROCEDURE — 96372 THER/PROPH/DIAG INJ SC/IM: CPT

## 2024-02-01 PROCEDURE — 72125 CT NECK SPINE W/O DYE: CPT

## 2024-02-01 PROCEDURE — 6370000000 HC RX 637 (ALT 250 FOR IP): Performed by: NURSE PRACTITIONER

## 2024-02-01 PROCEDURE — 6360000002 HC RX W HCPCS: Performed by: NURSE PRACTITIONER

## 2024-02-01 PROCEDURE — 99284 EMERGENCY DEPT VISIT MOD MDM: CPT

## 2024-02-01 RX ORDER — ORPHENADRINE CITRATE 30 MG/ML
60 INJECTION INTRAMUSCULAR; INTRAVENOUS ONCE
Status: COMPLETED | OUTPATIENT
Start: 2024-02-01 | End: 2024-02-01

## 2024-02-01 RX ORDER — ORPHENADRINE CITRATE 100 MG/1
100 TABLET, EXTENDED RELEASE ORAL 2 TIMES DAILY
Qty: 20 TABLET | Refills: 0 | Status: SHIPPED | OUTPATIENT
Start: 2024-02-01 | End: 2024-02-11

## 2024-02-01 RX ORDER — OXYCODONE HYDROCHLORIDE AND ACETAMINOPHEN 5; 325 MG/1; MG/1
1 TABLET ORAL ONCE
Status: COMPLETED | OUTPATIENT
Start: 2024-02-01 | End: 2024-02-01

## 2024-02-01 RX ORDER — HYDROCODONE BITARTRATE AND ACETAMINOPHEN 5; 325 MG/1; MG/1
1 TABLET ORAL EVERY 8 HOURS PRN
Qty: 8 TABLET | Refills: 0 | Status: SHIPPED | OUTPATIENT
Start: 2024-02-01 | End: 2024-02-04

## 2024-02-01 RX ORDER — DEXAMETHASONE SODIUM PHOSPHATE 10 MG/ML
10 INJECTION INTRAMUSCULAR; INTRAVENOUS ONCE
Status: COMPLETED | OUTPATIENT
Start: 2024-02-01 | End: 2024-02-01

## 2024-02-01 RX ADMIN — ORPHENADRINE CITRATE 60 MG: 60 INJECTION INTRAMUSCULAR; INTRAVENOUS at 00:47

## 2024-02-01 RX ADMIN — OXYCODONE HYDROCHLORIDE AND ACETAMINOPHEN 1 TABLET: 5; 325 TABLET ORAL at 00:46

## 2024-02-01 RX ADMIN — DEXAMETHASONE SODIUM PHOSPHATE 10 MG: 10 INJECTION INTRAMUSCULAR; INTRAVENOUS at 00:47

## 2024-02-01 ASSESSMENT — PAIN SCALES - GENERAL
PAINLEVEL_OUTOF10: 9
PAINLEVEL_OUTOF10: 7

## 2024-02-01 ASSESSMENT — PAIN - FUNCTIONAL ASSESSMENT: PAIN_FUNCTIONAL_ASSESSMENT: 0-10

## 2024-02-01 NOTE — ED PROVIDER NOTES
stenosis in cervical region        DISPOSITION  Disposition: Discharge to home  Patient condition is stable      NOTE: This report was transcribed using voice recognition software. Every effort was made to ensure accuracy; however, inadvertent computerized transcription errors may be present

## 2024-02-01 NOTE — DISCHARGE INSTRUCTIONS
Stop Flexeril and start New  Muscle relaxant.  Also closely monitor blood sugar you were provided with a steroid which may increase your blood sugar.

## 2024-02-08 DIAGNOSIS — D64.9 ANEMIA, UNSPECIFIED TYPE: Primary | ICD-10-CM

## 2024-02-12 ENCOUNTER — TELEPHONE (OUTPATIENT)
Dept: ONCOLOGY | Age: 59
End: 2024-02-12

## 2024-02-12 NOTE — TELEPHONE ENCOUNTER
Patient called today, 02/12/24, and left message to cancel appointment, requested reschedule of 02/13/24 follow up appointment with Dr. Marco Antonio Springer.  Called and left message for patient to call our office to reschedule at his earliest convenience.

## 2024-02-13 ENCOUNTER — HOSPITAL ENCOUNTER (OUTPATIENT)
Dept: INFUSION THERAPY | Age: 59
Discharge: HOME | End: 2024-02-13

## 2024-02-22 ENCOUNTER — TELEPHONE (OUTPATIENT)
Dept: ORTHOPEDIC SURGERY | Age: 59
End: 2024-02-22

## 2024-02-22 NOTE — TELEPHONE ENCOUNTER
Called patient to schedule appointment with provider.  Call back information was left for the office.

## 2024-03-06 ENCOUNTER — TELEPHONE (OUTPATIENT)
Dept: CARDIOLOGY | Age: 59
End: 2024-03-06

## 2024-03-06 NOTE — TELEPHONE ENCOUNTER
Returning patient call to schedule stress and Echo test. Voicemail was not set up yet. Unable to leave message.    Electronically signed by Debra Portillo on 3/6/2024 at 9:07 AM

## 2024-03-23 DIAGNOSIS — E78.5 HYPERLIPIDEMIA, UNSPECIFIED HYPERLIPIDEMIA TYPE: ICD-10-CM

## 2024-03-25 RX ORDER — ROSUVASTATIN CALCIUM 20 MG/1
20 TABLET, COATED ORAL NIGHTLY
Qty: 30 TABLET | Refills: 3 | Status: SHIPPED | OUTPATIENT
Start: 2024-03-25

## 2024-04-05 ENCOUNTER — TELEPHONE (OUTPATIENT)
Dept: CARDIOLOGY | Age: 59
End: 2024-04-05

## 2024-04-05 NOTE — TELEPHONE ENCOUNTER
Unable to reach patient to schedule echo and nuclear stress test. Voicemail is not set up.  Electronically signed by Miranda Storey on 4/5/2024 at 10:58 AM

## 2024-04-18 ENCOUNTER — OFFICE VISIT (OUTPATIENT)
Dept: PRIMARY CARE CLINIC | Age: 59
End: 2024-04-18
Payer: COMMERCIAL

## 2024-04-18 VITALS
RESPIRATION RATE: 17 BRPM | HEART RATE: 91 BPM | OXYGEN SATURATION: 97 % | SYSTOLIC BLOOD PRESSURE: 122 MMHG | TEMPERATURE: 96.8 F | DIASTOLIC BLOOD PRESSURE: 82 MMHG | HEIGHT: 71 IN | BODY MASS INDEX: 28.7 KG/M2 | WEIGHT: 205 LBS

## 2024-04-18 DIAGNOSIS — K21.9 GASTROESOPHAGEAL REFLUX DISEASE, UNSPECIFIED WHETHER ESOPHAGITIS PRESENT: ICD-10-CM

## 2024-04-18 DIAGNOSIS — E11.42 TYPE 2 DIABETES MELLITUS WITH POLYNEUROPATHY (HCC): ICD-10-CM

## 2024-04-18 DIAGNOSIS — Z59.9 FINANCIAL DIFFICULTIES: Primary | ICD-10-CM

## 2024-04-18 PROBLEM — J96.00 ACUTE RESPIRATORY FAILURE (HCC): Status: RESOLVED | Noted: 2023-07-30 | Resolved: 2024-04-18

## 2024-04-18 LAB — HBA1C MFR BLD: 14.6 %

## 2024-04-18 PROCEDURE — 2022F DILAT RTA XM EVC RTNOPTHY: CPT | Performed by: STUDENT IN AN ORGANIZED HEALTH CARE EDUCATION/TRAINING PROGRAM

## 2024-04-18 PROCEDURE — G2211 COMPLEX E/M VISIT ADD ON: HCPCS | Performed by: STUDENT IN AN ORGANIZED HEALTH CARE EDUCATION/TRAINING PROGRAM

## 2024-04-18 PROCEDURE — 3017F COLORECTAL CA SCREEN DOC REV: CPT | Performed by: STUDENT IN AN ORGANIZED HEALTH CARE EDUCATION/TRAINING PROGRAM

## 2024-04-18 PROCEDURE — 99214 OFFICE O/P EST MOD 30 MIN: CPT | Performed by: STUDENT IN AN ORGANIZED HEALTH CARE EDUCATION/TRAINING PROGRAM

## 2024-04-18 PROCEDURE — G8417 CALC BMI ABV UP PARAM F/U: HCPCS | Performed by: STUDENT IN AN ORGANIZED HEALTH CARE EDUCATION/TRAINING PROGRAM

## 2024-04-18 PROCEDURE — 83036 HEMOGLOBIN GLYCOSYLATED A1C: CPT | Performed by: STUDENT IN AN ORGANIZED HEALTH CARE EDUCATION/TRAINING PROGRAM

## 2024-04-18 PROCEDURE — 1036F TOBACCO NON-USER: CPT | Performed by: STUDENT IN AN ORGANIZED HEALTH CARE EDUCATION/TRAINING PROGRAM

## 2024-04-18 PROCEDURE — 3046F HEMOGLOBIN A1C LEVEL >9.0%: CPT | Performed by: STUDENT IN AN ORGANIZED HEALTH CARE EDUCATION/TRAINING PROGRAM

## 2024-04-18 PROCEDURE — G8427 DOCREV CUR MEDS BY ELIG CLIN: HCPCS | Performed by: STUDENT IN AN ORGANIZED HEALTH CARE EDUCATION/TRAINING PROGRAM

## 2024-04-18 RX ORDER — INSULIN GLARGINE 100 [IU]/ML
45 INJECTION, SOLUTION SUBCUTANEOUS DAILY
Qty: 5 ADJUSTABLE DOSE PRE-FILLED PEN SYRINGE | Refills: 5 | Status: SHIPPED | OUTPATIENT
Start: 2024-04-18

## 2024-04-18 RX ORDER — OMEPRAZOLE 40 MG/1
40 CAPSULE, DELAYED RELEASE ORAL
Qty: 30 CAPSULE | Refills: 0 | Status: SHIPPED | OUTPATIENT
Start: 2024-04-18

## 2024-04-18 SDOH — ECONOMIC STABILITY - INCOME SECURITY: PROBLEM RELATED TO HOUSING AND ECONOMIC CIRCUMSTANCES, UNSPECIFIED: Z59.9

## 2024-04-18 NOTE — PROGRESS NOTES
kit 0    Handicap Placard MISC by Does not apply route 5 years 1 each 0    blood glucose test strips (ASCENSIA AUTODISC VI;ONE TOUCH ULTRA TEST VI) strip Check four times a day 200 strip 5    sodium chloride, Inhalant, 3 % nebulizer solution Take 4 mLs by nebulization as needed for Cough or Other (secretions) 15 mL 5    ondansetron (ZOFRAN-ODT) 4 MG disintegrating tablet Take 1 tablet by mouth every 8 hours as needed for Nausea or Vomiting      QUEtiapine (SEROQUEL) 200 MG tablet 1 tablet by PEG Tube route 2 times daily (Patient taking differently: Take 1 tablet by mouth 2 times daily) 60 tablet 3    Respiratory Therapy Supplies (FULL KIT NEBULIZER SET) MISC Use as directed with nebulized medication. 1 each 0    buPROPion (WELLBUTRIN XL) 150 MG extended release tablet Take 1 tablet by mouth daily      FLUoxetine (PROZAC) 20 MG capsule Take 1 capsule by mouth daily      Dulaglutide 4.5 MG/0.5ML SOPN Inject 4.5 mg into the skin once a week 4 Adjustable Dose Pre-filled Pen Syringe 1    QUEtiapine (SEROQUEL) 25 MG tablet Take 1 tablet by mouth daily (Patient taking differently: Take 1 tablet by mouth 2 times daily) 60 tablet 3    ibuprofen (ADVIL;MOTRIN) 600 MG tablet take 1 tablet by mouth every 6 to 8 hours if needed      aspirin EC 81 MG EC tablet Take 1 tablet by mouth daily 90 tablet 1    Multiple Vitamin (MULTI VITAMIN MENS PO) Take by mouth daily       nicotine (NICODERM CQ) 14 MG/24HR Place 1 patch onto the skin daily (Patient taking differently: Place 1 patch onto the skin daily Does not useconsistantly) 14 patch 5     Current Facility-Administered Medications   Medication Dose Route Frequency Provider Last Rate Last Admin    perflutren lipid microspheres (DEFINITY) injection 1.5 mL  1.5 mL IntraVENous ONCE PRN Nathan Calero MD        regadenoson (LEXISCAN) injection 0.4 mg  0.4 mg IntraVENous ONCE PRN Nathan Calero MD Benjamin R Brocker, DO       This document may have been prepared at least

## 2024-04-19 ENCOUNTER — CARE COORDINATION (OUTPATIENT)
Dept: CARE COORDINATION | Age: 59
End: 2024-04-19

## 2024-04-19 NOTE — CARE COORDINATION
SWCC made call to pt to initiate SW referral, unable to reach, left message requesting call back to this CC.    Jennifer Cuenca MSW, LSW   Care Coordinator  [phone]

## 2024-04-23 ENCOUNTER — CARE COORDINATION (OUTPATIENT)
Dept: CARE COORDINATION | Age: 59
End: 2024-04-23

## 2024-04-23 ENCOUNTER — TELEPHONE (OUTPATIENT)
Dept: ENDOCRINOLOGY | Age: 59
End: 2024-04-23

## 2024-04-23 NOTE — CARE COORDINATION
Initial Contact Social Work Note - Ambulatory  4/23/2024      Date of referral: 4/23/2024  Referral received from: PCP Dr. Mcknight  Reason for referral: financial difficulties    Previous SW referral: No  If yes, brief summary of outcome: N/A    Two Identifiers Verified: Yes    Insurance Provider: [facility] Medicaid    Support System:   friends family but limited     Status:  No    Community Providers: SNAP/Food Sieper and Local Behavioral Provider    ADL Assistance Needed: N/A    Housing/Living Concerns or Home Modification Needs: N/A     Transportation Concern: No, pt drives    Medication Cost Concern: No    Medication Adherence Concern: No    Financial Concern(s): Yes, pt is connected with TCAP and no HEAP    Income (only if applicable): Social Security disability and epic card    Ability to Read/Write: Yes    Advance Care Plan:  N/A    Other: n/a    Identified Needs:  Financial assistance    Social Work Plan:  Pt is connected with TCAP for HEAP  Reviewed MH Financial Assistance program and provided phone number  Next Steps: SWCC to f/u  Method of Communication With Provider (if appropriate): Chart Routing       Goals Addressed    None        SWCC made call to pt, introduced self and reviewed referral. Pt reports he is not feeling good today but did not wish to call PCP office as he has appt on 4/25. Reviewed financial resources such as TCAP for HEAP/PIPP and MH Financial assistance program. Pt has HEAP services and has TCAP phone number. Encouraged pt to call utility companies directly to see if they will offer any payment plans. Pt has 3 children that live with him and they receive $650/month in food assistance. Pt is connected with [employer] for mental health services. Provided pt with phone number for MH Financial assistance which pt plans to call on his own. No other questions reported. SWCC to f/u.    Jennifer DANIELS, LSW   Care Coordinator  [phone]

## 2024-04-23 NOTE — TELEPHONE ENCOUNTER
Endocrine staff  Please add this patient to my schedule in a few weeks for poorly controlled diabetes.  Okay to double book    ----------------------  HEMANTH Henson

## 2024-04-26 ENCOUNTER — OFFICE VISIT (OUTPATIENT)
Dept: PRIMARY CARE CLINIC | Age: 59
End: 2024-04-26
Payer: COMMERCIAL

## 2024-04-26 ENCOUNTER — CARE COORDINATION (OUTPATIENT)
Dept: CARE COORDINATION | Age: 59
End: 2024-04-26

## 2024-04-26 VITALS
OXYGEN SATURATION: 100 % | SYSTOLIC BLOOD PRESSURE: 112 MMHG | WEIGHT: 203 LBS | DIASTOLIC BLOOD PRESSURE: 80 MMHG | HEART RATE: 57 BPM | TEMPERATURE: 96 F | HEIGHT: 71 IN | BODY MASS INDEX: 28.42 KG/M2 | RESPIRATION RATE: 17 BRPM

## 2024-04-26 DIAGNOSIS — E11.42 TYPE 2 DIABETES MELLITUS WITH POLYNEUROPATHY (HCC): Primary | ICD-10-CM

## 2024-04-26 DIAGNOSIS — K21.9 GASTROESOPHAGEAL REFLUX DISEASE, UNSPECIFIED WHETHER ESOPHAGITIS PRESENT: ICD-10-CM

## 2024-04-26 DIAGNOSIS — R29.898 WEAKNESS OF BOTH LOWER EXTREMITIES: ICD-10-CM

## 2024-04-26 DIAGNOSIS — R29.898 RIGHT ARM WEAKNESS: ICD-10-CM

## 2024-04-26 PROCEDURE — 3046F HEMOGLOBIN A1C LEVEL >9.0%: CPT | Performed by: STUDENT IN AN ORGANIZED HEALTH CARE EDUCATION/TRAINING PROGRAM

## 2024-04-26 PROCEDURE — 99214 OFFICE O/P EST MOD 30 MIN: CPT | Performed by: STUDENT IN AN ORGANIZED HEALTH CARE EDUCATION/TRAINING PROGRAM

## 2024-04-26 PROCEDURE — G2211 COMPLEX E/M VISIT ADD ON: HCPCS | Performed by: STUDENT IN AN ORGANIZED HEALTH CARE EDUCATION/TRAINING PROGRAM

## 2024-04-26 PROCEDURE — 2022F DILAT RTA XM EVC RTNOPTHY: CPT | Performed by: STUDENT IN AN ORGANIZED HEALTH CARE EDUCATION/TRAINING PROGRAM

## 2024-04-26 PROCEDURE — G8417 CALC BMI ABV UP PARAM F/U: HCPCS | Performed by: STUDENT IN AN ORGANIZED HEALTH CARE EDUCATION/TRAINING PROGRAM

## 2024-04-26 PROCEDURE — G8427 DOCREV CUR MEDS BY ELIG CLIN: HCPCS | Performed by: STUDENT IN AN ORGANIZED HEALTH CARE EDUCATION/TRAINING PROGRAM

## 2024-04-26 PROCEDURE — 1036F TOBACCO NON-USER: CPT | Performed by: STUDENT IN AN ORGANIZED HEALTH CARE EDUCATION/TRAINING PROGRAM

## 2024-04-26 PROCEDURE — 3017F COLORECTAL CA SCREEN DOC REV: CPT | Performed by: STUDENT IN AN ORGANIZED HEALTH CARE EDUCATION/TRAINING PROGRAM

## 2024-04-26 RX ORDER — INSULIN GLARGINE 100 [IU]/ML
48 INJECTION, SOLUTION SUBCUTANEOUS DAILY
Qty: 5 ADJUSTABLE DOSE PRE-FILLED PEN SYRINGE | Refills: 5
Start: 2024-04-26

## 2024-04-26 RX ORDER — PANTOPRAZOLE SODIUM 40 MG/1
40 TABLET, DELAYED RELEASE ORAL 2 TIMES DAILY
Qty: 30 TABLET | Refills: 3 | Status: SHIPPED | OUTPATIENT
Start: 2024-04-26

## 2024-04-26 ASSESSMENT — PATIENT HEALTH QUESTIONNAIRE - PHQ9
7. TROUBLE CONCENTRATING ON THINGS, SUCH AS READING THE NEWSPAPER OR WATCHING TELEVISION: NEARLY EVERY DAY
SUM OF ALL RESPONSES TO PHQ QUESTIONS 1-9: 22
4. FEELING TIRED OR HAVING LITTLE ENERGY: NEARLY EVERY DAY
5. POOR APPETITE OR OVEREATING: NEARLY EVERY DAY
10. IF YOU CHECKED OFF ANY PROBLEMS, HOW DIFFICULT HAVE THESE PROBLEMS MADE IT FOR YOU TO DO YOUR WORK, TAKE CARE OF THINGS AT HOME, OR GET ALONG WITH OTHER PEOPLE: NOT DIFFICULT AT ALL
3. TROUBLE FALLING OR STAYING ASLEEP: NEARLY EVERY DAY
SUM OF ALL RESPONSES TO PHQ9 QUESTIONS 1 & 2: 6
SUM OF ALL RESPONSES TO PHQ QUESTIONS 1-9: 22
SUM OF ALL RESPONSES TO PHQ QUESTIONS 1-9: 22
8. MOVING OR SPEAKING SO SLOWLY THAT OTHER PEOPLE COULD HAVE NOTICED. OR THE OPPOSITE, BEING SO FIGETY OR RESTLESS THAT YOU HAVE BEEN MOVING AROUND A LOT MORE THAN USUAL: SEVERAL DAYS
1. LITTLE INTEREST OR PLEASURE IN DOING THINGS: NEARLY EVERY DAY
6. FEELING BAD ABOUT YOURSELF - OR THAT YOU ARE A FAILURE OR HAVE LET YOURSELF OR YOUR FAMILY DOWN: NEARLY EVERY DAY
2. FEELING DOWN, DEPRESSED OR HOPELESS: NEARLY EVERY DAY
9. THOUGHTS THAT YOU WOULD BE BETTER OFF DEAD, OR OF HURTING YOURSELF: NOT AT ALL
SUM OF ALL RESPONSES TO PHQ QUESTIONS 1-9: 22

## 2024-04-26 ASSESSMENT — COLUMBIA-SUICIDE SEVERITY RATING SCALE - C-SSRS
2. HAVE YOU ACTUALLY HAD ANY THOUGHTS OF KILLING YOURSELF?: NO
1. WITHIN THE PAST MONTH, HAVE YOU WISHED YOU WERE DEAD OR WISHED YOU COULD GO TO SLEEP AND NOT WAKE UP?: NO
6. HAVE YOU EVER DONE ANYTHING, STARTED TO DO ANYTHING, OR PREPARED TO DO ANYTHING TO END YOUR LIFE?: NO
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
## 2024-04-26 NOTE — PROGRESS NOTES
[facility]  - [facility] - Referral to Care Management    4. Right arm weakness  As above  - [facility] [place]          I am the primary care provider for this patient and provide the patient with continuity of care.     Counseled regarding above diagnosis, including possible risks and complications,  especially if left uncontrolled. Counseled regarding the possible side effects, risks, benefits and alternatives to treatment;patient and/or guardian verbalizes understanding, agrees, feels comfortable with and wishes to proceed with above treatment plan.    Call or go to EDmediately if symptoms worsen or persist. Advised patient to call with any new medication issues, and, as applicable, read all Rx info from pharmacy to assure aware of all possible risks and side effects of medicationbefore taking.     Patient and/or guardian given opportunity to ask questions/raise concerns.  The patient verbalized comfort and understanding ofinstructions.    I encourage further reading and education about your health conditions.  Information on many health conditions is provided by the[facility] [employer]: [email or web address].org/  Please bring any questions to me at your nextvisit.    Return to Office: Return in about 2 weeks (around 5/10/2024) for f/u diabetes and weakness .    Medication List:    Current Outpatient Medications   Medication Sig Dispense Refill    insulin glargine (LANTUS SOLOSTAR) 100 UNIT/ML injection pen Inject 48 Units into the skin daily 5 Adjustable Dose Pre-filled Pen Syringe 5    pantoprazole (PROTONIX) 40 MG tablet Take 1 tablet by mouth in the morning and at bedtime 30 tablet 3    rosuvastatin (CRESTOR) 20 MG tablet take 1 tablet by mouth nightly 30 tablet 3    clonazePAM (KLONOPIN) 0.5 MG tablet Take 1 tablet by mouth daily as needed.      Respiratory Therapy Supplies (NEBULIZER/TUBING/MOUTHPIECE) KIT 1 kit by Does not apply route

## 2024-04-30 ENCOUNTER — CARE COORDINATION (OUTPATIENT)
Dept: CARE COORDINATION | Age: 59
End: 2024-04-30

## 2024-04-30 NOTE — CARE COORDINATION
CC made f/u call to pt unable to reach, left message requesting call back to this [facility].    Received missed call and message from pt. Returned call to pt, reviewed  Financial Assistance resource which pt reports he plans to call. No other questions reported. [facility] to sign off and encouraged pt to call with any SW needs.    Jennifer Cuenca MSW, LSW   Care Coordinator  [phone]

## 2024-05-01 ENCOUNTER — TELEPHONE (OUTPATIENT)
Dept: PRIMARY CARE CLINIC | Age: 59
End: 2024-05-01

## 2024-05-01 NOTE — TELEPHONE ENCOUNTER
A new referral for therapy is needed. [facility] in [place] said they cannot see him. They do not have enough therapists at this time.

## 2024-05-03 ENCOUNTER — CARE COORDINATION (OUTPATIENT)
Dept: CARE COORDINATION | Age: 59
End: 2024-05-03

## 2024-05-03 SDOH — HEALTH STABILITY: PHYSICAL HEALTH: ON AVERAGE, HOW MANY DAYS PER WEEK DO YOU ENGAGE IN MODERATE TO STRENUOUS EXERCISE (LIKE A BRISK WALK)?: 0 DAYS

## 2024-05-03 SDOH — ECONOMIC STABILITY: INCOME INSECURITY: IN THE LAST 12 MONTHS, WAS THERE A TIME WHEN YOU WERE NOT ABLE TO PAY THE MORTGAGE OR RENT ON TIME?: NO

## 2024-05-03 SDOH — ECONOMIC STABILITY: FOOD INSECURITY: WITHIN THE PAST 12 MONTHS, THE FOOD YOU BOUGHT JUST DIDN'T LAST AND YOU DIDN'T HAVE MONEY TO GET MORE.: OFTEN TRUE

## 2024-05-03 SDOH — ECONOMIC STABILITY: FOOD INSECURITY: WITHIN THE PAST 12 MONTHS, YOU WORRIED THAT YOUR FOOD WOULD RUN OUT BEFORE YOU GOT MONEY TO BUY MORE.: OFTEN TRUE

## 2024-05-03 SDOH — ECONOMIC STABILITY: HOUSING INSECURITY: IN THE LAST 12 MONTHS, HOW MANY PLACES HAVE YOU LIVED?: 1

## 2024-05-03 SDOH — HEALTH STABILITY: PHYSICAL HEALTH: ON AVERAGE, HOW MANY MINUTES DO YOU ENGAGE IN EXERCISE AT THIS LEVEL?: 0 MIN

## 2024-05-03 ASSESSMENT — SOCIAL DETERMINANTS OF HEALTH (SDOH)
HOW OFTEN DO YOU GET TOGETHER WITH FRIENDS OR RELATIVES?: ONCE A WEEK
ARE YOU MARRIED, WIDOWED, DIVORCED, SEPARATED, NEVER MARRIED, OR LIVING WITH A PARTNER?: NEVER MARRIED
WITHIN THE LAST YEAR, HAVE YOU BEEN KICKED, HIT, SLAPPED, OR OTHERWISE PHYSICALLY HURT BY YOUR PARTNER OR EX-PARTNER?: NO
DO YOU BELONG TO ANY CLUBS OR ORGANIZATIONS SUCH AS CHURCH GROUPS UNIONS, FRATERNAL OR ATHLETIC GROUPS, OR SCHOOL GROUPS?: NO
HOW OFTEN DO YOU ATTENT MEETINGS OF THE CLUB OR ORGANIZATION YOU BELONG TO?: NEVER
WITHIN THE LAST YEAR, HAVE YOU BEEN HUMILIATED OR EMOTIONALLY ABUSED IN OTHER WAYS BY YOUR PARTNER OR EX-PARTNER?: NO
WITHIN THE LAST YEAR, HAVE YOU BEEN AFRAID OF YOUR PARTNER OR EX-PARTNER?: NO
HOW OFTEN DO YOU ATTEND CHURCH OR RELIGIOUS SERVICES?: NEVER
IN A TYPICAL WEEK, HOW MANY TIMES DO YOU TALK ON THE PHONE WITH FAMILY, FRIENDS, OR NEIGHBORS?: TWICE A WEEK
HOW HARD IS IT FOR YOU TO PAY FOR THE VERY BASICS LIKE FOOD, HOUSING, MEDICAL CARE, AND HEATING?: VERY HARD
WITHIN THE LAST YEAR, HAVE TO BEEN RAPED OR FORCED TO HAVE ANY KIND OF SEXUAL ACTIVITY BY YOUR PARTNER OR EX-PARTNER?: NO

## 2024-05-03 ASSESSMENT — PATIENT HEALTH QUESTIONNAIRE - PHQ9
1. LITTLE INTEREST OR PLEASURE IN DOING THINGS: SEVERAL DAYS
2. FEELING DOWN, DEPRESSED OR HOPELESS: SEVERAL DAYS
SUM OF ALL RESPONSES TO PHQ9 QUESTIONS 1 & 2: 2
10. IF YOU CHECKED OFF ANY PROBLEMS, HOW DIFFICULT HAVE THESE PROBLEMS MADE IT FOR YOU TO DO YOUR WORK, TAKE CARE OF THINGS AT HOME, OR GET ALONG WITH OTHER PEOPLE: NOT DIFFICULT AT ALL

## 2024-05-07 ENCOUNTER — CARE COORDINATION (OUTPATIENT)
Dept: CARE COORDINATION | Age: 59
End: 2024-05-07

## 2024-05-07 NOTE — CARE COORDINATION
Attempted to reach patient by telephone. Left HIPAA compliant message requesting a return call. Will attempt to reach patient again for care coordination follow up.

## 2024-05-09 ENCOUNTER — CARE COORDINATION (OUTPATIENT)
Dept: CARE COORDINATION | Age: 59
End: 2024-05-09

## 2024-05-09 NOTE — CARE COORDINATION
Attempted to reach patient by telephone. Left HIPAA compliant message requesting a return call. Will attempt to reach patient again for CC follow up and to complete enrollment questions.

## 2024-05-10 ENCOUNTER — OFFICE VISIT (OUTPATIENT)
Dept: PRIMARY CARE CLINIC | Age: 59
End: 2024-05-10
Payer: COMMERCIAL

## 2024-05-10 VITALS
WEIGHT: 202 LBS | OXYGEN SATURATION: 100 % | HEART RATE: 95 BPM | HEIGHT: 71 IN | SYSTOLIC BLOOD PRESSURE: 130 MMHG | TEMPERATURE: 96.8 F | BODY MASS INDEX: 28.28 KG/M2 | DIASTOLIC BLOOD PRESSURE: 80 MMHG | RESPIRATION RATE: 18 BRPM

## 2024-05-10 DIAGNOSIS — E11.42 TYPE 2 DIABETES MELLITUS WITH POLYNEUROPATHY (HCC): Primary | ICD-10-CM

## 2024-05-10 DIAGNOSIS — R29.898 WEAKNESS OF BOTH LOWER EXTREMITIES: ICD-10-CM

## 2024-05-10 DIAGNOSIS — K21.9 GASTROESOPHAGEAL REFLUX DISEASE, UNSPECIFIED WHETHER ESOPHAGITIS PRESENT: ICD-10-CM

## 2024-05-10 LAB
ALBUMIN: 4.3 G/DL (ref 3.5–5.2)
ALP BLD-CCNC: 101 U/L (ref 40–129)
ALT SERPL-CCNC: 14 U/L (ref 0–40)
ANION GAP SERPL CALCULATED.3IONS-SCNC: 15 MMOL/L (ref 7–16)
AST SERPL-CCNC: 15 U/L (ref 0–39)
BILIRUB SERPL-MCNC: 0.3 MG/DL (ref 0–1.2)
BUN BLDV-MCNC: 20 MG/DL (ref 6–23)
CALCIUM SERPL-MCNC: 10 MG/DL (ref 8.6–10.2)
CHLORIDE BLD-SCNC: 97 MMOL/L (ref 98–107)
CO2: 23 MMOL/L (ref 22–29)
CREAT SERPL-MCNC: 1.2 MG/DL (ref 0.7–1.2)
GFR, ESTIMATED: 69 ML/MIN/1.73M2
GLUCOSE BLD-MCNC: 348 MG/DL (ref 74–99)
HCT VFR BLD CALC: 44.5 % (ref 37–54)
HEMOGLOBIN: 14.3 G/DL (ref 12.5–16.5)
MCH RBC QN AUTO: 29.7 PG (ref 26–35)
MCHC RBC AUTO-ENTMCNC: 32.1 G/DL (ref 32–34.5)
MCV RBC AUTO: 92.3 FL (ref 80–99.9)
PDW BLD-RTO: 13.3 % (ref 11.5–15)
PLATELET # BLD: 443 K/UL (ref 130–450)
PMV BLD AUTO: 9.2 FL (ref 7–12)
POTASSIUM SERPL-SCNC: 5.1 MMOL/L (ref 3.5–5)
RBC # BLD: 4.82 M/UL (ref 3.8–5.8)
SODIUM BLD-SCNC: 135 MMOL/L (ref 132–146)
TOTAL PROTEIN: 7.5 G/DL (ref 6.4–8.3)
WBC # BLD: 7.5 K/UL (ref 4.5–11.5)

## 2024-05-10 PROCEDURE — G8427 DOCREV CUR MEDS BY ELIG CLIN: HCPCS | Performed by: STUDENT IN AN ORGANIZED HEALTH CARE EDUCATION/TRAINING PROGRAM

## 2024-05-10 PROCEDURE — 99214 OFFICE O/P EST MOD 30 MIN: CPT | Performed by: STUDENT IN AN ORGANIZED HEALTH CARE EDUCATION/TRAINING PROGRAM

## 2024-05-10 PROCEDURE — 1036F TOBACCO NON-USER: CPT | Performed by: STUDENT IN AN ORGANIZED HEALTH CARE EDUCATION/TRAINING PROGRAM

## 2024-05-10 PROCEDURE — G2211 COMPLEX E/M VISIT ADD ON: HCPCS | Performed by: STUDENT IN AN ORGANIZED HEALTH CARE EDUCATION/TRAINING PROGRAM

## 2024-05-10 PROCEDURE — 2022F DILAT RTA XM EVC RTNOPTHY: CPT | Performed by: STUDENT IN AN ORGANIZED HEALTH CARE EDUCATION/TRAINING PROGRAM

## 2024-05-10 PROCEDURE — G8417 CALC BMI ABV UP PARAM F/U: HCPCS | Performed by: STUDENT IN AN ORGANIZED HEALTH CARE EDUCATION/TRAINING PROGRAM

## 2024-05-10 PROCEDURE — 3046F HEMOGLOBIN A1C LEVEL >9.0%: CPT | Performed by: STUDENT IN AN ORGANIZED HEALTH CARE EDUCATION/TRAINING PROGRAM

## 2024-05-10 PROCEDURE — 3017F COLORECTAL CA SCREEN DOC REV: CPT | Performed by: STUDENT IN AN ORGANIZED HEALTH CARE EDUCATION/TRAINING PROGRAM

## 2024-05-10 RX ORDER — INSULIN GLARGINE 100 [IU]/ML
50 INJECTION, SOLUTION SUBCUTANEOUS DAILY
Qty: 5 ADJUSTABLE DOSE PRE-FILLED PEN SYRINGE | Refills: 5
Start: 2024-05-10

## 2024-05-10 NOTE — PROGRESS NOTES
[facility]  Department of Family Medicine      Patient:  Wallace Harvey 59 y.o. male     Date of Service: 5/10/24      Chief complaint:   Chief Complaint   Patient presents with    2 Week Follow-Up         History ofPresent Illness   The patient is a 59 y.o. male  presented to the clinic with complaints as above.      Diabetes  -f/u  -increased lantus during last visit  -endocrinology appt in July   -doing 48 units lantus, now sugars have been around, this morning was 150  -has not used insulin pump since the hospital  -sugars typically in 200's    GERD  -f/u  -switched to protonix BID during last visit  -currently, feeling better on protonix  -denies any nausea or vomiting         Past Medical History:      Diagnosis Date    Chronic pain     Depression     HLD (hyperlipidemia)     Neuropathy     LIDYA on CPAP     Osteoarthritis     Type II or unspecified type diabetes mellitus without mention of complication, not stated as uncontrolled        PastSurgical History:        Procedure Laterality Date    ARM SURGERY      left arm    BRONCHOSCOPY N/A 8/18/2023    BRONCHOSCOPY DIAGNOSTIC OR CELL WASH ONLY performed by Willy Ryan MD at [facility]COPY    FACIAL SURGERY N/A 8/22/2023    FACIAL exploration of under anesthesia of left cheek performed by Ervin Lau MD at [facility] OR    INCISION AND DRAINAGE Bilateral 7/27/2020    REPEAT BILATERAL KNEE WASHOUT performed by Oscar Contreras DO at [facility] OR    INCISION AND DRAINAGE Bilateral 8/9/2020    INCISION AND DRAINAGE bilateral knees performed by Oscar Contreras DO at [facility] OR    KNEE ARTHROSCOPY Bilateral 7/23/2020    KNEE ARTHROSCOPY performed by Sudeep Umanzor MD at [facility] OR    MANDIBLE SURGERY Left 8/8/2023    LEFT PARASYNTHESIS MANDIBLE OPEN REDUCTION INTERNAL FIXATION WITH PLATES AND SCREWS MAXILLOMANDIBULAR FIXATION AND GINGIVOBUCCAL FLAP CLOSURE OF DEFECT performed by Ervin Lau MD at [facility] OR    [place]

## 2024-05-15 ENCOUNTER — TELEPHONE (OUTPATIENT)
Dept: PRIMARY CARE CLINIC | Age: 59
End: 2024-05-15

## 2024-05-15 DIAGNOSIS — K21.9 GASTROESOPHAGEAL REFLUX DISEASE, UNSPECIFIED WHETHER ESOPHAGITIS PRESENT: ICD-10-CM

## 2024-05-15 RX ORDER — OMEPRAZOLE 40 MG/1
40 CAPSULE, DELAYED RELEASE ORAL
Qty: 30 CAPSULE | Refills: 0 | OUTPATIENT
Start: 2024-05-15

## 2024-05-15 NOTE — TELEPHONE ENCOUNTER
PT. Called back about lab results on his potassium.  I read what DR put in note. PT was concerned with his copper numbers and wanted to know what to do about that.

## 2024-05-16 ENCOUNTER — CARE COORDINATION (OUTPATIENT)
Dept: CARE COORDINATION | Age: 59
End: 2024-05-16

## 2024-05-16 NOTE — TELEPHONE ENCOUNTER
Copper level has not been checked for several months.  Can get repeat if he wants.    Best regards,  Long Mcknight

## 2024-05-16 NOTE — CARE COORDINATION
Attempted to reach patient by telephone. Left HIPAA compliant message requesting a return call. Will attempt to reach patient again for CC follow up/complete admission questions.

## 2024-05-24 ENCOUNTER — CARE COORDINATION (OUTPATIENT)
Dept: CARE COORDINATION | Age: 59
End: 2024-05-24

## 2024-05-24 NOTE — CARE COORDINATION
Attempted to reach patient by telephone. Left HIPAA compliant message requesting a return call. Will attempt to reach patient again for care coordination follow up. Need enrollment questions completed. This was the 4th call with VM. Unable to reach. Will send an Unable to reach letter out.

## 2024-05-31 ENCOUNTER — CARE COORDINATION (OUTPATIENT)
Dept: CARE COORDINATION | Age: 59
End: 2024-05-31

## 2024-05-31 NOTE — CARE COORDINATION
Attempted to reach patient by telephone. Left HIPAA compliant message requesting a return call. Will attempt to reach patient again for care coordination follow up. Enrollment needs completed. Wallace not returning calls. [facility] called  Wallace 5/7, 5/9, 5/16, 5/24, and today 5/31.   [facility] to send unable to reach letter today. Will attempt to return call.

## 2024-06-13 ENCOUNTER — OFFICE VISIT (OUTPATIENT)
Dept: PRIMARY CARE CLINIC | Age: 59
End: 2024-06-13

## 2024-06-13 VITALS
WEIGHT: 205 LBS | DIASTOLIC BLOOD PRESSURE: 72 MMHG | SYSTOLIC BLOOD PRESSURE: 110 MMHG | HEART RATE: 94 BPM | BODY MASS INDEX: 28.7 KG/M2 | RESPIRATION RATE: 17 BRPM | OXYGEN SATURATION: 97 % | HEIGHT: 71 IN | TEMPERATURE: 96.8 F

## 2024-06-13 DIAGNOSIS — E11.42 TYPE 2 DIABETES MELLITUS WITH POLYNEUROPATHY (HCC): Primary | ICD-10-CM

## 2024-06-13 DIAGNOSIS — K21.9 GASTROESOPHAGEAL REFLUX DISEASE, UNSPECIFIED WHETHER ESOPHAGITIS PRESENT: ICD-10-CM

## 2024-06-13 SDOH — ECONOMIC STABILITY: FOOD INSECURITY: WITHIN THE PAST 12 MONTHS, YOU WORRIED THAT YOUR FOOD WOULD RUN OUT BEFORE YOU GOT MONEY TO BUY MORE.: SOMETIMES TRUE

## 2024-06-13 SDOH — ECONOMIC STABILITY: FOOD INSECURITY: WITHIN THE PAST 12 MONTHS, THE FOOD YOU BOUGHT JUST DIDN'T LAST AND YOU DIDN'T HAVE MONEY TO GET MORE.: SOMETIMES TRUE

## 2024-06-13 SDOH — ECONOMIC STABILITY: INCOME INSECURITY: HOW HARD IS IT FOR YOU TO PAY FOR THE VERY BASICS LIKE FOOD, HOUSING, MEDICAL CARE, AND HEATING?: VERY HARD

## 2024-06-13 NOTE — PATIENT INSTRUCTIONS
[employer]*  (Call United Way/211 if need more resources.)         HELP NETWORK OF [facility]:  What they do: Provides 24-hr, 7 days a week access to information on community resources for financial help. [facility]  Phone: 211 or [phone]    [facility] FAMILY SERVICE: [address], [place] [address]  What they offer: Limited assistance to restore/ prevent utility disconnection.  Phone Number: [phone]  Website: [employer]    DEPARTMENT OF JOB AND FAMILY SERVICES:  MAIN [facility] LINE FOR ALL [facility]: [phone]  What they do: [place] works first with temporary cash assistance if there are children in household.   [facility] DJFS: [address] [place] [address]  Phone: [phone], [phone]  [facility] DJFS: [address] [place] [address]  Phone: [phone]  [facility] DJFS: [address] [place] [address]  Phone: [phone]  Website: s.[place].[facility]    [employer] Financial Assistance  What they offer: Assistance with [employer] bills  Phone: [phone], option #5     Medications:  Good Rx  What they offer: Good Rx tracks prescription drug prices and provides free drug coupons for discounts on medications.  Website: https://www.1spire    [employer]  What they offer: [employer] offers free information on medications and healthcare cost savings programs including prescription assistance programs, coupons, and discount programs.  Helpline: 911.382.5506  Website: https://www.[employer].org    RX Assist  What they offer: Information about free and low-cost medicine programs.  Website: https://www.rxassist.org/    [employer] $4 Prescription Program  What they offer: Prescription Program includes up to a 30-day supply for $4 and a 90-day supply for $10 of some covered generic drugs at commonly prescribed dosages  Website: https://www.TheFamily/cp/4-prescriptions/2201174    [employer]

## 2024-06-13 NOTE — PROGRESS NOTES
[facility]  Department of Family Medicine      Patient:  Wallace Harvey 59 y.o. male     Date of Service: 6/13/24      Chief complaint:   Chief Complaint   Patient presents with    Diabetes    Gastroesophageal Reflux         History ofPresent Illness   The patient is a 59 y.o. male  presented to the clinic with complaints as above.      Diabetes  -f/u  -increased lantus during last visit  -endocrinology appt in July   -doing 50 units lantus, now sugars have been around, has been missing his dosages   -sugars typically 300's  -having excessive thirst and urination     GERD  -f/u  -switched to protonix BID during previous visit  -currently, feeling better   -denies any nausea or vomiting         Past Medical History:      Diagnosis Date    Chronic pain     Depression     HLD (hyperlipidemia)     Neuropathy     LIDYA on CPAP     Osteoarthritis     Type II or unspecified type diabetes mellitus without mention of complication, not stated as uncontrolled        PastSurgical History:        Procedure Laterality Date    ARM SURGERY      left arm    BRONCHOSCOPY N/A 8/18/2023    BRONCHOSCOPY DIAGNOSTIC OR CELL WASH ONLY performed by Willy Ryan MD at [facility]    FACIAL SURGERY N/A 8/22/2023    FACIAL exploration of under anesthesia of left cheek performed by Ervin Lau MD at [facility]    INCISION AND DRAINAGE Bilateral 7/27/2020    REPEAT BILATERAL KNEE WASHOUT performed by Oscar Contreras DO at [facility]    INCISION AND DRAINAGE Bilateral 8/9/2020    INCISION AND DRAINAGE bilateral knees performed by Oscar Contreras DO at [facility]    KNEE ARTHROSCOPY Bilateral 7/23/2020    KNEE ARTHROSCOPY performed by Sudeep Umanzor MD at [facility]    MANDIBLE SURGERY Left 8/8/2023    LEFT PARASYNTHESIS MANDIBLE OPEN REDUCTION INTERNAL FIXATION WITH PLATES AND SCREWS MAXILLOMANDIBULAR FIXATION AND GINGIVOBUCCAL FLAP CLOSURE OF DEFECT performed by Ervin Lau MD at [facility]

## 2024-06-14 ENCOUNTER — CARE COORDINATION (OUTPATIENT)
Dept: CARE COORDINATION | Age: 59
End: 2024-06-14

## 2024-06-14 NOTE — CARE COORDINATION
Attempted to reach patient by telephone. Left HIPAA compliant message requesting a return call. Will attempt to reach patient again. Unable to reach letter sent on 5/24/24 no response. Possible d/c from program if not return call.

## 2024-06-17 ENCOUNTER — CARE COORDINATION (OUTPATIENT)
Dept: CARE COORDINATION | Age: 59
End: 2024-06-17

## 2024-06-17 NOTE — CARE COORDINATION
[facility] received a referral from the PCP regarding SDOH for Wallace.  [facility] placed initial outreach call but there was no answer.  A voice message was left with [facility] information and call back request.    [facility] to follow with a second outreach attempt

## 2024-06-21 ENCOUNTER — CARE COORDINATION (OUTPATIENT)
Dept: CARE COORDINATION | Age: 59
End: 2024-06-21

## 2024-06-21 NOTE — CARE COORDINATION
[facility] placed another outreach call to Wallace.  There was no answer.  A voice message was left with [facility] information and request for a return call

## 2024-06-24 ENCOUNTER — CARE COORDINATION (OUTPATIENT)
Dept: CARE COORDINATION | Age: 59
End: 2024-06-24

## 2024-06-24 NOTE — CARE COORDINATION
[facility] has not received any call backs. [facility] called 5 times and sent a unable to reach letter on 5/24/24 with no return calls. [facility] to close enrollment and contact PCP for update. PCP updated.

## 2024-07-01 ENCOUNTER — CARE COORDINATION (OUTPATIENT)
Dept: CARE COORDINATION | Age: 59
End: 2024-07-01

## 2024-07-09 ENCOUNTER — OFFICE VISIT (OUTPATIENT)
Dept: ENDOCRINOLOGY | Age: 59
End: 2024-07-09
Payer: COMMERCIAL

## 2024-07-09 VITALS
HEART RATE: 95 BPM | WEIGHT: 211 LBS | OXYGEN SATURATION: 96 % | SYSTOLIC BLOOD PRESSURE: 168 MMHG | HEIGHT: 71 IN | BODY MASS INDEX: 29.54 KG/M2 | DIASTOLIC BLOOD PRESSURE: 95 MMHG

## 2024-07-09 DIAGNOSIS — E11.65 UNCONTROLLED TYPE 2 DIABETES MELLITUS WITH HYPERGLYCEMIA (HCC): Primary | ICD-10-CM

## 2024-07-09 DIAGNOSIS — E78.2 MIXED HYPERLIPIDEMIA: ICD-10-CM

## 2024-07-09 DIAGNOSIS — E11.42 TYPE 2 DIABETES MELLITUS WITH POLYNEUROPATHY (HCC): ICD-10-CM

## 2024-07-09 LAB — HBA1C MFR BLD: 14 %

## 2024-07-09 PROCEDURE — 3046F HEMOGLOBIN A1C LEVEL >9.0%: CPT | Performed by: CLINICAL NURSE SPECIALIST

## 2024-07-09 PROCEDURE — 99214 OFFICE O/P EST MOD 30 MIN: CPT | Performed by: CLINICAL NURSE SPECIALIST

## 2024-07-09 PROCEDURE — 2022F DILAT RTA XM EVC RTNOPTHY: CPT | Performed by: CLINICAL NURSE SPECIALIST

## 2024-07-09 PROCEDURE — 1036F TOBACCO NON-USER: CPT | Performed by: CLINICAL NURSE SPECIALIST

## 2024-07-09 PROCEDURE — 3017F COLORECTAL CA SCREEN DOC REV: CPT | Performed by: CLINICAL NURSE SPECIALIST

## 2024-07-09 PROCEDURE — G8427 DOCREV CUR MEDS BY ELIG CLIN: HCPCS | Performed by: CLINICAL NURSE SPECIALIST

## 2024-07-09 PROCEDURE — G8417 CALC BMI ABV UP PARAM F/U: HCPCS | Performed by: CLINICAL NURSE SPECIALIST

## 2024-07-09 PROCEDURE — 83036 HEMOGLOBIN GLYCOSYLATED A1C: CPT | Performed by: CLINICAL NURSE SPECIALIST

## 2024-07-09 RX ORDER — INSULIN LISPRO 100 [IU]/ML
INJECTION, SOLUTION INTRAVENOUS; SUBCUTANEOUS
Qty: 5 EACH | Refills: 3
Start: 2024-07-09

## 2024-07-09 RX ORDER — INSULIN GLARGINE 100 [IU]/ML
25 INJECTION, SOLUTION SUBCUTANEOUS DAILY
Qty: 5 ADJUSTABLE DOSE PRE-FILLED PEN SYRINGE | Refills: 5 | Status: SHIPPED | OUTPATIENT
Start: 2024-07-09

## 2024-07-09 NOTE — PROGRESS NOTES
[facility] Department of Endocrinology Diabetes and Metabolism   [address], [place], [address]   Phone: [phone]  Fax: [phone]    Date of Service: 7/9/2024    Primary Care Physician: Long Mcknight DO  Referring physician: Long Mcknight DO  Provider: FRANCIE Muller     Reason for the visit:  Type 2 DM       History of Present Illness:  The history is provided by the patient. No  was used. Accuracy of the patient data is excellent.  Wallace Harvey is a very pleasant 59 y.o. male seen today for diabetes management     Wallace Harvey was diagnosed with diabetes at age of 39  and currently on Lantus 50 units at night No longer on jardiance 10 mg daily or Trulicity 4.5 mg once weekly as he has not had rx   Was on [employer] pump but stopped after hospitalization for head injury      The patient has been checking blood sugar 1 times per day  BG not at goal   Has dexcom but has not used it   Most recent A1c results summarized below  Lab Results   Component Value Date/Time    LABA1C 14.0 07/09/2024 04:03 PM    LABA1C 14.6 04/18/2024 02:09 PM    LABA1C 6.4 11/02/2023 12:14 PM     Patient has had no hypoglycemic episodes   The patient has been mindful of what has been eating and following diabetic diet as encouraged  I reviewed current medications and the patient has no issues with diabetes medications  The patient is due for an eye exam. Last eye exam was > 1 year ago, no h/o diabetic retinopathy  The patient seeing podiatrist every   And also performs  own feet care  Microvascular complications:  No Retinopathy, Nephropathy or Neuropathy   Macrovascular complications: no CAD, PVD, or Stroke      PAST MEDICAL HISTORY   Past Medical History:   Diagnosis Date    Chronic pain     Depression     HLD (hyperlipidemia)     Neuropathy     LIDYA on CPAP     Osteoarthritis     Type II or unspecified type diabetes mellitus without

## 2024-07-23 ENCOUNTER — OFFICE VISIT (OUTPATIENT)
Dept: SLEEP MEDICINE | Age: 59
End: 2024-07-23
Payer: COMMERCIAL

## 2024-07-23 VITALS
OXYGEN SATURATION: 97 % | BODY MASS INDEX: 27.37 KG/M2 | DIASTOLIC BLOOD PRESSURE: 61 MMHG | RESPIRATION RATE: 18 BRPM | HEART RATE: 105 BPM | SYSTOLIC BLOOD PRESSURE: 86 MMHG | WEIGHT: 196.21 LBS

## 2024-07-23 DIAGNOSIS — G47.31 CENTRAL SLEEP APNEA: ICD-10-CM

## 2024-07-23 DIAGNOSIS — G47.33 OSA (OBSTRUCTIVE SLEEP APNEA): Primary | ICD-10-CM

## 2024-07-23 PROCEDURE — G8428 CUR MEDS NOT DOCUMENT: HCPCS | Performed by: STUDENT IN AN ORGANIZED HEALTH CARE EDUCATION/TRAINING PROGRAM

## 2024-07-23 PROCEDURE — 3017F COLORECTAL CA SCREEN DOC REV: CPT | Performed by: STUDENT IN AN ORGANIZED HEALTH CARE EDUCATION/TRAINING PROGRAM

## 2024-07-23 PROCEDURE — 99214 OFFICE O/P EST MOD 30 MIN: CPT | Performed by: STUDENT IN AN ORGANIZED HEALTH CARE EDUCATION/TRAINING PROGRAM

## 2024-07-23 PROCEDURE — G8417 CALC BMI ABV UP PARAM F/U: HCPCS | Performed by: STUDENT IN AN ORGANIZED HEALTH CARE EDUCATION/TRAINING PROGRAM

## 2024-07-23 PROCEDURE — 1036F TOBACCO NON-USER: CPT | Performed by: STUDENT IN AN ORGANIZED HEALTH CARE EDUCATION/TRAINING PROGRAM

## 2024-07-23 NOTE — PROGRESS NOTES
[facility]    Patient Name: Wallace Harvey  Age: 59 y.o.   : 1965  Date of Visit: 24    Assessment and Plan:   1. LIDYA (obstructive sleep apnea)  2. Central sleep apnea  - Obtain in laboratory study for diagnostic evaluation of central sleep apnea.  - Will follow-up after testing is completed  History:    HPI   Wallace Harvey is a 59 y.o. male with  has a past medical history of Chronic pain, Depression, HLD (hyperlipidemia), Neuropathy, LIDYA on CPAP, Osteoarthritis, and Type II or unspecified type diabetes mellitus without mention of complication, not stated as uncontrolled. who presents as a follow-up patient to Sleep Clinic for Follow-up (Did not get his echo or stress test done) and Sleep Apnea  .   - Here for follow-up.   - Has not had sleep study yet (issues with ordering)  - Needs repeat evaluation for central sleep apnea  - Still having persistent snoring and frequent awakenings    Current Outpatient Medications   Medication Sig Dispense Refill    Dulaglutide 4.5 MG/0.5ML SOPN Inject 4.5 mg into the skin once a week 4 Adjustable Dose Pre-filled Pen Syringe 1    empagliflozin (JARDIANCE) 10 MG tablet Take 1 tablet by mouth daily 90 tablet 1    insulin lispro (HUMALOG,ADMELOG) 100 UNIT/ML SOLN injection vial 8 units TID with meal splus mod dose ISS max dose per day 50 units 5 each 3    insulin glargine (LANTUS SOLOSTAR) 100 UNIT/ML injection pen Inject 25 Units into the skin daily 5 Adjustable Dose Pre-filled Pen Syringe 5    Handicap Placard MISC by Does not apply route For 5 years 1 each 0    pantoprazole (PROTONIX) 40 MG tablet Take 1 tablet by mouth in the morning and at bedtime 30 tablet 3    rosuvastatin (CRESTOR) 20 MG tablet take 1 tablet by mouth nightly 30 tablet 3    clonazePAM (KLONOPIN) 0.5 MG tablet Take 1 tablet by mouth daily as needed.      Respiratory Therapy Supplies (NEBULIZER/TUBING/MOUTHPIECE) KIT 1 kit by Does not apply route daily as needed (breathing) 1

## 2024-09-06 ENCOUNTER — HOSPITAL ENCOUNTER (EMERGENCY)
Age: 59
Discharge: HOME | End: 2024-09-07
Payer: COMMERCIAL

## 2024-09-06 ENCOUNTER — APPOINTMENT (OUTPATIENT)
Dept: ULTRASOUND IMAGING | Age: 59
End: 2024-09-06
Payer: COMMERCIAL

## 2024-09-06 DIAGNOSIS — L98.9 SKIN LESION: Primary | ICD-10-CM

## 2024-09-06 DIAGNOSIS — R73.9 HYPERGLYCEMIA: ICD-10-CM

## 2024-09-06 LAB
ALBUMIN SERPL-MCNC: 4 G/DL (ref 3.5–5.2)
ALP SERPL-CCNC: 190 U/L (ref 40–129)
ALT SERPL-CCNC: 9 U/L (ref 0–40)
ANION GAP SERPL CALCULATED.3IONS-SCNC: 14 MMOL/L (ref 7–16)
AST SERPL-CCNC: 12 U/L (ref 0–39)
BASOPHILS # BLD: 0.08 K/UL (ref 0–0.2)
BASOPHILS NFR BLD: 1 % (ref 0–2)
BILIRUB SERPL-MCNC: 0.2 MG/DL (ref 0–1.2)
BUN SERPL-MCNC: 19 MG/DL (ref 6–23)
CALCIUM SERPL-MCNC: 9.4 MG/DL (ref 8.6–10.2)
CHLORIDE SERPL-SCNC: 99 MMOL/L (ref 98–107)
CO2 SERPL-SCNC: 24 MMOL/L (ref 22–29)
CREAT SERPL-MCNC: 1.3 MG/DL (ref 0.7–1.2)
EOSINOPHIL # BLD: 0.21 K/UL (ref 0.05–0.5)
EOSINOPHILS RELATIVE PERCENT: 3 % (ref 0–6)
ERYTHROCYTE [DISTWIDTH] IN BLOOD BY AUTOMATED COUNT: 13.8 % (ref 11.5–15)
GFR, ESTIMATED: 66 ML/MIN/1.73M2
GLUCOSE SERPL-MCNC: 416 MG/DL (ref 74–99)
HCT VFR BLD AUTO: 40.1 % (ref 37–54)
HGB BLD-MCNC: 13.1 G/DL (ref 12.5–16.5)
IMM GRANULOCYTES # BLD AUTO: 0.03 K/UL (ref 0–0.58)
IMM GRANULOCYTES NFR BLD: 0 % (ref 0–5)
LACTATE BLDV-SCNC: 2.4 MMOL/L (ref 0.5–2.2)
LYMPHOCYTES NFR BLD: 2.09 K/UL (ref 1.5–4)
LYMPHOCYTES RELATIVE PERCENT: 28 % (ref 20–42)
MCH RBC QN AUTO: 29.4 PG (ref 26–35)
MCHC RBC AUTO-ENTMCNC: 32.7 G/DL (ref 32–34.5)
MCV RBC AUTO: 89.9 FL (ref 80–99.9)
MONOCYTES NFR BLD: 0.7 K/UL (ref 0.1–0.95)
MONOCYTES NFR BLD: 9 % (ref 2–12)
NEUTROPHILS NFR BLD: 59 % (ref 43–80)
NEUTS SEG NFR BLD: 4.39 K/UL (ref 1.8–7.3)
PLATELET # BLD AUTO: 405 K/UL (ref 130–450)
PMV BLD AUTO: 8.9 FL (ref 7–12)
POTASSIUM SERPL-SCNC: 4.6 MMOL/L (ref 3.5–5)
PROT SERPL-MCNC: 7.4 G/DL (ref 6.4–8.3)
RBC # BLD AUTO: 4.46 M/UL (ref 3.8–5.8)
SODIUM SERPL-SCNC: 137 MMOL/L (ref 132–146)
WBC OTHER # BLD: 7.5 K/UL (ref 4.5–11.5)

## 2024-09-06 PROCEDURE — 80053 COMPREHEN METABOLIC PANEL: CPT

## 2024-09-06 PROCEDURE — 76999 ECHO EXAMINATION PROCEDURE: CPT

## 2024-09-06 PROCEDURE — 99284 EMERGENCY DEPT VISIT MOD MDM: CPT

## 2024-09-06 PROCEDURE — 2580000003 HC RX 258: Performed by: NURSE PRACTITIONER

## 2024-09-06 PROCEDURE — 83605 ASSAY OF LACTIC ACID: CPT

## 2024-09-06 PROCEDURE — 85025 COMPLETE CBC W/AUTO DIFF WBC: CPT

## 2024-09-06 RX ORDER — 0.9 % SODIUM CHLORIDE 0.9 %
1000 INTRAVENOUS SOLUTION INTRAVENOUS ONCE
Status: COMPLETED | OUTPATIENT
Start: 2024-09-06 | End: 2024-09-06

## 2024-09-06 RX ADMIN — SODIUM CHLORIDE 1000 ML: 9 INJECTION, SOLUTION INTRAVENOUS at 21:26

## 2024-09-06 ASSESSMENT — PAIN DESCRIPTION - ORIENTATION: ORIENTATION: OTHER (COMMENT)

## 2024-09-06 ASSESSMENT — PAIN - FUNCTIONAL ASSESSMENT: PAIN_FUNCTIONAL_ASSESSMENT: 0-10

## 2024-09-06 ASSESSMENT — PAIN SCALES - GENERAL: PAINLEVEL_OUTOF10: 8

## 2024-09-06 ASSESSMENT — PAIN DESCRIPTION - PAIN TYPE: TYPE: ACUTE PAIN

## 2024-09-06 ASSESSMENT — PAIN DESCRIPTION - LOCATION: LOCATION: HEAD

## 2024-09-06 ASSESSMENT — PAIN DESCRIPTION - DESCRIPTORS: DESCRIPTORS: ACHING;THROBBING

## 2024-09-07 VITALS
HEART RATE: 86 BPM | BODY MASS INDEX: 29.4 KG/M2 | SYSTOLIC BLOOD PRESSURE: 149 MMHG | DIASTOLIC BLOOD PRESSURE: 88 MMHG | WEIGHT: 210 LBS | HEIGHT: 71 IN | OXYGEN SATURATION: 98 % | TEMPERATURE: 98.2 F | RESPIRATION RATE: 16 BRPM

## 2024-09-07 LAB
GLUCOSE BLD-MCNC: 263 MG/DL (ref 74–99)
GLUCOSE BLD-MCNC: 322 MG/DL (ref 74–99)

## 2024-09-07 PROCEDURE — 2580000003 HC RX 258: Performed by: NURSE PRACTITIONER

## 2024-09-07 PROCEDURE — 82962 GLUCOSE BLOOD TEST: CPT

## 2024-09-07 RX ORDER — 0.9 % SODIUM CHLORIDE 0.9 %
500 INTRAVENOUS SOLUTION INTRAVENOUS ONCE
Status: COMPLETED | OUTPATIENT
Start: 2024-09-07 | End: 2024-09-07

## 2024-09-07 RX ADMIN — SODIUM CHLORIDE 500 ML: 9 INJECTION, SOLUTION INTRAVENOUS at 01:18

## 2024-09-07 NOTE — ED PROVIDER NOTES
Independent DIANNE Visit.       [facility] EMERGENCY DEPARTMENT  ED  Encounter Note  Admit Date/RoomTime: 2024  9:09 PM  ED Room: [facility]/  NAME: Wallace Harvey  : 1965  MRN: [id number]  PCP: Long Mcknight DO    CHIEF COMPLAINT     Wound Check (Patient has wound to top of head x 1 week. Patient c/o increased pain to site and drainage.)    HISTORY OF PRESENT ILLNESS        Wallace Harvey is a 59 y.o. male who presents to the ED via private vehicle with complaint of lesion to his scalp for about 1 week.  Saw family doctor placed on Bactroban cream and and has upcoming appointment with dermatology in 2 weeks.  He has not had any fever chills.  It is sore to touch.  States that his family became concerned and had him present to the emergency department.  REVIEW OF SYSTEMS     Pertinent positives and negatives are stated within HPI, all other systems reviewed and are negative.    Past Medical History:  has a past medical history of Chronic pain, Depression, HLD (hyperlipidemia), Neuropathy, LIDYA on CPAP, Osteoarthritis, and Type II or unspecified type diabetes mellitus without mention of complication, not stated as uncontrolled.  Surgical History:  has a past surgical history that includes Varicose vein surgery; Arm Surgery; pr colonoscopy w/biopsy single/multiple (N/A, 2018); pr colsc flx w/rmvl of tumor polyp lesion snare tq (2018); Knee arthroscopy (Bilateral, 2020); incision and drainage (Bilateral, 2020); incision and drainage (Bilateral, 2020); Upper gastrointestinal endoscopy (N/A, 2023); Tracheal surgery (N/A, 2023); Mandible surgery (Left, 2023); bronchoscopy (N/A, 2023); and Facial Surgery (N/A, 2023).  Social History:  reports that he quit smoking about 13 months ago. His smoking use included cigarettes. He started smoking about 45 years ago. He has a 22 pack-year smoking history. He has never used smokeless  Panel w/ Reflex to MG   Result Value Ref Range    Sodium 137 132 - 146 mmol/L    Potassium 4.6 3.5 - 5.0 mmol/L    Chloride 99 98 - 107 mmol/L    CO2 24 22 - 29 mmol/L    Anion Gap 14 7 - 16 mmol/L    Glucose 416 (H) 74 - 99 mg/dL    BUN 19 6 - 23 mg/dL    Creatinine 1.3 (H) 0.70 - 1.20 mg/dL    Est, Glom Filt Rate 66 >60 mL/min/1.73m2    Calcium 9.4 8.6 - 10.2 mg/dL    Total Protein 7.4 6.4 - 8.3 g/dL    Albumin 4.0 3.5 - 5.2 g/dL    Total Bilirubin 0.2 0.0 - 1.2 mg/dL    Alkaline Phosphatase 190 (H) 40 - 129 U/L    ALT 9 0 - 40 U/L    AST 12 0 - 39 U/L   Lactic Acid   Result Value Ref Range    Lactic Acid 2.4 (H) 0.5 - 2.2 mmol/L   POCT Glucose   Result Value Ref Range    POC Glucose 322 (H) 74 - 99 mg/dL   POCT Glucose   Result Value Ref Range    POC Glucose 263 (H) 74 - 99 mg/dL     Imaging:  All Radiology results interpreted by Radiologist unless otherwise noted.  US SOFT TISSUE LIMITED AREA   Final Result   1.  Area of lump corresponds to what appears to be hypoechoic lesion showing   posterior shadowing.  Unclear if this is phlegmonous changes or hematoma.      2.  There is no surrounding hypervascularity to suggest abscess on this study.             ED COURSE   Vitals:    Vitals:    09/06/24 2101 09/06/24 2107 09/06/24 2111 09/07/24 0215   BP:  92/65 100/62 (!) 149/88   Pulse: 98   86   Resp: 16   16   Temp: 98.2 °F (36.8 °C)      TempSrc: Oral      SpO2: 97%   98%   Weight:  95.3 kg (210 lb)     Height:  1.803 m (5' 11\")         Patient was given the following medications:  Medications   sodium chloride 0.9 % bolus 1,000 mL (0 mLs IntraVENous Stopped 9/6/24 2228)   sodium chloride 0.9 % bolus 500 mL (0 mLs IntraVENous Stopped 9/7/24 0215)          PROCEDURES       REASSESSMENT   9/7/24       Time: 0230  Patient’s condition is improving.    CONSULTS:  None  DIFFERENTIAL DX_MDM   MDM:   Social Determinants : None    Records Reviewed : None_ n/a per encounter visit    CC/HPI Summary, DDx, ED Course, and

## 2024-09-07 NOTE — DISCHARGE INSTRUCTIONS
The lesion on your scalp does not appear to be abscess or infectious etiology.  May continue to use the ointment that was previously prescribed to you.  It is important that you follow-up with dermatology for definitive diagnosis.  Today your blood sugar was elevated.  This did improve with the IV fluids that you were given however do suggest continued and consistent use of the insulin pump that you have at home

## 2024-09-29 ENCOUNTER — HOSPITAL ENCOUNTER (OUTPATIENT)
Dept: SLEEP CENTER | Age: 59
Discharge: HOME | End: 2024-09-29
Payer: COMMERCIAL

## 2024-09-29 DIAGNOSIS — G47.33 OSA (OBSTRUCTIVE SLEEP APNEA): ICD-10-CM

## 2024-09-29 PROCEDURE — 95811 POLYSOM 6/>YRS CPAP 4/> PARM: CPT

## 2024-10-30 ENCOUNTER — HOSPITAL ENCOUNTER (OUTPATIENT)
Dept: SLEEP CENTER | Age: 59
Discharge: HOME | End: 2024-10-30
Payer: COMMERCIAL

## 2024-10-30 DIAGNOSIS — G47.31 CENTRAL SLEEP APNEA: Primary | ICD-10-CM

## 2024-10-30 DIAGNOSIS — G47.31 CENTRAL SLEEP APNEA: ICD-10-CM

## 2024-10-30 PROCEDURE — 95811 POLYSOM 6/>YRS CPAP 4/> PARM: CPT

## 2024-11-19 DIAGNOSIS — G47.33 OSA (OBSTRUCTIVE SLEEP APNEA): Primary | ICD-10-CM

## 2024-11-25 ENCOUNTER — TELEPHONE (OUTPATIENT)
Dept: SLEEP MEDICINE | Age: 59
End: 2024-11-25

## 2024-11-25 NOTE — TELEPHONE ENCOUNTER
----- Message from Dr. Nestor Euceda MD sent at 11/19/2024  6:36 PM EST -----  Done - ordered equipment  ----- Message -----  From: Magda Lagos LPN  Sent: 11/5/2024   1:06 PM EST  To: Nestor Euceda MD    Completed sleep study

## 2024-11-25 NOTE — TELEPHONE ENCOUNTER
Call to patient to discuss titration results and recommendations of a BiPAP. Lvm for patient to call us back.

## 2024-11-25 NOTE — TELEPHONE ENCOUNTER
Patient returned our call, discussed study results and tx recommendations of a BiPAP. Patient agreeable to orders being sent to [facility]. Set up [employer]

## 2025-01-15 DIAGNOSIS — E11.42 TYPE 2 DIABETES MELLITUS WITH POLYNEUROPATHY (HCC): ICD-10-CM

## 2025-01-15 NOTE — TELEPHONE ENCOUNTER
Wants lantus solostar and humalog vials for his pump     Pen needles for the lantus,  insulin syringes       Patient states he is still injecting lantus 25u daily     He wants the humalog sent in a vial with syringes so he has vials of insulin when he can get back on his pump.

## 2025-01-16 RX ORDER — INSULIN GLARGINE 100 [IU]/ML
25 INJECTION, SOLUTION SUBCUTANEOUS DAILY
Qty: 15 ML | Refills: 1 | Status: SHIPPED | OUTPATIENT
Start: 2025-01-16

## 2025-01-16 RX ORDER — IBUPROFEN 200 MG
1 TABLET ORAL 3 TIMES DAILY
Qty: 100 EACH | Refills: 1 | Status: SHIPPED | OUTPATIENT
Start: 2025-01-16

## 2025-01-16 RX ORDER — INSULIN LISPRO 100 [IU]/ML
INJECTION, SOLUTION INTRAVENOUS; SUBCUTANEOUS
Qty: 30 ML | Refills: 1 | Status: SHIPPED | OUTPATIENT
Start: 2025-01-16

## 2025-02-24 NOTE — PROGRESS NOTES
Wallace Harvey, was evaluated through a synchronous (real-time) audio-video encounter. The patient (or guardian if applicable) is aware that this is a billable service, which includes applicable co-pays. This Virtual Visit was conducted with patient's (and/or legal guardian's) consent. Patient identification was verified, and a caregiver was present when appropriate.   The patient was located at Home: [address] [place] [id number]  Provider was located at Facility (Appt Dept): [address] [place] [address]  Confirm you are appropriately licensed, registered, or certified to deliver care in the state where the patient is located as indicated above. If you are not or unsure, please re-schedule the visit: Yes, I confirm.     Wallace Harvey (:  1965) is a Established patient, presenting virtually for evaluation of the following:      Below is the assessment and plan developed based on review of pertinent history, physical exam, labs, studies, and medications.     Assessment & Plan  Central sleep apnea   Chronic, at goal (stable), continue current treatment plan  - F-U 1 yr.           No follow-ups on file.       Subjective   HPI  Review of Systems         - Changed to ASV since the last visit.  - Having an excellent response to therapy.  - Derives significant benefit from it    Objective   Patient-Reported Vitals  No data recorded     Physical Exam  [INSTRUCTIONS:  \"[x]\" Indicates a positive item  \"[]\" Indicates a negative item  -- DELETE ALL ITEMS NOT EXAMINED]    Constitutional: [x] Appears well-developed and well-nourished [x] No apparent distress      [] Abnormal -     Mental status: [x] Alert and awake  [x] Oriented to person/place/time [x] Able to follow commands    [] Abnormal -     Eyes:   EOM    [x]  Normal    [] Abnormal -   Sclera  [x]  Normal    [] Abnormal -          Discharge [x]  None visible   [] Abnormal -     HENT: [x] Normocephalic, atraumatic  [] Abnormal -   [x]

## 2025-02-25 ENCOUNTER — TELEMEDICINE (OUTPATIENT)
Dept: SLEEP MEDICINE | Age: 60
End: 2025-02-25
Payer: COMMERCIAL

## 2025-02-25 DIAGNOSIS — G47.31 CENTRAL SLEEP APNEA: Primary | ICD-10-CM

## 2025-02-25 PROCEDURE — G8428 CUR MEDS NOT DOCUMENT: HCPCS | Performed by: STUDENT IN AN ORGANIZED HEALTH CARE EDUCATION/TRAINING PROGRAM

## 2025-02-25 PROCEDURE — 1036F TOBACCO NON-USER: CPT | Performed by: STUDENT IN AN ORGANIZED HEALTH CARE EDUCATION/TRAINING PROGRAM

## 2025-02-25 PROCEDURE — 3017F COLORECTAL CA SCREEN DOC REV: CPT | Performed by: STUDENT IN AN ORGANIZED HEALTH CARE EDUCATION/TRAINING PROGRAM

## 2025-02-25 PROCEDURE — G8417 CALC BMI ABV UP PARAM F/U: HCPCS | Performed by: STUDENT IN AN ORGANIZED HEALTH CARE EDUCATION/TRAINING PROGRAM

## 2025-02-25 PROCEDURE — 99214 OFFICE O/P EST MOD 30 MIN: CPT | Performed by: STUDENT IN AN ORGANIZED HEALTH CARE EDUCATION/TRAINING PROGRAM

## 2025-03-04 ENCOUNTER — TELEMEDICINE (OUTPATIENT)
Dept: ENDOCRINOLOGY | Age: 60
End: 2025-03-04

## 2025-03-04 DIAGNOSIS — E11.42 TYPE 2 DIABETES MELLITUS WITH POLYNEUROPATHY (HCC): ICD-10-CM

## 2025-03-04 DIAGNOSIS — Z79.4 TYPE 2 DIABETES MELLITUS WITH HYPERGLYCEMIA, WITH LONG-TERM CURRENT USE OF INSULIN (HCC): ICD-10-CM

## 2025-03-04 DIAGNOSIS — E78.2 MIXED HYPERLIPIDEMIA: ICD-10-CM

## 2025-03-04 DIAGNOSIS — E11.65 TYPE 2 DIABETES MELLITUS WITH HYPERGLYCEMIA, WITH LONG-TERM CURRENT USE OF INSULIN (HCC): ICD-10-CM

## 2025-03-04 DIAGNOSIS — E55.9 VITAMIN D DEFICIENCY: ICD-10-CM

## 2025-03-04 DIAGNOSIS — E11.65 TYPE 2 DIABETES MELLITUS WITH HYPERGLYCEMIA, WITH LONG-TERM CURRENT USE OF INSULIN (HCC): Primary | ICD-10-CM

## 2025-03-04 DIAGNOSIS — Z79.4 TYPE 2 DIABETES MELLITUS WITH HYPERGLYCEMIA, WITH LONG-TERM CURRENT USE OF INSULIN (HCC): Primary | ICD-10-CM

## 2025-03-04 RX ORDER — INSULIN LISPRO 100 [IU]/ML
INJECTION, SOLUTION INTRAVENOUS; SUBCUTANEOUS
Qty: 5 ADJUSTABLE DOSE PRE-FILLED PEN SYRINGE | Refills: 11 | Status: SHIPPED
Start: 2025-03-04 | End: 2025-03-04 | Stop reason: SDUPTHER

## 2025-03-04 RX ORDER — INSULIN GLARGINE 100 [IU]/ML
20 INJECTION, SOLUTION SUBCUTANEOUS DAILY
Qty: 5 ADJUSTABLE DOSE PRE-FILLED PEN SYRINGE | Refills: 11 | Status: SHIPPED | OUTPATIENT
Start: 2025-03-04

## 2025-03-04 RX ORDER — INSULIN LISPRO 100 [IU]/ML
INJECTION, SOLUTION INTRAVENOUS; SUBCUTANEOUS
Qty: 5 ADJUSTABLE DOSE PRE-FILLED PEN SYRINGE | Refills: 11 | Status: SHIPPED | OUTPATIENT
Start: 2025-03-04

## 2025-03-04 RX ORDER — INSULIN GLARGINE 100 [IU]/ML
20 INJECTION, SOLUTION SUBCUTANEOUS DAILY
Qty: 5 ADJUSTABLE DOSE PRE-FILLED PEN SYRINGE | Refills: 11 | Status: SHIPPED
Start: 2025-03-04 | End: 2025-03-04 | Stop reason: SDUPTHER

## 2025-03-04 NOTE — PROGRESS NOTES
[facility] "[employer]" [facility] Department of Endocrinology Diabetes and Metabolism   [address], [place], [place], [address]   Phone: [phone]  Fax: [phone]    Date of Service: 3/4/2025    Primary Care Physician: Long Mcknight DO  Referring physician: No ref. provider found  Provider: FRANCIE Velasquez - CNP     Reason for the visit:  Type 2 DM       History of Present Illness:  The history is provided by the patient. No  was used. Accuracy of the patient data is excellent.  Wallace Harvey is a very pleasant 59 y.o. male seen today for diabetes management     Wallace Harvey was diagnosed with diabetes at age of 39  and currently on Lantus 50 units at night Has not been taking Humalog    Trulicity and Jardiance caused GI effects  Was on Medtronic pump but stopped after hospitalization for head injury.  Wants to get back on Medtronic pump.    The patient has been checking blood sugar 2 times  Blood sugars are \"terrible\"    Reports \"my A1c is probably 32\"    Most recent A1c results summarized below  Lab Results   Component Value Date/Time    LABA1C 14.0 07/09/2024 04:03 PM    LABA1C 14.6 04/18/2024 02:09 PM    LABA1C 6.4 11/02/2023 12:14 PM     Patient has had no hypoglycemic episodes   The patient has been mindful of what has been eating and following diabetic diet as encouraged  I reviewed current medications and the patient has no issues with diabetes medications  The patient is due for an eye exam.   The patient  performs  own feet care  Microvascular complications:  No Retinopathy, Nephropathy or Neuropathy   Macrovascular complications: no CAD, PVD, or Stroke      PAST MEDICAL HISTORY   Past Medical History:   Diagnosis Date    Chronic pain     Depression     HLD (hyperlipidemia)     Neuropathy     LIDYA on CPAP     Osteoarthritis     Type II or unspecified type diabetes mellitus without mention of complication, not stated as uncontrolled        PAST

## 2025-07-08 ENCOUNTER — TELEPHONE (OUTPATIENT)
Dept: ENDOCRINOLOGY | Age: 60
End: 2025-07-08

## 2025-07-09 ENCOUNTER — TELEPHONE (OUTPATIENT)
Dept: PRIMARY CARE CLINIC | Age: 60
End: 2025-07-09

## 2025-07-09 NOTE — TELEPHONE ENCOUNTER
Narinder states he is not in extreme pain and did not tell anyone its that bad. He said this has been going on for some years now.

## 2025-07-09 NOTE — TELEPHONE ENCOUNTER
----- Message from Josette SHEFFIELD sent at 7/8/2025  2:06 PM EDT -----  Regarding: ECC Escalation To Practice  ECC Escalation To Practice      Type of Escalation: Red Flag Symptom  --------------------------------------------------------------------------------------------------------------------------    Information for Provider:  Patient is looking for appointment for: Symptom / diabetec down/low blood sugar , extreme leg pain  Reasons for Message: Patient disconnected     Additional Information/ Patient disconnect the call when tried to talk to him back while practice  still ringing   --------------------------------------------------------------------------------------------------------------------------    Relationship to Patient: Self  Call Back Info: OK to leave message on voicemail  Preferred Call Back Number: Phone [phone]

## 2025-07-09 NOTE — TELEPHONE ENCOUNTER
Patient is scheduled for Monday. Is this okay to wait?  If not, where should I place patient tomorrow? Schedule is completely booked with 32

## 2025-07-14 ENCOUNTER — OFFICE VISIT (OUTPATIENT)
Dept: PRIMARY CARE CLINIC | Age: 60
End: 2025-07-14
Payer: COMMERCIAL

## 2025-07-14 VITALS
SYSTOLIC BLOOD PRESSURE: 144 MMHG | OXYGEN SATURATION: 95 % | DIASTOLIC BLOOD PRESSURE: 70 MMHG | HEART RATE: 78 BPM | TEMPERATURE: 96.8 F | BODY MASS INDEX: 31.78 KG/M2 | RESPIRATION RATE: 14 BRPM | WEIGHT: 227 LBS | HEIGHT: 71 IN

## 2025-07-14 DIAGNOSIS — Z79.4 TYPE 2 DIABETES MELLITUS WITH HYPERGLYCEMIA, WITH LONG-TERM CURRENT USE OF INSULIN (HCC): ICD-10-CM

## 2025-07-14 DIAGNOSIS — E11.65 TYPE 2 DIABETES MELLITUS WITH HYPERGLYCEMIA, WITH LONG-TERM CURRENT USE OF INSULIN (HCC): ICD-10-CM

## 2025-07-14 DIAGNOSIS — E11.42 TYPE 2 DIABETES MELLITUS WITH POLYNEUROPATHY (HCC): Primary | ICD-10-CM

## 2025-07-14 LAB
ALBUMIN: 3.9 G/DL (ref 3.5–5.2)
ALP BLD-CCNC: 101 U/L (ref 40–129)
ALT SERPL-CCNC: 15 U/L (ref 0–50)
ANION GAP SERPL CALCULATED.3IONS-SCNC: 15 MMOL/L (ref 7–16)
AST SERPL-CCNC: 21 U/L (ref 0–50)
BILIRUB SERPL-MCNC: <0.2 MG/DL (ref 0–1.2)
BUN BLDV-MCNC: 27 MG/DL (ref 8–23)
CALCIUM SERPL-MCNC: 10.2 MG/DL (ref 8.8–10.2)
CHLORIDE BLD-SCNC: 94 MMOL/L (ref 98–107)
CO2: 21 MMOL/L (ref 22–29)
CREAT SERPL-MCNC: 1 MG/DL (ref 0.7–1.2)
GFR, ESTIMATED: 85 ML/MIN/1.73M2
GLUCOSE BLD-MCNC: 528 MG/DL (ref 74–99)
HBA1C MFR BLD: 14.4 %
HCT VFR BLD CALC: 43 % (ref 37–54)
HEMOGLOBIN: 13.7 G/DL (ref 12.5–16.5)
MCH RBC QN AUTO: 29.6 PG (ref 26–35)
MCHC RBC AUTO-ENTMCNC: 31.9 G/DL (ref 32–34.5)
MCV RBC AUTO: 92.9 FL (ref 80–99.9)
PDW BLD-RTO: 13.9 % (ref 11.5–15)
PLATELET # BLD: 377 K/UL (ref 130–450)
PMV BLD AUTO: 9.6 FL (ref 7–12)
POTASSIUM SERPL-SCNC: 5 MMOL/L (ref 3.5–5.1)
RBC # BLD: 4.63 M/UL (ref 3.8–5.8)
SODIUM BLD-SCNC: 130 MMOL/L (ref 136–145)
TOTAL PROTEIN: 7 G/DL (ref 6.4–8.3)
WBC # BLD: 8 K/UL (ref 4.5–11.5)

## 2025-07-14 PROCEDURE — 4004F PT TOBACCO SCREEN RCVD TLK: CPT | Performed by: STUDENT IN AN ORGANIZED HEALTH CARE EDUCATION/TRAINING PROGRAM

## 2025-07-14 PROCEDURE — 3017F COLORECTAL CA SCREEN DOC REV: CPT | Performed by: STUDENT IN AN ORGANIZED HEALTH CARE EDUCATION/TRAINING PROGRAM

## 2025-07-14 PROCEDURE — 99214 OFFICE O/P EST MOD 30 MIN: CPT | Performed by: STUDENT IN AN ORGANIZED HEALTH CARE EDUCATION/TRAINING PROGRAM

## 2025-07-14 PROCEDURE — G8417 CALC BMI ABV UP PARAM F/U: HCPCS | Performed by: STUDENT IN AN ORGANIZED HEALTH CARE EDUCATION/TRAINING PROGRAM

## 2025-07-14 PROCEDURE — 83036 HEMOGLOBIN GLYCOSYLATED A1C: CPT | Performed by: STUDENT IN AN ORGANIZED HEALTH CARE EDUCATION/TRAINING PROGRAM

## 2025-07-14 PROCEDURE — 2022F DILAT RTA XM EVC RTNOPTHY: CPT | Performed by: STUDENT IN AN ORGANIZED HEALTH CARE EDUCATION/TRAINING PROGRAM

## 2025-07-14 PROCEDURE — G8427 DOCREV CUR MEDS BY ELIG CLIN: HCPCS | Performed by: STUDENT IN AN ORGANIZED HEALTH CARE EDUCATION/TRAINING PROGRAM

## 2025-07-14 PROCEDURE — 3046F HEMOGLOBIN A1C LEVEL >9.0%: CPT | Performed by: STUDENT IN AN ORGANIZED HEALTH CARE EDUCATION/TRAINING PROGRAM

## 2025-07-14 PROCEDURE — G2211 COMPLEX E/M VISIT ADD ON: HCPCS | Performed by: STUDENT IN AN ORGANIZED HEALTH CARE EDUCATION/TRAINING PROGRAM

## 2025-07-14 PROCEDURE — 36415 COLL VENOUS BLD VENIPUNCTURE: CPT | Performed by: STUDENT IN AN ORGANIZED HEALTH CARE EDUCATION/TRAINING PROGRAM

## 2025-07-14 RX ORDER — BLOOD-GLUCOSE METER
1 KIT MISCELLANEOUS DAILY
Qty: 1 KIT | Refills: 0 | Status: SHIPPED | OUTPATIENT
Start: 2025-07-14

## 2025-07-14 RX ORDER — INSULIN GLARGINE 100 [IU]/ML
25 INJECTION, SOLUTION SUBCUTANEOUS DAILY
Qty: 5 ADJUSTABLE DOSE PRE-FILLED PEN SYRINGE | Refills: 11
Start: 2025-07-14

## 2025-07-14 SDOH — ECONOMIC STABILITY: FOOD INSECURITY: WITHIN THE PAST 12 MONTHS, YOU WORRIED THAT YOUR FOOD WOULD RUN OUT BEFORE YOU GOT MONEY TO BUY MORE.: PATIENT DECLINED

## 2025-07-14 SDOH — ECONOMIC STABILITY: FOOD INSECURITY: WITHIN THE PAST 12 MONTHS, THE FOOD YOU BOUGHT JUST DIDN'T LAST AND YOU DIDN'T HAVE MONEY TO GET MORE.: PATIENT DECLINED

## 2025-07-14 ASSESSMENT — PATIENT HEALTH QUESTIONNAIRE - PHQ9
4. FEELING TIRED OR HAVING LITTLE ENERGY: SEVERAL DAYS
5. POOR APPETITE OR OVEREATING: SEVERAL DAYS
SUM OF ALL RESPONSES TO PHQ QUESTIONS 1-9: 9
8. MOVING OR SPEAKING SO SLOWLY THAT OTHER PEOPLE COULD HAVE NOTICED. OR THE OPPOSITE, BEING SO FIGETY OR RESTLESS THAT YOU HAVE BEEN MOVING AROUND A LOT MORE THAN USUAL: NOT AT ALL
6. FEELING BAD ABOUT YOURSELF - OR THAT YOU ARE A FAILURE OR HAVE LET YOURSELF OR YOUR FAMILY DOWN: SEVERAL DAYS
9. THOUGHTS THAT YOU WOULD BE BETTER OFF DEAD, OR OF HURTING YOURSELF: NOT AT ALL
SUM OF ALL RESPONSES TO PHQ QUESTIONS 1-9: 9
SUM OF ALL RESPONSES TO PHQ QUESTIONS 1-9: 9
3. TROUBLE FALLING OR STAYING ASLEEP: NEARLY EVERY DAY
10. IF YOU CHECKED OFF ANY PROBLEMS, HOW DIFFICULT HAVE THESE PROBLEMS MADE IT FOR YOU TO DO YOUR WORK, TAKE CARE OF THINGS AT HOME, OR GET ALONG WITH OTHER PEOPLE: NOT DIFFICULT AT ALL
SUM OF ALL RESPONSES TO PHQ QUESTIONS 1-9: 9
2. FEELING DOWN, DEPRESSED OR HOPELESS: MORE THAN HALF THE DAYS
7. TROUBLE CONCENTRATING ON THINGS, SUCH AS READING THE NEWSPAPER OR WATCHING TELEVISION: NOT AT ALL
1. LITTLE INTEREST OR PLEASURE IN DOING THINGS: SEVERAL DAYS

## 2025-07-14 NOTE — PROGRESS NOTES
[facility] Primary [facility]  Department of Family Medicine      Patient:  Wallace Harvey 60 y.o. male     Date of Service: 7/14/25      Chief complaint:   Chief Complaint   Patient presents with    Diabetes         History ofPresent Illness   The patient is a 60 y.o. male  presented to the clinic with complaints as above.    Noticing burning around his lips, denies any new medications     Diabetes  -f/u  -ran out of test strips and trying to get appt with endo to get back on pump   -has two meals a day, usually takes 4-10 units with this     Had recent trigger finger surgery, following with specialist for this    Following with [facility] for his depression, doing ok on prozac     Past Medical History:      Diagnosis Date    Chronic pain     Depression     HLD (hyperlipidemia)     Neuropathy     LIDYA on CPAP     Osteoarthritis     Type II or unspecified type diabetes mellitus without mention of complication, not stated as uncontrolled        PastSurgical History:        Procedure Laterality Date    ARM SURGERY      left arm    BRONCHOSCOPY N/A 8/18/2023    BRONCHOSCOPY DIAGNOSTIC OR CELL WASH ONLY performed by Willy Ryan MD at [facility] ENDOSCOPY    FACIAL SURGERY N/A 8/22/2023    FACIAL exploration of under anesthesia of left cheek performed by Ervin Lau MD at [facility] OR    INCISION AND DRAINAGE Bilateral 7/27/2020    REPEAT BILATERAL KNEE WASHOUT performed by Oscar Contreras DO at [facility] OR    INCISION AND DRAINAGE Bilateral 8/9/2020    INCISION AND DRAINAGE bilateral knees performed by Oscar Contreras DO at [facility] OR    KNEE ARTHROSCOPY Bilateral 7/23/2020    KNEE ARTHROSCOPY performed by Sudeep Umanzor MD at [facility] OR    MANDIBLE SURGERY Left 8/8/2023    LEFT PARASYNTHESIS MANDIBLE OPEN REDUCTION INTERNAL FIXATION WITH PLATES AND SCREWS MAXILLOMANDIBULAR FIXATION AND GINGIVOBUCCAL FLAP CLOSURE OF DEFECT performed by Ervin Lau MD at [facility] OR    [place] COLONOSCOPY W/BIOPSY

## 2025-07-16 RX ORDER — BLOOD-GLUCOSE METER
KIT MISCELLANEOUS
Qty: 1 KIT | Refills: 0 | Status: SHIPPED | OUTPATIENT
Start: 2025-07-16

## 2025-07-16 NOTE — TELEPHONE ENCOUNTER
Incoming fax from pharmacy stating that freestyle freedom lite kit is not covered under insurance. Pended an alternative

## 2025-07-24 ENCOUNTER — TELEPHONE (OUTPATIENT)
Dept: ENDOCRINOLOGY | Age: 60
End: 2025-07-24

## 2025-07-24 ENCOUNTER — TELEPHONE (OUTPATIENT)
Dept: PRIMARY CARE CLINIC | Age: 60
End: 2025-07-24

## 2025-07-24 DIAGNOSIS — Z79.4 INSULIN-REQUIRING OR DEPENDENT TYPE II DIABETES MELLITUS (HCC): ICD-10-CM

## 2025-07-24 DIAGNOSIS — Z79.4 TYPE 2 DIABETES MELLITUS WITH HYPERGLYCEMIA, WITH LONG-TERM CURRENT USE OF INSULIN (HCC): Primary | ICD-10-CM

## 2025-07-24 DIAGNOSIS — E11.65 TYPE 2 DIABETES MELLITUS WITH HYPERGLYCEMIA, WITH LONG-TERM CURRENT USE OF INSULIN (HCC): Primary | ICD-10-CM

## 2025-07-24 DIAGNOSIS — E11.9 INSULIN-REQUIRING OR DEPENDENT TYPE II DIABETES MELLITUS (HCC): ICD-10-CM

## 2025-07-24 NOTE — TELEPHONE ENCOUNTER
Attached are the pt's emailed blood sugar logs. I confirmed his regimen verbally.     Lantus 20 HS  Humalog 6/6/6 + 2:50>150

## 2025-07-24 NOTE — TELEPHONE ENCOUNTER
Name of Medication(s) Requested:  Requested Prescriptions     Pending Prescriptions Disp Refills    Blood Glucose Monitoring Suppl (ACCU-CHEK GUIDE) w/Device KIT 1 kit 0     Sig: Use to check blood glucose levels 3-4 times daily and as needed for high/low glucose levels    blood glucose test strips (ACCU-CHEK GUIDE) strip 200 each 3     Sig: Use 1 strip for each check 3-4 times daily and as needed for high/low blood glucose levels    Accu-Chek Softclix Lancets MISC 200 each 3     Sig: Use to check blood glucose levels 3-4 times daily       Medication is on current medication list Yes    Dosage and directions were verified? Yes    Quantity verified: 30 day supply     Pharmacy Verified?  Yes    Last Appointment:  7/14/2025    Future appts:  Future Appointments   Date Time Provider Department Center   7/30/2025 11:15 AM Long Mcknight DO [facility]   2/19/2026  1:45 PM Nestor Euceda MD [facility]        (If no appt send self scheduling link. .REFILLAPPT)  Scheduling request sent?     [] Yes  [x] No    Does patient need updated?  [] Yes  [x] No

## 2025-07-24 NOTE — TELEPHONE ENCOUNTER
Increase Lantus to 24 units.  Increase Humalog to 8 units 3 times daily with meals plus moderate dose insulin sliding scale.  Please have patient check blood sugars 4 times per day fasting and before lunch, dinner, and bedtime  Send blood glucose log in 1 week

## 2025-07-25 RX ORDER — LANCETS
EACH MISCELLANEOUS
Qty: 200 EACH | Refills: 3 | Status: SHIPPED | OUTPATIENT
Start: 2025-07-25

## 2025-07-25 RX ORDER — BLOOD-GLUCOSE METER
EACH MISCELLANEOUS
Qty: 1 KIT | Refills: 0 | Status: SHIPPED | OUTPATIENT
Start: 2025-07-25

## 2025-07-25 RX ORDER — BLOOD SUGAR DIAGNOSTIC
STRIP MISCELLANEOUS
Qty: 200 EACH | Refills: 3 | Status: SHIPPED | OUTPATIENT
Start: 2025-07-25

## (undated) DEVICE — ZIMMER® STERILE DISPOSABLE TOURNIQUET CUFF WITH PROTECTIVE SLEEVE AND PLC, DUAL PORT, SINGLE BLADDER, 34 IN. (86 CM)

## (undated) DEVICE — PATIENT RETURN ELECTRODE, SINGLE-USE, CONTACT QUALITY MONITORING, ADULT, WITH 9FT CORD, FOR PATIENTS WEIGING OVER 33LBS. (15KG): Brand: MEGADYNE

## (undated) DEVICE — SET IRR L100IN DIA0.280IN C100ML 2 NVENT PIERCING PINS 3

## (undated) DEVICE — DRAPE NEG PRSS W30.5XL26CM POLYUR ADH VAC

## (undated) DEVICE — PACKING,VAGINAL,XR,ST,4"X36",100EA: Brand: MEDLINE

## (undated) DEVICE — PLATE GRND ELECTROSURICAL PT

## (undated) DEVICE — SOLUTION IRRIG 500ML 0.9% SOD CHLO USP POUR PLAS BTL

## (undated) DEVICE — CLOTH SURG PREP PREOPERATIVE CHLORHEXIDINE GLUC 2% READYPREP

## (undated) DEVICE — SURGICAL PROCEDURE PACK BASIC

## (undated) DEVICE — BLADE SHV DIA4MM ENDO RESECT CUT FRMLA

## (undated) DEVICE — CORD,CAUTERY,BIPOLAR,STERILE: Brand: MEDLINE

## (undated) DEVICE — MAGNETIC INSTR DRAPE 20X16: Brand: MEDLINE INDUSTRIES, INC.

## (undated) DEVICE — YANKAUER,OPEN TIP,W/O VENT,STERILE: Brand: MEDLINE INDUSTRIES, INC.

## (undated) DEVICE — INTENDED FOR TISSUE SEPARATION, AND OTHER PROCEDURES THAT REQUIRE A SHARP SURGICAL BLADE TO PUNCTURE OR CUT.: Brand: BARD-PARKER ® STAINLESS STEEL BLADES

## (undated) DEVICE — CAMERA STRYKER 1488 HD GEN

## (undated) DEVICE — DOUBLE  SWIVEL ELBOW 15M - DOUBLE FLIP TOP CAP WITH SEAL - 22M/15F: Brand: DOUBLE  SWIVEL ELBOW 15M - DOUBLE FLIP TOP CAP WITH SEAL - 22M/15F

## (undated) DEVICE — SET ORTHO 30 DEG SCOPE AND TROC

## (undated) DEVICE — 3M™ COBAN™ NL STERILE NON-LATEX SELF-ADHERENT WRAP, 2084S, 4 IN X 5 YD (10 CM X 4,5 M), 18 ROLLS/CASE: Brand: 3M™ COBAN™

## (undated) DEVICE — NEEDLE HYPO 18GA L1.5IN PNK POLYPR HUB S STL REG BVL STR

## (undated) DEVICE — BATTERY PACK FOR VARISPEED: Brand: STRYKER VARISPEED

## (undated) DEVICE — GLOVE ORANGE PI 8   MSG9080

## (undated) DEVICE — GOWN,BREATHABLE SLV,AURORA,XLG,STRL: Brand: MEDLINE

## (undated) DEVICE — BLADE CLIPPER GEN PURP NS

## (undated) DEVICE — SET ORTHO STD STORTSTD1

## (undated) DEVICE — TRAY,SKIN SCRUB,DRY,W/GAUZE: Brand: MEDLINE

## (undated) DEVICE — HEAD AND NECK: Brand: MEDLINE INDUSTRIES, INC.

## (undated) DEVICE — DRIP REDUCTION MANIFOLD

## (undated) DEVICE — GAUZE,SPONGE,4"X4",8PLY,STRL,LF,10/TRAY: Brand: MEDLINE

## (undated) DEVICE — ENDOVIVE SFT PEG KIT PULL WENFIT 20F BX2

## (undated) DEVICE — TOWEL,OR,DSP,ST,BLUE,DLX,10/PK,8PK/CS: Brand: MEDLINE

## (undated) DEVICE — SINGLE USE SUCTION VALVE MAJ-209: Brand: SINGLE USE SUCTION VALVE (STERILE)

## (undated) DEVICE — Z INACTIVE USE 2660664 SOLUTION IRRIG 3000ML 0.9% SOD CHL USP UROMATIC PLAS CONT

## (undated) DEVICE — BANDAGE COMPR W4INXL10YD WHITE/BEIGE E MTRX HK LOOP CLSR

## (undated) DEVICE — HANDPIECE SET WITH BONE CLEANING TIP AND SUCTION TUBE: Brand: INTERPULSE

## (undated) DEVICE — APPLIER LIG CLP M L11IN TI STR RNG HNDL FOR 20 CLP DISP

## (undated) DEVICE — NIPPER SURG NAIL 5.5 IN CONCAVE-JAW 2-SPRING FURST

## (undated) DEVICE — SYRINGE MED 50ML LUERLOCK TIP

## (undated) DEVICE — APPLICATOR MEDICATED 26 CC SOLUTION HI LT ORNG CHLORAPREP

## (undated) DEVICE — SET ORTHO STRYKER SHAVER AND INST

## (undated) DEVICE — DRAIN SURG PENROSE 0.25X12 IN CLOSED WND DRAINAGE PREM SIL

## (undated) DEVICE — CAESAR GRASPING FORCEPS: Brand: CAESAR

## (undated) DEVICE — NEEDLE HYPO 21GA L1.5IN GRN POLYPR HUB S STL REG BVL STR

## (undated) DEVICE — TUBING, SUCTION, 9/32" X 10', STRAIGHT: Brand: MEDLINE

## (undated) DEVICE — SET ORTHO STD STORTSTD2

## (undated) DEVICE — Z DISCONTINUED USE 2275686 GLOVE SURG SZ 8 L12IN FNGR THK13MIL WHT ISOLEX POLYISOPRENE

## (undated) DEVICE — BRONCHOSCOPY PACK: Brand: MEDLINE INDUSTRIES, INC.

## (undated) DEVICE — SET SURG BASIN MAYO REUSABLE

## (undated) DEVICE — TOWEL,OR,DSP,ST,BLUE,STD,6/PK,12PK/CS: Brand: MEDLINE

## (undated) DEVICE — RETRACTOR 50-101-1 RADIALUX US: Brand: RADIALUX™

## (undated) DEVICE — BNDG,ELSTC,MATRIX,STRL,6"X5YD,LF,HOOK&LP: Brand: MEDLINE

## (undated) DEVICE — GAUZE,SPONGE,AVANT,4"X4",4PLY,STRL,10/TR: Brand: MEDLINE

## (undated) DEVICE — DRAPE,REIN 53X77,STERILE: Brand: MEDLINE

## (undated) DEVICE — PADDING,UNDERCAST,COTTON, 4"X4YD STERILE: Brand: MEDLINE

## (undated) DEVICE — KIT EVAC 400CC DIA1/8IN H PAT 12.5IN 3 SPR RND SHP PVC DRN

## (undated) DEVICE — TIP SUCT 12FR EXT MTL TIP YANK FOR LT IRRIG VIT VUE

## (undated) DEVICE — TRAP,MUCUS SPECIMEN,40CC: Brand: MEDLINE

## (undated) DEVICE — BITEBLOCK 54FR W/ DENT RIM BLOX

## (undated) DEVICE — GOWN,AURORA,BRTHSLV,2XL,18/CS: Brand: MEDLINE

## (undated) DEVICE — SYRINGE 20ML LL S/C 50

## (undated) DEVICE — GLOVE SURG SZ 8 L12IN FNGR THK79MIL GRN LTX FREE

## (undated) DEVICE — CANNULA NSL ORAL AD FOR CAPNOFLEX CO2 O2 AIRLFE

## (undated) DEVICE — STERILE POLYISOPRENE POWDER-FREE SURGICAL GLOVES: Brand: PROTEXIS

## (undated) DEVICE — Device

## (undated) DEVICE — MARKER SURG SKIN FULL SZ PUR TRAD INK REGULAR ULTRA FN TWIN

## (undated) DEVICE — 3M™ IOBAN™ 2 ANTIMICROBIAL INCISE DRAPE 6640EZ: Brand: IOBAN™ 2

## (undated) DEVICE — BANDAGE,GAUZE,4.5"X4.1YD,STERILE,LF: Brand: MEDLINE

## (undated) DEVICE — NEEDLE HYPO 27GA L1.25IN GRY POLYPR HUB S STL REG BVL STR

## (undated) DEVICE — NEEDLE SPNL L3.5IN PNK HUB S STL REG WALL FIT STYL W/ QNCKE

## (undated) DEVICE — GOWN,BREATHABLE,IMP SLV 3XL AURORA: Brand: MEDLINE

## (undated) DEVICE — WIPE,PROTECTANT,SKIN,SUREPREP: Brand: MEDLINE

## (undated) DEVICE — DRESSING,GAUZE,XEROFORM,CURAD,5"X9",ST: Brand: CURAD

## (undated) DEVICE — DRESSING PETRO W3XL8IN OIL EMUL N ADH GZ KNIT IMPREG CELOS

## (undated) DEVICE — SOLUTION SURG PREP ANTIMICROBIAL 4 OZ SKIN WND EXIDINE

## (undated) DEVICE — TOTAL KNEE PK

## (undated) DEVICE — DRILL FOR 20MM SCREWS, STRYKER SHAFT

## (undated) DEVICE — PAD,ABDOMINAL,5"X9",ST,LF,25/BX: Brand: MEDLINE INDUSTRIES, INC.

## (undated) DEVICE — DRESSING,GAUZE,XEROFORM,CURAD,1"X8",ST: Brand: CURAD

## (undated) DEVICE — 3M™ STERI-DRAPE™ U-DRAPE 1015: Brand: STERI-DRAPE™

## (undated) DEVICE — SNARE ENDOSCP L240CM LOOP W13MM SHTH DIA2.4MM SM OVL FLX

## (undated) DEVICE — PAD,NON-ADHERENT,2X3,STERILE,LF,1/PK: Brand: MEDLINE

## (undated) DEVICE — NEEDLE SYR 18GA L1.5IN RED PLAS HUB S STL BLNT FILL W/O

## (undated) DEVICE — STRIP,CLOSURE,WOUND,MEDI-STRIP,1/2X4: Brand: MEDLINE

## (undated) DEVICE — ARTHROSCOPY PUMP, FLUID MANAGEMENT SYSTEM, DO NOT USE IF PACKAGE IS DAMAGED, KEEP DRY, KEEP AWAY FROM SUNLIGHT, PROTECT FROM HEAT AND RADIOACTIVE SOURCES.: Brand: FLOCONTROL, FLUID SAFE

## (undated) DEVICE — ELECTRODE PT RET AD L9FT HI MOIST COND ADH HYDRGEL CORDED

## (undated) DEVICE — BANDAGE COMPR W6INXL12FT SMOOTH FOR LIMB EXSANG ESMARCH

## (undated) DEVICE — PADDING CAST W6INXL4YD COT LO LINTING WYTEX

## (undated) DEVICE — ZIMMER® STERILE DISPOSABLE TOURNIQUET CUFF WITH PROTECTIVE SLEEVE AND PLC, DUAL PORT, SINGLE BLADDER, 24 IN. (61 CM)

## (undated) DEVICE — DEFENDO AIR WATER SUCTION AND BIOPSY VALVE KIT FOR  OLYMPUS: Brand: DEFENDO AIR/WATER/SUCTION AND BIOPSY VALVE

## (undated) DEVICE — SINGLE USE BIOPSY VALVE MAJ-210: Brand: SINGLE USE BIOPSY VALVE (STERILE)

## (undated) DEVICE — DRAPE, EXTREMITY, BILATERAL, STERILE: Brand: MEDLINE

## (undated) DEVICE — MICRODISSECTION NEEDLE STRAIGHT SLEEVE: Brand: COLORADO